# Patient Record
Sex: MALE | Race: WHITE | Employment: OTHER | URBAN - METROPOLITAN AREA
[De-identification: names, ages, dates, MRNs, and addresses within clinical notes are randomized per-mention and may not be internally consistent; named-entity substitution may affect disease eponyms.]

---

## 2017-01-03 ENCOUNTER — TELEPHONE (OUTPATIENT)
Dept: FAMILY MEDICINE CLINIC | Age: 57
End: 2017-01-03

## 2017-01-03 RX ORDER — GUAIFENESIN AND CODEINE PHOSPHATE 100; 10 MG/5ML; MG/5ML
5 SOLUTION ORAL 4 TIMES DAILY PRN
Qty: 240 ML | Refills: 0 | Status: SHIPPED | OUTPATIENT
Start: 2017-01-03 | End: 2017-08-22 | Stop reason: ALTCHOICE

## 2017-02-06 ENCOUNTER — OFFICE VISIT (OUTPATIENT)
Dept: FAMILY MEDICINE CLINIC | Age: 57
End: 2017-02-06

## 2017-02-06 VITALS
SYSTOLIC BLOOD PRESSURE: 136 MMHG | WEIGHT: 275 LBS | HEIGHT: 70 IN | BODY MASS INDEX: 39.37 KG/M2 | RESPIRATION RATE: 16 BRPM | TEMPERATURE: 97.9 F | DIASTOLIC BLOOD PRESSURE: 89 MMHG | HEART RATE: 89 BPM | OXYGEN SATURATION: 95 %

## 2017-02-06 DIAGNOSIS — R19.7 DIARRHEA, UNSPECIFIED TYPE: ICD-10-CM

## 2017-02-06 DIAGNOSIS — G51.0 BELL'S PALSY: Primary | ICD-10-CM

## 2017-02-06 PROCEDURE — 99215 OFFICE O/P EST HI 40 MIN: CPT | Performed by: NURSE PRACTITIONER

## 2017-02-06 RX ORDER — PREDNISONE 10 MG/1
TABLET ORAL
Qty: 57 TABLET | Refills: 0 | Status: SHIPPED | OUTPATIENT
Start: 2017-02-06 | End: 2017-08-22 | Stop reason: ALTCHOICE

## 2017-02-06 RX ORDER — TEMAZEPAM 15 MG/1
15 CAPSULE ORAL
COMMUNITY
Start: 2017-01-23 | End: 2018-03-28

## 2017-02-06 RX ORDER — VALACYCLOVIR HYDROCHLORIDE 1 G/1
1000 TABLET, FILM COATED ORAL 3 TIMES DAILY
Qty: 21 TABLET | Refills: 0 | Status: SHIPPED | OUTPATIENT
Start: 2017-02-06 | End: 2018-03-28 | Stop reason: ALTCHOICE

## 2017-02-06 RX ORDER — BENZONATATE 200 MG/1
CAPSULE ORAL
COMMUNITY
Start: 2017-01-23 | End: 2018-03-28 | Stop reason: ALTCHOICE

## 2017-02-06 RX ORDER — GLIMEPIRIDE 2 MG/1
TABLET ORAL
COMMUNITY
Start: 2016-12-22 | End: 2017-02-27 | Stop reason: SDUPTHER

## 2017-02-06 ASSESSMENT — ENCOUNTER SYMPTOMS
COUGH: 0
EYE DISCHARGE: 1
FLATUS: 1
VISUAL CHANGE: 0
VOMITING: 0
SHORTNESS OF BREATH: 0
NAUSEA: 0
BOWEL INCONTINENCE: 0
EYE REDNESS: 1
BLOOD IN STOOL: 0
DIARRHEA: 1
BACK PAIN: 0
CHOKING: 0
EYE PAIN: 0
PHOTOPHOBIA: 0
BLOATING: 0
CHEST TIGHTNESS: 0
ABDOMINAL PAIN: 1

## 2017-02-06 ASSESSMENT — PATIENT HEALTH QUESTIONNAIRE - PHQ9
SUM OF ALL RESPONSES TO PHQ QUESTIONS 1-9: 0
2. FEELING DOWN, DEPRESSED OR HOPELESS: 0
SUM OF ALL RESPONSES TO PHQ9 QUESTIONS 1 & 2: 0
1. LITTLE INTEREST OR PLEASURE IN DOING THINGS: 0

## 2017-02-22 ENCOUNTER — TELEPHONE (OUTPATIENT)
Dept: FAMILY MEDICINE CLINIC | Age: 57
End: 2017-02-22

## 2017-03-02 ENCOUNTER — OFFICE VISIT (OUTPATIENT)
Dept: FAMILY MEDICINE CLINIC | Age: 57
End: 2017-03-02

## 2017-03-02 VITALS
SYSTOLIC BLOOD PRESSURE: 118 MMHG | DIASTOLIC BLOOD PRESSURE: 80 MMHG | TEMPERATURE: 98.7 F | BODY MASS INDEX: 38.6 KG/M2 | WEIGHT: 269 LBS | HEART RATE: 93 BPM | RESPIRATION RATE: 16 BRPM

## 2017-03-02 DIAGNOSIS — M99.03 LUMBAR REGION SOMATIC DYSFUNCTION: Primary | ICD-10-CM

## 2017-03-02 DIAGNOSIS — I10 ESSENTIAL HYPERTENSION: ICD-10-CM

## 2017-03-02 PROCEDURE — 99213 OFFICE O/P EST LOW 20 MIN: CPT | Performed by: FAMILY MEDICINE

## 2017-03-02 ASSESSMENT — PATIENT HEALTH QUESTIONNAIRE - PHQ9
SUM OF ALL RESPONSES TO PHQ QUESTIONS 1-9: 1
1. LITTLE INTEREST OR PLEASURE IN DOING THINGS: 0
2. FEELING DOWN, DEPRESSED OR HOPELESS: 1
SUM OF ALL RESPONSES TO PHQ9 QUESTIONS 1 & 2: 1

## 2017-03-02 ASSESSMENT — ENCOUNTER SYMPTOMS
SHORTNESS OF BREATH: 0
DIARRHEA: 0
WHEEZING: 0
NAUSEA: 0
ABDOMINAL PAIN: 0
CONSTIPATION: 0

## 2017-03-08 DIAGNOSIS — R79.89 LOW VITAMIN D LEVEL: ICD-10-CM

## 2017-03-08 RX ORDER — ERGOCALCIFEROL (VITAMIN D2) 1250 MCG
50000 CAPSULE ORAL WEEKLY
Qty: 4 CAPSULE | Refills: 3 | Status: SHIPPED | OUTPATIENT
Start: 2017-03-08 | End: 2018-03-28 | Stop reason: ALTCHOICE

## 2017-04-05 ENCOUNTER — OFFICE VISIT (OUTPATIENT)
Dept: FAMILY MEDICINE CLINIC | Age: 57
End: 2017-04-05

## 2017-04-05 VITALS
TEMPERATURE: 98.7 F | SYSTOLIC BLOOD PRESSURE: 138 MMHG | HEART RATE: 102 BPM | DIASTOLIC BLOOD PRESSURE: 90 MMHG | RESPIRATION RATE: 16 BRPM | BODY MASS INDEX: 39.89 KG/M2 | WEIGHT: 278 LBS

## 2017-04-05 DIAGNOSIS — S46.919A STRAIN OF SHOULDER, UNSPECIFIED LATERALITY, INITIAL ENCOUNTER: ICD-10-CM

## 2017-04-05 DIAGNOSIS — I10 ESSENTIAL HYPERTENSION: Primary | ICD-10-CM

## 2017-04-05 PROCEDURE — 99213 OFFICE O/P EST LOW 20 MIN: CPT | Performed by: FAMILY MEDICINE

## 2017-04-05 ASSESSMENT — ENCOUNTER SYMPTOMS
SHORTNESS OF BREATH: 0
COUGH: 0
WHEEZING: 0
BACK PAIN: 0

## 2017-04-05 ASSESSMENT — PATIENT HEALTH QUESTIONNAIRE - PHQ9
SUM OF ALL RESPONSES TO PHQ QUESTIONS 1-9: 0
1. LITTLE INTEREST OR PLEASURE IN DOING THINGS: 0
SUM OF ALL RESPONSES TO PHQ9 QUESTIONS 1 & 2: 0

## 2017-04-07 DIAGNOSIS — E79.0 HYPERURICEMIA: Primary | ICD-10-CM

## 2017-04-07 DIAGNOSIS — E78.2 MIXED HYPERLIPIDEMIA: ICD-10-CM

## 2017-04-13 ENCOUNTER — TELEPHONE (OUTPATIENT)
Dept: FAMILY MEDICINE CLINIC | Age: 57
End: 2017-04-13

## 2017-04-17 ENCOUNTER — TELEPHONE (OUTPATIENT)
Dept: FAMILY MEDICINE CLINIC | Age: 57
End: 2017-04-17

## 2017-04-18 RX ORDER — LANCETS
1 EACH MISCELLANEOUS DAILY
Qty: 100 EACH | Refills: 3 | Status: SHIPPED | OUTPATIENT
Start: 2017-04-18 | End: 2017-04-24 | Stop reason: SDUPTHER

## 2017-04-24 RX ORDER — LANCETS
1 EACH MISCELLANEOUS DAILY
Qty: 100 EACH | Refills: 3 | Status: SHIPPED | OUTPATIENT
Start: 2017-04-24 | End: 2017-08-02 | Stop reason: SDUPTHER

## 2017-04-28 ENCOUNTER — TELEPHONE (OUTPATIENT)
Dept: FAMILY MEDICINE CLINIC | Age: 57
End: 2017-04-28

## 2017-05-16 ENCOUNTER — TELEPHONE (OUTPATIENT)
Dept: FAMILY MEDICINE CLINIC | Age: 57
End: 2017-05-16

## 2017-05-16 RX ORDER — LANCETS 28 GAUGE
1 EACH MISCELLANEOUS DAILY
Qty: 100 EACH | Refills: 3 | Status: SHIPPED | OUTPATIENT
Start: 2017-05-16 | End: 2017-12-13 | Stop reason: SDUPTHER

## 2017-05-16 RX ORDER — BLOOD-GLUCOSE METER
1 KIT MISCELLANEOUS DAILY
Qty: 1 KIT | Refills: 0 | Status: SHIPPED | OUTPATIENT
Start: 2017-05-16 | End: 2018-11-09 | Stop reason: SDUPTHER

## 2017-05-26 ENCOUNTER — HOSPITAL ENCOUNTER (OUTPATIENT)
Dept: GENERAL RADIOLOGY | Age: 57
Discharge: HOME | End: 2017-05-26

## 2017-05-26 ENCOUNTER — OFFICE VISIT (OUTPATIENT)
Dept: FAMILY MEDICINE CLINIC | Age: 57
End: 2017-05-26

## 2017-05-26 VITALS
OXYGEN SATURATION: 97 % | BODY MASS INDEX: 40.23 KG/M2 | DIASTOLIC BLOOD PRESSURE: 92 MMHG | HEIGHT: 70 IN | HEART RATE: 89 BPM | WEIGHT: 281 LBS | SYSTOLIC BLOOD PRESSURE: 138 MMHG

## 2017-05-26 DIAGNOSIS — M75.42 IMPINGEMENT SYNDROME OF LEFT SHOULDER: Primary | ICD-10-CM

## 2017-05-26 DIAGNOSIS — R52 PAIN: ICD-10-CM

## 2017-05-26 PROCEDURE — 99213 OFFICE O/P EST LOW 20 MIN: CPT | Performed by: NURSE PRACTITIONER

## 2017-05-26 ASSESSMENT — ENCOUNTER SYMPTOMS
BACK PAIN: 1
RESPIRATORY NEGATIVE: 1

## 2017-06-19 ENCOUNTER — TELEPHONE (OUTPATIENT)
Dept: FAMILY MEDICINE CLINIC | Age: 57
End: 2017-06-19

## 2017-07-06 RX ORDER — POTASSIUM CHLORIDE 20 MEQ/1
20 TABLET, EXTENDED RELEASE ORAL 2 TIMES DAILY
Qty: 180 TABLET | Refills: 3 | Status: SHIPPED | OUTPATIENT
Start: 2017-07-06 | End: 2018-07-09 | Stop reason: SDUPTHER

## 2017-07-26 RX ORDER — ATORVASTATIN CALCIUM 40 MG/1
40 TABLET, FILM COATED ORAL DAILY
Qty: 30 TABLET | Refills: 3 | Status: SHIPPED | OUTPATIENT
Start: 2017-07-26 | End: 2017-10-11 | Stop reason: SDUPTHER

## 2017-07-27 RX ORDER — GUAIFENESIN AND DEXTROMETHORPHAN HYDROBROMIDE 600; 30 MG/1; MG/1
1 TABLET, EXTENDED RELEASE ORAL 2 TIMES DAILY
Qty: 28 TABLET | Refills: 1 | Status: SHIPPED | OUTPATIENT
Start: 2017-07-27 | End: 2017-08-22 | Stop reason: ALTCHOICE

## 2017-08-02 DIAGNOSIS — M75.42 IMPINGEMENT SYNDROME OF LEFT SHOULDER: ICD-10-CM

## 2017-08-02 RX ORDER — LANCETS
1 EACH MISCELLANEOUS DAILY
Qty: 100 EACH | Refills: 3 | Status: SHIPPED | OUTPATIENT
Start: 2017-08-02 | End: 2017-12-13 | Stop reason: SDUPTHER

## 2017-08-22 ENCOUNTER — OFFICE VISIT (OUTPATIENT)
Dept: FAMILY MEDICINE CLINIC | Age: 57
End: 2017-08-22

## 2017-08-22 VITALS
HEART RATE: 88 BPM | RESPIRATION RATE: 16 BRPM | BODY MASS INDEX: 40.03 KG/M2 | SYSTOLIC BLOOD PRESSURE: 134 MMHG | WEIGHT: 279 LBS | DIASTOLIC BLOOD PRESSURE: 82 MMHG | TEMPERATURE: 97.2 F

## 2017-08-22 DIAGNOSIS — I10 ESSENTIAL HYPERTENSION: ICD-10-CM

## 2017-08-22 DIAGNOSIS — M75.52 BURSITIS OF SHOULDER, LEFT: Primary | ICD-10-CM

## 2017-08-22 PROCEDURE — 99213 OFFICE O/P EST LOW 20 MIN: CPT | Performed by: FAMILY MEDICINE

## 2017-08-22 RX ORDER — MELOXICAM 15 MG/1
15 TABLET ORAL DAILY
Qty: 20 TABLET | Refills: 0 | Status: SHIPPED | OUTPATIENT
Start: 2017-08-22 | End: 2017-11-08 | Stop reason: ALTCHOICE

## 2017-08-22 ASSESSMENT — ENCOUNTER SYMPTOMS
WHEEZING: 0
COUGH: 0
SHORTNESS OF BREATH: 0

## 2017-08-24 RX ORDER — BUMETANIDE 2 MG/1
TABLET ORAL
Qty: 270 TABLET | Refills: 1 | Status: SHIPPED | OUTPATIENT
Start: 2017-08-24 | End: 2018-03-28 | Stop reason: SDUPTHER

## 2017-08-29 RX ORDER — LOSARTAN POTASSIUM 25 MG/1
TABLET ORAL
Qty: 30 TABLET | Refills: 11 | Status: SHIPPED | OUTPATIENT
Start: 2017-08-29 | End: 2018-03-28 | Stop reason: ALTCHOICE

## 2017-08-29 RX ORDER — BUMETANIDE 2 MG/1
TABLET ORAL
Qty: 270 TABLET | Refills: 1 | Status: SHIPPED | OUTPATIENT
Start: 2017-08-29 | End: 2018-08-20 | Stop reason: SDUPTHER

## 2017-09-06 ENCOUNTER — TELEPHONE (OUTPATIENT)
Dept: FAMILY MEDICINE CLINIC | Age: 57
End: 2017-09-06

## 2017-10-11 ENCOUNTER — OFFICE VISIT (OUTPATIENT)
Dept: FAMILY MEDICINE CLINIC | Age: 57
End: 2017-10-11

## 2017-10-11 VITALS
TEMPERATURE: 98.2 F | BODY MASS INDEX: 40.18 KG/M2 | WEIGHT: 280 LBS | SYSTOLIC BLOOD PRESSURE: 138 MMHG | DIASTOLIC BLOOD PRESSURE: 88 MMHG | RESPIRATION RATE: 16 BRPM

## 2017-10-11 DIAGNOSIS — N39.0 URINARY TRACT INFECTION WITHOUT HEMATURIA, SITE UNSPECIFIED: Primary | ICD-10-CM

## 2017-10-11 DIAGNOSIS — I10 ESSENTIAL HYPERTENSION: ICD-10-CM

## 2017-10-11 DIAGNOSIS — Z00.00 ROUTINE GENERAL MEDICAL EXAMINATION AT A HEALTH CARE FACILITY: ICD-10-CM

## 2017-10-11 LAB
ALBUMIN SERPL-MCNC: 3.5 G/DL (ref 3.4–5)
ALP BLD-CCNC: 96 U/L (ref 40–129)
ALT SERPL-CCNC: 26 U/L (ref 10–40)
ANION GAP SERPL CALCULATED.3IONS-SCNC: 15 MMOL/L (ref 3–16)
AST SERPL-CCNC: 26 U/L (ref 15–37)
BASOPHILS ABSOLUTE: 0 K/UL (ref 0–0.2)
BASOPHILS RELATIVE PERCENT: 0.6 %
BILIRUB SERPL-MCNC: 0.5 MG/DL (ref 0–1)
BILIRUBIN DIRECT: <0.2 MG/DL (ref 0–0.3)
BILIRUBIN, INDIRECT: ABNORMAL MG/DL (ref 0–1)
BILIRUBIN, POC: NORMAL
BLOOD URINE, POC: NORMAL
BUN BLDV-MCNC: 21 MG/DL (ref 7–20)
CALCIUM SERPL-MCNC: 9 MG/DL (ref 8.3–10.6)
CHLORIDE BLD-SCNC: 101 MMOL/L (ref 99–110)
CHOLESTEROL, TOTAL: 185 MG/DL (ref 0–199)
CLARITY, POC: NORMAL
CO2: 27 MMOL/L (ref 21–32)
COLOR, POC: NORMAL
CREAT SERPL-MCNC: 1.5 MG/DL (ref 0.9–1.3)
EOSINOPHILS ABSOLUTE: 0.3 K/UL (ref 0–0.6)
EOSINOPHILS RELATIVE PERCENT: 4.3 %
GFR AFRICAN AMERICAN: 58
GFR NON-AFRICAN AMERICAN: 48
GLUCOSE BLD-MCNC: 114 MG/DL (ref 70–99)
GLUCOSE URINE, POC: NORMAL
HCT VFR BLD CALC: 43.1 % (ref 40.5–52.5)
HDLC SERPL-MCNC: 33 MG/DL (ref 40–60)
HEMOGLOBIN: 13.7 G/DL (ref 13.5–17.5)
KETONES, POC: NORMAL
LDL CHOLESTEROL CALCULATED: ABNORMAL MG/DL
LDL CHOLESTEROL DIRECT: 97 MG/DL
LEUKOCYTE EST, POC: NORMAL
LYMPHOCYTES ABSOLUTE: 1.4 K/UL (ref 1–5.1)
LYMPHOCYTES RELATIVE PERCENT: 21.5 %
MCH RBC QN AUTO: 29.1 PG (ref 26–34)
MCHC RBC AUTO-ENTMCNC: 31.8 G/DL (ref 31–36)
MCV RBC AUTO: 91.7 FL (ref 80–100)
MONOCYTES ABSOLUTE: 0.6 K/UL (ref 0–1.3)
MONOCYTES RELATIVE PERCENT: 9.5 %
NEUTROPHILS ABSOLUTE: 4.2 K/UL (ref 1.7–7.7)
NEUTROPHILS RELATIVE PERCENT: 64.1 %
NITRITE, POC: NORMAL
PDW BLD-RTO: 17.5 % (ref 12.4–15.4)
PH, POC: 7
PLATELET # BLD: 144 K/UL (ref 135–450)
PMV BLD AUTO: 9.3 FL (ref 5–10.5)
POTASSIUM SERPL-SCNC: 3.8 MMOL/L (ref 3.5–5.1)
PROTEIN, POC: NORMAL
RBC # BLD: 4.7 M/UL (ref 4.2–5.9)
SODIUM BLD-SCNC: 143 MMOL/L (ref 136–145)
SPECIFIC GRAVITY, POC: 1.02
TOTAL PROTEIN: 6.1 G/DL (ref 6.4–8.2)
TRIGL SERPL-MCNC: 434 MG/DL (ref 0–150)
UROBILINOGEN, POC: NORMAL
VLDLC SERPL CALC-MCNC: ABNORMAL MG/DL
WBC # BLD: 6.6 K/UL (ref 4–11)

## 2017-10-11 PROCEDURE — 81003 URINALYSIS AUTO W/O SCOPE: CPT | Performed by: FAMILY MEDICINE

## 2017-10-11 PROCEDURE — 99213 OFFICE O/P EST LOW 20 MIN: CPT | Performed by: FAMILY MEDICINE

## 2017-10-11 RX ORDER — ATORVASTATIN CALCIUM 40 MG/1
40 TABLET, FILM COATED ORAL DAILY
Qty: 30 TABLET | Refills: 3 | Status: SHIPPED | OUTPATIENT
Start: 2017-10-11 | End: 2018-03-06 | Stop reason: SDUPTHER

## 2017-10-11 NOTE — PROGRESS NOTES
Yes Cheryal Burkitt, DO   gabapentin (NEURONTIN) 300 MG capsule  Yes Historical Provider, MD   zaleplon (SONATA) 10 MG capsule Take 10 mg by mouth nightly as needed (takes 5-6 times a month when Melatonin really not effective)  Yes Historical Provider, MD   fluticasone (FLONASE) 50 MCG/ACT nasal spray 2 sprays by Nasal route Yes Historical Provider, MD   Melatonin 10 MG TABS Take 10 mg by mouth Yes Historical Provider, MD

## 2017-10-12 ENCOUNTER — TELEPHONE (OUTPATIENT)
Dept: FAMILY MEDICINE CLINIC | Age: 57
End: 2017-10-12

## 2017-10-15 ASSESSMENT — ENCOUNTER SYMPTOMS: SHORTNESS OF BREATH: 0

## 2017-11-06 NOTE — TELEPHONE ENCOUNTER
Patient return your call please call around  10 on Wednesday,       Or call before 4:45 tonight 11/6/17

## 2017-11-08 NOTE — TELEPHONE ENCOUNTER
Spoke to patient about labs. We stressed eating carb restriction etc. for sugar. Renal function discussed. Kind of stable elevation of creatinine. Recommend not taking anti-inflammatories, ibuprofen, Aleve, Motrin. Tylenol is okay. Recommend continue the same medicines.  See me in February

## 2017-11-28 RX ORDER — GLIMEPIRIDE 2 MG/1
TABLET ORAL
Qty: 30 TABLET | Refills: 5 | Status: SHIPPED | OUTPATIENT
Start: 2017-11-28 | End: 2018-03-28 | Stop reason: ALTCHOICE

## 2017-12-04 RX ORDER — METOPROLOL SUCCINATE 100 MG/1
TABLET, EXTENDED RELEASE ORAL
Qty: 90 TABLET | Refills: 3 | Status: ON HOLD | OUTPATIENT
Start: 2017-12-04 | End: 2018-11-30 | Stop reason: HOSPADM

## 2018-02-15 DIAGNOSIS — M75.42 IMPINGEMENT SYNDROME OF LEFT SHOULDER: ICD-10-CM

## 2018-02-23 ENCOUNTER — TELEPHONE (OUTPATIENT)
Dept: FAMILY MEDICINE CLINIC | Age: 58
End: 2018-02-23

## 2018-02-23 NOTE — TELEPHONE ENCOUNTER
Pt called wanting to know if you have a recommendation for an ophthalmologist for R eye blurriness. Advised pt that you can recommend someone, but it would be his responsibility to see if they're covered under his insurance. Please advise.

## 2018-02-23 NOTE — TELEPHONE ENCOUNTER
Give him the number of [facility]. They have a general referral number to connect him with someone near him.   Or [facility]    Yes, he needs to check about the insurance

## 2018-03-06 RX ORDER — ATORVASTATIN CALCIUM 40 MG/1
TABLET, FILM COATED ORAL
Qty: 30 TABLET | Refills: 3 | Status: SHIPPED | OUTPATIENT
Start: 2018-03-06 | End: 2018-07-09 | Stop reason: SDUPTHER

## 2018-03-28 ENCOUNTER — TELEPHONE (OUTPATIENT)
Dept: FAMILY MEDICINE CLINIC | Age: 58
End: 2018-03-28

## 2018-03-28 ENCOUNTER — OFFICE VISIT (OUTPATIENT)
Dept: FAMILY MEDICINE CLINIC | Age: 58
End: 2018-03-28

## 2018-03-28 VITALS
DIASTOLIC BLOOD PRESSURE: 86 MMHG | HEIGHT: 70 IN | OXYGEN SATURATION: 99 % | BODY MASS INDEX: 40.23 KG/M2 | WEIGHT: 281 LBS | HEART RATE: 93 BPM | SYSTOLIC BLOOD PRESSURE: 138 MMHG

## 2018-03-28 DIAGNOSIS — I10 ESSENTIAL HYPERTENSION: ICD-10-CM

## 2018-03-28 DIAGNOSIS — E11.9 TYPE 2 DIABETES MELLITUS WITHOUT COMPLICATION, WITHOUT LONG-TERM CURRENT USE OF INSULIN (HCC): ICD-10-CM

## 2018-03-28 DIAGNOSIS — H33.21 RIGHT RETINAL DETACHMENT: Primary | ICD-10-CM

## 2018-03-28 DIAGNOSIS — Z01.818 PRE-OP EXAM: ICD-10-CM

## 2018-03-28 DIAGNOSIS — E79.0 HYPERURICEMIA: ICD-10-CM

## 2018-03-28 LAB
A/G RATIO: 1.8 (ref 1.1–2.2)
ALBUMIN SERPL-MCNC: 4.3 G/DL (ref 3.4–5)
ALP BLD-CCNC: 132 U/L (ref 40–129)
ALT SERPL-CCNC: 32 U/L (ref 10–40)
ANION GAP SERPL CALCULATED.3IONS-SCNC: 21 MMOL/L (ref 3–16)
AST SERPL-CCNC: 26 U/L (ref 15–37)
BASOPHILS ABSOLUTE: 0 K/UL (ref 0–0.2)
BASOPHILS RELATIVE PERCENT: 0.5 %
BILIRUB SERPL-MCNC: 0.3 MG/DL (ref 0–1)
BUN BLDV-MCNC: 42 MG/DL (ref 7–20)
CALCIUM SERPL-MCNC: 9.6 MG/DL (ref 8.3–10.6)
CHLORIDE BLD-SCNC: 104 MMOL/L (ref 99–110)
CO2: 24 MMOL/L (ref 21–32)
CREAT SERPL-MCNC: 1.7 MG/DL (ref 0.9–1.3)
EOSINOPHILS ABSOLUTE: 0.7 K/UL (ref 0–0.6)
EOSINOPHILS RELATIVE PERCENT: 11.3 %
GFR AFRICAN AMERICAN: 50
GFR NON-AFRICAN AMERICAN: 41
GLOBULIN: 2.4 G/DL
GLUCOSE BLD-MCNC: 164 MG/DL (ref 70–99)
HCT VFR BLD CALC: 42.7 % (ref 40.5–52.5)
HEMOGLOBIN: 13.7 G/DL (ref 13.5–17.5)
LYMPHOCYTES ABSOLUTE: 1.2 K/UL (ref 1–5.1)
LYMPHOCYTES RELATIVE PERCENT: 19 %
MCH RBC QN AUTO: 29.9 PG (ref 26–34)
MCHC RBC AUTO-ENTMCNC: 32.2 G/DL (ref 31–36)
MCV RBC AUTO: 92.9 FL (ref 80–100)
MONOCYTES ABSOLUTE: 0.5 K/UL (ref 0–1.3)
MONOCYTES RELATIVE PERCENT: 8.3 %
NEUTROPHILS ABSOLUTE: 3.9 K/UL (ref 1.7–7.7)
NEUTROPHILS RELATIVE PERCENT: 60.9 %
PDW BLD-RTO: 17.3 % (ref 12.4–15.4)
PLATELET # BLD: 152 K/UL (ref 135–450)
PMV BLD AUTO: 10 FL (ref 5–10.5)
POTASSIUM SERPL-SCNC: 4.6 MMOL/L (ref 3.5–5.1)
RBC # BLD: 4.6 M/UL (ref 4.2–5.9)
SODIUM BLD-SCNC: 149 MMOL/L (ref 136–145)
TOTAL PROTEIN: 6.7 G/DL (ref 6.4–8.2)
URIC ACID, SERUM: 5.5 MG/DL (ref 3.5–7.2)
WBC # BLD: 6.5 K/UL (ref 4–11)

## 2018-03-28 PROCEDURE — 3046F HEMOGLOBIN A1C LEVEL >9.0%: CPT | Performed by: FAMILY MEDICINE

## 2018-03-28 PROCEDURE — G8484 FLU IMMUNIZE NO ADMIN: HCPCS | Performed by: FAMILY MEDICINE

## 2018-03-28 PROCEDURE — 99214 OFFICE O/P EST MOD 30 MIN: CPT | Performed by: FAMILY MEDICINE

## 2018-03-28 PROCEDURE — G8417 CALC BMI ABV UP PARAM F/U: HCPCS | Performed by: FAMILY MEDICINE

## 2018-03-28 PROCEDURE — 1036F TOBACCO NON-USER: CPT | Performed by: FAMILY MEDICINE

## 2018-03-28 PROCEDURE — 3017F COLORECTAL CA SCREEN DOC REV: CPT | Performed by: FAMILY MEDICINE

## 2018-03-28 PROCEDURE — G8427 DOCREV CUR MEDS BY ELIG CLIN: HCPCS | Performed by: FAMILY MEDICINE

## 2018-03-28 ASSESSMENT — ENCOUNTER SYMPTOMS
COUGH: 0
TROUBLE SWALLOWING: 0
SORE THROAT: 0
WHEEZING: 0
GASTROINTESTINAL NEGATIVE: 1
SHORTNESS OF BREATH: 0

## 2018-03-29 LAB
ESTIMATED AVERAGE GLUCOSE: 197.3 MG/DL
HBA1C MFR BLD: 8.5 %

## 2018-04-23 RX ORDER — ASPIRIN 81 MG/1
TABLET ORAL
Qty: 90 TABLET | Refills: 3 | Status: SHIPPED | OUTPATIENT
Start: 2018-04-23

## 2018-05-03 RX ORDER — BLOOD-GLUCOSE METER
KIT MISCELLANEOUS
Qty: 1 KIT | Refills: 0 | Status: SHIPPED | OUTPATIENT
Start: 2018-05-03 | End: 2018-11-09 | Stop reason: SDUPTHER

## 2018-05-03 RX ORDER — LANCETS 28 GAUGE
1 EACH MISCELLANEOUS DAILY
Qty: 100 EACH | Refills: 3 | Status: ON HOLD | OUTPATIENT
Start: 2018-05-03 | End: 2018-12-27 | Stop reason: HOSPADM

## 2018-05-05 DIAGNOSIS — M75.42 IMPINGEMENT SYNDROME OF LEFT SHOULDER: ICD-10-CM

## 2018-05-07 RX ORDER — GLIMEPIRIDE 2 MG/1
TABLET ORAL
Qty: 30 TABLET | Refills: 5 | Status: SHIPPED | OUTPATIENT
Start: 2018-05-07 | End: 2018-11-22

## 2018-05-07 RX ORDER — ALLOPURINOL 300 MG/1
TABLET ORAL
Qty: 90 TABLET | Refills: 3 | Status: SHIPPED | OUTPATIENT
Start: 2018-05-07 | End: 2019-02-27

## 2018-06-01 ENCOUNTER — TELEPHONE (OUTPATIENT)
Dept: ORTHOPEDIC SURGERY | Age: 58
End: 2018-06-01

## 2018-06-19 ENCOUNTER — OFFICE VISIT (OUTPATIENT)
Dept: FAMILY MEDICINE CLINIC | Age: 58
End: 2018-06-19

## 2018-06-19 VITALS
SYSTOLIC BLOOD PRESSURE: 138 MMHG | WEIGHT: 275 LBS | HEART RATE: 87 BPM | HEIGHT: 70 IN | DIASTOLIC BLOOD PRESSURE: 88 MMHG | BODY MASS INDEX: 39.37 KG/M2 | OXYGEN SATURATION: 98 %

## 2018-06-19 DIAGNOSIS — G93.1 ANOXIC BRAIN INJURY (HCC): ICD-10-CM

## 2018-06-19 DIAGNOSIS — E11.8 TYPE 2 DIABETES MELLITUS WITH COMPLICATION, WITHOUT LONG-TERM CURRENT USE OF INSULIN (HCC): Primary | ICD-10-CM

## 2018-06-19 DIAGNOSIS — R79.89 AZOTEMIA: ICD-10-CM

## 2018-06-19 DIAGNOSIS — I10 ESSENTIAL HYPERTENSION: ICD-10-CM

## 2018-06-19 LAB
CREATININE URINE POCT: 50
HBA1C MFR BLD: 7.3 %
MICROALBUMIN/CREAT 24H UR: 150 MG/G{CREAT}
MICROALBUMIN/CREAT UR-RTO: 300

## 2018-06-19 PROCEDURE — 99214 OFFICE O/P EST MOD 30 MIN: CPT | Performed by: FAMILY MEDICINE

## 2018-06-19 PROCEDURE — G8417 CALC BMI ABV UP PARAM F/U: HCPCS | Performed by: FAMILY MEDICINE

## 2018-06-19 PROCEDURE — 3045F PR MOST RECENT HEMOGLOBIN A1C LEVEL 7.0-9.0%: CPT | Performed by: FAMILY MEDICINE

## 2018-06-19 PROCEDURE — 83036 HEMOGLOBIN GLYCOSYLATED A1C: CPT | Performed by: FAMILY MEDICINE

## 2018-06-19 PROCEDURE — 3017F COLORECTAL CA SCREEN DOC REV: CPT | Performed by: FAMILY MEDICINE

## 2018-06-19 PROCEDURE — 1036F TOBACCO NON-USER: CPT | Performed by: FAMILY MEDICINE

## 2018-06-19 PROCEDURE — G8427 DOCREV CUR MEDS BY ELIG CLIN: HCPCS | Performed by: FAMILY MEDICINE

## 2018-06-19 PROCEDURE — 2022F DILAT RTA XM EVC RTNOPTHY: CPT | Performed by: FAMILY MEDICINE

## 2018-06-19 PROCEDURE — 82044 UR ALBUMIN SEMIQUANTITATIVE: CPT | Performed by: FAMILY MEDICINE

## 2018-06-19 ASSESSMENT — PATIENT HEALTH QUESTIONNAIRE - PHQ9
2. FEELING DOWN, DEPRESSED OR HOPELESS: 0
1. LITTLE INTEREST OR PLEASURE IN DOING THINGS: 0
SUM OF ALL RESPONSES TO PHQ QUESTIONS 1-9: 0
SUM OF ALL RESPONSES TO PHQ9 QUESTIONS 1 & 2: 0

## 2018-06-19 ASSESSMENT — ENCOUNTER SYMPTOMS
GASTROINTESTINAL NEGATIVE: 1
RESPIRATORY NEGATIVE: 1

## 2018-06-23 PROBLEM — E11.8 TYPE 2 DIABETES MELLITUS WITH COMPLICATION, WITHOUT LONG-TERM CURRENT USE OF INSULIN (HCC): Status: ACTIVE | Noted: 2018-06-23

## 2018-07-09 RX ORDER — POTASSIUM CHLORIDE 1500 MG/1
TABLET, EXTENDED RELEASE ORAL
Qty: 60 TABLET | Refills: 11 | Status: SHIPPED | OUTPATIENT
Start: 2018-07-09 | End: 2018-10-30 | Stop reason: ALTCHOICE

## 2018-07-31 ENCOUNTER — OFFICE VISIT (OUTPATIENT)
Dept: FAMILY MEDICINE CLINIC | Age: 58
End: 2018-07-31

## 2018-07-31 VITALS
OXYGEN SATURATION: 98 % | SYSTOLIC BLOOD PRESSURE: 128 MMHG | DIASTOLIC BLOOD PRESSURE: 74 MMHG | HEIGHT: 70 IN | WEIGHT: 275 LBS | TEMPERATURE: 98.2 F | HEART RATE: 84 BPM | BODY MASS INDEX: 39.37 KG/M2

## 2018-07-31 DIAGNOSIS — R13.12 OROPHARYNGEAL DYSPHAGIA: Primary | ICD-10-CM

## 2018-07-31 DIAGNOSIS — I10 ESSENTIAL HYPERTENSION: ICD-10-CM

## 2018-07-31 PROCEDURE — 99213 OFFICE O/P EST LOW 20 MIN: CPT | Performed by: FAMILY MEDICINE

## 2018-07-31 PROCEDURE — G8417 CALC BMI ABV UP PARAM F/U: HCPCS | Performed by: FAMILY MEDICINE

## 2018-07-31 PROCEDURE — 1036F TOBACCO NON-USER: CPT | Performed by: FAMILY MEDICINE

## 2018-07-31 PROCEDURE — 3017F COLORECTAL CA SCREEN DOC REV: CPT | Performed by: FAMILY MEDICINE

## 2018-07-31 PROCEDURE — G8427 DOCREV CUR MEDS BY ELIG CLIN: HCPCS | Performed by: FAMILY MEDICINE

## 2018-07-31 RX ORDER — FAMOTIDINE 40 MG/1
TABLET, FILM COATED ORAL
Qty: 30 TABLET | Refills: 3 | Status: SHIPPED | OUTPATIENT
Start: 2018-07-31 | End: 2018-11-22

## 2018-07-31 ASSESSMENT — ENCOUNTER SYMPTOMS
STRIDOR: 0
SORE THROAT: 1
WHEEZING: 0
TROUBLE SWALLOWING: 1

## 2018-08-01 DIAGNOSIS — M75.42 IMPINGEMENT SYNDROME OF LEFT SHOULDER: ICD-10-CM

## 2018-08-20 ENCOUNTER — OFFICE VISIT (OUTPATIENT)
Dept: FAMILY MEDICINE CLINIC | Age: 58
End: 2018-08-20

## 2018-08-20 VITALS
HEIGHT: 70 IN | WEIGHT: 273 LBS | DIASTOLIC BLOOD PRESSURE: 84 MMHG | OXYGEN SATURATION: 96 % | BODY MASS INDEX: 39.08 KG/M2 | SYSTOLIC BLOOD PRESSURE: 126 MMHG | HEART RATE: 95 BPM

## 2018-08-20 DIAGNOSIS — I10 ESSENTIAL HYPERTENSION: ICD-10-CM

## 2018-08-20 DIAGNOSIS — I10 ESSENTIAL HYPERTENSION: Primary | ICD-10-CM

## 2018-08-20 LAB
ANION GAP SERPL CALCULATED.3IONS-SCNC: 16 MMOL/L (ref 3–16)
BUN BLDV-MCNC: 29 MG/DL (ref 7–20)
CALCIUM SERPL-MCNC: 10.3 MG/DL (ref 8.3–10.6)
CHLORIDE BLD-SCNC: 103 MMOL/L (ref 99–110)
CO2: 27 MMOL/L (ref 21–32)
CREAT SERPL-MCNC: 1.9 MG/DL (ref 0.9–1.3)
GFR AFRICAN AMERICAN: 44
GFR NON-AFRICAN AMERICAN: 36
GLUCOSE BLD-MCNC: 129 MG/DL (ref 70–99)
POTASSIUM SERPL-SCNC: 4.5 MMOL/L (ref 3.5–5.1)
SODIUM BLD-SCNC: 146 MMOL/L (ref 136–145)

## 2018-08-20 PROCEDURE — 99213 OFFICE O/P EST LOW 20 MIN: CPT | Performed by: FAMILY MEDICINE

## 2018-08-20 PROCEDURE — 3017F COLORECTAL CA SCREEN DOC REV: CPT | Performed by: FAMILY MEDICINE

## 2018-08-20 PROCEDURE — G8417 CALC BMI ABV UP PARAM F/U: HCPCS | Performed by: FAMILY MEDICINE

## 2018-08-20 PROCEDURE — 1036F TOBACCO NON-USER: CPT | Performed by: FAMILY MEDICINE

## 2018-08-20 PROCEDURE — G8427 DOCREV CUR MEDS BY ELIG CLIN: HCPCS | Performed by: FAMILY MEDICINE

## 2018-08-20 RX ORDER — BUMETANIDE 2 MG/1
TABLET ORAL
Qty: 270 TABLET | Refills: 1 | Status: SHIPPED | OUTPATIENT
Start: 2018-08-20 | End: 2018-10-30 | Stop reason: ALTCHOICE

## 2018-08-20 ASSESSMENT — ENCOUNTER SYMPTOMS
WHEEZING: 0
CONSTIPATION: 0
SHORTNESS OF BREATH: 0
DIARRHEA: 0
COUGH: 0
BLOOD IN STOOL: 0

## 2018-08-22 DIAGNOSIS — R79.89 AZOTEMIA: ICD-10-CM

## 2018-08-22 DIAGNOSIS — I10 ESSENTIAL HYPERTENSION: Primary | ICD-10-CM

## 2018-08-28 ENCOUNTER — HOSPITAL ENCOUNTER (OUTPATIENT)
Dept: ULTRASOUND IMAGING | Age: 58
Discharge: HOME | End: 2018-08-28
Payer: COMMERCIAL

## 2018-08-28 DIAGNOSIS — I10 ESSENTIAL HYPERTENSION: ICD-10-CM

## 2018-08-28 DIAGNOSIS — R79.89 AZOTEMIA: ICD-10-CM

## 2018-08-28 PROCEDURE — 76770 US EXAM ABDO BACK WALL COMP: CPT

## 2018-09-27 ENCOUNTER — OFFICE VISIT (OUTPATIENT)
Dept: FAMILY MEDICINE CLINIC | Age: 58
End: 2018-09-27
Payer: COMMERCIAL

## 2018-09-27 VITALS
HEART RATE: 78 BPM | SYSTOLIC BLOOD PRESSURE: 136 MMHG | DIASTOLIC BLOOD PRESSURE: 88 MMHG | HEIGHT: 70 IN | BODY MASS INDEX: 38.94 KG/M2 | OXYGEN SATURATION: 98 % | WEIGHT: 272 LBS

## 2018-09-27 DIAGNOSIS — I10 ESSENTIAL HYPERTENSION: ICD-10-CM

## 2018-09-27 DIAGNOSIS — E11.8 TYPE 2 DIABETES MELLITUS WITH COMPLICATION, WITHOUT LONG-TERM CURRENT USE OF INSULIN (HCC): ICD-10-CM

## 2018-09-27 DIAGNOSIS — Z01.818 PRE-OP EXAM: ICD-10-CM

## 2018-09-27 DIAGNOSIS — H26.9 CATARACT OF RIGHT EYE, UNSPECIFIED CATARACT TYPE: Primary | ICD-10-CM

## 2018-09-27 LAB — HBA1C MFR BLD: 7.5 %

## 2018-09-27 PROCEDURE — 83036 HEMOGLOBIN GLYCOSYLATED A1C: CPT | Performed by: FAMILY MEDICINE

## 2018-09-27 PROCEDURE — 99214 OFFICE O/P EST MOD 30 MIN: CPT | Performed by: FAMILY MEDICINE

## 2018-09-27 PROCEDURE — G8427 DOCREV CUR MEDS BY ELIG CLIN: HCPCS | Performed by: FAMILY MEDICINE

## 2018-09-27 PROCEDURE — 1036F TOBACCO NON-USER: CPT | Performed by: FAMILY MEDICINE

## 2018-09-27 PROCEDURE — 3045F PR MOST RECENT HEMOGLOBIN A1C LEVEL 7.0-9.0%: CPT | Performed by: FAMILY MEDICINE

## 2018-09-27 PROCEDURE — G8417 CALC BMI ABV UP PARAM F/U: HCPCS | Performed by: FAMILY MEDICINE

## 2018-09-27 PROCEDURE — 2022F DILAT RTA XM EVC RTNOPTHY: CPT | Performed by: FAMILY MEDICINE

## 2018-09-27 PROCEDURE — 3017F COLORECTAL CA SCREEN DOC REV: CPT | Performed by: FAMILY MEDICINE

## 2018-09-27 RX ORDER — LOSARTAN POTASSIUM 25 MG/1
TABLET ORAL
Qty: 30 TABLET | Refills: 11 | Status: SHIPPED | OUTPATIENT
Start: 2018-09-27 | End: 2018-10-30 | Stop reason: ALTCHOICE

## 2018-09-27 ASSESSMENT — ENCOUNTER SYMPTOMS
COUGH: 0
WHEEZING: 0
SHORTNESS OF BREATH: 0
GASTROINTESTINAL NEGATIVE: 1

## 2018-09-27 NOTE — TELEPHONE ENCOUNTER
Pharm called to check the status of the Losartan refill. It was sent to the wrong office. Please send back to [facility] on file.

## 2018-10-02 DIAGNOSIS — N18.30 CKD (CHRONIC KIDNEY DISEASE) STAGE 3, GFR 30-59 ML/MIN (HCC): Primary | ICD-10-CM

## 2018-10-23 ENCOUNTER — CARE COORDINATION (OUTPATIENT)
Dept: FAMILY MEDICINE CLINIC | Age: 58
End: 2018-10-23

## 2018-10-30 ENCOUNTER — OFFICE VISIT (OUTPATIENT)
Dept: FAMILY MEDICINE CLINIC | Age: 58
End: 2018-10-30
Payer: COMMERCIAL

## 2018-10-30 ENCOUNTER — CARE COORDINATION (OUTPATIENT)
Dept: FAMILY MEDICINE CLINIC | Age: 58
End: 2018-10-30

## 2018-10-30 VITALS
WEIGHT: 276 LBS | BODY MASS INDEX: 39.6 KG/M2 | HEART RATE: 84 BPM | SYSTOLIC BLOOD PRESSURE: 138 MMHG | RESPIRATION RATE: 16 BRPM | TEMPERATURE: 98.4 F | DIASTOLIC BLOOD PRESSURE: 90 MMHG

## 2018-10-30 DIAGNOSIS — E11.8 TYPE 2 DIABETES MELLITUS WITH COMPLICATION, WITHOUT LONG-TERM CURRENT USE OF INSULIN (HCC): ICD-10-CM

## 2018-10-30 DIAGNOSIS — M79.10 MUSCLE PAIN: Primary | ICD-10-CM

## 2018-10-30 DIAGNOSIS — M79.10 MUSCLE PAIN: ICD-10-CM

## 2018-10-30 DIAGNOSIS — R07.9 CHEST PAIN, UNSPECIFIED TYPE: ICD-10-CM

## 2018-10-30 LAB
ANION GAP SERPL CALCULATED.3IONS-SCNC: 16 MMOL/L (ref 3–16)
BUN BLDV-MCNC: 41 MG/DL (ref 7–20)
CALCIUM SERPL-MCNC: 9.9 MG/DL (ref 8.3–10.6)
CHLORIDE BLD-SCNC: 100 MMOL/L (ref 99–110)
CHOLESTEROL, FASTING: 197 MG/DL (ref 0–199)
CO2: 29 MMOL/L (ref 21–32)
CREAT SERPL-MCNC: 2.4 MG/DL (ref 0.9–1.3)
GFR AFRICAN AMERICAN: 34
GFR NON-AFRICAN AMERICAN: 28
GLUCOSE BLD-MCNC: 133 MG/DL (ref 70–99)
HBA1C MFR BLD: 7.7 %
HDLC SERPL-MCNC: 33 MG/DL (ref 40–60)
LDL CHOLESTEROL CALCULATED: ABNORMAL MG/DL
LDL CHOLESTEROL DIRECT: 110 MG/DL
POTASSIUM SERPL-SCNC: 4.5 MMOL/L (ref 3.5–5.1)
SODIUM BLD-SCNC: 145 MMOL/L (ref 136–145)
TOTAL CK: 537 U/L (ref 39–308)
TRIGLYCERIDE, FASTING: 433 MG/DL (ref 0–150)
URIC ACID, SERUM: 4.5 MG/DL (ref 3.5–7.2)
VLDLC SERPL CALC-MCNC: ABNORMAL MG/DL

## 2018-10-30 PROCEDURE — G8484 FLU IMMUNIZE NO ADMIN: HCPCS | Performed by: NURSE PRACTITIONER

## 2018-10-30 PROCEDURE — 99214 OFFICE O/P EST MOD 30 MIN: CPT | Performed by: NURSE PRACTITIONER

## 2018-10-30 PROCEDURE — 3045F PR MOST RECENT HEMOGLOBIN A1C LEVEL 7.0-9.0%: CPT | Performed by: NURSE PRACTITIONER

## 2018-10-30 PROCEDURE — 2022F DILAT RTA XM EVC RTNOPTHY: CPT | Performed by: NURSE PRACTITIONER

## 2018-10-30 PROCEDURE — 83036 HEMOGLOBIN GLYCOSYLATED A1C: CPT | Performed by: NURSE PRACTITIONER

## 2018-10-30 PROCEDURE — G8417 CALC BMI ABV UP PARAM F/U: HCPCS | Performed by: NURSE PRACTITIONER

## 2018-10-30 PROCEDURE — 93000 ELECTROCARDIOGRAM COMPLETE: CPT | Performed by: NURSE PRACTITIONER

## 2018-10-30 PROCEDURE — G8427 DOCREV CUR MEDS BY ELIG CLIN: HCPCS | Performed by: NURSE PRACTITIONER

## 2018-10-30 PROCEDURE — 3017F COLORECTAL CA SCREEN DOC REV: CPT | Performed by: NURSE PRACTITIONER

## 2018-10-30 PROCEDURE — 1036F TOBACCO NON-USER: CPT | Performed by: NURSE PRACTITIONER

## 2018-10-30 RX ORDER — CYCLOBENZAPRINE HCL 10 MG
10 TABLET ORAL 3 TIMES DAILY PRN
Qty: 30 TABLET | Refills: 3 | Status: ON HOLD | OUTPATIENT
Start: 2018-10-30 | End: 2018-12-03

## 2018-10-30 RX ORDER — LOSARTAN POTASSIUM AND HYDROCHLOROTHIAZIDE 25; 100 MG/1; MG/1
1 TABLET ORAL DAILY
Qty: 30 TABLET | Refills: 3 | Status: SHIPPED | OUTPATIENT
Start: 2018-10-30 | End: 2018-11-22

## 2018-10-30 ASSESSMENT — ENCOUNTER SYMPTOMS
COUGH: 0
SHORTNESS OF BREATH: 0

## 2018-10-30 NOTE — CARE COORDINATION
(Comment: Nephrology)  Zone Management Tools:  Completed (Comment: DM)         Goals Addressed      Activity Plan                  I will increase my activity by contacting [facility] to see if they would help with $$ to join the [facility]    Barriers: impairment:  cognitive  Plan for overcoming my barriers: Working with RN [facility]  Confidence: 9/10  Anticipated Goal Completion Date: 11/30/18       Self Monitoring                  Self-Monitored Blood Glucose - I will check my blood sugar Fasting blood sugar  I will notify my provider of any trends of increasing or decreasing blood sugars over a 1 month period. I will notify my provider if I have any blood sugar readings less than 70 more than 2 times a month. Blood Pressure - I will take my blood pressure as directed - Daily  I will notify my provider of any trends of increasing or decreasing blood pressures over a month period of time. I will notify my provider of any changes in blood pressure associated with symptoms of dizziness, falls, passing out, headache, confusion/change in mental status. None Recently Recorded    Barriers: No BP cuff  Plan for overcoming my barriers: RN [facility] to inquire that Rx be sent in for BP cuff  Confidence: 9/10  Anticipated Goal Completion Date: 11/30/18              Prior to Admission medications    Medication Sig Start Date End Date Taking?  Authorizing Provider   losartan-hydrochlorothiazide (HYZAAR) 100-25 MG per tablet Take 1 tablet by mouth daily 10/30/18   PAYTON Munson - CNP   cyclobenzaprine (FLEXERIL) 10 MG tablet Take 1 tablet by mouth 3 times daily as needed for Muscle spasms 10/30/18   PAYTON Munson CNP   diclofenac (VOLTAREN) 50 MG EC tablet TAKE 1 TABLET BY MOUTH THREE TIMES DAILY WITH MEALS 8/2/18   PAYTON Alfredo CNP   famotidine (PEPCID) 40 MG tablet One tab daily before bed 7/31/18   Mary Rogers,    allopurinol (ZYLOPRIM) 100 MG tablet TAKE ONE TABLET BY MOUTH ONCE DAILY symptoms of low blood sugar?:  No       No patient-reported symptoms

## 2018-10-30 NOTE — PROGRESS NOTES
 Not on file. Social History Main Topics    Smoking status: Never Smoker    Smokeless tobacco: Never Used    Alcohol use No    Drug use: No    Sexual activity: Not on file     Other Topics Concern    Not on file     Social History Narrative    No narrative on file       Current Outpatient Prescriptions on File Prior to Visit   Medication Sig Dispense Refill    losartan (COZAAR) 25 MG tablet TAKE ONE TABLET BY MOUTH ONCE DAILY 30 tablet 11    bumetanide (BUMEX) 2 MG tablet TAKE THREE TABLETS BY MOUTH ONCE DAILY 270 tablet 1    diclofenac (VOLTAREN) 50 MG EC tablet TAKE 1 TABLET BY MOUTH THREE TIMES DAILY WITH MEALS 90 tablet 1    famotidine (PEPCID) 40 MG tablet One tab daily before bed 30 tablet 3    atorvastatin (LIPITOR) 40 MG tablet TAKE 1 TABLET BY MOUTH ONCE DAILY 30 tablet 5    KLOR-CON M20 20 MEQ extended release tablet TAKE ONE TABLET BY MOUTH TWICE DAILY 60 tablet 11    allopurinol (ZYLOPRIM) 100 MG tablet TAKE ONE TABLET BY MOUTH ONCE DAILY 90 tablet 3    amLODIPine (NORVASC) 5 MG tablet TAKE ONE TABLET BY MOUTH ONCE DAILY 90 tablet 3    allopurinol (ZYLOPRIM) 300 MG tablet TAKE ONE TABLET BY MOUTH ONCE DAILY IN  ADDITION  TO  THE  100MG  TABLET 90 tablet 3    glimepiride (AMARYL) 2 MG tablet TAKE ONE TABLET BY MOUTH ONCE DAILY IN THE MORNING BEFORE BREAKFAST 30 tablet 5    Blood Glucose Monitoring Suppl (FREESTYLE FREEDOM) KIT Test bid 1 kit 0    FREESTYLE LANCETS MISC 1 each by Does not apply route daily 100 each 3    glucose blood VI test strips (FREESTYLE TEST STRIPS) strip 1 each by In Vitro route daily As needed.  100 each 3    aspirin (ASPIRIN LOW DOSE) 81 MG EC tablet TAKE ONE TABLET BY MOUTH ONCE DAILY 90 tablet 3    ACCU-CHEK MULTICLIX LANCETS MISC USE ONE  TO CHECK GLUCOSE ONCE DAILY 102 each 3    metoprolol succinate (TOPROL XL) 100 MG extended release tablet TAKE ONE TABLET BY MOUTH ONCE DAILY 90 tablet 3    Blood Glucose Monitoring Suppl (FREESTYLE FREEDOM LITE)

## 2018-10-30 NOTE — PATIENT INSTRUCTIONS
Thank you for enrolling in [address] Please follow the instructions below to securely access your online medical record. shipbeat allows you to send messages to your doctor, view your test results, renew your prescriptions, schedule appointments, and more. How Do I Sign Up? 1. In your Internet browser, go to [email or web address]  2. Click on the Sign Up Now link in the Sign In box. You will see the New Member Sign Up page. 3. Enter your shipbeat Access Code exactly as it appears below. You will not need to use this code after youve completed the sign-up process. If you do not sign up before the expiration date, you must request a new code. shipbeat Access Code: Activation code not generated  Current shipbeat Status: Active    4. Enter your Social Security Number ([id number]) and Date of Birth (mm/dd/yyyy) as indicated and click Submit. You will be taken to the next sign-up page. 5. Create a shipbeat ID. This will be your shipbeat login ID and cannot be changed, so think of one that is secure and easy to remember. 6. Create a shipbeat password. You can change your password at any time. 7. Enter your Password Reset Question and Answer. This can be used at a later time if you forget your password. 8. Enter your e-mail address. You will receive e-mail notification when new information is available in [address] 9. Click Sign Up. You can now view your medical record. Additional Information  If you have questions, please contact your physician practice where you receive care. Remember, shipbeat is NOT to be used for urgent needs. For medical emergencies, dial 911.

## 2018-11-05 ENCOUNTER — TELEPHONE (OUTPATIENT)
Dept: FAMILY MEDICINE CLINIC | Age: 58
End: 2018-11-05

## 2018-11-05 DIAGNOSIS — N18.30 STAGE 3 CHRONIC KIDNEY DISEASE (HCC): Primary | ICD-10-CM

## 2018-11-05 DIAGNOSIS — M79.10 MUSCLE PAIN: ICD-10-CM

## 2018-11-05 DIAGNOSIS — E78.2 MIXED HYPERLIPIDEMIA: ICD-10-CM

## 2018-11-05 DIAGNOSIS — E78.2 ELEVATED CHOLESTEROL WITH HIGH TRIGLYCERIDES: ICD-10-CM

## 2018-11-09 ENCOUNTER — CARE COORDINATION (OUTPATIENT)
Dept: FAMILY MEDICINE CLINIC | Age: 58
End: 2018-11-09

## 2018-11-09 ENCOUNTER — OFFICE VISIT (OUTPATIENT)
Dept: FAMILY MEDICINE CLINIC | Age: 58
End: 2018-11-09
Payer: COMMERCIAL

## 2018-11-09 VITALS
WEIGHT: 267 LBS | TEMPERATURE: 98.5 F | SYSTOLIC BLOOD PRESSURE: 131 MMHG | BODY MASS INDEX: 38.31 KG/M2 | DIASTOLIC BLOOD PRESSURE: 85 MMHG | RESPIRATION RATE: 16 BRPM | HEART RATE: 78 BPM

## 2018-11-09 DIAGNOSIS — E66.01 MORBID OBESITY DUE TO EXCESS CALORIES (HCC): ICD-10-CM

## 2018-11-09 DIAGNOSIS — I10 HYPERTENSION, UNSPECIFIED TYPE: Primary | ICD-10-CM

## 2018-11-09 DIAGNOSIS — M75.52 BURSITIS OF SHOULDER, LEFT: Primary | ICD-10-CM

## 2018-11-09 PROCEDURE — 99213 OFFICE O/P EST LOW 20 MIN: CPT | Performed by: NURSE PRACTITIONER

## 2018-11-09 PROCEDURE — G8427 DOCREV CUR MEDS BY ELIG CLIN: HCPCS | Performed by: NURSE PRACTITIONER

## 2018-11-09 PROCEDURE — G8484 FLU IMMUNIZE NO ADMIN: HCPCS | Performed by: NURSE PRACTITIONER

## 2018-11-09 PROCEDURE — G8417 CALC BMI ABV UP PARAM F/U: HCPCS | Performed by: NURSE PRACTITIONER

## 2018-11-09 PROCEDURE — 3017F COLORECTAL CA SCREEN DOC REV: CPT | Performed by: NURSE PRACTITIONER

## 2018-11-09 PROCEDURE — 1036F TOBACCO NON-USER: CPT | Performed by: NURSE PRACTITIONER

## 2018-11-09 ASSESSMENT — ENCOUNTER SYMPTOMS
RESPIRATORY NEGATIVE: 1
EYES NEGATIVE: 1

## 2018-11-09 NOTE — PROGRESS NOTES
tablet Take 1 tablet by mouth daily 30 tablet 3    cyclobenzaprine (FLEXERIL) 10 MG tablet Take 1 tablet by mouth 3 times daily as needed for Muscle spasms 30 tablet 3    diclofenac (VOLTAREN) 50 MG EC tablet TAKE 1 TABLET BY MOUTH THREE TIMES DAILY WITH MEALS 90 tablet 1    famotidine (PEPCID) 40 MG tablet One tab daily before bed 30 tablet 3    allopurinol (ZYLOPRIM) 100 MG tablet TAKE ONE TABLET BY MOUTH ONCE DAILY 90 tablet 3    amLODIPine (NORVASC) 5 MG tablet TAKE ONE TABLET BY MOUTH ONCE DAILY 90 tablet 3    allopurinol (ZYLOPRIM) 300 MG tablet TAKE ONE TABLET BY MOUTH ONCE DAILY IN  ADDITION  TO  THE  100MG  TABLET 90 tablet 3    glimepiride (AMARYL) 2 MG tablet TAKE ONE TABLET BY MOUTH ONCE DAILY IN THE MORNING BEFORE BREAKFAST 30 tablet 5    FREESTYLE LANCETS MISC 1 each by Does not apply route daily 100 each 3    glucose blood VI test strips (FREESTYLE TEST STRIPS) strip 1 each by In Vitro route daily As needed. 100 each 3    aspirin (ASPIRIN LOW DOSE) 81 MG EC tablet TAKE ONE TABLET BY MOUTH ONCE DAILY 90 tablet 3    ACCU-CHEK MULTICLIX LANCETS MISC USE ONE  TO CHECK GLUCOSE ONCE DAILY 102 each 3    metoprolol succinate (TOPROL XL) 100 MG extended release tablet TAKE ONE TABLET BY MOUTH ONCE DAILY 90 tablet 3    Blood Glucose Monitoring Suppl (ACCU-CHEK COMPACT CARE KIT) KIT 1 kit by Does not apply route daily 1 kit 0    fluticasone (FLONASE) 50 MCG/ACT nasal spray 2 sprays by Nasal route      Melatonin 10 MG TABS Take 10 mg by mouth       No current facility-administered medications on file prior to visit. Review of Systems   Constitutional:        Obese BMI 38.31   Eyes: Negative. Eye pain: Hypertension treated with Norvaschypertension treated with Norvasc. Respiratory: Negative. Cardiovascular: Negative. Endocrine:        Diabetes treated with Amaryl   Neurological: Positive for dizziness, seizures and numbness.        Objective:     Physical Exam   Constitutional: He is

## 2018-11-14 ENCOUNTER — CARE COORDINATION (OUTPATIENT)
Dept: FAMILY MEDICINE CLINIC | Age: 58
End: 2018-11-14

## 2018-11-14 DIAGNOSIS — M75.42 IMPINGEMENT SYNDROME OF LEFT SHOULDER: ICD-10-CM

## 2018-11-14 DIAGNOSIS — N18.30 STAGE 3 CHRONIC KIDNEY DISEASE (HCC): ICD-10-CM

## 2018-11-14 DIAGNOSIS — I10 ESSENTIAL HYPERTENSION: Primary | ICD-10-CM

## 2018-11-14 NOTE — CARE COORDINATION
Called patient to advise. States that he will pick it up tomorrow. Instructed him to check his BP in the morning and write it down in his log book. Will call the patient in 2 weeks for BP readings. Patient verbalized understanding. No future appointments.

## 2018-11-22 ENCOUNTER — APPOINTMENT (OUTPATIENT)
Dept: GENERAL RADIOLOGY | Age: 58
DRG: 058 | End: 2018-11-22
Payer: COMMERCIAL

## 2018-11-22 ENCOUNTER — HOSPITAL ENCOUNTER (INPATIENT)
Age: 58
LOS: 1 days | Discharge: ANOTHER HOSPITAL | DRG: 058 | End: 2018-11-26
Attending: EMERGENCY MEDICINE | Admitting: INTERNAL MEDICINE
Payer: COMMERCIAL

## 2018-11-22 ENCOUNTER — APPOINTMENT (OUTPATIENT)
Dept: CT IMAGING | Age: 58
DRG: 058 | End: 2018-11-22
Payer: COMMERCIAL

## 2018-11-22 DIAGNOSIS — E78.5 HYPERLIPIDEMIA, UNSPECIFIED HYPERLIPIDEMIA TYPE: ICD-10-CM

## 2018-11-22 DIAGNOSIS — R77.8 ELEVATED TROPONIN: ICD-10-CM

## 2018-11-22 DIAGNOSIS — E11.22 TYPE 2 DIABETES MELLITUS WITH DIABETIC CHRONIC KIDNEY DISEASE, UNSPECIFIED CKD STAGE, UNSPECIFIED WHETHER LONG TERM INSULIN USE (HCC): ICD-10-CM

## 2018-11-22 DIAGNOSIS — R20.0 NUMBNESS AND TINGLING: Primary | ICD-10-CM

## 2018-11-22 DIAGNOSIS — R20.2 NUMBNESS AND TINGLING: Primary | ICD-10-CM

## 2018-11-22 DIAGNOSIS — E66.01 MORBID OBESITY (HCC): ICD-10-CM

## 2018-11-22 DIAGNOSIS — N18.9 CHRONIC KIDNEY DISEASE, UNSPECIFIED CKD STAGE: ICD-10-CM

## 2018-11-22 DIAGNOSIS — I10 ESSENTIAL HYPERTENSION: ICD-10-CM

## 2018-11-22 PROBLEM — I24.9 ACS (ACUTE CORONARY SYNDROME) (HCC): Status: ACTIVE | Noted: 2018-11-22

## 2018-11-22 LAB
A/G RATIO: 1.1 (ref 1.1–2.2)
ALBUMIN SERPL-MCNC: 3.9 G/DL (ref 3.4–5)
ALP BLD-CCNC: 103 U/L (ref 40–129)
ALT SERPL-CCNC: 30 U/L (ref 10–40)
ANION GAP SERPL CALCULATED.3IONS-SCNC: 15 MMOL/L (ref 3–16)
AST SERPL-CCNC: 32 U/L (ref 15–37)
BASOPHILS ABSOLUTE: 0 K/UL (ref 0–0.2)
BASOPHILS RELATIVE PERCENT: 0.5 %
BILIRUB SERPL-MCNC: 0.3 MG/DL (ref 0–1)
BILIRUBIN URINE: NEGATIVE
BLOOD, URINE: ABNORMAL
BUN BLDV-MCNC: 42 MG/DL (ref 7–20)
CALCIUM SERPL-MCNC: 9.6 MG/DL (ref 8.3–10.6)
CHLORIDE BLD-SCNC: 104 MMOL/L (ref 99–110)
CLARITY: CLEAR
CO2: 23 MMOL/L (ref 21–32)
COLOR: YELLOW
CREAT SERPL-MCNC: 2 MG/DL (ref 0.9–1.3)
EOSINOPHILS ABSOLUTE: 0.4 K/UL (ref 0–0.6)
EOSINOPHILS RELATIVE PERCENT: 6 %
EPITHELIAL CELLS, UA: ABNORMAL /HPF
GFR AFRICAN AMERICAN: 42
GFR NON-AFRICAN AMERICAN: 34
GLOBULIN: 3.4 G/DL
GLUCOSE BLD-MCNC: 121 MG/DL (ref 70–99)
GLUCOSE BLD-MCNC: 141 MG/DL (ref 70–99)
GLUCOSE URINE: NEGATIVE MG/DL
HCT VFR BLD CALC: 42.6 % (ref 40.5–52.5)
HEMOGLOBIN: 13.9 G/DL (ref 13.5–17.5)
KETONES, URINE: NEGATIVE MG/DL
LEUKOCYTE ESTERASE, URINE: NEGATIVE
LYMPHOCYTES ABSOLUTE: 1.5 K/UL (ref 1–5.1)
LYMPHOCYTES RELATIVE PERCENT: 21.6 %
MCH RBC QN AUTO: 29.8 PG (ref 26–34)
MCHC RBC AUTO-ENTMCNC: 32.5 G/DL (ref 31–36)
MCV RBC AUTO: 91.8 FL (ref 80–100)
MICROSCOPIC EXAMINATION: YES
MONOCYTES ABSOLUTE: 0.7 K/UL (ref 0–1.3)
MONOCYTES RELATIVE PERCENT: 10 %
NEUTROPHILS ABSOLUTE: 4.2 K/UL (ref 1.7–7.7)
NEUTROPHILS RELATIVE PERCENT: 61.9 %
NITRITE, URINE: NEGATIVE
PDW BLD-RTO: 17.4 % (ref 12.4–15.4)
PERFORMED ON: ABNORMAL
PH UA: 6.5
PLATELET # BLD: 134 K/UL (ref 135–450)
PMV BLD AUTO: 8.8 FL (ref 5–10.5)
POTASSIUM REFLEX MAGNESIUM: 4.2 MMOL/L (ref 3.5–5.1)
PROTEIN UA: >=300 MG/DL
RBC # BLD: 4.65 M/UL (ref 4.2–5.9)
RBC UA: ABNORMAL /HPF (ref 0–2)
SODIUM BLD-SCNC: 142 MMOL/L (ref 136–145)
SPECIFIC GRAVITY UA: 1.02
TOTAL PROTEIN: 7.3 G/DL (ref 6.4–8.2)
TROPONIN: 0.02 NG/ML
TROPONIN: 0.03 NG/ML
TROPONIN: 0.03 NG/ML
URINE REFLEX TO CULTURE: ABNORMAL
URINE TYPE: ABNORMAL
UROBILINOGEN, URINE: 0.2 E.U./DL
WBC # BLD: 6.7 K/UL (ref 4–11)
WBC UA: ABNORMAL /HPF (ref 0–5)

## 2018-11-22 PROCEDURE — 83036 HEMOGLOBIN GLYCOSYLATED A1C: CPT

## 2018-11-22 PROCEDURE — G0378 HOSPITAL OBSERVATION PER HR: HCPCS

## 2018-11-22 PROCEDURE — 85025 COMPLETE CBC W/AUTO DIFF WBC: CPT

## 2018-11-22 PROCEDURE — 96360 HYDRATION IV INFUSION INIT: CPT

## 2018-11-22 PROCEDURE — 71046 X-RAY EXAM CHEST 2 VIEWS: CPT

## 2018-11-22 PROCEDURE — 99285 EMERGENCY DEPT VISIT HI MDM: CPT

## 2018-11-22 PROCEDURE — 80053 COMPREHEN METABOLIC PANEL: CPT

## 2018-11-22 PROCEDURE — 6370000000 HC RX 637 (ALT 250 FOR IP): Performed by: INTERNAL MEDICINE

## 2018-11-22 PROCEDURE — 2580000003 HC RX 258: Performed by: INTERNAL MEDICINE

## 2018-11-22 PROCEDURE — 2580000003 HC RX 258: Performed by: PHYSICIAN ASSISTANT

## 2018-11-22 PROCEDURE — 93005 ELECTROCARDIOGRAM TRACING: CPT | Performed by: PHYSICIAN ASSISTANT

## 2018-11-22 PROCEDURE — 74018 RADEX ABDOMEN 1 VIEW: CPT

## 2018-11-22 PROCEDURE — 74176 CT ABD & PELVIS W/O CONTRAST: CPT

## 2018-11-22 PROCEDURE — 72131 CT LUMBAR SPINE W/O DYE: CPT

## 2018-11-22 PROCEDURE — 36415 COLL VENOUS BLD VENIPUNCTURE: CPT

## 2018-11-22 PROCEDURE — 81001 URINALYSIS AUTO W/SCOPE: CPT

## 2018-11-22 PROCEDURE — 84484 ASSAY OF TROPONIN QUANT: CPT

## 2018-11-22 RX ORDER — CHOLECALCIFEROL (VITAMIN D3) 125 MCG
10 CAPSULE ORAL DAILY
Status: DISCONTINUED | OUTPATIENT
Start: 2018-11-22 | End: 2018-11-26 | Stop reason: HOSPADM

## 2018-11-22 RX ORDER — FAMOTIDINE 20 MG/1
40 TABLET, FILM COATED ORAL DAILY
Status: DISCONTINUED | OUTPATIENT
Start: 2018-11-22 | End: 2018-11-22

## 2018-11-22 RX ORDER — METOPROLOL SUCCINATE 50 MG/1
100 TABLET, EXTENDED RELEASE ORAL DAILY
Status: DISCONTINUED | OUTPATIENT
Start: 2018-11-22 | End: 2018-11-26 | Stop reason: HOSPADM

## 2018-11-22 RX ORDER — ASPIRIN 81 MG/1
81 TABLET ORAL DAILY
Status: DISCONTINUED | OUTPATIENT
Start: 2018-11-22 | End: 2018-11-26 | Stop reason: HOSPADM

## 2018-11-22 RX ORDER — DEXTROSE MONOHYDRATE 25 G/50ML
12.5 INJECTION, SOLUTION INTRAVENOUS PRN
Status: DISCONTINUED | OUTPATIENT
Start: 2018-11-22 | End: 2018-11-26 | Stop reason: HOSPADM

## 2018-11-22 RX ORDER — GLIMEPIRIDE 2 MG/1
2 TABLET ORAL
Status: DISCONTINUED | OUTPATIENT
Start: 2018-11-23 | End: 2018-11-22

## 2018-11-22 RX ORDER — LANCETS 28 GAUGE
1 EACH MISCELLANEOUS DAILY
Status: DISCONTINUED | OUTPATIENT
Start: 2018-11-22 | End: 2018-11-22

## 2018-11-22 RX ORDER — NITROGLYCERIN 0.4 MG/1
0.4 TABLET SUBLINGUAL EVERY 5 MIN PRN
Status: DISCONTINUED | OUTPATIENT
Start: 2018-11-22 | End: 2018-11-26 | Stop reason: HOSPADM

## 2018-11-22 RX ORDER — BUMETANIDE 2 MG/1
2 TABLET ORAL 3 TIMES DAILY
Status: ON HOLD | COMMUNITY
End: 2018-12-27 | Stop reason: HOSPADM

## 2018-11-22 RX ORDER — POTASSIUM CHLORIDE 1.5 G/1.77G
20 POWDER, FOR SOLUTION ORAL 2 TIMES DAILY
Status: ON HOLD | COMMUNITY
End: 2018-12-27 | Stop reason: HOSPADM

## 2018-11-22 RX ORDER — ALLOPURINOL 100 MG/1
100 TABLET ORAL DAILY
Status: DISCONTINUED | OUTPATIENT
Start: 2018-11-22 | End: 2018-11-25

## 2018-11-22 RX ORDER — ONDANSETRON 2 MG/ML
4 INJECTION INTRAMUSCULAR; INTRAVENOUS EVERY 6 HOURS PRN
Status: DISCONTINUED | OUTPATIENT
Start: 2018-11-22 | End: 2018-11-26 | Stop reason: HOSPADM

## 2018-11-22 RX ORDER — SODIUM CHLORIDE 0.9 % (FLUSH) 0.9 %
10 SYRINGE (ML) INJECTION PRN
Status: DISCONTINUED | OUTPATIENT
Start: 2018-11-22 | End: 2018-11-26 | Stop reason: HOSPADM

## 2018-11-22 RX ORDER — DEXTROSE MONOHYDRATE 50 MG/ML
100 INJECTION, SOLUTION INTRAVENOUS PRN
Status: DISCONTINUED | OUTPATIENT
Start: 2018-11-22 | End: 2018-11-26 | Stop reason: HOSPADM

## 2018-11-22 RX ORDER — CYCLOBENZAPRINE HCL 10 MG
10 TABLET ORAL 3 TIMES DAILY PRN
Status: DISCONTINUED | OUTPATIENT
Start: 2018-11-22 | End: 2018-11-26 | Stop reason: HOSPADM

## 2018-11-22 RX ORDER — DOCUSATE SODIUM 100 MG/1
100 CAPSULE, LIQUID FILLED ORAL 2 TIMES DAILY
Status: DISCONTINUED | OUTPATIENT
Start: 2018-11-22 | End: 2018-11-26 | Stop reason: HOSPADM

## 2018-11-22 RX ORDER — LOSARTAN POTASSIUM AND HYDROCHLOROTHIAZIDE 25; 100 MG/1; MG/1
1 TABLET ORAL DAILY
Status: DISCONTINUED | OUTPATIENT
Start: 2018-11-22 | End: 2018-11-22

## 2018-11-22 RX ORDER — AMLODIPINE BESYLATE 5 MG/1
5 TABLET ORAL DAILY
Status: DISCONTINUED | OUTPATIENT
Start: 2018-11-22 | End: 2018-11-26 | Stop reason: HOSPADM

## 2018-11-22 RX ORDER — FLUTICASONE PROPIONATE 50 MCG
2 SPRAY, SUSPENSION (ML) NASAL DAILY
Status: DISCONTINUED | OUTPATIENT
Start: 2018-11-22 | End: 2018-11-22

## 2018-11-22 RX ORDER — ATORVASTATIN CALCIUM 40 MG/1
40 TABLET, FILM COATED ORAL NIGHTLY
Status: DISCONTINUED | OUTPATIENT
Start: 2018-11-22 | End: 2018-11-26 | Stop reason: HOSPADM

## 2018-11-22 RX ORDER — SODIUM CHLORIDE 0.9 % (FLUSH) 0.9 %
10 SYRINGE (ML) INJECTION EVERY 12 HOURS SCHEDULED
Status: DISCONTINUED | OUTPATIENT
Start: 2018-11-22 | End: 2018-11-26 | Stop reason: HOSPADM

## 2018-11-22 RX ORDER — ATORVASTATIN CALCIUM 40 MG/1
40 TABLET, FILM COATED ORAL DAILY
Status: ON HOLD | COMMUNITY
End: 2018-12-27 | Stop reason: HOSPADM

## 2018-11-22 RX ORDER — MAGNESIUM SULFATE 1 G/100ML
1 INJECTION INTRAVENOUS PRN
Status: DISCONTINUED | OUTPATIENT
Start: 2018-11-22 | End: 2018-11-26 | Stop reason: HOSPADM

## 2018-11-22 RX ORDER — NICOTINE POLACRILEX 4 MG
15 LOZENGE BUCCAL PRN
Status: DISCONTINUED | OUTPATIENT
Start: 2018-11-22 | End: 2018-11-26 | Stop reason: HOSPADM

## 2018-11-22 RX ORDER — 0.9 % SODIUM CHLORIDE 0.9 %
1000 INTRAVENOUS SOLUTION INTRAVENOUS ONCE
Status: COMPLETED | OUTPATIENT
Start: 2018-11-22 | End: 2018-11-22

## 2018-11-22 RX ADMIN — DOCUSATE SODIUM 100 MG: 100 CAPSULE, LIQUID FILLED ORAL at 21:11

## 2018-11-22 RX ADMIN — ATORVASTATIN CALCIUM 40 MG: 40 TABLET, FILM COATED ORAL at 21:11

## 2018-11-22 RX ADMIN — SODIUM CHLORIDE 1000 ML: 9 INJECTION, SOLUTION INTRAVENOUS at 10:02

## 2018-11-22 RX ADMIN — Medication 10 ML: at 21:12

## 2018-11-22 RX ADMIN — Medication 10 MG: at 21:12

## 2018-11-22 RX ADMIN — NITROGLYCERIN 1 INCH: 20 OINTMENT TOPICAL at 16:36

## 2018-11-22 ASSESSMENT — HEART SCORE: ECG: 1

## 2018-11-22 ASSESSMENT — PAIN SCALES - GENERAL: PAINLEVEL_OUTOF10: 0

## 2018-11-22 NOTE — PLAN OF CARE
Problem: Infection:  Goal: Will remain free from infection  Will remain free from infection  Outcome: Ongoing      Problem: Safety:  Goal: Free from accidental physical injury  Free from accidental physical injury  Outcome: Ongoing      Problem: Daily Care:  Goal: Daily care needs are met  Daily care needs are met  Outcome: Ongoing      Problem: Pain:  Goal: Patient's pain/discomfort is manageable  Patient's pain/discomfort is manageable  Outcome: Ongoing      Problem: Skin Integrity:  Goal: Skin integrity will stabilize  Skin integrity will stabilize  Outcome: Ongoing      Problem: Discharge Planning:  Goal: Patients continuum of care needs are met  Patients continuum of care needs are met  Outcome: Ongoing

## 2018-11-22 NOTE — ED PROVIDER NOTES
ondansetron (ZOFRAN) injection 4 mg (not administered)   atorvastatin (LIPITOR) tablet 40 mg (not administered)   nitroglycerin (NITRO-BID) 2 % ointment 1 inch (not administered)   nitroGLYCERIN (NITROSTAT) SL tablet 0.4 mg (not administered)   insulin lispro (HUMALOG) injection vial 0-6 Units (not administered)   insulin lispro (HUMALOG) injection vial 0-3 Units (not administered)   glucose (GLUTOSE) 40 % oral gel 15 g (not administered)   dextrose 50 % solution 12.5 g (not administered)   glucagon (rDNA) injection 1 mg (not administered)   dextrose 5 % solution (not administered)   magnesium sulfate 1 g in dextrose 5% 100 mL IVPB (not administered)   enoxaparin (LOVENOX) injection 40 mg (40 mg Subcutaneous Not Given 11/22/18 1445)   0.9 % sodium chloride bolus (0 mLs Intravenous Stopped 11/22/18 1102)     ED Course as of Nov 22 1602   Thu Nov 22, 2018   1157 : Discussed admission with hospitalist Dr. Abram Hope who indicates that she would like the patient have a CT of his abdomen and spine prior to admission. She is concerned about an underlying vascular condition.  [AR]   1158 Patient's labs returned showing no leukocytosis. No evidence of anemia. Normal electrolytes glucose elevated at 1 2142 creatinine at 2.0 GFR of 34 kidney function is actually improved from last labs evaluated. Elevated troponin at 0.03. Cannot find a history of troponin.  [AR]      ED Course User Index  [AR] Kathi Cooper PA-C     Re-Evaluations:  11/22/18      Time:   1200  Patients symptoms are improving. Consultations:             Ronni Lozoya HOSPITALIST   Dr Von Reinoso, agrees to admit the patient for serial troponin, Further evaluation and treatment    MDM:  Afebrile, stable, patient presents to the ED for evaluation. Seen in conjunction with attending ED provider who agrees with assessment and plan. Dr Nadine Mcintosh  Patients PO2 is 97% on room air they are not hypoxic, Patient is in no acute distress nontoxic on presentation.   Patient

## 2018-11-22 NOTE — ED NOTES
Report given to Omar Garcia RN. Patient DC from ED to PCU in stable condition via wheelchair on inpatient cardiac monitor.       Andres Islas RN  11/22/18 8835

## 2018-11-23 ENCOUNTER — APPOINTMENT (OUTPATIENT)
Dept: CT IMAGING | Age: 58
DRG: 058 | End: 2018-11-23
Payer: COMMERCIAL

## 2018-11-23 PROBLEM — R26.9 GAIT DISTURBANCE: Status: ACTIVE | Noted: 2018-11-23

## 2018-11-23 LAB
CHOLESTEROL, TOTAL: 191 MG/DL (ref 0–199)
EKG ATRIAL RATE: 94 BPM
EKG DIAGNOSIS: NORMAL
EKG P AXIS: 34 DEGREES
EKG P-R INTERVAL: 188 MS
EKG Q-T INTERVAL: 376 MS
EKG QRS DURATION: 112 MS
EKG QTC CALCULATION (BAZETT): 470 MS
EKG R AXIS: 57 DEGREES
EKG T AXIS: -57 DEGREES
EKG VENTRICULAR RATE: 94 BPM
ESTIMATED AVERAGE GLUCOSE: 177.2 MG/DL
GLUCOSE BLD-MCNC: 122 MG/DL (ref 70–99)
GLUCOSE BLD-MCNC: 122 MG/DL (ref 70–99)
GLUCOSE BLD-MCNC: 138 MG/DL (ref 70–99)
GLUCOSE BLD-MCNC: 166 MG/DL (ref 70–99)
HBA1C MFR BLD: 7.8 %
HCT VFR BLD CALC: 38.7 % (ref 40.5–52.5)
HDLC SERPL-MCNC: 27 MG/DL (ref 40–60)
HEMOGLOBIN: 12.6 G/DL (ref 13.5–17.5)
LDL CHOLESTEROL CALCULATED: ABNORMAL MG/DL
LDL CHOLESTEROL DIRECT: 107 MG/DL
LV EF: 43 %
LVEF MODALITY: NORMAL
MCH RBC QN AUTO: 29.7 PG (ref 26–34)
MCHC RBC AUTO-ENTMCNC: 32.5 G/DL (ref 31–36)
MCV RBC AUTO: 91.4 FL (ref 80–100)
PDW BLD-RTO: 17.4 % (ref 12.4–15.4)
PERFORMED ON: ABNORMAL
PLATELET # BLD: 136 K/UL (ref 135–450)
PMV BLD AUTO: 9.2 FL (ref 5–10.5)
RBC # BLD: 4.23 M/UL (ref 4.2–5.9)
TRIGL SERPL-MCNC: 422 MG/DL (ref 0–150)
VLDLC SERPL CALC-MCNC: ABNORMAL MG/DL
WBC # BLD: 6 K/UL (ref 4–11)

## 2018-11-23 PROCEDURE — 6370000000 HC RX 637 (ALT 250 FOR IP): Performed by: INTERNAL MEDICINE

## 2018-11-23 PROCEDURE — 2580000003 HC RX 258: Performed by: INTERNAL MEDICINE

## 2018-11-23 PROCEDURE — 83721 ASSAY OF BLOOD LIPOPROTEIN: CPT

## 2018-11-23 PROCEDURE — 70450 CT HEAD/BRAIN W/O DYE: CPT

## 2018-11-23 PROCEDURE — G0378 HOSPITAL OBSERVATION PER HR: HCPCS

## 2018-11-23 PROCEDURE — 94761 N-INVAS EAR/PLS OXIMETRY MLT: CPT

## 2018-11-23 PROCEDURE — 85027 COMPLETE CBC AUTOMATED: CPT

## 2018-11-23 PROCEDURE — 80061 LIPID PANEL: CPT

## 2018-11-23 PROCEDURE — 99226 PR SBSQ OBSERVATION CARE/DAY 35 MINUTES: CPT | Performed by: INTERNAL MEDICINE

## 2018-11-23 PROCEDURE — 93306 TTE W/DOPPLER COMPLETE: CPT

## 2018-11-23 PROCEDURE — 36415 COLL VENOUS BLD VENIPUNCTURE: CPT

## 2018-11-23 PROCEDURE — 93010 ELECTROCARDIOGRAM REPORT: CPT | Performed by: INTERNAL MEDICINE

## 2018-11-23 RX ADMIN — NITROGLYCERIN 1 INCH: 20 OINTMENT TOPICAL at 23:50

## 2018-11-23 RX ADMIN — Medication 10 ML: at 09:32

## 2018-11-23 RX ADMIN — CYCLOBENZAPRINE HYDROCHLORIDE 10 MG: 10 TABLET, FILM COATED ORAL at 18:08

## 2018-11-23 RX ADMIN — ATORVASTATIN CALCIUM 40 MG: 40 TABLET, FILM COATED ORAL at 21:17

## 2018-11-23 RX ADMIN — ASPIRIN 81 MG: 81 TABLET, COATED ORAL at 14:53

## 2018-11-23 RX ADMIN — Medication 10 ML: at 21:18

## 2018-11-23 RX ADMIN — ALLOPURINOL 100 MG: 100 TABLET ORAL at 09:31

## 2018-11-23 RX ADMIN — DOCUSATE SODIUM 100 MG: 100 CAPSULE, LIQUID FILLED ORAL at 21:17

## 2018-11-23 RX ADMIN — AMLODIPINE BESYLATE 5 MG: 5 TABLET ORAL at 09:31

## 2018-11-23 RX ADMIN — NITROGLYCERIN 1 INCH: 20 OINTMENT TOPICAL at 14:53

## 2018-11-23 RX ADMIN — METOPROLOL SUCCINATE 100 MG: 50 TABLET, EXTENDED RELEASE ORAL at 09:31

## 2018-11-23 RX ADMIN — NITROGLYCERIN 1 INCH: 20 OINTMENT TOPICAL at 00:06

## 2018-11-23 RX ADMIN — DOCUSATE SODIUM 100 MG: 100 CAPSULE, LIQUID FILLED ORAL at 09:31

## 2018-11-23 RX ADMIN — Medication 10 MG: at 21:18

## 2018-11-23 RX ADMIN — NITROGLYCERIN 1 INCH: 20 OINTMENT TOPICAL at 06:13

## 2018-11-24 ENCOUNTER — APPOINTMENT (OUTPATIENT)
Dept: NUCLEAR MEDICINE | Age: 58
DRG: 058 | End: 2018-11-24
Payer: COMMERCIAL

## 2018-11-24 LAB
GLUCOSE BLD-MCNC: 123 MG/DL (ref 70–99)
GLUCOSE BLD-MCNC: 123 MG/DL (ref 70–99)
GLUCOSE BLD-MCNC: 129 MG/DL (ref 70–99)
GLUCOSE BLD-MCNC: 139 MG/DL (ref 70–99)
LV EF: 37 %
LVEF MODALITY: NORMAL
PERFORMED ON: ABNORMAL

## 2018-11-24 PROCEDURE — 6360000002 HC RX W HCPCS: Performed by: INTERNAL MEDICINE

## 2018-11-24 PROCEDURE — G0378 HOSPITAL OBSERVATION PER HR: HCPCS

## 2018-11-24 PROCEDURE — A9502 TC99M TETROFOSMIN: HCPCS | Performed by: INTERNAL MEDICINE

## 2018-11-24 PROCEDURE — 93017 CV STRESS TEST TRACING ONLY: CPT

## 2018-11-24 PROCEDURE — 6370000000 HC RX 637 (ALT 250 FOR IP): Performed by: INTERNAL MEDICINE

## 2018-11-24 PROCEDURE — 78452 HT MUSCLE IMAGE SPECT MULT: CPT

## 2018-11-24 PROCEDURE — 2580000003 HC RX 258: Performed by: INTERNAL MEDICINE

## 2018-11-24 PROCEDURE — 3430000000 HC RX DIAGNOSTIC RADIOPHARMACEUTICAL: Performed by: INTERNAL MEDICINE

## 2018-11-24 RX ADMIN — METOPROLOL SUCCINATE 100 MG: 50 TABLET, EXTENDED RELEASE ORAL at 13:04

## 2018-11-24 RX ADMIN — ASPIRIN 81 MG: 81 TABLET, COATED ORAL at 13:04

## 2018-11-24 RX ADMIN — NITROGLYCERIN 1 INCH: 20 OINTMENT TOPICAL at 13:04

## 2018-11-24 RX ADMIN — TETROFOSMIN 11.3 MILLICURIE: 0.23 INJECTION, POWDER, LYOPHILIZED, FOR SOLUTION INTRAVENOUS at 08:27

## 2018-11-24 RX ADMIN — ALLOPURINOL 100 MG: 100 TABLET ORAL at 13:03

## 2018-11-24 RX ADMIN — TETROFOSMIN 34 MILLICURIE: 0.23 INJECTION, POWDER, LYOPHILIZED, FOR SOLUTION INTRAVENOUS at 10:06

## 2018-11-24 RX ADMIN — NITROGLYCERIN 1 INCH: 20 OINTMENT TOPICAL at 06:45

## 2018-11-24 RX ADMIN — ATORVASTATIN CALCIUM 40 MG: 40 TABLET, FILM COATED ORAL at 21:20

## 2018-11-24 RX ADMIN — REGADENOSON 0.4 MG: 0.08 INJECTION, SOLUTION INTRAVENOUS at 10:06

## 2018-11-24 RX ADMIN — DOCUSATE SODIUM 100 MG: 100 CAPSULE, LIQUID FILLED ORAL at 21:20

## 2018-11-24 RX ADMIN — DOCUSATE SODIUM 100 MG: 100 CAPSULE, LIQUID FILLED ORAL at 13:04

## 2018-11-24 RX ADMIN — AMLODIPINE BESYLATE 5 MG: 5 TABLET ORAL at 13:04

## 2018-11-24 RX ADMIN — Medication 10 MG: at 21:20

## 2018-11-24 RX ADMIN — NITROGLYCERIN 1 INCH: 20 OINTMENT TOPICAL at 22:52

## 2018-11-24 RX ADMIN — CYCLOBENZAPRINE HYDROCHLORIDE 10 MG: 10 TABLET, FILM COATED ORAL at 23:18

## 2018-11-24 RX ADMIN — Medication 10 ML: at 21:20

## 2018-11-24 RX ADMIN — NITROGLYCERIN 1 INCH: 20 OINTMENT TOPICAL at 18:46

## 2018-11-24 RX ADMIN — Medication 10 ML: at 13:05

## 2018-11-25 ENCOUNTER — APPOINTMENT (OUTPATIENT)
Dept: ULTRASOUND IMAGING | Age: 58
DRG: 058 | End: 2018-11-25
Payer: COMMERCIAL

## 2018-11-25 PROBLEM — I25.10 CAD IN NATIVE ARTERY: Status: ACTIVE | Noted: 2018-11-25

## 2018-11-25 LAB
ANION GAP SERPL CALCULATED.3IONS-SCNC: 12 MMOL/L (ref 3–16)
BUN BLDV-MCNC: 28 MG/DL (ref 7–20)
CALCIUM SERPL-MCNC: 9 MG/DL (ref 8.3–10.6)
CHLORIDE BLD-SCNC: 105 MMOL/L (ref 99–110)
CO2: 22 MMOL/L (ref 21–32)
CREAT SERPL-MCNC: 1.9 MG/DL (ref 0.9–1.3)
GFR AFRICAN AMERICAN: 44
GFR NON-AFRICAN AMERICAN: 36
GLUCOSE BLD-MCNC: 107 MG/DL (ref 70–99)
GLUCOSE BLD-MCNC: 115 MG/DL (ref 70–99)
GLUCOSE BLD-MCNC: 116 MG/DL (ref 70–99)
GLUCOSE BLD-MCNC: 119 MG/DL (ref 70–99)
GLUCOSE BLD-MCNC: 134 MG/DL (ref 70–99)
PERFORMED ON: ABNORMAL
POTASSIUM SERPL-SCNC: 4.1 MMOL/L (ref 3.5–5.1)
SODIUM BLD-SCNC: 139 MMOL/L (ref 136–145)

## 2018-11-25 PROCEDURE — 2060000000 HC ICU INTERMEDIATE R&B

## 2018-11-25 PROCEDURE — 6370000000 HC RX 637 (ALT 250 FOR IP): Performed by: HOSPITALIST

## 2018-11-25 PROCEDURE — 6370000000 HC RX 637 (ALT 250 FOR IP): Performed by: INTERNAL MEDICINE

## 2018-11-25 PROCEDURE — 80048 BASIC METABOLIC PNL TOTAL CA: CPT

## 2018-11-25 PROCEDURE — 2580000003 HC RX 258: Performed by: INTERNAL MEDICINE

## 2018-11-25 PROCEDURE — 99223 1ST HOSP IP/OBS HIGH 75: CPT | Performed by: INTERNAL MEDICINE

## 2018-11-25 PROCEDURE — 36415 COLL VENOUS BLD VENIPUNCTURE: CPT

## 2018-11-25 PROCEDURE — G0378 HOSPITAL OBSERVATION PER HR: HCPCS

## 2018-11-25 PROCEDURE — 76770 US EXAM ABDO BACK WALL COMP: CPT

## 2018-11-25 RX ORDER — SODIUM CHLORIDE 9 MG/ML
INJECTION, SOLUTION INTRAVENOUS CONTINUOUS
Status: DISCONTINUED | OUTPATIENT
Start: 2018-11-26 | End: 2018-11-26 | Stop reason: HOSPADM

## 2018-11-25 RX ORDER — ALLOPURINOL 300 MG/1
300 TABLET ORAL DAILY
Status: DISCONTINUED | OUTPATIENT
Start: 2018-11-26 | End: 2018-11-26 | Stop reason: HOSPADM

## 2018-11-25 RX ORDER — SODIUM CHLORIDE 0.9 % (FLUSH) 0.9 %
10 SYRINGE (ML) INJECTION PRN
Status: CANCELLED | OUTPATIENT
Start: 2018-11-25

## 2018-11-25 RX ORDER — ALLOPURINOL 100 MG/1
200 TABLET ORAL ONCE
Status: COMPLETED | OUTPATIENT
Start: 2018-11-25 | End: 2018-11-25

## 2018-11-25 RX ORDER — SODIUM CHLORIDE 0.9 % (FLUSH) 0.9 %
10 SYRINGE (ML) INJECTION EVERY 12 HOURS SCHEDULED
Status: CANCELLED | OUTPATIENT
Start: 2018-11-25

## 2018-11-25 RX ORDER — SODIUM CHLORIDE 9 MG/ML
INJECTION, SOLUTION INTRAVENOUS CONTINUOUS
Status: CANCELLED | OUTPATIENT
Start: 2018-11-25

## 2018-11-25 RX ADMIN — ALLOPURINOL 200 MG: 100 TABLET ORAL at 14:32

## 2018-11-25 RX ADMIN — ASPIRIN 81 MG: 81 TABLET, COATED ORAL at 08:39

## 2018-11-25 RX ADMIN — DOCUSATE SODIUM 100 MG: 100 CAPSULE, LIQUID FILLED ORAL at 08:39

## 2018-11-25 RX ADMIN — NITROGLYCERIN 1 INCH: 20 OINTMENT TOPICAL at 04:07

## 2018-11-25 RX ADMIN — Medication 10 ML: at 08:40

## 2018-11-25 RX ADMIN — DOCUSATE SODIUM 100 MG: 100 CAPSULE, LIQUID FILLED ORAL at 20:34

## 2018-11-25 RX ADMIN — METOPROLOL SUCCINATE 100 MG: 50 TABLET, EXTENDED RELEASE ORAL at 08:39

## 2018-11-25 RX ADMIN — ALLOPURINOL 100 MG: 100 TABLET ORAL at 08:40

## 2018-11-25 RX ADMIN — NITROGLYCERIN 1 INCH: 20 OINTMENT TOPICAL at 18:13

## 2018-11-25 RX ADMIN — AMLODIPINE BESYLATE 5 MG: 5 TABLET ORAL at 08:40

## 2018-11-25 RX ADMIN — ATORVASTATIN CALCIUM 40 MG: 40 TABLET, FILM COATED ORAL at 20:34

## 2018-11-25 RX ADMIN — Medication 10 ML: at 20:35

## 2018-11-25 RX ADMIN — NITROGLYCERIN 1 INCH: 20 OINTMENT TOPICAL at 13:50

## 2018-11-25 NOTE — CONSULTS
[address]  Advanced CHF/Pulmonary Hypertension   Cardiac Evaluation      Carroll Quick  YOB: 1960    Requesting PHysician:  Dr. Socorro Briceno      Chief Complaint   Patient presents with    Numbness     Patient reports sudden onset abd numbness that began this morning. Denies pain. Denies n/v/d/f. Denies SOB, CP        History of Present Illness:  Marquez Benjamin is a 59 yo male who presented to the ED several days ago with generalized pains in his upper extremities, weakness, and gait disturbance. He had been in his usual stage of health when he woke up and felt that something was wrong. He was concerned about a stroke. No slurred speech. No chest pain, shortness of breath, nausea, or vomiting. He has chronic bilateral peripheral edema. He had an echo with results below showing mild reduction in LV function. Yesterday he had a Lexiscan Myoview showing ischemia. He tells me that he had an MI in the mid 80's. He had a cath then at [facility]. He had some anoxia at the time and cannot remember a lot. Today he feels good. We are consulted for abnormal stress results. [facility] 11/24/18:   Moderate-large sized inferior, septal, and distal apical wall significant    partial reversibility defects consistent with mainly ischemia and some    infarction in the territory of the proximal to distal RCA and/or LAD. LV    function is moderately reduced with more prominent inferior hypokinesis and    ejection fraction of 37%. Higher risk abnormal study.      Echo:  98/82/17:  LV systolic function is mildly reduced with EF estimated from 40-45%.   More prominent septal hypokinesis is noted on certain views.   Left ventricular size is mildly increased.   There is mild concentric left ventricular hypertrophy.   Grade I diastolic dysfunction with normal filling pressure.   Aortic valve appears sclerotic but opens adequately.   Mild mitral regurgitation.   Inadequate tricuspid regurgitation to estimate

## 2018-11-25 NOTE — CONSULTS
[facility]    Consult Note           Reason for Consult:  [facility] on CKD  Requesting Physician:  Dr. Vane Carter    Chief Complaint:  Numbness tingling    History of Present Illness on 11/25/18:    60 y.o. yo male with PMH of CAD? , HTN, CKD baseline cr seems be around 1.9-2.4 who is admitted for numbness tingling unsteady gait on the day of admission  Had abnormal stress test and cardiology is planning on cardiac cath   He has CKD and nephrology was consulted   He has had HTN since high school and tels me that it was difficult to control for several years before it got controlled    Past Medical History:        Diagnosis Date    [facility]oxic brain [facility]) 1994    Cardiac arrest ([facility] 1994    Diabetes mellitus ([facility] [facility]     borderline    Gout     Hyperlipidemia     Hypertension     Reflux     Sleep apnea     Type 2 diabetes mellitus without complication ([facility] [address] [facility]        Past Surgical History:        Procedure Laterality Date    CARPAL TUNNEL RELEASE Left     CARPAL TUNNEL RELEASE Right 4/14/15    COLONOSCOPY  1/2014       Home Medications:    No current facility-administered medications on file prior to encounter.       Current Outpatient Prescriptions on File Prior to Encounter   Medication Sig Dispense Refill    allopurinol (ZYLOPRIM) 100 MG tablet TAKE ONE TABLET BY MOUTH ONCE DAILY 90 tablet 3    amLODIPine (NORVASC) 5 MG tablet TAKE ONE TABLET BY MOUTH ONCE DAILY 90 tablet 3    allopurinol (ZYLOPRIM) 300 MG tablet TAKE ONE TABLET BY MOUTH ONCE DAILY IN  ADDITION  TO  THE  100MG  TABLET 90 tablet 3    aspirin (ASPIRIN LOW DOSE) 81 MG EC tablet TAKE ONE TABLET BY MOUTH ONCE DAILY 90 tablet 3    metoprolol succinate (TOPROL XL) 100 MG extended release tablet TAKE ONE TABLET BY MOUTH ONCE DAILY 90 tablet 3    Melatonin 10 MG TABS Take 10 mg by mouth      cyclobenzaprine (FLEXERIL) 10 MG tablet Take 1 tablet by mouth 3 times daily as needed for Muscle spasms 30 tablet 3    FREESTYLE LANCETS MISC 1 each by Does not apply route daily 100 each 3    glucose blood VI test strips (FREESTYLE TEST STRIPS) strip 1 each by In Vitro route daily As needed. 100 each 3    ACCU-CHEK MULTICLIX LANCETS MISC USE ONE  TO CHECK GLUCOSE ONCE DAILY 102 each 3    Blood Glucose Monitoring Suppl (ACCU-CHEK COMPACT CARE KIT) KIT 1 kit by Does not apply route daily 1 kit 0       Allergies:  Enalapril; Nadolol; and Verapamil    Social History:    Social History     Social History    Marital status: Single     Spouse name: N/A    Number of children: N/A    Years of education: N/A     Occupational History    Not on file. Social History Main Topics    Smoking status: Never Smoker    Smokeless tobacco: Never Used    Alcohol use No    Drug use: No    Sexual activity: Not on file     Other Topics Concern    Not on file     Social History Narrative    No narrative on file       Family History:   Family History   Problem Relation Age of Onset    High Blood Pressure Mother     High Blood Pressure Father     High Blood Pressure Brother     High Blood Pressure Maternal Grandmother     High Blood Pressure Maternal Grandfather        Review of Systems:   Pertinent positives stated above in HPI. All other 10 systems were reviewed and were negative.      Physical exam:   Constitutional:  VITALS:  /72   Pulse 64   Temp 97.4 °F (36.3 °C) (Oral)   Resp 20   Ht 5' 10\" (1.778 m)   Wt 269 lb (122 kg)   SpO2 98%   BMI 38.60 kg/m²   Gen: alert, awake, nad  Skin: no rash, turgor wnl  Heent:  eomi, mmm  Neck: no bruits or jvd noted, thyroid normal  Cardiovascular:  S1, S2 without m/r/g  Respiratory: CTA B without w/r/r; respiratory effort normal  Abdomen:  +bs, soft, nt, nd, no hepatosplenomegaly  Ext: no lower extremity edema  Neuro/Psy: AAoriented times 3 ; moves all 4 ext  Musculoskeletal:  Rom, muscular strength intact; digits, nails normal    Data/  Recent Labs      11/23/18   0606   WBC  6.0   HGB  12.6*

## 2018-11-26 ENCOUNTER — HOSPITAL ENCOUNTER (INPATIENT)
Dept: CARDIAC CATH/INVASIVE PROCEDURES | Age: 58
LOS: 11 days | Discharge: ANOTHER HOSPITAL | DRG: 165 | End: 2018-12-07
Attending: INTERNAL MEDICINE | Admitting: THORACIC SURGERY (CARDIOTHORACIC VASCULAR SURGERY)
Payer: COMMERCIAL

## 2018-11-26 ENCOUNTER — APPOINTMENT (OUTPATIENT)
Dept: ULTRASOUND IMAGING | Age: 58
DRG: 058 | End: 2018-11-26
Payer: COMMERCIAL

## 2018-11-26 VITALS
SYSTOLIC BLOOD PRESSURE: 147 MMHG | OXYGEN SATURATION: 96 % | WEIGHT: 268.3 LBS | TEMPERATURE: 97.2 F | RESPIRATION RATE: 16 BRPM | DIASTOLIC BLOOD PRESSURE: 83 MMHG | HEART RATE: 69 BPM | HEIGHT: 70 IN | BODY MASS INDEX: 38.41 KG/M2

## 2018-11-26 PROBLEM — R94.39 ABNORMAL STRESS TEST: Status: ACTIVE | Noted: 2018-11-26

## 2018-11-26 LAB
ALBUMIN SERPL-MCNC: 3.6 G/DL (ref 3.4–5)
AMORPHOUS: ABNORMAL /HPF
ANION GAP SERPL CALCULATED.3IONS-SCNC: 12 MMOL/L (ref 3–16)
BILIRUBIN URINE: NEGATIVE
BLOOD, URINE: ABNORMAL
BUN BLDV-MCNC: 28 MG/DL (ref 7–20)
CALCIUM SERPL-MCNC: 9.3 MG/DL (ref 8.3–10.6)
CHLORIDE BLD-SCNC: 106 MMOL/L (ref 99–110)
CHLORIDE URINE RANDOM: 28 MMOL/L
CLARITY: CLEAR
CO2: 24 MMOL/L (ref 21–32)
COLOR: YELLOW
CREAT SERPL-MCNC: 2 MG/DL (ref 0.9–1.3)
CREATININE URINE: 108.4 MG/DL (ref 39–259)
EPITHELIAL CELLS, UA: ABNORMAL /HPF
GFR AFRICAN AMERICAN: 42
GFR NON-AFRICAN AMERICAN: 34
GLUCOSE BLD-MCNC: 106 MG/DL (ref 70–99)
GLUCOSE BLD-MCNC: 119 MG/DL (ref 70–99)
GLUCOSE BLD-MCNC: 134 MG/DL (ref 70–99)
GLUCOSE URINE: NEGATIVE MG/DL
KETONES, URINE: NEGATIVE MG/DL
LEUKOCYTE ESTERASE, URINE: NEGATIVE
MICROALBUMIN UR-MCNC: 376.7 MG/DL
MICROALBUMIN/CREAT UR-RTO: 3475.1 MG/G (ref 0–30)
MICROSCOPIC EXAMINATION: YES
MUCUS: ABNORMAL /LPF
NITRITE, URINE: NEGATIVE
PERFORMED ON: ABNORMAL
PERFORMED ON: ABNORMAL
PH UA: 6
PHOSPHORUS: 3.2 MG/DL (ref 2.5–4.9)
POTASSIUM SERPL-SCNC: 4.5 MMOL/L (ref 3.5–5.1)
POTASSIUM, UR: 26.8 MMOL/L
PROTEIN PROTEIN: 498 MG/DL
PROTEIN UA: >=300 MG/DL
PROTEIN/CREAT RATIO: 4.6 MG/DL
RBC UA: ABNORMAL /HPF (ref 0–2)
SODIUM BLD-SCNC: 142 MMOL/L (ref 136–145)
SODIUM URINE: 45 MMOL/L
SPECIFIC GRAVITY UA: 1.02
UROBILINOGEN, URINE: 0.2 E.U./DL
WBC UA: ABNORMAL /HPF (ref 0–5)

## 2018-11-26 PROCEDURE — 93458 L HRT ARTERY/VENTRICLE ANGIO: CPT | Performed by: INTERNAL MEDICINE

## 2018-11-26 PROCEDURE — 2500000003 HC RX 250 WO HCPCS

## 2018-11-26 PROCEDURE — 6370000000 HC RX 637 (ALT 250 FOR IP): Performed by: HOSPITALIST

## 2018-11-26 PROCEDURE — 36415 COLL VENOUS BLD VENIPUNCTURE: CPT

## 2018-11-26 PROCEDURE — 82436 ASSAY OF URINE CHLORIDE: CPT

## 2018-11-26 PROCEDURE — B2111ZZ FLUOROSCOPY OF MULTIPLE CORONARY ARTERIES USING LOW OSMOLAR CONTRAST: ICD-10-PCS | Performed by: INTERNAL MEDICINE

## 2018-11-26 PROCEDURE — 84300 ASSAY OF URINE SODIUM: CPT

## 2018-11-26 PROCEDURE — 84133 ASSAY OF URINE POTASSIUM: CPT

## 2018-11-26 PROCEDURE — 6370000000 HC RX 637 (ALT 250 FOR IP): Performed by: INTERNAL MEDICINE

## 2018-11-26 PROCEDURE — 4A023N7 MEASUREMENT OF CARDIAC SAMPLING AND PRESSURE, LEFT HEART, PERCUTANEOUS APPROACH: ICD-10-PCS | Performed by: INTERNAL MEDICINE

## 2018-11-26 PROCEDURE — C1894 INTRO/SHEATH, NON-LASER: HCPCS

## 2018-11-26 PROCEDURE — 99152 MOD SED SAME PHYS/QHP 5/>YRS: CPT | Performed by: INTERNAL MEDICINE

## 2018-11-26 PROCEDURE — 84156 ASSAY OF PROTEIN URINE: CPT

## 2018-11-26 PROCEDURE — 76775 US EXAM ABDO BACK WALL LIM: CPT

## 2018-11-26 PROCEDURE — P9047 ALBUMIN (HUMAN), 25%, 50ML: HCPCS

## 2018-11-26 PROCEDURE — 2709999900 HC NON-CHARGEABLE SUPPLY

## 2018-11-26 PROCEDURE — 81001 URINALYSIS AUTO W/SCOPE: CPT

## 2018-11-26 PROCEDURE — 80069 RENAL FUNCTION PANEL: CPT

## 2018-11-26 PROCEDURE — 6360000002 HC RX W HCPCS: Performed by: INTERNAL MEDICINE

## 2018-11-26 PROCEDURE — 6360000002 HC RX W HCPCS

## 2018-11-26 PROCEDURE — 6370000000 HC RX 637 (ALT 250 FOR IP)

## 2018-11-26 PROCEDURE — 2580000003 HC RX 258

## 2018-11-26 PROCEDURE — 82570 ASSAY OF URINE CREATININE: CPT

## 2018-11-26 PROCEDURE — 1200000000 HC SEMI PRIVATE

## 2018-11-26 PROCEDURE — 2580000003 HC RX 258: Performed by: INTERNAL MEDICINE

## 2018-11-26 PROCEDURE — 82043 UR ALBUMIN QUANTITATIVE: CPT

## 2018-11-26 PROCEDURE — 99153 MOD SED SAME PHYS/QHP EA: CPT | Performed by: INTERNAL MEDICINE

## 2018-11-26 PROCEDURE — C1769 GUIDE WIRE: HCPCS

## 2018-11-26 RX ORDER — ASPIRIN 81 MG/1
81 TABLET ORAL DAILY
Status: CANCELLED | OUTPATIENT
Start: 2018-11-26

## 2018-11-26 RX ORDER — NICOTINE POLACRILEX 4 MG
15 LOZENGE BUCCAL PRN
Status: CANCELLED | OUTPATIENT
Start: 2018-11-26

## 2018-11-26 RX ORDER — LABETALOL HYDROCHLORIDE 5 MG/ML
10 INJECTION, SOLUTION INTRAVENOUS ONCE
Status: COMPLETED | OUTPATIENT
Start: 2018-11-26 | End: 2018-11-26

## 2018-11-26 RX ORDER — ALLOPURINOL 300 MG/1
300 TABLET ORAL DAILY
Status: CANCELLED | OUTPATIENT
Start: 2018-11-26

## 2018-11-26 RX ORDER — METOPROLOL SUCCINATE 50 MG/1
100 TABLET, EXTENDED RELEASE ORAL DAILY
Status: CANCELLED | OUTPATIENT
Start: 2018-11-26

## 2018-11-26 RX ORDER — ASPIRIN 81 MG/1
243 TABLET, CHEWABLE ORAL ONCE
Status: COMPLETED | OUTPATIENT
Start: 2018-11-26 | End: 2018-11-26

## 2018-11-26 RX ORDER — ATORVASTATIN CALCIUM 10 MG/1
20 TABLET, FILM COATED ORAL DAILY
Status: DISCONTINUED | OUTPATIENT
Start: 2018-11-26 | End: 2018-12-03

## 2018-11-26 RX ORDER — SODIUM CHLORIDE 9 MG/ML
INJECTION, SOLUTION INTRAVENOUS CONTINUOUS
Status: ACTIVE | OUTPATIENT
Start: 2018-11-26 | End: 2018-11-26

## 2018-11-26 RX ORDER — CHOLECALCIFEROL (VITAMIN D3) 125 MCG
10 CAPSULE ORAL DAILY
Status: CANCELLED | OUTPATIENT
Start: 2018-11-26

## 2018-11-26 RX ORDER — MAGNESIUM SULFATE 1 G/100ML
1 INJECTION INTRAVENOUS PRN
Status: CANCELLED | OUTPATIENT
Start: 2018-11-26

## 2018-11-26 RX ORDER — DEXTROSE MONOHYDRATE 25 G/50ML
12.5 INJECTION, SOLUTION INTRAVENOUS PRN
Status: CANCELLED | OUTPATIENT
Start: 2018-11-26

## 2018-11-26 RX ORDER — POTASSIUM CHLORIDE 750 MG/1
20 TABLET, EXTENDED RELEASE ORAL 2 TIMES DAILY
Status: DISCONTINUED | OUTPATIENT
Start: 2018-11-26 | End: 2018-12-03

## 2018-11-26 RX ORDER — SODIUM CHLORIDE 0.9 % (FLUSH) 0.9 %
10 SYRINGE (ML) INJECTION EVERY 12 HOURS SCHEDULED
Status: CANCELLED | OUTPATIENT
Start: 2018-11-26

## 2018-11-26 RX ORDER — AMLODIPINE BESYLATE 5 MG/1
5 TABLET ORAL DAILY
Status: DISCONTINUED | OUTPATIENT
Start: 2018-11-26 | End: 2018-12-01

## 2018-11-26 RX ORDER — SODIUM CHLORIDE 0.9 % (FLUSH) 0.9 %
10 SYRINGE (ML) INJECTION PRN
Status: CANCELLED | OUTPATIENT
Start: 2018-11-26

## 2018-11-26 RX ORDER — BUMETANIDE 1 MG/1
2 TABLET ORAL 2 TIMES DAILY
Status: DISCONTINUED | OUTPATIENT
Start: 2018-11-26 | End: 2018-12-03

## 2018-11-26 RX ORDER — ATORVASTATIN CALCIUM 40 MG/1
40 TABLET, FILM COATED ORAL NIGHTLY
Status: CANCELLED | OUTPATIENT
Start: 2018-11-26

## 2018-11-26 RX ORDER — NITROGLYCERIN 0.4 MG/1
0.4 TABLET SUBLINGUAL EVERY 5 MIN PRN
Status: CANCELLED | OUTPATIENT
Start: 2018-11-26

## 2018-11-26 RX ORDER — SODIUM CHLORIDE 9 MG/ML
INJECTION, SOLUTION INTRAVENOUS CONTINUOUS
Status: CANCELLED | OUTPATIENT
Start: 2018-11-26

## 2018-11-26 RX ORDER — ASPIRIN 81 MG/1
81 TABLET ORAL DAILY
Status: DISCONTINUED | OUTPATIENT
Start: 2018-11-26 | End: 2018-12-03

## 2018-11-26 RX ORDER — AMLODIPINE BESYLATE 5 MG/1
5 TABLET ORAL DAILY
Status: CANCELLED | OUTPATIENT
Start: 2018-11-26

## 2018-11-26 RX ORDER — ONDANSETRON 2 MG/ML
4 INJECTION INTRAMUSCULAR; INTRAVENOUS EVERY 6 HOURS PRN
Status: CANCELLED | OUTPATIENT
Start: 2018-11-26

## 2018-11-26 RX ORDER — DOCUSATE SODIUM 100 MG/1
100 CAPSULE, LIQUID FILLED ORAL 2 TIMES DAILY
Status: CANCELLED | OUTPATIENT
Start: 2018-11-26

## 2018-11-26 RX ORDER — CYCLOBENZAPRINE HCL 10 MG
10 TABLET ORAL 3 TIMES DAILY PRN
Status: CANCELLED | OUTPATIENT
Start: 2018-11-26

## 2018-11-26 RX ORDER — GLIMEPIRIDE 2 MG/1
TABLET ORAL
Qty: 30 TABLET | Refills: 5 | Status: ON HOLD | OUTPATIENT
Start: 2018-11-26 | End: 2018-12-03

## 2018-11-26 RX ORDER — FUROSEMIDE 10 MG/ML
20 INJECTION INTRAMUSCULAR; INTRAVENOUS ONCE
Status: COMPLETED | OUTPATIENT
Start: 2018-11-26 | End: 2018-11-26

## 2018-11-26 RX ORDER — DEXTROSE MONOHYDRATE 50 MG/ML
100 INJECTION, SOLUTION INTRAVENOUS PRN
Status: CANCELLED | OUTPATIENT
Start: 2018-11-26

## 2018-11-26 RX ORDER — METOPROLOL SUCCINATE 50 MG/1
100 TABLET, EXTENDED RELEASE ORAL DAILY
Status: DISCONTINUED | OUTPATIENT
Start: 2018-11-26 | End: 2018-11-29

## 2018-11-26 RX ADMIN — Medication 10 MG: at 00:13

## 2018-11-26 RX ADMIN — AMLODIPINE BESYLATE 5 MG: 5 TABLET ORAL at 08:15

## 2018-11-26 RX ADMIN — ASPIRIN 81 MG: 81 TABLET, COATED ORAL at 08:15

## 2018-11-26 RX ADMIN — NITROGLYCERIN 1 INCH: 20 OINTMENT TOPICAL at 00:13

## 2018-11-26 RX ADMIN — TICAGRELOR 90 MG: 90 TABLET ORAL at 12:59

## 2018-11-26 RX ADMIN — ASPIRIN 243 MG: 81 TABLET, CHEWABLE ORAL at 09:56

## 2018-11-26 RX ADMIN — ASPIRIN 81 MG: 81 TABLET, COATED ORAL at 12:42

## 2018-11-26 RX ADMIN — POTASSIUM CHLORIDE 20 MEQ: 750 TABLET, EXTENDED RELEASE ORAL at 17:44

## 2018-11-26 RX ADMIN — TICAGRELOR 90 MG: 90 TABLET ORAL at 22:03

## 2018-11-26 RX ADMIN — ALLOPURINOL 300 MG: 300 TABLET ORAL at 08:15

## 2018-11-26 RX ADMIN — FUROSEMIDE 20 MG: 10 INJECTION, SOLUTION INTRAMUSCULAR; INTRAVENOUS at 17:45

## 2018-11-26 RX ADMIN — LABETALOL HYDROCHLORIDE 10 MG: 5 INJECTION, SOLUTION INTRAVENOUS at 12:00

## 2018-11-26 RX ADMIN — SODIUM CHLORIDE: 9 INJECTION, SOLUTION INTRAVENOUS at 12:40

## 2018-11-26 RX ADMIN — ATORVASTATIN CALCIUM 20 MG: 10 TABLET, FILM COATED ORAL at 22:06

## 2018-11-26 RX ADMIN — BUMETANIDE 2 MG: 1 TABLET ORAL at 22:03

## 2018-11-26 RX ADMIN — CYCLOBENZAPRINE HYDROCHLORIDE 10 MG: 10 TABLET, FILM COATED ORAL at 00:23

## 2018-11-26 RX ADMIN — BUMETANIDE 2 MG: 1 TABLET ORAL at 17:44

## 2018-11-26 RX ADMIN — SODIUM CHLORIDE: 9 INJECTION, SOLUTION INTRAVENOUS at 08:15

## 2018-11-26 RX ADMIN — METOPROLOL SUCCINATE 100 MG: 50 TABLET, EXTENDED RELEASE ORAL at 08:14

## 2018-11-26 RX ADMIN — NITROGLYCERIN 1 INCH: 20 OINTMENT TOPICAL at 05:59

## 2018-11-26 RX ADMIN — SODIUM CHLORIDE: 9 INJECTION, SOLUTION INTRAVENOUS at 00:13

## 2018-11-26 RX ADMIN — METOPROLOL SUCCINATE 100 MG: 50 TABLET, EXTENDED RELEASE ORAL at 12:43

## 2018-11-26 RX ADMIN — POTASSIUM CHLORIDE 20 MEQ: 750 TABLET, EXTENDED RELEASE ORAL at 22:03

## 2018-11-26 RX ADMIN — DOCUSATE SODIUM 100 MG: 100 CAPSULE, LIQUID FILLED ORAL at 08:14

## 2018-11-26 ASSESSMENT — PAIN SCALES - GENERAL: PAINLEVEL_OUTOF10: 0

## 2018-11-26 NOTE — CARE COORDINATION
DISCHARGE ORDER  Date/Time 2018 2:32 PM  Completed by: Giselle Pérez, Case Management    Patient Name: Rozina Mckeon    : 1960  Admitting Diagnosis: ACS (acute coronary syndrome) ([facility] [I24.9]  CAD in native artery [I25.10]  Admit Date/Time: 2018  9:35 AM    Noted discharge order. Discharge Plan: Noted pt transferred to [facility] for cath.

## 2018-11-26 NOTE — PROGRESS NOTES
11/26/18 0809 called first care and spoke to [facility] re pt needing to go to [facility] cath lab. ETA 45 min.  Roberto Carlos Lloyd
Patient BP noted to be elevated. Patient states electronic BP machines don't work for him and they must be done manually. Manual /85.
Per Dr. Mauricio Bending orders placed for transfer to [facility] for further interventional cardiac evaluation  with Dr. Viraj Melton. Pt to arrive at ASAP this am. RN, Shift lead, MD, cath lab and unit secretary all aware. Transportation arrangements made through [facility] care transport.  Risks benefits and alternatives to transfer discussed and pt agreeable to proceed Violeta Lees RN    [facility] Hospitalist aware to call [facility] hospitalist for transfer report
Pt arrived back from Stress Test at this time to room 316-1. Pt states in no discomfort or pain. VS obtained. Shift assessment complete. AM med pass w/sip of water. NPO status addressed to pt continuing until results are back. Pt up to chair ambulating in room independently with no issues with gait or imbalance noted. Will continue to monitor.
Report to [address] [place] [address]. Aware pt completed stress test via [facility]. Pt is waiting to have second scan completed and then will return to assigned room.
consistent with mainly ischemia and some   infarction in the territory of the proximal to distal RCA and/or LAD. LV   function is moderately reduced with more prominent inferior hypokinesis and   ejection fraction of 37%. Higher risk abnormal study    Summary of echo    LV systolic function is mildly reduced with EF estimated from 40-45%.   More prominent septal hypokinesis is noted on certain views.   Left ventricular size is mildly increased.   There is mild concentric left ventricular hypertrophy.   Grade I diastolic dysfunction with normal filling pressure.   Aortic valve appears sclerotic but opens adequately.   Mild mitral regurgitation.   Inadequate tricuspid regurgitation to estimate systolic pulmonary artery   pressure. Renal USG  FINDINGS:       Kidneys:       The right kidney measures 11.8 cm in length and the left kidney measures 11.8   cm in length.       Kidneys demonstrate abnormal increased cortical echogenicity.  There are   small bilateral simple renal cysts ranging in size up to 3.3 cm in diameter. There is no hydronephrosis. Devonda Gavia is a tiny nonobstructing 1 mm right   intrarenal calculus.           Bladder:       The bladder is incompletely distended and thus not evaluated.           Impression   1. Medical renal disease. 2. Nonobstructing right nephrolithiasis. Assessment  -CKD III , recent cr has been around 1.9 to 2.4; on admission it was 2; Likely from HTN nephrosclerosis, possibly has DM as well. Ua shows proteinuria of >300 on 11/22 w 3-5 RBCs and has had proteinuria since 2016.  Repeat UA w >300 protein but no wbcs, rbcs  -proteinuria   Work up in progress; likely from DM  -abnormal stress test   -Numbness tingling of arms and unsteady gait   -CHF, combined ,seesm compensated   -gout  -DM (newly diagnosed , he believed that he had borderline Dm for a while and was not on any meds)   Hb a1c from 11/22/18 was 7.8  -HTN   Seems better in the last several checks    Plan  -continue

## 2018-11-27 PROBLEM — I25.119 CORONARY ARTERY DISEASE INVOLVING NATIVE CORONARY ARTERY OF NATIVE HEART WITH ANGINA PECTORIS (HCC): Status: ACTIVE | Noted: 2018-11-25

## 2018-11-27 LAB
ANION GAP SERPL CALCULATED.3IONS-SCNC: 12 MMOL/L (ref 3–16)
BUN BLDV-MCNC: 29 MG/DL (ref 7–20)
CALCIUM SERPL-MCNC: 9.7 MG/DL (ref 8.3–10.6)
CHLORIDE BLD-SCNC: 104 MMOL/L (ref 99–110)
CO2: 24 MMOL/L (ref 21–32)
CREAT SERPL-MCNC: 1.9 MG/DL (ref 0.9–1.3)
GFR AFRICAN AMERICAN: 44
GFR NON-AFRICAN AMERICAN: 36
GLUCOSE BLD-MCNC: 111 MG/DL (ref 70–99)
GLUCOSE BLD-MCNC: 113 MG/DL (ref 70–99)
GLUCOSE BLD-MCNC: 123 MG/DL (ref 70–99)
GLUCOSE BLD-MCNC: 131 MG/DL (ref 70–99)
HCT VFR BLD CALC: 43.5 % (ref 40.5–52.5)
HEMOGLOBIN: 14.2 G/DL (ref 13.5–17.5)
MCH RBC QN AUTO: 29.6 PG (ref 26–34)
MCHC RBC AUTO-ENTMCNC: 32.7 G/DL (ref 31–36)
MCV RBC AUTO: 90.7 FL (ref 80–100)
PDW BLD-RTO: 17.7 % (ref 12.4–15.4)
PERFORMED ON: ABNORMAL
PLATELET # BLD: 159 K/UL (ref 135–450)
PMV BLD AUTO: 8.8 FL (ref 5–10.5)
POTASSIUM REFLEX MAGNESIUM: 4.1 MMOL/L (ref 3.5–5.1)
RBC # BLD: 4.79 M/UL (ref 4.2–5.9)
SODIUM BLD-SCNC: 140 MMOL/L (ref 136–145)
WBC # BLD: 6.4 K/UL (ref 4–11)

## 2018-11-27 PROCEDURE — 6370000000 HC RX 637 (ALT 250 FOR IP): Performed by: INTERNAL MEDICINE

## 2018-11-27 PROCEDURE — 1200000000 HC SEMI PRIVATE

## 2018-11-27 PROCEDURE — 36415 COLL VENOUS BLD VENIPUNCTURE: CPT

## 2018-11-27 PROCEDURE — 80048 BASIC METABOLIC PNL TOTAL CA: CPT

## 2018-11-27 PROCEDURE — 85027 COMPLETE CBC AUTOMATED: CPT

## 2018-11-27 PROCEDURE — 99024 POSTOP FOLLOW-UP VISIT: CPT | Performed by: INTERNAL MEDICINE

## 2018-11-27 RX ORDER — NICOTINE POLACRILEX 4 MG
15 LOZENGE BUCCAL PRN
Status: DISCONTINUED | OUTPATIENT
Start: 2018-11-27 | End: 2018-12-04

## 2018-11-27 RX ORDER — DEXTROSE MONOHYDRATE 50 MG/ML
100 INJECTION, SOLUTION INTRAVENOUS PRN
Status: DISCONTINUED | OUTPATIENT
Start: 2018-11-27 | End: 2018-12-04

## 2018-11-27 RX ORDER — DEXTROSE MONOHYDRATE 25 G/50ML
12.5 INJECTION, SOLUTION INTRAVENOUS PRN
Status: DISCONTINUED | OUTPATIENT
Start: 2018-11-27 | End: 2018-12-04

## 2018-11-27 RX ADMIN — AMLODIPINE BESYLATE 5 MG: 5 TABLET ORAL at 09:53

## 2018-11-27 RX ADMIN — POTASSIUM CHLORIDE 20 MEQ: 750 TABLET, EXTENDED RELEASE ORAL at 20:40

## 2018-11-27 RX ADMIN — BUMETANIDE 2 MG: 1 TABLET ORAL at 09:53

## 2018-11-27 RX ADMIN — TICAGRELOR 90 MG: 90 TABLET ORAL at 20:40

## 2018-11-27 RX ADMIN — TICAGRELOR 90 MG: 90 TABLET ORAL at 09:53

## 2018-11-27 RX ADMIN — POTASSIUM CHLORIDE 20 MEQ: 750 TABLET, EXTENDED RELEASE ORAL at 09:53

## 2018-11-27 RX ADMIN — ATORVASTATIN CALCIUM 20 MG: 10 TABLET, FILM COATED ORAL at 09:53

## 2018-11-27 RX ADMIN — ASPIRIN 81 MG: 81 TABLET, COATED ORAL at 09:53

## 2018-11-27 RX ADMIN — METOPROLOL SUCCINATE 100 MG: 50 TABLET, EXTENDED RELEASE ORAL at 09:52

## 2018-11-27 ASSESSMENT — PAIN SCALES - GENERAL
PAINLEVEL_OUTOF10: 0

## 2018-11-28 ENCOUNTER — TELEPHONE (OUTPATIENT)
Dept: FAMILY MEDICINE CLINIC | Age: 58
End: 2018-11-28

## 2018-11-28 LAB
ANION GAP SERPL CALCULATED.3IONS-SCNC: 14 MMOL/L (ref 3–16)
APTT: 34.2 SEC (ref 26–36)
BUN BLDV-MCNC: 34 MG/DL (ref 7–20)
CALCIUM SERPL-MCNC: 9.7 MG/DL (ref 8.3–10.6)
CHLORIDE BLD-SCNC: 105 MMOL/L (ref 99–110)
CO2: 22 MMOL/L (ref 21–32)
CREAT SERPL-MCNC: 2 MG/DL (ref 0.9–1.3)
GFR AFRICAN AMERICAN: 42
GFR NON-AFRICAN AMERICAN: 34
GLUCOSE BLD-MCNC: 117 MG/DL (ref 70–99)
GLUCOSE BLD-MCNC: 123 MG/DL (ref 70–99)
GLUCOSE BLD-MCNC: 125 MG/DL (ref 70–99)
GLUCOSE BLD-MCNC: 130 MG/DL (ref 70–99)
GLUCOSE BLD-MCNC: 142 MG/DL (ref 70–99)
HCT VFR BLD CALC: 45.7 % (ref 40.5–52.5)
HEMOGLOBIN: 14.7 G/DL (ref 13.5–17.5)
INR BLD: 1.05 (ref 0.86–1.14)
MCH RBC QN AUTO: 29.2 PG (ref 26–34)
MCHC RBC AUTO-ENTMCNC: 32 G/DL (ref 31–36)
MCV RBC AUTO: 91 FL (ref 80–100)
PDW BLD-RTO: 17.2 % (ref 12.4–15.4)
PERFORMED ON: ABNORMAL
PLATELET # BLD: 177 K/UL (ref 135–450)
PMV BLD AUTO: 8.7 FL (ref 5–10.5)
POTASSIUM REFLEX MAGNESIUM: 4.3 MMOL/L (ref 3.5–5.1)
PROTHROMBIN TIME: 12 SEC (ref 9.8–13)
RBC # BLD: 5.03 M/UL (ref 4.2–5.9)
SODIUM BLD-SCNC: 141 MMOL/L (ref 136–145)
WBC # BLD: 6.7 K/UL (ref 4–11)

## 2018-11-28 PROCEDURE — 6370000000 HC RX 637 (ALT 250 FOR IP): Performed by: INTERNAL MEDICINE

## 2018-11-28 PROCEDURE — 1200000000 HC SEMI PRIVATE

## 2018-11-28 PROCEDURE — 85730 THROMBOPLASTIN TIME PARTIAL: CPT

## 2018-11-28 PROCEDURE — 85027 COMPLETE CBC AUTOMATED: CPT

## 2018-11-28 PROCEDURE — 99024 POSTOP FOLLOW-UP VISIT: CPT | Performed by: INTERNAL MEDICINE

## 2018-11-28 PROCEDURE — 36415 COLL VENOUS BLD VENIPUNCTURE: CPT

## 2018-11-28 PROCEDURE — 80048 BASIC METABOLIC PNL TOTAL CA: CPT

## 2018-11-28 PROCEDURE — 85610 PROTHROMBIN TIME: CPT

## 2018-11-28 RX ORDER — SENNA AND DOCUSATE SODIUM 50; 8.6 MG/1; MG/1
2 TABLET, FILM COATED ORAL 2 TIMES DAILY
Status: DISCONTINUED | OUTPATIENT
Start: 2018-11-28 | End: 2018-12-04

## 2018-11-28 RX ADMIN — STANDARDIZED SENNA CONCENTRATE AND DOCUSATE SODIUM 2 TABLET: 8.6; 5 TABLET, FILM COATED ORAL at 09:03

## 2018-11-28 RX ADMIN — BUMETANIDE 2 MG: 1 TABLET ORAL at 09:03

## 2018-11-28 RX ADMIN — ASPIRIN 81 MG: 81 TABLET, COATED ORAL at 09:03

## 2018-11-28 RX ADMIN — POTASSIUM CHLORIDE 20 MEQ: 750 TABLET, EXTENDED RELEASE ORAL at 20:27

## 2018-11-28 RX ADMIN — METOPROLOL SUCCINATE 100 MG: 50 TABLET, EXTENDED RELEASE ORAL at 09:03

## 2018-11-28 RX ADMIN — TICAGRELOR 90 MG: 90 TABLET ORAL at 09:03

## 2018-11-28 RX ADMIN — AMLODIPINE BESYLATE 5 MG: 5 TABLET ORAL at 09:03

## 2018-11-28 RX ADMIN — ATORVASTATIN CALCIUM 20 MG: 10 TABLET, FILM COATED ORAL at 09:03

## 2018-11-28 RX ADMIN — TICAGRELOR 90 MG: 90 TABLET ORAL at 20:27

## 2018-11-28 RX ADMIN — STANDARDIZED SENNA CONCENTRATE AND DOCUSATE SODIUM 2 TABLET: 8.6; 5 TABLET, FILM COATED ORAL at 20:27

## 2018-11-28 RX ADMIN — POTASSIUM CHLORIDE 20 MEQ: 750 TABLET, EXTENDED RELEASE ORAL at 09:03

## 2018-11-29 LAB
ANION GAP SERPL CALCULATED.3IONS-SCNC: 12 MMOL/L (ref 3–16)
BUN BLDV-MCNC: 40 MG/DL (ref 7–20)
CALCIUM SERPL-MCNC: 9.5 MG/DL (ref 8.3–10.6)
CHLORIDE BLD-SCNC: 106 MMOL/L (ref 99–110)
CO2: 23 MMOL/L (ref 21–32)
CREAT SERPL-MCNC: 2.2 MG/DL (ref 0.9–1.3)
GFR AFRICAN AMERICAN: 37
GFR NON-AFRICAN AMERICAN: 31
GLUCOSE BLD-MCNC: 104 MG/DL (ref 70–99)
GLUCOSE BLD-MCNC: 106 MG/DL (ref 70–99)
GLUCOSE BLD-MCNC: 112 MG/DL (ref 70–99)
GLUCOSE BLD-MCNC: 124 MG/DL (ref 70–99)
GLUCOSE BLD-MCNC: 126 MG/DL (ref 70–99)
PERFORMED ON: ABNORMAL
POTASSIUM REFLEX MAGNESIUM: 4.3 MMOL/L (ref 3.5–5.1)
SODIUM BLD-SCNC: 141 MMOL/L (ref 136–145)

## 2018-11-29 PROCEDURE — 99233 SBSQ HOSP IP/OBS HIGH 50: CPT | Performed by: NURSE PRACTITIONER

## 2018-11-29 PROCEDURE — 80048 BASIC METABOLIC PNL TOTAL CA: CPT

## 2018-11-29 PROCEDURE — 36415 COLL VENOUS BLD VENIPUNCTURE: CPT

## 2018-11-29 PROCEDURE — 6370000000 HC RX 637 (ALT 250 FOR IP): Performed by: NURSE PRACTITIONER

## 2018-11-29 PROCEDURE — 6370000000 HC RX 637 (ALT 250 FOR IP): Performed by: INTERNAL MEDICINE

## 2018-11-29 PROCEDURE — 93971 EXTREMITY STUDY: CPT

## 2018-11-29 PROCEDURE — 1200000000 HC SEMI PRIVATE

## 2018-11-29 RX ADMIN — METOPROLOL SUCCINATE 100 MG: 50 TABLET, EXTENDED RELEASE ORAL at 08:14

## 2018-11-29 RX ADMIN — ATORVASTATIN CALCIUM 20 MG: 10 TABLET, FILM COATED ORAL at 08:14

## 2018-11-29 RX ADMIN — POTASSIUM CHLORIDE 20 MEQ: 750 TABLET, EXTENDED RELEASE ORAL at 08:15

## 2018-11-29 RX ADMIN — STANDARDIZED SENNA CONCENTRATE AND DOCUSATE SODIUM 2 TABLET: 8.6; 5 TABLET, FILM COATED ORAL at 08:14

## 2018-11-29 RX ADMIN — METOPROLOL SUCCINATE 75 MG: 50 TABLET, EXTENDED RELEASE ORAL at 21:17

## 2018-11-29 RX ADMIN — STANDARDIZED SENNA CONCENTRATE AND DOCUSATE SODIUM 2 TABLET: 8.6; 5 TABLET, FILM COATED ORAL at 21:17

## 2018-11-29 RX ADMIN — AMLODIPINE BESYLATE 5 MG: 5 TABLET ORAL at 08:15

## 2018-11-29 RX ADMIN — BUMETANIDE 2 MG: 1 TABLET ORAL at 08:14

## 2018-11-29 RX ADMIN — POTASSIUM CHLORIDE 20 MEQ: 750 TABLET, EXTENDED RELEASE ORAL at 21:17

## 2018-11-29 ASSESSMENT — PAIN SCALES - GENERAL
PAINLEVEL_OUTOF10: 0

## 2018-11-30 LAB
ANION GAP SERPL CALCULATED.3IONS-SCNC: 13 MMOL/L (ref 3–16)
BUN BLDV-MCNC: 38 MG/DL (ref 7–20)
CALCIUM SERPL-MCNC: 9.1 MG/DL (ref 8.3–10.6)
CHLORIDE BLD-SCNC: 105 MMOL/L (ref 99–110)
CO2: 21 MMOL/L (ref 21–32)
CREAT SERPL-MCNC: 2 MG/DL (ref 0.9–1.3)
GFR AFRICAN AMERICAN: 42
GFR NON-AFRICAN AMERICAN: 34
GLUCOSE BLD-MCNC: 102 MG/DL (ref 70–99)
GLUCOSE BLD-MCNC: 116 MG/DL (ref 70–99)
GLUCOSE BLD-MCNC: 118 MG/DL (ref 70–99)
GLUCOSE BLD-MCNC: 119 MG/DL (ref 70–99)
GLUCOSE BLD-MCNC: 96 MG/DL (ref 70–99)
PERFORMED ON: ABNORMAL
PERFORMED ON: NORMAL
POTASSIUM REFLEX MAGNESIUM: 4 MMOL/L (ref 3.5–5.1)
SODIUM BLD-SCNC: 139 MMOL/L (ref 136–145)

## 2018-11-30 PROCEDURE — 36415 COLL VENOUS BLD VENIPUNCTURE: CPT

## 2018-11-30 PROCEDURE — 99232 SBSQ HOSP IP/OBS MODERATE 35: CPT | Performed by: NURSE PRACTITIONER

## 2018-11-30 PROCEDURE — 6370000000 HC RX 637 (ALT 250 FOR IP): Performed by: INTERNAL MEDICINE

## 2018-11-30 PROCEDURE — 93880 EXTRACRANIAL BILAT STUDY: CPT

## 2018-11-30 PROCEDURE — 80048 BASIC METABOLIC PNL TOTAL CA: CPT

## 2018-11-30 PROCEDURE — 6370000000 HC RX 637 (ALT 250 FOR IP): Performed by: NURSE PRACTITIONER

## 2018-11-30 PROCEDURE — 1200000000 HC SEMI PRIVATE

## 2018-11-30 RX ORDER — METOPROLOL SUCCINATE 25 MG/1
75 TABLET, EXTENDED RELEASE ORAL 2 TIMES DAILY
Qty: 180 TABLET | Refills: 1 | Status: ON HOLD | OUTPATIENT
Start: 2018-11-30 | End: 2018-12-27 | Stop reason: HOSPADM

## 2018-11-30 RX ADMIN — BUMETANIDE 2 MG: 1 TABLET ORAL at 09:31

## 2018-11-30 RX ADMIN — ASPIRIN 81 MG: 81 TABLET, COATED ORAL at 09:28

## 2018-11-30 RX ADMIN — METOPROLOL SUCCINATE 75 MG: 50 TABLET, EXTENDED RELEASE ORAL at 09:28

## 2018-11-30 RX ADMIN — ATORVASTATIN CALCIUM 20 MG: 10 TABLET, FILM COATED ORAL at 09:28

## 2018-11-30 RX ADMIN — AMLODIPINE BESYLATE 5 MG: 5 TABLET ORAL at 09:28

## 2018-11-30 RX ADMIN — POTASSIUM CHLORIDE 20 MEQ: 750 TABLET, EXTENDED RELEASE ORAL at 09:28

## 2018-11-30 ASSESSMENT — PAIN SCALES - GENERAL
PAINLEVEL_OUTOF10: 0
PAINLEVEL_OUTOF10: 0

## 2018-12-01 LAB
ANION GAP SERPL CALCULATED.3IONS-SCNC: 13 MMOL/L (ref 3–16)
BUN BLDV-MCNC: 37 MG/DL (ref 7–20)
CALCIUM SERPL-MCNC: 9 MG/DL (ref 8.3–10.6)
CHLORIDE BLD-SCNC: 106 MMOL/L (ref 99–110)
CO2: 19 MMOL/L (ref 21–32)
CREAT SERPL-MCNC: 1.9 MG/DL (ref 0.9–1.3)
GFR AFRICAN AMERICAN: 44
GFR NON-AFRICAN AMERICAN: 36
GLUCOSE BLD-MCNC: 110 MG/DL (ref 70–99)
GLUCOSE BLD-MCNC: 113 MG/DL (ref 70–99)
GLUCOSE BLD-MCNC: 118 MG/DL (ref 70–99)
GLUCOSE BLD-MCNC: 122 MG/DL (ref 70–99)
GLUCOSE BLD-MCNC: 99 MG/DL (ref 70–99)
MAGNESIUM: 2 MG/DL (ref 1.8–2.4)
PERFORMED ON: ABNORMAL
PERFORMED ON: NORMAL
POTASSIUM REFLEX MAGNESIUM: 4 MMOL/L (ref 3.5–5.1)
SODIUM BLD-SCNC: 138 MMOL/L (ref 136–145)

## 2018-12-01 PROCEDURE — 6370000000 HC RX 637 (ALT 250 FOR IP): Performed by: NURSE PRACTITIONER

## 2018-12-01 PROCEDURE — 94761 N-INVAS EAR/PLS OXIMETRY MLT: CPT

## 2018-12-01 PROCEDURE — 6370000000 HC RX 637 (ALT 250 FOR IP): Performed by: INTERNAL MEDICINE

## 2018-12-01 PROCEDURE — 80048 BASIC METABOLIC PNL TOTAL CA: CPT

## 2018-12-01 PROCEDURE — 2700000000 HC OXYGEN THERAPY PER DAY

## 2018-12-01 PROCEDURE — 1200000000 HC SEMI PRIVATE

## 2018-12-01 PROCEDURE — 6360000002 HC RX W HCPCS: Performed by: INTERNAL MEDICINE

## 2018-12-01 PROCEDURE — 99232 SBSQ HOSP IP/OBS MODERATE 35: CPT | Performed by: NURSE PRACTITIONER

## 2018-12-01 PROCEDURE — 6370000000 HC RX 637 (ALT 250 FOR IP): Performed by: REGISTERED NURSE

## 2018-12-01 PROCEDURE — 83735 ASSAY OF MAGNESIUM: CPT

## 2018-12-01 PROCEDURE — 36415 COLL VENOUS BLD VENIPUNCTURE: CPT

## 2018-12-01 RX ORDER — ALLOPURINOL 100 MG/1
100 TABLET ORAL DAILY
Status: DISCONTINUED | OUTPATIENT
Start: 2018-12-01 | End: 2018-12-04

## 2018-12-01 RX ORDER — ALLOPURINOL 300 MG/1
300 TABLET ORAL NIGHTLY
Status: DISCONTINUED | OUTPATIENT
Start: 2018-12-01 | End: 2018-12-04

## 2018-12-01 RX ORDER — AMLODIPINE BESYLATE 5 MG/1
10 TABLET ORAL DAILY
Status: DISCONTINUED | OUTPATIENT
Start: 2018-12-02 | End: 2018-12-04

## 2018-12-01 RX ORDER — AMLODIPINE BESYLATE 5 MG/1
5 TABLET ORAL ONCE
Status: COMPLETED | OUTPATIENT
Start: 2018-12-01 | End: 2018-12-01

## 2018-12-01 RX ORDER — CHOLECALCIFEROL (VITAMIN D3) 125 MCG
10 CAPSULE ORAL DAILY
Status: DISCONTINUED | OUTPATIENT
Start: 2018-12-01 | End: 2018-12-04

## 2018-12-01 RX ADMIN — POTASSIUM CHLORIDE 20 MEQ: 750 TABLET, EXTENDED RELEASE ORAL at 21:53

## 2018-12-01 RX ADMIN — METOPROLOL SUCCINATE 75 MG: 50 TABLET, EXTENDED RELEASE ORAL at 09:19

## 2018-12-01 RX ADMIN — AMLODIPINE BESYLATE 5 MG: 5 TABLET ORAL at 09:19

## 2018-12-01 RX ADMIN — AMLODIPINE BESYLATE 5 MG: 5 TABLET ORAL at 12:02

## 2018-12-01 RX ADMIN — Medication 10 MG: at 21:53

## 2018-12-01 RX ADMIN — Medication 10 MG: at 00:18

## 2018-12-01 RX ADMIN — METOPROLOL SUCCINATE 75 MG: 50 TABLET, EXTENDED RELEASE ORAL at 00:17

## 2018-12-01 RX ADMIN — ENOXAPARIN SODIUM 40 MG: 40 INJECTION SUBCUTANEOUS at 15:08

## 2018-12-01 RX ADMIN — ATORVASTATIN CALCIUM 20 MG: 10 TABLET, FILM COATED ORAL at 21:53

## 2018-12-01 RX ADMIN — ALLOPURINOL 300 MG: 300 TABLET ORAL at 22:19

## 2018-12-01 RX ADMIN — ALLOPURINOL 100 MG: 100 TABLET ORAL at 12:02

## 2018-12-01 RX ADMIN — BUMETANIDE 2 MG: 1 TABLET ORAL at 09:19

## 2018-12-01 RX ADMIN — METOPROLOL SUCCINATE 75 MG: 50 TABLET, EXTENDED RELEASE ORAL at 21:53

## 2018-12-01 RX ADMIN — BUMETANIDE 2 MG: 1 TABLET ORAL at 00:06

## 2018-12-01 RX ADMIN — POTASSIUM CHLORIDE 20 MEQ: 750 TABLET, EXTENDED RELEASE ORAL at 09:19

## 2018-12-01 RX ADMIN — BUMETANIDE 2 MG: 1 TABLET ORAL at 18:27

## 2018-12-01 RX ADMIN — POTASSIUM CHLORIDE 20 MEQ: 750 TABLET, EXTENDED RELEASE ORAL at 00:06

## 2018-12-01 RX ADMIN — ASPIRIN 81 MG: 81 TABLET, COATED ORAL at 09:19

## 2018-12-01 ASSESSMENT — ENCOUNTER SYMPTOMS
GASTROINTESTINAL NEGATIVE: 1
RESPIRATORY NEGATIVE: 1

## 2018-12-02 LAB
ANION GAP SERPL CALCULATED.3IONS-SCNC: 12 MMOL/L (ref 3–16)
BUN BLDV-MCNC: 42 MG/DL (ref 7–20)
CALCIUM SERPL-MCNC: 9.2 MG/DL (ref 8.3–10.6)
CHLORIDE BLD-SCNC: 106 MMOL/L (ref 99–110)
CO2: 25 MMOL/L (ref 21–32)
CREAT SERPL-MCNC: 2.1 MG/DL (ref 0.9–1.3)
GFR AFRICAN AMERICAN: 39
GFR NON-AFRICAN AMERICAN: 32
GLUCOSE BLD-MCNC: 115 MG/DL (ref 70–99)
GLUCOSE BLD-MCNC: 119 MG/DL (ref 70–99)
GLUCOSE BLD-MCNC: 137 MG/DL (ref 70–99)
GLUCOSE BLD-MCNC: 88 MG/DL (ref 70–99)
PERFORMED ON: ABNORMAL
PERFORMED ON: ABNORMAL
PERFORMED ON: NORMAL
POTASSIUM REFLEX MAGNESIUM: 4.2 MMOL/L (ref 3.5–5.1)
SODIUM BLD-SCNC: 143 MMOL/L (ref 136–145)

## 2018-12-02 PROCEDURE — 6370000000 HC RX 637 (ALT 250 FOR IP): Performed by: INTERNAL MEDICINE

## 2018-12-02 PROCEDURE — 94660 CPAP INITIATION&MGMT: CPT

## 2018-12-02 PROCEDURE — 6360000002 HC RX W HCPCS: Performed by: INTERNAL MEDICINE

## 2018-12-02 PROCEDURE — 2700000000 HC OXYGEN THERAPY PER DAY

## 2018-12-02 PROCEDURE — 2060000000 HC ICU INTERMEDIATE R&B

## 2018-12-02 PROCEDURE — 36415 COLL VENOUS BLD VENIPUNCTURE: CPT

## 2018-12-02 PROCEDURE — 6370000000 HC RX 637 (ALT 250 FOR IP): Performed by: NURSE PRACTITIONER

## 2018-12-02 PROCEDURE — 94761 N-INVAS EAR/PLS OXIMETRY MLT: CPT

## 2018-12-02 PROCEDURE — 6370000000 HC RX 637 (ALT 250 FOR IP): Performed by: REGISTERED NURSE

## 2018-12-02 PROCEDURE — 99232 SBSQ HOSP IP/OBS MODERATE 35: CPT | Performed by: INTERNAL MEDICINE

## 2018-12-02 PROCEDURE — 80048 BASIC METABOLIC PNL TOTAL CA: CPT

## 2018-12-02 RX ADMIN — ASPIRIN 81 MG: 81 TABLET, COATED ORAL at 10:07

## 2018-12-02 RX ADMIN — ATORVASTATIN CALCIUM 20 MG: 10 TABLET, FILM COATED ORAL at 21:23

## 2018-12-02 RX ADMIN — METOPROLOL SUCCINATE 75 MG: 50 TABLET, EXTENDED RELEASE ORAL at 10:07

## 2018-12-02 RX ADMIN — AMLODIPINE BESYLATE 10 MG: 5 TABLET ORAL at 10:07

## 2018-12-02 RX ADMIN — BUMETANIDE 2 MG: 1 TABLET ORAL at 10:27

## 2018-12-02 RX ADMIN — ALLOPURINOL 100 MG: 100 TABLET ORAL at 10:07

## 2018-12-02 RX ADMIN — Medication 10 MG: at 21:23

## 2018-12-02 RX ADMIN — METOPROLOL SUCCINATE 75 MG: 50 TABLET, EXTENDED RELEASE ORAL at 21:24

## 2018-12-02 RX ADMIN — ENOXAPARIN SODIUM 40 MG: 40 INJECTION SUBCUTANEOUS at 10:07

## 2018-12-02 RX ADMIN — ALLOPURINOL 300 MG: 300 TABLET ORAL at 21:24

## 2018-12-02 RX ADMIN — POTASSIUM CHLORIDE 20 MEQ: 750 TABLET, EXTENDED RELEASE ORAL at 21:23

## 2018-12-02 RX ADMIN — POTASSIUM CHLORIDE 20 MEQ: 750 TABLET, EXTENDED RELEASE ORAL at 10:07

## 2018-12-02 ASSESSMENT — PAIN SCALES - GENERAL: PAINLEVEL_OUTOF10: 0

## 2018-12-03 ENCOUNTER — ANESTHESIA EVENT (OUTPATIENT)
Dept: OPERATING ROOM | Age: 58
DRG: 165 | End: 2018-12-03
Payer: COMMERCIAL

## 2018-12-03 LAB
ABO/RH: NORMAL
ANION GAP SERPL CALCULATED.3IONS-SCNC: 11 MMOL/L (ref 3–16)
ANTIBODY SCREEN: NORMAL
BUN BLDV-MCNC: 41 MG/DL (ref 7–20)
CALCIUM SERPL-MCNC: 9.3 MG/DL (ref 8.3–10.6)
CHLORIDE BLD-SCNC: 104 MMOL/L (ref 99–110)
CO2: 23 MMOL/L (ref 21–32)
CREAT SERPL-MCNC: 1.9 MG/DL (ref 0.9–1.3)
GFR AFRICAN AMERICAN: 44
GFR NON-AFRICAN AMERICAN: 36
GLUCOSE BLD-MCNC: 107 MG/DL (ref 70–99)
GLUCOSE BLD-MCNC: 121 MG/DL (ref 70–99)
GLUCOSE BLD-MCNC: 123 MG/DL (ref 70–99)
GLUCOSE BLD-MCNC: 128 MG/DL (ref 70–99)
GLUCOSE BLD-MCNC: 170 MG/DL (ref 70–99)
PERFORMED ON: ABNORMAL
POTASSIUM REFLEX MAGNESIUM: 4.4 MMOL/L (ref 3.5–5.1)
SODIUM BLD-SCNC: 138 MMOL/L (ref 136–145)

## 2018-12-03 PROCEDURE — 6370000000 HC RX 637 (ALT 250 FOR IP): Performed by: THORACIC SURGERY (CARDIOTHORACIC VASCULAR SURGERY)

## 2018-12-03 PROCEDURE — 99233 SBSQ HOSP IP/OBS HIGH 50: CPT | Performed by: NURSE PRACTITIONER

## 2018-12-03 PROCEDURE — 94726 PLETHYSMOGRAPHY LUNG VOLUMES: CPT

## 2018-12-03 PROCEDURE — 6370000000 HC RX 637 (ALT 250 FOR IP): Performed by: REGISTERED NURSE

## 2018-12-03 PROCEDURE — 80048 BASIC METABOLIC PNL TOTAL CA: CPT

## 2018-12-03 PROCEDURE — 86900 BLOOD TYPING SEROLOGIC ABO: CPT

## 2018-12-03 PROCEDURE — 2060000000 HC ICU INTERMEDIATE R&B

## 2018-12-03 PROCEDURE — 6370000000 HC RX 637 (ALT 250 FOR IP): Performed by: NURSE PRACTITIONER

## 2018-12-03 PROCEDURE — 94664 DEMO&/EVAL PT USE INHALER: CPT

## 2018-12-03 PROCEDURE — 36415 COLL VENOUS BLD VENIPUNCTURE: CPT

## 2018-12-03 PROCEDURE — 86923 COMPATIBILITY TEST ELECTRIC: CPT

## 2018-12-03 PROCEDURE — 94729 DIFFUSING CAPACITY: CPT

## 2018-12-03 PROCEDURE — 6370000000 HC RX 637 (ALT 250 FOR IP): Performed by: INTERNAL MEDICINE

## 2018-12-03 PROCEDURE — 86901 BLOOD TYPING SEROLOGIC RH(D): CPT

## 2018-12-03 PROCEDURE — 94760 N-INVAS EAR/PLS OXIMETRY 1: CPT

## 2018-12-03 PROCEDURE — 86850 RBC ANTIBODY SCREEN: CPT

## 2018-12-03 PROCEDURE — 2580000003 HC RX 258: Performed by: NURSE PRACTITIONER

## 2018-12-03 PROCEDURE — 6360000002 HC RX W HCPCS: Performed by: INTERNAL MEDICINE

## 2018-12-03 PROCEDURE — 94060 EVALUATION OF WHEEZING: CPT

## 2018-12-03 PROCEDURE — 99024 POSTOP FOLLOW-UP VISIT: CPT | Performed by: THORACIC SURGERY (CARDIOTHORACIC VASCULAR SURGERY)

## 2018-12-03 RX ORDER — ATORVASTATIN CALCIUM 40 MG/1
40 TABLET, FILM COATED ORAL DAILY
Status: DISCONTINUED | OUTPATIENT
Start: 2018-12-03 | End: 2018-12-04

## 2018-12-03 RX ORDER — POTASSIUM CHLORIDE 20 MEQ/1
20 TABLET, EXTENDED RELEASE ORAL DAILY
Status: DISCONTINUED | OUTPATIENT
Start: 2018-12-04 | End: 2018-12-04

## 2018-12-03 RX ORDER — BISACODYL 10 MG
10 SUPPOSITORY, RECTAL RECTAL ONCE
Status: DISCONTINUED | OUTPATIENT
Start: 2018-12-03 | End: 2018-12-04

## 2018-12-03 RX ORDER — FLUTICASONE PROPIONATE 50 MCG
2 SPRAY, SUSPENSION (ML) NASAL DAILY
Status: DISCONTINUED | OUTPATIENT
Start: 2018-12-03 | End: 2018-12-04

## 2018-12-03 RX ORDER — ALBUTEROL SULFATE 90 UG/1
4 AEROSOL, METERED RESPIRATORY (INHALATION) ONCE
Status: COMPLETED | OUTPATIENT
Start: 2018-12-03 | End: 2018-12-03

## 2018-12-03 RX ORDER — SODIUM CHLORIDE 9 MG/ML
INJECTION, SOLUTION INTRAVENOUS CONTINUOUS
Status: DISCONTINUED | OUTPATIENT
Start: 2018-12-04 | End: 2018-12-04

## 2018-12-03 RX ORDER — CHLORHEXIDINE GLUCONATE 0.12 MG/ML
15 RINSE ORAL ONCE
Status: COMPLETED | OUTPATIENT
Start: 2018-12-04 | End: 2018-12-04

## 2018-12-03 RX ORDER — GABAPENTIN 300 MG/1
300 CAPSULE ORAL NIGHTLY
COMMUNITY

## 2018-12-03 RX ORDER — MIDAZOLAM HYDROCHLORIDE 5 MG/ML
2 INJECTION INTRAMUSCULAR; INTRAVENOUS ONCE
Status: COMPLETED | OUTPATIENT
Start: 2018-12-04 | End: 2018-12-04

## 2018-12-03 RX ORDER — DIPHENHYDRAMINE HCL 25 MG
25 TABLET ORAL NIGHTLY PRN
Status: DISCONTINUED | OUTPATIENT
Start: 2018-12-03 | End: 2018-12-04

## 2018-12-03 RX ORDER — BUMETANIDE 1 MG/1
2 TABLET ORAL 3 TIMES DAILY
Status: DISCONTINUED | OUTPATIENT
Start: 2018-12-03 | End: 2018-12-03

## 2018-12-03 RX ORDER — ASPIRIN 81 MG/1
81 TABLET ORAL ONCE
Status: COMPLETED | OUTPATIENT
Start: 2018-12-04 | End: 2018-12-04

## 2018-12-03 RX ORDER — CHLORHEXIDINE GLUCONATE 4 G/100ML
SOLUTION TOPICAL SEE ADMIN INSTRUCTIONS
Status: DISCONTINUED | OUTPATIENT
Start: 2018-12-03 | End: 2018-12-04 | Stop reason: HOSPADM

## 2018-12-03 RX ORDER — GABAPENTIN 300 MG/1
300 CAPSULE ORAL NIGHTLY
Status: DISCONTINUED | OUTPATIENT
Start: 2018-12-03 | End: 2018-12-04

## 2018-12-03 RX ORDER — FLUTICASONE PROPIONATE 50 MCG
2 SPRAY, SUSPENSION (ML) NASAL DAILY
COMMUNITY

## 2018-12-03 RX ORDER — SODIUM CHLORIDE 0.9 % (FLUSH) 0.9 %
10 SYRINGE (ML) INJECTION EVERY 12 HOURS SCHEDULED
Status: DISCONTINUED | OUTPATIENT
Start: 2018-12-03 | End: 2018-12-04

## 2018-12-03 RX ORDER — ACETAMINOPHEN 325 MG/1
650 TABLET ORAL EVERY 4 HOURS PRN
Status: DISCONTINUED | OUTPATIENT
Start: 2018-12-03 | End: 2018-12-04

## 2018-12-03 RX ORDER — SODIUM CHLORIDE, SODIUM LACTATE, POTASSIUM CHLORIDE, CALCIUM CHLORIDE 600; 310; 30; 20 MG/100ML; MG/100ML; MG/100ML; MG/100ML
1000 INJECTION, SOLUTION INTRAVENOUS CONTINUOUS
Status: DISCONTINUED | OUTPATIENT
Start: 2018-12-03 | End: 2018-12-04

## 2018-12-03 RX ORDER — HYDRALAZINE HYDROCHLORIDE 25 MG/1
25 TABLET, FILM COATED ORAL 3 TIMES DAILY PRN
Status: DISCONTINUED | OUTPATIENT
Start: 2018-12-03 | End: 2018-12-04

## 2018-12-03 RX ORDER — LOSARTAN POTASSIUM 25 MG/1
25 TABLET ORAL DAILY
Status: ON HOLD | COMMUNITY
End: 2019-01-10 | Stop reason: HOSPADM

## 2018-12-03 RX ADMIN — CHLORHEXIDINE GLUCONATE: 213 SOLUTION TOPICAL at 20:56

## 2018-12-03 RX ADMIN — ATORVASTATIN CALCIUM 40 MG: 40 TABLET, FILM COATED ORAL at 20:52

## 2018-12-03 RX ADMIN — ENOXAPARIN SODIUM 40 MG: 40 INJECTION SUBCUTANEOUS at 08:45

## 2018-12-03 RX ADMIN — GABAPENTIN 300 MG: 300 CAPSULE ORAL at 20:52

## 2018-12-03 RX ADMIN — ASPIRIN 81 MG: 81 TABLET, COATED ORAL at 08:45

## 2018-12-03 RX ADMIN — MUPIROCIN: 20 OINTMENT TOPICAL at 20:52

## 2018-12-03 RX ADMIN — METOPROLOL SUCCINATE 75 MG: 50 TABLET, EXTENDED RELEASE ORAL at 20:52

## 2018-12-03 RX ADMIN — ALLOPURINOL 300 MG: 300 TABLET ORAL at 20:52

## 2018-12-03 RX ADMIN — Medication 4 PUFF: at 11:01

## 2018-12-03 RX ADMIN — POTASSIUM CHLORIDE 20 MEQ: 750 TABLET, EXTENDED RELEASE ORAL at 08:45

## 2018-12-03 RX ADMIN — SODIUM CHLORIDE, PRESERVATIVE FREE 10 ML: 5 INJECTION INTRAVENOUS at 10:49

## 2018-12-03 RX ADMIN — METOPROLOL SUCCINATE 75 MG: 50 TABLET, EXTENDED RELEASE ORAL at 08:44

## 2018-12-03 RX ADMIN — Medication 10 MG: at 20:51

## 2018-12-03 RX ADMIN — SODIUM CHLORIDE, PRESERVATIVE FREE 10 ML: 5 INJECTION INTRAVENOUS at 20:53

## 2018-12-03 RX ADMIN — ALLOPURINOL 100 MG: 100 TABLET ORAL at 08:45

## 2018-12-03 RX ADMIN — AMLODIPINE BESYLATE 10 MG: 5 TABLET ORAL at 08:45

## 2018-12-03 RX ADMIN — BUMETANIDE 2 MG: 1 TABLET ORAL at 08:45

## 2018-12-03 NOTE — ANESTHESIA PRE PROCEDURE
needed.  5/3/18   Gurmeet Marti, DO   ACCU-CHEK MULTICLIX LANCETS MISC USE ONE  TO CHECK GLUCOSE ONCE DAILY 4/19/18   Gurmeet Marti, DO   Blood Glucose Monitoring Suppl (ACCU-CHEK COMPACT CARE KIT) KIT 1 kit by Does not apply route daily 4/18/17   Gurmeet Marti, DO       Current medications:    Current Facility-Administered Medications   Medication Dose Route Frequency Provider Last Rate Last Dose    diphenhydrAMINE (BENADRYL) tablet 25 mg  25 mg Oral Nightly PRN PAYTON Fenton CNP ON 12/4/2018] potassium chloride (KLOR-CON M) extended release tablet 20 mEq  20 mEq Oral Daily MD Jessica Ferrari ON 12/4/2018] aspirin EC tablet 81 mg  81 mg Oral Once Neyda A PAYTON Palencia CNP        mupirocin (BACTROBAN) 2 % ointment   Nasal BID Neyda A PAYTON Palencia CNP        [START ON 12/4/2018] chlorhexidine (PERIDEX) 0.12 % solution 15 mL  15 mL Mouth/Throat Once Neyda A PAYTON Palencia CNP        chlorhexidine (HIBICLENS) 4 % liquid   Topical See Admin Instructions Neyda A PAYTON Palencia CNP        sodium chloride flush 0.9 % injection 10 mL  10 mL Intravenous 2 times per day Neyda A PAYTON Palencia CNP        [START ON 12/4/2018] 0.9 % sodium chloride infusion   Intravenous Continuous Neyda A PAYTON Palencia CNP        [START ON 12/4/2018] ceFAZolin (ANCEF) 2 g in sterile water 20 mL IV syringe  2 g Intravenous On Call to [address] [facility], APRN - CNP        [START ON 12/4/2018] vancomycin 1.5 g in dextrose 5% 300 mL IVPB  1,500 mg Intravenous On Call to [address] [facility], APRN - CNP        [START ON 12/4/2018] metoprolol tartrate (LOPRESSOR) tablet 12.5 mg  12.5 mg Oral Once Neyda A PAYTON Palencia CNP        bisacodyl (DULCOLAX) suppository 10 mg  10 mg Rectal Once Neyda A PAYTON Palencia CNP        [START ON 12/4/2018] insulin regular (HUMULIN R;NOVOLIN R) 100 Units in sodium chloride 0.9 % 100 mL infusion  0.5 Units/hr Intravenous On Call PAYTON Tamayo -

## 2018-12-04 ENCOUNTER — APPOINTMENT (OUTPATIENT)
Dept: GENERAL RADIOLOGY | Age: 58
DRG: 165 | End: 2018-12-04
Attending: INTERNAL MEDICINE
Payer: COMMERCIAL

## 2018-12-04 ENCOUNTER — APPOINTMENT (OUTPATIENT)
Dept: CT IMAGING | Age: 58
DRG: 165 | End: 2018-12-04
Attending: INTERNAL MEDICINE
Payer: COMMERCIAL

## 2018-12-04 ENCOUNTER — ANESTHESIA (OUTPATIENT)
Dept: OPERATING ROOM | Age: 58
DRG: 165 | End: 2018-12-04
Payer: COMMERCIAL

## 2018-12-04 VITALS
SYSTOLIC BLOOD PRESSURE: 86 MMHG | TEMPERATURE: 99.3 F | RESPIRATION RATE: 18 BRPM | DIASTOLIC BLOOD PRESSURE: 67 MMHG | OXYGEN SATURATION: 100 %

## 2018-12-04 LAB
ACTIVATED CLOTTING TIME: 112 SEC (ref 99–130)
ACTIVATED CLOTTING TIME: 114 SEC (ref 99–130)
ACTIVATED CLOTTING TIME: 487 SEC (ref 99–130)
ACTIVATED CLOTTING TIME: 493 SEC (ref 99–130)
ACTIVATED CLOTTING TIME: 625 SEC (ref 99–130)
ALBUMIN SERPL-MCNC: 2.5 G/DL (ref 3.4–5)
ALBUMIN SERPL-MCNC: 3.3 G/DL (ref 3.4–5)
ALP BLD-CCNC: 84 U/L (ref 40–129)
ALT SERPL-CCNC: 29 U/L (ref 10–40)
ANION GAP SERPL CALCULATED.3IONS-SCNC: 8 MMOL/L (ref 3–16)
ANION GAP SERPL CALCULATED.3IONS-SCNC: 9 MMOL/L (ref 3–16)
AST SERPL-CCNC: 22 U/L (ref 15–37)
BASE EXCESS ARTERIAL: -1 (ref -3–3)
BASE EXCESS ARTERIAL: -3 (ref -3–3)
BASE EXCESS ARTERIAL: -4 (ref -3–3)
BASE EXCESS ARTERIAL: -4 (ref -3–3)
BASE EXCESS ARTERIAL: -5 (ref -3–3)
BASE EXCESS ARTERIAL: -5 (ref -3–3)
BASOPHILS ABSOLUTE: 0 K/UL (ref 0–0.2)
BASOPHILS RELATIVE PERCENT: 0.8 %
BILIRUB SERPL-MCNC: 0.4 MG/DL (ref 0–1)
BILIRUBIN DIRECT: <0.2 MG/DL (ref 0–0.3)
BILIRUBIN, INDIRECT: ABNORMAL MG/DL (ref 0–1)
BLOOD BANK DISPENSE STATUS: NORMAL
BLOOD BANK DISPENSE STATUS: NORMAL
BLOOD BANK PRODUCT CODE: NORMAL
BLOOD BANK PRODUCT CODE: NORMAL
BPU ID: NORMAL
BPU ID: NORMAL
BUN BLDV-MCNC: 37 MG/DL (ref 7–20)
BUN BLDV-MCNC: 41 MG/DL (ref 7–20)
CALCIUM IONIZED: 1.15 MMOL/L (ref 1.12–1.32)
CALCIUM IONIZED: 1.16 MMOL/L (ref 1.12–1.32)
CALCIUM IONIZED: 1.22 MMOL/L (ref 1.12–1.32)
CALCIUM IONIZED: 1.45 MMOL/L (ref 1.12–1.32)
CALCIUM IONIZED: 1.69 MMOL/L (ref 1.12–1.32)
CALCIUM SERPL-MCNC: 9 MG/DL (ref 8.3–10.6)
CALCIUM SERPL-MCNC: 9.2 MG/DL (ref 8.3–10.6)
CHLORIDE BLD-SCNC: 103 MMOL/L (ref 99–110)
CHLORIDE BLD-SCNC: 106 MMOL/L (ref 99–110)
CHOLESTEROL, TOTAL: 156 MG/DL (ref 0–199)
CO2: 22 MMOL/L (ref 21–32)
CO2: 26 MMOL/L (ref 21–32)
CREAT SERPL-MCNC: 1.8 MG/DL (ref 0.9–1.3)
CREAT SERPL-MCNC: 2 MG/DL (ref 0.9–1.3)
DESCRIPTION BLOOD BANK: NORMAL
DESCRIPTION BLOOD BANK: NORMAL
EOSINOPHILS ABSOLUTE: 0.2 K/UL (ref 0–0.6)
EOSINOPHILS RELATIVE PERCENT: 3 %
ESTIMATED AVERAGE GLUCOSE: 162.8 MG/DL
GFR AFRICAN AMERICAN: 42
GFR AFRICAN AMERICAN: 47
GFR NON-AFRICAN AMERICAN: 34
GFR NON-AFRICAN AMERICAN: 39
GLUCOSE BLD-MCNC: 102 MG/DL (ref 70–99)
GLUCOSE BLD-MCNC: 102 MG/DL (ref 70–99)
GLUCOSE BLD-MCNC: 104 MG/DL (ref 70–99)
GLUCOSE BLD-MCNC: 107 MG/DL (ref 70–99)
GLUCOSE BLD-MCNC: 108 MG/DL (ref 70–99)
GLUCOSE BLD-MCNC: 117 MG/DL (ref 70–99)
GLUCOSE BLD-MCNC: 121 MG/DL (ref 70–99)
GLUCOSE BLD-MCNC: 126 MG/DL (ref 70–99)
GLUCOSE BLD-MCNC: 129 MG/DL (ref 70–99)
GLUCOSE BLD-MCNC: 134 MG/DL (ref 70–99)
GLUCOSE BLD-MCNC: 140 MG/DL (ref 70–99)
GLUCOSE BLD-MCNC: 142 MG/DL (ref 70–99)
GLUCOSE BLD-MCNC: 146 MG/DL (ref 70–99)
GLUCOSE BLD-MCNC: 152 MG/DL (ref 70–99)
GLUCOSE BLD-MCNC: 153 MG/DL (ref 70–99)
GLUCOSE BLD-MCNC: 159 MG/DL (ref 70–99)
HBA1C MFR BLD: 7.3 %
HCO3 ARTERIAL: 20.6 MMOL/L (ref 21–29)
HCO3 ARTERIAL: 20.8 MMOL/L (ref 21–29)
HCO3 ARTERIAL: 21.4 MMOL/L (ref 21–29)
HCO3 ARTERIAL: 21.5 MMOL/L (ref 21–29)
HCO3 ARTERIAL: 22.1 MMOL/L (ref 21–29)
HCO3 ARTERIAL: 22.6 MMOL/L (ref 21–29)
HCO3 ARTERIAL: 22.7 MMOL/L (ref 21–29)
HCO3 ARTERIAL: 24 MMOL/L (ref 21–29)
HCT VFR BLD CALC: 32.5 % (ref 40.5–52.5)
HCT VFR BLD CALC: 42.8 % (ref 40.5–52.5)
HDLC SERPL-MCNC: 28 MG/DL (ref 40–60)
HEMOGLOBIN: 10.1 GM/DL (ref 13.5–17.5)
HEMOGLOBIN: 10.5 G/DL (ref 13.5–17.5)
HEMOGLOBIN: 13.3 GM/DL (ref 13.5–17.5)
HEMOGLOBIN: 13.9 G/DL (ref 13.5–17.5)
HEMOGLOBIN: 8.2 GM/DL (ref 13.5–17.5)
HEMOGLOBIN: 8.9 GM/DL (ref 13.5–17.5)
HEMOGLOBIN: 9 GM/DL (ref 13.5–17.5)
INR BLD: 1.11 (ref 0.86–1.14)
INR BLD: 1.26 (ref 0.86–1.14)
LACTATE: 0.65 MMOL/L (ref 0.4–2)
LACTATE: 0.74 MMOL/L (ref 0.4–2)
LACTATE: 0.79 MMOL/L (ref 0.4–2)
LACTATE: 0.84 MMOL/L (ref 0.4–2)
LACTATE: 1.51 MMOL/L (ref 0.4–2)
LDL CHOLESTEROL CALCULATED: 83 MG/DL
LYMPHOCYTES ABSOLUTE: 1.4 K/UL (ref 1–5.1)
LYMPHOCYTES RELATIVE PERCENT: 26.1 %
MAGNESIUM: 2.7 MG/DL (ref 1.8–2.4)
MCH RBC QN AUTO: 29.2 PG (ref 26–34)
MCH RBC QN AUTO: 29.3 PG (ref 26–34)
MCHC RBC AUTO-ENTMCNC: 32.4 G/DL (ref 31–36)
MCHC RBC AUTO-ENTMCNC: 32.5 G/DL (ref 31–36)
MCV RBC AUTO: 90.1 FL (ref 80–100)
MCV RBC AUTO: 90.3 FL (ref 80–100)
MONOCYTES ABSOLUTE: 0.6 K/UL (ref 0–1.3)
MONOCYTES RELATIVE PERCENT: 11.6 %
NEUTROPHILS ABSOLUTE: 3.2 K/UL (ref 1.7–7.7)
NEUTROPHILS RELATIVE PERCENT: 58.5 %
O2 SAT, ARTERIAL: 100 % (ref 93–100)
O2 SAT, ARTERIAL: 98 % (ref 93–100)
O2 SAT, ARTERIAL: 98 % (ref 93–100)
O2 SAT, ARTERIAL: 99 % (ref 93–100)
PCO2 ARTERIAL: 31.1 MM HG (ref 35–45)
PCO2 ARTERIAL: 33.3 MM HG (ref 35–45)
PCO2 ARTERIAL: 38 MM HG (ref 35–45)
PCO2 ARTERIAL: 38.2 MM HG (ref 35–45)
PCO2 ARTERIAL: 38.4 MM HG (ref 35–45)
PCO2 ARTERIAL: 39.1 MM HG (ref 35–45)
PCO2 ARTERIAL: 42.7 MM HG (ref 35–45)
PCO2 ARTERIAL: 52 MM HG (ref 35–45)
PDW BLD-RTO: 16.8 % (ref 12.4–15.4)
PDW BLD-RTO: 17 % (ref 12.4–15.4)
PERFORMED ON: ABNORMAL
PH ARTERIAL: 7.25 (ref 7.35–7.45)
PH ARTERIAL: 7.33 (ref 7.35–7.45)
PH ARTERIAL: 7.35 (ref 7.35–7.45)
PH ARTERIAL: 7.35 (ref 7.35–7.45)
PH ARTERIAL: 7.37 (ref 7.35–7.45)
PH ARTERIAL: 7.41 (ref 7.35–7.45)
PH ARTERIAL: 7.42 (ref 7.35–7.45)
PH ARTERIAL: 7.43 (ref 7.35–7.45)
PHOSPHORUS: 3.6 MG/DL (ref 2.5–4.9)
PLATELET # BLD: 109 K/UL (ref 135–450)
PLATELET # BLD: 157 K/UL (ref 135–450)
PMV BLD AUTO: 8.5 FL (ref 5–10.5)
PMV BLD AUTO: 8.6 FL (ref 5–10.5)
PO2 ARTERIAL: 109.1 MM HG (ref 75–108)
PO2 ARTERIAL: 110.5 MM HG (ref 75–108)
PO2 ARTERIAL: 142.1 MM HG (ref 75–108)
PO2 ARTERIAL: 222.8 MM HG (ref 75–108)
PO2 ARTERIAL: 349.8 MM HG (ref 75–108)
PO2 ARTERIAL: 381.1 MM HG (ref 75–108)
PO2 ARTERIAL: 406.5 MM HG (ref 75–108)
PO2 ARTERIAL: 538.7 MM HG (ref 75–108)
POC HEMATOCRIT: 24 % (ref 41–53)
POC HEMATOCRIT: 26 % (ref 41–53)
POC HEMATOCRIT: 27 % (ref 41–53)
POC HEMATOCRIT: 30 % (ref 41–53)
POC HEMATOCRIT: 39 % (ref 41–53)
POC POTASSIUM: 3.7 MMOL/L (ref 3.5–5.1)
POC POTASSIUM: 3.9 MMOL/L (ref 3.5–5.1)
POC POTASSIUM: 4.1 MMOL/L (ref 3.5–5.1)
POC POTASSIUM: 4.4 MMOL/L (ref 3.5–5.1)
POC POTASSIUM: 4.5 MMOL/L (ref 3.5–5.1)
POC SAMPLE TYPE: ABNORMAL
POC SODIUM: 137 MMOL/L (ref 136–145)
POC SODIUM: 137 MMOL/L (ref 136–145)
POC SODIUM: 138 MMOL/L (ref 136–145)
POC SODIUM: 140 MMOL/L (ref 136–145)
POC SODIUM: 140 MMOL/L (ref 136–145)
POTASSIUM REFLEX MAGNESIUM: 4.2 MMOL/L (ref 3.5–5.1)
POTASSIUM SERPL-SCNC: 4.1 MMOL/L (ref 3.5–5.1)
POTASSIUM SERPL-SCNC: 4.1 MMOL/L (ref 3.5–5.1)
PROTHROMBIN TIME: 12.7 SEC (ref 9.8–13)
PROTHROMBIN TIME: 14.4 SEC (ref 9.8–13)
RBC # BLD: 3.6 M/UL (ref 4.2–5.9)
RBC # BLD: 4.75 M/UL (ref 4.2–5.9)
SODIUM BLD-SCNC: 136 MMOL/L (ref 136–145)
SODIUM BLD-SCNC: 138 MMOL/L (ref 136–145)
TCO2 ARTERIAL: 22 MMOL/L
TCO2 ARTERIAL: 22 MMOL/L
TCO2 ARTERIAL: 23 MMOL/L
TCO2 ARTERIAL: 24 MMOL/L
TCO2 ARTERIAL: 24 MMOL/L
TCO2 ARTERIAL: 25 MMOL/L
TOTAL PROTEIN: 6.4 G/DL (ref 6.4–8.2)
TRIGL SERPL-MCNC: 225 MG/DL (ref 0–150)
VANCOMYCIN RANDOM: 12.5 UG/ML
VLDLC SERPL CALC-MCNC: 45 MG/DL
WBC # BLD: 5.5 K/UL (ref 4–11)
WBC # BLD: 7.3 K/UL (ref 4–11)

## 2018-12-04 PROCEDURE — 82803 BLOOD GASES ANY COMBINATION: CPT

## 2018-12-04 PROCEDURE — 82947 ASSAY GLUCOSE BLOOD QUANT: CPT

## 2018-12-04 PROCEDURE — 83605 ASSAY OF LACTIC ACID: CPT

## 2018-12-04 PROCEDURE — B24BZZ4 ULTRASONOGRAPHY OF HEART WITH AORTA, TRANSESOPHAGEAL: ICD-10-PCS | Performed by: THORACIC SURGERY (CARDIOTHORACIC VASCULAR SURGERY)

## 2018-12-04 PROCEDURE — C1729 CATH, DRAINAGE: HCPCS | Performed by: THORACIC SURGERY (CARDIOTHORACIC VASCULAR SURGERY)

## 2018-12-04 PROCEDURE — 2580000003 HC RX 258: Performed by: THORACIC SURGERY (CARDIOTHORACIC VASCULAR SURGERY)

## 2018-12-04 PROCEDURE — 2580000003 HC RX 258: Performed by: NURSE PRACTITIONER

## 2018-12-04 PROCEDURE — 80076 HEPATIC FUNCTION PANEL: CPT

## 2018-12-04 PROCEDURE — 6370000000 HC RX 637 (ALT 250 FOR IP): Performed by: NURSE PRACTITIONER

## 2018-12-04 PROCEDURE — 2720000010 HC SURG SUPPLY STERILE: Performed by: THORACIC SURGERY (CARDIOTHORACIC VASCULAR SURGERY)

## 2018-12-04 PROCEDURE — 6360000002 HC RX W HCPCS: Performed by: ANESTHESIOLOGY

## 2018-12-04 PROCEDURE — 94668 MNPJ CHEST WALL SBSQ: CPT

## 2018-12-04 PROCEDURE — 83735 ASSAY OF MAGNESIUM: CPT

## 2018-12-04 PROCEDURE — 33533 CABG ARTERIAL SINGLE: CPT | Performed by: THORACIC SURGERY (CARDIOTHORACIC VASCULAR SURGERY)

## 2018-12-04 PROCEDURE — 3600000018 HC SURGERY OHS ADDTL 15MIN: Performed by: THORACIC SURGERY (CARDIOTHORACIC VASCULAR SURGERY)

## 2018-12-04 PROCEDURE — 2580000003 HC RX 258: Performed by: ANESTHESIOLOGY

## 2018-12-04 PROCEDURE — 6370000000 HC RX 637 (ALT 250 FOR IP): Performed by: THORACIC SURGERY (CARDIOTHORACIC VASCULAR SURGERY)

## 2018-12-04 PROCEDURE — 99255 IP/OBS CONSLTJ NEW/EST HI 80: CPT | Performed by: PSYCHIATRY & NEUROLOGY

## 2018-12-04 PROCEDURE — 2709999900 HC NON-CHARGEABLE SUPPLY: Performed by: THORACIC SURGERY (CARDIOTHORACIC VASCULAR SURGERY)

## 2018-12-04 PROCEDURE — 84132 ASSAY OF SERUM POTASSIUM: CPT

## 2018-12-04 PROCEDURE — 2500000003 HC RX 250 WO HCPCS: Performed by: ANESTHESIOLOGY

## 2018-12-04 PROCEDURE — 33518 CABG ARTERY-VEIN TWO: CPT | Performed by: THORACIC SURGERY (CARDIOTHORACIC VASCULAR SURGERY)

## 2018-12-04 PROCEDURE — 84295 ASSAY OF SERUM SODIUM: CPT

## 2018-12-04 PROCEDURE — 85027 COMPLETE CBC AUTOMATED: CPT

## 2018-12-04 PROCEDURE — 3700000000 HC ANESTHESIA ATTENDED CARE: Performed by: THORACIC SURGERY (CARDIOTHORACIC VASCULAR SURGERY)

## 2018-12-04 PROCEDURE — 71045 X-RAY EXAM CHEST 1 VIEW: CPT

## 2018-12-04 PROCEDURE — 94640 AIRWAY INHALATION TREATMENT: CPT

## 2018-12-04 PROCEDURE — 83036 HEMOGLOBIN GLYCOSYLATED A1C: CPT

## 2018-12-04 PROCEDURE — 3700000001 HC ADD 15 MINUTES (ANESTHESIA): Performed by: THORACIC SURGERY (CARDIOTHORACIC VASCULAR SURGERY)

## 2018-12-04 PROCEDURE — 6360000002 HC RX W HCPCS: Performed by: NURSE PRACTITIONER

## 2018-12-04 PROCEDURE — 74176 CT ABD & PELVIS W/O CONTRAST: CPT

## 2018-12-04 PROCEDURE — 85347 COAGULATION TIME ACTIVATED: CPT

## 2018-12-04 PROCEDURE — 82330 ASSAY OF CALCIUM: CPT

## 2018-12-04 PROCEDURE — 7100000001 HC PACU RECOVERY - ADDTL 15 MIN

## 2018-12-04 PROCEDURE — 70450 CT HEAD/BRAIN W/O DYE: CPT

## 2018-12-04 PROCEDURE — S0028 INJECTION, FAMOTIDINE, 20 MG: HCPCS | Performed by: THORACIC SURGERY (CARDIOTHORACIC VASCULAR SURGERY)

## 2018-12-04 PROCEDURE — 2500000003 HC RX 250 WO HCPCS: Performed by: THORACIC SURGERY (CARDIOTHORACIC VASCULAR SURGERY)

## 2018-12-04 PROCEDURE — 94667 MNPJ CHEST WALL 1ST: CPT

## 2018-12-04 PROCEDURE — 72125 CT NECK SPINE W/O DYE: CPT

## 2018-12-04 PROCEDURE — 80061 LIPID PANEL: CPT

## 2018-12-04 PROCEDURE — P9045 ALBUMIN (HUMAN), 5%, 250 ML: HCPCS | Performed by: THORACIC SURGERY (CARDIOTHORACIC VASCULAR SURGERY)

## 2018-12-04 PROCEDURE — C9290 INJ, BUPIVACAINE LIPOSOME: HCPCS | Performed by: THORACIC SURGERY (CARDIOTHORACIC VASCULAR SURGERY)

## 2018-12-04 PROCEDURE — 06BP4ZZ EXCISION OF RIGHT SAPHENOUS VEIN, PERCUTANEOUS ENDOSCOPIC APPROACH: ICD-10-PCS | Performed by: THORACIC SURGERY (CARDIOTHORACIC VASCULAR SURGERY)

## 2018-12-04 PROCEDURE — 6370000000 HC RX 637 (ALT 250 FOR IP): Performed by: ANESTHESIOLOGY

## 2018-12-04 PROCEDURE — 36415 COLL VENOUS BLD VENIPUNCTURE: CPT

## 2018-12-04 PROCEDURE — 2100000000 HC CCU R&B

## 2018-12-04 PROCEDURE — 6360000002 HC RX W HCPCS: Performed by: THORACIC SURGERY (CARDIOTHORACIC VASCULAR SURGERY)

## 2018-12-04 PROCEDURE — 36592 COLLECT BLOOD FROM PICC: CPT

## 2018-12-04 PROCEDURE — 2700000000 HC OXYGEN THERAPY PER DAY

## 2018-12-04 PROCEDURE — 85610 PROTHROMBIN TIME: CPT

## 2018-12-04 PROCEDURE — 85014 HEMATOCRIT: CPT

## 2018-12-04 PROCEDURE — 94664 DEMO&/EVAL PT USE INHALER: CPT

## 2018-12-04 PROCEDURE — 33508 ENDOSCOPIC VEIN HARVEST: CPT | Performed by: THORACIC SURGERY (CARDIOTHORACIC VASCULAR SURGERY)

## 2018-12-04 PROCEDURE — 7100000000 HC PACU RECOVERY - FIRST 15 MIN

## 2018-12-04 PROCEDURE — 94761 N-INVAS EAR/PLS OXIMETRY MLT: CPT

## 2018-12-04 PROCEDURE — 80202 ASSAY OF VANCOMYCIN: CPT

## 2018-12-04 PROCEDURE — 021109W BYPASS CORONARY ARTERY, TWO ARTERIES FROM AORTA WITH AUTOLOGOUS VENOUS TISSUE, OPEN APPROACH: ICD-10-PCS | Performed by: THORACIC SURGERY (CARDIOTHORACIC VASCULAR SURGERY)

## 2018-12-04 PROCEDURE — 71250 CT THORAX DX C-: CPT

## 2018-12-04 PROCEDURE — 02100Z9 BYPASS CORONARY ARTERY, ONE ARTERY FROM LEFT INTERNAL MAMMARY, OPEN APPROACH: ICD-10-PCS | Performed by: THORACIC SURGERY (CARDIOTHORACIC VASCULAR SURGERY)

## 2018-12-04 PROCEDURE — 80069 RENAL FUNCTION PANEL: CPT

## 2018-12-04 PROCEDURE — 06BQ4ZZ EXCISION OF LEFT SAPHENOUS VEIN, PERCUTANEOUS ENDOSCOPIC APPROACH: ICD-10-PCS | Performed by: THORACIC SURGERY (CARDIOTHORACIC VASCULAR SURGERY)

## 2018-12-04 PROCEDURE — 5A1221Z PERFORMANCE OF CARDIAC OUTPUT, CONTINUOUS: ICD-10-PCS | Performed by: THORACIC SURGERY (CARDIOTHORACIC VASCULAR SURGERY)

## 2018-12-04 PROCEDURE — 85025 COMPLETE CBC W/AUTO DIFF WBC: CPT

## 2018-12-04 PROCEDURE — 3600000008 HC SURGERY OHS BASE: Performed by: THORACIC SURGERY (CARDIOTHORACIC VASCULAR SURGERY)

## 2018-12-04 RX ORDER — DEXTROSE MONOHYDRATE 50 MG/ML
100 INJECTION, SOLUTION INTRAVENOUS PRN
Status: DISCONTINUED | OUTPATIENT
Start: 2018-12-04 | End: 2018-12-07 | Stop reason: HOSPADM

## 2018-12-04 RX ORDER — SODIUM CHLORIDE 9 MG/ML
INJECTION, SOLUTION INTRAVENOUS CONTINUOUS PRN
Status: DISCONTINUED | OUTPATIENT
Start: 2018-12-04 | End: 2018-12-04 | Stop reason: SDUPTHER

## 2018-12-04 RX ORDER — POTASSIUM CHLORIDE 29.8 MG/ML
20 INJECTION INTRAVENOUS PRN
Status: DISCONTINUED | OUTPATIENT
Start: 2018-12-04 | End: 2018-12-07 | Stop reason: HOSPADM

## 2018-12-04 RX ORDER — MAGNESIUM SULFATE IN WATER 40 MG/ML
2 INJECTION, SOLUTION INTRAVENOUS PRN
Status: DISCONTINUED | OUTPATIENT
Start: 2018-12-04 | End: 2018-12-07

## 2018-12-04 RX ORDER — DOCUSATE SODIUM 100 MG/1
100 CAPSULE, LIQUID FILLED ORAL 2 TIMES DAILY
Status: DISCONTINUED | OUTPATIENT
Start: 2018-12-05 | End: 2018-12-07 | Stop reason: HOSPADM

## 2018-12-04 RX ORDER — EPHEDRINE SULFATE 50 MG/ML
INJECTION INTRAVENOUS PRN
Status: DISCONTINUED | OUTPATIENT
Start: 2018-12-04 | End: 2018-12-04 | Stop reason: SDUPTHER

## 2018-12-04 RX ORDER — SODIUM CHLORIDE 0.9 % (FLUSH) 0.9 %
10 SYRINGE (ML) INJECTION EVERY 12 HOURS SCHEDULED
Status: DISCONTINUED | OUTPATIENT
Start: 2018-12-04 | End: 2018-12-07 | Stop reason: HOSPADM

## 2018-12-04 RX ORDER — PROTAMINE SULFATE 10 MG/ML
50 INJECTION, SOLUTION INTRAVENOUS
Status: ACTIVE | OUTPATIENT
Start: 2018-12-04 | End: 2018-12-04

## 2018-12-04 RX ORDER — FENTANYL CITRATE 50 UG/ML
INJECTION, SOLUTION INTRAMUSCULAR; INTRAVENOUS PRN
Status: DISCONTINUED | OUTPATIENT
Start: 2018-12-04 | End: 2018-12-04 | Stop reason: SDUPTHER

## 2018-12-04 RX ORDER — DOBUTAMINE HYDROCHLORIDE 200 MG/100ML
INJECTION INTRAVENOUS CONTINUOUS PRN
Status: DISCONTINUED | OUTPATIENT
Start: 2018-12-04 | End: 2018-12-04 | Stop reason: SDUPTHER

## 2018-12-04 RX ORDER — MIDAZOLAM HYDROCHLORIDE 1 MG/ML
1 INJECTION INTRAMUSCULAR; INTRAVENOUS
Status: DISCONTINUED | OUTPATIENT
Start: 2018-12-04 | End: 2018-12-06

## 2018-12-04 RX ORDER — FUROSEMIDE 10 MG/ML
40 INJECTION INTRAMUSCULAR; INTRAVENOUS 2 TIMES DAILY
Status: DISCONTINUED | OUTPATIENT
Start: 2018-12-05 | End: 2018-12-05

## 2018-12-04 RX ORDER — HYDROMORPHONE HCL 110MG/55ML
0.25 PATIENT CONTROLLED ANALGESIA SYRINGE INTRAVENOUS
Status: DISCONTINUED | OUTPATIENT
Start: 2018-12-04 | End: 2018-12-07

## 2018-12-04 RX ORDER — ACETAMINOPHEN 650 MG/1
650 SUPPOSITORY RECTAL EVERY 4 HOURS PRN
Status: DISCONTINUED | OUTPATIENT
Start: 2018-12-04 | End: 2018-12-07 | Stop reason: HOSPADM

## 2018-12-04 RX ORDER — DEXTROSE AND SODIUM CHLORIDE 5; .45 G/100ML; G/100ML
INJECTION, SOLUTION INTRAVENOUS CONTINUOUS
Status: DISCONTINUED | OUTPATIENT
Start: 2018-12-04 | End: 2018-12-04

## 2018-12-04 RX ORDER — HYDRALAZINE HYDROCHLORIDE 20 MG/ML
5 INJECTION INTRAMUSCULAR; INTRAVENOUS EVERY 5 MIN PRN
Status: DISCONTINUED | OUTPATIENT
Start: 2018-12-04 | End: 2018-12-07 | Stop reason: HOSPADM

## 2018-12-04 RX ORDER — ALBUTEROL SULFATE 2.5 MG/3ML
2.5 SOLUTION RESPIRATORY (INHALATION)
Status: DISCONTINUED | OUTPATIENT
Start: 2018-12-04 | End: 2018-12-07 | Stop reason: HOSPADM

## 2018-12-04 RX ORDER — POTASSIUM CHLORIDE 750 MG/1
10 TABLET, EXTENDED RELEASE ORAL
Status: DISCONTINUED | OUTPATIENT
Start: 2018-12-05 | End: 2018-12-04

## 2018-12-04 RX ORDER — HYDROMORPHONE HCL 110MG/55ML
0.5 PATIENT CONTROLLED ANALGESIA SYRINGE INTRAVENOUS
Status: DISCONTINUED | OUTPATIENT
Start: 2018-12-04 | End: 2018-12-07

## 2018-12-04 RX ORDER — CALCIUM CHLORIDE 100 MG/ML
INJECTION INTRAVENOUS; INTRAVENTRICULAR PRN
Status: DISCONTINUED | OUTPATIENT
Start: 2018-12-04 | End: 2018-12-04 | Stop reason: SDUPTHER

## 2018-12-04 RX ORDER — ALBUMIN, HUMAN INJ 5% 5 %
25 SOLUTION INTRAVENOUS PRN
Status: DISCONTINUED | OUTPATIENT
Start: 2018-12-04 | End: 2018-12-06

## 2018-12-04 RX ORDER — OXYCODONE HYDROCHLORIDE AND ACETAMINOPHEN 5; 325 MG/1; MG/1
2 TABLET ORAL EVERY 4 HOURS PRN
Status: DISCONTINUED | OUTPATIENT
Start: 2018-12-04 | End: 2018-12-04

## 2018-12-04 RX ORDER — MIDAZOLAM HYDROCHLORIDE 5 MG/ML
INJECTION INTRAMUSCULAR; INTRAVENOUS
Status: DISCONTINUED
Start: 2018-12-04 | End: 2018-12-04

## 2018-12-04 RX ORDER — PROTAMINE SULFATE 10 MG/ML
INJECTION, SOLUTION INTRAVENOUS PRN
Status: DISCONTINUED | OUTPATIENT
Start: 2018-12-04 | End: 2018-12-04 | Stop reason: SDUPTHER

## 2018-12-04 RX ORDER — PAPAVERINE HYDROCHLORIDE 30 MG/ML
INJECTION INTRAMUSCULAR; INTRAVENOUS PRN
Status: DISCONTINUED | OUTPATIENT
Start: 2018-12-04 | End: 2018-12-04 | Stop reason: HOSPADM

## 2018-12-04 RX ORDER — ATORVASTATIN CALCIUM 10 MG/1
20 TABLET, FILM COATED ORAL NIGHTLY
Status: DISCONTINUED | OUTPATIENT
Start: 2018-12-05 | End: 2018-12-07 | Stop reason: HOSPADM

## 2018-12-04 RX ORDER — GLYCOPYRROLATE 0.2 MG/ML
INJECTION INTRAMUSCULAR; INTRAVENOUS PRN
Status: DISCONTINUED | OUTPATIENT
Start: 2018-12-04 | End: 2018-12-04 | Stop reason: SDUPTHER

## 2018-12-04 RX ORDER — INSULIN GLARGINE 100 [IU]/ML
0.25 INJECTION, SOLUTION SUBCUTANEOUS NIGHTLY
Status: COMPLETED | OUTPATIENT
Start: 2018-12-05 | End: 2018-12-05

## 2018-12-04 RX ORDER — SODIUM CHLORIDE 0.9 % (FLUSH) 0.9 %
10 SYRINGE (ML) INJECTION PRN
Status: DISCONTINUED | OUTPATIENT
Start: 2018-12-04 | End: 2018-12-07 | Stop reason: HOSPADM

## 2018-12-04 RX ORDER — MAGNESIUM HYDROXIDE 1200 MG/15ML
LIQUID ORAL CONTINUOUS PRN
Status: COMPLETED | OUTPATIENT
Start: 2018-12-04 | End: 2018-12-04

## 2018-12-04 RX ORDER — LACTULOSE 10 G/15ML
10 SOLUTION ORAL 2 TIMES DAILY
Status: DISCONTINUED | OUTPATIENT
Start: 2018-12-05 | End: 2018-12-07 | Stop reason: HOSPADM

## 2018-12-04 RX ORDER — NEOSTIGMINE METHYLSULFATE 5 MG/5 ML
SYRINGE (ML) INTRAVENOUS PRN
Status: DISCONTINUED | OUTPATIENT
Start: 2018-12-04 | End: 2018-12-04 | Stop reason: SDUPTHER

## 2018-12-04 RX ORDER — CISATRACURIUM BESYLATE 2 MG/ML
INJECTION, SOLUTION INTRAVENOUS PRN
Status: DISCONTINUED | OUTPATIENT
Start: 2018-12-04 | End: 2018-12-04 | Stop reason: SDUPTHER

## 2018-12-04 RX ORDER — DEXTROSE MONOHYDRATE 25 G/50ML
12.5 INJECTION, SOLUTION INTRAVENOUS PRN
Status: DISCONTINUED | OUTPATIENT
Start: 2018-12-04 | End: 2018-12-07 | Stop reason: HOSPADM

## 2018-12-04 RX ORDER — OXYCODONE HYDROCHLORIDE AND ACETAMINOPHEN 5; 325 MG/1; MG/1
1 TABLET ORAL EVERY 4 HOURS PRN
Status: DISCONTINUED | OUTPATIENT
Start: 2018-12-04 | End: 2018-12-04

## 2018-12-04 RX ORDER — MEPERIDINE HYDROCHLORIDE 50 MG/ML
25 INJECTION INTRAMUSCULAR; INTRAVENOUS; SUBCUTANEOUS
Status: ACTIVE | OUTPATIENT
Start: 2018-12-04 | End: 2018-12-04

## 2018-12-04 RX ORDER — ETOMIDATE 2 MG/ML
INJECTION INTRAVENOUS PRN
Status: DISCONTINUED | OUTPATIENT
Start: 2018-12-04 | End: 2018-12-04 | Stop reason: SDUPTHER

## 2018-12-04 RX ORDER — MILRINONE LACTATE 0.2 MG/ML
0.12 INJECTION, SOLUTION INTRAVENOUS CONTINUOUS
Status: DISCONTINUED | OUTPATIENT
Start: 2018-12-04 | End: 2018-12-06

## 2018-12-04 RX ORDER — KETAMINE HYDROCHLORIDE 100 MG/ML
INJECTION, SOLUTION INTRAMUSCULAR; INTRAVENOUS PRN
Status: DISCONTINUED | OUTPATIENT
Start: 2018-12-04 | End: 2018-12-04 | Stop reason: SDUPTHER

## 2018-12-04 RX ORDER — ONDANSETRON 2 MG/ML
4 INJECTION INTRAMUSCULAR; INTRAVENOUS EVERY 8 HOURS PRN
Status: DISCONTINUED | OUTPATIENT
Start: 2018-12-04 | End: 2018-12-07 | Stop reason: HOSPADM

## 2018-12-04 RX ORDER — OXYCODONE HYDROCHLORIDE 5 MG/1
10 TABLET ORAL EVERY 4 HOURS PRN
Status: DISCONTINUED | OUTPATIENT
Start: 2018-12-04 | End: 2018-12-07 | Stop reason: HOSPADM

## 2018-12-04 RX ORDER — NICOTINE POLACRILEX 4 MG
15 LOZENGE BUCCAL PRN
Status: DISCONTINUED | OUTPATIENT
Start: 2018-12-04 | End: 2018-12-07 | Stop reason: HOSPADM

## 2018-12-04 RX ORDER — MIDAZOLAM HYDROCHLORIDE 1 MG/ML
INJECTION INTRAMUSCULAR; INTRAVENOUS PRN
Status: DISCONTINUED | OUTPATIENT
Start: 2018-12-04 | End: 2018-12-04 | Stop reason: SDUPTHER

## 2018-12-04 RX ORDER — CLOPIDOGREL BISULFATE 75 MG/1
75 TABLET ORAL DAILY
Status: DISCONTINUED | OUTPATIENT
Start: 2018-12-05 | End: 2018-12-07 | Stop reason: HOSPADM

## 2018-12-04 RX ORDER — MILRINONE LACTATE 0.2 MG/ML
INJECTION, SOLUTION INTRAVENOUS CONTINUOUS PRN
Status: DISCONTINUED | OUTPATIENT
Start: 2018-12-04 | End: 2018-12-04 | Stop reason: SDUPTHER

## 2018-12-04 RX ORDER — POLYETHYLENE GLYCOL 3350 17 G/17G
17 POWDER, FOR SOLUTION ORAL DAILY
Status: DISCONTINUED | OUTPATIENT
Start: 2018-12-05 | End: 2018-12-07 | Stop reason: HOSPADM

## 2018-12-04 RX ORDER — ASPIRIN 81 MG/1
81 TABLET ORAL DAILY
Status: DISCONTINUED | OUTPATIENT
Start: 2018-12-05 | End: 2018-12-07 | Stop reason: HOSPADM

## 2018-12-04 RX ORDER — ACETAMINOPHEN 10 MG/ML
1000 INJECTION, SOLUTION INTRAVENOUS EVERY 8 HOURS
Status: COMPLETED | OUTPATIENT
Start: 2018-12-04 | End: 2018-12-06

## 2018-12-04 RX ORDER — AMINOCAPROIC ACID 250 MG/ML
INJECTION, SOLUTION INTRAVENOUS PRN
Status: DISCONTINUED | OUTPATIENT
Start: 2018-12-04 | End: 2018-12-04 | Stop reason: SDUPTHER

## 2018-12-04 RX ORDER — CHLORHEXIDINE GLUCONATE 0.12 MG/ML
15 RINSE ORAL 2 TIMES DAILY
Status: DISCONTINUED | OUTPATIENT
Start: 2018-12-04 | End: 2018-12-07 | Stop reason: HOSPADM

## 2018-12-04 RX ORDER — METOPROLOL TARTRATE 5 MG/5ML
2.5 INJECTION INTRAVENOUS EVERY 10 MIN PRN
Status: DISCONTINUED | OUTPATIENT
Start: 2018-12-04 | End: 2018-12-07 | Stop reason: HOSPADM

## 2018-12-04 RX ORDER — ACETAMINOPHEN 325 MG/1
650 TABLET ORAL EVERY 4 HOURS PRN
Status: DISCONTINUED | OUTPATIENT
Start: 2018-12-04 | End: 2018-12-07 | Stop reason: HOSPADM

## 2018-12-04 RX ORDER — ACETAMINOPHEN 10 MG/ML
INJECTION, SOLUTION INTRAVENOUS PRN
Status: DISCONTINUED | OUTPATIENT
Start: 2018-12-04 | End: 2018-12-04 | Stop reason: SDUPTHER

## 2018-12-04 RX ORDER — HEPARIN SODIUM 1000 [USP'U]/ML
INJECTION, SOLUTION INTRAVENOUS; SUBCUTANEOUS PRN
Status: DISCONTINUED | OUTPATIENT
Start: 2018-12-04 | End: 2018-12-04 | Stop reason: SDUPTHER

## 2018-12-04 RX ORDER — SODIUM CHLORIDE, SODIUM LACTATE, POTASSIUM CHLORIDE, CALCIUM CHLORIDE 600; 310; 30; 20 MG/100ML; MG/100ML; MG/100ML; MG/100ML
INJECTION, SOLUTION INTRAVENOUS CONTINUOUS PRN
Status: DISCONTINUED | OUTPATIENT
Start: 2018-12-04 | End: 2018-12-04 | Stop reason: SDUPTHER

## 2018-12-04 RX ORDER — FAMOTIDINE 20 MG/1
20 TABLET, FILM COATED ORAL 2 TIMES DAILY
Status: DISCONTINUED | OUTPATIENT
Start: 2018-12-05 | End: 2018-12-07 | Stop reason: HOSPADM

## 2018-12-04 RX ORDER — OXYCODONE HYDROCHLORIDE 5 MG/1
5 TABLET ORAL EVERY 4 HOURS PRN
Status: DISCONTINUED | OUTPATIENT
Start: 2018-12-04 | End: 2018-12-07 | Stop reason: HOSPADM

## 2018-12-04 RX ADMIN — MILRINONE LACTATE IN DEXTROSE 0.25 MCG/KG/MIN: 200 INJECTION, SOLUTION INTRAVENOUS at 13:33

## 2018-12-04 RX ADMIN — SODIUM CHLORIDE: 9 INJECTION, SOLUTION INTRAVENOUS at 07:55

## 2018-12-04 RX ADMIN — NOREPINEPHRINE BITARTRATE 4 MCG/MIN: 1 INJECTION INTRAVENOUS at 13:50

## 2018-12-04 RX ADMIN — VANCOMYCIN HYDROCHLORIDE 1.5 G: 5 INJECTION, POWDER, LYOPHILIZED, FOR SOLUTION INTRAVENOUS at 21:31

## 2018-12-04 RX ADMIN — FENTANYL CITRATE 500 MCG: 50 INJECTION, SOLUTION INTRAMUSCULAR; INTRAVENOUS at 08:30

## 2018-12-04 RX ADMIN — VANCOMYCIN HYDROCHLORIDE 1.5 G: 5 INJECTION, POWDER, LYOPHILIZED, FOR SOLUTION INTRAVENOUS at 07:25

## 2018-12-04 RX ADMIN — CISATRACURIUM BESYLATE 10 MG: 2 INJECTION INTRAVENOUS at 10:55

## 2018-12-04 RX ADMIN — CHLORHEXIDINE GLUCONATE 15 ML: 1.2 RINSE ORAL at 05:05

## 2018-12-04 RX ADMIN — SODIUM CHLORIDE, PRESERVATIVE FREE 10 ML: 5 INJECTION INTRAVENOUS at 21:33

## 2018-12-04 RX ADMIN — GLYCOPYRROLATE 0.5 MG: 0.2 INJECTION, SOLUTION INTRAMUSCULAR; INTRAVENOUS at 12:21

## 2018-12-04 RX ADMIN — FENTANYL CITRATE 500 MCG: 50 INJECTION, SOLUTION INTRAMUSCULAR; INTRAVENOUS at 07:15

## 2018-12-04 RX ADMIN — MIDAZOLAM HYDROCHLORIDE 2 MG: 5 INJECTION, SOLUTION INTRAMUSCULAR; INTRAVENOUS at 06:03

## 2018-12-04 RX ADMIN — CISATRACURIUM BESYLATE 10 MG: 2 INJECTION INTRAVENOUS at 08:55

## 2018-12-04 RX ADMIN — CISATRACURIUM BESYLATE 10 MG: 2 INJECTION INTRAVENOUS at 11:25

## 2018-12-04 RX ADMIN — ONDANSETRON HYDROCHLORIDE 4 MG: 2 INJECTION, SOLUTION INTRAVENOUS at 17:56

## 2018-12-04 RX ADMIN — MIDAZOLAM HYDROCHLORIDE 2 MG: 2 INJECTION, SOLUTION INTRAMUSCULAR; INTRAVENOUS at 07:10

## 2018-12-04 RX ADMIN — SODIUM CHLORIDE, POTASSIUM CHLORIDE, SODIUM LACTATE AND CALCIUM CHLORIDE: 600; 310; 30; 20 INJECTION, SOLUTION INTRAVENOUS at 07:10

## 2018-12-04 RX ADMIN — POTASSIUM CHLORIDE 20 MEQ: 400 INJECTION, SOLUTION INTRAVENOUS at 13:22

## 2018-12-04 RX ADMIN — CALCIUM CHLORIDE 0.5 G: 100 INJECTION, SOLUTION INTRAVENOUS at 11:10

## 2018-12-04 RX ADMIN — ASPIRIN 81 MG: 81 TABLET ORAL at 05:04

## 2018-12-04 RX ADMIN — POTASSIUM CHLORIDE 20 MEQ: 400 INJECTION, SOLUTION INTRAVENOUS at 19:07

## 2018-12-04 RX ADMIN — ACETAMINOPHEN 1000 MG: 10 INJECTION, SOLUTION INTRAVENOUS at 14:01

## 2018-12-04 RX ADMIN — SODIUM BICARBONATE 50 MEQ: 84 INJECTION, SOLUTION INTRAVENOUS at 12:55

## 2018-12-04 RX ADMIN — DOBUTAMINE HYDROCHLORIDE 2.5 MCG/KG/MIN: 200 INJECTION INTRAVENOUS at 07:55

## 2018-12-04 RX ADMIN — DEXTROSE AND SODIUM CHLORIDE: 5; 450 INJECTION, SOLUTION INTRAVENOUS at 13:38

## 2018-12-04 RX ADMIN — EPHEDRINE SULFATE 5 MG: 50 INJECTION INTRAVENOUS at 07:55

## 2018-12-04 RX ADMIN — CISATRACURIUM BESYLATE 10 MG: 2 INJECTION INTRAVENOUS at 07:55

## 2018-12-04 RX ADMIN — AMINOCAPROIC ACID 5000 MG: 250 INJECTION, SOLUTION INTRAVENOUS at 07:15

## 2018-12-04 RX ADMIN — SODIUM BICARBONATE 50 MEQ: 84 INJECTION, SOLUTION INTRAVENOUS at 13:30

## 2018-12-04 RX ADMIN — EPHEDRINE SULFATE 10 MG: 50 INJECTION INTRAVENOUS at 08:15

## 2018-12-04 RX ADMIN — HEPARIN SODIUM 45000 UNITS: 1000 INJECTION, SOLUTION INTRAVENOUS; SUBCUTANEOUS at 09:20

## 2018-12-04 RX ADMIN — ALBUMIN (HUMAN) 12.5 G: 12.5 INJECTION, SOLUTION INTRAVENOUS at 13:45

## 2018-12-04 RX ADMIN — SODIUM CHLORIDE: 9 INJECTION, SOLUTION INTRAVENOUS at 12:15

## 2018-12-04 RX ADMIN — PROTAMINE SULFATE 450 MG: 10 INJECTION, SOLUTION INTRAVENOUS at 11:10

## 2018-12-04 RX ADMIN — FAMOTIDINE 20 MG: 10 INJECTION, SOLUTION INTRAVENOUS at 13:59

## 2018-12-04 RX ADMIN — ALBUTEROL SULFATE 2.5 MG: 2.5 SOLUTION RESPIRATORY (INHALATION) at 15:54

## 2018-12-04 RX ADMIN — ACETAMINOPHEN 1000 MG: 10 INJECTION, SOLUTION INTRAVENOUS at 07:25

## 2018-12-04 RX ADMIN — CEFAZOLIN SODIUM 2 G: 1 INJECTION, POWDER, FOR SOLUTION INTRAMUSCULAR; INTRAVENOUS at 18:07

## 2018-12-04 RX ADMIN — ACETAMINOPHEN 1000 MG: 10 INJECTION, SOLUTION INTRAVENOUS at 21:30

## 2018-12-04 RX ADMIN — KETAMINE HYDROCHLORIDE 75 MG: 100 INJECTION INTRAMUSCULAR; INTRAVENOUS at 07:10

## 2018-12-04 RX ADMIN — MUPIROCIN: 20 OINTMENT TOPICAL at 21:32

## 2018-12-04 RX ADMIN — SODIUM CHLORIDE: 9 INJECTION, SOLUTION INTRAVENOUS at 11:27

## 2018-12-04 RX ADMIN — CISATRACURIUM BESYLATE 10 MG: 2 INJECTION INTRAVENOUS at 10:05

## 2018-12-04 RX ADMIN — EPHEDRINE SULFATE 10 MG: 50 INJECTION INTRAVENOUS at 09:20

## 2018-12-04 RX ADMIN — SODIUM CHLORIDE 1.71 UNITS/HR: 9 INJECTION, SOLUTION INTRAVENOUS at 13:25

## 2018-12-04 RX ADMIN — CALCIUM CHLORIDE 0.5 G: 100 INJECTION, SOLUTION INTRAVENOUS at 11:15

## 2018-12-04 RX ADMIN — SODIUM CHLORIDE: 9 INJECTION, SOLUTION INTRAVENOUS at 03:55

## 2018-12-04 RX ADMIN — ALBUMIN (HUMAN) 12.5 G: 12.5 INJECTION, SOLUTION INTRAVENOUS at 13:35

## 2018-12-04 RX ADMIN — EPHEDRINE SULFATE 5 MG: 50 INJECTION INTRAVENOUS at 08:05

## 2018-12-04 RX ADMIN — EPHEDRINE SULFATE 10 MG: 50 INJECTION INTRAVENOUS at 08:14

## 2018-12-04 RX ADMIN — MILRINONE LACTATE 0.38 MCG/KG/MIN: 0.2 INJECTION, SOLUTION INTRAVENOUS at 08:07

## 2018-12-04 RX ADMIN — KETAMINE HYDROCHLORIDE 25 MG: 100 INJECTION INTRAMUSCULAR; INTRAVENOUS at 11:50

## 2018-12-04 RX ADMIN — CISATRACURIUM BESYLATE 40 MG: 2 INJECTION INTRAVENOUS at 07:15

## 2018-12-04 RX ADMIN — EPHEDRINE SULFATE 10 MG: 50 INJECTION INTRAVENOUS at 11:55

## 2018-12-04 RX ADMIN — MUPIROCIN: 20 OINTMENT TOPICAL at 05:06

## 2018-12-04 RX ADMIN — AMINOCAPROIC ACID 5000 MG: 250 INJECTION, SOLUTION INTRAVENOUS at 11:10

## 2018-12-04 RX ADMIN — Medication 2 G: at 07:25

## 2018-12-04 RX ADMIN — ETOMIDATE 10 MG: 2 INJECTION INTRAVENOUS at 07:15

## 2018-12-04 RX ADMIN — CHLORHEXIDINE GLUCONATE 15 ML: 1.2 RINSE ORAL at 21:32

## 2018-12-04 RX ADMIN — METOPROLOL TARTRATE 12.5 MG: 25 TABLET ORAL at 05:04

## 2018-12-04 RX ADMIN — ALBUTEROL SULFATE 2.5 MG: 2.5 SOLUTION RESPIRATORY (INHALATION) at 20:47

## 2018-12-04 RX ADMIN — Medication 1 G: at 10:15

## 2018-12-04 RX ADMIN — SODIUM CHLORIDE 2.7 UNITS/HR: 9 INJECTION, SOLUTION INTRAVENOUS at 07:55

## 2018-12-04 RX ADMIN — PROTAMINE SULFATE 50 MG: 10 INJECTION, SOLUTION INTRAVENOUS at 11:30

## 2018-12-04 RX ADMIN — Medication 5 MG: at 12:22

## 2018-12-04 ASSESSMENT — PULMONARY FUNCTION TESTS
PIF_VALUE: 8
PIF_VALUE: 22
PIF_VALUE: 21
PIF_VALUE: 24
PIF_VALUE: 20
PIF_VALUE: 20
PIF_VALUE: 22
PIF_VALUE: 20
PIF_VALUE: 22
PIF_VALUE: 1
PIF_VALUE: 22
PIF_VALUE: 1
PIF_VALUE: 20
PIF_VALUE: 24
PIF_VALUE: 21
PIF_VALUE: 22
PIF_VALUE: 1
PIF_VALUE: 20
PIF_VALUE: 23
PIF_VALUE: 24
PIF_VALUE: 21
PIF_VALUE: 1
PIF_VALUE: 22
PIF_VALUE: 1
PIF_VALUE: 21
PIF_VALUE: 26
PIF_VALUE: 20
PIF_VALUE: 26
PIF_VALUE: 21
PIF_VALUE: 21
PIF_VALUE: 1
PIF_VALUE: 24
PIF_VALUE: 2
PIF_VALUE: 20
PIF_VALUE: 23
PIF_VALUE: 1
PIF_VALUE: 22
PIF_VALUE: 1
PIF_VALUE: 22
PIF_VALUE: 32
PIF_VALUE: 1
PIF_VALUE: 22
PIF_VALUE: 1
PIF_VALUE: 22
PIF_VALUE: 20
PIF_VALUE: 25
PIF_VALUE: 7
PIF_VALUE: 24
PIF_VALUE: 2
PIF_VALUE: 24
PIF_VALUE: 1
PIF_VALUE: 1
PIF_VALUE: 21
PIF_VALUE: 21
PIF_VALUE: 24
PIF_VALUE: 1
PIF_VALUE: 21
PIF_VALUE: 4
PIF_VALUE: 24
PIF_VALUE: 26
PIF_VALUE: 24
PIF_VALUE: 5
PIF_VALUE: 22
PIF_VALUE: 20
PIF_VALUE: 21
PIF_VALUE: 22
PIF_VALUE: 1
PIF_VALUE: 21
PIF_VALUE: 20
PIF_VALUE: 21
PIF_VALUE: 20
PIF_VALUE: 22
PIF_VALUE: 20
PIF_VALUE: 21
PIF_VALUE: 22
PIF_VALUE: 1
PIF_VALUE: 2
PIF_VALUE: 1
PIF_VALUE: 1
PIF_VALUE: 24
PIF_VALUE: 21
PIF_VALUE: 22
PIF_VALUE: 21
PIF_VALUE: 1
PIF_VALUE: 21
PIF_VALUE: 1
PIF_VALUE: 19
PIF_VALUE: 24
PIF_VALUE: 1
PIF_VALUE: 21
PIF_VALUE: 24
PIF_VALUE: 5
PIF_VALUE: 22
PIF_VALUE: 21
PIF_VALUE: 23
PIF_VALUE: 24
PIF_VALUE: 25
PIF_VALUE: 3
PIF_VALUE: 1
PIF_VALUE: 21
PIF_VALUE: 26
PIF_VALUE: 20
PIF_VALUE: 23
PIF_VALUE: 22
PIF_VALUE: 6
PIF_VALUE: 22
PIF_VALUE: 22
PIF_VALUE: 21
PIF_VALUE: 24
PIF_VALUE: 21
PIF_VALUE: 2
PIF_VALUE: 22
PIF_VALUE: 21
PIF_VALUE: 1
PIF_VALUE: 1
PIF_VALUE: 22
PIF_VALUE: 24
PIF_VALUE: 8
PIF_VALUE: 1
PIF_VALUE: 20
PIF_VALUE: 25
PIF_VALUE: 2
PIF_VALUE: 24
PIF_VALUE: 1
PIF_VALUE: 22
PIF_VALUE: 23
PIF_VALUE: 20
PIF_VALUE: 1
PIF_VALUE: 22
PIF_VALUE: 1
PIF_VALUE: 2
PIF_VALUE: 23
PIF_VALUE: 23
PIF_VALUE: 20
PIF_VALUE: 22
PIF_VALUE: 1
PIF_VALUE: 22
PIF_VALUE: 21
PIF_VALUE: 22
PIF_VALUE: 1
PIF_VALUE: 21
PIF_VALUE: 23
PIF_VALUE: 21
PIF_VALUE: 1
PIF_VALUE: 25
PIF_VALUE: 24
PIF_VALUE: 21
PIF_VALUE: 26
PIF_VALUE: 23
PIF_VALUE: 2
PIF_VALUE: 26
PIF_VALUE: 20
PIF_VALUE: 1
PIF_VALUE: 1
PIF_VALUE: 25
PIF_VALUE: 1
PIF_VALUE: 16
PIF_VALUE: 23
PIF_VALUE: 22
PIF_VALUE: 21
PIF_VALUE: 1
PIF_VALUE: 24
PIF_VALUE: 2
PIF_VALUE: 21
PIF_VALUE: 1
PIF_VALUE: 24
PIF_VALUE: 24
PIF_VALUE: 21
PIF_VALUE: 22
PIF_VALUE: 1
PIF_VALUE: 20
PIF_VALUE: 25
PIF_VALUE: 23
PIF_VALUE: 20
PIF_VALUE: 1
PIF_VALUE: 24
PIF_VALUE: 22
PIF_VALUE: 1
PIF_VALUE: 42
PIF_VALUE: 19
PIF_VALUE: 22
PIF_VALUE: 25
PIF_VALUE: 24
PIF_VALUE: 1
PIF_VALUE: 20
PIF_VALUE: 22
PIF_VALUE: 22
PIF_VALUE: 21
PIF_VALUE: 24
PIF_VALUE: 22
PIF_VALUE: 21
PIF_VALUE: 22
PIF_VALUE: 24
PIF_VALUE: 21
PIF_VALUE: 22
PIF_VALUE: 23
PIF_VALUE: 1
PIF_VALUE: 20
PIF_VALUE: 1
PIF_VALUE: 22
PIF_VALUE: 24
PIF_VALUE: 1
PIF_VALUE: 21
PIF_VALUE: 25
PIF_VALUE: 22
PIF_VALUE: 21
PIF_VALUE: 24
PIF_VALUE: 24
PIF_VALUE: 1
PIF_VALUE: 1
PIF_VALUE: 7
PIF_VALUE: 21
PIF_VALUE: 22
PIF_VALUE: 20
PIF_VALUE: 21
PIF_VALUE: 1
PIF_VALUE: 3
PIF_VALUE: 22
PIF_VALUE: 22
PIF_VALUE: 2
PIF_VALUE: 24
PIF_VALUE: 21
PIF_VALUE: 1
PIF_VALUE: 20
PIF_VALUE: 21
PIF_VALUE: 21
PIF_VALUE: 23
PIF_VALUE: 21
PIF_VALUE: 22
PIF_VALUE: 22
PIF_VALUE: 21
PIF_VALUE: 22
PIF_VALUE: 22
PIF_VALUE: 21
PIF_VALUE: 1
PIF_VALUE: 18
PIF_VALUE: 22
PIF_VALUE: 24
PIF_VALUE: 1
PIF_VALUE: 21
PIF_VALUE: 1
PIF_VALUE: 1
PIF_VALUE: 24
PIF_VALUE: 23
PIF_VALUE: 23
PIF_VALUE: 19
PIF_VALUE: 24
PIF_VALUE: 1
PIF_VALUE: 1
PIF_VALUE: 21
PIF_VALUE: 21
PIF_VALUE: 25
PIF_VALUE: 22
PIF_VALUE: 25
PIF_VALUE: 24
PIF_VALUE: 23
PIF_VALUE: 1
PIF_VALUE: 22
PIF_VALUE: 1
PIF_VALUE: 2
PIF_VALUE: 22
PIF_VALUE: 1
PIF_VALUE: 22
PIF_VALUE: 1
PIF_VALUE: 21
PIF_VALUE: 22
PIF_VALUE: 22
PIF_VALUE: 21
PIF_VALUE: 22
PIF_VALUE: 21
PIF_VALUE: 19
PIF_VALUE: 21
PIF_VALUE: 1
PIF_VALUE: 22
PIF_VALUE: 24
PIF_VALUE: 1
PIF_VALUE: 22
PIF_VALUE: 1
PIF_VALUE: 19
PIF_VALUE: 1
PIF_VALUE: 22
PIF_VALUE: 21
PIF_VALUE: 1
PIF_VALUE: 21
PIF_VALUE: 22
PIF_VALUE: 12
PIF_VALUE: 21
PIF_VALUE: 1
PIF_VALUE: 1
PIF_VALUE: 23
PIF_VALUE: 22

## 2018-12-05 ENCOUNTER — APPOINTMENT (OUTPATIENT)
Dept: GENERAL RADIOLOGY | Age: 58
DRG: 165 | End: 2018-12-05
Attending: INTERNAL MEDICINE
Payer: COMMERCIAL

## 2018-12-05 LAB
ANION GAP SERPL CALCULATED.3IONS-SCNC: 11 MMOL/L (ref 3–16)
BUN BLDV-MCNC: 32 MG/DL (ref 7–20)
CALCIUM SERPL-MCNC: 8.7 MG/DL (ref 8.3–10.6)
CHLORIDE BLD-SCNC: 113 MMOL/L (ref 99–110)
CO2: 21 MMOL/L (ref 21–32)
CREAT SERPL-MCNC: 2 MG/DL (ref 0.9–1.3)
GFR AFRICAN AMERICAN: 42
GFR NON-AFRICAN AMERICAN: 34
GLUCOSE BLD-MCNC: 107 MG/DL (ref 70–99)
GLUCOSE BLD-MCNC: 109 MG/DL (ref 70–99)
GLUCOSE BLD-MCNC: 111 MG/DL (ref 70–99)
GLUCOSE BLD-MCNC: 124 MG/DL (ref 70–99)
GLUCOSE BLD-MCNC: 124 MG/DL (ref 70–99)
GLUCOSE BLD-MCNC: 128 MG/DL (ref 70–99)
GLUCOSE BLD-MCNC: 135 MG/DL (ref 70–99)
GLUCOSE BLD-MCNC: 136 MG/DL (ref 70–99)
GLUCOSE BLD-MCNC: 136 MG/DL (ref 70–99)
GLUCOSE BLD-MCNC: 96 MG/DL (ref 70–99)
GLUCOSE BLD-MCNC: 99 MG/DL (ref 70–99)
HCT VFR BLD CALC: 31.7 % (ref 40.5–52.5)
HEMOGLOBIN: 10.2 G/DL (ref 13.5–17.5)
MAGNESIUM: 2.4 MG/DL (ref 1.8–2.4)
MCH RBC QN AUTO: 29.2 PG (ref 26–34)
MCHC RBC AUTO-ENTMCNC: 32.2 G/DL (ref 31–36)
MCV RBC AUTO: 90.7 FL (ref 80–100)
PDW BLD-RTO: 16.8 % (ref 12.4–15.4)
PERFORMED ON: ABNORMAL
PERFORMED ON: NORMAL
PERFORMED ON: NORMAL
PLATELET # BLD: 100 K/UL (ref 135–450)
PMV BLD AUTO: 9.4 FL (ref 5–10.5)
POTASSIUM SERPL-SCNC: 4.4 MMOL/L (ref 3.5–5.1)
POTASSIUM SERPL-SCNC: 4.8 MMOL/L (ref 3.5–5.1)
RBC # BLD: 3.5 M/UL (ref 4.2–5.9)
SODIUM BLD-SCNC: 145 MMOL/L (ref 136–145)
VANCOMYCIN RANDOM: 21.3 UG/ML
WBC # BLD: 6 K/UL (ref 4–11)

## 2018-12-05 PROCEDURE — 80202 ASSAY OF VANCOMYCIN: CPT

## 2018-12-05 PROCEDURE — 2140000000 HC CCU INTERMEDIATE R&B

## 2018-12-05 PROCEDURE — 6360000002 HC RX W HCPCS: Performed by: THORACIC SURGERY (CARDIOTHORACIC VASCULAR SURGERY)

## 2018-12-05 PROCEDURE — G8978 MOBILITY CURRENT STATUS: HCPCS

## 2018-12-05 PROCEDURE — G8987 SELF CARE CURRENT STATUS: HCPCS

## 2018-12-05 PROCEDURE — 6370000000 HC RX 637 (ALT 250 FOR IP): Performed by: THORACIC SURGERY (CARDIOTHORACIC VASCULAR SURGERY)

## 2018-12-05 PROCEDURE — 99232 SBSQ HOSP IP/OBS MODERATE 35: CPT | Performed by: INTERNAL MEDICINE

## 2018-12-05 PROCEDURE — 97530 THERAPEUTIC ACTIVITIES: CPT

## 2018-12-05 PROCEDURE — G8988 SELF CARE GOAL STATUS: HCPCS

## 2018-12-05 PROCEDURE — G8979 MOBILITY GOAL STATUS: HCPCS

## 2018-12-05 PROCEDURE — 97167 OT EVAL HIGH COMPLEX 60 MIN: CPT

## 2018-12-05 PROCEDURE — 2580000003 HC RX 258: Performed by: NURSE PRACTITIONER

## 2018-12-05 PROCEDURE — 80048 BASIC METABOLIC PNL TOTAL CA: CPT

## 2018-12-05 PROCEDURE — 84132 ASSAY OF SERUM POTASSIUM: CPT

## 2018-12-05 PROCEDURE — 2580000003 HC RX 258: Performed by: THORACIC SURGERY (CARDIOTHORACIC VASCULAR SURGERY)

## 2018-12-05 PROCEDURE — 6360000002 HC RX W HCPCS: Performed by: NURSE PRACTITIONER

## 2018-12-05 PROCEDURE — 94668 MNPJ CHEST WALL SBSQ: CPT

## 2018-12-05 PROCEDURE — 83735 ASSAY OF MAGNESIUM: CPT

## 2018-12-05 PROCEDURE — 85027 COMPLETE CBC AUTOMATED: CPT

## 2018-12-05 PROCEDURE — 99233 SBSQ HOSP IP/OBS HIGH 50: CPT | Performed by: PSYCHIATRY & NEUROLOGY

## 2018-12-05 PROCEDURE — 97163 PT EVAL HIGH COMPLEX 45 MIN: CPT

## 2018-12-05 PROCEDURE — 71045 X-RAY EXAM CHEST 1 VIEW: CPT

## 2018-12-05 PROCEDURE — 94640 AIRWAY INHALATION TREATMENT: CPT

## 2018-12-05 PROCEDURE — 6370000000 HC RX 637 (ALT 250 FOR IP): Performed by: NURSE PRACTITIONER

## 2018-12-05 RX ORDER — DEXAMETHASONE SODIUM PHOSPHATE 4 MG/ML
4 INJECTION, SOLUTION INTRA-ARTICULAR; INTRALESIONAL; INTRAMUSCULAR; INTRAVENOUS; SOFT TISSUE EVERY 6 HOURS
Status: COMPLETED | OUTPATIENT
Start: 2018-12-05 | End: 2018-12-07

## 2018-12-05 RX ORDER — FUROSEMIDE 40 MG/1
40 TABLET ORAL DAILY
Status: DISCONTINUED | OUTPATIENT
Start: 2018-12-06 | End: 2018-12-05

## 2018-12-05 RX ORDER — FUROSEMIDE 10 MG/ML
20 INJECTION INTRAMUSCULAR; INTRAVENOUS DAILY
Status: DISCONTINUED | OUTPATIENT
Start: 2018-12-06 | End: 2018-12-06

## 2018-12-05 RX ORDER — SODIUM CHLORIDE 9 MG/ML
INJECTION, SOLUTION INTRAVENOUS
Status: DISCONTINUED
Start: 2018-12-05 | End: 2018-12-06

## 2018-12-05 RX ADMIN — FUROSEMIDE 20 MG: 10 INJECTION, SOLUTION INTRAMUSCULAR; INTRAVENOUS at 09:13

## 2018-12-05 RX ADMIN — DEXAMETHASONE SODIUM PHOSPHATE 4 MG: 4 INJECTION, SOLUTION INTRAMUSCULAR; INTRAVENOUS at 21:06

## 2018-12-05 RX ADMIN — CHLORHEXIDINE GLUCONATE 15 ML: 1.2 RINSE ORAL at 09:16

## 2018-12-05 RX ADMIN — CEFAZOLIN SODIUM 2 G: 1 INJECTION, POWDER, FOR SOLUTION INTRAMUSCULAR; INTRAVENOUS at 09:32

## 2018-12-05 RX ADMIN — OXYCODONE HYDROCHLORIDE 10 MG: 5 TABLET ORAL at 20:59

## 2018-12-05 RX ADMIN — ALBUTEROL SULFATE 2.5 MG: 2.5 SOLUTION RESPIRATORY (INHALATION) at 21:14

## 2018-12-05 RX ADMIN — FAMOTIDINE 20 MG: 20 TABLET ORAL at 09:10

## 2018-12-05 RX ADMIN — SODIUM CHLORIDE, PRESERVATIVE FREE 10 ML: 5 INJECTION INTRAVENOUS at 20:01

## 2018-12-05 RX ADMIN — CHLORHEXIDINE GLUCONATE 15 ML: 1.2 RINSE ORAL at 21:00

## 2018-12-05 RX ADMIN — POLYETHYLENE GLYCOL (3350) 17 G: 17 POWDER, FOR SOLUTION ORAL at 09:11

## 2018-12-05 RX ADMIN — INSULIN GLARGINE 29 UNITS: 100 INJECTION, SOLUTION SUBCUTANEOUS at 21:00

## 2018-12-05 RX ADMIN — METOPROLOL TARTRATE 25 MG: 25 TABLET ORAL at 17:45

## 2018-12-05 RX ADMIN — ACETAMINOPHEN 1000 MG: 10 INJECTION, SOLUTION INTRAVENOUS at 06:21

## 2018-12-05 RX ADMIN — CLOPIDOGREL BISULFATE 75 MG: 75 TABLET ORAL at 09:09

## 2018-12-05 RX ADMIN — HYDRALAZINE HYDROCHLORIDE 5 MG: 20 INJECTION INTRAMUSCULAR; INTRAVENOUS at 16:33

## 2018-12-05 RX ADMIN — FAMOTIDINE 20 MG: 20 TABLET ORAL at 20:59

## 2018-12-05 RX ADMIN — DEXAMETHASONE SODIUM PHOSPHATE 4 MG: 4 INJECTION, SOLUTION INTRAMUSCULAR; INTRAVENOUS at 14:41

## 2018-12-05 RX ADMIN — MUPIROCIN: 20 OINTMENT TOPICAL at 21:00

## 2018-12-05 RX ADMIN — METOPROLOL TARTRATE 12.5 MG: 25 TABLET ORAL at 09:09

## 2018-12-05 RX ADMIN — SODIUM CHLORIDE, PRESERVATIVE FREE 10 ML: 5 INJECTION INTRAVENOUS at 09:16

## 2018-12-05 RX ADMIN — DEXAMETHASONE SODIUM PHOSPHATE 4 MG: 4 INJECTION, SOLUTION INTRAMUSCULAR; INTRAVENOUS at 09:13

## 2018-12-05 RX ADMIN — ASPIRIN 81 MG: 81 TABLET, COATED ORAL at 09:09

## 2018-12-05 RX ADMIN — ATORVASTATIN CALCIUM 20 MG: 10 TABLET, FILM COATED ORAL at 20:59

## 2018-12-05 RX ADMIN — SODIUM CHLORIDE 3.4 UNITS/HR: 9 INJECTION, SOLUTION INTRAVENOUS at 11:29

## 2018-12-05 RX ADMIN — MUPIROCIN: 20 OINTMENT TOPICAL at 09:16

## 2018-12-05 RX ADMIN — FUROSEMIDE 20 MG: 10 INJECTION, SOLUTION INTRAMUSCULAR; INTRAVENOUS at 11:43

## 2018-12-05 RX ADMIN — ACETAMINOPHEN 1000 MG: 10 INJECTION, SOLUTION INTRAVENOUS at 20:59

## 2018-12-05 RX ADMIN — DOCUSATE SODIUM 100 MG: 100 CAPSULE, LIQUID FILLED ORAL at 20:59

## 2018-12-05 RX ADMIN — ACETAMINOPHEN 1000 MG: 10 INJECTION, SOLUTION INTRAVENOUS at 14:41

## 2018-12-05 RX ADMIN — DOCUSATE SODIUM 100 MG: 100 CAPSULE, LIQUID FILLED ORAL at 09:09

## 2018-12-05 RX ADMIN — CEFAZOLIN SODIUM 2 G: 1 INJECTION, POWDER, FOR SOLUTION INTRAMUSCULAR; INTRAVENOUS at 17:47

## 2018-12-05 RX ADMIN — HYDRALAZINE HYDROCHLORIDE 5 MG: 20 INJECTION INTRAMUSCULAR; INTRAVENOUS at 20:00

## 2018-12-05 RX ADMIN — CEFAZOLIN SODIUM 2 G: 1 INJECTION, POWDER, FOR SOLUTION INTRAMUSCULAR; INTRAVENOUS at 02:00

## 2018-12-05 RX ADMIN — VANCOMYCIN HYDROCHLORIDE 500 MG: 500 INJECTION, POWDER, LYOPHILIZED, FOR SOLUTION INTRAVENOUS at 20:59

## 2018-12-05 RX ADMIN — ALBUTEROL SULFATE 2.5 MG: 2.5 SOLUTION RESPIRATORY (INHALATION) at 15:50

## 2018-12-05 ASSESSMENT — PAIN SCALES - GENERAL
PAINLEVEL_OUTOF10: 8
PAINLEVEL_OUTOF10: 0

## 2018-12-06 ENCOUNTER — APPOINTMENT (OUTPATIENT)
Dept: MRI IMAGING | Age: 58
DRG: 165 | End: 2018-12-06
Attending: INTERNAL MEDICINE
Payer: COMMERCIAL

## 2018-12-06 LAB
ANION GAP SERPL CALCULATED.3IONS-SCNC: 12 MMOL/L (ref 3–16)
BUN BLDV-MCNC: 33 MG/DL (ref 7–20)
CALCIUM SERPL-MCNC: 9 MG/DL (ref 8.3–10.6)
CHLORIDE BLD-SCNC: 108 MMOL/L (ref 99–110)
CO2: 22 MMOL/L (ref 21–32)
CREAT SERPL-MCNC: 1.7 MG/DL (ref 0.9–1.3)
EKG ATRIAL RATE: 94 BPM
EKG DIAGNOSIS: NORMAL
EKG P AXIS: 28 DEGREES
EKG P-R INTERVAL: 190 MS
EKG Q-T INTERVAL: 386 MS
EKG QRS DURATION: 114 MS
EKG QTC CALCULATION (BAZETT): 482 MS
EKG R AXIS: 61 DEGREES
EKG T AXIS: -46 DEGREES
EKG VENTRICULAR RATE: 94 BPM
GFR AFRICAN AMERICAN: 50
GFR NON-AFRICAN AMERICAN: 41
GLUCOSE BLD-MCNC: 118 MG/DL (ref 70–99)
GLUCOSE BLD-MCNC: 129 MG/DL (ref 70–99)
GLUCOSE BLD-MCNC: 140 MG/DL (ref 70–99)
GLUCOSE BLD-MCNC: 145 MG/DL (ref 70–99)
GLUCOSE BLD-MCNC: 149 MG/DL (ref 70–99)
GLUCOSE BLD-MCNC: 177 MG/DL (ref 70–99)
GLUCOSE BLD-MCNC: 178 MG/DL (ref 70–99)
HCT VFR BLD CALC: 33.4 % (ref 40.5–52.5)
HEMOGLOBIN: 10.9 G/DL (ref 13.5–17.5)
MAGNESIUM: 2.2 MG/DL (ref 1.8–2.4)
MCH RBC QN AUTO: 29.5 PG (ref 26–34)
MCHC RBC AUTO-ENTMCNC: 32.7 G/DL (ref 31–36)
MCV RBC AUTO: 90.3 FL (ref 80–100)
PDW BLD-RTO: 16.6 % (ref 12.4–15.4)
PERFORMED ON: ABNORMAL
PLATELET # BLD: 104 K/UL (ref 135–450)
PMV BLD AUTO: 9.7 FL (ref 5–10.5)
POTASSIUM SERPL-SCNC: 3.7 MMOL/L (ref 3.5–5.1)
RBC # BLD: 3.7 M/UL (ref 4.2–5.9)
SODIUM BLD-SCNC: 142 MMOL/L (ref 136–145)
WBC # BLD: 9.7 K/UL (ref 4–11)

## 2018-12-06 PROCEDURE — 2140000000 HC CCU INTERMEDIATE R&B

## 2018-12-06 PROCEDURE — 85027 COMPLETE CBC AUTOMATED: CPT

## 2018-12-06 PROCEDURE — 97535 SELF CARE MNGMENT TRAINING: CPT

## 2018-12-06 PROCEDURE — 6360000002 HC RX W HCPCS: Performed by: NURSE PRACTITIONER

## 2018-12-06 PROCEDURE — 2580000003 HC RX 258: Performed by: THORACIC SURGERY (CARDIOTHORACIC VASCULAR SURGERY)

## 2018-12-06 PROCEDURE — 99233 SBSQ HOSP IP/OBS HIGH 50: CPT | Performed by: NURSE PRACTITIONER

## 2018-12-06 PROCEDURE — 6370000000 HC RX 637 (ALT 250 FOR IP): Performed by: NURSE PRACTITIONER

## 2018-12-06 PROCEDURE — 6370000000 HC RX 637 (ALT 250 FOR IP): Performed by: THORACIC SURGERY (CARDIOTHORACIC VASCULAR SURGERY)

## 2018-12-06 PROCEDURE — 94668 MNPJ CHEST WALL SBSQ: CPT

## 2018-12-06 PROCEDURE — 72146 MRI CHEST SPINE W/O DYE: CPT

## 2018-12-06 PROCEDURE — 97530 THERAPEUTIC ACTIVITIES: CPT

## 2018-12-06 PROCEDURE — 94640 AIRWAY INHALATION TREATMENT: CPT

## 2018-12-06 PROCEDURE — 83735 ASSAY OF MAGNESIUM: CPT

## 2018-12-06 PROCEDURE — 6360000002 HC RX W HCPCS: Performed by: THORACIC SURGERY (CARDIOTHORACIC VASCULAR SURGERY)

## 2018-12-06 PROCEDURE — 93005 ELECTROCARDIOGRAM TRACING: CPT | Performed by: NURSE PRACTITIONER

## 2018-12-06 PROCEDURE — 94664 DEMO&/EVAL PT USE INHALER: CPT

## 2018-12-06 PROCEDURE — 99233 SBSQ HOSP IP/OBS HIGH 50: CPT | Performed by: PSYCHIATRY & NEUROLOGY

## 2018-12-06 PROCEDURE — 80048 BASIC METABOLIC PNL TOTAL CA: CPT

## 2018-12-06 RX ORDER — FUROSEMIDE 20 MG/1
20 TABLET ORAL DAILY
Status: DISCONTINUED | OUTPATIENT
Start: 2018-12-06 | End: 2018-12-06

## 2018-12-06 RX ORDER — INSULIN GLARGINE 100 [IU]/ML
8 INJECTION, SOLUTION SUBCUTANEOUS NIGHTLY
Status: DISCONTINUED | OUTPATIENT
Start: 2018-12-06 | End: 2018-12-07 | Stop reason: HOSPADM

## 2018-12-06 RX ORDER — BUMETANIDE 1 MG/1
2 TABLET ORAL DAILY
Status: DISCONTINUED | OUTPATIENT
Start: 2018-12-07 | End: 2018-12-07 | Stop reason: HOSPADM

## 2018-12-06 RX ORDER — CARVEDILOL 6.25 MG/1
12.5 TABLET ORAL 2 TIMES DAILY WITH MEALS
Status: DISCONTINUED | OUTPATIENT
Start: 2018-12-06 | End: 2018-12-07

## 2018-12-06 RX ORDER — HEPARIN SODIUM 5000 [USP'U]/ML
5000 INJECTION, SOLUTION INTRAVENOUS; SUBCUTANEOUS EVERY 8 HOURS SCHEDULED
Status: DISCONTINUED | OUTPATIENT
Start: 2018-12-06 | End: 2018-12-07 | Stop reason: HOSPADM

## 2018-12-06 RX ADMIN — ALBUTEROL SULFATE 2.5 MG: 2.5 SOLUTION RESPIRATORY (INHALATION) at 20:04

## 2018-12-06 RX ADMIN — DEXAMETHASONE SODIUM PHOSPHATE 4 MG: 4 INJECTION, SOLUTION INTRAMUSCULAR; INTRAVENOUS at 08:59

## 2018-12-06 RX ADMIN — DEXAMETHASONE SODIUM PHOSPHATE 4 MG: 4 INJECTION, SOLUTION INTRAMUSCULAR; INTRAVENOUS at 22:15

## 2018-12-06 RX ADMIN — CARVEDILOL 12.5 MG: 6.25 TABLET, FILM COATED ORAL at 08:56

## 2018-12-06 RX ADMIN — ATORVASTATIN CALCIUM 20 MG: 10 TABLET, FILM COATED ORAL at 21:40

## 2018-12-06 RX ADMIN — ALBUTEROL SULFATE 2.5 MG: 2.5 SOLUTION RESPIRATORY (INHALATION) at 16:33

## 2018-12-06 RX ADMIN — CHLORHEXIDINE GLUCONATE 15 ML: 1.2 RINSE ORAL at 21:41

## 2018-12-06 RX ADMIN — INSULIN LISPRO 2 UNITS: 100 INJECTION, SOLUTION INTRAVENOUS; SUBCUTANEOUS at 18:16

## 2018-12-06 RX ADMIN — LACTULOSE 10 G: 20 SOLUTION ORAL at 21:40

## 2018-12-06 RX ADMIN — DEXAMETHASONE SODIUM PHOSPHATE 4 MG: 4 INJECTION, SOLUTION INTRAMUSCULAR; INTRAVENOUS at 05:00

## 2018-12-06 RX ADMIN — INSULIN LISPRO 2 UNITS: 100 INJECTION, SOLUTION INTRAVENOUS; SUBCUTANEOUS at 12:35

## 2018-12-06 RX ADMIN — ALBUTEROL SULFATE 2.5 MG: 2.5 SOLUTION RESPIRATORY (INHALATION) at 07:53

## 2018-12-06 RX ADMIN — FAMOTIDINE 20 MG: 20 TABLET ORAL at 08:56

## 2018-12-06 RX ADMIN — SODIUM CHLORIDE, PRESERVATIVE FREE 10 ML: 5 INJECTION INTRAVENOUS at 21:41

## 2018-12-06 RX ADMIN — POLYETHYLENE GLYCOL (3350) 17 G: 17 POWDER, FOR SOLUTION ORAL at 08:56

## 2018-12-06 RX ADMIN — CHLORHEXIDINE GLUCONATE 15 ML: 1.2 RINSE ORAL at 09:07

## 2018-12-06 RX ADMIN — FUROSEMIDE 20 MG: 20 TABLET ORAL at 08:55

## 2018-12-06 RX ADMIN — DOCUSATE SODIUM 100 MG: 100 CAPSULE, LIQUID FILLED ORAL at 21:40

## 2018-12-06 RX ADMIN — CEFAZOLIN SODIUM 2 G: 1 INJECTION, POWDER, FOR SOLUTION INTRAMUSCULAR; INTRAVENOUS at 04:58

## 2018-12-06 RX ADMIN — HEPARIN SODIUM 5000 UNITS: 5000 INJECTION INTRAVENOUS; SUBCUTANEOUS at 15:05

## 2018-12-06 RX ADMIN — ASPIRIN 81 MG: 81 TABLET, COATED ORAL at 08:55

## 2018-12-06 RX ADMIN — ALBUTEROL SULFATE 2.5 MG: 2.5 SOLUTION RESPIRATORY (INHALATION) at 11:27

## 2018-12-06 RX ADMIN — DEXAMETHASONE SODIUM PHOSPHATE 4 MG: 4 INJECTION, SOLUTION INTRAMUSCULAR; INTRAVENOUS at 15:05

## 2018-12-06 RX ADMIN — HEPARIN SODIUM 5000 UNITS: 5000 INJECTION INTRAVENOUS; SUBCUTANEOUS at 08:51

## 2018-12-06 RX ADMIN — FAMOTIDINE 20 MG: 20 TABLET ORAL at 21:40

## 2018-12-06 RX ADMIN — OXYCODONE HYDROCHLORIDE 10 MG: 5 TABLET ORAL at 21:40

## 2018-12-06 RX ADMIN — SODIUM CHLORIDE, PRESERVATIVE FREE 10 ML: 5 INJECTION INTRAVENOUS at 09:01

## 2018-12-06 RX ADMIN — HEPARIN SODIUM 5000 UNITS: 5000 INJECTION INTRAVENOUS; SUBCUTANEOUS at 22:15

## 2018-12-06 RX ADMIN — CLOPIDOGREL BISULFATE 75 MG: 75 TABLET ORAL at 08:55

## 2018-12-06 RX ADMIN — MUPIROCIN: 20 OINTMENT TOPICAL at 09:07

## 2018-12-06 RX ADMIN — LACTULOSE 10 G: 20 SOLUTION ORAL at 08:56

## 2018-12-06 RX ADMIN — INSULIN GLARGINE 8 UNITS: 100 INJECTION, SOLUTION SUBCUTANEOUS at 21:39

## 2018-12-06 RX ADMIN — MUPIROCIN: 20 OINTMENT TOPICAL at 21:41

## 2018-12-06 RX ADMIN — DOCUSATE SODIUM 100 MG: 100 CAPSULE, LIQUID FILLED ORAL at 08:56

## 2018-12-06 RX ADMIN — ACETAMINOPHEN 1000 MG: 10 INJECTION, SOLUTION INTRAVENOUS at 06:42

## 2018-12-06 RX ADMIN — CARVEDILOL 12.5 MG: 6.25 TABLET, FILM COATED ORAL at 18:14

## 2018-12-06 RX ADMIN — INSULIN LISPRO 2 UNITS: 100 INJECTION, SOLUTION INTRAVENOUS; SUBCUTANEOUS at 08:51

## 2018-12-06 ASSESSMENT — PAIN SCALES - GENERAL
PAINLEVEL_OUTOF10: 0

## 2018-12-07 ENCOUNTER — APPOINTMENT (OUTPATIENT)
Dept: MRI IMAGING | Age: 58
DRG: 165 | End: 2018-12-07
Attending: INTERNAL MEDICINE
Payer: COMMERCIAL

## 2018-12-07 ENCOUNTER — HOSPITAL ENCOUNTER (INPATIENT)
Age: 58
LOS: 20 days | Discharge: NURSING, REHAB OR LONG-TERM CARE | DRG: 023 | End: 2018-12-27
Attending: FAMILY MEDICINE | Admitting: INTERNAL MEDICINE
Payer: COMMERCIAL

## 2018-12-07 ENCOUNTER — TELEPHONE (OUTPATIENT)
Dept: ORTHOPEDIC SURGERY | Age: 58
End: 2018-12-07

## 2018-12-07 VITALS
OXYGEN SATURATION: 98 % | BODY MASS INDEX: 35.51 KG/M2 | DIASTOLIC BLOOD PRESSURE: 92 MMHG | WEIGHT: 248.02 LBS | HEART RATE: 82 BPM | SYSTOLIC BLOOD PRESSURE: 163 MMHG | RESPIRATION RATE: 22 BRPM | TEMPERATURE: 98.8 F | HEIGHT: 70 IN

## 2018-12-07 DIAGNOSIS — G95.20 CERVICAL SPINAL CORD COMPRESSION (HCC): Primary | ICD-10-CM

## 2018-12-07 LAB
ANION GAP SERPL CALCULATED.3IONS-SCNC: 11 MMOL/L (ref 3–16)
ANION GAP SERPL CALCULATED.3IONS-SCNC: 15 MMOL/L (ref 3–16)
BUN BLDV-MCNC: 43 MG/DL (ref 7–20)
BUN BLDV-MCNC: 46 MG/DL (ref 7–20)
CALCIUM SERPL-MCNC: 9 MG/DL (ref 8.3–10.6)
CALCIUM SERPL-MCNC: 9.2 MG/DL (ref 8.3–10.6)
CHLORIDE BLD-SCNC: 104 MMOL/L (ref 99–110)
CHLORIDE BLD-SCNC: 109 MMOL/L (ref 99–110)
CO2: 21 MMOL/L (ref 21–32)
CO2: 23 MMOL/L (ref 21–32)
CREAT SERPL-MCNC: 1.6 MG/DL (ref 0.9–1.3)
CREAT SERPL-MCNC: 1.6 MG/DL (ref 0.9–1.3)
GFR AFRICAN AMERICAN: 54
GFR AFRICAN AMERICAN: 54
GFR NON-AFRICAN AMERICAN: 44
GFR NON-AFRICAN AMERICAN: 44
GLUCOSE BLD-MCNC: 115 MG/DL (ref 70–99)
GLUCOSE BLD-MCNC: 121 MG/DL (ref 70–99)
GLUCOSE BLD-MCNC: 132 MG/DL (ref 70–99)
GLUCOSE BLD-MCNC: 133 MG/DL (ref 70–99)
GLUCOSE BLD-MCNC: 166 MG/DL (ref 70–99)
GLUCOSE BLD-MCNC: 191 MG/DL (ref 70–99)
HCT VFR BLD CALC: 34.5 % (ref 40.5–52.5)
HEMOGLOBIN: 11.1 G/DL (ref 13.5–17.5)
MAGNESIUM: 2.1 MG/DL (ref 1.8–2.4)
MCH RBC QN AUTO: 29.2 PG (ref 26–34)
MCHC RBC AUTO-ENTMCNC: 32.2 G/DL (ref 31–36)
MCV RBC AUTO: 90.9 FL (ref 80–100)
PDW BLD-RTO: 17.3 % (ref 12.4–15.4)
PERFORMED ON: ABNORMAL
PLATELET # BLD: 118 K/UL (ref 135–450)
PMV BLD AUTO: 9.6 FL (ref 5–10.5)
POTASSIUM REFLEX MAGNESIUM: 3.8 MMOL/L (ref 3.5–5.1)
POTASSIUM SERPL-SCNC: 3.8 MMOL/L (ref 3.5–5.1)
RBC # BLD: 3.79 M/UL (ref 4.2–5.9)
SODIUM BLD-SCNC: 140 MMOL/L (ref 136–145)
SODIUM BLD-SCNC: 143 MMOL/L (ref 136–145)
WBC # BLD: 9.2 K/UL (ref 4–11)

## 2018-12-07 PROCEDURE — 94640 AIRWAY INHALATION TREATMENT: CPT

## 2018-12-07 PROCEDURE — 80048 BASIC METABOLIC PNL TOTAL CA: CPT

## 2018-12-07 PROCEDURE — 6360000002 HC RX W HCPCS: Performed by: NURSE PRACTITIONER

## 2018-12-07 PROCEDURE — 2000000000 HC ICU R&B

## 2018-12-07 PROCEDURE — 6360000002 HC RX W HCPCS: Performed by: THORACIC SURGERY (CARDIOTHORACIC VASCULAR SURGERY)

## 2018-12-07 PROCEDURE — 36415 COLL VENOUS BLD VENIPUNCTURE: CPT

## 2018-12-07 PROCEDURE — 97530 THERAPEUTIC ACTIVITIES: CPT

## 2018-12-07 PROCEDURE — 72141 MRI NECK SPINE W/O DYE: CPT

## 2018-12-07 PROCEDURE — 6370000000 HC RX 637 (ALT 250 FOR IP): Performed by: NURSE PRACTITIONER

## 2018-12-07 PROCEDURE — 99233 SBSQ HOSP IP/OBS HIGH 50: CPT | Performed by: PSYCHIATRY & NEUROLOGY

## 2018-12-07 PROCEDURE — 2580000003 HC RX 258: Performed by: THORACIC SURGERY (CARDIOTHORACIC VASCULAR SURGERY)

## 2018-12-07 PROCEDURE — 2580000003 HC RX 258: Performed by: NURSE PRACTITIONER

## 2018-12-07 PROCEDURE — 97535 SELF CARE MNGMENT TRAINING: CPT

## 2018-12-07 PROCEDURE — 85027 COMPLETE CBC AUTOMATED: CPT

## 2018-12-07 PROCEDURE — 6370000000 HC RX 637 (ALT 250 FOR IP): Performed by: INTERNAL MEDICINE

## 2018-12-07 PROCEDURE — 94668 MNPJ CHEST WALL SBSQ: CPT

## 2018-12-07 PROCEDURE — 6360000002 HC RX W HCPCS: Performed by: PSYCHIATRY & NEUROLOGY

## 2018-12-07 PROCEDURE — 83735 ASSAY OF MAGNESIUM: CPT

## 2018-12-07 PROCEDURE — 6370000000 HC RX 637 (ALT 250 FOR IP): Performed by: THORACIC SURGERY (CARDIOTHORACIC VASCULAR SURGERY)

## 2018-12-07 RX ORDER — LOSARTAN POTASSIUM 25 MG/1
25 TABLET ORAL DAILY
Status: DISCONTINUED | OUTPATIENT
Start: 2018-12-07 | End: 2018-12-07 | Stop reason: HOSPADM

## 2018-12-07 RX ORDER — DEXAMETHASONE SODIUM PHOSPHATE 4 MG/ML
4 INJECTION, SOLUTION INTRA-ARTICULAR; INTRALESIONAL; INTRAMUSCULAR; INTRAVENOUS; SOFT TISSUE EVERY 6 HOURS
Status: DISCONTINUED | OUTPATIENT
Start: 2018-12-07 | End: 2018-12-07 | Stop reason: HOSPADM

## 2018-12-07 RX ORDER — AMIODARONE HYDROCHLORIDE 200 MG/1
400 TABLET ORAL 2 TIMES DAILY
Status: DISCONTINUED | OUTPATIENT
Start: 2018-12-07 | End: 2018-12-07 | Stop reason: HOSPADM

## 2018-12-07 RX ORDER — TAMSULOSIN HYDROCHLORIDE 0.4 MG/1
0.4 CAPSULE ORAL DAILY
Status: DISCONTINUED | OUTPATIENT
Start: 2018-12-07 | End: 2018-12-07 | Stop reason: HOSPADM

## 2018-12-07 RX ORDER — BISACODYL 10 MG
20 SUPPOSITORY, RECTAL RECTAL ONCE
Status: DISCONTINUED | OUTPATIENT
Start: 2018-12-07 | End: 2018-12-07 | Stop reason: HOSPADM

## 2018-12-07 RX ORDER — AMIODARONE HYDROCHLORIDE 200 MG/1
200 TABLET ORAL DAILY
Status: DISCONTINUED | OUTPATIENT
Start: 2018-12-13 | End: 2018-12-07 | Stop reason: HOSPADM

## 2018-12-07 RX ADMIN — SODIUM CHLORIDE, PRESERVATIVE FREE 10 ML: 5 INJECTION INTRAVENOUS at 10:00

## 2018-12-07 RX ADMIN — ALBUTEROL SULFATE 2.5 MG: 2.5 SOLUTION RESPIRATORY (INHALATION) at 08:58

## 2018-12-07 RX ADMIN — CHLORHEXIDINE GLUCONATE 15 ML: 1.2 RINSE ORAL at 21:26

## 2018-12-07 RX ADMIN — OXYCODONE HYDROCHLORIDE 5 MG: 5 TABLET ORAL at 21:38

## 2018-12-07 RX ADMIN — LOSARTAN POTASSIUM 25 MG: 25 TABLET, FILM COATED ORAL at 10:00

## 2018-12-07 RX ADMIN — AMIODARONE HYDROCHLORIDE 0.5 MG/MIN: 50 INJECTION, SOLUTION INTRAVENOUS at 10:26

## 2018-12-07 RX ADMIN — DEXAMETHASONE SODIUM PHOSPHATE 4 MG: 4 INJECTION, SOLUTION INTRAMUSCULAR; INTRAVENOUS at 21:39

## 2018-12-07 RX ADMIN — SODIUM CHLORIDE, PRESERVATIVE FREE 10 ML: 5 INJECTION INTRAVENOUS at 21:27

## 2018-12-07 RX ADMIN — CHLORHEXIDINE GLUCONATE 15 ML: 1.2 RINSE ORAL at 09:59

## 2018-12-07 RX ADMIN — METOPROLOL SUCCINATE 75 MG: 50 TABLET, EXTENDED RELEASE ORAL at 21:22

## 2018-12-07 RX ADMIN — DOCUSATE SODIUM 100 MG: 100 CAPSULE, LIQUID FILLED ORAL at 09:59

## 2018-12-07 RX ADMIN — AMIODARONE HYDROCHLORIDE 1 MG/MIN: 50 INJECTION, SOLUTION INTRAVENOUS at 02:01

## 2018-12-07 RX ADMIN — AMIODARONE HYDROCHLORIDE 150 MG: 150 INJECTION, SOLUTION INTRAVENOUS at 01:53

## 2018-12-07 RX ADMIN — DEXAMETHASONE SODIUM PHOSPHATE 4 MG: 4 INJECTION, SOLUTION INTRAMUSCULAR; INTRAVENOUS at 02:54

## 2018-12-07 RX ADMIN — CLOPIDOGREL BISULFATE 75 MG: 75 TABLET ORAL at 09:59

## 2018-12-07 RX ADMIN — MUPIROCIN: 20 OINTMENT TOPICAL at 21:27

## 2018-12-07 RX ADMIN — FAMOTIDINE 20 MG: 20 TABLET ORAL at 09:59

## 2018-12-07 RX ADMIN — INSULIN LISPRO 3 UNITS: 100 INJECTION, SOLUTION INTRAVENOUS; SUBCUTANEOUS at 17:13

## 2018-12-07 RX ADMIN — LACTULOSE 10 G: 20 SOLUTION ORAL at 21:22

## 2018-12-07 RX ADMIN — METOPROLOL SUCCINATE 75 MG: 50 TABLET, EXTENDED RELEASE ORAL at 09:59

## 2018-12-07 RX ADMIN — ATORVASTATIN CALCIUM 20 MG: 10 TABLET, FILM COATED ORAL at 21:22

## 2018-12-07 RX ADMIN — TAMSULOSIN HYDROCHLORIDE 0.4 MG: 0.4 CAPSULE ORAL at 10:06

## 2018-12-07 RX ADMIN — ASPIRIN 81 MG: 81 TABLET, COATED ORAL at 09:59

## 2018-12-07 RX ADMIN — BUMETANIDE 2 MG: 1 TABLET ORAL at 09:59

## 2018-12-07 RX ADMIN — MUPIROCIN: 20 OINTMENT TOPICAL at 09:59

## 2018-12-07 RX ADMIN — POLYETHYLENE GLYCOL (3350) 17 G: 17 POWDER, FOR SOLUTION ORAL at 10:00

## 2018-12-07 RX ADMIN — INSULIN GLARGINE 8 UNITS: 100 INJECTION, SOLUTION SUBCUTANEOUS at 21:48

## 2018-12-07 RX ADMIN — AMIODARONE HYDROCHLORIDE 400 MG: 200 TABLET ORAL at 21:22

## 2018-12-07 RX ADMIN — HEPARIN SODIUM 5000 UNITS: 5000 INJECTION INTRAVENOUS; SUBCUTANEOUS at 06:35

## 2018-12-07 RX ADMIN — FAMOTIDINE 20 MG: 20 TABLET ORAL at 21:22

## 2018-12-07 RX ADMIN — ALBUTEROL SULFATE 2.5 MG: 2.5 SOLUTION RESPIRATORY (INHALATION) at 16:13

## 2018-12-07 RX ADMIN — DEXAMETHASONE SODIUM PHOSPHATE 4 MG: 4 INJECTION, SOLUTION INTRAMUSCULAR; INTRAVENOUS at 17:13

## 2018-12-07 RX ADMIN — DOCUSATE SODIUM 100 MG: 100 CAPSULE, LIQUID FILLED ORAL at 21:22

## 2018-12-07 RX ADMIN — HYDRALAZINE HYDROCHLORIDE 5 MG: 20 INJECTION INTRAMUSCULAR; INTRAVENOUS at 06:42

## 2018-12-07 ASSESSMENT — PAIN SCALES - GENERAL
PAINLEVEL_OUTOF10: 0
PAINLEVEL_OUTOF10: 5
PAINLEVEL_OUTOF10: 0

## 2018-12-07 NOTE — TELEPHONE ENCOUNTER
Dr Karlie Delgado is asking if Dr Jordan Rivera could look at this patient Cervical MRI and call him     This patient is post open heart in [facility] ICU-   Patient is dealing with some paralysis    Pls call Dr Leopoldo Shen at [phone]

## 2018-12-08 LAB
ALBUMIN SERPL-MCNC: 3.2 G/DL (ref 3.4–5)
ANION GAP SERPL CALCULATED.3IONS-SCNC: 14 MMOL/L (ref 3–16)
BUN BLDV-MCNC: 57 MG/DL (ref 7–20)
CALCIUM SERPL-MCNC: 9.5 MG/DL (ref 8.3–10.6)
CHLORIDE BLD-SCNC: 107 MMOL/L (ref 99–110)
CO2: 22 MMOL/L (ref 21–32)
CREAT SERPL-MCNC: 1.9 MG/DL (ref 0.9–1.3)
GFR AFRICAN AMERICAN: 44
GFR NON-AFRICAN AMERICAN: 36
GLUCOSE BLD-MCNC: 118 MG/DL (ref 70–99)
GLUCOSE BLD-MCNC: 130 MG/DL (ref 70–99)
GLUCOSE BLD-MCNC: 137 MG/DL (ref 70–99)
GLUCOSE BLD-MCNC: 161 MG/DL (ref 70–99)
GLUCOSE BLD-MCNC: 210 MG/DL (ref 70–99)
HCT VFR BLD CALC: 35.6 % (ref 40.5–52.5)
HEMOGLOBIN: 11.5 G/DL (ref 13.5–17.5)
MAGNESIUM: 2.3 MG/DL (ref 1.8–2.4)
MCH RBC QN AUTO: 29.5 PG (ref 26–34)
MCHC RBC AUTO-ENTMCNC: 32.4 G/DL (ref 31–36)
MCV RBC AUTO: 90.8 FL (ref 80–100)
PDW BLD-RTO: 17.4 % (ref 12.4–15.4)
PERFORMED ON: ABNORMAL
PHOSPHORUS: 4.7 MG/DL (ref 2.5–4.9)
PLATELET # BLD: 171 K/UL (ref 135–450)
PMV BLD AUTO: 9.2 FL (ref 5–10.5)
POTASSIUM SERPL-SCNC: 4.4 MMOL/L (ref 3.5–5.1)
RBC # BLD: 3.92 M/UL (ref 4.2–5.9)
SODIUM BLD-SCNC: 143 MMOL/L (ref 136–145)
WBC # BLD: 9.1 K/UL (ref 4–11)

## 2018-12-08 PROCEDURE — 83735 ASSAY OF MAGNESIUM: CPT

## 2018-12-08 PROCEDURE — 2580000003 HC RX 258: Performed by: STUDENT IN AN ORGANIZED HEALTH CARE EDUCATION/TRAINING PROGRAM

## 2018-12-08 PROCEDURE — 94664 DEMO&/EVAL PT USE INHALER: CPT

## 2018-12-08 PROCEDURE — 94761 N-INVAS EAR/PLS OXIMETRY MLT: CPT

## 2018-12-08 PROCEDURE — 6370000000 HC RX 637 (ALT 250 FOR IP): Performed by: STUDENT IN AN ORGANIZED HEALTH CARE EDUCATION/TRAINING PROGRAM

## 2018-12-08 PROCEDURE — 6360000002 HC RX W HCPCS: Performed by: STUDENT IN AN ORGANIZED HEALTH CARE EDUCATION/TRAINING PROGRAM

## 2018-12-08 PROCEDURE — 36415 COLL VENOUS BLD VENIPUNCTURE: CPT

## 2018-12-08 PROCEDURE — 85027 COMPLETE CBC AUTOMATED: CPT

## 2018-12-08 PROCEDURE — 2000000000 HC ICU R&B

## 2018-12-08 PROCEDURE — 51702 INSERT TEMP BLADDER CATH: CPT

## 2018-12-08 PROCEDURE — 80069 RENAL FUNCTION PANEL: CPT

## 2018-12-08 PROCEDURE — 94660 CPAP INITIATION&MGMT: CPT

## 2018-12-08 PROCEDURE — 6370000000 HC RX 637 (ALT 250 FOR IP): Performed by: INTERNAL MEDICINE

## 2018-12-08 RX ORDER — DEXTROSE MONOHYDRATE 50 MG/ML
100 INJECTION, SOLUTION INTRAVENOUS PRN
Status: DISCONTINUED | OUTPATIENT
Start: 2018-12-08 | End: 2018-12-27 | Stop reason: HOSPADM

## 2018-12-08 RX ORDER — BUMETANIDE 2 MG/1
2 TABLET ORAL DAILY
Status: DISCONTINUED | OUTPATIENT
Start: 2018-12-08 | End: 2018-12-08

## 2018-12-08 RX ORDER — UREA 10 %
10 LOTION (ML) TOPICAL NIGHTLY PRN
Status: DISCONTINUED | OUTPATIENT
Start: 2018-12-08 | End: 2018-12-27 | Stop reason: HOSPADM

## 2018-12-08 RX ORDER — ALBUTEROL SULFATE 2.5 MG/3ML
2.5 SOLUTION RESPIRATORY (INHALATION) EVERY 4 HOURS PRN
Status: DISCONTINUED | OUTPATIENT
Start: 2018-12-08 | End: 2018-12-17

## 2018-12-08 RX ORDER — HYDRALAZINE HYDROCHLORIDE 10 MG/1
10 TABLET, FILM COATED ORAL EVERY 8 HOURS SCHEDULED
Status: DISCONTINUED | OUTPATIENT
Start: 2018-12-08 | End: 2018-12-15

## 2018-12-08 RX ORDER — ONDANSETRON 2 MG/ML
4 INJECTION INTRAMUSCULAR; INTRAVENOUS EVERY 6 HOURS PRN
Status: DISCONTINUED | OUTPATIENT
Start: 2018-12-08 | End: 2018-12-16 | Stop reason: SDUPTHER

## 2018-12-08 RX ORDER — TAMSULOSIN HYDROCHLORIDE 0.4 MG/1
0.4 CAPSULE ORAL DAILY
Status: DISCONTINUED | OUTPATIENT
Start: 2018-12-08 | End: 2018-12-27 | Stop reason: HOSPADM

## 2018-12-08 RX ORDER — SODIUM CHLORIDE 0.9 % (FLUSH) 0.9 %
10 SYRINGE (ML) INJECTION EVERY 12 HOURS SCHEDULED
Status: DISCONTINUED | OUTPATIENT
Start: 2018-12-08 | End: 2018-12-26 | Stop reason: SDUPTHER

## 2018-12-08 RX ORDER — LOSARTAN POTASSIUM 50 MG/1
25 TABLET ORAL DAILY
Status: DISCONTINUED | OUTPATIENT
Start: 2018-12-08 | End: 2018-12-08

## 2018-12-08 RX ORDER — FAMOTIDINE 20 MG/1
20 TABLET, FILM COATED ORAL 2 TIMES DAILY
Status: DISCONTINUED | OUTPATIENT
Start: 2018-12-08 | End: 2018-12-27 | Stop reason: HOSPADM

## 2018-12-08 RX ORDER — NICOTINE POLACRILEX 4 MG
15 LOZENGE BUCCAL PRN
Status: DISCONTINUED | OUTPATIENT
Start: 2018-12-08 | End: 2018-12-27 | Stop reason: HOSPADM

## 2018-12-08 RX ORDER — DEXTROSE MONOHYDRATE 25 G/50ML
12.5 INJECTION, SOLUTION INTRAVENOUS PRN
Status: DISCONTINUED | OUTPATIENT
Start: 2018-12-08 | End: 2018-12-27 | Stop reason: HOSPADM

## 2018-12-08 RX ORDER — ATORVASTATIN CALCIUM 20 MG/1
20 TABLET, FILM COATED ORAL NIGHTLY
Status: DISCONTINUED | OUTPATIENT
Start: 2018-12-08 | End: 2018-12-23

## 2018-12-08 RX ORDER — METOPROLOL SUCCINATE 50 MG/1
75 TABLET, EXTENDED RELEASE ORAL 2 TIMES DAILY
Status: DISCONTINUED | OUTPATIENT
Start: 2018-12-08 | End: 2018-12-21

## 2018-12-08 RX ORDER — ALBUTEROL SULFATE 2.5 MG/3ML
2.5 SOLUTION RESPIRATORY (INHALATION)
Status: DISCONTINUED | OUTPATIENT
Start: 2018-12-08 | End: 2018-12-08

## 2018-12-08 RX ORDER — SODIUM CHLORIDE 0.9 % (FLUSH) 0.9 %
10 SYRINGE (ML) INJECTION PRN
Status: DISCONTINUED | OUTPATIENT
Start: 2018-12-08 | End: 2018-12-26 | Stop reason: SDUPTHER

## 2018-12-08 RX ADMIN — MUPIROCIN: 20 OINTMENT TOPICAL at 20:57

## 2018-12-08 RX ADMIN — LOSARTAN POTASSIUM 25 MG: 50 TABLET ORAL at 08:32

## 2018-12-08 RX ADMIN — FAMOTIDINE 20 MG: 20 TABLET ORAL at 20:54

## 2018-12-08 RX ADMIN — Medication 10 ML: at 08:42

## 2018-12-08 RX ADMIN — METOPROLOL SUCCINATE 75 MG: 50 TABLET, EXTENDED RELEASE ORAL at 20:55

## 2018-12-08 RX ADMIN — Medication 10 ML: at 20:56

## 2018-12-08 RX ADMIN — INSULIN LISPRO 3 UNITS: 100 INJECTION, SOLUTION INTRAVENOUS; SUBCUTANEOUS at 20:59

## 2018-12-08 RX ADMIN — Medication 10 MG: at 22:14

## 2018-12-08 RX ADMIN — HYDRALAZINE HYDROCHLORIDE 10 MG: 10 TABLET, FILM COATED ORAL at 22:14

## 2018-12-08 RX ADMIN — TAMSULOSIN HYDROCHLORIDE 0.4 MG: 0.4 CAPSULE ORAL at 08:32

## 2018-12-08 RX ADMIN — INSULIN GLARGINE 8 UNITS: 100 INJECTION, SOLUTION SUBCUTANEOUS at 21:00

## 2018-12-08 RX ADMIN — FAMOTIDINE 20 MG: 20 TABLET ORAL at 08:32

## 2018-12-08 RX ADMIN — ATORVASTATIN CALCIUM 20 MG: 20 TABLET, FILM COATED ORAL at 20:55

## 2018-12-08 RX ADMIN — AMIODARONE HYDROCHLORIDE 0.5 MG/MIN: 50 INJECTION, SOLUTION INTRAVENOUS at 02:08

## 2018-12-08 RX ADMIN — BUMETANIDE 2 MG: 2 TABLET ORAL at 08:33

## 2018-12-08 RX ADMIN — METOPROLOL SUCCINATE 75 MG: 50 TABLET, EXTENDED RELEASE ORAL at 08:32

## 2018-12-08 ASSESSMENT — ENCOUNTER SYMPTOMS
SHORTNESS OF BREATH: 0
SORE THROAT: 0
SINUS PAIN: 0
WHEEZING: 0
CONSTIPATION: 1
ABDOMINAL PAIN: 0
CHEST TIGHTNESS: 0
NAUSEA: 0
VOMITING: 0
COUGH: 0

## 2018-12-08 ASSESSMENT — PAIN SCALES - GENERAL
PAINLEVEL_OUTOF10: 0

## 2018-12-08 ASSESSMENT — PAIN DESCRIPTION - PROGRESSION: CLINICAL_PROGRESSION: NOT CHANGED

## 2018-12-08 NOTE — CONSULTS
Samia Tolbert MD, 20 mg at 12/08/18 9578    insulin glargine (LANTUS) injection pen 8 Units, 8 Units, Subcutaneous, Nightly, Samia Tolbert MD    insulin lispro (HUMALOG) injection pen 0-18 Units, 0-18 Units, Subcutaneous, TID WC, Samia Tolbert MD    insulin lispro (HUMALOG) injection pen 0-9 Units, 0-9 Units, Subcutaneous, Nightly, Samia Tolbert MD    losartan (COZAAR) tablet 25 mg, 25 mg, Oral, Daily, Samia Tolbert MD, 25 mg at 12/08/18 4395    metoprolol succinate (TOPROL XL) extended release tablet 75 mg, 75 mg, Oral, BID, Samia Tolbert MD, 75 mg at 12/08/18 0565    mupirocin (BACTROBAN) 2 % ointment, , Nasal, BID, Samia Tolbert MD    [facility]) capsule 0.4 mg, 0.4 mg, Oral, Daily, Samia Tolbert MD, 0.4 mg at 12/08/18 0764    amiodarone (CORDARONE) 450 mg in dextrose 5 % 250 mL infusion, 0.5 mg/min, Intravenous, Continuous, Samia Tolbert MD, Last Rate: 16.7 mL/hr at 12/08/18 0208, 0.5 mg/min at 12/08/18 0208    sodium chloride flush 0.9 % injection 10 mL, 10 mL, Intravenous, 2 times per day, Samia Tolbert MD, 10 mL at 12/08/18 9256    sodium chloride flush 0.9 % injection 10 mL, 10 mL, Intravenous, PRN, Samia Tolbert MD    magnesium hydroxide (MILK OF MAGNESIA) 400 MG/5ML suspension 30 mL, 30 mL, Oral, Daily PRN, Samia Tolbert MD    [facility]) injection 4 mg, 4 mg, Intravenous, Q6H PRN, Samia Tolbert MD    glucose (GLUTOSE) 40 % oral gel 15 g, 15 g, Oral, PRN, Samia Tolbert MD    dextrose 50 % solution 12.5 g, 12.5 g, Intravenous, PRN, Samia Tolbert MD    glucagon (rDNA) injection 1 mg, 1 mg, Intramuscular, PRN, Samia Tolbert MD    dextrose 5 % solution, 100 mL/hr, Intravenous, PRN, Samia Tolbert MD    melatonin tablet 10 mg, 10 mg, Oral, Nightly PRN, Samia Tolbert MD    albuterol (PROVENTIL) nebulizer solution 2.5 mg, 2.5 mg, Nebulization, Q4H PRN, Shivam Pepe MD  Social History     Social History    Marital status: Single     Spouse name: N/A    Number of children: N/A    Years of education: N/A     Occupational History    Not on file. Social History Main Topics    Smoking status: Never Smoker    Smokeless tobacco: Never Used    Alcohol use No    Drug use: No    Sexual activity: Not on file     Other Topics Concern    Not on file     Social History Narrative    No narrative on file     Family History   Problem Relation Age of Onset    High Blood Pressure Mother     High Blood Pressure Father     High Blood Pressure Brother     High Blood Pressure Maternal Grandmother     High Blood Pressure Maternal Grandfather        ROS: Complete 10 point ROS was obtained with positives in HPI, otherwise negative. PHYSICAL EXAMINATION:  BP (!) 150/103   Pulse 68   Temp 98 °F (36.7 °C) (Oral)   Resp 13   Ht 5' 10\" (1.778 m)   Wt 261 lb 7.5 oz (118.6 kg)   SpO2 99%   BMI 37.52 kg/m²   General appearance: NAD  Cardiovascular: No carotid bruit.                               + lower leg edema with good pulsation. Mental Status: Oriented to person, place, problem, and time. Fluent speech. Good fund of knowledge. Normal attention span and concentration. Cranial Nerves:   II: Visual fields: Full to confrontation  III: Pupils: equal, round, reactive to light  III,IV,VI: Extra Ocular Movements are intact. No nystagmus  V: Facial sensation is intact to pin prick and light touch  VII: Facial strength and movements: intact and symmetric  VIII: Hearing: Intact to finger rub bilaterally  IX: Palate elevation is symmetric  XI: Shoulder shrug is intact  XII: Tongue movements are normal  Musculoskeletal: 5/5 in his arms proximally. He has 4-/5 weakness with handgrip bilaterally. 2/5 hip flexors but 0/5 distally in both LE. Reflexes: Bilateral biceps 2/4, triceps 2/4, brachial radialis 2/4, knee 3/4 and ankle 2/4. Planters: flexor bilaterally.   Coordination: NT due to weakness  Sensation: Decreased sensation to all modalities including vibration, with assessment of risk and Plan for stopping antiplatelet agents. If OK with cardiology then hold ASA and Plavix  . Begin decadron for treatment of cervical cord injury. Would plan for X23,24 ACDF and possible corpectomy in 5 days after halting antiplatelet agents. Discussed MRI results and treatment options with patient who wishes to discuss with his family MD. Will follow. Please call with any questions. Thank you again for this consultation.     Ana Salmon  12/8/2018

## 2018-12-08 NOTE — PROGRESS NOTES
RESPIRATORY THERAPY ASSESSMENT    Name:  Ana Pastor  Medical Record Number:  [id number]  Age: 60 y.o. Gender: male  : 1960  Today's Date:  2018  Room:  [address] [place] [address]    Assessment     Is the patient being admitted for a COPD or Asthma exacerbation? No   (If yes the patient will be seen every 4 hours for the first 24 hours and then reassessed)    Patient Admission Diagnosis      Allergies  Allergies   Allergen Reactions    Enalapril     Nadolol     Verapamil        Minimum Predicted Vital Capacity:     1122          Actual Vital Capacity:      Pt on home cpap           Pulmonary History:FELIPE  Home Oxygen Therapy:  room air  Home Respiratory Therapy:None   Current Respiratory Therapy:  albuterol          Respiratory Severity Index(RSI)   Patients with orders for inhalation medications, oxygen, or any therapeutic treatment modality will be placed on Respiratory Protocol. They will be assessed with the first treatment and at least every 72 hours thereafter. The following severity scale will be used to determine frequency of treatment intervention.     Smoking History: No Smoking History = 0    Social History  Social History   Substance Use Topics    Smoking status: Never Smoker    Smokeless tobacco: Never Used    Alcohol use No       Recent Surgical History: None = 0  Past Surgical History  Past Surgical History:   Procedure Laterality Date    CARPAL TUNNEL RELEASE Left     CARPAL TUNNEL RELEASE Right 4/14/15    COLONOSCOPY  2014    AL CABG, ARTERIAL, THREE N/A 2018    CORONARY ARTERY BYPASS GRAFTING X3, INTERNAL MAMMARY ARTERY, SAPHENOUS VEIN GRAFT, ON PUMP performed by Yudith Hutchinson MD at [facility]       Level of Consciousness: Alert, Oriented, and Cooperative = 0    Level of Activity: Walking with assistance = 1    Respiratory Pattern: Regular Pattern; RR 8-20 = 0    Breath Sounds: Clear = 0    Sputum   ,  ,    Cough: Strong, spontaneous, non-productive = 0    Vital Signs   BP (!) 145/89   Pulse 80   Temp 98 °F (36.7 °C) (Oral)   Resp 17   Ht 5' 10\" (1.778 m)   Wt 261 lb 7.5 oz (118.6 kg)   SpO2 94%   BMI 37.52 kg/m²   SPO2 (COPD values may differ): Greater than or equal to 92% on room air = 0    Peak Flow (asthma only): not applicable = 0    RSI: 0-4 = See once and convert to home regimen or discontinue        Plan       Goals: medication delivery, mobilize retained secretions and volume expansion    Patient/caregiver was educated on the proper method of use for Respiratory Care Devices:  Yes      Level of patient/caregiver understanding able to:   [x] Verbalize understanding   [x] Demonstrate understanding       [] Teach back        [] Needs reinforcement       []  No available caregiver               []  Other:     Response to education:  Excellent     Is patient being placed on Home Treatment Regimen? No     Does the patient have everything they need prior to discharge? Yes     Comments: pt has home cpap, no resp distress, no home meds    Plan of Care: Albuterol prn, acapella and IS prn    Electronically signed by Ale Nunez RCP on 12/8/2018 at 12:53 AM    Respiratory Protocol Guidelines     1. Assessment and treatment by Respiratory Therapy will be initiated for medication and therapeutic interventions upon initiation of aerosolized medication. 2. Physician will be contacted for respiratory rate (RR) greater than 35 breaths per minute. Therapy will be held for heart rate (HR) greater than 140 beats per minute, pending direction from physician. 3. Bronchodilators will be administered via Metered Dose Inhaler (MDI) with spacer when the following criteria are met:  a. Alert and cooperative     b. HR < 140 bpm  c. RR < 30 bpm                d. Can demonstrate a 2-3 second inspiratory hold  4. Bronchodilators will be administered via Hand Held Nebulizer [facility]) to patients when ANY of the following criteria are met  a. Incognizant or uncooperative          b.  Patients treated

## 2018-12-08 NOTE — PROGRESS NOTES
ICU Progress Note  PGY1    Hospital Day: 2                                                         Code:Full Code  Admit Date: 12/7/2018                                 PCP: Teresa Carcamo DO    ICU Day: 2  Vent Day: no vent   IV Access:  PIV: R AC, PIV L AC Central Line: no  Duration:  R AC (4 days), L AC (1 day)    IV Fluids:  none  Diet: Diet NPO Effective Now Exceptions are: Ice Chips, Sips with Meds    Daily Plan:  12/8/2018    Subjective:   Mr. Noemy Panda endorses that he struggles with short term memory loss from previous anoxic brain injury, and that he may need to be reminded why he was transferred to [facility]. When explaining, the patient remembers that he is here for cord compression. Pt endorses that his symptoms may be worse at times, and at other times states that they are about the same. He endorses loss of sensation over his lower abdomen and his upper legs. He also endorses weakness in his legs. Pt states that he can still feel that he needs to use the restroom, and thinks that he can sense when he is urinating. However, while pt was urinating (during nursing change), patient said that he was not currently urinating. Robin was placed and 1650 mL drained. He denies chest pain, shortness of breath, nausea/vomiting, abdominal pain, urinary pain, diarrhea or constipation, loss of bowel continence, vision or hearing changes, problems with upper extremity or facial weakness/sensation.       Medications:     Scheduled Meds:   atorvastatin  20 mg Oral Nightly    bumetanide  2 mg Oral Daily    famotidine  20 mg Oral BID    insulin glargine  8 Units Subcutaneous Nightly    insulin lispro  0-18 Units Subcutaneous TID     insulin lispro  0-9 Units Subcutaneous Nightly    losartan  25 mg Oral Daily    metoprolol succinate  75 mg Oral BID    mupirocin   Nasal BID    tamsulosin  0.4 mg Oral Daily    sodium chloride flush  10 mL Intravenous 2 times per day     Continuous Infusions:   amiodarone 0.5

## 2018-12-09 LAB
ALBUMIN SERPL-MCNC: 2.9 G/DL (ref 3.4–5)
ANION GAP SERPL CALCULATED.3IONS-SCNC: 15 MMOL/L (ref 3–16)
BUN BLDV-MCNC: 67 MG/DL (ref 7–20)
CALCIUM SERPL-MCNC: 8.2 MG/DL (ref 8.3–10.6)
CHLORIDE BLD-SCNC: 106 MMOL/L (ref 99–110)
CO2: 23 MMOL/L (ref 21–32)
CREAT SERPL-MCNC: 2.1 MG/DL (ref 0.9–1.3)
GFR AFRICAN AMERICAN: 39
GFR NON-AFRICAN AMERICAN: 32
GLUCOSE BLD-MCNC: 121 MG/DL (ref 70–99)
GLUCOSE BLD-MCNC: 134 MG/DL (ref 70–99)
GLUCOSE BLD-MCNC: 140 MG/DL (ref 70–99)
GLUCOSE BLD-MCNC: 144 MG/DL (ref 70–99)
GLUCOSE BLD-MCNC: 155 MG/DL (ref 70–99)
GLUCOSE BLD-MCNC: 157 MG/DL (ref 70–99)
HCT VFR BLD CALC: 36.8 % (ref 40.5–52.5)
HEMOGLOBIN: 11.7 G/DL (ref 13.5–17.5)
MAGNESIUM: 2.1 MG/DL (ref 1.8–2.4)
MCH RBC QN AUTO: 29.1 PG (ref 26–34)
MCHC RBC AUTO-ENTMCNC: 31.7 G/DL (ref 31–36)
MCV RBC AUTO: 91.7 FL (ref 80–100)
PDW BLD-RTO: 17.1 % (ref 12.4–15.4)
PERFORMED ON: ABNORMAL
PHOSPHORUS: 4.5 MG/DL (ref 2.5–4.9)
PLATELET # BLD: 164 K/UL (ref 135–450)
PMV BLD AUTO: 8.7 FL (ref 5–10.5)
POTASSIUM SERPL-SCNC: 4 MMOL/L (ref 3.5–5.1)
RBC # BLD: 4.01 M/UL (ref 4.2–5.9)
SODIUM BLD-SCNC: 144 MMOL/L (ref 136–145)
WBC # BLD: 7.9 K/UL (ref 4–11)

## 2018-12-09 PROCEDURE — 6360000002 HC RX W HCPCS: Performed by: STUDENT IN AN ORGANIZED HEALTH CARE EDUCATION/TRAINING PROGRAM

## 2018-12-09 PROCEDURE — 94660 CPAP INITIATION&MGMT: CPT

## 2018-12-09 PROCEDURE — 2580000003 HC RX 258: Performed by: STUDENT IN AN ORGANIZED HEALTH CARE EDUCATION/TRAINING PROGRAM

## 2018-12-09 PROCEDURE — 6370000000 HC RX 637 (ALT 250 FOR IP): Performed by: STUDENT IN AN ORGANIZED HEALTH CARE EDUCATION/TRAINING PROGRAM

## 2018-12-09 PROCEDURE — 83735 ASSAY OF MAGNESIUM: CPT

## 2018-12-09 PROCEDURE — 2000000000 HC ICU R&B

## 2018-12-09 PROCEDURE — 80069 RENAL FUNCTION PANEL: CPT

## 2018-12-09 PROCEDURE — 85027 COMPLETE CBC AUTOMATED: CPT

## 2018-12-09 PROCEDURE — 36415 COLL VENOUS BLD VENIPUNCTURE: CPT

## 2018-12-09 PROCEDURE — 6370000000 HC RX 637 (ALT 250 FOR IP): Performed by: INTERNAL MEDICINE

## 2018-12-09 PROCEDURE — 99024 POSTOP FOLLOW-UP VISIT: CPT | Performed by: THORACIC SURGERY (CARDIOTHORACIC VASCULAR SURGERY)

## 2018-12-09 RX ORDER — DEXAMETHASONE SODIUM PHOSPHATE 4 MG/ML
4 INJECTION, SOLUTION INTRA-ARTICULAR; INTRALESIONAL; INTRAMUSCULAR; INTRAVENOUS; SOFT TISSUE EVERY 6 HOURS
Status: DISCONTINUED | OUTPATIENT
Start: 2018-12-09 | End: 2018-12-14

## 2018-12-09 RX ORDER — HEPARIN SODIUM 5000 [USP'U]/ML
5000 INJECTION, SOLUTION INTRAVENOUS; SUBCUTANEOUS EVERY 8 HOURS SCHEDULED
Status: COMPLETED | OUTPATIENT
Start: 2018-12-09 | End: 2018-12-15

## 2018-12-09 RX ORDER — DOCUSATE SODIUM 100 MG/1
100 CAPSULE, LIQUID FILLED ORAL DAILY
Status: DISCONTINUED | OUTPATIENT
Start: 2018-12-09 | End: 2018-12-16 | Stop reason: SDUPTHER

## 2018-12-09 RX ORDER — SENNA PLUS 8.6 MG/1
1 TABLET ORAL NIGHTLY PRN
Status: DISCONTINUED | OUTPATIENT
Start: 2018-12-09 | End: 2018-12-27 | Stop reason: HOSPADM

## 2018-12-09 RX ADMIN — INSULIN LISPRO 2 UNITS: 100 INJECTION, SOLUTION INTRAVENOUS; SUBCUTANEOUS at 19:51

## 2018-12-09 RX ADMIN — HEPARIN SODIUM 5000 UNITS: 5000 INJECTION INTRAVENOUS; SUBCUTANEOUS at 15:05

## 2018-12-09 RX ADMIN — ATORVASTATIN CALCIUM 20 MG: 20 TABLET, FILM COATED ORAL at 20:00

## 2018-12-09 RX ADMIN — DEXAMETHASONE SODIUM PHOSPHATE 4 MG: 4 INJECTION, SOLUTION INTRAMUSCULAR; INTRAVENOUS at 08:16

## 2018-12-09 RX ADMIN — Medication 10 ML: at 20:02

## 2018-12-09 RX ADMIN — HEPARIN SODIUM 5000 UNITS: 5000 INJECTION INTRAVENOUS; SUBCUTANEOUS at 21:59

## 2018-12-09 RX ADMIN — INSULIN LISPRO 3 UNITS: 100 INJECTION, SOLUTION INTRAVENOUS; SUBCUTANEOUS at 13:04

## 2018-12-09 RX ADMIN — MUPIROCIN: 20 OINTMENT TOPICAL at 08:18

## 2018-12-09 RX ADMIN — FAMOTIDINE 20 MG: 20 TABLET ORAL at 08:16

## 2018-12-09 RX ADMIN — DEXAMETHASONE SODIUM PHOSPHATE 4 MG: 4 INJECTION, SOLUTION INTRAMUSCULAR; INTRAVENOUS at 15:05

## 2018-12-09 RX ADMIN — INSULIN GLARGINE 8 UNITS: 100 INJECTION, SOLUTION SUBCUTANEOUS at 19:49

## 2018-12-09 RX ADMIN — TAMSULOSIN HYDROCHLORIDE 0.4 MG: 0.4 CAPSULE ORAL at 08:16

## 2018-12-09 RX ADMIN — DEXAMETHASONE SODIUM PHOSPHATE 4 MG: 4 INJECTION, SOLUTION INTRAMUSCULAR; INTRAVENOUS at 19:31

## 2018-12-09 RX ADMIN — INSULIN LISPRO 3 UNITS: 100 INJECTION, SOLUTION INTRAVENOUS; SUBCUTANEOUS at 18:31

## 2018-12-09 RX ADMIN — Medication 10 ML: at 08:16

## 2018-12-09 RX ADMIN — METOPROLOL SUCCINATE 75 MG: 50 TABLET, EXTENDED RELEASE ORAL at 20:00

## 2018-12-09 RX ADMIN — HYDRALAZINE HYDROCHLORIDE 10 MG: 10 TABLET, FILM COATED ORAL at 15:05

## 2018-12-09 RX ADMIN — HYDRALAZINE HYDROCHLORIDE 10 MG: 10 TABLET, FILM COATED ORAL at 21:58

## 2018-12-09 RX ADMIN — FAMOTIDINE 20 MG: 20 TABLET ORAL at 20:00

## 2018-12-09 RX ADMIN — METOPROLOL SUCCINATE 75 MG: 50 TABLET, EXTENDED RELEASE ORAL at 08:16

## 2018-12-09 RX ADMIN — DOCUSATE SODIUM 100 MG: 100 CAPSULE, LIQUID FILLED ORAL at 15:05

## 2018-12-09 RX ADMIN — HYDRALAZINE HYDROCHLORIDE 10 MG: 10 TABLET, FILM COATED ORAL at 06:42

## 2018-12-09 RX ADMIN — Medication 10 MG: at 21:59

## 2018-12-09 ASSESSMENT — PAIN DESCRIPTION - PROGRESSION
CLINICAL_PROGRESSION: NOT CHANGED

## 2018-12-09 ASSESSMENT — PAIN SCALES - GENERAL
PAINLEVEL_OUTOF10: 0

## 2018-12-09 NOTE — PROGRESS NOTES
On assessment pt states that he experiences superficial sensation in bilateral lower extremities but has altered deep sensation. Pt states \"I can feel your hands on my skin but its like beneath my skin feels like Styrofoam\". Pt can plantar flex on command but is unable to dorsiflex, which is consistent with day shift report. At 2230 pt put on his home CPAP machine and is sleeping comfortably.

## 2018-12-09 NOTE — PROGRESS NOTES
Progress Note - Neurosurgery    cc: follow-up on cervical cord compression    Subjective:  Magda Camacho is a 60 y.o. male. Pain is controlled. Patient resting comfortably this am.  Improved feeling in LE since starting decadron. Objective:  Blood pressure 128/85, pulse 65, temperature 97.7 °F (36.5 °C), temperature source Oral, resp. rate 15, height 5' 10\" (1.778 m), weight 255 lb 1.2 oz (115.7 kg), SpO2 95 %. In: 12 [P.O.:960]  Out: 2050 [Urine:2050]    Physical Exam:  General: in no apparent distress and cooperative  HEENT: Head: Normal, normocephalic, atraumatic. Neuro: alert & oriented x 3 with fluent speech  Motor: Hip flexors 4-/5 today, quads 2+/5, ADF/APF 3/5 on right, 0/5 on left    Labs:  CBC:   Lab Results   Component Value Date    WBC 7.9 12/09/2018    RBC 4.01 12/09/2018         Assessment and Plan:    Patient Active Hospital Problem List:   Cervical spinal cord compression ([facility] [address] (12/7/2018)  CT surgery note appreciated. ASA and Plavix on hold, day 2. Given improvement with decadron, patient is a candidate for and agreeable to surgical decompression this week. Plan surgical intervention mid week. Will continue to follow.       Alfred Juan  12/9/2018

## 2018-12-09 NOTE — PLAN OF CARE
Problem: Risk for Impaired Skin Integrity  Goal: Tissue integrity - skin and mucous membranes  Structural intactness and normal physiological function of skin and  mucous membranes. Outcome: Ongoing  Patient skin free from breakdown. He is repositioned to alternate pressure. Sacral heart in place. Skin kept clean and dry. Low air loss mattress in place. Will continue to montior    Problem: Falls - Risk of:  Goal: Will remain free from falls  Will remain free from falls   Outcome: Ongoing  Patient free from falls call light and belongings within reach. Bed locked and in lowest position bed alarm activated. 3/4 side rails up non skid socks on fall signs posted. Patient educated to call out for assistance and calls out approriately hourly rounding. Will continue to monitor    Problem: Urinary Elimination:  Goal: Complications related to the disease process, condition or treatment will be avoided or minimized  Complications related to the disease process, condition or treatment will be avoided or minimized   Outcome: Ongoing  Patient has a mohamud in place for urinary retention. Mohamud care completed with CHG wipes to decrease risk of infection.  Will continue to monitor

## 2018-12-09 NOTE — PROGRESS NOTES
Seen and examined and discussed with Dr Noemy Hyde    Pt doing about the same  Upset he cant get up but explained to him that is not safe  Numbness and weakness about the same in the LE    Paraplegia  Cervical cord compression and spinal stenosis  CAD s/p CABG  CKD3  ABL anemia post op  DM2  FELIPE on CPAP  HTN  Chronic combined CHF  Hx of anoxic brain injury     Planning for cervical cord decompression  Monitor neuro status  Appreciate cards input about antiplt therapy  Cont with ss insulin coverage    Electronically signed by Phil Doyle MD on 12/9/2018 at 12:52 PM

## 2018-12-09 NOTE — PROGRESS NOTES
[facility]    Follow up Note           Reason for Consult:  CAMI on CKD  Requesting Physician:  Dr. Marilu Monzon    Chief Complaint:  Numbness tingling    Sub/interval history    The patient was transferred her for paraplegia occuring after CABAG, seen by Neurosurgery , he appears to have cervical cord injury and may require surgery but he had recent CABG and was on Plavix. Timing needs to be worked out by surgery. He feels well today with no CP, SOB, nausea or vomiting. ROS: No fever or chills. Social: No family at bedside. Physical exam:   Constitutional:  VITALS:  /85   Pulse 75   Temp 97.6 °F (36.4 °C) (Oral)   Resp 18   Ht 5' 10\" (1.778 m)   Wt 255 lb 1.2 oz (115.7 kg)   SpO2 98%   BMI 36.60 kg/m²      General appearance: Seems comfortable, no acute distress. Neck: Trachea midline, thyroid normal.   Lungs:  Non labored breathing, CTA to anterior auscultation. Heart:  S1S2 normal, rub or gallop. No peripheral edema. Abdomen: Soft, non-tender, no organomegaly. Skin: No lesions or rashes, warm to touch. Data/  Recent Labs      12/07/18   0432  12/08/18   0438  12/09/18   0452   WBC  9.2  9.1  7.9   HGB  11.1*  11.5*  11.7*   HCT  34.5*  35.6*  36.8*   MCV  90.9  90.8  91.7   PLT  118*  171  164     Recent Labs      12/07/18   0432  12/07/18   1505  12/08/18   0438  12/09/18   0452   NA  143  140  143  144   K  3.8  3.8  4.4  4.0   CL  109  104  107  106   CO2  23  21  22  23   GLUCOSE  166*  133*  161*  134*   PHOS   --    --   4.7  4.5   MG  2.10   --   2.30  2.10   BUN  43*  46*  57*  67*   CREATININE  1.6*  1.6*  1.9*  2.1*   LABGLOM  44*  44*  36*  32*   GFRAA  54*  54*  44*  39*        Assessment/Plan    -CKD III , recent cr has been around 1.8 to 2   Likely from HTN nephrosclerosis, possibly has DM as well. Cr now 2.1,stopped the ARB.     -Proteinuria   Likely from DM   Spot urine protein/creatinine ratio 4.6 grams    -Coronary artery disease    post op

## 2018-12-10 LAB
ALBUMIN SERPL-MCNC: 2.9 G/DL (ref 3.4–5)
ANION GAP SERPL CALCULATED.3IONS-SCNC: 13 MMOL/L (ref 3–16)
BUN BLDV-MCNC: 63 MG/DL (ref 7–20)
CALCIUM SERPL-MCNC: 8.9 MG/DL (ref 8.3–10.6)
CHLORIDE BLD-SCNC: 106 MMOL/L (ref 99–110)
CO2: 22 MMOL/L (ref 21–32)
CREAT SERPL-MCNC: 1.9 MG/DL (ref 0.9–1.3)
GFR AFRICAN AMERICAN: 44
GFR NON-AFRICAN AMERICAN: 36
GLUCOSE BLD-MCNC: 134 MG/DL (ref 70–99)
GLUCOSE BLD-MCNC: 138 MG/DL (ref 70–99)
GLUCOSE BLD-MCNC: 169 MG/DL (ref 70–99)
GLUCOSE BLD-MCNC: 177 MG/DL (ref 70–99)
GLUCOSE BLD-MCNC: 194 MG/DL (ref 70–99)
HCT VFR BLD CALC: 36.5 % (ref 40.5–52.5)
HEMOGLOBIN: 11.8 G/DL (ref 13.5–17.5)
MAGNESIUM: 2.4 MG/DL (ref 1.8–2.4)
MCH RBC QN AUTO: 29.2 PG (ref 26–34)
MCHC RBC AUTO-ENTMCNC: 32.4 G/DL (ref 31–36)
MCV RBC AUTO: 90.2 FL (ref 80–100)
PDW BLD-RTO: 16.7 % (ref 12.4–15.4)
PERFORMED ON: ABNORMAL
PHOSPHORUS: 4.6 MG/DL (ref 2.5–4.9)
PLATELET # BLD: 155 K/UL (ref 135–450)
PMV BLD AUTO: 8.7 FL (ref 5–10.5)
POTASSIUM SERPL-SCNC: 4.6 MMOL/L (ref 3.5–5.1)
RBC # BLD: 4.04 M/UL (ref 4.2–5.9)
SODIUM BLD-SCNC: 141 MMOL/L (ref 136–145)
WBC # BLD: 7.9 K/UL (ref 4–11)

## 2018-12-10 PROCEDURE — 6360000002 HC RX W HCPCS: Performed by: STUDENT IN AN ORGANIZED HEALTH CARE EDUCATION/TRAINING PROGRAM

## 2018-12-10 PROCEDURE — 99222 1ST HOSP IP/OBS MODERATE 55: CPT | Performed by: INTERNAL MEDICINE

## 2018-12-10 PROCEDURE — 80069 RENAL FUNCTION PANEL: CPT

## 2018-12-10 PROCEDURE — 2580000003 HC RX 258: Performed by: STUDENT IN AN ORGANIZED HEALTH CARE EDUCATION/TRAINING PROGRAM

## 2018-12-10 PROCEDURE — 6370000000 HC RX 637 (ALT 250 FOR IP): Performed by: STUDENT IN AN ORGANIZED HEALTH CARE EDUCATION/TRAINING PROGRAM

## 2018-12-10 PROCEDURE — 1200000000 HC SEMI PRIVATE

## 2018-12-10 PROCEDURE — 85027 COMPLETE CBC AUTOMATED: CPT

## 2018-12-10 PROCEDURE — 36415 COLL VENOUS BLD VENIPUNCTURE: CPT

## 2018-12-10 PROCEDURE — 83735 ASSAY OF MAGNESIUM: CPT

## 2018-12-10 PROCEDURE — 6370000000 HC RX 637 (ALT 250 FOR IP): Performed by: INTERNAL MEDICINE

## 2018-12-10 RX ADMIN — METOPROLOL SUCCINATE 75 MG: 50 TABLET, EXTENDED RELEASE ORAL at 21:11

## 2018-12-10 RX ADMIN — HEPARIN SODIUM 5000 UNITS: 5000 INJECTION INTRAVENOUS; SUBCUTANEOUS at 17:34

## 2018-12-10 RX ADMIN — Medication 10 ML: at 21:19

## 2018-12-10 RX ADMIN — METOPROLOL SUCCINATE 75 MG: 50 TABLET, EXTENDED RELEASE ORAL at 09:42

## 2018-12-10 RX ADMIN — HEPARIN SODIUM 5000 UNITS: 5000 INJECTION INTRAVENOUS; SUBCUTANEOUS at 22:10

## 2018-12-10 RX ADMIN — DEXAMETHASONE SODIUM PHOSPHATE 4 MG: 4 INJECTION, SOLUTION INTRAMUSCULAR; INTRAVENOUS at 01:58

## 2018-12-10 RX ADMIN — FAMOTIDINE 20 MG: 20 TABLET ORAL at 09:42

## 2018-12-10 RX ADMIN — INSULIN LISPRO 2 UNITS: 100 INJECTION, SOLUTION INTRAVENOUS; SUBCUTANEOUS at 21:11

## 2018-12-10 RX ADMIN — DEXAMETHASONE SODIUM PHOSPHATE 4 MG: 4 INJECTION, SOLUTION INTRAMUSCULAR; INTRAVENOUS at 17:34

## 2018-12-10 RX ADMIN — INSULIN GLARGINE 8 UNITS: 100 INJECTION, SOLUTION SUBCUTANEOUS at 21:11

## 2018-12-10 RX ADMIN — HYDRALAZINE HYDROCHLORIDE 10 MG: 10 TABLET, FILM COATED ORAL at 22:10

## 2018-12-10 RX ADMIN — HYDRALAZINE HYDROCHLORIDE 10 MG: 10 TABLET, FILM COATED ORAL at 05:46

## 2018-12-10 RX ADMIN — FAMOTIDINE 20 MG: 20 TABLET ORAL at 21:12

## 2018-12-10 RX ADMIN — Medication 10 ML: at 09:43

## 2018-12-10 RX ADMIN — DEXAMETHASONE SODIUM PHOSPHATE 4 MG: 4 INJECTION, SOLUTION INTRAMUSCULAR; INTRAVENOUS at 21:12

## 2018-12-10 RX ADMIN — HYDRALAZINE HYDROCHLORIDE 10 MG: 10 TABLET, FILM COATED ORAL at 17:34

## 2018-12-10 RX ADMIN — TAMSULOSIN HYDROCHLORIDE 0.4 MG: 0.4 CAPSULE ORAL at 09:42

## 2018-12-10 RX ADMIN — DEXAMETHASONE SODIUM PHOSPHATE 4 MG: 4 INJECTION, SOLUTION INTRAMUSCULAR; INTRAVENOUS at 09:42

## 2018-12-10 RX ADMIN — INSULIN LISPRO 3 UNITS: 100 INJECTION, SOLUTION INTRAVENOUS; SUBCUTANEOUS at 13:21

## 2018-12-10 RX ADMIN — DOCUSATE SODIUM 100 MG: 100 CAPSULE, LIQUID FILLED ORAL at 09:42

## 2018-12-10 RX ADMIN — ATORVASTATIN CALCIUM 20 MG: 20 TABLET, FILM COATED ORAL at 21:12

## 2018-12-10 RX ADMIN — Medication 10 MG: at 22:14

## 2018-12-10 RX ADMIN — HEPARIN SODIUM 5000 UNITS: 5000 INJECTION INTRAVENOUS; SUBCUTANEOUS at 05:46

## 2018-12-10 ASSESSMENT — PAIN SCALES - GENERAL
PAINLEVEL_OUTOF10: 0

## 2018-12-10 ASSESSMENT — PAIN DESCRIPTION - PROGRESSION
CLINICAL_PROGRESSION: NOT CHANGED
CLINICAL_PROGRESSION: NOT CHANGED

## 2018-12-10 NOTE — CONSULTS
12/10/2018    CO2 22 12/10/2018    BUN 63 12/10/2018    CREATININE 1.9 12/10/2018    CALCIUM 8.9 12/10/2018    GFRAA 44 12/10/2018    LABGLOM 36 12/10/2018    GLUCOSE 169 12/10/2018     Hepatic Function Panel:  Lab Results   Component Value Date    ALKPHOS 84 12/04/2018    ALT 29 12/04/2018    AST 22 12/04/2018    PROT 6.4 12/04/2018    BILITOT 0.4 12/04/2018    BILIDIR <0.2 12/04/2018    IBILI see below 12/04/2018     ABG:  Lab Results   Component Value Date    TTG6NCN 21.5 12/04/2018    BEART -4 12/04/2018    L2IPXOIT 98 12/04/2018    PHART 7.347 12/04/2018    JEG0JQE 39.1 12/04/2018    PO2ART 110.5 12/04/2018    YKK5JVM 23 12/04/2018         Assessment/Plan       Severe Cervical Spinal Canal stenosis with Cord Compression at C5-6 & C6-7  CAD S/P recent CABG 12/4  CKD3  A-Fib rhythm controlled  DMII  FELIPE  Chronic Combined CHF  Hx of anoxic brain injury from cardiac arrest 1990s    Plan:  Decodron to help with inflammation  Hold ASA & Plavix (Day 3)  Wear [place] J collar at all times, except for hygiene purposes  Plan for neurosurgical intervention on 12/12/18  Continue Toprol XL/Hydralazine  Lantus/lispro for sugar control  Continue statin  CT surgery and Neurosurgery following      Maya Vance DO. PGY-3    Patient seen, examined and discussed with the resident and I agree with the assessment and plan. Briefly, this is a 60 y.o. male with cervical cord compression and recent CABG. Holding antiplatelet therapies in anticipation of decompressive surgeries when risk of bleeding has been mitigated. Currently improved with traction from [place]-J collar and decadron. Getting q4 hour neuro checks so can transfer to 5 tower from my perspective.     Jose Gonzalez MD

## 2018-12-10 NOTE — PROGRESS NOTES
Progress Note  Hospital Day: 4  POD#  6  S/P Coronary artery bypass x 3 (2018) at [address] [place] [address] Complaint: Postop follow up    Mr. Calero has no complaints. States his lower extremities are improving. VITAL SIGNS   Temperature:  Current - Temp: 97.3 °F (36.3 °C);  Max - Temp  Av.6 °F (36.4 °C)  Min: 97.3 °F (36.3 °C)  Max: 98 °F (36.7 °C)    Respiratory Rate : Resp  Av.1  Min: 12  Max: 24    Pulse Range: Pulse  Av.8  Min: 59  Max: 79    Blood Pressure Range:  Systolic (30SLP), UIV:751 , Min:126 , FWD:734   ; Diastolic (56BGI), KSO:82, Min:80, Max:109    Pulse ox Range: SpO2  Av %  Min: 95 %  Max: 100 %       Admission Weight: Weight: 261 lb 7.5 oz (118.6 kg)    Current Weight: up 3 lbs from preop CABG   Weight   12/10/18 0500 254 lb 10.1 oz (115.5 kg)   18 0500 255 lb 1.2 oz (115.7 kg)   18 2343 261 lb 7.5 oz (118.6 kg)     I/O:   Intake/Output Summary (Last 24 hours) at 12/10/18 0917  Last data filed at 12/10/18 0556   Gross per 24 hour   Intake             1440 ml   Output             2557 ml   Net            -1117 ml     Urine (mohamud): 240-1544-210 ml/shift    LINES/ACCESS:   Peripheral Access: Left antecubital Day #: 3                               Mohamud Day #: 2             Diet: DIET CARDIAC;    MEDICATIONS:      dexamethasone  4 mg Intravenous Q6H    docusate sodium  100 mg Oral Daily    heparin (porcine)  5,000 Units Subcutaneous 3 times per day    atorvastatin  20 mg Oral Nightly    famotidine  20 mg Oral BID    insulin glargine  8 Units Subcutaneous Nightly    insulin lispro  0-18 Units Subcutaneous TID     insulin lispro  0-9 Units Subcutaneous Nightly    metoprolol succinate  75 mg Oral BID    tamsulosin  0.4 mg Oral Daily    sodium chloride flush  10 mL Intravenous 2 times per day    hydrALAZINE  10 mg Oral 3 times per day     DATA REVIEW:   CBC: Recent Labs      18   0438  18   0452  12/10/18   0510   WBC  9.1  7.9 7. 9   HGB  11.5*  11.7*  11.8*   HCT  35.6*  36.8*  36.5*   MCV  90.8  91.7  90.2   PLT  171  164  155     BMP: Recent Labs      12/07/18   1505  12/08/18   0438  12/09/18   0452  12/10/18   0510   NA  140  143  144  141   K  3.8  4.4  4.0  4.6   CL  104  107  106  106   CO2  21  22  23  22   PHOS   --   4.7  4.5  4.6   GLUCOSE  133*  161*  134*  169*   BUN  46*  57*  67*  63*   CREATININE  1.6*  1.9*  2.1*  1.9*   CALCIUM  9.2  9.5  8.2*  8.9   MG   --   2.30  2.10  2.40     Accucheck Glucoses:   Recent Labs      12/09/18   0821  12/09/18   1230  12/09/18   1737  12/09/18   1928  12/10/18   0809   POCGLU  121*  144*  155*  140*  134*     ABG:  Lab Results   Component Value Date    PHART 7.347 12/04/2018    PO2ART 110.5 12/04/2018    MPE3UIA 39.1 12/04/2018    N3LTMRZI 98 12/04/2018    BYR8MZR 23 12/04/2018    GQR9WBZ 21.5 12/04/2018    BEART -4 12/04/2018       ASSESSMENT:   Patient Active Problem List:     Trigger finger     Hand pain     Anoxic brain injury ([facility] [address]     HTN (hypertension)     Hyperlipemia     Carpal tunnel syndrome     Wrist pain     Obstructive sleep apnea syndrome     Hyperuricemia     Cardiac arrhythmia     Depression     Gout     Adiposity     Type 2 diabetes mellitus with complication, without long-term current use of insulin (HCC)     ACS (acute coronary syndrome) ([facility])     Gait disturbance     Chronic kidney disease     Abnormal echocardiogram     Coronary artery disease involving native coronary artery of native heart with angina pectoris ([facility])     Abnormal stress test     Cardiomyopathy ([facility])     NSTEMI (non-ST elevated myocardial infarction) ([address]     Ischemic cardiomyopathy     Acute systolic congestive heart failure (HCC)     Acute renal failure with acute tubular necrosis superimposed on stage 3 chronic kidney disease ([address]     Spinal cord ischemia (HCC)     DM (diabetes mellitus), secondary, uncontrolled, w/neurologic complic ([address]     Cervical spinal cord compression ([address]      Neuro:

## 2018-12-10 NOTE — PROGRESS NOTES
TRANSFER ACCEPTANCE NOTE:    62yo M PMHx cardiac arrest (1994), CKD III, HTN, HLD who underwent 3 vessel CABG on 12/4 at [facility]. Developed UE paresthesias, LE weakness, and saddle anesthesia after procedure. Going to the OR on Wednesday 21/2, with NSGY for cervical spine decompression. OK with CT surg to hold ASA and plavix for 5 days. NSGY and CTS following. S: The patient was seen and examined. Patient felt well. He asked to remove J collar but NSGY recommend continuing collar until surgery. Patient does not want to go to SNF, however he still has significant weakness in lower extremities. Vitals:    12/10/18 0800   BP: (!) 174/109   Pulse: 64   Resp: 13   Temp: 97.3 °F (36.3 °C)   SpO2: 100%     Scheduled Meds:   dexamethasone  4 mg Intravenous Q6H    docusate sodium  100 mg Oral Daily    heparin (porcine)  5,000 Units Subcutaneous 3 times per day    atorvastatin  20 mg Oral Nightly    famotidine  20 mg Oral BID    insulin glargine  8 Units Subcutaneous Nightly    insulin lispro  0-18 Units Subcutaneous TID WC    insulin lispro  0-9 Units Subcutaneous Nightly    metoprolol succinate  75 mg Oral BID    tamsulosin  0.4 mg Oral Daily    sodium chloride flush  10 mL Intravenous 2 times per day    hydrALAZINE  10 mg Oral 3 times per day     Continuous Infusions:   dextrose       PRN Meds:senna, sodium chloride flush, magnesium hydroxide, ondansetron, glucose, dextrose, glucagon (rDNA), dextrose, melatonin, albuterol    Gen: supine in bed; no apparent distress, j collar in place, obese  HEENT: MMM  Cardiovascular: +S1, +S2; RRR; no murmurs   Lungs: CTAB  Abdomen: soft, non-tender, non-distended, bowel sounds present throughout  Extremities: warm, well-perfused; no cyanosis, no edema, no erythema  Neuro: AAO x3; 3/5 strength BL lower extremities, 5/5 BL upper extremities, 4/5 sensory LE. Decreased dorsiflexion bilaterally.  Pupils reactive to light bilaterally, normal eye ROM         The

## 2018-12-11 ENCOUNTER — TELEPHONE (OUTPATIENT)
Dept: FAMILY MEDICINE CLINIC | Age: 58
End: 2018-12-11

## 2018-12-11 ENCOUNTER — APPOINTMENT (OUTPATIENT)
Dept: GENERAL RADIOLOGY | Age: 58
DRG: 023 | End: 2018-12-11
Attending: FAMILY MEDICINE
Payer: COMMERCIAL

## 2018-12-11 LAB
ALBUMIN SERPL-MCNC: 2.8 G/DL (ref 3.4–5)
ANION GAP SERPL CALCULATED.3IONS-SCNC: 12 MMOL/L (ref 3–16)
BUN BLDV-MCNC: 62 MG/DL (ref 7–20)
CALCIUM SERPL-MCNC: 8.9 MG/DL (ref 8.3–10.6)
CHLORIDE BLD-SCNC: 102 MMOL/L (ref 99–110)
CO2: 21 MMOL/L (ref 21–32)
CREAT SERPL-MCNC: 1.9 MG/DL (ref 0.9–1.3)
GFR AFRICAN AMERICAN: 44
GFR NON-AFRICAN AMERICAN: 36
GLUCOSE BLD-MCNC: 144 MG/DL (ref 70–99)
GLUCOSE BLD-MCNC: 164 MG/DL (ref 70–99)
GLUCOSE BLD-MCNC: 170 MG/DL (ref 70–99)
GLUCOSE BLD-MCNC: 175 MG/DL (ref 70–99)
GLUCOSE BLD-MCNC: 225 MG/DL (ref 70–99)
HCT VFR BLD CALC: 36.8 % (ref 40.5–52.5)
HEMOGLOBIN: 11.9 G/DL (ref 13.5–17.5)
MAGNESIUM: 2.2 MG/DL (ref 1.8–2.4)
MCH RBC QN AUTO: 29.6 PG (ref 26–34)
MCHC RBC AUTO-ENTMCNC: 32.5 G/DL (ref 31–36)
MCV RBC AUTO: 91.2 FL (ref 80–100)
PDW BLD-RTO: 17.1 % (ref 12.4–15.4)
PERFORMED ON: ABNORMAL
PHOSPHORUS: 4.7 MG/DL (ref 2.5–4.9)
PLATELET # BLD: 147 K/UL (ref 135–450)
PMV BLD AUTO: 8.9 FL (ref 5–10.5)
POTASSIUM SERPL-SCNC: 4.5 MMOL/L (ref 3.5–5.1)
RBC # BLD: 4.03 M/UL (ref 4.2–5.9)
SODIUM BLD-SCNC: 135 MMOL/L (ref 136–145)
WBC # BLD: 7.7 K/UL (ref 4–11)

## 2018-12-11 PROCEDURE — 6370000000 HC RX 637 (ALT 250 FOR IP): Performed by: STUDENT IN AN ORGANIZED HEALTH CARE EDUCATION/TRAINING PROGRAM

## 2018-12-11 PROCEDURE — 83735 ASSAY OF MAGNESIUM: CPT

## 2018-12-11 PROCEDURE — 1200000000 HC SEMI PRIVATE

## 2018-12-11 PROCEDURE — 85027 COMPLETE CBC AUTOMATED: CPT

## 2018-12-11 PROCEDURE — 2580000003 HC RX 258: Performed by: STUDENT IN AN ORGANIZED HEALTH CARE EDUCATION/TRAINING PROGRAM

## 2018-12-11 PROCEDURE — 6360000002 HC RX W HCPCS: Performed by: STUDENT IN AN ORGANIZED HEALTH CARE EDUCATION/TRAINING PROGRAM

## 2018-12-11 PROCEDURE — 6370000000 HC RX 637 (ALT 250 FOR IP): Performed by: INTERNAL MEDICINE

## 2018-12-11 PROCEDURE — 36415 COLL VENOUS BLD VENIPUNCTURE: CPT

## 2018-12-11 PROCEDURE — 80069 RENAL FUNCTION PANEL: CPT

## 2018-12-11 PROCEDURE — 71045 X-RAY EXAM CHEST 1 VIEW: CPT

## 2018-12-11 RX ORDER — CEFAZOLIN SODIUM 2 G/50ML
2 SOLUTION INTRAVENOUS
Status: ACTIVE | OUTPATIENT
Start: 2018-12-12 | End: 2018-12-12

## 2018-12-11 RX ADMIN — METOPROLOL SUCCINATE 75 MG: 50 TABLET, EXTENDED RELEASE ORAL at 08:55

## 2018-12-11 RX ADMIN — METOPROLOL SUCCINATE 75 MG: 50 TABLET, EXTENDED RELEASE ORAL at 21:13

## 2018-12-11 RX ADMIN — Medication 10 MG: at 21:40

## 2018-12-11 RX ADMIN — Medication 10 ML: at 08:58

## 2018-12-11 RX ADMIN — HYDRALAZINE HYDROCHLORIDE 10 MG: 10 TABLET, FILM COATED ORAL at 05:59

## 2018-12-11 RX ADMIN — HEPARIN SODIUM 5000 UNITS: 5000 INJECTION INTRAVENOUS; SUBCUTANEOUS at 05:59

## 2018-12-11 RX ADMIN — INSULIN LISPRO 3 UNITS: 100 INJECTION, SOLUTION INTRAVENOUS; SUBCUTANEOUS at 18:05

## 2018-12-11 RX ADMIN — HEPARIN SODIUM 5000 UNITS: 5000 INJECTION INTRAVENOUS; SUBCUTANEOUS at 21:14

## 2018-12-11 RX ADMIN — FAMOTIDINE 20 MG: 20 TABLET ORAL at 21:14

## 2018-12-11 RX ADMIN — DOCUSATE SODIUM 100 MG: 100 CAPSULE, LIQUID FILLED ORAL at 09:12

## 2018-12-11 RX ADMIN — DEXAMETHASONE SODIUM PHOSPHATE 4 MG: 4 INJECTION, SOLUTION INTRAMUSCULAR; INTRAVENOUS at 08:56

## 2018-12-11 RX ADMIN — DEXAMETHASONE SODIUM PHOSPHATE 4 MG: 4 INJECTION, SOLUTION INTRAMUSCULAR; INTRAVENOUS at 01:49

## 2018-12-11 RX ADMIN — ATORVASTATIN CALCIUM 20 MG: 20 TABLET, FILM COATED ORAL at 21:14

## 2018-12-11 RX ADMIN — DEXAMETHASONE SODIUM PHOSPHATE 4 MG: 4 INJECTION, SOLUTION INTRAMUSCULAR; INTRAVENOUS at 15:18

## 2018-12-11 RX ADMIN — DEXAMETHASONE SODIUM PHOSPHATE 4 MG: 4 INJECTION, SOLUTION INTRAMUSCULAR; INTRAVENOUS at 21:14

## 2018-12-11 RX ADMIN — INSULIN LISPRO 3 UNITS: 100 INJECTION, SOLUTION INTRAVENOUS; SUBCUTANEOUS at 12:14

## 2018-12-11 RX ADMIN — FAMOTIDINE 20 MG: 20 TABLET ORAL at 08:55

## 2018-12-11 RX ADMIN — TAMSULOSIN HYDROCHLORIDE 0.4 MG: 0.4 CAPSULE ORAL at 08:55

## 2018-12-11 RX ADMIN — HYDRALAZINE HYDROCHLORIDE 10 MG: 10 TABLET, FILM COATED ORAL at 21:14

## 2018-12-11 RX ADMIN — INSULIN LISPRO 3 UNITS: 100 INJECTION, SOLUTION INTRAVENOUS; SUBCUTANEOUS at 21:25

## 2018-12-11 RX ADMIN — INSULIN LISPRO 3 UNITS: 100 INJECTION, SOLUTION INTRAVENOUS; SUBCUTANEOUS at 08:58

## 2018-12-11 RX ADMIN — HYDRALAZINE HYDROCHLORIDE 10 MG: 10 TABLET, FILM COATED ORAL at 15:17

## 2018-12-11 RX ADMIN — HEPARIN SODIUM 5000 UNITS: 5000 INJECTION INTRAVENOUS; SUBCUTANEOUS at 15:18

## 2018-12-11 ASSESSMENT — PAIN SCALES - GENERAL
PAINLEVEL_OUTOF10: 0

## 2018-12-11 NOTE — PROGRESS NOTES
Internal Medicine PGY-1 Resident Progress Note        PCP: Melany Waite DO    Date of Admission: 12/7/2018    Chief Complaint: UE paresthesias, LE weakness and saddle anesthesia    Subjective: ICU transfer yesterday. Met pt at bedside. He was in good spirits. Said the numbness has diminished in his fingers - but continues on his abdomen and lower extremities. Denies fevers or chills. No HA, lightheadedness or dizziness. No CP, chest tightness or palpitations. Hospital Course: 59 yo M Hx cardiac arrest (1994), CKD III, HTN, HLD, CAD (s/p 3 vessel CABG; 12/4 at [facility]). Developed UE paresthesias, LE weakness, and saddle anesthesia after procedure. Going to the OR on Wednesday 12/12, with NSGY for cervical spine decompression. OK with CT surg to hold ASA and plavix for 5 days. NSGY and CTS following.      Medications:  Reviewed    Infusion Medications    dextrose       Scheduled Medications    dexamethasone  4 mg Intravenous Q6H    docusate sodium  100 mg Oral Daily    heparin (porcine)  5,000 Units Subcutaneous 3 times per day    atorvastatin  20 mg Oral Nightly    famotidine  20 mg Oral BID    insulin glargine  8 Units Subcutaneous Nightly    insulin lispro  0-18 Units Subcutaneous TID WC    insulin lispro  0-9 Units Subcutaneous Nightly    metoprolol succinate  75 mg Oral BID    tamsulosin  0.4 mg Oral Daily    sodium chloride flush  10 mL Intravenous 2 times per day    hydrALAZINE  10 mg Oral 3 times per day     PRN Meds: senna, sodium chloride flush, magnesium hydroxide, ondansetron, glucose, dextrose, glucagon (rDNA), dextrose, melatonin, albuterol      Intake/Output Summary (Last 24 hours) at 12/11/18 0648  Last data filed at 12/11/18 3154   Gross per 24 hour   Intake                0 ml   Output             2350 ml   Net            -2350 ml       Physical Exam Performed:    BP (!) 155/103   Pulse 57   Temp 98.6 °F (37 °C) (Oral)   Resp 16   Ht 5' 10\" (1.778 m)   Wt 254 lb 6.6 oz

## 2018-12-11 NOTE — PLAN OF CARE
Neurosurgery Update:    OR cancelled for tomorrow 12/12. Patient will need C6 corpectomy and will need to be off plavix 7 days for this procedure. Discussed change in plan w/ patient this evening.    OR now planned for Saturday 12/15/2018    Bakari Dimas  6:13 PM  12/11/18

## 2018-12-12 ENCOUNTER — TELEPHONE (OUTPATIENT)
Dept: FAMILY MEDICINE CLINIC | Age: 58
End: 2018-12-12

## 2018-12-12 LAB
ALBUMIN SERPL-MCNC: 3 G/DL (ref 3.4–5)
ANION GAP SERPL CALCULATED.3IONS-SCNC: 11 MMOL/L (ref 3–16)
BUN BLDV-MCNC: 58 MG/DL (ref 7–20)
CALCIUM SERPL-MCNC: 9 MG/DL (ref 8.3–10.6)
CHLORIDE BLD-SCNC: 103 MMOL/L (ref 99–110)
CO2: 24 MMOL/L (ref 21–32)
CREAT SERPL-MCNC: 1.7 MG/DL (ref 0.9–1.3)
GFR AFRICAN AMERICAN: 50
GFR NON-AFRICAN AMERICAN: 41
GLUCOSE BLD-MCNC: 128 MG/DL (ref 70–99)
GLUCOSE BLD-MCNC: 166 MG/DL (ref 70–99)
GLUCOSE BLD-MCNC: 184 MG/DL (ref 70–99)
GLUCOSE BLD-MCNC: 207 MG/DL (ref 70–99)
GLUCOSE BLD-MCNC: 231 MG/DL (ref 70–99)
GLUCOSE BLD-MCNC: 256 MG/DL (ref 70–99)
HCT VFR BLD CALC: 39.7 % (ref 40.5–52.5)
HEMOGLOBIN: 12.3 G/DL (ref 13.5–17.5)
INR BLD: 1.03 (ref 0.86–1.14)
MAGNESIUM: 2.2 MG/DL (ref 1.8–2.4)
MCH RBC QN AUTO: 28.9 PG (ref 26–34)
MCHC RBC AUTO-ENTMCNC: 31 G/DL (ref 31–36)
MCV RBC AUTO: 93.3 FL (ref 80–100)
PDW BLD-RTO: 17.7 % (ref 12.4–15.4)
PERFORMED ON: ABNORMAL
PHOSPHORUS: 4 MG/DL (ref 2.5–4.9)
PLATELET # BLD: 170 K/UL (ref 135–450)
PMV BLD AUTO: 8.8 FL (ref 5–10.5)
POTASSIUM SERPL-SCNC: 4.9 MMOL/L (ref 3.5–5.1)
PROTHROMBIN TIME: 11.7 SEC (ref 9.8–13)
RBC # BLD: 4.25 M/UL (ref 4.2–5.9)
SODIUM BLD-SCNC: 138 MMOL/L (ref 136–145)
WBC # BLD: 12.5 K/UL (ref 4–11)

## 2018-12-12 PROCEDURE — 6360000002 HC RX W HCPCS: Performed by: STUDENT IN AN ORGANIZED HEALTH CARE EDUCATION/TRAINING PROGRAM

## 2018-12-12 PROCEDURE — 1200000000 HC SEMI PRIVATE

## 2018-12-12 PROCEDURE — 36415 COLL VENOUS BLD VENIPUNCTURE: CPT

## 2018-12-12 PROCEDURE — 2580000003 HC RX 258: Performed by: STUDENT IN AN ORGANIZED HEALTH CARE EDUCATION/TRAINING PROGRAM

## 2018-12-12 PROCEDURE — G8988 SELF CARE GOAL STATUS: HCPCS

## 2018-12-12 PROCEDURE — 6370000000 HC RX 637 (ALT 250 FOR IP): Performed by: STUDENT IN AN ORGANIZED HEALTH CARE EDUCATION/TRAINING PROGRAM

## 2018-12-12 PROCEDURE — G8981 BODY POS CURRENT STATUS: HCPCS

## 2018-12-12 PROCEDURE — 97530 THERAPEUTIC ACTIVITIES: CPT

## 2018-12-12 PROCEDURE — 6370000000 HC RX 637 (ALT 250 FOR IP): Performed by: INTERNAL MEDICINE

## 2018-12-12 PROCEDURE — 85610 PROTHROMBIN TIME: CPT

## 2018-12-12 PROCEDURE — 85027 COMPLETE CBC AUTOMATED: CPT

## 2018-12-12 PROCEDURE — 94664 DEMO&/EVAL PT USE INHALER: CPT

## 2018-12-12 PROCEDURE — 94660 CPAP INITIATION&MGMT: CPT

## 2018-12-12 PROCEDURE — 97166 OT EVAL MOD COMPLEX 45 MIN: CPT

## 2018-12-12 PROCEDURE — 80069 RENAL FUNCTION PANEL: CPT

## 2018-12-12 PROCEDURE — G8982 BODY POS GOAL STATUS: HCPCS

## 2018-12-12 PROCEDURE — 83735 ASSAY OF MAGNESIUM: CPT

## 2018-12-12 PROCEDURE — G8987 SELF CARE CURRENT STATUS: HCPCS

## 2018-12-12 PROCEDURE — 97163 PT EVAL HIGH COMPLEX 45 MIN: CPT

## 2018-12-12 RX ORDER — GABAPENTIN 300 MG/1
300 CAPSULE ORAL NIGHTLY
Status: DISCONTINUED | OUTPATIENT
Start: 2018-12-12 | End: 2018-12-27 | Stop reason: HOSPADM

## 2018-12-12 RX ADMIN — DOCUSATE SODIUM 100 MG: 100 CAPSULE, LIQUID FILLED ORAL at 08:06

## 2018-12-12 RX ADMIN — HYDRALAZINE HYDROCHLORIDE 10 MG: 10 TABLET, FILM COATED ORAL at 06:05

## 2018-12-12 RX ADMIN — GABAPENTIN 300 MG: 300 CAPSULE ORAL at 20:30

## 2018-12-12 RX ADMIN — HEPARIN SODIUM 5000 UNITS: 5000 INJECTION INTRAVENOUS; SUBCUTANEOUS at 20:30

## 2018-12-12 RX ADMIN — TAMSULOSIN HYDROCHLORIDE 0.4 MG: 0.4 CAPSULE ORAL at 08:06

## 2018-12-12 RX ADMIN — INSULIN LISPRO 5 UNITS: 100 INJECTION, SOLUTION INTRAVENOUS; SUBCUTANEOUS at 22:19

## 2018-12-12 RX ADMIN — HYDRALAZINE HYDROCHLORIDE 10 MG: 10 TABLET, FILM COATED ORAL at 14:22

## 2018-12-12 RX ADMIN — DEXAMETHASONE SODIUM PHOSPHATE 4 MG: 4 INJECTION, SOLUTION INTRAMUSCULAR; INTRAVENOUS at 02:42

## 2018-12-12 RX ADMIN — ATORVASTATIN CALCIUM 20 MG: 20 TABLET, FILM COATED ORAL at 20:29

## 2018-12-12 RX ADMIN — HEPARIN SODIUM 5000 UNITS: 5000 INJECTION INTRAVENOUS; SUBCUTANEOUS at 14:23

## 2018-12-12 RX ADMIN — HEPARIN SODIUM 5000 UNITS: 5000 INJECTION INTRAVENOUS; SUBCUTANEOUS at 06:05

## 2018-12-12 RX ADMIN — Medication 10 ML: at 20:33

## 2018-12-12 RX ADMIN — DEXAMETHASONE SODIUM PHOSPHATE 4 MG: 4 INJECTION, SOLUTION INTRAMUSCULAR; INTRAVENOUS at 08:09

## 2018-12-12 RX ADMIN — Medication 10 MG: at 20:33

## 2018-12-12 RX ADMIN — Medication 10 ML: at 08:09

## 2018-12-12 RX ADMIN — FAMOTIDINE 20 MG: 20 TABLET ORAL at 20:30

## 2018-12-12 RX ADMIN — HYDRALAZINE HYDROCHLORIDE 10 MG: 10 TABLET, FILM COATED ORAL at 20:29

## 2018-12-12 RX ADMIN — METOPROLOL SUCCINATE 75 MG: 50 TABLET, EXTENDED RELEASE ORAL at 08:06

## 2018-12-12 RX ADMIN — DEXAMETHASONE SODIUM PHOSPHATE 4 MG: 4 INJECTION, SOLUTION INTRAMUSCULAR; INTRAVENOUS at 14:17

## 2018-12-12 RX ADMIN — INSULIN LISPRO 3 UNITS: 100 INJECTION, SOLUTION INTRAVENOUS; SUBCUTANEOUS at 11:30

## 2018-12-12 RX ADMIN — METOPROLOL SUCCINATE 75 MG: 50 TABLET, EXTENDED RELEASE ORAL at 20:29

## 2018-12-12 RX ADMIN — INSULIN GLARGINE 7 UNITS: 100 INJECTION, SOLUTION SUBCUTANEOUS at 22:19

## 2018-12-12 RX ADMIN — Medication 10 ML: at 00:20

## 2018-12-12 RX ADMIN — FAMOTIDINE 20 MG: 20 TABLET ORAL at 08:05

## 2018-12-12 RX ADMIN — DEXAMETHASONE SODIUM PHOSPHATE 4 MG: 4 INJECTION, SOLUTION INTRAMUSCULAR; INTRAVENOUS at 20:29

## 2018-12-12 ASSESSMENT — PAIN SCALES - GENERAL
PAINLEVEL_OUTOF10: 0

## 2018-12-12 NOTE — PROGRESS NOTES
1000- activity      Attempted to get patient OOB , patient unable to bear any weight on lower ext , patient lowered to the ground, and lifted with lift back to bed , only contact to thefloor was when we gently lowered him to the floor to place the lift under him,patient assisted back to bed, side rail up x 4 , bed alarm in place

## 2018-12-12 NOTE — PROGRESS NOTES
Complexity  Patient Education: Role of OT, safety, sternal precautions- verb understanding  REQUIRES OT FOLLOW UP: Yes  Activity Tolerance  Activity Tolerance: Treatment limited secondary to medical complications (free text); Patient limited by fatigue  Activity Tolerance: Pt O2 sat 100% -- limited ability to perform increased activity 2/2 poor postural control and inability to use BUE as functional assist   Safety Devices  Safety Devices in place: Yes  Type of devices: Bed alarm in place;Nurse notified;Call light within reach; Left in bed         Plan  This note will serve as a discharge summary if patient is discharged from hospital before next treatment session. Plan  Times per week: 2-5x   Current Treatment Recommendations: Strengthening, Balance Training, Functional Mobility Training, Neuromuscular Re-education, Patient/Caregiver Education & Training, Equipment Evaluation, Education, & procurement, Self-Care / ADL, Safety Education & Training, Endurance Training    G-Code  OT G-codes  Functional Limitation: Self care  Self Care Current Status (): At least 60 percent but less than 80 percent impaired, limited or restricted  Self Care Goal Status (): At least 40 percent but less than 60 percent impaired, limited or restricted    AM-PAC Score  AM-[facility] Inpatient Daily Activity Raw Score: 12  AM-PAC Inpatient ADL T-Scale Score : 30.6  ADL Inpatient CMS 0-100% Score: 66.57  ADL Inpatient CMS G-Code Modifier : CL    Goals  Short term goals  Time Frame for Short term goals: at d/c   Short term goal 1: Sit at EOB x 3mins with Mod A x 1   Short term goal 2: Grooming with setup   Short term goal 3: Increase BUE HEP 10-reps x 2 sets all planes.     Patient Goals   Patient goals : \"to be able to stand up\"     Therapy Time   Individual Concurrent Group Co-treatment   Time In 1100         Time Out 1140         Minutes 40              Timed Code Treatment Minutes:  23 mins     Total Treatment Minutes:  40 mins Aidee Jasmin, OT

## 2018-12-12 NOTE — PROGRESS NOTES
as Cr is elevated, giving hydralazine instead - 10 mg q 8 hr     Atrial fibrillation   -was on amio gtt at OSH  -currently NSR   -Toprol XL 75 mg BID      Type II Diabetes Mellitus  Chronic, stable/uncontrolled. Known diabetic complications:nephropathy, cardiovascular disease and cerebrovascular disease. Home diabetic medications include oral agent (monotherapy): glimepiride. A1C 7.3 (12/4/18)   Plan  - Lantus 8u qhs  - HDSSI  - Accuchecks  - Hypoglycemia protocol  - start Neurontin 300 mg QHS      FELIPE  On CPAP at home. Follows [facility] sleep medicine. Takes gabapentin for periodic limb movement disorder qhs. Plan   -Continue CPAP as tolerated, confirm with neurosurgery     HTN   -Currently controlled, see above plan      Chronic Systolic and Diastolic Heart Failure  Echo 11/2018, LVEF 39-79%, Grade 1 Diastolic Dysfunction  Plan   - Toprol XL bid  - held bumex yesterday per nephrology     Hx of anoxic brain injury in 1990's following MI with short term memory deficit   -Spinal cord, fall precautions   -Patient demonstrates poor insight at times, may need reorientation to not move neck and to situation     DVT Prophylaxis: none for surgery tomorrow  Diet: DIET CARDIAC;  Code Status: Full Code    Dispo - [facility]    Chas Mcclure MD    I will discuss the patient with the senior resident and MD Chas Parker MD  Internal Medicine Resident, PGY-1  Pager: [phone]  Patient seen and examined, labs and imaging studies reviewed, agree with assessment and plan as outlined above. Continue with car epatient is resting comfortably he is on guideline based meds lantus discontinued last night in anticipation of surgery at 3 today will resume today hold off Friday night.       Niko Rodgers MD [id number]

## 2018-12-12 NOTE — PROGRESS NOTES
RESPIRATORY THERAPY ASSESSMENT    Name:  Shreya Marrero  Medical Record Number:  [id number]  Age: 60 y.o. Gender: male  : 1960  Today's Date:  2018  Room:  [address] [place] [address]-    Assessment     Is the patient being admitted for a COPD or Asthma exacerbation? No   (If yes the patient will be seen every 4 hours for the first 24 hours and then reassessed)    Patient Admission Diagnosis      Allergies  Allergies   Allergen Reactions    Enalapril     Nadolol     Verapamil        Minimum Predicted Vital Capacity:     1122          Actual Vital Capacity:      Pt on home cpap           Pulmonary History:FELIPE  Home Oxygen Therapy:  room air  Home Respiratory Therapy:None   Current Respiratory Therapy:  albuterol          Respiratory Severity Index(RSI)   Patients with orders for inhalation medications, oxygen, or any therapeutic treatment modality will be placed on Respiratory Protocol. They will be assessed with the first treatment and at least every 72 hours thereafter. The following severity scale will be used to determine frequency of treatment intervention.     Smoking History: No Smoking History = 0    Social History  Social History   Substance Use Topics    Smoking status: Never Smoker    Smokeless tobacco: Never Used    Alcohol use No       Recent Surgical History: None = 0  Past Surgical History  Past Surgical History:   Procedure Laterality Date    CARPAL TUNNEL RELEASE Left     CARPAL TUNNEL RELEASE Right 4/14/15    COLONOSCOPY  2014    GA CABG, ARTERIAL, THREE N/A 2018    CORONARY ARTERY BYPASS GRAFTING X3, INTERNAL MAMMARY ARTERY, SAPHENOUS VEIN GRAFT, ON PUMP performed by Chapito Palacios MD at [facility]       Level of Consciousness: Alert, Oriented, and Cooperative = 0    Level of Activity: Walking with assistance = 1    Respiratory Pattern: Regular Pattern; RR 8-20 = 0    Breath Sounds: Clear = 0    Sputum   ,  , Sputum How Obtained: None  Cough: Strong, spontaneous, non-productive =

## 2018-12-13 LAB
ALBUMIN SERPL-MCNC: 2.8 G/DL (ref 3.4–5)
ANION GAP SERPL CALCULATED.3IONS-SCNC: 15 MMOL/L (ref 3–16)
BUN BLDV-MCNC: 54 MG/DL (ref 7–20)
CALCIUM SERPL-MCNC: 9 MG/DL (ref 8.3–10.6)
CHLORIDE BLD-SCNC: 109 MMOL/L (ref 99–110)
CO2: 18 MMOL/L (ref 21–32)
CREAT SERPL-MCNC: 1.6 MG/DL (ref 0.9–1.3)
GFR AFRICAN AMERICAN: 54
GFR NON-AFRICAN AMERICAN: 44
GLUCOSE BLD-MCNC: 155 MG/DL (ref 70–99)
GLUCOSE BLD-MCNC: 185 MG/DL (ref 70–99)
GLUCOSE BLD-MCNC: 189 MG/DL (ref 70–99)
GLUCOSE BLD-MCNC: 209 MG/DL (ref 70–99)
GLUCOSE BLD-MCNC: 222 MG/DL (ref 70–99)
HCT VFR BLD CALC: 38.4 % (ref 40.5–52.5)
HEMOGLOBIN: 12.4 G/DL (ref 13.5–17.5)
MAGNESIUM: 2.3 MG/DL (ref 1.8–2.4)
MCH RBC QN AUTO: 29.3 PG (ref 26–34)
MCHC RBC AUTO-ENTMCNC: 32.1 G/DL (ref 31–36)
MCV RBC AUTO: 91.1 FL (ref 80–100)
PDW BLD-RTO: 16.9 % (ref 12.4–15.4)
PERFORMED ON: ABNORMAL
PHOSPHORUS: 4.3 MG/DL (ref 2.5–4.9)
PLATELET # BLD: 137 K/UL (ref 135–450)
PMV BLD AUTO: 9.4 FL (ref 5–10.5)
POTASSIUM SERPL-SCNC: 5.1 MMOL/L (ref 3.5–5.1)
RBC # BLD: 4.22 M/UL (ref 4.2–5.9)
SODIUM BLD-SCNC: 142 MMOL/L (ref 136–145)
WBC # BLD: 11.3 K/UL (ref 4–11)

## 2018-12-13 PROCEDURE — 94760 N-INVAS EAR/PLS OXIMETRY 1: CPT

## 2018-12-13 PROCEDURE — 1200000000 HC SEMI PRIVATE

## 2018-12-13 PROCEDURE — 83735 ASSAY OF MAGNESIUM: CPT

## 2018-12-13 PROCEDURE — 6360000002 HC RX W HCPCS: Performed by: STUDENT IN AN ORGANIZED HEALTH CARE EDUCATION/TRAINING PROGRAM

## 2018-12-13 PROCEDURE — 80069 RENAL FUNCTION PANEL: CPT

## 2018-12-13 PROCEDURE — 94660 CPAP INITIATION&MGMT: CPT

## 2018-12-13 PROCEDURE — 6370000000 HC RX 637 (ALT 250 FOR IP): Performed by: STUDENT IN AN ORGANIZED HEALTH CARE EDUCATION/TRAINING PROGRAM

## 2018-12-13 PROCEDURE — 36415 COLL VENOUS BLD VENIPUNCTURE: CPT

## 2018-12-13 PROCEDURE — 2580000003 HC RX 258: Performed by: STUDENT IN AN ORGANIZED HEALTH CARE EDUCATION/TRAINING PROGRAM

## 2018-12-13 PROCEDURE — 85027 COMPLETE CBC AUTOMATED: CPT

## 2018-12-13 PROCEDURE — 6370000000 HC RX 637 (ALT 250 FOR IP): Performed by: INTERNAL MEDICINE

## 2018-12-13 RX ADMIN — Medication 10 ML: at 08:54

## 2018-12-13 RX ADMIN — DEXAMETHASONE SODIUM PHOSPHATE 4 MG: 4 INJECTION, SOLUTION INTRAMUSCULAR; INTRAVENOUS at 20:06

## 2018-12-13 RX ADMIN — DOCUSATE SODIUM 100 MG: 100 CAPSULE, LIQUID FILLED ORAL at 08:53

## 2018-12-13 RX ADMIN — INSULIN LISPRO 3 UNITS: 100 INJECTION, SOLUTION INTRAVENOUS; SUBCUTANEOUS at 20:04

## 2018-12-13 RX ADMIN — FAMOTIDINE 20 MG: 20 TABLET ORAL at 08:52

## 2018-12-13 RX ADMIN — HEPARIN SODIUM 5000 UNITS: 5000 INJECTION INTRAVENOUS; SUBCUTANEOUS at 13:51

## 2018-12-13 RX ADMIN — DEXAMETHASONE SODIUM PHOSPHATE 4 MG: 4 INJECTION, SOLUTION INTRAMUSCULAR; INTRAVENOUS at 02:49

## 2018-12-13 RX ADMIN — Medication 10 ML: at 20:08

## 2018-12-13 RX ADMIN — DEXAMETHASONE SODIUM PHOSPHATE 4 MG: 4 INJECTION, SOLUTION INTRAMUSCULAR; INTRAVENOUS at 13:51

## 2018-12-13 RX ADMIN — METOPROLOL SUCCINATE 75 MG: 50 TABLET, EXTENDED RELEASE ORAL at 20:05

## 2018-12-13 RX ADMIN — HYDRALAZINE HYDROCHLORIDE 10 MG: 10 TABLET, FILM COATED ORAL at 22:24

## 2018-12-13 RX ADMIN — GABAPENTIN 300 MG: 300 CAPSULE ORAL at 20:05

## 2018-12-13 RX ADMIN — HYDRALAZINE HYDROCHLORIDE 10 MG: 10 TABLET, FILM COATED ORAL at 06:16

## 2018-12-13 RX ADMIN — Medication 10 MG: at 22:24

## 2018-12-13 RX ADMIN — ATORVASTATIN CALCIUM 20 MG: 20 TABLET, FILM COATED ORAL at 20:05

## 2018-12-13 RX ADMIN — HEPARIN SODIUM 5000 UNITS: 5000 INJECTION INTRAVENOUS; SUBCUTANEOUS at 22:24

## 2018-12-13 RX ADMIN — INSULIN LISPRO 6 UNITS: 100 INJECTION, SOLUTION INTRAVENOUS; SUBCUTANEOUS at 12:30

## 2018-12-13 RX ADMIN — INSULIN LISPRO 3 UNITS: 100 INJECTION, SOLUTION INTRAVENOUS; SUBCUTANEOUS at 16:51

## 2018-12-13 RX ADMIN — INSULIN LISPRO 3 UNITS: 100 INJECTION, SOLUTION INTRAVENOUS; SUBCUTANEOUS at 08:55

## 2018-12-13 RX ADMIN — INSULIN GLARGINE 7 UNITS: 100 INJECTION, SOLUTION SUBCUTANEOUS at 20:05

## 2018-12-13 RX ADMIN — TAMSULOSIN HYDROCHLORIDE 0.4 MG: 0.4 CAPSULE ORAL at 08:53

## 2018-12-13 RX ADMIN — DEXAMETHASONE SODIUM PHOSPHATE 4 MG: 4 INJECTION, SOLUTION INTRAMUSCULAR; INTRAVENOUS at 08:53

## 2018-12-13 RX ADMIN — HEPARIN SODIUM 5000 UNITS: 5000 INJECTION INTRAVENOUS; SUBCUTANEOUS at 06:16

## 2018-12-13 RX ADMIN — FAMOTIDINE 20 MG: 20 TABLET ORAL at 20:05

## 2018-12-13 RX ADMIN — HYDRALAZINE HYDROCHLORIDE 10 MG: 10 TABLET, FILM COATED ORAL at 13:51

## 2018-12-13 RX ADMIN — METOPROLOL SUCCINATE 75 MG: 50 TABLET, EXTENDED RELEASE ORAL at 08:53

## 2018-12-13 ASSESSMENT — PAIN SCALES - GENERAL: PAINLEVEL_OUTOF10: 0

## 2018-12-13 NOTE — PROGRESS NOTES
[facility]    Follow up Note           Reason for Consult:  CAMI on CKD  Requesting Physician:  Dr. Sylvain Davila    Chief Complaint:  Numbness tingling  The patient was transferred her for paraplegia occuring after CABAG, seen by Neurosurgery , he appears to have cervical cord injury and may require surgery but he had recent CABG and was on Plavix. Sub/interval history    Patient moved to 5th floor, decompression/laminectomy planned for Saturday  Reports increased sensation in feet  Cr improved. ROS: No fever or chills. Social: No family at bedside. Physical exam:   Constitutional:  VITALS:  BP (!) 150/88   Pulse 69   Temp 98.2 °F (36.8 °C) (Oral)   Resp 16   Ht 5' 10\" (1.778 m)   Wt 255 lb 11.7 oz (116 kg)   SpO2 99%   BMI 36.69 kg/m²      Admit Wt: Weight: 261 lb 7.5 oz (118.6 kg)   Todays Wt: Weight: 255 lb 11.7 oz (116 kg)     Most Recent Wt: Weight: 255 lb 11.7 oz (116 kg)            General appearance: Seems comfortable, no acute distress. Neck: Trachea midline, thyroid normal.   Lungs:  Non labored breathing, CTA to anterior auscultation. Heart:  S1S2 normal, rub or gallop. No peripheral edema. Abdomen: Soft, non-tender, no organomegaly. Skin: No lesions or rashes, warm to touch. Data/  Recent Labs      12/11/18   0428  12/12/18   1454  12/13/18   0736   WBC  7.7  12.5*  11.3*   HGB  11.9*  12.3*  12.4*   HCT  36.8*  39.7*  38.4*   MCV  91.2  93.3  91.1   PLT  147  170  137     Recent Labs      12/11/18   0428  12/12/18   1454  12/13/18   0736   NA  135*  138  142   K  4.5  4.9  5.1   CL  102  103  109   CO2  21  24  18*   GLUCOSE  170*  207*  185*   PHOS  4.7  4.0  4.3   MG  2.20  2.20  2.30   BUN  62*  58*  54*   CREATININE  1.9*  1.7*  1.6*   LABGLOM  36*  41*  44*   GFRAA  44*  50*  54*        Assessment/Plan    -CKD III , recent cr has been around 1.8 to 2; previously followed with Dr. Elina Whitney now follows with PCP.    Likely from HTN nephrosclerosis, possibly has DM as well. Cr back to baseline since holding ARB. -Proteinuria   Likely from DM   Spot urine protein/creatinine ratio 4.6 grams    -Coronary artery disease    post op CABAG     -CHF compensated   Holding Bumex for now.     -Gout    -DM (newly diagnosed , he believed that he had borderline Dm for a while and was not on any meds)   Hb a1c from 11/22/18 was 7.8    -HTN   Still a bit elevated will monitor for now eventually back on ARB

## 2018-12-14 ENCOUNTER — ANESTHESIA EVENT (OUTPATIENT)
Dept: OPERATING ROOM | Age: 58
DRG: 023 | End: 2018-12-14
Payer: COMMERCIAL

## 2018-12-14 LAB
ABO/RH: NORMAL
ALBUMIN SERPL-MCNC: 3 G/DL (ref 3.4–5)
ANION GAP SERPL CALCULATED.3IONS-SCNC: 12 MMOL/L (ref 3–16)
ANTIBODY SCREEN: NORMAL
BUN BLDV-MCNC: 55 MG/DL (ref 7–20)
CALCIUM SERPL-MCNC: 8.7 MG/DL (ref 8.3–10.6)
CHLORIDE BLD-SCNC: 101 MMOL/L (ref 99–110)
CO2: 21 MMOL/L (ref 21–32)
CREAT SERPL-MCNC: 2.1 MG/DL (ref 0.9–1.3)
GFR AFRICAN AMERICAN: 39
GFR NON-AFRICAN AMERICAN: 32
GLUCOSE BLD-MCNC: 167 MG/DL (ref 70–99)
GLUCOSE BLD-MCNC: 189 MG/DL (ref 70–99)
GLUCOSE BLD-MCNC: 234 MG/DL (ref 70–99)
GLUCOSE BLD-MCNC: 246 MG/DL (ref 70–99)
GLUCOSE BLD-MCNC: 251 MG/DL (ref 70–99)
HCT VFR BLD CALC: 39.1 % (ref 40.5–52.5)
HEMOGLOBIN: 12.4 G/DL (ref 13.5–17.5)
MAGNESIUM: 2.2 MG/DL (ref 1.8–2.4)
MCH RBC QN AUTO: 28.8 PG (ref 26–34)
MCHC RBC AUTO-ENTMCNC: 31.8 G/DL (ref 31–36)
MCV RBC AUTO: 90.7 FL (ref 80–100)
PDW BLD-RTO: 17.2 % (ref 12.4–15.4)
PERFORMED ON: ABNORMAL
PHOSPHORUS: 4 MG/DL (ref 2.5–4.9)
PLATELET # BLD: 112 K/UL (ref 135–450)
PMV BLD AUTO: 9.2 FL (ref 5–10.5)
POTASSIUM SERPL-SCNC: 5.3 MMOL/L (ref 3.5–5.1)
RBC # BLD: 4.31 M/UL (ref 4.2–5.9)
SODIUM BLD-SCNC: 134 MMOL/L (ref 136–145)
WBC # BLD: 12.9 K/UL (ref 4–11)

## 2018-12-14 PROCEDURE — 86901 BLOOD TYPING SEROLOGIC RH(D): CPT

## 2018-12-14 PROCEDURE — 85027 COMPLETE CBC AUTOMATED: CPT

## 2018-12-14 PROCEDURE — 6370000000 HC RX 637 (ALT 250 FOR IP): Performed by: INTERNAL MEDICINE

## 2018-12-14 PROCEDURE — 80069 RENAL FUNCTION PANEL: CPT

## 2018-12-14 PROCEDURE — 6370000000 HC RX 637 (ALT 250 FOR IP): Performed by: STUDENT IN AN ORGANIZED HEALTH CARE EDUCATION/TRAINING PROGRAM

## 2018-12-14 PROCEDURE — 6360000002 HC RX W HCPCS: Performed by: STUDENT IN AN ORGANIZED HEALTH CARE EDUCATION/TRAINING PROGRAM

## 2018-12-14 PROCEDURE — 1200000000 HC SEMI PRIVATE

## 2018-12-14 PROCEDURE — 83735 ASSAY OF MAGNESIUM: CPT

## 2018-12-14 PROCEDURE — 36415 COLL VENOUS BLD VENIPUNCTURE: CPT

## 2018-12-14 PROCEDURE — 86900 BLOOD TYPING SEROLOGIC ABO: CPT

## 2018-12-14 PROCEDURE — 2580000003 HC RX 258: Performed by: STUDENT IN AN ORGANIZED HEALTH CARE EDUCATION/TRAINING PROGRAM

## 2018-12-14 PROCEDURE — 86850 RBC ANTIBODY SCREEN: CPT

## 2018-12-14 PROCEDURE — 2580000003 HC RX 258: Performed by: NURSE PRACTITIONER

## 2018-12-14 PROCEDURE — 6360000002 HC RX W HCPCS: Performed by: NURSE PRACTITIONER

## 2018-12-14 RX ORDER — SODIUM CHLORIDE 9 MG/ML
INJECTION, SOLUTION INTRAVENOUS CONTINUOUS
Status: DISCONTINUED | OUTPATIENT
Start: 2018-12-16 | End: 2018-12-15

## 2018-12-14 RX ORDER — DEXAMETHASONE 4 MG/1
4 TABLET ORAL DAILY
Status: COMPLETED | OUTPATIENT
Start: 2018-12-19 | End: 2018-12-19

## 2018-12-14 RX ORDER — CEFAZOLIN SODIUM 2 G/50ML
2 SOLUTION INTRAVENOUS
Status: DISCONTINUED | OUTPATIENT
Start: 2018-12-15 | End: 2018-12-15

## 2018-12-14 RX ORDER — DEXAMETHASONE 4 MG/1
4 TABLET ORAL EVERY 12 HOURS SCHEDULED
Status: COMPLETED | OUTPATIENT
Start: 2018-12-17 | End: 2018-12-18

## 2018-12-14 RX ORDER — SODIUM CHLORIDE 9 MG/ML
INJECTION, SOLUTION INTRAVENOUS
Status: DISPENSED
Start: 2018-12-14 | End: 2018-12-15

## 2018-12-14 RX ORDER — DEXAMETHASONE 4 MG/1
4 TABLET ORAL EVERY 8 HOURS SCHEDULED
Status: DISPENSED | OUTPATIENT
Start: 2018-12-16 | End: 2018-12-17

## 2018-12-14 RX ORDER — SODIUM CHLORIDE 0.9 % (FLUSH) 0.9 %
10 SYRINGE (ML) INJECTION EVERY 12 HOURS SCHEDULED
Status: DISCONTINUED | OUTPATIENT
Start: 2018-12-14 | End: 2018-12-16 | Stop reason: HOSPADM

## 2018-12-14 RX ORDER — SODIUM CHLORIDE 0.9 % (FLUSH) 0.9 %
10 SYRINGE (ML) INJECTION PRN
Status: DISCONTINUED | OUTPATIENT
Start: 2018-12-14 | End: 2018-12-16 | Stop reason: HOSPADM

## 2018-12-14 RX ORDER — SODIUM CHLORIDE 9 MG/ML
INJECTION, SOLUTION INTRAVENOUS CONTINUOUS
Status: DISCONTINUED | OUTPATIENT
Start: 2018-12-14 | End: 2018-12-14

## 2018-12-14 RX ORDER — DEXAMETHASONE 4 MG/1
4 TABLET ORAL EVERY 6 HOURS SCHEDULED
Status: COMPLETED | OUTPATIENT
Start: 2018-12-14 | End: 2018-12-16

## 2018-12-14 RX ADMIN — ATORVASTATIN CALCIUM 20 MG: 20 TABLET, FILM COATED ORAL at 21:37

## 2018-12-14 RX ADMIN — Medication 10 ML: at 09:29

## 2018-12-14 RX ADMIN — FAMOTIDINE 20 MG: 20 TABLET ORAL at 09:29

## 2018-12-14 RX ADMIN — HYDRALAZINE HYDROCHLORIDE 10 MG: 10 TABLET, FILM COATED ORAL at 21:38

## 2018-12-14 RX ADMIN — HYDRALAZINE HYDROCHLORIDE 10 MG: 10 TABLET, FILM COATED ORAL at 12:37

## 2018-12-14 RX ADMIN — Medication 10 ML: at 21:38

## 2018-12-14 RX ADMIN — METOPROLOL SUCCINATE 75 MG: 50 TABLET, EXTENDED RELEASE ORAL at 09:29

## 2018-12-14 RX ADMIN — SENNOSIDES 8.6 MG: 8.6 TABLET, FILM COATED ORAL at 23:11

## 2018-12-14 RX ADMIN — DEXAMETHASONE 4 MG: 4 TABLET ORAL at 23:13

## 2018-12-14 RX ADMIN — TAMSULOSIN HYDROCHLORIDE 0.4 MG: 0.4 CAPSULE ORAL at 09:29

## 2018-12-14 RX ADMIN — DEXAMETHASONE SODIUM PHOSPHATE 4 MG: 4 INJECTION, SOLUTION INTRAMUSCULAR; INTRAVENOUS at 03:47

## 2018-12-14 RX ADMIN — DEXAMETHASONE 4 MG: 4 TABLET ORAL at 12:37

## 2018-12-14 RX ADMIN — INSULIN LISPRO 5 UNITS: 100 INJECTION, SOLUTION INTRAVENOUS; SUBCUTANEOUS at 16:57

## 2018-12-14 RX ADMIN — HYDRALAZINE HYDROCHLORIDE 10 MG: 10 TABLET, FILM COATED ORAL at 06:31

## 2018-12-14 RX ADMIN — GABAPENTIN 300 MG: 300 CAPSULE ORAL at 21:37

## 2018-12-14 RX ADMIN — INSULIN GLARGINE 7 UNITS: 100 INJECTION, SOLUTION SUBCUTANEOUS at 22:19

## 2018-12-14 RX ADMIN — INSULIN LISPRO 3 UNITS: 100 INJECTION, SOLUTION INTRAVENOUS; SUBCUTANEOUS at 22:18

## 2018-12-14 RX ADMIN — DOCUSATE SODIUM 100 MG: 100 CAPSULE, LIQUID FILLED ORAL at 09:29

## 2018-12-14 RX ADMIN — INSULIN LISPRO 6 UNITS: 100 INJECTION, SOLUTION INTRAVENOUS; SUBCUTANEOUS at 16:55

## 2018-12-14 RX ADMIN — FAMOTIDINE 20 MG: 20 TABLET ORAL at 21:38

## 2018-12-14 RX ADMIN — METOPROLOL SUCCINATE 75 MG: 50 TABLET, EXTENDED RELEASE ORAL at 21:37

## 2018-12-14 RX ADMIN — DEXAMETHASONE SODIUM PHOSPHATE 4 MG: 4 INJECTION, SOLUTION INTRAMUSCULAR; INTRAVENOUS at 09:29

## 2018-12-14 RX ADMIN — DEXAMETHASONE 4 MG: 4 TABLET ORAL at 16:55

## 2018-12-14 RX ADMIN — SODIUM CHLORIDE: 9 INJECTION, SOLUTION INTRAVENOUS at 14:48

## 2018-12-14 RX ADMIN — INSULIN LISPRO 3 UNITS: 100 INJECTION, SOLUTION INTRAVENOUS; SUBCUTANEOUS at 09:32

## 2018-12-14 RX ADMIN — HEPARIN SODIUM 5000 UNITS: 5000 INJECTION INTRAVENOUS; SUBCUTANEOUS at 12:38

## 2018-12-14 RX ADMIN — Medication 10 MG: at 23:24

## 2018-12-14 RX ADMIN — HEPARIN SODIUM 5000 UNITS: 5000 INJECTION INTRAVENOUS; SUBCUTANEOUS at 06:31

## 2018-12-14 RX ADMIN — HEPARIN SODIUM 5000 UNITS: 5000 INJECTION INTRAVENOUS; SUBCUTANEOUS at 22:20

## 2018-12-14 RX ADMIN — INSULIN LISPRO 9 UNITS: 100 INJECTION, SOLUTION INTRAVENOUS; SUBCUTANEOUS at 10:46

## 2018-12-14 ASSESSMENT — PAIN SCALES - GENERAL
PAINLEVEL_OUTOF10: 0

## 2018-12-14 NOTE — PROGRESS NOTES
NEUROSURGERY CONSULT PROGRESS NOTE  Tatiana MOULTON  [id number]     Objective:   BP (!) 150/92   Pulse 80   Temp 98.3 °F (36.8 °C) (Oral)   Resp 16   Ht 5' 10\" (1.778 m)   Wt 255 lb 11.7 oz (116 kg)   SpO2 100%   BMI 36.69 kg/m²   Exam:   No changes in exam    Assessment:  Patient is a 59 y/o M w/ C5-7 cervical stenosis w/ myelopathy and need for surgical decompression     Plan:  1. Plan for OR Sunday @ 9:30am, C5-7 decompression and fusion & C6 corpectomy - surgery date moved d/t surgeon availability - discussed with patient. 2. Needs to be off plavix 7 days prior to OR  3. Hood J at all times - okay to have off briefly for hygiene  4. Continue decadron - can do quick taper after OR (ordered placed)  5. On Pepcid for GI prophylaxis while on steroids   6. PT/OT  7. Okay for [address] in AM - (ordered placed)  8. Will follow along - please call with questions       Tona Presley [address] [facility]  Neurosurgery Nurse Practitioner  12/14/2018  11:16 AM  [phone]    Patient was discussed with Dr. Bette Pitts who agrees with above assessment and plan.

## 2018-12-14 NOTE — PROGRESS NOTES
nephrosclerosis, possibly has DM as well. On IV fluids for now . -Proteinuria   Likely from DM   Spot urine protein/creatinine ratio 4.6 grams    -Coronary artery disease    post op CABAG     -CHF compensated   Holding Bumex for now.     -Gout    -DM (newly diagnosed , he believed that he had borderline Dm for a while and was not on any meds) however Hb a1c from 11/22/18 was 7.8    -HTN  Eventually will be put  back on ARB

## 2018-12-14 NOTE — PROGRESS NOTES
SPECGRAV 1.020 11/26/2018    GLUCOSEU Negative 11/26/2018       Radiology:  XR CHEST 1 VW   Final Result      No acute cardiopulmonary disease. Assessment/Plan:  59 yo M w/ Hx of cardiac arrest (1994) anoxic brain injury, HTN, HLD, FELIPE, DMII, CKD 3, admitted to [facility] with NSTEMI and underwent 3 vessel CABG on 12/4. He started having paresthesias in bilateral upper extremities (fingers 3-5) and weakness in bilateral lower extremities, and was found to have cervical spinal cord compression. He was transferred for neurosurgical evaluation.     Cervical Cord compression/spinal stenosis C6/C7 with bilateral finger 3-5 numbness  CT cervical spine showed severe spinal stenosis C6/C7 with cord compression. Leg weakness prior to CABG, but worse afterwards. Patient demonstrating bladder retention (1650 mL drained), unable to stand or hold upper body up on sitting, decreased sensation over abdomen from umbilicus below and L1, L2 and medial part of L3 dermatomes. - Neurosurgery following              -corpectomy planned Saturday 12/15              -Continue Decadron 4 mg q6h  - C- collar  - Spinal cord precautions  - Bedrest  - q4h neuro checks  - Hold anticoagulation, ASA, plavix (held day #6 of 7 days)              -CT surgery says it is reasonable to stop ASA and plavix for spinal decompression              -antiplatelets should be restarted as soon as possible after decompression if okay with NSG     Bilateral leg weakness and altered sensation from umbilicus down  MRI Thoracic spine with no acute pathology. MRI cervical spine as above.    - Neurosurgery following; patient to go to OR for decompression today 12/12  - Q4 hour neuro checks  - strict bed rest  - C-collar     CAD s/p CABG after MI (NSTEMI)  -s/p 3 vessel CABG (LAD, OM and PDA) 12/4/18  -Consult to cardiology given recent CABG              -hold ASA and plavix until decompression  - Toprol XL 75mg bid, holding ARB  - Continue

## 2018-12-14 NOTE — PROGRESS NOTES
DO Edvin   Blood Glucose Monitoring Suppl (ACCU-CHEK COMPACT CARE KIT) KIT 1 kit by Does not apply route daily 4/18/17  Yes Gurmeet Marti DO   Melatonin 10 MG TABS Take 10 mg by mouth nightly    Yes Historical Provider, MD       CURRENT INPATIENT MEDICATIONS:  Scheduled Meds:   gabapentin  300 mg Oral Nightly    insulin glargine  7 Units Subcutaneous Nightly    dexamethasone  4 mg Intravenous Q6H    docusate sodium  100 mg Oral Daily    heparin (porcine)  5,000 Units Subcutaneous 3 times per day    atorvastatin  20 mg Oral Nightly    famotidine  20 mg Oral BID    insulin lispro  0-18 Units Subcutaneous TID WC    insulin lispro  0-9 Units Subcutaneous Nightly    metoprolol succinate  75 mg Oral BID    tamsulosin  0.4 mg Oral Daily    sodium chloride flush  10 mL Intravenous 2 times per day    hydrALAZINE  10 mg Oral 3 times per day     Continuous Infusions:   dextrose       PRN Meds:senna, sodium chloride flush, magnesium hydroxide, ondansetron, glucose, dextrose, glucagon (rDNA), dextrose, melatonin, albuterol   Labs    Lab Results   Component Value Date    INR 1.03 12/12/2018       CBC Recent Labs      12/12/18   1454  12/13/18   0736  12/14/18   0646   WBC  12.5*  11.3*  12.9*   HGB  12.3*  12.4*  12.4*   HCT  39.7*  38.4*  39.1*   MCV  93.3  91.1  90.7   PLT  170  137  112*     Renal Recent Labs      12/12/18   1454  12/13/18   0736  12/14/18   0646   NA  138  142  134*   K  4.9  5.1  5.3*   CL  103  109  101   CO2  24  18*  21   PHOS  4.0  4.3  4.0   BUN  58*  54*  55*   CREATININE  1.7*  1.6*  2.1*     Hepatic No results for input(s): AST, ALT, ALB, BILIDIR, BILITOT, ALKPHOS in the last 72 hours. Estimated Creatinine Clearance: 48 mL/min (A) (based on SCr of 2.1 mg/dL (H)).      Vitals    TEMPERATURE:  98 °F (36.7 °C)  HEIGHT:  5' 10\" (177.8 cm)  WEIGHT:  255 lb 11.7 oz (116 kg)  BLOOD PRESSURE:  BP: (!) 153/88    PULSE:  65  RESPIRATION:  15    I/0  Intake/Output:    Intake/Output Summary

## 2018-12-15 LAB
ALBUMIN SERPL-MCNC: 2.6 G/DL (ref 3.4–5)
ANION GAP SERPL CALCULATED.3IONS-SCNC: 13 MMOL/L (ref 3–16)
BUN BLDV-MCNC: 57 MG/DL (ref 7–20)
CALCIUM SERPL-MCNC: 8.4 MG/DL (ref 8.3–10.6)
CHLORIDE BLD-SCNC: 102 MMOL/L (ref 99–110)
CO2: 19 MMOL/L (ref 21–32)
CREAT SERPL-MCNC: 1.9 MG/DL (ref 0.9–1.3)
GFR AFRICAN AMERICAN: 44
GFR NON-AFRICAN AMERICAN: 36
GLUCOSE BLD-MCNC: 184 MG/DL (ref 70–99)
GLUCOSE BLD-MCNC: 194 MG/DL (ref 70–99)
GLUCOSE BLD-MCNC: 200 MG/DL (ref 70–99)
GLUCOSE BLD-MCNC: 235 MG/DL (ref 70–99)
GLUCOSE BLD-MCNC: 316 MG/DL (ref 70–99)
HCT VFR BLD CALC: 37 % (ref 40.5–52.5)
HEMOGLOBIN: 11.6 G/DL (ref 13.5–17.5)
MAGNESIUM: 2.2 MG/DL (ref 1.8–2.4)
MCH RBC QN AUTO: 28.6 PG (ref 26–34)
MCHC RBC AUTO-ENTMCNC: 31.4 G/DL (ref 31–36)
MCV RBC AUTO: 91.3 FL (ref 80–100)
PDW BLD-RTO: 17.8 % (ref 12.4–15.4)
PERFORMED ON: ABNORMAL
PHOSPHORUS: 3.8 MG/DL (ref 2.5–4.9)
PLATELET # BLD: 66 K/UL (ref 135–450)
PLATELET SLIDE REVIEW: ABNORMAL
PMV BLD AUTO: 9.8 FL (ref 5–10.5)
POTASSIUM SERPL-SCNC: 4.8 MMOL/L (ref 3.5–5.1)
RBC # BLD: 4.05 M/UL (ref 4.2–5.9)
SODIUM BLD-SCNC: 134 MMOL/L (ref 136–145)
WBC # BLD: 12.2 K/UL (ref 4–11)

## 2018-12-15 PROCEDURE — 36415 COLL VENOUS BLD VENIPUNCTURE: CPT

## 2018-12-15 PROCEDURE — 6370000000 HC RX 637 (ALT 250 FOR IP): Performed by: STUDENT IN AN ORGANIZED HEALTH CARE EDUCATION/TRAINING PROGRAM

## 2018-12-15 PROCEDURE — 83735 ASSAY OF MAGNESIUM: CPT

## 2018-12-15 PROCEDURE — 85027 COMPLETE CBC AUTOMATED: CPT

## 2018-12-15 PROCEDURE — 1200000000 HC SEMI PRIVATE

## 2018-12-15 PROCEDURE — 6360000002 HC RX W HCPCS: Performed by: NURSE PRACTITIONER

## 2018-12-15 PROCEDURE — 2580000003 HC RX 258: Performed by: NURSE PRACTITIONER

## 2018-12-15 PROCEDURE — 2580000003 HC RX 258: Performed by: INTERNAL MEDICINE

## 2018-12-15 PROCEDURE — 6370000000 HC RX 637 (ALT 250 FOR IP): Performed by: INTERNAL MEDICINE

## 2018-12-15 PROCEDURE — 2580000003 HC RX 258: Performed by: STUDENT IN AN ORGANIZED HEALTH CARE EDUCATION/TRAINING PROGRAM

## 2018-12-15 PROCEDURE — 80069 RENAL FUNCTION PANEL: CPT

## 2018-12-15 RX ORDER — SODIUM BICARBONATE 650 MG/1
650 TABLET ORAL 2 TIMES DAILY
Status: DISCONTINUED | OUTPATIENT
Start: 2018-12-15 | End: 2018-12-27 | Stop reason: HOSPADM

## 2018-12-15 RX ORDER — HYDRALAZINE HYDROCHLORIDE 25 MG/1
25 TABLET, FILM COATED ORAL EVERY 8 HOURS SCHEDULED
Status: DISCONTINUED | OUTPATIENT
Start: 2018-12-15 | End: 2018-12-20

## 2018-12-15 RX ORDER — SODIUM CHLORIDE 9 MG/ML
INJECTION, SOLUTION INTRAVENOUS CONTINUOUS
Status: DISCONTINUED | OUTPATIENT
Start: 2018-12-15 | End: 2018-12-17

## 2018-12-15 RX ORDER — CEFAZOLIN SODIUM 2 G/50ML
2 SOLUTION INTRAVENOUS
Status: COMPLETED | OUTPATIENT
Start: 2018-12-16 | End: 2018-12-16

## 2018-12-15 RX ADMIN — DEXAMETHASONE 4 MG: 4 TABLET ORAL at 13:09

## 2018-12-15 RX ADMIN — HYDRALAZINE HYDROCHLORIDE 25 MG: 25 TABLET, FILM COATED ORAL at 21:31

## 2018-12-15 RX ADMIN — DEXAMETHASONE 4 MG: 4 TABLET ORAL at 19:23

## 2018-12-15 RX ADMIN — GABAPENTIN 300 MG: 300 CAPSULE ORAL at 21:30

## 2018-12-15 RX ADMIN — INSULIN LISPRO 3 UNITS: 100 INJECTION, SOLUTION INTRAVENOUS; SUBCUTANEOUS at 08:10

## 2018-12-15 RX ADMIN — MAGNESIUM HYDROXIDE 30 ML: 400 SUSPENSION ORAL at 08:05

## 2018-12-15 RX ADMIN — Medication 10 ML: at 21:32

## 2018-12-15 RX ADMIN — Medication 10 ML: at 08:06

## 2018-12-15 RX ADMIN — ATORVASTATIN CALCIUM 20 MG: 20 TABLET, FILM COATED ORAL at 21:31

## 2018-12-15 RX ADMIN — FAMOTIDINE 20 MG: 20 TABLET ORAL at 08:06

## 2018-12-15 RX ADMIN — HYDRALAZINE HYDROCHLORIDE 25 MG: 25 TABLET, FILM COATED ORAL at 13:11

## 2018-12-15 RX ADMIN — FAMOTIDINE 20 MG: 20 TABLET ORAL at 21:30

## 2018-12-15 RX ADMIN — Medication 10 MG: at 21:31

## 2018-12-15 RX ADMIN — INSULIN LISPRO 5 UNITS: 100 INJECTION, SOLUTION INTRAVENOUS; SUBCUTANEOUS at 08:09

## 2018-12-15 RX ADMIN — HYDRALAZINE HYDROCHLORIDE 10 MG: 10 TABLET, FILM COATED ORAL at 05:34

## 2018-12-15 RX ADMIN — HEPARIN SODIUM 5000 UNITS: 5000 INJECTION INTRAVENOUS; SUBCUTANEOUS at 13:07

## 2018-12-15 RX ADMIN — METOPROLOL SUCCINATE 75 MG: 50 TABLET, EXTENDED RELEASE ORAL at 08:05

## 2018-12-15 RX ADMIN — TAMSULOSIN HYDROCHLORIDE 0.4 MG: 0.4 CAPSULE ORAL at 08:05

## 2018-12-15 RX ADMIN — SODIUM BICARBONATE 650 MG: 650 TABLET ORAL at 21:31

## 2018-12-15 RX ADMIN — INSULIN LISPRO 2 UNITS: 100 INJECTION, SOLUTION INTRAVENOUS; SUBCUTANEOUS at 20:53

## 2018-12-15 RX ADMIN — DOCUSATE SODIUM 100 MG: 100 CAPSULE, LIQUID FILLED ORAL at 08:06

## 2018-12-15 RX ADMIN — SODIUM BICARBONATE 650 MG: 650 TABLET ORAL at 08:05

## 2018-12-15 RX ADMIN — HEPARIN SODIUM 5000 UNITS: 5000 INJECTION INTRAVENOUS; SUBCUTANEOUS at 21:30

## 2018-12-15 RX ADMIN — SODIUM CHLORIDE: 9 INJECTION, SOLUTION INTRAVENOUS at 08:04

## 2018-12-15 RX ADMIN — INSULIN LISPRO 2 UNITS: 100 INJECTION, SOLUTION INTRAVENOUS; SUBCUTANEOUS at 12:51

## 2018-12-15 RX ADMIN — DEXAMETHASONE 4 MG: 4 TABLET ORAL at 05:34

## 2018-12-15 RX ADMIN — INSULIN LISPRO 1 UNITS: 100 INJECTION, SOLUTION INTRAVENOUS; SUBCUTANEOUS at 17:08

## 2018-12-15 RX ADMIN — METOPROLOL SUCCINATE 75 MG: 50 TABLET, EXTENDED RELEASE ORAL at 21:30

## 2018-12-15 RX ADMIN — HEPARIN SODIUM 5000 UNITS: 5000 INJECTION INTRAVENOUS; SUBCUTANEOUS at 05:41

## 2018-12-15 ASSESSMENT — PAIN SCALES - GENERAL: PAINLEVEL_OUTOF10: 2

## 2018-12-15 NOTE — PROGRESS NOTES
PRN Meds: sodium chloride flush, senna, sodium chloride flush, magnesium hydroxide, ondansetron, glucose, dextrose, glucagon (rDNA), dextrose, melatonin, albuterol      Intake/Output Summary (Last 24 hours) at 12/15/18 1408  Last data filed at 12/15/18 1406   Gross per 24 hour   Intake                0 ml   Output             3100 ml   Net            -3100 ml       Physical Exam Performed:    BP (!) 155/91 Comment: nurse notified  Pulse 67   Temp 98.1 °F (36.7 °C) (Oral)   Resp 16   Ht 5' 10\" (1.778 m)   Wt 255 lb 11.7 oz (116 kg)   SpO2 97%   BMI 36.69 kg/m²     General appearance:  No apparent distress. HEENT:  Normal cephalic, atraumatic without obvious deformity. PERRL. Extra ocular muscles intact. Conjunctivae/corneas clear. Neck: C-collar  Respiratory:  Normal respiratory effort. CTAB w/o r/r/w. Cardiovascular:  RRR; nl S1&S2; w/o m/r/g. Abdomen: Soft, nt/nd w/ normal bowel sounds. Musculoskeletal:  No clubbing, cyanosis or edema bilaterally. Skin: Skin color, texture, turgor normal.  No rashes or lesions. Neurologic: Loss of sensation to bilateral lower extremities. Cranial nerves: II-XII intact, grossly non-focal.  Psychiatric:  Alert and oriented, thought content appropriate, normal insight  Capillary Refill: Brisk,< 3 seconds   Peripheral Pulses: +2 palpable, equal bilaterally     Labs:   Recent Labs      12/13/18   0736  12/14/18   0646  12/15/18   0626   WBC  11.3*  12.9*  12.2*   HGB  12.4*  12.4*  11.6*   HCT  38.4*  39.1*  37.0*   PLT  137  112*  66*     Recent Labs      12/13/18   0736  12/14/18   0646  12/15/18   0626   NA  142  134*  134*   K  5.1  5.3*  4.8   CL  109  101  102   CO2  18*  21  19*   BUN  54*  55*  57*   CREATININE  1.6*  2.1*  1.9*   CALCIUM  9.0  8.7  8.4   PHOS  4.3  4.0  3.8     No results for input(s): AST, ALT, BILIDIR, BILITOT, ALKPHOS in the last 72 hours.   Recent Labs      12/12/18   1454   INR  1.03     No results for input(s): Lorena Gutierrez in the

## 2018-12-15 NOTE — ANESTHESIA PRE PROCEDURE
atorvastatin (LIPITOR) 40 MG tablet Take 40 mg by mouth daily    Yes Historical Provider, MD   bumetanide (BUMEX) 2 MG tablet Take 2 mg by mouth 3 times daily    Yes Historical Provider, MD   potassium chloride (KLOR-CON) 20 MEQ packet Take 20 mEq by mouth 2 times daily   Yes Historical Provider, MD   allopurinol (ZYLOPRIM) 100 MG tablet TAKE ONE TABLET BY MOUTH ONCE DAILY 6/14/18  Yes [facility]   amLODIPine (NORVASC) 5 MG tablet TAKE ONE TABLET BY MOUTH ONCE DAILY 6/14/18  Yes Scharlene Service, DO   allopurinol (ZYLOPRIM) 300 MG tablet TAKE ONE TABLET BY MOUTH ONCE DAILY IN  ADDITION  TO  THE  100MG  TABLET 5/7/18  Yes [facility]   FREESTYLE LANCETS MISC 1 each by Does not apply route daily 5/3/18  Yes [facility]   glucose blood VI test strips (FREESTYLE TEST STRIPS) strip 1 each by In Vitro route daily As needed.  5/3/18  Yes [facility]   aspirin (ASPIRIN LOW DOSE) 81 MG EC tablet TAKE ONE TABLET BY MOUTH ONCE DAILY 4/23/18  Yes [facility]   ACCU-CHEK MULTICLIX LANCETS MISC USE ONE  TO CHECK GLUCOSE ONCE DAILY 4/19/18  Yes [facility]   Blood Glucose Monitoring Suppl (ACCU-CHEK COMPACT CARE KIT) KIT 1 kit by Does not apply route daily 4/18/17  Yes [facility]   Melatonin 10 MG TABS Take 10 mg by mouth nightly    Yes Historical Provider, MD       Current medications:    Current Facility-Administered Medications   Medication Dose Route Frequency Provider Last Rate Last Dose    dexamethasone (DECADRON) tablet 4 mg  4 mg Oral 4 times per day PAYTON Gracia - CNP   4 mg at 12/14/18 1655    Followed by   Deborah Dilniharika ON 12/16/2018] dexamethasone (DECADRON) tablet 4 mg  4 mg Oral 3 times per day PAYTON Gracia - MARY        Followed by   Deborah Dilniharika ON 12/17/2018] dexamethasone (DECADRON) tablet 4 mg  4 mg Oral 2 times per day Josafat Herrera APRN - CNP        Followed by   Deborah Dill ON 12/19/2018] dexamethasone (DECADRON) tablet 4 mg  4 mg Oral Daily Josafat Herrera APRN capsule 0.4 mg  0.4 mg Oral Daily Mirian Velásquez MD   0.4 mg at 12/14/18 9935    sodium chloride flush 0.9 % injection 10 mL  10 mL Intravenous 2 times per day Mirian Velásquez MD   10 mL at 12/14/18 0929    sodium chloride flush 0.9 % injection 10 mL  10 mL Intravenous PRN Mirian Velásquez MD        magnesium hydroxide (MILK OF MAGNESIA) 400 MG/5ML suspension 30 mL  30 mL Oral Daily PRN Mirian Velásquez MD        ondansetron [facility]) injection 4 mg  4 mg Intravenous Q6H PRN Mirian Velásquez MD        glucose (GLUTOSE) 40 % oral gel 15 g  15 g Oral PRN Mirian Velásquez MD        dextrose 50 % solution 12.5 g  12.5 g Intravenous PRN Mirian Velásquez MD        glucagon (rDNA) injection 1 mg  1 mg Intramuscular PRN Mirian Velásquez MD        dextrose 5 % solution  100 mL/hr Intravenous PRN Mirian Velásquez MD        melatonin tablet 10 mg  10 mg Oral Nightly PRN Mirian Velásquez MD   10 mg at 12/13/18 2224    albuterol (PROVENTIL) nebulizer solution 2.5 mg  2.5 mg Nebulization Q4H PRN Alcira Mckee MD        hydrALAZINE (APRESOLINE) tablet 10 mg  10 mg Oral 3 times per day Sammy Dorantes MD   10 mg at 12/14/18 1237       Allergies:     Allergies   Allergen Reactions    Enalapril     Nadolol     Verapamil        Problem List:    Patient Active Problem List   Diagnosis Code    Trigger finger M65.30    Hand pain M79.643    Anoxic brain injury ([facility] [address] G93.1    HTN (hypertension) I10    Hyperlipemia E78.5    Carpal tunnel syndrome G56.00    Wrist pain M25.539    Obstructive sleep apnea syndrome G47.33    Hyperuricemia E79.0    Cardiac arrhythmia I49.9    Depression F32.9    Gout M10.9    Adiposity E66.9    Type 2 diabetes mellitus with complication, without long-term current use of insulin (HCC) E11.8    ACS (acute coronary syndrome) (HCC) I24.9    Gait disturbance R26.9    Chronic kidney disease N18.9    Abnormal echocardiogram R93.1    Coronary artery disease involving native coronary artery of native heart with angina pectoris ([facility] [address] I25.119    Abnormal stress test R94.39    Cardiomyopathy ([facility]) I42.9    NSTEMI (non-ST elevated myocardial infarction) ([facility]) I21.4    Ischemic cardiomyopathy Q45.8    Acute systolic congestive heart failure ([facility]) I50.21    Acute renal failure with acute tubular necrosis superimposed on stage 3 chronic kidney disease ([facility]) N17.0, N18.3    Spinal cord ischemia ([facility]) G95.11    DM (diabetes mellitus), secondary, uncontrolled, w/neurologic complic ([facility]) X17.21, B39.31    Cervical spinal cord compression ([facility]) G95.20       Past Medical History:        Diagnosis Date    Anoxic brain injury ([facility] [address] 1994    Cardiac arrest ([facility] [address] 1994    [facility]     borderline    Gout     Hyperlipidemia     Hypertension     Reflux     Sleep apnea     Type 2 diabetes mellitus without complication ([facility])        Past Surgical History:        Procedure Laterality Date    CARPAL TUNNEL RELEASE Left     CARPAL TUNNEL RELEASE Right 4/14/15    COLONOSCOPY  1/2014    TN CABG, ARTERIAL, THREE N/A 12/4/2018    CORONARY ARTERY BYPASS GRAFTING X3, INTERNAL MAMMARY ARTERY, SAPHENOUS VEIN GRAFT, ON PUMP performed by Chapito Palacios MD at [address] [facility] History:    Social History   Substance Use Topics    Smoking status: Never Smoker    Smokeless tobacco: Never Used    Alcohol use No                                Counseling given: Not Answered      Vital Signs (Current):   Vitals:    12/14/18 0730 12/14/18 1028 12/14/18 1030 12/14/18 1525   BP: (!) 153/88  (!) 150/92 (!) 149/88   Pulse: 65 80  67   Resp: 15 16  14   Temp: 98 °F (36.7 °C) 98.3 °F (36.8 °C)  98.3 °F (36.8 °C)   TempSrc: Oral Oral  Oral   SpO2: 96% 100%  95%   Weight:       Height:                                                  BP Readings from Last 3 Encounters:   12/14/18 (!) 149/88   12/07/18 (!) 163/92   12/04/18 86/67       NPO Status: 40-45%. More prominent septal hypokinesis is noted on certain views. Left ventricular size is mildly increased.   There is mild concentric left ventricular hypertrophy.   Grade I diastolic dysfunction with normal filling pressure.   Aortic valve appears sclerotic but opens adequately.   Mild mitral regurgitation. Inadequate tricuspid regurgitation to estimate systolic pulmonary artery   pressure.    -anoxic brain injury after cardiac arrest (1994)     Stress test: EF 37%     Neuro/Psych:   (+) neuromuscular disease:,              ROS comment: UE paresthesias, LE weakness and saddle anesthesia GI/Hepatic/Renal:   (+) GERD:, renal disease: CRI, morbid obesity          Endo/Other:    (+) DiabetesType II DM, , .                 Abdominal:           Vascular:                                      Anesthesia Plan      general and TIVA     ASA 4       Induction: intravenous. MIPS: Postoperative opioids intended and Prophylactic antiemetics administered. Anesthetic plan and risks discussed with patient. Attending anesthesiologist reviewed and agrees with Pre Eval content               LHC:  11/26/18 with Dr. Jeremiah Burger  LM: luminals, mild calcium  LAD: heavy prox calcification.  Mid long section of disease with focal lesion up to 80%, distal with sequential lesions of up to 60-70%                Daig1- mid 90%, distal vessel bifurcation disease of 50%  LCX: luminals, distal native LCX is ecctretic and severely diseased fed by R->Lt collaterals.                 OM1- 50% mid ribbon like lesion                OM2- smaller vessel, mild diffuse disease <30%  RCA: dominant, mild-moderate diffuse disease and calcification, lesions up to 20%                PDA- focal eccentric 80%                = some Rt-->Lt collaterals  LVEDP: 22-25  LVEF: Not done due to CKD    Wilner Gold MD   12/14/2018

## 2018-12-16 ENCOUNTER — APPOINTMENT (OUTPATIENT)
Dept: GENERAL RADIOLOGY | Age: 58
DRG: 023 | End: 2018-12-16
Attending: FAMILY MEDICINE
Payer: COMMERCIAL

## 2018-12-16 ENCOUNTER — ANESTHESIA (OUTPATIENT)
Dept: OPERATING ROOM | Age: 58
DRG: 023 | End: 2018-12-16
Payer: COMMERCIAL

## 2018-12-16 VITALS
DIASTOLIC BLOOD PRESSURE: 57 MMHG | TEMPERATURE: 95.9 F | RESPIRATION RATE: 6 BRPM | SYSTOLIC BLOOD PRESSURE: 95 MMHG | OXYGEN SATURATION: 100 %

## 2018-12-16 LAB
ALBUMIN SERPL-MCNC: 2.6 G/DL (ref 3.4–5)
ANION GAP SERPL CALCULATED.3IONS-SCNC: 12 MMOL/L (ref 3–16)
BUN BLDV-MCNC: 65 MG/DL (ref 7–20)
CALCIUM SERPL-MCNC: 8.8 MG/DL (ref 8.3–10.6)
CHLORIDE BLD-SCNC: 102 MMOL/L (ref 99–110)
CO2: 22 MMOL/L (ref 21–32)
CREAT SERPL-MCNC: 1.8 MG/DL (ref 0.9–1.3)
GFR AFRICAN AMERICAN: 47
GFR NON-AFRICAN AMERICAN: 39
GLUCOSE BLD-MCNC: 183 MG/DL (ref 70–99)
GLUCOSE BLD-MCNC: 197 MG/DL (ref 70–99)
GLUCOSE BLD-MCNC: 206 MG/DL (ref 70–99)
GLUCOSE BLD-MCNC: 216 MG/DL (ref 70–99)
GLUCOSE BLD-MCNC: 255 MG/DL (ref 70–99)
HCT VFR BLD CALC: 33 % (ref 40.5–52.5)
HCT VFR BLD CALC: 37.5 % (ref 40.5–52.5)
HEMOGLOBIN: 10.4 G/DL (ref 13.5–17.5)
HEMOGLOBIN: 11.9 G/DL (ref 13.5–17.5)
MAGNESIUM: 2.4 MG/DL (ref 1.8–2.4)
MCH RBC QN AUTO: 29.1 PG (ref 26–34)
MCH RBC QN AUTO: 29.1 PG (ref 26–34)
MCHC RBC AUTO-ENTMCNC: 31.7 G/DL (ref 31–36)
MCHC RBC AUTO-ENTMCNC: 31.8 G/DL (ref 31–36)
MCV RBC AUTO: 91.4 FL (ref 80–100)
MCV RBC AUTO: 91.9 FL (ref 80–100)
PDW BLD-RTO: 17.2 % (ref 12.4–15.4)
PDW BLD-RTO: 17.2 % (ref 12.4–15.4)
PERFORMED ON: ABNORMAL
PHOSPHORUS: 4.5 MG/DL (ref 2.5–4.9)
PLATELET # BLD: 73 K/UL (ref 135–450)
PLATELET # BLD: 78 K/UL (ref 135–450)
PMV BLD AUTO: 10.6 FL (ref 5–10.5)
PMV BLD AUTO: 9.7 FL (ref 5–10.5)
POTASSIUM SERPL-SCNC: 5.2 MMOL/L (ref 3.5–5.1)
RBC # BLD: 3.59 M/UL (ref 4.2–5.9)
RBC # BLD: 4.1 M/UL (ref 4.2–5.9)
SODIUM BLD-SCNC: 136 MMOL/L (ref 136–145)
WBC # BLD: 11.9 K/UL (ref 4–11)
WBC # BLD: 8.9 K/UL (ref 4–11)

## 2018-12-16 PROCEDURE — 6360000002 HC RX W HCPCS: Performed by: PHYSICIAN ASSISTANT

## 2018-12-16 PROCEDURE — 2700000000 HC OXYGEN THERAPY PER DAY

## 2018-12-16 PROCEDURE — 7100000000 HC PACU RECOVERY - FIRST 15 MIN: Performed by: NEUROLOGICAL SURGERY

## 2018-12-16 PROCEDURE — 1200000000 HC SEMI PRIVATE

## 2018-12-16 PROCEDURE — 83735 ASSAY OF MAGNESIUM: CPT

## 2018-12-16 PROCEDURE — 2720000010 HC SURG SUPPLY STERILE: Performed by: NEUROLOGICAL SURGERY

## 2018-12-16 PROCEDURE — 2500000003 HC RX 250 WO HCPCS: Performed by: NEUROLOGICAL SURGERY

## 2018-12-16 PROCEDURE — C1713 ANCHOR/SCREW BN/BN,TIS/BN: HCPCS | Performed by: NEUROLOGICAL SURGERY

## 2018-12-16 PROCEDURE — 6370000000 HC RX 637 (ALT 250 FOR IP): Performed by: INTERNAL MEDICINE

## 2018-12-16 PROCEDURE — 2580000003 HC RX 258: Performed by: STUDENT IN AN ORGANIZED HEALTH CARE EDUCATION/TRAINING PROGRAM

## 2018-12-16 PROCEDURE — 6370000000 HC RX 637 (ALT 250 FOR IP): Performed by: STUDENT IN AN ORGANIZED HEALTH CARE EDUCATION/TRAINING PROGRAM

## 2018-12-16 PROCEDURE — 80069 RENAL FUNCTION PANEL: CPT

## 2018-12-16 PROCEDURE — L0120 CERV FLEX N/ADJ FOAM PRE OTS: HCPCS | Performed by: NEUROLOGICAL SURGERY

## 2018-12-16 PROCEDURE — 6360000002 HC RX W HCPCS: Performed by: NURSE PRACTITIONER

## 2018-12-16 PROCEDURE — 2580000003 HC RX 258: Performed by: ANESTHESIOLOGY

## 2018-12-16 PROCEDURE — 2780000010 HC IMPLANT OTHER: Performed by: NEUROLOGICAL SURGERY

## 2018-12-16 PROCEDURE — 3700000000 HC ANESTHESIA ATTENDED CARE: Performed by: NEUROLOGICAL SURGERY

## 2018-12-16 PROCEDURE — 7100000001 HC PACU RECOVERY - ADDTL 15 MIN: Performed by: NEUROLOGICAL SURGERY

## 2018-12-16 PROCEDURE — 36415 COLL VENOUS BLD VENIPUNCTURE: CPT

## 2018-12-16 PROCEDURE — 6370000000 HC RX 637 (ALT 250 FOR IP)

## 2018-12-16 PROCEDURE — 6360000002 HC RX W HCPCS: Performed by: ANESTHESIOLOGY

## 2018-12-16 PROCEDURE — 2580000003 HC RX 258: Performed by: NEUROLOGICAL SURGERY

## 2018-12-16 PROCEDURE — 3600000012 HC SURGERY LEVEL 2 ADDTL 15MIN: Performed by: NEUROLOGICAL SURGERY

## 2018-12-16 PROCEDURE — 0RG20K0 FUSION OF 2 OR MORE CERVICAL VERTEBRAL JOINTS WITH NONAUTOLOGOUS TISSUE SUBSTITUTE, ANTERIOR APPROACH, ANTERIOR COLUMN, OPEN APPROACH: ICD-10-PCS | Performed by: NEUROLOGICAL SURGERY

## 2018-12-16 PROCEDURE — 3700000001 HC ADD 15 MINUTES (ANESTHESIA): Performed by: NEUROLOGICAL SURGERY

## 2018-12-16 PROCEDURE — 0RG20A0 FUSION OF 2 OR MORE CERVICAL VERTEBRAL JOINTS WITH INTERBODY FUSION DEVICE, ANTERIOR APPROACH, ANTERIOR COLUMN, OPEN APPROACH: ICD-10-PCS | Performed by: NEUROLOGICAL SURGERY

## 2018-12-16 PROCEDURE — 2709999900 HC NON-CHARGEABLE SUPPLY: Performed by: NEUROLOGICAL SURGERY

## 2018-12-16 PROCEDURE — 2580000003 HC RX 258: Performed by: NURSE PRACTITIONER

## 2018-12-16 PROCEDURE — 2580000003 HC RX 258: Performed by: PHYSICIAN ASSISTANT

## 2018-12-16 PROCEDURE — 6370000000 HC RX 637 (ALT 250 FOR IP): Performed by: NEUROLOGICAL SURGERY

## 2018-12-16 PROCEDURE — 3209999900 FLUORO FOR SURGICAL PROCEDURES

## 2018-12-16 PROCEDURE — 85027 COMPLETE CBC AUTOMATED: CPT

## 2018-12-16 PROCEDURE — 72020 X-RAY EXAM OF SPINE 1 VIEW: CPT

## 2018-12-16 PROCEDURE — 2500000003 HC RX 250 WO HCPCS: Performed by: ANESTHESIOLOGY

## 2018-12-16 PROCEDURE — BR10YZZ FLUOROSCOPY OF CERVICAL SPINE USING OTHER CONTRAST: ICD-10-PCS | Performed by: RADIOLOGY

## 2018-12-16 PROCEDURE — 94660 CPAP INITIATION&MGMT: CPT

## 2018-12-16 PROCEDURE — 0RG20J0 FUSION OF 2 OR MORE CERVICAL VERTEBRAL JOINTS WITH SYNTHETIC SUBSTITUTE, ANTERIOR APPROACH, ANTERIOR COLUMN, OPEN APPROACH: ICD-10-PCS | Performed by: NEUROLOGICAL SURGERY

## 2018-12-16 PROCEDURE — 94761 N-INVAS EAR/PLS OXIMETRY MLT: CPT

## 2018-12-16 PROCEDURE — 3600000002 HC SURGERY LEVEL 2 BASE: Performed by: NEUROLOGICAL SURGERY

## 2018-12-16 DEVICE — SCREW SPNL L16MM DIA4MM ANT CERV TI SELF DRL VAR ANG FULL: Type: IMPLANTABLE DEVICE | Site: SPINE CERVICAL | Status: FUNCTIONAL

## 2018-12-16 DEVICE — GRAFT BNE SUB SM 1ML CRYOPRESERVED VIABLE CORT CANC BNE: Type: IMPLANTABLE DEVICE | Site: SPINE CERVICAL | Status: FUNCTIONAL

## 2018-12-16 DEVICE — SCREW SPNL L18MM DIA4MM ANT CERV TI SELF DRL VAR ANG LOK: Type: IMPLANTABLE DEVICE | Site: SPINE CERVICAL | Status: FUNCTIONAL

## 2018-12-16 DEVICE — IMPLANTABLE DEVICE: Type: IMPLANTABLE DEVICE | Site: SPINE CERVICAL | Status: FUNCTIONAL

## 2018-12-16 RX ORDER — ONDANSETRON 2 MG/ML
4 INJECTION INTRAMUSCULAR; INTRAVENOUS
Status: DISCONTINUED | OUTPATIENT
Start: 2018-12-16 | End: 2018-12-16 | Stop reason: HOSPADM

## 2018-12-16 RX ORDER — DIAZEPAM 5 MG/1
5 TABLET ORAL EVERY 6 HOURS PRN
Status: DISCONTINUED | OUTPATIENT
Start: 2018-12-16 | End: 2018-12-27 | Stop reason: HOSPADM

## 2018-12-16 RX ORDER — ACETAMINOPHEN 325 MG/1
650 TABLET ORAL EVERY 4 HOURS PRN
Status: DISCONTINUED | OUTPATIENT
Start: 2018-12-16 | End: 2018-12-27 | Stop reason: HOSPADM

## 2018-12-16 RX ORDER — HYDROMORPHONE HCL 110MG/55ML
PATIENT CONTROLLED ANALGESIA SYRINGE INTRAVENOUS PRN
Status: DISCONTINUED | OUTPATIENT
Start: 2018-12-16 | End: 2018-12-16 | Stop reason: SDUPTHER

## 2018-12-16 RX ORDER — ROCURONIUM BROMIDE 10 MG/ML
INJECTION, SOLUTION INTRAVENOUS PRN
Status: DISCONTINUED | OUTPATIENT
Start: 2018-12-16 | End: 2018-12-16 | Stop reason: SDUPTHER

## 2018-12-16 RX ORDER — FENTANYL CITRATE 50 UG/ML
INJECTION, SOLUTION INTRAMUSCULAR; INTRAVENOUS PRN
Status: DISCONTINUED | OUTPATIENT
Start: 2018-12-16 | End: 2018-12-16 | Stop reason: SDUPTHER

## 2018-12-16 RX ORDER — MEPERIDINE HYDROCHLORIDE 25 MG/ML
12.5 INJECTION INTRAMUSCULAR; INTRAVENOUS; SUBCUTANEOUS EVERY 5 MIN PRN
Status: DISCONTINUED | OUTPATIENT
Start: 2018-12-16 | End: 2018-12-16 | Stop reason: HOSPADM

## 2018-12-16 RX ORDER — DEXAMETHASONE SODIUM PHOSPHATE 4 MG/ML
INJECTION, SOLUTION INTRA-ARTICULAR; INTRALESIONAL; INTRAMUSCULAR; INTRAVENOUS; SOFT TISSUE PRN
Status: DISCONTINUED | OUTPATIENT
Start: 2018-12-16 | End: 2018-12-16 | Stop reason: SDUPTHER

## 2018-12-16 RX ORDER — PROPOFOL 10 MG/ML
INJECTION, EMULSION INTRAVENOUS PRN
Status: DISCONTINUED | OUTPATIENT
Start: 2018-12-16 | End: 2018-12-16 | Stop reason: SDUPTHER

## 2018-12-16 RX ORDER — SODIUM CHLORIDE 9 MG/ML
INJECTION, SOLUTION INTRAVENOUS CONTINUOUS PRN
Status: DISCONTINUED | OUTPATIENT
Start: 2018-12-16 | End: 2018-12-16 | Stop reason: SDUPTHER

## 2018-12-16 RX ORDER — FENTANYL CITRATE 50 UG/ML
25 INJECTION, SOLUTION INTRAMUSCULAR; INTRAVENOUS EVERY 5 MIN PRN
Status: DISCONTINUED | OUTPATIENT
Start: 2018-12-16 | End: 2018-12-16 | Stop reason: HOSPADM

## 2018-12-16 RX ORDER — PROPOFOL 10 MG/ML
INJECTION, EMULSION INTRAVENOUS CONTINUOUS PRN
Status: DISCONTINUED | OUTPATIENT
Start: 2018-12-16 | End: 2018-12-16 | Stop reason: SDUPTHER

## 2018-12-16 RX ORDER — SODIUM CHLORIDE 9 MG/ML
INJECTION, SOLUTION INTRAVENOUS CONTINUOUS
Status: DISCONTINUED | OUTPATIENT
Start: 2018-12-16 | End: 2018-12-17

## 2018-12-16 RX ORDER — MIDAZOLAM HYDROCHLORIDE 1 MG/ML
INJECTION INTRAMUSCULAR; INTRAVENOUS PRN
Status: DISCONTINUED | OUTPATIENT
Start: 2018-12-16 | End: 2018-12-16 | Stop reason: SDUPTHER

## 2018-12-16 RX ORDER — DOCUSATE SODIUM 100 MG/1
100 CAPSULE, LIQUID FILLED ORAL 2 TIMES DAILY
Status: DISCONTINUED | OUTPATIENT
Start: 2018-12-16 | End: 2018-12-27 | Stop reason: HOSPADM

## 2018-12-16 RX ORDER — SENNA AND DOCUSATE SODIUM 50; 8.6 MG/1; MG/1
1 TABLET, FILM COATED ORAL DAILY
Status: DISCONTINUED | OUTPATIENT
Start: 2018-12-16 | End: 2018-12-27 | Stop reason: HOSPADM

## 2018-12-16 RX ORDER — SODIUM CHLORIDE 0.9 % (FLUSH) 0.9 %
10 SYRINGE (ML) INJECTION EVERY 12 HOURS SCHEDULED
Status: DISCONTINUED | OUTPATIENT
Start: 2018-12-16 | End: 2018-12-26 | Stop reason: SDUPTHER

## 2018-12-16 RX ORDER — ONDANSETRON 2 MG/ML
INJECTION INTRAMUSCULAR; INTRAVENOUS PRN
Status: DISCONTINUED | OUTPATIENT
Start: 2018-12-16 | End: 2018-12-16 | Stop reason: SDUPTHER

## 2018-12-16 RX ORDER — DIPHENHYDRAMINE HYDROCHLORIDE 50 MG/ML
12.5 INJECTION INTRAMUSCULAR; INTRAVENOUS
Status: DISCONTINUED | OUTPATIENT
Start: 2018-12-16 | End: 2018-12-16 | Stop reason: HOSPADM

## 2018-12-16 RX ORDER — METOCLOPRAMIDE HYDROCHLORIDE 5 MG/ML
INJECTION INTRAMUSCULAR; INTRAVENOUS PRN
Status: DISCONTINUED | OUTPATIENT
Start: 2018-12-16 | End: 2018-12-16 | Stop reason: SDUPTHER

## 2018-12-16 RX ORDER — CEFAZOLIN SODIUM 2 G/50ML
2 SOLUTION INTRAVENOUS EVERY 8 HOURS
Status: COMPLETED | OUTPATIENT
Start: 2018-12-16 | End: 2018-12-17

## 2018-12-16 RX ORDER — OXYCODONE HYDROCHLORIDE AND ACETAMINOPHEN 5; 325 MG/1; MG/1
1 TABLET ORAL ONCE
Status: DISCONTINUED | OUTPATIENT
Start: 2018-12-16 | End: 2018-12-16 | Stop reason: HOSPADM

## 2018-12-16 RX ORDER — ONDANSETRON 2 MG/ML
4 INJECTION INTRAMUSCULAR; INTRAVENOUS EVERY 6 HOURS PRN
Status: DISCONTINUED | OUTPATIENT
Start: 2018-12-16 | End: 2018-12-27 | Stop reason: HOSPADM

## 2018-12-16 RX ORDER — PROMETHAZINE HYDROCHLORIDE 25 MG/ML
6.25 INJECTION, SOLUTION INTRAMUSCULAR; INTRAVENOUS
Status: DISCONTINUED | OUTPATIENT
Start: 2018-12-16 | End: 2018-12-16 | Stop reason: HOSPADM

## 2018-12-16 RX ORDER — HYDRALAZINE HYDROCHLORIDE 20 MG/ML
5 INJECTION INTRAMUSCULAR; INTRAVENOUS EVERY 10 MIN PRN
Status: DISCONTINUED | OUTPATIENT
Start: 2018-12-16 | End: 2018-12-16 | Stop reason: HOSPADM

## 2018-12-16 RX ORDER — LIDOCAINE HYDROCHLORIDE 20 MG/ML
INJECTION, SOLUTION INFILTRATION; PERINEURAL PRN
Status: DISCONTINUED | OUTPATIENT
Start: 2018-12-16 | End: 2018-12-16 | Stop reason: SDUPTHER

## 2018-12-16 RX ORDER — SODIUM CHLORIDE, SODIUM LACTATE, POTASSIUM CHLORIDE, CALCIUM CHLORIDE 600; 310; 30; 20 MG/100ML; MG/100ML; MG/100ML; MG/100ML
INJECTION, SOLUTION INTRAVENOUS CONTINUOUS PRN
Status: DISCONTINUED | OUTPATIENT
Start: 2018-12-16 | End: 2018-12-16 | Stop reason: SDUPTHER

## 2018-12-16 RX ORDER — SODIUM CHLORIDE 0.9 % (FLUSH) 0.9 %
10 SYRINGE (ML) INJECTION PRN
Status: DISCONTINUED | OUTPATIENT
Start: 2018-12-16 | End: 2018-12-26 | Stop reason: SDUPTHER

## 2018-12-16 RX ORDER — OXYCODONE HYDROCHLORIDE AND ACETAMINOPHEN 5; 325 MG/1; MG/1
1 TABLET ORAL EVERY 4 HOURS PRN
Status: DISCONTINUED | OUTPATIENT
Start: 2018-12-16 | End: 2018-12-27 | Stop reason: HOSPADM

## 2018-12-16 RX ORDER — OXYCODONE HYDROCHLORIDE AND ACETAMINOPHEN 5; 325 MG/1; MG/1
2 TABLET ORAL EVERY 4 HOURS PRN
Status: DISCONTINUED | OUTPATIENT
Start: 2018-12-16 | End: 2018-12-27 | Stop reason: HOSPADM

## 2018-12-16 RX ADMIN — HYDRALAZINE HYDROCHLORIDE 25 MG: 25 TABLET, FILM COATED ORAL at 18:21

## 2018-12-16 RX ADMIN — MIDAZOLAM HYDROCHLORIDE 2 MG: 1 INJECTION INTRAMUSCULAR; INTRAVENOUS at 11:59

## 2018-12-16 RX ADMIN — ROCURONIUM BROMIDE 20 MG: 10 INJECTION, SOLUTION INTRAVENOUS at 12:02

## 2018-12-16 RX ADMIN — LIDOCAINE HYDROCHLORIDE 200 MG: 20 INJECTION, SOLUTION INFILTRATION; PERINEURAL at 12:02

## 2018-12-16 RX ADMIN — FAMOTIDINE 20 MG: 20 TABLET ORAL at 21:17

## 2018-12-16 RX ADMIN — DEXAMETHASONE SODIUM PHOSPHATE 10 MG: 4 INJECTION, SOLUTION INTRAMUSCULAR; INTRAVENOUS at 13:14

## 2018-12-16 RX ADMIN — INSULIN LISPRO 1 UNITS: 100 INJECTION, SOLUTION INTRAVENOUS; SUBCUTANEOUS at 08:16

## 2018-12-16 RX ADMIN — DEXAMETHASONE 4 MG: 4 TABLET ORAL at 00:00

## 2018-12-16 RX ADMIN — SODIUM CHLORIDE: 9 INJECTION, SOLUTION INTRAVENOUS at 16:53

## 2018-12-16 RX ADMIN — HYDRALAZINE HYDROCHLORIDE 25 MG: 25 TABLET, FILM COATED ORAL at 05:23

## 2018-12-16 RX ADMIN — HYDRALAZINE HYDROCHLORIDE 25 MG: 25 TABLET, FILM COATED ORAL at 21:18

## 2018-12-16 RX ADMIN — Medication 10 MG: at 21:17

## 2018-12-16 RX ADMIN — SODIUM BICARBONATE 650 MG: 650 TABLET ORAL at 21:17

## 2018-12-16 RX ADMIN — SODIUM CHLORIDE: 900 INJECTION, SOLUTION INTRAVENOUS at 11:48

## 2018-12-16 RX ADMIN — GABAPENTIN 300 MG: 300 CAPSULE ORAL at 21:17

## 2018-12-16 RX ADMIN — ATORVASTATIN CALCIUM 20 MG: 20 TABLET, FILM COATED ORAL at 21:18

## 2018-12-16 RX ADMIN — Medication 10 ML: at 07:58

## 2018-12-16 RX ADMIN — FENTANYL CITRATE 100 MCG: 50 INJECTION INTRAMUSCULAR; INTRAVENOUS at 12:06

## 2018-12-16 RX ADMIN — HYDROMORPHONE HYDROCHLORIDE 0.2 MG: 2 INJECTION, SOLUTION INTRAMUSCULAR; INTRAVENOUS; SUBCUTANEOUS at 14:20

## 2018-12-16 RX ADMIN — INSULIN LISPRO 2 UNITS: 100 INJECTION, SOLUTION INTRAVENOUS; SUBCUTANEOUS at 22:16

## 2018-12-16 RX ADMIN — METOCLOPRAMIDE 10 MG: 5 INJECTION, SOLUTION INTRAMUSCULAR; INTRAVENOUS at 13:14

## 2018-12-16 RX ADMIN — METHOCARBAMOL 1000 MG: 100 INJECTION, SOLUTION INTRAMUSCULAR; INTRAVENOUS at 16:13

## 2018-12-16 RX ADMIN — INSULIN LISPRO 2 UNITS: 100 INJECTION, SOLUTION INTRAVENOUS; SUBCUTANEOUS at 18:42

## 2018-12-16 RX ADMIN — PROPOFOL 200 MG: 10 INJECTION, EMULSION INTRAVENOUS at 12:02

## 2018-12-16 RX ADMIN — HYDROMORPHONE HYDROCHLORIDE 0.8 MG: 2 INJECTION, SOLUTION INTRAMUSCULAR; INTRAVENOUS; SUBCUTANEOUS at 13:43

## 2018-12-16 RX ADMIN — FAMOTIDINE 20 MG: 20 TABLET ORAL at 07:57

## 2018-12-16 RX ADMIN — PHENYLEPHRINE HYDROCHLORIDE 160 MCG: 10 INJECTION, SOLUTION INTRAMUSCULAR; INTRAVENOUS; SUBCUTANEOUS at 15:40

## 2018-12-16 RX ADMIN — DEXAMETHASONE 4 MG: 4 TABLET ORAL at 05:23

## 2018-12-16 RX ADMIN — SODIUM CHLORIDE, SODIUM LACTATE, POTASSIUM CHLORIDE, AND CALCIUM CHLORIDE: 600; 310; 30; 20 INJECTION, SOLUTION INTRAVENOUS at 11:48

## 2018-12-16 RX ADMIN — METOPROLOL SUCCINATE 75 MG: 50 TABLET, EXTENDED RELEASE ORAL at 21:18

## 2018-12-16 RX ADMIN — HYDROMORPHONE HYDROCHLORIDE 0.2 MG: 2 INJECTION, SOLUTION INTRAMUSCULAR; INTRAVENOUS; SUBCUTANEOUS at 13:26

## 2018-12-16 RX ADMIN — CEFAZOLIN SODIUM 2 G: 2 SOLUTION INTRAVENOUS at 21:24

## 2018-12-16 RX ADMIN — PHENYLEPHRINE HYDROCHLORIDE 300 MCG: 10 INJECTION, SOLUTION INTRAMUSCULAR; INTRAVENOUS; SUBCUTANEOUS at 15:25

## 2018-12-16 RX ADMIN — REMIFENTANIL HYDROCHLORIDE 0.2 MCG/KG/MIN: 1 INJECTION, POWDER, LYOPHILIZED, FOR SOLUTION INTRAVENOUS at 12:25

## 2018-12-16 RX ADMIN — DEXAMETHASONE 4 MG: 4 TABLET ORAL at 21:18

## 2018-12-16 RX ADMIN — PHENYLEPHRINE HYDROCHLORIDE 160 MCG: 10 INJECTION, SOLUTION INTRAMUSCULAR; INTRAVENOUS; SUBCUTANEOUS at 15:31

## 2018-12-16 RX ADMIN — HYDRALAZINE HYDROCHLORIDE 5 MG: 20 INJECTION INTRAMUSCULAR; INTRAVENOUS at 16:25

## 2018-12-16 RX ADMIN — SODIUM BICARBONATE 650 MG: 650 TABLET ORAL at 07:56

## 2018-12-16 RX ADMIN — PROPOFOL 100 MCG/KG/MIN: 10 INJECTION, EMULSION INTRAVENOUS at 12:25

## 2018-12-16 RX ADMIN — PHENYLEPHRINE HYDROCHLORIDE 300 MCG: 10 INJECTION, SOLUTION INTRAMUSCULAR; INTRAVENOUS; SUBCUTANEOUS at 15:28

## 2018-12-16 RX ADMIN — ONDANSETRON 4 MG: 2 INJECTION INTRAMUSCULAR; INTRAVENOUS at 15:10

## 2018-12-16 RX ADMIN — METOPROLOL SUCCINATE 75 MG: 50 TABLET, EXTENDED RELEASE ORAL at 07:57

## 2018-12-16 RX ADMIN — SODIUM CHLORIDE, SODIUM LACTATE, POTASSIUM CHLORIDE, AND CALCIUM CHLORIDE: 600; 310; 30; 20 INJECTION, SOLUTION INTRAVENOUS at 14:15

## 2018-12-16 RX ADMIN — CEFAZOLIN SODIUM 2 G: 2 SOLUTION INTRAVENOUS at 13:01

## 2018-12-16 RX ADMIN — REMIFENTANIL HYDROCHLORIDE: 1 INJECTION, POWDER, LYOPHILIZED, FOR SOLUTION INTRAVENOUS at 13:50

## 2018-12-16 RX ADMIN — TAMSULOSIN HYDROCHLORIDE 0.4 MG: 0.4 CAPSULE ORAL at 07:57

## 2018-12-16 ASSESSMENT — PULMONARY FUNCTION TESTS
PIF_VALUE: 21
PIF_VALUE: 20
PIF_VALUE: 21
PIF_VALUE: 20
PIF_VALUE: 21
PIF_VALUE: 21
PIF_VALUE: 20
PIF_VALUE: 19
PIF_VALUE: 21
PIF_VALUE: 1
PIF_VALUE: 20
PIF_VALUE: 21
PIF_VALUE: 16
PIF_VALUE: 1
PIF_VALUE: 18
PIF_VALUE: 21
PIF_VALUE: 20
PIF_VALUE: 20
PIF_VALUE: 19
PIF_VALUE: 20
PIF_VALUE: 14
PIF_VALUE: 20
PIF_VALUE: 22
PIF_VALUE: 20
PIF_VALUE: 21
PIF_VALUE: 21
PIF_VALUE: 28
PIF_VALUE: 21
PIF_VALUE: 20
PIF_VALUE: 21
PIF_VALUE: 20
PIF_VALUE: 9
PIF_VALUE: 20
PIF_VALUE: 20
PIF_VALUE: 11
PIF_VALUE: 20
PIF_VALUE: 20
PIF_VALUE: 19
PIF_VALUE: 16
PIF_VALUE: 1
PIF_VALUE: 20
PIF_VALUE: 20
PIF_VALUE: 19
PIF_VALUE: 21
PIF_VALUE: 20
PIF_VALUE: 20
PIF_VALUE: 21
PIF_VALUE: 20
PIF_VALUE: 14
PIF_VALUE: 20
PIF_VALUE: 19
PIF_VALUE: 21
PIF_VALUE: 7
PIF_VALUE: 20
PIF_VALUE: 22
PIF_VALUE: 20
PIF_VALUE: 21
PIF_VALUE: 29
PIF_VALUE: 20
PIF_VALUE: 21
PIF_VALUE: 20
PIF_VALUE: 21
PIF_VALUE: 25
PIF_VALUE: 20
PIF_VALUE: 19
PIF_VALUE: 20
PIF_VALUE: 18
PIF_VALUE: 22
PIF_VALUE: 21
PIF_VALUE: 20
PIF_VALUE: 20
PIF_VALUE: 22
PIF_VALUE: 23
PIF_VALUE: 18
PIF_VALUE: 20
PIF_VALUE: 1
PIF_VALUE: 19
PIF_VALUE: 22
PIF_VALUE: 20
PIF_VALUE: 21
PIF_VALUE: 20
PIF_VALUE: 20
PIF_VALUE: 21
PIF_VALUE: 21
PIF_VALUE: 22
PIF_VALUE: 20
PIF_VALUE: 19
PIF_VALUE: 22
PIF_VALUE: 20
PIF_VALUE: 21
PIF_VALUE: 20
PIF_VALUE: 20
PIF_VALUE: 21
PIF_VALUE: 24
PIF_VALUE: 20
PIF_VALUE: 22
PIF_VALUE: 19
PIF_VALUE: 19
PIF_VALUE: 20
PIF_VALUE: 20
PIF_VALUE: 19
PIF_VALUE: 21
PIF_VALUE: 21
PIF_VALUE: 22
PIF_VALUE: 20
PIF_VALUE: 23
PIF_VALUE: 21
PIF_VALUE: 21
PIF_VALUE: 6
PIF_VALUE: 2
PIF_VALUE: 16
PIF_VALUE: 21
PIF_VALUE: 20
PIF_VALUE: 21
PIF_VALUE: 2
PIF_VALUE: 20
PIF_VALUE: 21
PIF_VALUE: 21
PIF_VALUE: 20
PIF_VALUE: 20
PIF_VALUE: 21
PIF_VALUE: 21
PIF_VALUE: 20
PIF_VALUE: 3
PIF_VALUE: 21
PIF_VALUE: 19
PIF_VALUE: 6
PIF_VALUE: 19
PIF_VALUE: 19
PIF_VALUE: 21
PIF_VALUE: 19
PIF_VALUE: 20
PIF_VALUE: 20
PIF_VALUE: 22
PIF_VALUE: 20
PIF_VALUE: 20
PIF_VALUE: 21
PIF_VALUE: 20
PIF_VALUE: 19
PIF_VALUE: 19
PIF_VALUE: 20
PIF_VALUE: 21
PIF_VALUE: 21
PIF_VALUE: 20
PIF_VALUE: 21
PIF_VALUE: 13
PIF_VALUE: 21
PIF_VALUE: 20
PIF_VALUE: 21
PIF_VALUE: 19
PIF_VALUE: 21
PIF_VALUE: 20
PIF_VALUE: 21
PIF_VALUE: 21
PIF_VALUE: 19
PIF_VALUE: 16
PIF_VALUE: 20
PIF_VALUE: 4
PIF_VALUE: 20
PIF_VALUE: 2
PIF_VALUE: 20
PIF_VALUE: 21
PIF_VALUE: 20
PIF_VALUE: 15
PIF_VALUE: 21
PIF_VALUE: 20
PIF_VALUE: 21
PIF_VALUE: 19
PIF_VALUE: 21
PIF_VALUE: 21
PIF_VALUE: 20
PIF_VALUE: 19
PIF_VALUE: 20
PIF_VALUE: 20
PIF_VALUE: 21
PIF_VALUE: 2
PIF_VALUE: 19
PIF_VALUE: 20
PIF_VALUE: 19
PIF_VALUE: 20
PIF_VALUE: 19
PIF_VALUE: 21
PIF_VALUE: 20
PIF_VALUE: 20
PIF_VALUE: 9
PIF_VALUE: 18
PIF_VALUE: 20
PIF_VALUE: 19
PIF_VALUE: 7
PIF_VALUE: 20
PIF_VALUE: 21
PIF_VALUE: 20

## 2018-12-16 ASSESSMENT — PAIN SCALES - GENERAL: PAINLEVEL_OUTOF10: 0

## 2018-12-16 NOTE — PROGRESS NOTES
VSS. NPO. CHD wipes to cervical area at 0600. Collar on and aligned. Lantus was held before bed. Robin in place.

## 2018-12-16 NOTE — PROGRESS NOTES
Patient back from surgery , thoracic chest incision cleansed with hydrogen peroxide, then dressing with medipore to area, pt on ancef iv till drain is pulled by neuro surgery, thoracic surgery to see tomorrow,

## 2018-12-16 NOTE — ANESTHESIA PROCEDURE NOTES
Arterial Line:    An arterial line was placed using surface landmarks, in the OR for the following indication(s): continuous blood pressure monitoring and blood sampling needed. A 20 gauge (size), 1 and 3/8 inch (length), Angiocath (type) catheter was placed, Seldinger technique not used, into the left radial artery, secured by tape, suture and Tegaderm. Anesthesia type: Local  Local infiltration: None    Events:  patient tolerated procedure well with no complications.   Anesthesiologist: Luba Greene  Performed: Anesthesiologist   Preanesthetic Checklist  Completed: patient identified, site marked, surgical consent, pre-op evaluation, timeout performed, IV checked, risks and benefits discussed, monitors and equipment checked, anesthesia consent given, oxygen available and patient being monitored

## 2018-12-16 NOTE — PROGRESS NOTES
Arterial line discontinued. Plan to transfer back to room. No c/o pain. Now awake enough to follow commands to assess lower extremities.

## 2018-12-16 NOTE — PROGRESS NOTES
PACU Transfer Note    Vitals:    12/16/18 1715   BP: (!) 147/86   Pulse: 64   Resp: 12   Temp: 96.8 °F (36 °C)   SpO2: 100%     BP same improved from pre-op    In: 2689 [I.V.:2689]  Out: 1200 [Urine:1100]    Pain assessment:  none  Pain Level: 0    Report given to Receiving unit RAMAKRISHNA Woodward.     12/16/2018 5:20 PM

## 2018-12-16 NOTE — PROGRESS NOTES
redraw CBC in blue top  - BL LE dopplers in am      Bilateral leg weakness and altered sensation from umbilicus down  MRI Thoracic spine with no acute pathology. MRI cervical spine as above. - Neurosurgery following; patient to go to OR for decompression today 12/12  - Q4 hour neuro checks  - strict bed rest  - C-collar    CAD s/p CABG after MI (NSTEMI)  -s/p 3 vessel CABG (LAD, OM and PDA) 12/4/18  -Consult to cardiology given recent CABG              -hold ASA and plavix until decompression  - Toprol XL 75mg bid, holding ARB  - Continue atorvastatin 20 mg qhs      Acute blood loss anemia post surgical  Baseline 13.3--> 8.2-9 after surgery.  -Daily CBC - stable     CKD 3  Cr. 1.6 at admit (baseline since August appears to be 2)  -Daily renal panel   -Nephrology following  -held Bumex on 12/8  -holding ABG as Cr is elevated, giving hydralazine instead - 10 mg q 8 hr     Atrial fibrillation   -was on amio gtt at OSH  -currently NSR   -Toprol XL 75 mg BID      Type II Diabetes Mellitus  Chronic, stable/uncontrolled. Known diabetic complications:nephropathy, cardiovascular disease and cerebrovascular disease. Home diabetic medications include oral agent (monotherapy): glimepiride. A1C 7.3 (12/4/18)   Plan  - Lantus 8u qhs  - HDSSI  - Accuchecks  - Hypoglycemia protocol  - cont Neurontin 300 mg QHS      FELIPE  On CPAP at home. Follows [facility] sleep medicine. Takes gabapentin for periodic limb movement disorder qhs.    Plan   -Continue CPAP     HTN   -Currently controlled, see above plan      Chronic Systolic and Diastolic Heart Failure  Echo 11/2018, LVEF 25-31%, Grade 1 Diastolic Dysfunction  Plan   - Toprol XL bid     Hx of anoxic brain injury in 1990's following MI with short term memory deficit   -Spinal cord, fall precautions   -Patient demonstrates poor insight at times, may need reorientation to not move neck and to situation     DVT Prophylaxis: none for surgery tomorrow  Diet: Diet NPO Time Specified Exceptions

## 2018-12-17 ENCOUNTER — APPOINTMENT (OUTPATIENT)
Dept: VASCULAR LAB | Age: 58
DRG: 023 | End: 2018-12-17
Attending: FAMILY MEDICINE
Payer: COMMERCIAL

## 2018-12-17 LAB
ALBUMIN SERPL-MCNC: 2.3 G/DL (ref 3.4–5)
ANION GAP SERPL CALCULATED.3IONS-SCNC: 12 MMOL/L (ref 3–16)
BUN BLDV-MCNC: 59 MG/DL (ref 7–20)
CALCIUM SERPL-MCNC: 8.3 MG/DL (ref 8.3–10.6)
CHLORIDE BLD-SCNC: 106 MMOL/L (ref 99–110)
CO2: 20 MMOL/L (ref 21–32)
CREAT SERPL-MCNC: 1.7 MG/DL (ref 0.9–1.3)
GFR AFRICAN AMERICAN: 50
GFR NON-AFRICAN AMERICAN: 41
GLUCOSE BLD-MCNC: 205 MG/DL (ref 70–99)
GLUCOSE BLD-MCNC: 222 MG/DL (ref 70–99)
GLUCOSE BLD-MCNC: 238 MG/DL (ref 70–99)
GLUCOSE BLD-MCNC: 243 MG/DL (ref 70–99)
GLUCOSE BLD-MCNC: 260 MG/DL (ref 70–99)
HCT VFR BLD CALC: 33.7 % (ref 40.5–52.5)
HEMOGLOBIN: 10.7 G/DL (ref 13.5–17.5)
MAGNESIUM: 2.2 MG/DL (ref 1.8–2.4)
MCH RBC QN AUTO: 29.1 PG (ref 26–34)
MCHC RBC AUTO-ENTMCNC: 31.8 G/DL (ref 31–36)
MCV RBC AUTO: 91.6 FL (ref 80–100)
PDW BLD-RTO: 17.4 % (ref 12.4–15.4)
PERFORMED ON: ABNORMAL
PHOSPHORUS: 4.7 MG/DL (ref 2.5–4.9)
PLATELET # BLD: 86 K/UL (ref 135–450)
PMV BLD AUTO: 10 FL (ref 5–10.5)
POTASSIUM SERPL-SCNC: 5.2 MMOL/L (ref 3.5–5.1)
RBC # BLD: 3.68 M/UL (ref 4.2–5.9)
SODIUM BLD-SCNC: 138 MMOL/L (ref 136–145)
WBC # BLD: 10.1 K/UL (ref 4–11)

## 2018-12-17 PROCEDURE — 93970 EXTREMITY STUDY: CPT

## 2018-12-17 PROCEDURE — 1200000000 HC SEMI PRIVATE

## 2018-12-17 PROCEDURE — 6370000000 HC RX 637 (ALT 250 FOR IP): Performed by: INTERNAL MEDICINE

## 2018-12-17 PROCEDURE — 2580000003 HC RX 258: Performed by: PHYSICIAN ASSISTANT

## 2018-12-17 PROCEDURE — 6370000000 HC RX 637 (ALT 250 FOR IP): Performed by: STUDENT IN AN ORGANIZED HEALTH CARE EDUCATION/TRAINING PROGRAM

## 2018-12-17 PROCEDURE — 6370000000 HC RX 637 (ALT 250 FOR IP): Performed by: PHYSICIAN ASSISTANT

## 2018-12-17 PROCEDURE — 6360000002 HC RX W HCPCS: Performed by: PHYSICIAN ASSISTANT

## 2018-12-17 PROCEDURE — 2580000003 HC RX 258: Performed by: STUDENT IN AN ORGANIZED HEALTH CARE EDUCATION/TRAINING PROGRAM

## 2018-12-17 PROCEDURE — 85027 COMPLETE CBC AUTOMATED: CPT

## 2018-12-17 PROCEDURE — 94761 N-INVAS EAR/PLS OXIMETRY MLT: CPT

## 2018-12-17 PROCEDURE — 83735 ASSAY OF MAGNESIUM: CPT

## 2018-12-17 PROCEDURE — 6360000002 HC RX W HCPCS: Performed by: NURSE PRACTITIONER

## 2018-12-17 PROCEDURE — 94660 CPAP INITIATION&MGMT: CPT

## 2018-12-17 PROCEDURE — 36415 COLL VENOUS BLD VENIPUNCTURE: CPT

## 2018-12-17 PROCEDURE — 80069 RENAL FUNCTION PANEL: CPT

## 2018-12-17 RX ADMIN — INSULIN LISPRO 2 UNITS: 100 INJECTION, SOLUTION INTRAVENOUS; SUBCUTANEOUS at 11:54

## 2018-12-17 RX ADMIN — Medication 10 MG: at 21:23

## 2018-12-17 RX ADMIN — TAMSULOSIN HYDROCHLORIDE 0.4 MG: 0.4 CAPSULE ORAL at 09:19

## 2018-12-17 RX ADMIN — INSULIN LISPRO 1 UNITS: 100 INJECTION, SOLUTION INTRAVENOUS; SUBCUTANEOUS at 21:27

## 2018-12-17 RX ADMIN — GABAPENTIN 300 MG: 300 CAPSULE ORAL at 20:47

## 2018-12-17 RX ADMIN — METHOCARBAMOL 1000 MG: 100 INJECTION, SOLUTION INTRAMUSCULAR; INTRAVENOUS at 01:13

## 2018-12-17 RX ADMIN — DOCUSATE SODIUM 100 MG: 100 CAPSULE, LIQUID FILLED ORAL at 20:47

## 2018-12-17 RX ADMIN — HYDRALAZINE HYDROCHLORIDE 25 MG: 25 TABLET, FILM COATED ORAL at 05:22

## 2018-12-17 RX ADMIN — HYDRALAZINE HYDROCHLORIDE 25 MG: 25 TABLET, FILM COATED ORAL at 20:46

## 2018-12-17 RX ADMIN — FAMOTIDINE 20 MG: 20 TABLET ORAL at 09:19

## 2018-12-17 RX ADMIN — SENNOSIDES AND DOCUSATE SODIUM 1 TABLET: 8.6; 5 TABLET ORAL at 09:19

## 2018-12-17 RX ADMIN — Medication 10 ML: at 09:35

## 2018-12-17 RX ADMIN — CEFAZOLIN SODIUM 2 G: 2 SOLUTION INTRAVENOUS at 05:22

## 2018-12-17 RX ADMIN — INSULIN LISPRO 2 UNITS: 100 INJECTION, SOLUTION INTRAVENOUS; SUBCUTANEOUS at 09:27

## 2018-12-17 RX ADMIN — DEXAMETHASONE 4 MG: 4 TABLET ORAL at 17:40

## 2018-12-17 RX ADMIN — HYDRALAZINE HYDROCHLORIDE 25 MG: 25 TABLET, FILM COATED ORAL at 17:39

## 2018-12-17 RX ADMIN — METHOCARBAMOL 1000 MG: 100 INJECTION, SOLUTION INTRAMUSCULAR; INTRAVENOUS at 09:29

## 2018-12-17 RX ADMIN — DOCUSATE SODIUM 100 MG: 100 CAPSULE, LIQUID FILLED ORAL at 09:19

## 2018-12-17 RX ADMIN — Medication 10 ML: at 22:12

## 2018-12-17 RX ADMIN — ENOXAPARIN SODIUM 40 MG: 40 INJECTION SUBCUTANEOUS at 09:21

## 2018-12-17 RX ADMIN — METOPROLOL SUCCINATE 75 MG: 50 TABLET, EXTENDED RELEASE ORAL at 09:18

## 2018-12-17 RX ADMIN — SODIUM BICARBONATE 650 MG: 650 TABLET ORAL at 09:20

## 2018-12-17 RX ADMIN — INSULIN LISPRO 2 UNITS: 100 INJECTION, SOLUTION INTRAVENOUS; SUBCUTANEOUS at 17:41

## 2018-12-17 RX ADMIN — METOPROLOL SUCCINATE 75 MG: 50 TABLET, EXTENDED RELEASE ORAL at 20:51

## 2018-12-17 RX ADMIN — SODIUM CHLORIDE: 9 INJECTION, SOLUTION INTRAVENOUS at 01:12

## 2018-12-17 RX ADMIN — DEXAMETHASONE 4 MG: 4 TABLET ORAL at 05:22

## 2018-12-17 RX ADMIN — ATORVASTATIN CALCIUM 20 MG: 20 TABLET, FILM COATED ORAL at 20:47

## 2018-12-17 RX ADMIN — FAMOTIDINE 20 MG: 20 TABLET ORAL at 20:47

## 2018-12-17 RX ADMIN — SODIUM BICARBONATE 650 MG: 650 TABLET ORAL at 20:47

## 2018-12-17 NOTE — PROGRESS NOTES
NEUROSURGERY POST-OP PROGRESS NOTE    Patient Name: Aurelio Jose YOB: 1960   Sex: Male Age: 60 yrs     Medical Record Number: [id number] Acct Number: [de-identified]   Room Number: 0325/8087-36 Hospital Day: Hospital Day: 11     Interval History:  Post-operative Day# 1 s/p ACDF C5-7 w/ C6 corpectomy     Subjective: Patient states he already feels like he is improving strength     Objective:    VITAL SIGNS   BP (!) 164/89   Pulse 58   Temp 97.5 °F (36.4 °C) (Oral)   Resp 18   Ht 5' 10\" (1.778 m)   Wt 255 lb 11.7 oz (116 kg)   SpO2 99%   BMI 36.69 kg/m²    Height Height: 5' 10\" (177.8 cm)   Weight Weight: 255 lb 11.7 oz (116 kg)        Allergies Allergies   Allergen Reactions    Enalapril     Nadolol     Verapamil       NPO Status DIET GENERAL;   Isolation No active isolations     LABS   Basic Metabolic Profile Recent Labs      12/15/18   0626  12/16/18   0605  12/17/18   0543   NA  134*  136  138   CL  102  102  106   CO2  19*  22  20*   BUN  57*  65*  59*   CREATININE  1.9*  1.8*  1.7*   GLUCOSE  200*  216*  260*   PHOS  3.8  4.5  4.7   MG  2.20  2.40  2.20      Complete Blood Count Recent Labs      12/16/18   0605  12/16/18   1651  12/17/18   0543   WBC  11.9*  8.9  10.1   RBC  4.10*  3.59*  3.68*      Coagulation Studies No results for input(s): PTT, INR in the last 72 hours.     Invalid input(s): PLATELETS, PROA, PT, PTTA     MEDICATIONS   Inpatient Medications     sodium chloride flush, 10 mL, Intravenous, 2 times per day    docusate sodium, 100 mg, Oral, BID    sennosides-docusate sodium, 1 tablet, Oral, Daily    enoxaparin, 40 mg, Subcutaneous, Daily    hydrALAZINE, 25 mg, Oral, 3 times per day    sodium bicarbonate, 650 mg, Oral, BID    insulin glargine, 10 Units, Subcutaneous, BID    insulin lispro, 0-6 Units, Subcutaneous, TID WC    insulin lispro, 0-3 Units, Subcutaneous, Nightly    [COMPLETED] dexamethasone, 4 mg, Oral, 4 times per day **FOLLOWED BY** dexamethasone, 4 mg, Oral, 3 times per day **FOLLOWED BY** dexamethasone, 4 mg, Oral, 2 times per day **FOLLOWED BY** [START ON 12/19/2018] dexamethasone, 4 mg, Oral, Daily    gabapentin, 300 mg, Oral, Nightly    atorvastatin, 20 mg, Oral, Nightly    famotidine, 20 mg, Oral, BID    metoprolol succinate, 75 mg, Oral, BID    tamsulosin, 0.4 mg, Oral, Daily    sodium chloride flush, 10 mL, Intravenous, 2 times per day   Infusions    dextrose        Antibiotics   Recent Abx Admin                   ceFAZolin (ANCEF) 2 g in dextrose 3 % 50 mL IVPB (duplex) (g) 2 g New Bag 12/17/18 0522     2 g New Bag 12/16/18 2124    sodium chloride 0.9 % 1,000 mL with bacitracin 50,000 Units (mL) 1,000 mL Given 12/16/18 1414    ceFAZolin (ANCEF) 2 g in dextrose 3 % 50 mL IVPB (duplex) (g) 2 g Given 12/16/18 1301                 Neurologic Exam:    Mental status: awake, alert, oriented x 4, speech clear     Musculoskeletal:   Gait: Not tested   Tone: Normal   Sensory: Intact to light touch   Motor strength:    Right  Left    Right  Left    Deltoid  5 5  Hip Flex  4 4   Biceps  5 5  Knee Extensors  4 4   Triceps  5 5  Knee Flexors  4 4   Wrist Ext  5 5  Ankle Dorsiflex. 4 4   Wrist Flex  5 5  Ankle Plantarflex. 4 4   Handgrip  5 5  Ext Harpreet Longus  4 4   Thumb Ext  5 5         Incision: Anterior cervical incision   Drain: 50ml    Respiratory:  Unlabored respiratory pattern    Abdomen:   Soft, ND   Last BM 12/16    Cardiovascular:  Warm, well perfused    Assessment   Patient is a 59 y/o M POD#1 s/p ACDF C5-7 & C6 corpectomy d/t cervical stenosis w/ myelopathy     Plan:  1. Neurologic exam frequency: Q4H  2. [place] J collar - will need to order new from [facility]   3. Mobility: PT/OT as tolerates (has brace in place currently)  4. Steroids: Quick taper   5. Diet: PO diet as tolerates   6. Antibiotics: completed post operative course  7. Drain: Low output - will DC today   8. Robin: per primary team if still in place  9.  DVT Prophylaxis: SCDs &

## 2018-12-17 NOTE — PROGRESS NOTES
weakness prior to CABG, but worse afterwards. Patient demonstrating bladder retention (1650 mL drained), unable to stand or hold upper body up on sitting, decreased sensation over abdomen from umbilicus below and L1, L2 and medial part of L3 dermatomes. - Neurosurgery following              -corpectomy planned Saturday 12/15              -Continue Decadron 4 mg q6h  - C- collar  - Spinal cord precautions  - Bedrest  - q4h neuro checks  - Hold anticoagulation, ASA, plavix (held day #6 of 7 days)              -CT surgery says it is reasonable to stop ASA and plavix for spinal decompression              -antiplatelets should be restarted as soon as possible after decompression if okay with NSG     Thrombocytopenia:   Presented with  (12/4) and yesterday PL 66; today PL 78  - redraw CBC in blue top  - BL LE dopplers in am      Bilateral leg weakness and altered sensation from umbilicus down  MRI Thoracic spine with no acute pathology. MRI cervical spine as above. - Neurosurgery following; patient to go to OR for decompression today 12/12  - Q4 hour neuro checks  - strict bed rest  - C-collar    CAD s/p CABG after MI (NSTEMI)  -s/p 3 vessel CABG (LAD, OM and PDA) 12/4/18  -Consult to cardiology given recent CABG              -hold ASA and plavix until decompression  - Toprol XL 75mg bid, holding ARB  - Continue atorvastatin 20 mg qhs      Acute blood loss anemia post surgical  Baseline 13.3--> 8.2-9 after surgery.  -Daily CBC - stable     CKD 3  Cr. 1.6 at admit (baseline since August appears to be 2)  -Daily renal panel   -Nephrology following  -held Bumex on 12/8  -holding ABG as Cr is elevated, giving hydralazine instead - 10 mg q 8 hr     Atrial fibrillation   -was on amio gtt at OSH  -currently NSR   -Toprol XL 75 mg BID      Type II Diabetes Mellitus  Chronic, stable/uncontrolled. Known diabetic complications:nephropathy, cardiovascular disease and cerebrovascular disease.  Home diabetic medications

## 2018-12-18 LAB
ALBUMIN SERPL-MCNC: 2.4 G/DL (ref 3.4–5)
ANION GAP SERPL CALCULATED.3IONS-SCNC: 11 MMOL/L (ref 3–16)
BUN BLDV-MCNC: 55 MG/DL (ref 7–20)
CALCIUM SERPL-MCNC: 8.8 MG/DL (ref 8.3–10.6)
CHLORIDE BLD-SCNC: 111 MMOL/L (ref 99–110)
CO2: 19 MMOL/L (ref 21–32)
CREAT SERPL-MCNC: 1.5 MG/DL (ref 0.9–1.3)
GFR AFRICAN AMERICAN: 58
GFR NON-AFRICAN AMERICAN: 48
GLUCOSE BLD-MCNC: 145 MG/DL (ref 70–99)
GLUCOSE BLD-MCNC: 172 MG/DL (ref 70–99)
GLUCOSE BLD-MCNC: 176 MG/DL (ref 70–99)
GLUCOSE BLD-MCNC: 182 MG/DL (ref 70–99)
GLUCOSE BLD-MCNC: 242 MG/DL (ref 70–99)
HCT VFR BLD CALC: 35.8 % (ref 40.5–52.5)
HEMOGLOBIN: 11.4 G/DL (ref 13.5–17.5)
MAGNESIUM: 2 MG/DL (ref 1.8–2.4)
MCH RBC QN AUTO: 28.9 PG (ref 26–34)
MCHC RBC AUTO-ENTMCNC: 31.8 G/DL (ref 31–36)
MCV RBC AUTO: 91.1 FL (ref 80–100)
PDW BLD-RTO: 17.6 % (ref 12.4–15.4)
PERFORMED ON: ABNORMAL
PHOSPHORUS: 3.6 MG/DL (ref 2.5–4.9)
PLATELET # BLD: 111 K/UL (ref 135–450)
PMV BLD AUTO: 9.4 FL (ref 5–10.5)
POTASSIUM SERPL-SCNC: 5 MMOL/L (ref 3.5–5.1)
RBC # BLD: 3.94 M/UL (ref 4.2–5.9)
SODIUM BLD-SCNC: 141 MMOL/L (ref 136–145)
WBC # BLD: 11.8 K/UL (ref 4–11)

## 2018-12-18 PROCEDURE — 2580000003 HC RX 258: Performed by: STUDENT IN AN ORGANIZED HEALTH CARE EDUCATION/TRAINING PROGRAM

## 2018-12-18 PROCEDURE — 94761 N-INVAS EAR/PLS OXIMETRY MLT: CPT

## 2018-12-18 PROCEDURE — 6370000000 HC RX 637 (ALT 250 FOR IP): Performed by: INTERNAL MEDICINE

## 2018-12-18 PROCEDURE — 83735 ASSAY OF MAGNESIUM: CPT

## 2018-12-18 PROCEDURE — 6360000002 HC RX W HCPCS: Performed by: PHYSICIAN ASSISTANT

## 2018-12-18 PROCEDURE — 94660 CPAP INITIATION&MGMT: CPT

## 2018-12-18 PROCEDURE — 6360000002 HC RX W HCPCS: Performed by: CLINICAL NURSE SPECIALIST

## 2018-12-18 PROCEDURE — 1200000000 HC SEMI PRIVATE

## 2018-12-18 PROCEDURE — 6360000002 HC RX W HCPCS: Performed by: INTERNAL MEDICINE

## 2018-12-18 PROCEDURE — 2580000003 HC RX 258: Performed by: PHYSICIAN ASSISTANT

## 2018-12-18 PROCEDURE — 6370000000 HC RX 637 (ALT 250 FOR IP): Performed by: STUDENT IN AN ORGANIZED HEALTH CARE EDUCATION/TRAINING PROGRAM

## 2018-12-18 PROCEDURE — 85027 COMPLETE CBC AUTOMATED: CPT

## 2018-12-18 PROCEDURE — 2580000003 HC RX 258: Performed by: CLINICAL NURSE SPECIALIST

## 2018-12-18 PROCEDURE — 97530 THERAPEUTIC ACTIVITIES: CPT

## 2018-12-18 PROCEDURE — 97168 OT RE-EVAL EST PLAN CARE: CPT

## 2018-12-18 PROCEDURE — 87186 SC STD MICRODIL/AGAR DIL: CPT

## 2018-12-18 PROCEDURE — 80069 RENAL FUNCTION PANEL: CPT

## 2018-12-18 PROCEDURE — 87040 BLOOD CULTURE FOR BACTERIA: CPT

## 2018-12-18 PROCEDURE — 87801 DETECT AGNT MULT DNA AMPLI: CPT

## 2018-12-18 PROCEDURE — 36415 COLL VENOUS BLD VENIPUNCTURE: CPT

## 2018-12-18 PROCEDURE — 6360000002 HC RX W HCPCS: Performed by: NURSE PRACTITIONER

## 2018-12-18 PROCEDURE — 6370000000 HC RX 637 (ALT 250 FOR IP): Performed by: PHYSICIAN ASSISTANT

## 2018-12-18 PROCEDURE — 6360000002 HC RX W HCPCS: Performed by: STUDENT IN AN ORGANIZED HEALTH CARE EDUCATION/TRAINING PROGRAM

## 2018-12-18 RX ORDER — FUROSEMIDE 10 MG/ML
40 INJECTION INTRAMUSCULAR; INTRAVENOUS 2 TIMES DAILY
Status: COMPLETED | OUTPATIENT
Start: 2018-12-18 | End: 2018-12-18

## 2018-12-18 RX ORDER — HYDRALAZINE HYDROCHLORIDE 20 MG/ML
10 INJECTION INTRAMUSCULAR; INTRAVENOUS ONCE
Status: COMPLETED | OUTPATIENT
Start: 2018-12-18 | End: 2018-12-18

## 2018-12-18 RX ORDER — LOSARTAN POTASSIUM 25 MG/1
25 TABLET ORAL DAILY
Status: DISCONTINUED | OUTPATIENT
Start: 2018-12-18 | End: 2018-12-21

## 2018-12-18 RX ADMIN — METOPROLOL SUCCINATE 75 MG: 50 TABLET, EXTENDED RELEASE ORAL at 21:21

## 2018-12-18 RX ADMIN — ATORVASTATIN CALCIUM 20 MG: 20 TABLET, FILM COATED ORAL at 21:22

## 2018-12-18 RX ADMIN — Medication 10 MG: at 21:21

## 2018-12-18 RX ADMIN — HYDRALAZINE HYDROCHLORIDE 25 MG: 25 TABLET, FILM COATED ORAL at 06:06

## 2018-12-18 RX ADMIN — PIPERACILLIN SODIUM,TAZOBACTAM SODIUM 3.38 G: 3; .375 INJECTION, POWDER, FOR SOLUTION INTRAVENOUS at 21:20

## 2018-12-18 RX ADMIN — SODIUM BICARBONATE 650 MG: 650 TABLET ORAL at 21:21

## 2018-12-18 RX ADMIN — HYDRALAZINE HYDROCHLORIDE 10 MG: 20 INJECTION INTRAMUSCULAR; INTRAVENOUS at 02:34

## 2018-12-18 RX ADMIN — FUROSEMIDE 40 MG: 10 INJECTION, SOLUTION INTRAMUSCULAR; INTRAVENOUS at 21:21

## 2018-12-18 RX ADMIN — TAMSULOSIN HYDROCHLORIDE 0.4 MG: 0.4 CAPSULE ORAL at 08:18

## 2018-12-18 RX ADMIN — LOSARTAN POTASSIUM 25 MG: 25 TABLET ORAL at 10:07

## 2018-12-18 RX ADMIN — Medication 10 ML: at 21:00

## 2018-12-18 RX ADMIN — INSULIN LISPRO 2 UNITS: 100 INJECTION, SOLUTION INTRAVENOUS; SUBCUTANEOUS at 18:20

## 2018-12-18 RX ADMIN — FAMOTIDINE 20 MG: 20 TABLET ORAL at 08:19

## 2018-12-18 RX ADMIN — INSULIN LISPRO 1 UNITS: 100 INJECTION, SOLUTION INTRAVENOUS; SUBCUTANEOUS at 12:32

## 2018-12-18 RX ADMIN — DEXAMETHASONE 4 MG: 4 TABLET ORAL at 08:18

## 2018-12-18 RX ADMIN — SODIUM BICARBONATE 650 MG: 650 TABLET ORAL at 08:17

## 2018-12-18 RX ADMIN — INSULIN LISPRO 1 UNITS: 100 INJECTION, SOLUTION INTRAVENOUS; SUBCUTANEOUS at 08:04

## 2018-12-18 RX ADMIN — GABAPENTIN 300 MG: 300 CAPSULE ORAL at 21:22

## 2018-12-18 RX ADMIN — METOPROLOL SUCCINATE 75 MG: 50 TABLET, EXTENDED RELEASE ORAL at 08:17

## 2018-12-18 RX ADMIN — HYDRALAZINE HYDROCHLORIDE 25 MG: 25 TABLET, FILM COATED ORAL at 14:03

## 2018-12-18 RX ADMIN — Medication 10 ML: at 08:19

## 2018-12-18 RX ADMIN — HYDRALAZINE HYDROCHLORIDE 25 MG: 25 TABLET, FILM COATED ORAL at 21:21

## 2018-12-18 RX ADMIN — FAMOTIDINE 20 MG: 20 TABLET ORAL at 21:22

## 2018-12-18 RX ADMIN — Medication 10 ML: at 21:22

## 2018-12-18 RX ADMIN — PIPERACILLIN SODIUM,TAZOBACTAM SODIUM 3.38 G: 3; .375 INJECTION, POWDER, FOR SOLUTION INTRAVENOUS at 13:40

## 2018-12-18 RX ADMIN — Medication 10 ML: at 08:18

## 2018-12-18 RX ADMIN — ENOXAPARIN SODIUM 40 MG: 40 INJECTION SUBCUTANEOUS at 08:16

## 2018-12-18 RX ADMIN — DOCUSATE SODIUM 100 MG: 100 CAPSULE, LIQUID FILLED ORAL at 21:22

## 2018-12-18 RX ADMIN — INSULIN LISPRO 1 UNITS: 100 INJECTION, SOLUTION INTRAVENOUS; SUBCUTANEOUS at 21:36

## 2018-12-18 RX ADMIN — FUROSEMIDE 40 MG: 10 INJECTION, SOLUTION INTRAMUSCULAR; INTRAVENOUS at 10:09

## 2018-12-18 ASSESSMENT — PAIN SCALES - GENERAL: PAINLEVEL_OUTOF10: 0

## 2018-12-18 NOTE — PROGRESS NOTES
Screening  Patient Currently in Pain: Denies  Vital Signs  Patient Currently in Pain: Denies       Orientation  Orientation  Overall Orientation Status: Within Normal Limits  Cognition      Objective   Bed mobility  Rolling to Left: Dependent/Total (max x 1 + mod x 1)  Rolling to Right: Dependent/Total (max x 1 + mod x 1)  Supine to Sit: Dependent/Total (max x 2)  Sit to Supine: Dependent/Total (max x 2)     Transfers  Sit to Stand: Unable to assess  Stand to sit: Unable to assess  Bed to Chair: Dependent/Total (maxisky)     Balance  Comments: pt sat EOB 10-15 minute with variable trunk support, but mostly max A - able to use UEs on bed to balance CGA briefly. pt completed UE reaching with 1 UE support, but required max A for balance during reaching with significant posterior lean. Assessment   Body structures, Functions, Activity limitations: Decreased functional mobility ; Decreased endurance;Decreased coordination;Decreased sensation;Decreased ROM; Decreased balance;Decreased strength;Decreased cognition  Assessment: Pt is normally independent, lives alone. Currently very limited 2/2 LE weakness and UE restrictions post CABG. Recommend continued therapies both while inpatient and at DC. Pt at similar functional level after cervical decompression. Very motivated to participate in therapy. Treatment Diagnosis: decreased mobility  Prognosis: Good  Patient Education: role of PT, safety, maxisky transfer  REQUIRES PT FOLLOW UP: Yes  Activity Tolerance  Activity Tolerance: Patient Tolerated treatment well  Activity Tolerance: Limited by LE weakness and UE restrictions.         AM-PAC Score  AM-PAC Inpatient Mobility Raw Score : 7  AM-PAC Inpatient T-Scale Score : 26.42  Mobility Inpatient CMS 0-100% Score: 92.36  Mobility Inpatient CMS G-Code Modifier : CM    Goals  Short term goals  Time Frame for Short term goals: Discharge  Short term goal 1: Bed mobility with max x 1 - ongoing  Short term goal 2: Maintain

## 2018-12-18 NOTE — PROGRESS NOTES
NEUROSURGERY POST-OP PROGRESS NOTE    Patient Name: Carroll Quick YOB: 1960   Sex: Male Age: 60 yrs     Medical Record Number: [id number] Acct Number: [de-identified]   Room Number: 0576/3718-47 Hospital Day: Hospital Day: 12     Interval History:  Post-operative Day# 2 s/p ACDF C5-7 w/ C6 corpectomy     Subjective: Patient states he already feels like he is improving strength     Objective:    VITAL SIGNS   BP (!) 152/89   Pulse 66   Temp 97.6 °F (36.4 °C) (Oral)   Resp 16   Ht 5' 10\" (1.778 m)   Wt 255 lb 11.7 oz (116 kg)   SpO2 97%   BMI 36.69 kg/m²    Height Height: 5' 10\" (177.8 cm)   Weight Weight: 255 lb 11.7 oz (116 kg)        Allergies Allergies   Allergen Reactions    Enalapril     Nadolol     Verapamil       NPO Status DIET GENERAL;   Isolation No active isolations     LABS   Basic Metabolic Profile Recent Labs      12/16/18   0605  12/17/18   0543  12/18/18   0606   NA  136  138  141   CL  102  106  111*   CO2  22  20*  19*   BUN  65*  59*  55*   CREATININE  1.8*  1.7*  1.5*   GLUCOSE  216*  260*  176*   PHOS  4.5  4.7  3.6   MG  2.40  2.20  2.00      Complete Blood Count Recent Labs      12/16/18   1651  12/17/18   0543  12/18/18   0606   WBC  8.9  10.1  11.8*   RBC  3.59*  3.68*  3.94*      Coagulation Studies No results for input(s): PTT, INR in the last 72 hours.     Invalid input(s): PLATELETS, PROA, PT, PTTA     MEDICATIONS   Inpatient Medications     losartan, 25 mg, Oral, Daily    furosemide, 40 mg, Intravenous, BID    sodium chloride flush, 10 mL, Intravenous, 2 times per day    docusate sodium, 100 mg, Oral, BID    sennosides-docusate sodium, 1 tablet, Oral, Daily    enoxaparin, 40 mg, Subcutaneous, Daily    hydrALAZINE, 25 mg, Oral, 3 times per day    sodium bicarbonate, 650 mg, Oral, BID    insulin glargine, 10 Units, Subcutaneous, BID    insulin lispro, 0-6 Units, Subcutaneous, TID WC    insulin lispro, 0-3 Units, Subcutaneous, Nightly    [COMPLETED] dexamethasone, 4 mg, Oral, 4 times per day **FOLLOWED BY** [] dexamethasone, 4 mg, Oral, 3 times per day **FOLLOWED BY** [COMPLETED] dexamethasone, 4 mg, Oral, 2 times per day **FOLLOWED BY** [START ON 2018] dexamethasone, 4 mg, Oral, Daily    gabapentin, 300 mg, Oral, Nightly    atorvastatin, 20 mg, Oral, Nightly    famotidine, 20 mg, Oral, BID    metoprolol succinate, 75 mg, Oral, BID    tamsulosin, 0.4 mg, Oral, Daily    sodium chloride flush, 10 mL, Intravenous, 2 times per day   Infusions    dextrose        Antibiotics   Recent Abx Admin      No antibiotic orders with administrations found. Neurologic Exam:    Mental status: awake, alert, oriented x 4, speech clear     Musculoskeletal:   Gait: Not tested   Tone: Normal   Sensory: Intact to light touch   Motor strength:    Right  Left    Right  Left    Deltoid  5 5  Hip Flex  4 4   Biceps  5 5  Knee Extensors  4 4   Triceps  5 5  Knee Flexors  4 4   Wrist Ext  5 5  Ankle Dorsiflex. 4 4   Wrist Flex  5 5  Ankle Plantarflex. 4 4   Handgrip  5 5  Ext Harpreet Longus  4 4   Thumb Ext  5 5         Incision: CDI    Drain: DC'd    Respiratory:  Unlabored respiratory pattern    Abdomen:   Soft, ND   Last BM 12/16    Cardiovascular:  Warm, well perfused    Assessment:  Patient is a 61 y/o M POD#1 s/p ACDF C5-7 & C6 corpectomy d/t cervical stenosis w/ myelopathy     Plan:  1. Neurologic exam frequency: Q4H  2. [place] J collar to be worn at all times, except for hygiene purposes  3. Mobility: PT/OT as tolerates (has brace in place currently)  4. Steroids: Quick taper   5. Diet: PO diet as tolerates   6. Antibiotics: completed post operative course  7. Drain: DC'd   8. Robin: per primary team if still in place  9. DVT Prophylaxis: SCDs & Lovenox   10. Antiplt therapy: current plan is to resume on POD#7   11. GI Prophylaxis: Pepcid   12. Bowel Regimen: Per primary team   13. Pain control: PRN   14.  Incisional Care: Okay to leave incision STEFANO,

## 2018-12-18 NOTE — PROGRESS NOTES
Progress Note  Hospital Day: 12  POD#  14  S/P Coronary artery bypass x 3 (2018)    Chief Complaint: Postop follow up    Mr. Calero is sitting in bed with visitors in room. Called by bedside RN that sternotomy incisions had increased brownish drainage. VITAL SIGNS   Temperature:  Current - Temp: 97.6 °F (36.4 °C); Max - Temp  Av.9 °F (36.6 °C)  Min: 97.6 °F (36.4 °C)  Max: 98.1 °F (36.7 °C)    Respiratory Rate : Resp  Av.6  Min: 16  Max: 18    Pulse Range: Pulse  Av.4  Min: 56  Max: 68    Blood Pressure Range:  Systolic (64QIJ), GAL:060 , Min:149 , MQR:001   ; Diastolic (82VOC), JFI:45, Min:89, Max:96    Pulse ox Range: SpO2  Av.5 %  Min: 97 %  Max: 98 %  O2 Flow Rate (L/min): 0 L/min    Admission Weight: Weight: 261 lb 7.5 oz (118.6 kg)    Current Weight: No data found.     I/O:   Intake/Output Summary (Last 24 hours) at 18 1220  Last data filed at 18 0446   Gross per 24 hour   Intake                0 ml   Output             2700 ml   Net            -2700 ml     Urine (mohamud): [phone] ml/shift    LINES/ACCESS:   Peripheral Access    Diet: DIET GENERAL;    MEDICATIONS:      losartan  25 mg Oral Daily    furosemide  40 mg Intravenous BID    sodium chloride flush  10 mL Intravenous 2 times per day    docusate sodium  100 mg Oral BID    sennosides-docusate sodium  1 tablet Oral Daily    enoxaparin  40 mg Subcutaneous Daily    hydrALAZINE  25 mg Oral 3 times per day    sodium bicarbonate  650 mg Oral BID    insulin glargine  10 Units Subcutaneous BID    insulin lispro  0-6 Units Subcutaneous TID     insulin lispro  0-3 Units Subcutaneous Nightly    [START ON 2018] dexamethasone  4 mg Oral Daily    gabapentin  300 mg Oral Nightly    atorvastatin  20 mg Oral Nightly    famotidine  20 mg Oral BID    metoprolol succinate  75 mg Oral BID    tamsulosin  0.4 mg Oral Daily    sodium chloride flush  10 mL Intravenous 2 times per day     DATA REVIEW: CBC: Recent Labs      12/16/18   0605  12/16/18   1651  12/17/18   0543  12/18/18   0606   WBC  11.9*  8.9  10.1  11.8*   HGB  11.9*  10.4*  10.7*  11.4*   HCT  37.5*  33.0*  33.7*  35.8*   MCV  91.4  91.9  91.6  91.1   PLT  78*  73*  86*  111*     BMP: Recent Labs      12/16/18   0605  12/17/18   0543  12/18/18   0606   NA  136  138  141   K  5.2*  5.2*  5.0   CL  102  106  111*   CO2  22  20*  19*   PHOS  4.5  4.7  3.6   GLUCOSE  216*  260*  176*   BUN  65*  59*  55*   CREATININE  1.8*  1.7*  1.5*   CALCIUM  8.8  8.3  8.8   MG  2.40  2.20  2.00     Accucheck Glucoses:   Recent Labs      12/17/18   1153  12/17/18   1640  12/17/18   2027  12/18/18   0722  12/18/18   1126   POCGLU  238*  243*  222*  145*  182*     ABG:  Lab Results   Component Value Date    PHART 7.347 12/04/2018    PO2ART 110.5 12/04/2018    UPQ8VCT 39.1 12/04/2018    F0AAYHGJ 98 12/04/2018    LHS3BZW 23 12/04/2018    YKU7FNT 21.5 12/04/2018    BEART -4 12/04/2018     Chest X-Ray 12/11/2018:   No acute disease      ASSESSMENT:   Patient Active Problem List:     Trigger finger     Hand pain     Anoxic brain injury ([facility] [facility]     HTN (hypertension)     Hyperlipemia     Carpal tunnel syndrome     Wrist pain     Obstructive sleep apnea syndrome     Hyperuricemia     Cardiac arrhythmia     Depression     Gout     Adiposity     Type 2 diabetes mellitus with complication, without long-term current use of insulin (HCC)     ACS (acute coronary syndrome) ([facility])     Gait disturbance     Chronic kidney disease     Abnormal echocardiogram     Coronary artery disease involving native coronary artery of native heart with angina pectoris (HCC)     Abnormal stress test     Cardiomyopathy ([facility] [address]     NSTEMI (non-ST elevated myocardial infarction) ([facility] [address]     Ischemic cardiomyopathy     Acute systolic congestive heart failure (HCC)     Acute renal failure with acute tubular necrosis superimposed on stage 3 chronic kidney disease ([address]     Spinal cord ischemia ([address]     DM (diabetes mellitus), secondary, uncontrolled, w/neurologic complic ([address]     Cervical spinal cord compression (HCC)      Neuro: awake, alert and oriented x 3, thought content appropriate   CV:   Sinus  Pulm:  clear  Wounds: sternotomy incision with two area draining brownish drainage. Sternum rocking  Ext: warm    PLAN:   Obtain blood culture. start IV Zosyn and wound dressing changes every shift  Discussed patient with Dr Virgilio Hardy who is agreeable to assessment and plan.         Austin Sinha, APRN  [phone] pager  12/18/2018  12:20 PM

## 2018-12-18 NOTE — PLAN OF CARE
Problem: Pain - Acute:  Goal: Pain level will decrease  Pain level will decrease    Outcome: Ongoing  Pt is alert and intermittent disoriented to place and time. denies    Problem: Pain:  Goal: Pain level will decrease  Pain level will decrease    Outcome: Ongoing  Pt is alert and intermittent disoriented to place and time. Denies chest pain and SR on tele. No c/o calf pain and slightly able do ankle pumps. No c/o pain for now including anterior neck. Samish J collar ON. Call light within reach.

## 2018-12-19 LAB
ALBUMIN SERPL-MCNC: 2.6 G/DL (ref 3.4–5)
ANION GAP SERPL CALCULATED.3IONS-SCNC: 13 MMOL/L (ref 3–16)
BUN BLDV-MCNC: 56 MG/DL (ref 7–20)
CALCIUM SERPL-MCNC: 8.9 MG/DL (ref 8.3–10.6)
CHLORIDE BLD-SCNC: 105 MMOL/L (ref 99–110)
CO2: 23 MMOL/L (ref 21–32)
CREAT SERPL-MCNC: 1.8 MG/DL (ref 0.9–1.3)
GFR AFRICAN AMERICAN: 47
GFR NON-AFRICAN AMERICAN: 39
GLUCOSE BLD-MCNC: 104 MG/DL (ref 70–99)
GLUCOSE BLD-MCNC: 123 MG/DL (ref 70–99)
GLUCOSE BLD-MCNC: 156 MG/DL (ref 70–99)
GLUCOSE BLD-MCNC: 195 MG/DL (ref 70–99)
GLUCOSE BLD-MCNC: 206 MG/DL (ref 70–99)
HCT VFR BLD CALC: 35.9 % (ref 40.5–52.5)
HCT VFR BLD CALC: 37.7 % (ref 40.5–52.5)
HEMOGLOBIN: 11.3 G/DL (ref 13.5–17.5)
HEMOGLOBIN: 11.9 G/DL (ref 13.5–17.5)
MAGNESIUM: 2 MG/DL (ref 1.8–2.4)
MCH RBC QN AUTO: 28.9 PG (ref 26–34)
MCHC RBC AUTO-ENTMCNC: 31.7 G/DL (ref 31–36)
MCV RBC AUTO: 91.2 FL (ref 80–100)
PDW BLD-RTO: 17.3 % (ref 12.4–15.4)
PERFORMED ON: ABNORMAL
PHOSPHORUS: 4.4 MG/DL (ref 2.5–4.9)
PLATELET # BLD: 123 K/UL (ref 135–450)
PMV BLD AUTO: 9 FL (ref 5–10.5)
POTASSIUM SERPL-SCNC: 4.5 MMOL/L (ref 3.5–5.1)
RBC # BLD: 4.14 M/UL (ref 4.2–5.9)
REPORT: NORMAL
SODIUM BLD-SCNC: 141 MMOL/L (ref 136–145)
WBC # BLD: 12 K/UL (ref 4–11)

## 2018-12-19 PROCEDURE — 6370000000 HC RX 637 (ALT 250 FOR IP): Performed by: PHYSICIAN ASSISTANT

## 2018-12-19 PROCEDURE — 2580000003 HC RX 258: Performed by: STUDENT IN AN ORGANIZED HEALTH CARE EDUCATION/TRAINING PROGRAM

## 2018-12-19 PROCEDURE — 6360000002 HC RX W HCPCS: Performed by: THORACIC SURGERY (CARDIOTHORACIC VASCULAR SURGERY)

## 2018-12-19 PROCEDURE — 6370000000 HC RX 637 (ALT 250 FOR IP): Performed by: STUDENT IN AN ORGANIZED HEALTH CARE EDUCATION/TRAINING PROGRAM

## 2018-12-19 PROCEDURE — 99024 POSTOP FOLLOW-UP VISIT: CPT | Performed by: THORACIC SURGERY (CARDIOTHORACIC VASCULAR SURGERY)

## 2018-12-19 PROCEDURE — 36415 COLL VENOUS BLD VENIPUNCTURE: CPT

## 2018-12-19 PROCEDURE — 85027 COMPLETE CBC AUTOMATED: CPT

## 2018-12-19 PROCEDURE — 83735 ASSAY OF MAGNESIUM: CPT

## 2018-12-19 PROCEDURE — 6360000002 HC RX W HCPCS: Performed by: PHYSICIAN ASSISTANT

## 2018-12-19 PROCEDURE — 2580000003 HC RX 258: Performed by: PHYSICIAN ASSISTANT

## 2018-12-19 PROCEDURE — 80069 RENAL FUNCTION PANEL: CPT

## 2018-12-19 PROCEDURE — 1200000000 HC SEMI PRIVATE

## 2018-12-19 PROCEDURE — 85014 HEMATOCRIT: CPT

## 2018-12-19 PROCEDURE — 6370000000 HC RX 637 (ALT 250 FOR IP): Performed by: INTERNAL MEDICINE

## 2018-12-19 PROCEDURE — 6360000002 HC RX W HCPCS: Performed by: NURSE PRACTITIONER

## 2018-12-19 PROCEDURE — 85018 HEMOGLOBIN: CPT

## 2018-12-19 PROCEDURE — 97530 THERAPEUTIC ACTIVITIES: CPT

## 2018-12-19 PROCEDURE — 2580000003 HC RX 258: Performed by: THORACIC SURGERY (CARDIOTHORACIC VASCULAR SURGERY)

## 2018-12-19 PROCEDURE — 2580000003 HC RX 258: Performed by: CLINICAL NURSE SPECIALIST

## 2018-12-19 PROCEDURE — 6360000002 HC RX W HCPCS: Performed by: STUDENT IN AN ORGANIZED HEALTH CARE EDUCATION/TRAINING PROGRAM

## 2018-12-19 PROCEDURE — 6360000002 HC RX W HCPCS: Performed by: CLINICAL NURSE SPECIALIST

## 2018-12-19 RX ORDER — FLUTICASONE PROPIONATE 50 MCG
1 SPRAY, SUSPENSION (ML) NASAL DAILY
Status: DISCONTINUED | OUTPATIENT
Start: 2018-12-19 | End: 2018-12-27 | Stop reason: HOSPADM

## 2018-12-19 RX ADMIN — INSULIN LISPRO 1 UNITS: 100 INJECTION, SOLUTION INTRAVENOUS; SUBCUTANEOUS at 17:59

## 2018-12-19 RX ADMIN — PIPERACILLIN SODIUM,TAZOBACTAM SODIUM 3.38 G: 3; .375 INJECTION, POWDER, FOR SOLUTION INTRAVENOUS at 05:00

## 2018-12-19 RX ADMIN — Medication 10 ML: at 08:24

## 2018-12-19 RX ADMIN — METOPROLOL SUCCINATE 75 MG: 50 TABLET, EXTENDED RELEASE ORAL at 08:21

## 2018-12-19 RX ADMIN — DEXAMETHASONE 4 MG: 4 TABLET ORAL at 08:22

## 2018-12-19 RX ADMIN — GABAPENTIN 300 MG: 300 CAPSULE ORAL at 22:09

## 2018-12-19 RX ADMIN — Medication 10 ML: at 21:00

## 2018-12-19 RX ADMIN — DOCUSATE SODIUM 100 MG: 100 CAPSULE, LIQUID FILLED ORAL at 08:22

## 2018-12-19 RX ADMIN — INSULIN LISPRO 1 UNITS: 100 INJECTION, SOLUTION INTRAVENOUS; SUBCUTANEOUS at 13:17

## 2018-12-19 RX ADMIN — METOPROLOL SUCCINATE 75 MG: 50 TABLET, EXTENDED RELEASE ORAL at 22:15

## 2018-12-19 RX ADMIN — OXYCODONE AND ACETAMINOPHEN 2 TABLET: 5; 325 TABLET ORAL at 08:22

## 2018-12-19 RX ADMIN — SODIUM BICARBONATE 650 MG: 650 TABLET ORAL at 08:22

## 2018-12-19 RX ADMIN — HYDRALAZINE HYDROCHLORIDE 25 MG: 25 TABLET, FILM COATED ORAL at 06:15

## 2018-12-19 RX ADMIN — CEFEPIME HYDROCHLORIDE 2 G: 2 INJECTION, POWDER, FOR SOLUTION INTRAVENOUS at 22:17

## 2018-12-19 RX ADMIN — TAMSULOSIN HYDROCHLORIDE 0.4 MG: 0.4 CAPSULE ORAL at 08:22

## 2018-12-19 RX ADMIN — HYDRALAZINE HYDROCHLORIDE 25 MG: 25 TABLET, FILM COATED ORAL at 13:25

## 2018-12-19 RX ADMIN — INSULIN LISPRO 1 UNITS: 100 INJECTION, SOLUTION INTRAVENOUS; SUBCUTANEOUS at 22:37

## 2018-12-19 RX ADMIN — LOSARTAN POTASSIUM 25 MG: 25 TABLET ORAL at 08:21

## 2018-12-19 RX ADMIN — SODIUM BICARBONATE 650 MG: 650 TABLET ORAL at 22:09

## 2018-12-19 RX ADMIN — HYDRALAZINE HYDROCHLORIDE 25 MG: 25 TABLET, FILM COATED ORAL at 22:08

## 2018-12-19 RX ADMIN — DOCUSATE SODIUM 100 MG: 100 CAPSULE, LIQUID FILLED ORAL at 22:09

## 2018-12-19 RX ADMIN — ENOXAPARIN SODIUM 40 MG: 40 INJECTION SUBCUTANEOUS at 08:24

## 2018-12-19 RX ADMIN — PIPERACILLIN SODIUM,TAZOBACTAM SODIUM 3.38 G: 3; .375 INJECTION, POWDER, FOR SOLUTION INTRAVENOUS at 13:25

## 2018-12-19 RX ADMIN — FAMOTIDINE 20 MG: 20 TABLET ORAL at 08:22

## 2018-12-19 RX ADMIN — FLUTICASONE PROPIONATE 1 SPRAY: 50 SPRAY, METERED NASAL at 23:29

## 2018-12-19 RX ADMIN — Medication 10 MG: at 23:27

## 2018-12-19 RX ADMIN — ATORVASTATIN CALCIUM 20 MG: 20 TABLET, FILM COATED ORAL at 22:09

## 2018-12-19 RX ADMIN — FAMOTIDINE 20 MG: 20 TABLET ORAL at 23:26

## 2018-12-19 RX ADMIN — PIPERACILLIN SODIUM,TAZOBACTAM SODIUM 3.38 G: 3; .375 INJECTION, POWDER, FOR SOLUTION INTRAVENOUS at 22:22

## 2018-12-19 RX ADMIN — SENNOSIDES AND DOCUSATE SODIUM 1 TABLET: 8.6; 5 TABLET ORAL at 08:22

## 2018-12-19 ASSESSMENT — PAIN DESCRIPTION - PROGRESSION: CLINICAL_PROGRESSION: GRADUALLY WORSENING

## 2018-12-19 ASSESSMENT — PAIN DESCRIPTION - LOCATION: LOCATION: NECK

## 2018-12-19 ASSESSMENT — PAIN SCALES - GENERAL
PAINLEVEL_OUTOF10: 0
PAINLEVEL_OUTOF10: 7

## 2018-12-19 ASSESSMENT — PAIN DESCRIPTION - FREQUENCY: FREQUENCY: CONTINUOUS

## 2018-12-19 ASSESSMENT — PAIN DESCRIPTION - PAIN TYPE: TYPE: SURGICAL PAIN

## 2018-12-19 ASSESSMENT — PAIN DESCRIPTION - ONSET: ONSET: ON-GOING

## 2018-12-19 ASSESSMENT — PAIN DESCRIPTION - ORIENTATION: ORIENTATION: MID

## 2018-12-19 ASSESSMENT — PAIN DESCRIPTION - DESCRIPTORS: DESCRIPTORS: DISCOMFORT;TIGHTNESS

## 2018-12-19 NOTE — PROGRESS NOTES
[facility]  Diabetes Education   Progress Note       NAME:  Lars Penny  MEDICAL RECORD NUMBER:  [id number]  AGE: 60 y.o.    GENDER: male  : 1960  TODAY'S DATE:  2018    Subjective   Reason for Diabetic Education Evaluation and Assessment: General diabetes education    Visit Type: evaluation      Lars Penny is a 60 y.o. male referred by:     [x] Physician  [] Nursing  [] Chart Review   [] Other:     PAST MEDICAL HISTORY        Diagnosis Date    [facility]     Cardiac arrest ([facility]     Diabetes mellitus ([facility]     borderline    Gout     Hyperlipidemia     Hypertension     Reflux     Sleep apnea     Type 2 diabetes mellitus without complication ([facility] [facility]        PAST SURGICAL HISTORY    Past Surgical History:   Procedure Laterality Date    CARPAL TUNNEL RELEASE Left     CARPAL TUNNEL RELEASE Right 4/14/15    CERVICAL One [address] ARTHROPLASTY N/A 2018    C6 CORPECTOMY C5-7 DECOMPRESSION/ FUSION performed by Ranjeet Ann MD at [address] [place] [address]  2014    RI CABG, ARTERIAL, THREE N/A 2018    CORONARY ARTERY BYPASS GRAFTING X3, INTERNAL MAMMARY ARTERY, SAPHENOUS VEIN GRAFT, ON PUMP performed by Hieu Thompson MD at [facility]    Family History   Problem Relation Age of Onset    High Blood Pressure Mother     High Blood Pressure Father     High Blood Pressure Brother     High Blood Pressure Maternal Grandmother     High Blood Pressure Maternal Grandfather        SOCIAL HISTORY    Social History   Substance Use Topics    Smoking status: Never Smoker    Smokeless tobacco: Never Used    Alcohol use No       ALLERGIES    Allergies   Allergen Reactions    Enalapril     Nadolol     Verapamil        MEDICATIONS     losartan  25 mg Oral Daily    piperacillin-tazobactam  3.375 g Intravenous Q8H    sodium chloride flush  10 mL Intravenous 2 times per day    docusate sodium  100 mg Oral BID    sennosides-docusate sodium  1 tablet Oral Daily    enoxaparin  40 mg Subcutaneous Daily    hydrALAZINE  25 mg Oral 3 times per day    sodium bicarbonate  650 mg Oral BID    insulin glargine  10 Units Subcutaneous BID    insulin lispro  0-6 Units Subcutaneous TID WC    insulin lispro  0-3 Units Subcutaneous Nightly    gabapentin  300 mg Oral Nightly    atorvastatin  20 mg Oral Nightly    famotidine  20 mg Oral BID    metoprolol succinate  75 mg Oral BID    tamsulosin  0.4 mg Oral Daily    sodium chloride flush  10 mL Intravenous 2 times per day       Objective        Patient Active Problem List   Diagnosis Code    Trigger finger M65.30    Hand pain M79.643    Anoxic brain injury ([facility] [address] G93.1    HTN (hypertension) I10    Hyperlipemia E78.5    Carpal tunnel syndrome G56.00    Wrist pain M25.539    Obstructive sleep apnea syndrome G47.33    Hyperuricemia E79.0    Cardiac arrhythmia I49.9    Depression F32.9    Gout M10.9    Adiposity E66.9    Type 2 diabetes mellitus with complication, without long-term current use of insulin ([facility]) E11.8    ACS (acute coronary syndrome) ([facility]) I24.9    Gait disturbance R26.9    Chronic kidney disease N18.9    Abnormal echocardiogram R93.1    Coronary artery disease involving native coronary artery of native heart with angina pectoris ([facility]) I25.119    Abnormal stress test R94.39    Cardiomyopathy ([facility]) I42.9    NSTEMI (non-ST elevated myocardial infarction) ([facility]) I21.4    Ischemic cardiomyopathy N92.8    Acute systolic congestive heart failure ([facility]) I50.21    Acute renal failure with acute tubular necrosis superimposed on stage 3 chronic kidney disease ([facility]) N17.0, N18.3    Spinal cord ischemia ([facility]) G95.11    DM (diabetes mellitus), secondary, uncontrolled, w/neurologic complic ([facility]) A80.47, W98.18    Cervical spinal cord compression ([facility]) G95.20        /81   Pulse 70   Temp 98.1 °F (36.7 °C) (Oral)   Resp 18   Ht 5' 10\" (1.778 m)   Wt 255 lb 11.7 oz (116 kg)   SpO2

## 2018-12-19 NOTE — PROGRESS NOTES
Physical Therapy  Facility/Department: [facility] 5T ORTHO/NEURO  Daily Treatment Note  NAME: Chapo Angelo  : 1960  MRN: [id number]    Date of Service: 2018    Discharge Recommendations: Chapo Angelo scored a 7/24 on the AM-PAC short mobility form. Current research shows that an AM-PAC score of 17 or less is typically not associated with a discharge to the patient's home setting. Based on the patients AM-PAC score and their current functional mobility deficits, it is recommended that the patient have 5-7 sessions per week of Physical Therapy at d/c to increase the patients independence. PT Equipment Recommendations  Equipment Needed:  (defer to next level of care)    Patient Diagnosis(es): There were no encounter diagnoses. has a past medical history of Anoxic brain injury ([facility] [address] Cardiac arrest ([facility] [facility] Diabetes mellitus ([facility] [facility] Gout; Hyperlipidemia; Hypertension; Reflux; Sleep apnea; and Type 2 diabetes mellitus without complication ([facility] [facility] has a past surgical history that includes Colonoscopy (2014); Carpal tunnel release (Left); Carpal tunnel release (Right, 4/14/15); pr cabg, arterial, three (N/A, 2018); and Cervical disc arthroplasty (N/A, 2018). Restrictions  Restrictions/Precautions  Restrictions/Precautions: General Precautions, Fall Risk, Surgical Protocols  Position Activity Restriction  Sternal Precautions: No Pushing, No Pulling, 5# Lifting Restrictions  Other position/activity restrictions: [place] J cervical collar to be worn at all times, except hygiene purposes, up with assist  Subjective   General  Chart Reviewed:  Yes  Additional Pertinent Hx: Admit  from [facility] 2/2 BLE paralysis after CABG x 3 on 12/3  Neuro consult -  s/p ACDF C5-7 w/ C6 corpectomy on 12/15 ; PMHx: TBI , [address] [facility], gout, HLD, HTN, reflux, sleep apnea, CABG 2018  Referring Practitioner: Shama Guzman  Subjective  Subjective: pt supine in bed, collar on, denies pain, pleasant and

## 2018-12-19 NOTE — PROGRESS NOTES
Occupational Therapy  Facility/Department: [facility] 5T ORTHO/NEURO  Daily Treatment Note  NAME: Lars Penny  : 1960  MRN: [id number]    Date of Service: 2018    Discharge Recommendations:  Lars Penny scored a  on the AM-PAC ADL Inpatient form. Current research shows that an AM-PAC score of 17 or less is typically not associated with a discharge to the patient's home setting. Based on the patients AM-PAC score and their current ADL deficits, it is recommended that the patient have 5-7 sessions per week of Occupational Therapy at d/c to increase the patients independence. OT Equipment Recommendations  Equipment Needed: No  Other: defer to next level of care    Patient Diagnosis(es): There were no encounter diagnoses. has a past medical history of Anoxic brain injury ([facility] Cardiac arrest ([facility] [facility] Diabetes mellitus ([facility] [facility] Gout; Hyperlipidemia; Hypertension; Reflux; Sleep apnea; and Type 2 diabetes mellitus without complication ([facility] has a past surgical history that includes Colonoscopy (2014); Carpal tunnel release (Left); Carpal tunnel release (Right, 4/14/15); pr cabg, arterial, three (N/A, 2018); and Cervical disc arthroplasty (N/A, 2018). Restrictions  Restrictions/Precautions  Restrictions/Precautions: General Precautions, Fall Risk, Surgical Protocols  Position Activity Restriction  Sternal Precautions: No Pushing, No Pulling, 5# Lifting Restrictions  Other position/activity restrictions: [place] J cervical collar to be worn at all times, except hygiene purposes, up with assist  Subjective   General  Chart Reviewed:  Yes  Additional Pertinent Hx: Admit  from [facility] 2/2 BLE paralysis after CABG x 3 on 12/3  Neuro consult -  s/p ACDF C5-7 w/ C6 corpectomy on 12/15      PMHx= anoxic brain injury, cardiac arrest, DM, CABG x3 2018  Family / Caregiver Present: No  Diagnosis: Cervical spinal cord compression  Subjective  Subjective: Pt in bed upon entry and Re-education, Patient/Caregiver Education & Training, Equipment Evaluation, Education, & procurement, Self-Care / ADL, Safety Education & Training, Endurance Training  G-Code     OutComes Score                                                  AM-PAC Score        AM-PAC Inpatient Daily Activity Raw Score: 12  AM-PAC Inpatient ADL T-Scale Score : 30.6  ADL Inpatient CMS 0-100% Score: 66.57  ADL Inpatient CMS G-Code Modifier : CL    Goals  Short term goals  Time Frame for Short term goals: at d/c   Short term goal 1: Sit at EOB x 3mins with Mod A x 1 - not met  Short term goal 2: Grooming with setup - not met  Short term goal 3:  Increase BUE HEP 10-reps x 2 sets all planes- not met, progressing  Patient Goals   Patient goals : \"to be able to stand up\"       Therapy Time   Individual Concurrent Group Co-treatment   Time In 1145         Time Out 1215         Minutes 30         Timed Code Treatment Minutes:   30  Total Treatment Minutes:  30     If patient is d/c prior to next treatment session, this note will serve as the discharge summary    [employer] OTR/L, 6950

## 2018-12-19 NOTE — PROGRESS NOTES
NEUROSURGERY POST-OP PROGRESS NOTE    Patient Name: Vahid Morales YOB: 1960   Sex: Male Age: 60 yrs     Medical Record Number: [id number] Acct Number: [de-identified]   Room Number: 7915/3566-39 Hospital Day: Hospital Day: 15     Interval History:  Post-operative Day# 4 s/p ACDF C5-7 w/ C6 corpectomy     Subjective: No new changes from neurosurgery standpoint - chest incision infection noted and started on antibx yesterday     Objective:    VITAL SIGNS   /79   Pulse 67   Temp 97.4 °F (36.3 °C) (Oral)   Resp 18   Ht 5' 10\" (1.778 m)   Wt 255 lb 11.7 oz (116 kg)   SpO2 96%   BMI 36.69 kg/m²    Height Height: 5' 10\" (177.8 cm)   Weight Weight: 255 lb 11.7 oz (116 kg)        Allergies Allergies   Allergen Reactions    Enalapril     Nadolol     Verapamil       NPO Status DIET GENERAL;   Isolation No active isolations     LABS   Basic Metabolic Profile Recent Labs      12/17/18   0543  12/18/18   0606  12/19/18   0621   NA  138  141  141   CL  106  111*  105   CO2  20*  19*  23   BUN  59*  55*  56*   CREATININE  1.7*  1.5*  1.8*   GLUCOSE  260*  176*  123*   PHOS  4.7  3.6  4.4   MG  2.20  2.00  2.00      Complete Blood Count Recent Labs      12/17/18   0543  12/18/18   0606  12/19/18   0621   WBC  10.1  11.8*  12.0*   RBC  3.68*  3.94*  4.14*      Coagulation Studies No results for input(s): PTT, INR in the last 72 hours.     Invalid input(s): PLATELETS, PROA, PT, PTTA     MEDICATIONS   Inpatient Medications     losartan, 25 mg, Oral, Daily    piperacillin-tazobactam, 3.375 g, Intravenous, Q8H    sodium chloride flush, 10 mL, Intravenous, 2 times per day    docusate sodium, 100 mg, Oral, BID    sennosides-docusate sodium, 1 tablet, Oral, Daily    enoxaparin, 40 mg, Subcutaneous, Daily    hydrALAZINE, 25 mg, Oral, 3 times per day    sodium bicarbonate, 650 mg, Oral, BID    insulin glargine, 10 Units, Subcutaneous, BID    insulin lispro, 0-6 Units, Subcutaneous, TID WC    insulin

## 2018-12-19 NOTE — PROGRESS NOTES
uncontrolled, w/neurologic complic ([address]     Cervical spinal cord compression (HCC)      Neuro: awake, alert and oriented x 3, thought content appropriate   CV:   Sinus  Pulm:  clear  Wounds: sternotomy incision with two area draining brownish drainage.  Sternum rocking  Ext: warm    PLAN:   We'll plan for drainage of sternal incisions with wound VAC placement patient's could be discharged Friday as much as we'll arrange for his wound VAC wound VAC would be need to be replaced Monday, Wednesday at my clinic, Friday  Patient will be discharged on Augmentin as antibiotic for 10 days

## 2018-12-19 NOTE — PROGRESS NOTES
Internal Medicine PGY-1 Resident Progress Note        PCP: Meir Hutchinson DO    Date of Admission: 12/7/2018    Chief Complaint: UE paresthesias, LE weakness and saddle anesthesia    Subjective: ICU transfer 12/10  POD #3: corpectomy and anterior cervical discectomy and fusion (12/16)    He says that hands, abdomen and thighs are all back to normal - but says he still feels a little weak. No fevers or chills. No HA, lightheadedness or dizziness. No CP, chest tightness or palpitations. No SOB (on room air)  No BM since the surgery (12/16)    Overnight bouts of hypertension SBP 140s; otherwise VS wnl; afebrile    Surgical site bandaged, clean and dry (brown discharge from thoracic incision reported yesterday)    Hb 11.9 stable  WBC 12 stable; remains afebrile   (164 on admission)  Cr 1.8; yest 1.5 (1.9 on admission)  Glu 104-172    Per NSG: Plavix/ASA can be restarted after drain is removed (likely 1 week)  Possible [place] [facility] on discharge; precert started - will wait for PT eval    Per nephro:  Cr improving restarted lasix 40 mg BID; losartan 25 mg QHS yesterday; plan to start bumetanide today (IVF stopped yesterday)    Per CTS:  Vandana Gordon d/c from incision site; zosyn + BCx + wound Cx    AM-PAC  PT: 7/24  OT: 12/14    Hospital Course: 61 yo M Hx cardiac arrest (1994), CKD III, HTN, HLD, CAD (s/p 3 vessel CABG; 12/4 at [address]). Developed UE paresthesias, LE weakness, and saddle anesthesia after procedure. Going to the OR on Wednesday 12/12, with NSGY for cervical spine decompression. OK with CT surg to hold ASA and plavix for 5 days. NSGY and CTS following.      Medications:  Reviewed    Infusion Medications    dextrose       Scheduled Medications    losartan  25 mg Oral Daily    piperacillin-tazobactam  3.375 g Intravenous Q8H    sodium chloride flush  10 mL Intravenous 2 times per day    docusate sodium  100 mg Oral BID    sennosides-docusate sodium  1 tablet Oral Daily    enoxaparin  40 mg

## 2018-12-20 ENCOUNTER — ANESTHESIA EVENT (OUTPATIENT)
Dept: OPERATING ROOM | Age: 58
DRG: 023 | End: 2018-12-20
Payer: COMMERCIAL

## 2018-12-20 ENCOUNTER — APPOINTMENT (OUTPATIENT)
Dept: GENERAL RADIOLOGY | Age: 58
DRG: 023 | End: 2018-12-20
Attending: FAMILY MEDICINE
Payer: COMMERCIAL

## 2018-12-20 ENCOUNTER — ANESTHESIA (OUTPATIENT)
Dept: OPERATING ROOM | Age: 58
DRG: 023 | End: 2018-12-20
Payer: COMMERCIAL

## 2018-12-20 VITALS
OXYGEN SATURATION: 100 % | DIASTOLIC BLOOD PRESSURE: 85 MMHG | TEMPERATURE: 96.8 F | RESPIRATION RATE: 8 BRPM | SYSTOLIC BLOOD PRESSURE: 140 MMHG

## 2018-12-20 LAB
ALBUMIN SERPL-MCNC: 2.2 G/DL (ref 3.4–5)
ANION GAP SERPL CALCULATED.3IONS-SCNC: 12 MMOL/L (ref 3–16)
BASE EXCESS ARTERIAL: -4 (ref -3–3)
BASE EXCESS ARTERIAL: -5.6 MMOL/L (ref -3–3)
BASE EXCESS ARTERIAL: -6 (ref -3–3)
BUN BLDV-MCNC: 57 MG/DL (ref 7–20)
CALCIUM IONIZED: 1.24 MMOL/L (ref 1.12–1.32)
CALCIUM IONIZED: 1.25 MMOL/L (ref 1.12–1.32)
CALCIUM SERPL-MCNC: 8.7 MG/DL (ref 8.3–10.6)
CARBOXYHEMOGLOBIN ARTERIAL: 2.1 % (ref 0–1.5)
CHLORIDE BLD-SCNC: 108 MMOL/L (ref 99–110)
CO2: 21 MMOL/L (ref 21–32)
CREAT SERPL-MCNC: 1.8 MG/DL (ref 0.9–1.3)
CREATININE URINE: 54.1 MG/DL (ref 39–259)
GFR AFRICAN AMERICAN: 47
GFR NON-AFRICAN AMERICAN: 39
GLUCOSE BLD-MCNC: 106 MG/DL (ref 70–99)
GLUCOSE BLD-MCNC: 106 MG/DL (ref 70–99)
GLUCOSE BLD-MCNC: 120 MG/DL (ref 70–99)
GLUCOSE BLD-MCNC: 142 MG/DL (ref 70–99)
GLUCOSE BLD-MCNC: 187 MG/DL (ref 70–99)
GLUCOSE BLD-MCNC: 202 MG/DL (ref 70–99)
GLUCOSE BLD-MCNC: 206 MG/DL (ref 70–99)
HCO3 ARTERIAL: 19 MMOL/L (ref 21–29)
HCO3 ARTERIAL: 20.2 MMOL/L (ref 21–29)
HCO3 ARTERIAL: 21.8 MMOL/L (ref 21–29)
HCT VFR BLD CALC: 36 % (ref 40.5–52.5)
HEMOGLOBIN, ART, EXTENDED: 12.3 G/DL
HEMOGLOBIN: 11.4 G/DL (ref 13.5–17.5)
LACTATE: 1.29 MMOL/L (ref 0.4–2)
LACTATE: 2.12 MMOL/L (ref 0.4–2)
MAGNESIUM: 2 MG/DL (ref 1.8–2.4)
MCH RBC QN AUTO: 28.6 PG (ref 26–34)
MCHC RBC AUTO-ENTMCNC: 31.5 G/DL (ref 31–36)
MCV RBC AUTO: 90.8 FL (ref 80–100)
METHEMOGLOBIN ARTERIAL: 0 % (ref 0–1.4)
O2 SAT, ARTERIAL: 100 % (ref 93–100)
O2 SAT, ARTERIAL: 100 % (ref 93–100)
O2 SAT, ARTERIAL: 99 % (ref 93–100)
PCO2 ARTERIAL: 36.9 MM HG (ref 35–45)
PCO2 ARTERIAL: 37.8 MMHG (ref 35–45)
PCO2 ARTERIAL: 38.2 MM HG (ref 35–45)
PDW BLD-RTO: 17.4 % (ref 12.4–15.4)
PERFORMED ON: ABNORMAL
PH ARTERIAL: 7.33 (ref 7.35–7.45)
PH ARTERIAL: 7.35 (ref 7.35–7.45)
PH ARTERIAL: 7.37 (ref 7.35–7.45)
PHOSPHORUS: 4.1 MG/DL (ref 2.5–4.9)
PLATELET # BLD: 118 K/UL (ref 135–450)
PMV BLD AUTO: 9.2 FL (ref 5–10.5)
PO2 ARTERIAL: 144.1 MM HG (ref 75–108)
PO2 ARTERIAL: 398 MMHG (ref 75–108)
PO2 ARTERIAL: 501.9 MM HG (ref 75–108)
POC POTASSIUM: 4 MMOL/L (ref 3.5–5.1)
POC POTASSIUM: 4.5 MMOL/L (ref 3.5–5.1)
POC SAMPLE TYPE: ABNORMAL
POC SAMPLE TYPE: ABNORMAL
POC SODIUM: 141 MMOL/L (ref 136–145)
POC SODIUM: 141 MMOL/L (ref 136–145)
POTASSIUM SERPL-SCNC: 4.9 MMOL/L (ref 3.5–5.1)
PROTEIN PROTEIN: 159.6 MG/DL
RBC # BLD: 3.96 M/UL (ref 4.2–5.9)
SODIUM BLD-SCNC: 141 MMOL/L (ref 136–145)
TCO2 ARTERIAL: 20 MMOL/L
TCO2 ARTERIAL: 21 MMOL/L
TCO2 ARTERIAL: 23 MMOL/L
TROPONIN: 0.29 NG/ML
WBC # BLD: 10.5 K/UL (ref 4–11)

## 2018-12-20 PROCEDURE — 2580000003 HC RX 258: Performed by: STUDENT IN AN ORGANIZED HEALTH CARE EDUCATION/TRAINING PROGRAM

## 2018-12-20 PROCEDURE — 82570 ASSAY OF URINE CREATININE: CPT

## 2018-12-20 PROCEDURE — 87205 SMEAR GRAM STAIN: CPT

## 2018-12-20 PROCEDURE — 83605 ASSAY OF LACTIC ACID: CPT

## 2018-12-20 PROCEDURE — 2580000003 HC RX 258

## 2018-12-20 PROCEDURE — 80069 RENAL FUNCTION PANEL: CPT

## 2018-12-20 PROCEDURE — 0BH17EZ INSERTION OF ENDOTRACHEAL AIRWAY INTO TRACHEA, VIA NATURAL OR ARTIFICIAL OPENING: ICD-10-PCS | Performed by: THORACIC SURGERY (CARDIOTHORACIC VASCULAR SURGERY)

## 2018-12-20 PROCEDURE — 74018 RADEX ABDOMEN 1 VIEW: CPT

## 2018-12-20 PROCEDURE — 87075 CULTR BACTERIA EXCEPT BLOOD: CPT

## 2018-12-20 PROCEDURE — 97110 THERAPEUTIC EXERCISES: CPT

## 2018-12-20 PROCEDURE — 36415 COLL VENOUS BLD VENIPUNCTURE: CPT

## 2018-12-20 PROCEDURE — 3700000001 HC ADD 15 MINUTES (ANESTHESIA): Performed by: THORACIC SURGERY (CARDIOTHORACIC VASCULAR SURGERY)

## 2018-12-20 PROCEDURE — 82330 ASSAY OF CALCIUM: CPT

## 2018-12-20 PROCEDURE — 87077 CULTURE AEROBIC IDENTIFY: CPT

## 2018-12-20 PROCEDURE — 84132 ASSAY OF SERUM POTASSIUM: CPT

## 2018-12-20 PROCEDURE — 2580000003 HC RX 258: Performed by: THORACIC SURGERY (CARDIOTHORACIC VASCULAR SURGERY)

## 2018-12-20 PROCEDURE — 84295 ASSAY OF SERUM SODIUM: CPT

## 2018-12-20 PROCEDURE — 2580000003 HC RX 258: Performed by: NURSE ANESTHETIST, CERTIFIED REGISTERED

## 2018-12-20 PROCEDURE — 3700000000 HC ANESTHESIA ATTENDED CARE: Performed by: THORACIC SURGERY (CARDIOTHORACIC VASCULAR SURGERY)

## 2018-12-20 PROCEDURE — 6370000000 HC RX 637 (ALT 250 FOR IP): Performed by: INTERNAL MEDICINE

## 2018-12-20 PROCEDURE — 84156 ASSAY OF PROTEIN URINE: CPT

## 2018-12-20 PROCEDURE — 2580000003 HC RX 258: Performed by: INTERNAL MEDICINE

## 2018-12-20 PROCEDURE — 6360000002 HC RX W HCPCS: Performed by: PHYSICIAN ASSISTANT

## 2018-12-20 PROCEDURE — 94761 N-INVAS EAR/PLS OXIMETRY MLT: CPT

## 2018-12-20 PROCEDURE — 87070 CULTURE OTHR SPECIMN AEROBIC: CPT

## 2018-12-20 PROCEDURE — 10180 I&D COMPLEX PO WOUND INFCTJ: CPT | Performed by: THORACIC SURGERY (CARDIOTHORACIC VASCULAR SURGERY)

## 2018-12-20 PROCEDURE — 71045 X-RAY EXAM CHEST 1 VIEW: CPT

## 2018-12-20 PROCEDURE — 5A1945Z RESPIRATORY VENTILATION, 24-96 CONSECUTIVE HOURS: ICD-10-PCS | Performed by: THORACIC SURGERY (CARDIOTHORACIC VASCULAR SURGERY)

## 2018-12-20 PROCEDURE — 97112 NEUROMUSCULAR REEDUCATION: CPT

## 2018-12-20 PROCEDURE — 6370000000 HC RX 637 (ALT 250 FOR IP): Performed by: STUDENT IN AN ORGANIZED HEALTH CARE EDUCATION/TRAINING PROGRAM

## 2018-12-20 PROCEDURE — 3600000004 HC SURGERY LEVEL 4 BASE: Performed by: THORACIC SURGERY (CARDIOTHORACIC VASCULAR SURGERY)

## 2018-12-20 PROCEDURE — 2500000003 HC RX 250 WO HCPCS: Performed by: STUDENT IN AN ORGANIZED HEALTH CARE EDUCATION/TRAINING PROGRAM

## 2018-12-20 PROCEDURE — 87186 SC STD MICRODIL/AGAR DIL: CPT

## 2018-12-20 PROCEDURE — 94750 HC PULMONARY COMPLIANCE STUDY: CPT

## 2018-12-20 PROCEDURE — 0J960ZZ DRAINAGE OF CHEST SUBCUTANEOUS TISSUE AND FASCIA, OPEN APPROACH: ICD-10-PCS | Performed by: THORACIC SURGERY (CARDIOTHORACIC VASCULAR SURGERY)

## 2018-12-20 PROCEDURE — 94770 HC ETCO2 MONITOR DAILY: CPT

## 2018-12-20 PROCEDURE — 82803 BLOOD GASES ANY COMBINATION: CPT

## 2018-12-20 PROCEDURE — 85027 COMPLETE CBC AUTOMATED: CPT

## 2018-12-20 PROCEDURE — 6360000002 HC RX W HCPCS: Performed by: NURSE ANESTHETIST, CERTIFIED REGISTERED

## 2018-12-20 PROCEDURE — P9045 ALBUMIN (HUMAN), 5%, 250 ML: HCPCS | Performed by: NURSE ANESTHETIST, CERTIFIED REGISTERED

## 2018-12-20 PROCEDURE — 97530 THERAPEUTIC ACTIVITIES: CPT

## 2018-12-20 PROCEDURE — 6360000002 HC RX W HCPCS: Performed by: INTERNAL MEDICINE

## 2018-12-20 PROCEDURE — 94002 VENT MGMT INPAT INIT DAY: CPT

## 2018-12-20 PROCEDURE — 93005 ELECTROCARDIOGRAM TRACING: CPT | Performed by: STUDENT IN AN ORGANIZED HEALTH CARE EDUCATION/TRAINING PROGRAM

## 2018-12-20 PROCEDURE — 2580000003 HC RX 258: Performed by: PHYSICIAN ASSISTANT

## 2018-12-20 PROCEDURE — 83735 ASSAY OF MAGNESIUM: CPT

## 2018-12-20 PROCEDURE — 2709999900 HC NON-CHARGEABLE SUPPLY: Performed by: THORACIC SURGERY (CARDIOTHORACIC VASCULAR SURGERY)

## 2018-12-20 PROCEDURE — 6360000002 HC RX W HCPCS: Performed by: THORACIC SURGERY (CARDIOTHORACIC VASCULAR SURGERY)

## 2018-12-20 PROCEDURE — 97605 NEG PRS WND THER DME<=50SQCM: CPT | Performed by: THORACIC SURGERY (CARDIOTHORACIC VASCULAR SURGERY)

## 2018-12-20 PROCEDURE — 3600000014 HC SURGERY LEVEL 4 ADDTL 15MIN: Performed by: THORACIC SURGERY (CARDIOTHORACIC VASCULAR SURGERY)

## 2018-12-20 PROCEDURE — 2000000000 HC ICU R&B

## 2018-12-20 PROCEDURE — 2500000003 HC RX 250 WO HCPCS: Performed by: NURSE ANESTHETIST, CERTIFIED REGISTERED

## 2018-12-20 PROCEDURE — 6360000002 HC RX W HCPCS

## 2018-12-20 PROCEDURE — 94660 CPAP INITIATION&MGMT: CPT

## 2018-12-20 PROCEDURE — 6370000000 HC RX 637 (ALT 250 FOR IP): Performed by: PHYSICIAN ASSISTANT

## 2018-12-20 PROCEDURE — 6360000002 HC RX W HCPCS: Performed by: STUDENT IN AN ORGANIZED HEALTH CARE EDUCATION/TRAINING PROGRAM

## 2018-12-20 PROCEDURE — 82947 ASSAY GLUCOSE BLOOD QUANT: CPT

## 2018-12-20 PROCEDURE — 5A12012 PERFORMANCE OF CARDIAC OUTPUT, SINGLE, MANUAL: ICD-10-PCS | Performed by: THORACIC SURGERY (CARDIOTHORACIC VASCULAR SURGERY)

## 2018-12-20 PROCEDURE — 84484 ASSAY OF TROPONIN QUANT: CPT

## 2018-12-20 RX ORDER — PROPOFOL 10 MG/ML
INJECTION, EMULSION INTRAVENOUS PRN
Status: DISCONTINUED | OUTPATIENT
Start: 2018-12-20 | End: 2018-12-20 | Stop reason: SDUPTHER

## 2018-12-20 RX ORDER — GLYCOPYRROLATE 0.2 MG/ML
INJECTION INTRAMUSCULAR; INTRAVENOUS PRN
Status: DISCONTINUED | OUTPATIENT
Start: 2018-12-20 | End: 2018-12-20 | Stop reason: SDUPTHER

## 2018-12-20 RX ORDER — SODIUM CHLORIDE 9 MG/ML
INJECTION, SOLUTION INTRAVENOUS
Status: DISPENSED
Start: 2018-12-20 | End: 2018-12-21

## 2018-12-20 RX ORDER — ATROPINE SULFATE 0.1 MG/ML
INJECTION INTRAVENOUS PRN
Status: DISCONTINUED | OUTPATIENT
Start: 2018-12-20 | End: 2018-12-20 | Stop reason: SDUPTHER

## 2018-12-20 RX ORDER — OXYMETAZOLINE HYDROCHLORIDE 0.05 G/100ML
2 SPRAY NASAL 2 TIMES DAILY
Status: DISPENSED | OUTPATIENT
Start: 2018-12-20 | End: 2018-12-23

## 2018-12-20 RX ORDER — FUROSEMIDE 10 MG/ML
20 INJECTION INTRAMUSCULAR; INTRAVENOUS ONCE
Status: COMPLETED | OUTPATIENT
Start: 2018-12-20 | End: 2018-12-20

## 2018-12-20 RX ORDER — EPINEPHRINE 1 MG/ML
INJECTION, SOLUTION, CONCENTRATE INTRAVENOUS PRN
Status: DISCONTINUED | OUTPATIENT
Start: 2018-12-20 | End: 2018-12-20 | Stop reason: SDUPTHER

## 2018-12-20 RX ORDER — PROPOFOL 10 MG/ML
10 INJECTION, EMULSION INTRAVENOUS
Status: DISCONTINUED | OUTPATIENT
Start: 2018-12-20 | End: 2018-12-21

## 2018-12-20 RX ORDER — FAMOTIDINE 40 MG/1
TABLET, FILM COATED ORAL
Qty: 30 TABLET | Refills: 3 | Status: ON HOLD | OUTPATIENT
Start: 2018-12-20 | End: 2019-01-10

## 2018-12-20 RX ORDER — SODIUM CHLORIDE 9 MG/ML
INJECTION, SOLUTION INTRAVENOUS CONTINUOUS PRN
Status: DISCONTINUED | OUTPATIENT
Start: 2018-12-20 | End: 2018-12-20 | Stop reason: SDUPTHER

## 2018-12-20 RX ORDER — SODIUM CHLORIDE, SODIUM LACTATE, POTASSIUM CHLORIDE, CALCIUM CHLORIDE 600; 310; 30; 20 MG/100ML; MG/100ML; MG/100ML; MG/100ML
INJECTION, SOLUTION INTRAVENOUS CONTINUOUS
Status: DISCONTINUED | OUTPATIENT
Start: 2018-12-20 | End: 2018-12-21

## 2018-12-20 RX ORDER — EPHEDRINE SULFATE 50 MG/ML
INJECTION INTRAVENOUS PRN
Status: DISCONTINUED | OUTPATIENT
Start: 2018-12-20 | End: 2018-12-20 | Stop reason: SDUPTHER

## 2018-12-20 RX ORDER — CALCIUM CHLORIDE 100 MG/ML
INJECTION INTRAVENOUS; INTRAVENTRICULAR PRN
Status: DISCONTINUED | OUTPATIENT
Start: 2018-12-20 | End: 2018-12-20 | Stop reason: SDUPTHER

## 2018-12-20 RX ORDER — SODIUM CHLORIDE, SODIUM LACTATE, POTASSIUM CHLORIDE, CALCIUM CHLORIDE 600; 310; 30; 20 MG/100ML; MG/100ML; MG/100ML; MG/100ML
INJECTION, SOLUTION INTRAVENOUS
Status: COMPLETED
Start: 2018-12-20 | End: 2018-12-20

## 2018-12-20 RX ORDER — MIDAZOLAM HYDROCHLORIDE 1 MG/ML
INJECTION INTRAMUSCULAR; INTRAVENOUS PRN
Status: DISCONTINUED | OUTPATIENT
Start: 2018-12-20 | End: 2018-12-20 | Stop reason: SDUPTHER

## 2018-12-20 RX ORDER — ALBUMIN, HUMAN INJ 5% 5 %
SOLUTION INTRAVENOUS PRN
Status: DISCONTINUED | OUTPATIENT
Start: 2018-12-20 | End: 2018-12-20 | Stop reason: SDUPTHER

## 2018-12-20 RX ORDER — PROPOFOL 10 MG/ML
INJECTION, EMULSION INTRAVENOUS
Status: COMPLETED
Start: 2018-12-20 | End: 2018-12-20

## 2018-12-20 RX ORDER — ONDANSETRON 2 MG/ML
INJECTION INTRAMUSCULAR; INTRAVENOUS PRN
Status: DISCONTINUED | OUTPATIENT
Start: 2018-12-20 | End: 2018-12-20 | Stop reason: SDUPTHER

## 2018-12-20 RX ORDER — LEVOFLOXACIN 5 MG/ML
750 INJECTION, SOLUTION INTRAVENOUS EVERY 24 HOURS
Status: DISCONTINUED | OUTPATIENT
Start: 2018-12-20 | End: 2018-12-21

## 2018-12-20 RX ORDER — 0.9 % SODIUM CHLORIDE 0.9 %
1000 INTRAVENOUS SOLUTION INTRAVENOUS ONCE
Status: COMPLETED | OUTPATIENT
Start: 2018-12-20 | End: 2018-12-20

## 2018-12-20 RX ADMIN — CALCIUM CHLORIDE 0.5 G: 100 INJECTION, SOLUTION INTRAVENOUS at 18:29

## 2018-12-20 RX ADMIN — PROPOFOL 5 MCG/KG/MIN: 10 INJECTION, EMULSION INTRAVENOUS at 21:02

## 2018-12-20 RX ADMIN — FLUTICASONE PROPIONATE 1 SPRAY: 50 SPRAY, METERED NASAL at 08:36

## 2018-12-20 RX ADMIN — HYDROCORTISONE SODIUM SUCCINATE 125 MG: 100 INJECTION, POWDER, FOR SOLUTION INTRAMUSCULAR; INTRAVENOUS at 19:52

## 2018-12-20 RX ADMIN — NOREPINEPHRINE BITARTRATE 5 MCG/MIN: 1 INJECTION INTRAVENOUS at 19:44

## 2018-12-20 RX ADMIN — LOSARTAN POTASSIUM 25 MG: 25 TABLET ORAL at 08:35

## 2018-12-20 RX ADMIN — SENNOSIDES AND DOCUSATE SODIUM 1 TABLET: 8.6; 5 TABLET ORAL at 08:35

## 2018-12-20 RX ADMIN — EPINEPHRINE 0.1 MCG: 1 INJECTION, SOLUTION INTRAMUSCULAR; INTRAVENOUS; SUBCUTANEOUS at 18:33

## 2018-12-20 RX ADMIN — HYDRALAZINE HYDROCHLORIDE 25 MG: 25 TABLET, FILM COATED ORAL at 06:33

## 2018-12-20 RX ADMIN — Medication 10 ML: at 08:36

## 2018-12-20 RX ADMIN — FUROSEMIDE 20 MG: 10 INJECTION, SOLUTION INTRAMUSCULAR; INTRAVENOUS at 13:13

## 2018-12-20 RX ADMIN — EPHEDRINE SULFATE 10 MG: 50 INJECTION INTRAVENOUS at 17:49

## 2018-12-20 RX ADMIN — PIPERACILLIN SODIUM,TAZOBACTAM SODIUM 3.38 G: 3; .375 INJECTION, POWDER, FOR SOLUTION INTRAVENOUS at 06:33

## 2018-12-20 RX ADMIN — TAMSULOSIN HYDROCHLORIDE 0.4 MG: 0.4 CAPSULE ORAL at 08:35

## 2018-12-20 RX ADMIN — EPINEPHRINE 1 MG: 1 INJECTION, SOLUTION INTRAMUSCULAR; SUBCUTANEOUS at 18:17

## 2018-12-20 RX ADMIN — ENOXAPARIN SODIUM 40 MG: 40 INJECTION SUBCUTANEOUS at 09:46

## 2018-12-20 RX ADMIN — DOCUSATE SODIUM 100 MG: 100 CAPSULE, LIQUID FILLED ORAL at 08:35

## 2018-12-20 RX ADMIN — EPINEPHRINE 1 MG: 1 INJECTION, SOLUTION INTRAMUSCULAR; SUBCUTANEOUS at 18:29

## 2018-12-20 RX ADMIN — MIDAZOLAM HYDROCHLORIDE 2 MG: 1 INJECTION INTRAMUSCULAR; INTRAVENOUS at 17:35

## 2018-12-20 RX ADMIN — ATROPINE SULFATE 0.5 MG: 0.1 INJECTION, SOLUTION INTRAVENOUS at 18:18

## 2018-12-20 RX ADMIN — SODIUM CHLORIDE, POTASSIUM CHLORIDE, SODIUM LACTATE AND CALCIUM CHLORIDE: 600; 310; 30; 20 INJECTION, SOLUTION INTRAVENOUS at 22:02

## 2018-12-20 RX ADMIN — Medication 10 ML: at 21:14

## 2018-12-20 RX ADMIN — PROPOFOL 150 MG: 10 INJECTION, EMULSION INTRAVENOUS at 17:37

## 2018-12-20 RX ADMIN — SODIUM CHLORIDE 0.4 MCG/KG/HR: 9 INJECTION, SOLUTION INTRAVENOUS at 20:48

## 2018-12-20 RX ADMIN — SODIUM CHLORIDE, POTASSIUM CHLORIDE, SODIUM LACTATE AND CALCIUM CHLORIDE: 600; 310; 30; 20 INJECTION, SOLUTION INTRAVENOUS at 21:59

## 2018-12-20 RX ADMIN — CEFEPIME HYDROCHLORIDE 2 G: 2 INJECTION, POWDER, FOR SOLUTION INTRAVENOUS at 09:46

## 2018-12-20 RX ADMIN — ALBUMIN (HUMAN) 12.5 G: 12.5 SOLUTION INTRAVENOUS at 18:45

## 2018-12-20 RX ADMIN — GLYCOPYRROLATE 0.2 MG: 0.2 INJECTION INTRAMUSCULAR; INTRAVENOUS at 17:35

## 2018-12-20 RX ADMIN — Medication 2 SPRAY: at 13:25

## 2018-12-20 RX ADMIN — PIPERACILLIN SODIUM,TAZOBACTAM SODIUM 3.38 G: 3; .375 INJECTION, POWDER, FOR SOLUTION INTRAVENOUS at 21:30

## 2018-12-20 RX ADMIN — EPINEPHRINE 1 MG: 1 INJECTION, SOLUTION INTRAMUSCULAR; SUBCUTANEOUS at 18:00

## 2018-12-20 RX ADMIN — Medication 10 ML: at 21:15

## 2018-12-20 RX ADMIN — SODIUM CHLORIDE: 900 INJECTION, SOLUTION INTRAVENOUS at 17:25

## 2018-12-20 RX ADMIN — EPHEDRINE SULFATE 10 MG: 50 INJECTION INTRAVENOUS at 17:48

## 2018-12-20 RX ADMIN — ATROPINE SULFATE 0.5 MG: 0.1 INJECTION, SOLUTION INTRAVENOUS at 18:00

## 2018-12-20 RX ADMIN — HYDRALAZINE HYDROCHLORIDE 25 MG: 25 TABLET, FILM COATED ORAL at 13:13

## 2018-12-20 RX ADMIN — FAMOTIDINE 20 MG: 20 TABLET ORAL at 08:35

## 2018-12-20 RX ADMIN — SODIUM CHLORIDE 1000 ML: 9 INJECTION, SOLUTION INTRAVENOUS at 19:44

## 2018-12-20 RX ADMIN — METOPROLOL SUCCINATE 75 MG: 50 TABLET, EXTENDED RELEASE ORAL at 08:35

## 2018-12-20 RX ADMIN — ONDANSETRON 4 MG: 2 INJECTION INTRAMUSCULAR; INTRAVENOUS at 17:50

## 2018-12-20 RX ADMIN — SODIUM BICARBONATE 650 MG: 650 TABLET ORAL at 08:35

## 2018-12-20 RX ADMIN — LEVOFLOXACIN 750 MG: 5 INJECTION, SOLUTION INTRAVENOUS at 11:30

## 2018-12-20 RX ADMIN — PIPERACILLIN SODIUM,TAZOBACTAM SODIUM 3.38 G: 3; .375 INJECTION, POWDER, FOR SOLUTION INTRAVENOUS at 13:13

## 2018-12-20 ASSESSMENT — PULMONARY FUNCTION TESTS
PIF_VALUE: 19
PIF_VALUE: 22
PIF_VALUE: 12
PIF_VALUE: 0
PIF_VALUE: 12
PIF_VALUE: 34
PIF_VALUE: 12
PIF_VALUE: 18
PIF_VALUE: 19
PIF_VALUE: 3
PIF_VALUE: 34
PIF_VALUE: 1
PIF_VALUE: 8
PIF_VALUE: 35
PIF_VALUE: 19
PIF_VALUE: 20
PIF_VALUE: 0
PIF_VALUE: 12
PIF_VALUE: 12
PIF_VALUE: 19
PIF_VALUE: 19
PIF_VALUE: 21
PIF_VALUE: 0
PIF_VALUE: 0
PIF_VALUE: 20
PIF_VALUE: 20
PIF_VALUE: 28
PIF_VALUE: 4
PIF_VALUE: 11
PIF_VALUE: 18
PIF_VALUE: 4
PIF_VALUE: 9
PIF_VALUE: 12
PIF_VALUE: 35
PIF_VALUE: 13
PIF_VALUE: 1
PIF_VALUE: 0
PIF_VALUE: 1
PIF_VALUE: 20
PIF_VALUE: 17
PIF_VALUE: 16
PIF_VALUE: 19
PIF_VALUE: 16
PIF_VALUE: 24
PIF_VALUE: 2
PIF_VALUE: 12
PIF_VALUE: 16
PIF_VALUE: 12
PIF_VALUE: 12
PIF_VALUE: 22
PIF_VALUE: 21
PIF_VALUE: 13
PIF_VALUE: 19
PIF_VALUE: 17
PIF_VALUE: 15
PIF_VALUE: 21
PIF_VALUE: 1
PIF_VALUE: 16
PIF_VALUE: 12
PIF_VALUE: 12
PIF_VALUE: 20
PIF_VALUE: 0
PIF_VALUE: 19
PIF_VALUE: 2
PIF_VALUE: 19
PIF_VALUE: 22
PIF_VALUE: 4
PIF_VALUE: 0
PIF_VALUE: 19
PIF_VALUE: 0
PIF_VALUE: 19
PIF_VALUE: 27
PIF_VALUE: 12
PIF_VALUE: 11
PIF_VALUE: 26
PIF_VALUE: 21
PIF_VALUE: 27
PIF_VALUE: 19
PIF_VALUE: 8
PIF_VALUE: 0
PIF_VALUE: 26
PIF_VALUE: 12
PIF_VALUE: 16
PIF_VALUE: 19
PIF_VALUE: 20
PIF_VALUE: 9
PIF_VALUE: 19
PIF_VALUE: 11
PIF_VALUE: 12
PIF_VALUE: 19
PIF_VALUE: 21
PIF_VALUE: 19
PIF_VALUE: 28
PIF_VALUE: 16
PIF_VALUE: 10
PIF_VALUE: 19
PIF_VALUE: 20
PIF_VALUE: 19
PIF_VALUE: 19
PIF_VALUE: 12
PIF_VALUE: 19
PIF_VALUE: 17
PIF_VALUE: 19
PIF_VALUE: 3
PIF_VALUE: 13
PIF_VALUE: 19

## 2018-12-20 ASSESSMENT — PAIN SCALES - GENERAL
PAINLEVEL_OUTOF10: 0
PAINLEVEL_OUTOF10: 0

## 2018-12-20 NOTE — PROGRESS NOTES
3 seconds   Peripheral Pulses: +2 palpable, equal bilaterally     Labs:   Recent Labs      12/18/18   0606  12/19/18   0621  12/19/18   1832  12/20/18   0510   WBC  11.8*  12.0*   --   10.5   HGB  11.4*  11.9*  11.3*  11.4*   HCT  35.8*  37.7*  35.9*  36.0*   PLT  111*  123*   --   118*     Recent Labs      12/18/18   0606  12/19/18   0621  12/20/18   0510   NA  141  141  141   K  5.0  4.5  4.9   CL  111*  105  108   CO2  19*  23  21   BUN  55*  56*  57*   CREATININE  1.5*  1.8*  1.8*   CALCIUM  8.8  8.9  8.7   PHOS  3.6  4.4  4.1     No results for input(s): AST, ALT, BILIDIR, BILITOT, ALKPHOS in the last 72 hours. No results for input(s): INR in the last 72 hours. No results for input(s): Amedeo [place] in the last 72 hours. Urinalysis:      Lab Results   Component Value Date    NITRU Negative 11/26/2018    WBCUA 0-2 11/26/2018    RBCUA 0-2 11/26/2018    BLOODU SMALL 11/26/2018    SPECGRAV 1.020 11/26/2018    GLUCOSEU Negative 11/26/2018       Radiology:  VL Extremity Venous Bilateral   Final Result      XR Cervical Spine 1 VW   Final Result       Intraoperative radiographs of the cervical spine C5-C7 ACDF. FLUORO FOR SURGICAL PROCEDURES   Final Result       Intraoperative radiographs of the cervical spine C5-C7 ACDF. XR CHEST 1 VW   Final Result      No acute cardiopulmonary disease. Assessment/Plan:  61 yo M w/ Hx of cardiac arrest (1994) anoxic brain injury, HTN, HLD, FELIPE, DMII, CKD 3, admitted to [facility] with NSTEMI and underwent 3 vessel CABG on 12/4. He started having paresthesias in bilateral upper extremities (fingers 3-5) and weakness in bilateral lower extremities, and was found to have cervical spinal cord compression. He was transferred for neurosurgical evaluation.     Cervical Cord compression/spinal stenosis C6/C7 with bilateral finger 3-5 numbness  CT cervical spine showed severe spinal stenosis C6/C7 with cord compression.  Leg weakness prior to CABG, but worse medications include oral agent (monotherapy): glimepiride. A1C 7.3 (12/4/18)   Plan  - Lantus 10u BID  - HDSSI  - Accuchecks  - Hypoglycemia protocol  - cont Neurontin 300 mg QHS      FELIPE  On CPAP at home. Follows [facility] sleep medicine. Takes gabapentin for periodic limb movement disorder qhs.    Plan   -Continue CPAP     HTN   -Currently controlled, see above plan      Chronic Systolic and Diastolic Heart Failure  Echo 11/2018, LVEF 44-75%, Grade 1 Diastolic Dysfunction  Plan   - Toprol XL bid     Hx of anoxic brain injury in 1990's following MI with short term memory deficit   -Spinal cord, fall precautions   -Patient demonstrates poor insight at times, may need reorientation to not move neck and to situation     DVT Prophylaxis: none for surgery tomorrow  Diet: Diet NPO, After Midnight Exceptions are: Ice Chips, Sips with Meds  Code Status: Full Code    Dispo - GMF    Cali Mobley MD    I will discuss the patient with the senior resident and MD Cali Armstrong MD  Internal Medicine Resident, PGY-1  Pager: [phone]

## 2018-12-20 NOTE — PLAN OF CARE
Problem: Falls - Risk of:  Goal: Will remain free from falls  Will remain free from falls   Outcome: Ongoing  Patient remains free from falls during this shift. Bed is in the lowest position and the bed alarm is activated. Anti-slip socks are on. Call light is within reach. Will continue to monitor and reassess. Problem: Mobility - Impaired:  Goal: Mobility will improve to maximum level  Mobility will improve to maximum level   Outcome: Ongoing  Patient has strong plantar flexion and weak dorsiflexion. Will continue to monitor and reassess. Problem: Pain - Acute:  Goal: Pain level will decrease  Pain level will decrease    Outcome: Ongoing  RN assesses pain using 0-10 scale. Patient understands how to rate pain using 0-10 scale. Patient states pain is at a 0/10. Will continue to monitor and reassess. Problem: Pain:  Goal: Pain level will decrease  Pain level will decrease    Outcome: Ongoing  RN assesses pain using 0-10 scale. Patient understands how to rate pain using 0-10 scale. Patient states pain is at a 0/10. Will continue to monitor and reassess.

## 2018-12-20 NOTE — ANESTHESIA PRE PROCEDURE
12/20/18 2871    losartan (COZAAR) tablet 25 mg  25 mg Oral Daily Josephine Alvarado MD   25 mg at 12/20/18 0835    sodium chloride flush 0.9 % injection 10 mL  10 mL Intravenous 2 times per day Nelson Decker PA   10 mL at 12/20/18 0836    sodium chloride flush 0.9 % injection 10 mL  10 mL Intravenous PRN [place] Jeronimo, PA        acetaminophen (TYLENOL) tablet 650 mg  650 mg Oral Q4H PRN [place] Jeronimo PA        docusate sodium (COLACE) capsule 100 mg  100 mg Oral BID Nelson Jeronimo PA   100 mg at 12/20/18 0835    sennosides-docusate sodium (SENOKOT-S) 8.6-50 MG tablet 1 tablet  1 tablet Oral Daily [place] Jeronimo PA   1 tablet at 12/20/18 0835    ondansetron (ZOFRAN) injection 4 mg  4 mg Intravenous Q6H PRN Nelson Jeronimo PA        oxyCODONE-acetaminophen (PERCOCET) 5-325 MG per tablet 1 tablet  1 tablet Oral Q4H PRN Nelson Jeronimo PA        Or    oxyCODONE-acetaminophen (PERCOCET) 5-325 MG per tablet 2 tablet  2 tablet Oral Q4H PRN [place] Jeronimo PA   2 tablet at 12/19/18 5854    diazepam (VALIUM) tablet 5 mg  5 mg Oral Q6H PRN Nelson Jeronimo, PA        enoxaparin (LOVENOX) injection 40 mg  40 mg Subcutaneous Daily [place] Jeronimo PA   40 mg at 12/20/18 0946    hydrALAZINE (APRESOLINE) tablet 25 mg  25 mg Oral 3 times per day Gwendolyn Paulson MD   25 mg at 12/20/18 9874    sodium bicarbonate tablet 650 mg  650 mg Oral BID Gwendolyn Paulson MD   650 mg at 12/20/18 0835    insulin glargine (LANTUS) injection pen 10 Units  10 Units Subcutaneous BID Chris Harrison MD   Stopped at 12/20/18 0724    insulin lispro (HUMALOG) injection pen 0-6 Units  0-6 Units Subcutaneous TID WC Chris Harrison MD   Stopped at 12/20/18 0724    insulin lispro (HUMALOG) injection pen 0-3 Units  0-3 Units Subcutaneous Nightly Chris Ole Michele MD   1 Units at 12/19/18 2237    gabapentin (NEURONTIN) capsule 300 mg  300 mg Oral Nightly Nikhil Garibay MD   300 mg at 12/19/18 2209    senna (SENOKOT) tablet 8.6 mg  1

## 2018-12-21 PROBLEM — A49.8 SERRATIA INFECTION: Status: ACTIVE | Noted: 2018-12-21

## 2018-12-21 PROBLEM — R78.81 GRAM-NEGATIVE BACTEREMIA: Status: ACTIVE | Noted: 2018-12-21

## 2018-12-21 PROBLEM — T81.49XA POSTOPERATIVE INFECTION OF WOUND OF STERNUM: Status: ACTIVE | Noted: 2018-12-21

## 2018-12-21 LAB
ALBUMIN SERPL-MCNC: 2 G/DL (ref 3.4–5)
ANION GAP SERPL CALCULATED.3IONS-SCNC: 12 MMOL/L (ref 3–16)
BASE EXCESS ARTERIAL: -2 (ref -3–3)
BASE EXCESS ARTERIAL: -4 (ref -3–3)
BASE EXCESS ARTERIAL: -4.1 MMOL/L (ref -3–3)
BLOOD CULTURE, ROUTINE: ABNORMAL
BUN BLDV-MCNC: 53 MG/DL (ref 7–20)
CALCIUM IONIZED: 1.24 MMOL/L (ref 1.12–1.32)
CALCIUM SERPL-MCNC: 8.2 MG/DL (ref 8.3–10.6)
CARBOXYHEMOGLOBIN ARTERIAL: 1.1 % (ref 0–1.5)
CHLORIDE BLD-SCNC: 108 MMOL/L (ref 99–110)
CO2: 20 MMOL/L (ref 21–32)
CREAT SERPL-MCNC: 1.9 MG/DL (ref 0.9–1.3)
EKG ATRIAL RATE: 126 BPM
EKG ATRIAL RATE: 81 BPM
EKG DIAGNOSIS: NORMAL
EKG DIAGNOSIS: NORMAL
EKG P AXIS: 17 DEGREES
EKG P AXIS: 42 DEGREES
EKG P-R INTERVAL: 164 MS
EKG P-R INTERVAL: 168 MS
EKG Q-T INTERVAL: 342 MS
EKG Q-T INTERVAL: 406 MS
EKG QRS DURATION: 102 MS
EKG QRS DURATION: 110 MS
EKG QTC CALCULATION (BAZETT): 471 MS
EKG QTC CALCULATION (BAZETT): 495 MS
EKG R AXIS: 68 DEGREES
EKG R AXIS: 81 DEGREES
EKG T AXIS: 217 DEGREES
EKG T AXIS: 267 DEGREES
EKG VENTRICULAR RATE: 126 BPM
EKG VENTRICULAR RATE: 81 BPM
GFR AFRICAN AMERICAN: 44
GFR NON-AFRICAN AMERICAN: 36
GLUCOSE BLD-MCNC: 109 MG/DL (ref 70–99)
GLUCOSE BLD-MCNC: 110 MG/DL (ref 70–99)
GLUCOSE BLD-MCNC: 168 MG/DL (ref 70–99)
GLUCOSE BLD-MCNC: 171 MG/DL (ref 70–99)
GLUCOSE BLD-MCNC: 192 MG/DL (ref 70–99)
GLUCOSE BLD-MCNC: 197 MG/DL (ref 70–99)
GLUCOSE BLD-MCNC: 215 MG/DL (ref 70–99)
GLUCOSE BLD-MCNC: 83 MG/DL (ref 70–99)
HCO3 ARTERIAL: 20 MMOL/L (ref 21–29)
HCO3 ARTERIAL: 21.8 MMOL/L (ref 21–29)
HCO3 ARTERIAL: 22.8 MMOL/L (ref 21–29)
HCT VFR BLD CALC: 31.2 % (ref 40.5–52.5)
HEMOGLOBIN, ART, EXTENDED: 9.9 G/DL
HEMOGLOBIN: 9.9 G/DL (ref 13.5–17.5)
LACTATE: 0.72 MMOL/L (ref 0.4–2)
LACTATE: 1.12 MMOL/L (ref 0.4–2)
LACTIC ACID: 1.4 MMOL/L (ref 0.4–2)
MAGNESIUM: 1.8 MG/DL (ref 1.8–2.4)
MCH RBC QN AUTO: 29.1 PG (ref 26–34)
MCHC RBC AUTO-ENTMCNC: 31.6 G/DL (ref 31–36)
MCV RBC AUTO: 91.9 FL (ref 80–100)
METHEMOGLOBIN ARTERIAL: 0.6 % (ref 0–1.4)
O2 SAT, ARTERIAL: 98 % (ref 93–100)
O2 SAT, ARTERIAL: 99 % (ref 93–100)
O2 SAT, ARTERIAL: 99 % (ref 93–100)
ORGANISM: ABNORMAL
ORGANISM: ABNORMAL
PCO2 ARTERIAL: 36.4 MMHG (ref 35–45)
PCO2 ARTERIAL: 36.7 MM HG (ref 35–45)
PCO2 ARTERIAL: 38.2 MM HG (ref 35–45)
PDW BLD-RTO: 17.5 % (ref 12.4–15.4)
PERFORMED ON: ABNORMAL
PERFORMED ON: NORMAL
PH ARTERIAL: 7.36 (ref 7.35–7.45)
PH ARTERIAL: 7.36 (ref 7.35–7.45)
PH ARTERIAL: 7.4 (ref 7.35–7.45)
PHOSPHORUS: 5.1 MG/DL (ref 2.5–4.9)
PLATELET # BLD: 98 K/UL (ref 135–450)
PMV BLD AUTO: 8.8 FL (ref 5–10.5)
PO2 ARTERIAL: 108.8 MM HG (ref 75–108)
PO2 ARTERIAL: 143 MMHG (ref 75–108)
PO2 ARTERIAL: 153.4 MM HG (ref 75–108)
POC POTASSIUM: 5 MMOL/L (ref 3.5–5.1)
POC SAMPLE TYPE: ABNORMAL
POC SODIUM: 139 MMOL/L (ref 136–145)
POTASSIUM SERPL-SCNC: 5.1 MMOL/L (ref 3.5–5.1)
RBC # BLD: 3.4 M/UL (ref 4.2–5.9)
SODIUM BLD-SCNC: 140 MMOL/L (ref 136–145)
TCO2 ARTERIAL: 21 MMOL/L
TCO2 ARTERIAL: 23 MMOL/L
TCO2 ARTERIAL: 24 MMOL/L
TROPONIN: 0.26 NG/ML
TROPONIN: 0.36 NG/ML
TROPONIN: 0.36 NG/ML
WBC # BLD: 7.9 K/UL (ref 4–11)

## 2018-12-21 PROCEDURE — 6360000002 HC RX W HCPCS: Performed by: INTERNAL MEDICINE

## 2018-12-21 PROCEDURE — 6370000000 HC RX 637 (ALT 250 FOR IP): Performed by: PHYSICIAN ASSISTANT

## 2018-12-21 PROCEDURE — 2580000003 HC RX 258: Performed by: STUDENT IN AN ORGANIZED HEALTH CARE EDUCATION/TRAINING PROGRAM

## 2018-12-21 PROCEDURE — 99255 IP/OBS CONSLTJ NEW/EST HI 80: CPT | Performed by: INTERNAL MEDICINE

## 2018-12-21 PROCEDURE — 83605 ASSAY OF LACTIC ACID: CPT

## 2018-12-21 PROCEDURE — 82803 BLOOD GASES ANY COMBINATION: CPT

## 2018-12-21 PROCEDURE — 93005 ELECTROCARDIOGRAM TRACING: CPT | Performed by: STUDENT IN AN ORGANIZED HEALTH CARE EDUCATION/TRAINING PROGRAM

## 2018-12-21 PROCEDURE — 93010 ELECTROCARDIOGRAM REPORT: CPT | Performed by: INTERNAL MEDICINE

## 2018-12-21 PROCEDURE — 6360000002 HC RX W HCPCS: Performed by: PHYSICIAN ASSISTANT

## 2018-12-21 PROCEDURE — 94003 VENT MGMT INPAT SUBQ DAY: CPT

## 2018-12-21 PROCEDURE — 6360000002 HC RX W HCPCS: Performed by: STUDENT IN AN ORGANIZED HEALTH CARE EDUCATION/TRAINING PROGRAM

## 2018-12-21 PROCEDURE — 2580000003 HC RX 258: Performed by: PHYSICIAN ASSISTANT

## 2018-12-21 PROCEDURE — 84484 ASSAY OF TROPONIN QUANT: CPT

## 2018-12-21 PROCEDURE — 83735 ASSAY OF MAGNESIUM: CPT

## 2018-12-21 PROCEDURE — 6370000000 HC RX 637 (ALT 250 FOR IP): Performed by: INTERNAL MEDICINE

## 2018-12-21 PROCEDURE — 36592 COLLECT BLOOD FROM PICC: CPT

## 2018-12-21 PROCEDURE — 84295 ASSAY OF SERUM SODIUM: CPT

## 2018-12-21 PROCEDURE — 6360000002 HC RX W HCPCS: Performed by: THORACIC SURGERY (CARDIOTHORACIC VASCULAR SURGERY)

## 2018-12-21 PROCEDURE — 94664 DEMO&/EVAL PT USE INHALER: CPT

## 2018-12-21 PROCEDURE — 85027 COMPLETE CBC AUTOMATED: CPT

## 2018-12-21 PROCEDURE — 80069 RENAL FUNCTION PANEL: CPT

## 2018-12-21 PROCEDURE — 87040 BLOOD CULTURE FOR BACTERIA: CPT

## 2018-12-21 PROCEDURE — 2580000003 HC RX 258: Performed by: INTERNAL MEDICINE

## 2018-12-21 PROCEDURE — 2000000000 HC ICU R&B

## 2018-12-21 PROCEDURE — 36415 COLL VENOUS BLD VENIPUNCTURE: CPT

## 2018-12-21 PROCEDURE — 2580000003 HC RX 258

## 2018-12-21 PROCEDURE — 99291 CRITICAL CARE FIRST HOUR: CPT | Performed by: INTERNAL MEDICINE

## 2018-12-21 PROCEDURE — 82330 ASSAY OF CALCIUM: CPT

## 2018-12-21 PROCEDURE — 82947 ASSAY GLUCOSE BLOOD QUANT: CPT

## 2018-12-21 PROCEDURE — 2580000003 HC RX 258: Performed by: THORACIC SURGERY (CARDIOTHORACIC VASCULAR SURGERY)

## 2018-12-21 PROCEDURE — 84132 ASSAY OF SERUM POTASSIUM: CPT

## 2018-12-21 PROCEDURE — 6370000000 HC RX 637 (ALT 250 FOR IP): Performed by: STUDENT IN AN ORGANIZED HEALTH CARE EDUCATION/TRAINING PROGRAM

## 2018-12-21 RX ORDER — ATROPINE SULFATE 0.1 MG/ML
0.5 INJECTION INTRAVENOUS ONCE
Status: COMPLETED | OUTPATIENT
Start: 2018-12-21 | End: 2018-12-21

## 2018-12-21 RX ORDER — SODIUM CHLORIDE, SODIUM LACTATE, POTASSIUM CHLORIDE, CALCIUM CHLORIDE 600; 310; 30; 20 MG/100ML; MG/100ML; MG/100ML; MG/100ML
INJECTION, SOLUTION INTRAVENOUS CONTINUOUS
Status: DISCONTINUED | OUTPATIENT
Start: 2018-12-21 | End: 2018-12-21

## 2018-12-21 RX ORDER — AMLODIPINE BESYLATE 5 MG/1
5 TABLET ORAL EVERY EVENING
Status: DISCONTINUED | OUTPATIENT
Start: 2018-12-21 | End: 2018-12-24

## 2018-12-21 RX ORDER — HYDRALAZINE HYDROCHLORIDE 20 MG/ML
10 INJECTION INTRAMUSCULAR; INTRAVENOUS EVERY 6 HOURS PRN
Status: DISCONTINUED | OUTPATIENT
Start: 2018-12-21 | End: 2018-12-27 | Stop reason: HOSPADM

## 2018-12-21 RX ORDER — SODIUM CHLORIDE 9 MG/ML
INJECTION, SOLUTION INTRAVENOUS
Status: COMPLETED
Start: 2018-12-21 | End: 2018-12-21

## 2018-12-21 RX ORDER — UREA 10 %
10 LOTION (ML) TOPICAL NIGHTLY PRN
Status: DISCONTINUED | OUTPATIENT
Start: 2018-12-22 | End: 2018-12-21

## 2018-12-21 RX ORDER — AMLODIPINE BESYLATE 5 MG/1
5 TABLET ORAL EVERY EVENING
Status: DISCONTINUED | OUTPATIENT
Start: 2018-12-21 | End: 2018-12-21

## 2018-12-21 RX ORDER — LEVOFLOXACIN 500 MG/1
250 TABLET, FILM COATED ORAL DAILY
Status: DISCONTINUED | OUTPATIENT
Start: 2018-12-22 | End: 2018-12-26

## 2018-12-21 RX ADMIN — Medication 2 SPRAY: at 22:08

## 2018-12-21 RX ADMIN — GABAPENTIN 300 MG: 300 CAPSULE ORAL at 00:55

## 2018-12-21 RX ADMIN — SODIUM CHLORIDE, POTASSIUM CHLORIDE, SODIUM LACTATE AND CALCIUM CHLORIDE: 600; 310; 30; 20 INJECTION, SOLUTION INTRAVENOUS at 04:45

## 2018-12-21 RX ADMIN — Medication 10 ML: at 20:55

## 2018-12-21 RX ADMIN — DOCUSATE SODIUM 100 MG: 100 CAPSULE, LIQUID FILLED ORAL at 20:34

## 2018-12-21 RX ADMIN — SODIUM CHLORIDE, POTASSIUM CHLORIDE, SODIUM LACTATE AND CALCIUM CHLORIDE: 600; 310; 30; 20 INJECTION, SOLUTION INTRAVENOUS at 11:31

## 2018-12-21 RX ADMIN — FAMOTIDINE 20 MG: 20 TABLET ORAL at 20:34

## 2018-12-21 RX ADMIN — HYDROCORTISONE SODIUM SUCCINATE 100 MG: 100 INJECTION, POWDER, FOR SOLUTION INTRAMUSCULAR; INTRAVENOUS at 03:58

## 2018-12-21 RX ADMIN — SODIUM BICARBONATE 650 MG: 650 TABLET ORAL at 00:55

## 2018-12-21 RX ADMIN — ENOXAPARIN SODIUM 40 MG: 40 INJECTION SUBCUTANEOUS at 11:02

## 2018-12-21 RX ADMIN — Medication 10 MG: at 22:08

## 2018-12-21 RX ADMIN — SODIUM CHLORIDE 500 ML: 9 INJECTION, SOLUTION INTRAVENOUS at 21:55

## 2018-12-21 RX ADMIN — ATROPINE SULFATE 0.5 MG: 0.1 INJECTION, SOLUTION INTRAVENOUS at 01:29

## 2018-12-21 RX ADMIN — INSULIN LISPRO 1 UNITS: 100 INJECTION, SOLUTION INTRAVENOUS; SUBCUTANEOUS at 00:09

## 2018-12-21 RX ADMIN — ATORVASTATIN CALCIUM 20 MG: 20 TABLET, FILM COATED ORAL at 00:56

## 2018-12-21 RX ADMIN — Medication 10 ML: at 20:56

## 2018-12-21 RX ADMIN — ATORVASTATIN CALCIUM 20 MG: 20 TABLET, FILM COATED ORAL at 20:34

## 2018-12-21 RX ADMIN — HYDRALAZINE HYDROCHLORIDE 10 MG: 20 INJECTION INTRAMUSCULAR; INTRAVENOUS at 15:08

## 2018-12-21 RX ADMIN — GABAPENTIN 300 MG: 300 CAPSULE ORAL at 20:34

## 2018-12-21 RX ADMIN — CEFEPIME HYDROCHLORIDE 2 G: 2 INJECTION, POWDER, FOR SOLUTION INTRAVENOUS at 21:54

## 2018-12-21 RX ADMIN — CEFEPIME HYDROCHLORIDE 2 G: 2 INJECTION, POWDER, FOR SOLUTION INTRAVENOUS at 10:33

## 2018-12-21 RX ADMIN — FAMOTIDINE 20 MG: 20 TABLET ORAL at 00:55

## 2018-12-21 RX ADMIN — PROPOFOL 30 MCG/KG/MIN: 10 INJECTION, EMULSION INTRAVENOUS at 02:49

## 2018-12-21 RX ADMIN — PIPERACILLIN SODIUM,TAZOBACTAM SODIUM 3.38 G: 3; .375 INJECTION, POWDER, FOR SOLUTION INTRAVENOUS at 06:15

## 2018-12-21 RX ADMIN — LEVOFLOXACIN 750 MG: 5 INJECTION, SOLUTION INTRAVENOUS at 10:33

## 2018-12-21 RX ADMIN — SODIUM BICARBONATE 650 MG: 650 TABLET ORAL at 20:34

## 2018-12-21 RX ADMIN — AMLODIPINE BESYLATE 5 MG: 5 TABLET ORAL at 13:52

## 2018-12-21 ASSESSMENT — PULMONARY FUNCTION TESTS
PIF_VALUE: 9
PIF_VALUE: 13
PIF_VALUE: 14
PIF_VALUE: 8
PIF_VALUE: 13
PIF_VALUE: 12
PIF_VALUE: 13
PIF_VALUE: 13
PIF_VALUE: 11
PIF_VALUE: 13
PIF_VALUE: 8
PIF_VALUE: 15
PIF_VALUE: 13
PIF_VALUE: 13
PIF_VALUE: 15
PIF_VALUE: 13
PIF_VALUE: 8
PIF_VALUE: 9
PIF_VALUE: 10
PIF_VALUE: 15
PIF_VALUE: 13
PIF_VALUE: 6
PIF_VALUE: 15
PIF_VALUE: 13
PIF_VALUE: 14
PIF_VALUE: 13

## 2018-12-21 ASSESSMENT — PAIN DESCRIPTION - PROGRESSION
CLINICAL_PROGRESSION: NOT CHANGED
CLINICAL_PROGRESSION: NOT CHANGED

## 2018-12-21 ASSESSMENT — PAIN SCALES - GENERAL
PAINLEVEL_OUTOF10: 0

## 2018-12-21 NOTE — PROGRESS NOTES
Pt extubated per order at 0920. Pt currently on 4 L O2 NC. Pt now able to follow commands. Will monitor closely.

## 2018-12-21 NOTE — ANESTHESIA POSTPROCEDURE EVALUATION
at 0.1mcg/kg/minute, and inserted an echocardiography probe to assess ventricular function and volume status. The LV was moderately dysfunctional and the RV was grossly underfilled. We then started albumin to expand the circulating volume and transferred the patient to the ICU.

## 2018-12-21 NOTE — PROGRESS NOTES
tolerates  4. Steroids: Quick taper completed  5. Diet: PO diet as tolerates   6. DVT Prophylaxis: SCDs & Lovenox   7. Antiplt therapy: OK to resume on POD#7 from neurosurgery standpoint  8. GI Prophylaxis: Pepcid   9. Bowel Regimen: Per primary team   10. Pain control: Per primary team  11. Incisional Care: Okay to leave anterior cervical incision STEFANO, wash daily with soap & water    Dispo Planning: Okay for dispo planning to rehab from neurosurgery standpoint     Patient was discussed with Dr. Yovana Beltran who agrees with above assessment and plan.      Electronically signed by: [id number] [facility] [address]  N, 12/21/2018 4:11 PM   Neurosurgery Nurse Practitioner  [phone]

## 2018-12-21 NOTE — CONSULTS
Initial Pulmonary & Critical Care Consult Note      Reason for Consult: cardiac arrest   Requesting Physician: Dr. Barnett Home:   [facility]:                The patient is a 60 y.o. male with significant past medical history of cardiac arrest (1994), anoxic brain injury, HTN, HLD, FELIPE, DMII, CKD 3, CAD S/P CABG, cervical stenosis S/P decompression surgery presents with complaints of cardiac arrest. He recently had CABGX3 on 12/4, later developed numbness and weakness which found to have spinal canal stenosis, had decompression surgery of C5-C6 on 12/12. He was doing well until his CABG dehisced on 12/19 and was taken back to OR on 12/20 for wound washout and wound vac placement. He however coded  In PACU, received 1 min of chest compression before ROSC. He was on epi drip briefly, but quickly weaned off. This morning, patient was hemodynamically stable following commend and passed weaning trial, we will extubate him today.     Past Medical History:      Diagnosis Date    Anoxic brain injury [facility]) 1994    Cardiac arrest ([facility] [address] 1994    Diabetes mellitus ([facility] [address]     borderline    Gout     Hyperlipidemia     Hypertension     Reflux     Sleep apnea     Type 2 diabetes mellitus without complication (HCC)       Past Surgical History:        Procedure Laterality Date    CARPAL TUNNEL RELEASE Left     CARPAL TUNNEL RELEASE Right 4/14/15    CERVICAL DISC ARTHROPLASTY N/A 12/16/2018    C6 CORPECTOMY C5-7 DECOMPRESSION/ FUSION performed by Rose Mary Prince MD at [facility]  1/2014    ID CABG, ARTERIAL, THREE N/A 12/4/2018    CORONARY ARTERY BYPASS GRAFTING X3, INTERNAL MAMMARY ARTERY, SAPHENOUS VEIN GRAFT, ON PUMP performed by Kayode Perdomo MD at [facility] N/A 12/20/2018    STERNUM INCISION AND DRAINAGE WITH WOUND VAC PLACEMENT performed by Kayode Perdomo MD at [address] [facility] [address]     Current Medications:     insulin lispro  0-6 Units Subcutaneous Q6H   

## 2018-12-21 NOTE — CONSULTS
Infectious Diseases Inpatient Consult Note    Reason for Consult:   Serratia bacteremia, sternal wound infection  Requesting Physician:   Dr Sadiq Dominguez  Primary Care Physician:  Derrick Gordillo DO  History Obtained From:   Pt, EPIC    Admit Date: 12/7/2018  Hospital Day: 13    CHIEF COMPLAINT:     Sternal wound    HISTORY OF PRESENT ILLNESS:      59 yo man with hx DM, HTN, TIFFANIE, FELIPE, CKD, past arrest 1994 with anoxic injury. [facility] to [facility] - 12/7 -  Dx NSTEMI, cath with multivessel d, CABG on 12/4 . Postop pt had LE weakness. MRI revealed SC stenosis and compression at C 5/6, C 6/7. Transfer / admit [facility] 12/7 -  OR 12/16 C6 corpectomy, allograft cage, synthes plate C 5-7  Upper chest wound drainage noted. Seen by CT surg and had unstable sternum / 'rocking'  Taken to OR 12/20 for debridement, no operative details. GS neg  Postop, arrest in PACU, admit to ICU on vent. Today 12/21, extubated in ICU. Pt c/o of some CP / surgical site pain.       Past Medical History:    Past Medical History:   Diagnosis Date    Anoxic brain injury ([facility] [address] 12    Cardiac arrest ([facility] [address] 1994    Diabetes mellitus ([facility] [address]     borderline    Gout     Hyperlipidemia     Hypertension     Reflux     Sleep apnea     Type 2 diabetes mellitus without complication (HCC)        Past Surgical History:    Past Surgical History:   Procedure Laterality Date    CARPAL TUNNEL RELEASE Left     CARPAL TUNNEL RELEASE Right 4/14/15    CERVICAL DISC ARTHROPLASTY N/A 12/16/2018    C6 CORPECTOMY C5-7 DECOMPRESSION/ FUSION performed by Keshawn Pritchett MD at [facility]  1/2014    AR CABG, ARTERIAL, THREE N/A 12/4/2018    CORONARY ARTERY BYPASS GRAFTING X3, INTERNAL MAMMARY ARTERY, SAPHENOUS VEIN GRAFT, ON PUMP performed by Nikki Sheth MD at [address] [place] [address] N/A 12/20/2018    STERNUM INCISION AND DRAINAGE WITH WOUND VAC PLACEMENT performed by Nikki Sheth MD at [place] [address] ischemia ([facility] [address]    DM (diabetes mellitus), secondary, uncontrolled, w/neurologic complic ([address]    Cervical spinal cord compression (HCC)       Multiple medical co-morbidities - hx DM, HTN, TIFFANIE, FELIPE, CKD,  past arrest with anoxic injury.   CAD, s/p MI, s/p CABG 12/4  Cervical stenosis with SC compression, s/p decompression 12/16    Sternal wound dehiscence   Serratia bacteremia, presumed due to sternal wound infection  POD#1 debridement, no operative details available    RECOMMENDATIONS:    Cont cefepime  Add levofloxacin  Will f/u on OR cultures  Await OR report - findings, extent of debridement    Discussed with pt  Bella Montoya MD

## 2018-12-21 NOTE — PROGRESS NOTES
[facility]    Follow up Note           Reason for Consult:  CAMI on CKD  Requesting Physician:  Dr. Trula Ganser    Chief Complaint:  Numbness tingling  The patient was transferred her for paraplegia occuring after CABG, seen by Neurosurgery , he appears to have cervical cord injury    Sub/interval history    Corpectomy 12/16    Found with positive BC for Serratia   Exposed sternum in part due to friction of C collar: had debridement and wound VAC placement 12/20  Moved to ICU - was on vent. Extubated now    BP much higher. He has received significant IVF and shows edema  Not taking PO yet but plans for swallow evaluation - was taking PO before  Sternal surgery    No C collar in place  No dyspnea, CP. No cough or wheezing. No N/V, abd pain, or diarrhea. No F/C. Social: No family at bedside. Physical exam:   Constitutional:  VITALS:  /64   Pulse 67   Temp 96.2 °F (35.7 °C) (Axillary)   Resp 19   Ht 5' 10\" (1.778 m)   Wt 256 lb 9.9 oz (116.4 kg)   SpO2 99%   BMI 36.82 kg/m²      Admit Wt: Weight: 261 lb 7.5 oz (118.6 kg)   Todays Wt: Weight: 256 lb 9.9 oz (116.4 kg)     Most Recent Wt: Weight: 256 lb 9.9 oz (116.4 kg)        General appearance: hard C collar. NAD  Neck: no DALTON  Lungs:  clear  Heart:  S1S2 normal, rub or gallop. Post sternotomy with VAC in place  Abdomen: Soft, non-tender, no organomegaly. Skin: No lesions or rashes, warm to touch.    Ext: trace to 1+ LE edema  : mohamud in place    Data/  Recent Labs      12/19/18   0621  12/19/18   1832  12/20/18   0510  12/21/18   0433   WBC  12.0*   --   10.5  7.9   HGB  11.9*  11.3*  11.4*  9.9*   HCT  37.7*  35.9*  36.0*  31.2*   MCV  91.2   --   90.8  91.9   PLT  123*   --   118*  98*     Recent Labs      12/19/18   0621  12/20/18   0510  12/21/18   0443   NA  141  141  140   K  4.5  4.9  5.1   CL  105  108  108   CO2  23  21  20*   GLUCOSE  123*  120*  197*   PHOS  4.4  4.1  5.1*   MG  2.00  2.00  1.80   BUN  56*  57* 53*   CREATININE  1.8*  1.8*  1.9*   LABGLOM  39*  39*  36*   GFRAA  47*  47*  44*        Assessment/Plan    1. CKD III , recent cr has been around 1.8 to 2; previously followed with Dr. Marie Brantley  Likely from HTN nephrosclerosis, possibly has DM as well. Given repeat OR and higher creatinine, will hold losartan for now  Has volume excess - stop IVF bu no diuretics yet  2. Proteinuria - Spot urine protein/creatinine ratio 4.6 grams  Significant but non-nephrotic proteinuria dates back to 2010  Recheck UPC  Use ARB once creatinine stable  3. Cervical cord compression - CT cervical spine showed severe spinal stenosis C6/C7 with cord compression. Leg weakness  Corpectomy performed 12/16  Had Decadron but off this now  4. Coronary artery disease   post op CABG   5. CHF - normally on bumetanide TID at home  Has responded to IV lasix - diuretics on hold today. Resume Lasix 40 mg IV tomorrow if stable  Stop IVF  6. Gout  7. DM2 - newly diagnosed   Hb a1c from 11/22/18 was 7.8  8. HTN  - bradycardia overnight, hold metoprolol  Hold losartan with creatinine higher  Will use amlodipine and prn hydralazine  9. Urinary retention - on flomax. Continue mohamud until more mobile  10.  Bacteremia - BC positive for Serratia felt to be due to sternal wound  Followed by ID  On cefepime and Levaquin

## 2018-12-21 NOTE — PROGRESS NOTES
Since returning from the OR Mr. Duncan Chen has been quite labile. From what I was told he received ~1min of chest compressions before ROSC. He arrived to the ICU on an epinephrine infusion that he seemed dependant on to maintain his blood pressure. However he seemed extremely sensitive to this; when we titrated it down his BP would drop, and even on a low dose of 0.05 his BP kavon to 260s/100s. We have managed to transition him off epinephrine and onto levophed with more predictable control of his pressure. He did have an acute episode of sudden bradycardia with heart rate in the 30s that responded to a dose atropine. His troponin is slowly starting to rise as expected post-arrest. We will continue to trend these. His EKGs have been NSR without ST changes. ABGs and chemistries have looked good through the night. He has come down on his FiO2 quickly and is breathing over the vent. Settings have been: FiO2 35%, , RR 14, PEEP 5 for most of the night.      Matt Lang  PGY-3

## 2018-12-21 NOTE — PROGRESS NOTES
sounds  Extremities: no edema  Skin: no rashes  Neurologic: AAOx4, CNs II-XII grossly intact, moving all extremities spontaneously    FEN: Diet NPO, After Midnight Exceptions are: Ice Chips, Sips with Meds  PPx: Lovenox   CODE: Full Code  DISPO: Intensive care unit     The objective and subjective findings as well as the ICU course of treatment have been reviewed with the ICU team. The treatment plan has been reviewed with the ICU team. The patient is being transferred to the intensive care unit in stable condition.      Lucho Chaidez MD   Internal Medicine Resident, PGY-1

## 2018-12-22 LAB
ALBUMIN SERPL-MCNC: 2.1 G/DL (ref 3.4–5)
ANION GAP SERPL CALCULATED.3IONS-SCNC: 11 MMOL/L (ref 3–16)
BUN BLDV-MCNC: 49 MG/DL (ref 7–20)
CALCIUM SERPL-MCNC: 8.4 MG/DL (ref 8.3–10.6)
CHLORIDE BLD-SCNC: 108 MMOL/L (ref 99–110)
CO2: 23 MMOL/L (ref 21–32)
CREAT SERPL-MCNC: 2 MG/DL (ref 0.9–1.3)
GFR AFRICAN AMERICAN: 42
GFR NON-AFRICAN AMERICAN: 34
GLUCOSE BLD-MCNC: 104 MG/DL (ref 70–99)
GLUCOSE BLD-MCNC: 127 MG/DL (ref 70–99)
GLUCOSE BLD-MCNC: 148 MG/DL (ref 70–99)
GLUCOSE BLD-MCNC: 171 MG/DL (ref 70–99)
GLUCOSE BLD-MCNC: 90 MG/DL (ref 70–99)
HCT VFR BLD CALC: 30.6 % (ref 40.5–52.5)
HEMOGLOBIN: 9.9 G/DL (ref 13.5–17.5)
MAGNESIUM: 1.9 MG/DL (ref 1.8–2.4)
MCH RBC QN AUTO: 29.4 PG (ref 26–34)
MCHC RBC AUTO-ENTMCNC: 32.2 G/DL (ref 31–36)
MCV RBC AUTO: 91.2 FL (ref 80–100)
PDW BLD-RTO: 17.4 % (ref 12.4–15.4)
PERFORMED ON: ABNORMAL
PERFORMED ON: NORMAL
PHOSPHORUS: 4.3 MG/DL (ref 2.5–4.9)
PLATELET # BLD: 110 K/UL (ref 135–450)
PMV BLD AUTO: 9 FL (ref 5–10.5)
POTASSIUM SERPL-SCNC: 4.4 MMOL/L (ref 3.5–5.1)
RBC # BLD: 3.36 M/UL (ref 4.2–5.9)
SODIUM BLD-SCNC: 142 MMOL/L (ref 136–145)
WBC # BLD: 6.9 K/UL (ref 4–11)

## 2018-12-22 PROCEDURE — 83735 ASSAY OF MAGNESIUM: CPT

## 2018-12-22 PROCEDURE — 94660 CPAP INITIATION&MGMT: CPT

## 2018-12-22 PROCEDURE — 6370000000 HC RX 637 (ALT 250 FOR IP): Performed by: INTERNAL MEDICINE

## 2018-12-22 PROCEDURE — 94760 N-INVAS EAR/PLS OXIMETRY 1: CPT

## 2018-12-22 PROCEDURE — 80069 RENAL FUNCTION PANEL: CPT

## 2018-12-22 PROCEDURE — 6360000002 HC RX W HCPCS: Performed by: INTERNAL MEDICINE

## 2018-12-22 PROCEDURE — 93005 ELECTROCARDIOGRAM TRACING: CPT | Performed by: STUDENT IN AN ORGANIZED HEALTH CARE EDUCATION/TRAINING PROGRAM

## 2018-12-22 PROCEDURE — 85027 COMPLETE CBC AUTOMATED: CPT

## 2018-12-22 PROCEDURE — 6370000000 HC RX 637 (ALT 250 FOR IP): Performed by: STUDENT IN AN ORGANIZED HEALTH CARE EDUCATION/TRAINING PROGRAM

## 2018-12-22 PROCEDURE — 36592 COLLECT BLOOD FROM PICC: CPT

## 2018-12-22 PROCEDURE — 6360000002 HC RX W HCPCS: Performed by: PHYSICIAN ASSISTANT

## 2018-12-22 PROCEDURE — 99232 SBSQ HOSP IP/OBS MODERATE 35: CPT | Performed by: INTERNAL MEDICINE

## 2018-12-22 PROCEDURE — 1200000000 HC SEMI PRIVATE

## 2018-12-22 PROCEDURE — 2580000003 HC RX 258: Performed by: INTERNAL MEDICINE

## 2018-12-22 PROCEDURE — 6370000000 HC RX 637 (ALT 250 FOR IP): Performed by: PHYSICIAN ASSISTANT

## 2018-12-22 PROCEDURE — 2580000003 HC RX 258: Performed by: PHYSICIAN ASSISTANT

## 2018-12-22 PROCEDURE — 36415 COLL VENOUS BLD VENIPUNCTURE: CPT

## 2018-12-22 RX ORDER — FUROSEMIDE 10 MG/ML
40 INJECTION INTRAMUSCULAR; INTRAVENOUS DAILY
Status: DISCONTINUED | OUTPATIENT
Start: 2018-12-22 | End: 2018-12-27 | Stop reason: HOSPADM

## 2018-12-22 RX ADMIN — ATORVASTATIN CALCIUM 20 MG: 20 TABLET, FILM COATED ORAL at 20:12

## 2018-12-22 RX ADMIN — FUROSEMIDE 40 MG: 10 INJECTION, SOLUTION INTRAMUSCULAR; INTRAVENOUS at 08:36

## 2018-12-22 RX ADMIN — CEFEPIME HYDROCHLORIDE 2 G: 2 INJECTION, POWDER, FOR SOLUTION INTRAVENOUS at 22:34

## 2018-12-22 RX ADMIN — TAMSULOSIN HYDROCHLORIDE 0.4 MG: 0.4 CAPSULE ORAL at 08:36

## 2018-12-22 RX ADMIN — Medication 10 ML: at 08:37

## 2018-12-22 RX ADMIN — Medication 2 SPRAY: at 20:15

## 2018-12-22 RX ADMIN — SENNOSIDES AND DOCUSATE SODIUM 1 TABLET: 8.6; 5 TABLET ORAL at 08:36

## 2018-12-22 RX ADMIN — SODIUM BICARBONATE 650 MG: 650 TABLET ORAL at 08:36

## 2018-12-22 RX ADMIN — DOCUSATE SODIUM 100 MG: 100 CAPSULE, LIQUID FILLED ORAL at 08:36

## 2018-12-22 RX ADMIN — GABAPENTIN 300 MG: 300 CAPSULE ORAL at 20:12

## 2018-12-22 RX ADMIN — FAMOTIDINE 20 MG: 20 TABLET ORAL at 20:12

## 2018-12-22 RX ADMIN — DOCUSATE SODIUM 100 MG: 100 CAPSULE, LIQUID FILLED ORAL at 20:12

## 2018-12-22 RX ADMIN — AMLODIPINE BESYLATE 5 MG: 5 TABLET ORAL at 19:01

## 2018-12-22 RX ADMIN — LEVOFLOXACIN 250 MG: 500 TABLET, FILM COATED ORAL at 08:36

## 2018-12-22 RX ADMIN — CEFEPIME HYDROCHLORIDE 2 G: 2 INJECTION, POWDER, FOR SOLUTION INTRAVENOUS at 12:12

## 2018-12-22 RX ADMIN — ENOXAPARIN SODIUM 40 MG: 40 INJECTION SUBCUTANEOUS at 08:35

## 2018-12-22 RX ADMIN — Medication 10 ML: at 20:13

## 2018-12-22 RX ADMIN — FAMOTIDINE 20 MG: 20 TABLET ORAL at 08:36

## 2018-12-22 ASSESSMENT — PAIN SCALES - GENERAL
PAINLEVEL_OUTOF10: 0

## 2018-12-22 ASSESSMENT — ENCOUNTER SYMPTOMS
ABDOMINAL DISTENTION: 0
ABDOMINAL PAIN: 0
CONSTIPATION: 0
COUGH: 0
WHEEZING: 0
SHORTNESS OF BREATH: 0
DIARRHEA: 0

## 2018-12-22 ASSESSMENT — PAIN DESCRIPTION - PROGRESSION
CLINICAL_PROGRESSION: NOT CHANGED
CLINICAL_PROGRESSION: NOT CHANGED

## 2018-12-22 NOTE — PROGRESS NOTES
Daily    sodium bicarbonate, 650 mg, Oral, BID    insulin lispro, 0-3 Units, Subcutaneous, Nightly    gabapentin, 300 mg, Oral, Nightly    atorvastatin, 20 mg, Oral, Nightly    famotidine, 20 mg, Oral, BID    tamsulosin, 0.4 mg, Oral, Daily    sodium chloride flush, 10 mL, Intravenous, 2 times per day   Infusions    dextrose        Antibiotics   Recent Abx Admin                   cefepime (MAXIPIME) 2 g IVPB minibag (g) 2 g New Bag 12/21/18 2154     2 g New Bag  1033    levofloxacin (LEVAQUIN) 750 MG/150ML infusion 750 mg (mg) 750 mg New Bag 12/21/18 1033                 Neurologic Exam:    Mental status: awake, alert, oriented x 4, speech clear     Musculoskeletal:   Gait: Not tested   Tone: Normal   Sensory: Decreased in BLE  Motor strength:    Right  Left    Right  Left    Deltoid  5 5  Hip Flex  5 5   Biceps  5 5  Knee Extensors  5 5   Triceps  5 5  Knee Flexors  5 5   Wrist Ext  5 5  Ankle Dorsiflex. 3 3   Wrist Flex  5 5  Ankle Plantarflex. 5 5   Handgrip  5 5  Ext Harpreet Longus  3 3   Thumb Ext  5 5         Incision: Anterior cervical incision w/ dermabond - STEFANO  Misenheimer J collar when OOB    Respiratory:  Unlabored respiratory pattern    Abdomen:   Soft, ND   Last BM 12/19    Cardiovascular:  Warm, well perfused    Assessment:   Patient is a 61 y/o M POD#6 s/p ACDF C5-7 & C6 corpectomy d/t cervical stenosis w/ myelopathy     Plan:  1. Neurologic exam frequency: Q4H  2. Misenheimer J collar - Only needs while up walking - okay to have off while in bed or sitting in chair. If CT surgery needs to have off while sternal incision healing okay to DC x 1 week. Please call [phone] to discuss if needed. Patient will need brace for 6-8 week post operatively. 3. Mobility: PT/OT as tolerates  4. Steroids: Quick taper completed  5. Diet: PO diet as tolerates   6. DVT Prophylaxis: SCDs & Lovenox   7. Antiplt therapy: OK to resume 12/23/2018 from neurosurgery standpoint  8. GI Prophylaxis: Pepcid   9.  Bowel Regimen: Per primary team   10. Pain control: Per primary team  11. Incisional Care: Okay to leave anterior cervical incision STEFANO, wash daily with soap & water    Dispo Planning: Okay for dispo planning to rehab from neurosurgery standpoint     Patient was discussed with Dr. Lisette Lopez who agrees with above assessment and plan. Electronically signed by: Charleen Casiano, 12/22/2018 8:31 AM   Neurosurgery Nurse Practitioner  [phone]    ________________________________________________    I saw and examined Lu Redmond on 12/22/18. I agree with the assessment and plan as detailed above.      Corby Evans MD, PhD  [address], [address] [place] [address], [address] [phone] (c), [phone] (o)

## 2018-12-22 NOTE — PROGRESS NOTES
Pulmonary & Critical Care Medicine ICU Progress Note    Admit Date: 2018  Hospital day 16                              ICU Day:  2  PCP: Derrick Gordillo DO    Indication for Visit: Respiratory failure    Events of Last 24 hours: Patient was extubated yesterday without difficulty. He currently is on room air and denies dyspnea, cough, or chest pain. He remains nothing by mouth but was able to swallow his pills with water without any difficulty. ROS: No fever or HA    Vitals:  Tmax: 98.6  VITALS:  /81   Pulse 71   Temp 97.8 °F (36.6 °C) (Oral)   Resp 14   Ht 5' 10\" (1.778 m)   Wt 257 lb 4.4 oz (116.7 kg)   SpO2 98%   BMI 36.92 kg/m²   24HR INTAKE/OUTPUT:    Intake/Output Summary (Last 24 hours) at 18 0849  Last data filed at 18 0845   Gross per 24 hour   Intake          3073.92 ml   Output             1967 ml   Net          1106.92 ml     CURRENT PULSE OXIMETRY:  SpO2: 98 %  24HR PULSE OXIMETRY RANGE:  SpO2  Av.3 %  Min: 95 %  Max: 100 %    EXAM:  General: No distress. Alert. Eyes: PERRL. No sclera icterus. No conjunctival injection. ENT: No discharge. Pharynx clear. Neck: Trachea midline. Normal thyroid. Resp: No accessory muscle use. No crackles. No wheezing. No rhonchi. CV: Regular rate. Regular rhythm. No mumur or rub. No edema. GI: Non-tender. Non-distended. No masses. No organmegaly. Normal bowel sounds. Skin: Warm and dry. No nodule on exposed extremities. No rash on exposed extremities. Lymph: No cervical LAD. No supraclavicular LAD. M/S: No cyanosis. No joint deformity. No clubbing. Neuro: Awake. Follows commands. Positive pupils/gag/corneals. Normal pain response. Psych: Oriented to person, place, time. No anxiety or agitation.      Medications:    Scheduled Meds:   furosemide  40 mg Intravenous Daily    insulin lispro  0-6 Units Subcutaneous Q6H    cefepime  2 g Intravenous Q12H    levofloxacin  250 mg Oral Daily    amLODIPine  5 mg Oral QPM    oxymetazoline  2 spray Each Nare BID    fluticasone  1 spray Each Nare Daily    sodium chloride flush  10 mL Intravenous 2 times per day    docusate sodium  100 mg Oral BID    sennosides-docusate sodium  1 tablet Oral Daily    enoxaparin  40 mg Subcutaneous Daily    sodium bicarbonate  650 mg Oral BID    insulin lispro  0-3 Units Subcutaneous Nightly    gabapentin  300 mg Oral Nightly    atorvastatin  20 mg Oral Nightly    famotidine  20 mg Oral BID    tamsulosin  0.4 mg Oral Daily    sodium chloride flush  10 mL Intravenous 2 times per day     Diet: Diet NPO, After Midnight Exceptions are: Ice Chips, Sips with Meds     Results:  CBC: Recent Labs      12/20/18   0510  12/21/18   0433  12/22/18   0627   WBC  10.5  7.9  6.9   HGB  11.4*  9.9*  9.9*   HCT  36.0*  31.2*  30.6*   MCV  90.8  91.9  91.2   PLT  118*  98*  110*     BMP: Recent Labs      12/20/18   0510  12/21/18   0443  12/22/18   0627   NA  141  140  142   K  4.9  5.1  4.4   CL  108  108  108   CO2  21  20*  23   PHOS  4.1  5.1*  4.3   BUN  57*  53*  49*   CREATININE  1.8*  1.9*  2.0*     CXR 12/20/2018  Impression       ET tube in place. Postsurgical changes in the lower cervical spine.       No acute cardiopulmonary findings.      Assessment    Acute hypoxemic/hypercapnic respiratory failure with bradycardia (required epinephrine) S/P sternotomy wound wash out and wound vac placement  Serratia marcescens bacteremia 2/2 wound infection  Cervical spinal stenosis s/p decompression   CAD/CABG  CKD III  Paroxysmal A-Fib, currently NSR  T2DM  HLD  FELIPE     Plan:  Okay to resume diet today  Antibiotics per infectious disease  Continue Toprol XL  Resume ASA/Plavix 12/23  ID/CT surgery/Neprho following  Okay to transfer to telemetry  Will sign off    Andrew Guy

## 2018-12-23 LAB
ALBUMIN SERPL-MCNC: 2 G/DL (ref 3.4–5)
ANION GAP SERPL CALCULATED.3IONS-SCNC: 9 MMOL/L (ref 3–16)
BUN BLDV-MCNC: 48 MG/DL (ref 7–20)
CALCIUM SERPL-MCNC: 8.3 MG/DL (ref 8.3–10.6)
CHLORIDE BLD-SCNC: 107 MMOL/L (ref 99–110)
CO2: 24 MMOL/L (ref 21–32)
CREAT SERPL-MCNC: 2 MG/DL (ref 0.9–1.3)
EKG ATRIAL RATE: 125 BPM
EKG ATRIAL RATE: 93 BPM
EKG DIAGNOSIS: NORMAL
EKG DIAGNOSIS: NORMAL
EKG P AXIS: 81 DEGREES
EKG P-R INTERVAL: 148 MS
EKG Q-T INTERVAL: 290 MS
EKG Q-T INTERVAL: 378 MS
EKG QRS DURATION: 102 MS
EKG QRS DURATION: 102 MS
EKG QTC CALCULATION (BAZETT): 433 MS
EKG QTC CALCULATION (BAZETT): 469 MS
EKG R AXIS: 61 DEGREES
EKG R AXIS: 79 DEGREES
EKG T AXIS: -80 DEGREES
EKG T AXIS: 236 DEGREES
EKG VENTRICULAR RATE: 134 BPM
EKG VENTRICULAR RATE: 93 BPM
GFR AFRICAN AMERICAN: 42
GFR NON-AFRICAN AMERICAN: 34
GLUCOSE BLD-MCNC: 136 MG/DL (ref 70–99)
GLUCOSE BLD-MCNC: 166 MG/DL (ref 70–99)
GLUCOSE BLD-MCNC: 176 MG/DL (ref 70–99)
GLUCOSE BLD-MCNC: 182 MG/DL (ref 70–99)
GLUCOSE BLD-MCNC: 199 MG/DL (ref 70–99)
GLUCOSE BLD-MCNC: 208 MG/DL (ref 70–99)
HCT VFR BLD CALC: 29.2 % (ref 40.5–52.5)
HEMOGLOBIN: 9.2 G/DL (ref 13.5–17.5)
MAGNESIUM: 1.7 MG/DL (ref 1.8–2.4)
MCH RBC QN AUTO: 28.8 PG (ref 26–34)
MCHC RBC AUTO-ENTMCNC: 31.6 G/DL (ref 31–36)
MCV RBC AUTO: 91.3 FL (ref 80–100)
PDW BLD-RTO: 17.1 % (ref 12.4–15.4)
PERFORMED ON: ABNORMAL
PHOSPHORUS: 3.2 MG/DL (ref 2.5–4.9)
PLATELET # BLD: 93 K/UL (ref 135–450)
PLATELET SLIDE REVIEW: ABNORMAL
PMV BLD AUTO: 8.8 FL (ref 5–10.5)
POTASSIUM SERPL-SCNC: 4.1 MMOL/L (ref 3.5–5.1)
RBC # BLD: 3.2 M/UL (ref 4.2–5.9)
SODIUM BLD-SCNC: 140 MMOL/L (ref 136–145)
WBC # BLD: 5.8 K/UL (ref 4–11)

## 2018-12-23 PROCEDURE — 80069 RENAL FUNCTION PANEL: CPT

## 2018-12-23 PROCEDURE — 6370000000 HC RX 637 (ALT 250 FOR IP): Performed by: STUDENT IN AN ORGANIZED HEALTH CARE EDUCATION/TRAINING PROGRAM

## 2018-12-23 PROCEDURE — 93005 ELECTROCARDIOGRAM TRACING: CPT | Performed by: INTERNAL MEDICINE

## 2018-12-23 PROCEDURE — 6370000000 HC RX 637 (ALT 250 FOR IP): Performed by: INTERNAL MEDICINE

## 2018-12-23 PROCEDURE — 6360000002 HC RX W HCPCS: Performed by: INTERNAL MEDICINE

## 2018-12-23 PROCEDURE — 1200000000 HC SEMI PRIVATE

## 2018-12-23 PROCEDURE — 85027 COMPLETE CBC AUTOMATED: CPT

## 2018-12-23 PROCEDURE — 99233 SBSQ HOSP IP/OBS HIGH 50: CPT | Performed by: INTERNAL MEDICINE

## 2018-12-23 PROCEDURE — 94760 N-INVAS EAR/PLS OXIMETRY 1: CPT

## 2018-12-23 PROCEDURE — 99024 POSTOP FOLLOW-UP VISIT: CPT | Performed by: THORACIC SURGERY (CARDIOTHORACIC VASCULAR SURGERY)

## 2018-12-23 PROCEDURE — 6370000000 HC RX 637 (ALT 250 FOR IP): Performed by: PHYSICIAN ASSISTANT

## 2018-12-23 PROCEDURE — 2580000003 HC RX 258: Performed by: STUDENT IN AN ORGANIZED HEALTH CARE EDUCATION/TRAINING PROGRAM

## 2018-12-23 PROCEDURE — 2580000003 HC RX 258: Performed by: PHYSICIAN ASSISTANT

## 2018-12-23 PROCEDURE — 83735 ASSAY OF MAGNESIUM: CPT

## 2018-12-23 PROCEDURE — 2580000003 HC RX 258: Performed by: INTERNAL MEDICINE

## 2018-12-23 PROCEDURE — 94660 CPAP INITIATION&MGMT: CPT

## 2018-12-23 PROCEDURE — 93010 ELECTROCARDIOGRAM REPORT: CPT | Performed by: INTERNAL MEDICINE

## 2018-12-23 PROCEDURE — 6360000002 HC RX W HCPCS: Performed by: PHYSICIAN ASSISTANT

## 2018-12-23 PROCEDURE — 36592 COLLECT BLOOD FROM PICC: CPT

## 2018-12-23 RX ORDER — ATORVASTATIN CALCIUM 80 MG/1
80 TABLET, FILM COATED ORAL NIGHTLY
Status: DISCONTINUED | OUTPATIENT
Start: 2018-12-23 | End: 2018-12-27 | Stop reason: HOSPADM

## 2018-12-23 RX ORDER — ASPIRIN 81 MG/1
81 TABLET, CHEWABLE ORAL DAILY
Status: DISCONTINUED | OUTPATIENT
Start: 2018-12-23 | End: 2018-12-27 | Stop reason: HOSPADM

## 2018-12-23 RX ORDER — MAGNESIUM SULFATE IN WATER 40 MG/ML
2 INJECTION, SOLUTION INTRAVENOUS ONCE
Status: COMPLETED | OUTPATIENT
Start: 2018-12-23 | End: 2018-12-23

## 2018-12-23 RX ORDER — METOPROLOL SUCCINATE 50 MG/1
25 TABLET, EXTENDED RELEASE ORAL DAILY
Status: DISCONTINUED | OUTPATIENT
Start: 2018-12-23 | End: 2018-12-23

## 2018-12-23 RX ORDER — CLOPIDOGREL BISULFATE 75 MG/1
75 TABLET ORAL DAILY
Status: DISCONTINUED | OUTPATIENT
Start: 2018-12-23 | End: 2018-12-27 | Stop reason: HOSPADM

## 2018-12-23 RX ORDER — METOPROLOL SUCCINATE 50 MG/1
50 TABLET, EXTENDED RELEASE ORAL DAILY
Status: DISCONTINUED | OUTPATIENT
Start: 2018-12-24 | End: 2018-12-27 | Stop reason: HOSPADM

## 2018-12-23 RX ADMIN — FUROSEMIDE 40 MG: 10 INJECTION, SOLUTION INTRAMUSCULAR; INTRAVENOUS at 09:09

## 2018-12-23 RX ADMIN — FAMOTIDINE 20 MG: 20 TABLET ORAL at 08:43

## 2018-12-23 RX ADMIN — METOPROLOL SUCCINATE 25 MG: 50 TABLET, EXTENDED RELEASE ORAL at 09:57

## 2018-12-23 RX ADMIN — DOCUSATE SODIUM 100 MG: 100 CAPSULE, LIQUID FILLED ORAL at 08:44

## 2018-12-23 RX ADMIN — CEFEPIME HYDROCHLORIDE 2 G: 2 INJECTION, POWDER, FOR SOLUTION INTRAVENOUS at 22:16

## 2018-12-23 RX ADMIN — AMLODIPINE BESYLATE 5 MG: 5 TABLET ORAL at 17:13

## 2018-12-23 RX ADMIN — Medication 10 ML: at 08:44

## 2018-12-23 RX ADMIN — ASPIRIN 81 MG 81 MG: 81 TABLET ORAL at 09:57

## 2018-12-23 RX ADMIN — SENNOSIDES 8.6 MG: 8.6 TABLET, FILM COATED ORAL at 17:09

## 2018-12-23 RX ADMIN — DOCUSATE SODIUM 100 MG: 100 CAPSULE, LIQUID FILLED ORAL at 20:40

## 2018-12-23 RX ADMIN — TAMSULOSIN HYDROCHLORIDE 0.4 MG: 0.4 CAPSULE ORAL at 08:44

## 2018-12-23 RX ADMIN — CLOPIDOGREL 75 MG: 75 TABLET, FILM COATED ORAL at 09:59

## 2018-12-23 RX ADMIN — ENOXAPARIN SODIUM 40 MG: 40 INJECTION SUBCUTANEOUS at 08:43

## 2018-12-23 RX ADMIN — FAMOTIDINE 20 MG: 20 TABLET ORAL at 20:40

## 2018-12-23 RX ADMIN — SENNOSIDES AND DOCUSATE SODIUM 1 TABLET: 8.6; 5 TABLET ORAL at 08:43

## 2018-12-23 RX ADMIN — Medication 10 ML: at 20:48

## 2018-12-23 RX ADMIN — SODIUM BICARBONATE 650 MG: 650 TABLET ORAL at 20:40

## 2018-12-23 RX ADMIN — MAGNESIUM SULFATE HEPTAHYDRATE 2 G: 40 INJECTION, SOLUTION INTRAVENOUS at 08:47

## 2018-12-23 RX ADMIN — LEVOFLOXACIN 250 MG: 500 TABLET, FILM COATED ORAL at 08:43

## 2018-12-23 RX ADMIN — ATORVASTATIN CALCIUM 80 MG: 80 TABLET, FILM COATED ORAL at 20:40

## 2018-12-23 RX ADMIN — SODIUM BICARBONATE 650 MG: 650 TABLET ORAL at 08:43

## 2018-12-23 RX ADMIN — GABAPENTIN 300 MG: 300 CAPSULE ORAL at 20:40

## 2018-12-23 RX ADMIN — CEFEPIME HYDROCHLORIDE 2 G: 2 INJECTION, POWDER, FOR SOLUTION INTRAVENOUS at 10:07

## 2018-12-23 RX ADMIN — Medication 10 ML: at 20:47

## 2018-12-23 ASSESSMENT — PAIN SCALES - GENERAL
PAINLEVEL_OUTOF10: 0

## 2018-12-23 NOTE — PROGRESS NOTES
3090 ml   Net            -2120 ml       Objective:     Vitals: BP (!) 158/92   Pulse 78   Temp 98.1 °F (36.7 °C) (Oral)   Resp 8   Ht 5' 10\" (1.778 m)   Wt 251 lb 5.2 oz (114 kg)   SpO2 100%   BMI 36.06 kg/m²     Physical Exam  General appearance: Obese  M, Lying in bed in no acute distress, appears stated age, cooperative  HEENT Normal cephalic, atraumatic, EOMI, PERRL, no LAD, no oropharyngeal erythema  Lungs: CTAB, no wheezes, rhonchi or rales  Heart: RRR +S1/S2 without murmurs, rubs or gallops, wound vac in place with appropriate dressing, c/d/i, TTP but no erythema noted   Abdomen: Soft, NTND, without rigidity or guarding, normal active bowel sounds  Extremities: no edema  Skin: no rashes  Neurologic: AAOx3, CNs II-XII grossly intact, moving all extremities spontaneously    LABS:    CBC:   Recent Labs      12/21/18   0433  12/22/18   0627  12/23/18   0416   WBC  7.9  6.9  5.8   HGB  9.9*  9.9*  9.2*   HCT  31.2*  30.6*  29.2*   MCV  91.9  91.2  91.3   PLT  98*  110*  93*                                                                BMP:    Recent Labs      12/21/18   0443  12/22/18   0627  12/23/18   0416   NA  140  142  140   K  5.1  4.4  4.1   CL  108  108  107   CO2  20*  23  24   BUN  53*  49*  48*   CREATININE  1.9*  2.0*  2.0*   GLUCOSE  197*  104*  182*     Troponin:   Recent Labs      12/21/18   0140  12/21/18   0433  12/21/18   1358   TROPONINI  0.36*  0.36*  0.26*     ABGs:   Lab Results   Component Value Date    PHART 7.402 12/21/2018    UGG3ICS 36.7 12/21/2018    PO2ART 108.8 12/21/2018     -----------------------------------------------------------------  RAD:   XR ABDOMEN (KUB) (SINGLE AP VIEW)   Final Result   1. Orogastric tube with expected intragastric placement. XR CHEST 1 VW   Final Result      ET tube in place. Postsurgical changes in the lower cervical spine. No acute cardiopulmonary findings.          VL Extremity Venous Bilateral   Final Result      XR

## 2018-12-23 NOTE — PROGRESS NOTES
High Blood Pressure Maternal Grandmother     High Blood Pressure Maternal Grandfather          REVIEW OF SYSTEMS:    No fever / chills / sweats. No weight loss. No visual change, eye pain, eye discharge. No oral lesion, sore throat, dysphagia. Denies cough / sputum/Sob   Denies chest pain, palpitations/ dizziness  Denies nausea/ vomiting/abdominal pain/diarrhea. Denies dysuria or change in urinary function. Denies joint swelling or pain. No myalgia, arthralgia. No rashes, skin lesions   Denies focal weakness, sensory change or other neurologic symptoms  No lymph node swelling or tenderness.     Fevers sternal wound dehiscence, drainage, weakness     PHYSICAL EXAM:      Vitals:    BP (!) 142/105   Pulse 136   Temp 98.1 °F (36.7 °C) (Oral)   Resp 8   Ht 5' 10\" (1.778 m)   Wt 251 lb 5.2 oz (114 kg)   SpO2 100%   BMI 36.06 kg/m²     General Appearance: alert,in  acute distress,+  pallor, no icterus   Skin: warm and dry, no rash or erythema  Head: normocephalic and atraumatic  Eyes: pupils equal, round, and reactive to light, conjunctivae normal  ENT: tympanic membrane, external ear and ear canal normal bilaterally, nose without deformity, nasal mucosa and turbinates normal without polyps  Neck: supple and non-tender without mass, no thyromegaly  no cervical lymphadenopathy  Pulmonary/Chest: coarse crepts +  no wheezes, rales or rhonchi, normal air movement, no respiratory distress  Cardiovascular:  S1 and S2, no murmurs, rubs, clicks, or gallops, no carotid bruits sterna wound vac++  Abdomen: soft, non-tender, non-distended, normal bowel sounds, no masses or organomegaly  Extremities: no cyanosis, clubbing or +edema  Musculoskeletal: normal range of motion, no joint swelling, deformity or tenderness  Psych:  Orientation, sensorium, mood normal  Lines:  IV      Data Review:    Lab Results   Component Value Date    WBC 5.8 12/23/2018    HGB 9.2 (L) 12/23/2018    HCT 29.2 (L) 12/23/2018    MCV 91.3 12/23/2018    PLT 93 (L) 12/23/2018     Lab Results   Component Value Date    CREATININE 2.0 (H) 12/23/2018    BUN 48 (H) 12/23/2018     12/23/2018    K 4.1 12/23/2018     12/23/2018    CO2 24 12/23/2018       Hepatic Function Panel:   Lab Results   Component Value Date    ALKPHOS 84 12/04/2018    ALT 29 12/04/2018    AST 22 12/04/2018    PROT 6.4 12/04/2018    BILITOT 0.4 12/04/2018    BILIDIR <0.2 12/04/2018    IBILI see below 12/04/2018    LABALBU 2.0 12/23/2018       UA:  Lab Results   Component Value Date    COLORU Yellow 11/26/2018    CLARITYU Clear 11/26/2018    GLUCOSEU Negative 11/26/2018    BILIRUBINUR Negative 11/26/2018    BILIRUBINUR n 10/11/2017    KETUA Negative 11/26/2018    SPECGRAV 1.020 11/26/2018    BLOODU SMALL 11/26/2018    PHUR 6.0 11/26/2018    PROTEINU >=300 11/26/2018    UROBILINOGEN 0.2 11/26/2018    NITRU Negative 11/26/2018    LEUKOCYTESUR Negative 11/26/2018    LABMICR YES 11/26/2018    LABMICR 376.70 11/26/2018    URINETYPE Not Specified 11/22/2018      Urine Microscopic:   Lab Results   Component Value Date    WBCUA 0-2 11/26/2018    RBCUA 0-2 11/26/2018    EPIU 3-5 11/26/2018     Creat  2 :    MICRO: cultures reviewed and updated by me   Time       Surgical Culture [[id number]] (Abnormal)  Collected: 12/20/18 7933   Order Status: Completed Specimen: Specimen from Chest Updated: 12/23/18 0614    Gram Stain Result No WBCs or organisms seen    Anaerobic Culture --    Further report to follow   No anaerobes isolated so far, Further report to follow     Organism Serratia marcescens (A)    Culture Surgical Light growth   Narrative:     ORDER#: [id number]                          ORDERED BY: Catracho Vallejo  SOURCE: Chest sternum                      COLLECTED:  12/20/18 17:46  ANTIBIOTICS AT [facility]. :                      RECEIVED :  12/21/18 06:28  Performed at:   [facility]  [address]   Phone [phone] Culture blood #1 [[id number]] Collected: 12/21/18 0926   Order Status: Completed Specimen: Blood from Blood Updated: 12/22/18 1115    Blood Culture, Routine No Growth to date.  Any change in status will be called. Narrative:     ORDER#: [id number]                          ORDERED BY: JLUIS Morgan  SOURCE: Blood Arterial line                COLLECTED:  12/21/18 09:26  ANTIBIOTICS AT [facility]. :                      RECEIVED :  12/21/18 14:49  If child <=2 yrs old please draw pediatric bottle. ~Blood Culture #1  Performed at:  [facility]  [address] [facility] [address]   Phone [phone]   Culture blood #2 [[id number]] Collected: 12/21/18 1015   Order Status: Completed Specimen: Blood Updated: 12/22/18 1115    Culture, Blood 2 No Growth to date.  Any change in status will be called. Narrative:     ORDER#: [id number]                          ORDERED BY: JLUIS Morgan  SOURCE: Blood Antecubital-Right            COLLECTED:  12/21/18 10:15  ANTIBIOTICS AT [facility]. :                      RECEIVED :  12/21/18 14:49  Pediatric bottle ONLY received  If child <=2 yrs old please draw pediatric bottle. ~Blood Culture #2  Performed at:  [facility]  [address]   Phone [phone]   CULTURE BLOOD #1 [[id number]] (Abnormal)  Collected: 12/18/18 1300   Order Status: Completed Specimen: Blood from Blood Updated: 12/21/18 0543    Blood Culture, Routine --    Organism Serratia marcescens DNA Detected (A)    Blood Culture, Routine See additional report for complete BCID panel. Organism Serratia marcescens (A)    Blood Culture, Routine --    POSITIVE for   Isolated two out of two bottles    Narrative:     ORDER#: [id number]                          ORDERED BY: Jenny Fay  SOURCE: Blood Antecubital-Right            COLLECTED:  12/18/18 13:00  ANTIBIOTICS AT [facility]. :                      RECEIVED :  12/18/18 15:34  CALL  Bee from current hospitalization and  care every where were reviewed  by me as a part of the evaluation   Plan:   1. Cont IV Cefepime x 2 gm  Q12 HRS  2. Cont oral Levofloxacin   3. ESR, CRP   4. Will need Chest line placement or PICC line placement for IV abx   5. Anticipate x 4 weeks of iv ABx THERAPY     Discussed with patient/Family  D/W RN     Thanks for allowing me to participate in your patient's care and please call me with any questions or concerns.     Davon Lopez MD  Infectious Disease  [facility]) Physician  Phone: [phone]   Fax : [phone]

## 2018-12-23 NOTE — PROGRESS NOTES
Incision and drainage sternotomy  Postoperative day #2  Dr. Tino Randall  Patient was extubated post pump appropriately to questions  Vital signs stable afebrile  Wound VAC in position  Culture from the sternum is no back  S1 plus S2 +0 no additional sounds sinus rhythm  Bilaterally clear no additional sounds  Plan  From our end patient could be discharged to acute rehab wound VAC change Monday Wednesday and Friday  I'll increase beta blocker today disposition that did not contain sinus rhythm I could add digoxin in the morning

## 2018-12-24 ENCOUNTER — APPOINTMENT (OUTPATIENT)
Dept: INTERVENTIONAL RADIOLOGY/VASCULAR | Age: 58
DRG: 023 | End: 2018-12-24
Attending: FAMILY MEDICINE
Payer: COMMERCIAL

## 2018-12-24 LAB
ALBUMIN SERPL-MCNC: 2.1 G/DL (ref 3.4–5)
ANION GAP SERPL CALCULATED.3IONS-SCNC: 9 MMOL/L (ref 3–16)
BUN BLDV-MCNC: 48 MG/DL (ref 7–20)
C-REACTIVE PROTEIN: 91 MG/L (ref 0–5.1)
CALCIUM SERPL-MCNC: 8.2 MG/DL (ref 8.3–10.6)
CHLORIDE BLD-SCNC: 108 MMOL/L (ref 99–110)
CO2: 24 MMOL/L (ref 21–32)
CREAT SERPL-MCNC: 2.1 MG/DL (ref 0.9–1.3)
GFR AFRICAN AMERICAN: 39
GFR NON-AFRICAN AMERICAN: 32
GLUCOSE BLD-MCNC: 125 MG/DL (ref 70–99)
GLUCOSE BLD-MCNC: 127 MG/DL (ref 70–99)
GLUCOSE BLD-MCNC: 145 MG/DL (ref 70–99)
GLUCOSE BLD-MCNC: 147 MG/DL (ref 70–99)
GLUCOSE BLD-MCNC: 167 MG/DL (ref 70–99)
GLUCOSE BLD-MCNC: 203 MG/DL (ref 70–99)
HCT VFR BLD CALC: 27.7 % (ref 40.5–52.5)
HEMOGLOBIN: 9.1 G/DL (ref 13.5–17.5)
INR BLD: 1.26 (ref 0.86–1.14)
MAGNESIUM: 2 MG/DL (ref 1.8–2.4)
MCH RBC QN AUTO: 29.8 PG (ref 26–34)
MCHC RBC AUTO-ENTMCNC: 32.8 G/DL (ref 31–36)
MCV RBC AUTO: 91 FL (ref 80–100)
PDW BLD-RTO: 17.5 % (ref 12.4–15.4)
PERFORMED ON: ABNORMAL
PHOSPHORUS: 3.5 MG/DL (ref 2.5–4.9)
PLATELET # BLD: 100 K/UL (ref 135–450)
PMV BLD AUTO: 8.8 FL (ref 5–10.5)
POTASSIUM SERPL-SCNC: 4.2 MMOL/L (ref 3.5–5.1)
PROTHROMBIN TIME: 14.4 SEC (ref 9.8–13)
RBC # BLD: 3.05 M/UL (ref 4.2–5.9)
SEDIMENTATION RATE, ERYTHROCYTE: 119 MM/HR (ref 0–20)
SODIUM BLD-SCNC: 141 MMOL/L (ref 136–145)
WBC # BLD: 7 K/UL (ref 4–11)

## 2018-12-24 PROCEDURE — G8987 SELF CARE CURRENT STATUS: HCPCS

## 2018-12-24 PROCEDURE — 85027 COMPLETE CBC AUTOMATED: CPT

## 2018-12-24 PROCEDURE — 83735 ASSAY OF MAGNESIUM: CPT

## 2018-12-24 PROCEDURE — 86140 C-REACTIVE PROTEIN: CPT

## 2018-12-24 PROCEDURE — 85652 RBC SED RATE AUTOMATED: CPT

## 2018-12-24 PROCEDURE — 51798 US URINE CAPACITY MEASURE: CPT

## 2018-12-24 PROCEDURE — 99152 MOD SED SAME PHYS/QHP 5/>YRS: CPT | Performed by: RADIOLOGY

## 2018-12-24 PROCEDURE — G8979 MOBILITY GOAL STATUS: HCPCS

## 2018-12-24 PROCEDURE — C1894 INTRO/SHEATH, NON-LASER: HCPCS

## 2018-12-24 PROCEDURE — 6370000000 HC RX 637 (ALT 250 FOR IP): Performed by: STUDENT IN AN ORGANIZED HEALTH CARE EDUCATION/TRAINING PROGRAM

## 2018-12-24 PROCEDURE — 6360000002 HC RX W HCPCS

## 2018-12-24 PROCEDURE — 6360000002 HC RX W HCPCS: Performed by: INTERNAL MEDICINE

## 2018-12-24 PROCEDURE — 97164 PT RE-EVAL EST PLAN CARE: CPT

## 2018-12-24 PROCEDURE — 2580000003 HC RX 258: Performed by: INTERNAL MEDICINE

## 2018-12-24 PROCEDURE — 97530 THERAPEUTIC ACTIVITIES: CPT

## 2018-12-24 PROCEDURE — 6370000000 HC RX 637 (ALT 250 FOR IP): Performed by: INTERNAL MEDICINE

## 2018-12-24 PROCEDURE — C1751 CATH, INF, PER/CENT/MIDLINE: HCPCS

## 2018-12-24 PROCEDURE — 6360000002 HC RX W HCPCS: Performed by: PHYSICIAN ASSISTANT

## 2018-12-24 PROCEDURE — 97168 OT RE-EVAL EST PLAN CARE: CPT

## 2018-12-24 PROCEDURE — 2580000003 HC RX 258: Performed by: STUDENT IN AN ORGANIZED HEALTH CARE EDUCATION/TRAINING PROGRAM

## 2018-12-24 PROCEDURE — 76937 US GUIDE VASCULAR ACCESS: CPT | Performed by: RADIOLOGY

## 2018-12-24 PROCEDURE — 77001 FLUOROGUIDE FOR VEIN DEVICE: CPT | Performed by: RADIOLOGY

## 2018-12-24 PROCEDURE — 80069 RENAL FUNCTION PANEL: CPT

## 2018-12-24 PROCEDURE — 1200000000 HC SEMI PRIVATE

## 2018-12-24 PROCEDURE — 36558 INSERT TUNNELED CV CATH: CPT | Performed by: RADIOLOGY

## 2018-12-24 PROCEDURE — 94760 N-INVAS EAR/PLS OXIMETRY 1: CPT

## 2018-12-24 PROCEDURE — G8978 MOBILITY CURRENT STATUS: HCPCS

## 2018-12-24 PROCEDURE — 99024 POSTOP FOLLOW-UP VISIT: CPT | Performed by: THORACIC SURGERY (CARDIOTHORACIC VASCULAR SURGERY)

## 2018-12-24 PROCEDURE — 2580000003 HC RX 258: Performed by: PHYSICIAN ASSISTANT

## 2018-12-24 PROCEDURE — 6370000000 HC RX 637 (ALT 250 FOR IP): Performed by: PHYSICIAN ASSISTANT

## 2018-12-24 PROCEDURE — 02HV33Z INSERTION OF INFUSION DEVICE INTO SUPERIOR VENA CAVA, PERCUTANEOUS APPROACH: ICD-10-PCS | Performed by: RADIOLOGY

## 2018-12-24 PROCEDURE — 6370000000 HC RX 637 (ALT 250 FOR IP): Performed by: THORACIC SURGERY (CARDIOTHORACIC VASCULAR SURGERY)

## 2018-12-24 PROCEDURE — G8988 SELF CARE GOAL STATUS: HCPCS

## 2018-12-24 PROCEDURE — 85610 PROTHROMBIN TIME: CPT

## 2018-12-24 RX ORDER — SODIUM CHLORIDE 0.9 % (FLUSH) 0.9 %
10 SYRINGE (ML) INJECTION PRN
Status: DISCONTINUED | OUTPATIENT
Start: 2018-12-24 | End: 2018-12-24 | Stop reason: SDUPTHER

## 2018-12-24 RX ORDER — LIDOCAINE HYDROCHLORIDE 10 MG/ML
5 INJECTION, SOLUTION EPIDURAL; INFILTRATION; INTRACAUDAL; PERINEURAL ONCE
Status: DISCONTINUED | OUTPATIENT
Start: 2018-12-24 | End: 2018-12-27 | Stop reason: HOSPADM

## 2018-12-24 RX ORDER — ACETAMINOPHEN 325 MG/1
650 TABLET ORAL EVERY 4 HOURS PRN
Status: DISCONTINUED | OUTPATIENT
Start: 2018-12-24 | End: 2018-12-24 | Stop reason: SDUPTHER

## 2018-12-24 RX ORDER — AMLODIPINE BESYLATE 10 MG/1
10 TABLET ORAL EVERY EVENING
Status: DISCONTINUED | OUTPATIENT
Start: 2018-12-24 | End: 2018-12-27 | Stop reason: HOSPADM

## 2018-12-24 RX ORDER — WARFARIN SODIUM 5 MG/1
5 TABLET ORAL DAILY
Status: DISCONTINUED | OUTPATIENT
Start: 2018-12-24 | End: 2018-12-26

## 2018-12-24 RX ORDER — SODIUM CHLORIDE 0.9 % (FLUSH) 0.9 %
10 SYRINGE (ML) INJECTION EVERY 12 HOURS SCHEDULED
Status: DISCONTINUED | OUTPATIENT
Start: 2018-12-24 | End: 2018-12-24 | Stop reason: SDUPTHER

## 2018-12-24 RX ORDER — OXYMETAZOLINE HYDROCHLORIDE 0.05 G/100ML
2 SPRAY NASAL ONCE
Status: COMPLETED | OUTPATIENT
Start: 2018-12-24 | End: 2018-12-24

## 2018-12-24 RX ADMIN — METOPROLOL SUCCINATE 50 MG: 50 TABLET, EXTENDED RELEASE ORAL at 09:42

## 2018-12-24 RX ADMIN — DOCUSATE SODIUM 100 MG: 100 CAPSULE, LIQUID FILLED ORAL at 21:06

## 2018-12-24 RX ADMIN — TAMSULOSIN HYDROCHLORIDE 0.4 MG: 0.4 CAPSULE ORAL at 09:42

## 2018-12-24 RX ADMIN — ATORVASTATIN CALCIUM 80 MG: 80 TABLET, FILM COATED ORAL at 20:24

## 2018-12-24 RX ADMIN — CEFEPIME HYDROCHLORIDE 2 G: 2 INJECTION, POWDER, FOR SOLUTION INTRAVENOUS at 21:07

## 2018-12-24 RX ADMIN — FLUTICASONE PROPIONATE 1 SPRAY: 50 SPRAY, METERED NASAL at 12:40

## 2018-12-24 RX ADMIN — DOCUSATE SODIUM 100 MG: 100 CAPSULE, LIQUID FILLED ORAL at 09:42

## 2018-12-24 RX ADMIN — OXYMETAZOLINE HYDROCHLORIDE 2 SPRAY: 5 SPRAY NASAL at 12:40

## 2018-12-24 RX ADMIN — GABAPENTIN 300 MG: 300 CAPSULE ORAL at 20:24

## 2018-12-24 RX ADMIN — FAMOTIDINE 20 MG: 20 TABLET ORAL at 20:24

## 2018-12-24 RX ADMIN — CLOPIDOGREL 75 MG: 75 TABLET, FILM COATED ORAL at 09:42

## 2018-12-24 RX ADMIN — FUROSEMIDE 40 MG: 10 INJECTION, SOLUTION INTRAMUSCULAR; INTRAVENOUS at 09:43

## 2018-12-24 RX ADMIN — ENOXAPARIN SODIUM 40 MG: 40 INJECTION SUBCUTANEOUS at 09:40

## 2018-12-24 RX ADMIN — SODIUM BICARBONATE 650 MG: 650 TABLET ORAL at 09:41

## 2018-12-24 RX ADMIN — CEFEPIME HYDROCHLORIDE 2 G: 2 INJECTION, POWDER, FOR SOLUTION INTRAVENOUS at 09:40

## 2018-12-24 RX ADMIN — SENNOSIDES AND DOCUSATE SODIUM 1 TABLET: 8.6; 5 TABLET ORAL at 09:42

## 2018-12-24 RX ADMIN — SODIUM BICARBONATE 650 MG: 650 TABLET ORAL at 20:28

## 2018-12-24 RX ADMIN — Medication 10 ML: at 20:24

## 2018-12-24 RX ADMIN — Medication 10 ML: at 09:44

## 2018-12-24 RX ADMIN — AMLODIPINE BESYLATE 10 MG: 10 TABLET ORAL at 17:09

## 2018-12-24 RX ADMIN — LEVOFLOXACIN 250 MG: 500 TABLET, FILM COATED ORAL at 09:41

## 2018-12-24 RX ADMIN — WARFARIN SODIUM 5 MG: 5 TABLET ORAL at 20:24

## 2018-12-24 RX ADMIN — ASPIRIN 81 MG 81 MG: 81 TABLET ORAL at 09:42

## 2018-12-24 RX ADMIN — FAMOTIDINE 20 MG: 20 TABLET ORAL at 09:45

## 2018-12-24 RX ADMIN — Medication 10 ML: at 20:25

## 2018-12-24 ASSESSMENT — PAIN SCALES - GENERAL
PAINLEVEL_OUTOF10: 0
PAINLEVEL_OUTOF10: 0

## 2018-12-24 NOTE — PLAN OF CARE
Problem: Risk for Impaired Skin Integrity  Goal: Tissue integrity - skin and mucous membranes  Structural intactness and normal physiological function of skin and  mucous membranes. Outcome: Ongoing  Pt has various bruising and redness, special mattress ordered. New sacral heart applied and zinc cream. Will consult wound nurse. Problem: Falls - Risk of:  Goal: Will remain free from falls  Will remain free from falls   Outcome: Ongoing  Pt will need to see PT, pt not c/o any pain and resting in bed comfortably.

## 2018-12-24 NOTE — PROGRESS NOTES
mg, Oral, BID    sennosides-docusate sodium, 1 tablet, Oral, Daily    enoxaparin, 40 mg, Subcutaneous, Daily    sodium bicarbonate, 650 mg, Oral, BID    gabapentin, 300 mg, Oral, Nightly    famotidine, 20 mg, Oral, BID    tamsulosin, 0.4 mg, Oral, Daily    sodium chloride flush, 10 mL, Intravenous, 2 times per day   Infusions    dextrose        Antibiotics   Recent Abx Admin                   cefepime (MAXIPIME) 2 g IVPB minibag (g) 2 g New Bag 12/23/18 2216     2 g New Bag  1007    levofloxacin (LEVAQUIN) tablet 250 mg (mg) 250 mg Given 12/23/18 0843                 Neurologic Exam:    Mental status: awake, alert, oriented x 4, speech clear     Musculoskeletal:   Gait: Not tested   Tone: Normal   Sensory: Decreased in BLE  Motor strength:    Right  Left    Right  Left    Deltoid  5 5  Hip Flex  5 5   Biceps  5 5  Knee Extensors  5 5   Triceps  5 5  Knee Flexors  5 5   Wrist Ext  5 5  Ankle Dorsiflex. 3 3   Wrist Flex  5 5  Ankle Plantarflex. 5 5   Handgrip  5 5  Ext Harpreet Longus  3 3   Thumb Ext  5 5         Incision: Anterior cervical incision w/ dermabond - STEFANO  [place] J collar when OOB    Respiratory:  Unlabored respiratory pattern    Abdomen:   Soft, ND   Last BM 12/19    Cardiovascular:  Warm, well perfused    Assessment:   Patient is a 59 y/o M POD#9 s/p ACDF C5-7 & C6 corpectomy d/t cervical stenosis w/ myelopathy     Plan:  1. Neuro stable  2. Neurologic exam frequency: Q4H  3. [place] J collar  - Only needs while up walking  - okay to have off while in bed or sitting in chair  - If CT surgery needs to have off while sternal incision healing okay to DC x 1 week. Please call [phone] to discuss if needed  - Patient will need brace for 6-8 week post operatively  4. Mobility: PT/OT as tolerates  5. Diet: PO diet as tolerates   6. DVT Prophylaxis: SCDs & Lovenox   7. Antiplt therapy: OK to resume if medically indicated  8. GI Prophylaxis: Pepcid   9. Bowel Regimen: Per primary team   10.  Pain control: Per

## 2018-12-24 NOTE — PROGRESS NOTES
[facility]    Follow up Note           Reason for Consult:  CAMI on CKD  Requesting Physician:  Dr. Hang Martinez    Chief Complaint:  Numbness tingling  The patient was transferred her for paraplegia occuring after CABG, seen by Neurosurgery , he appears to have cervical cord injury. Patient underwent an anterior spinal cord compression of C6/C7 (POD 6) and subsequently developed sternotomy wound dehiscence with Serratia Marcescens bacteremia. Patient went back for wound vac placement with CT surgery on 12/20 and then had PEA arrest in the PACU that required epi, < 1 min chest compressions and pressor and intubation. Patient was extubated on 12/21 and off sedation. Sub/interval history    Corpectomy 12/16  Found with positive BC for Serratia       No complaints this morning. He remains in the ICU. No chest pain. No cough or wheezing. Social: No visitors this morning. Physical exam:   Constitutional:  VITALS:  /70   Pulse 99   Temp 98.1 °F (36.7 °C) (Oral)   Resp 16   Ht 5' 10\" (1.778 m)   Wt 251 lb 5.2 oz (114 kg)   SpO2 94%   BMI 36.06 kg/m²      Admit Wt: Weight: 261 lb 7.5 oz (118.6 kg)   Todays Wt: Weight: 251 lb 5.2 oz (114 kg)     Most Recent Wt: Weight: 251 lb 5.2 oz (114 kg)        General appearance: NAD, in ICU. Neck: no JVD, supple. Lungs:  Clear bilaterally. No rales. Heart:  S1S2 normal, rub or gallop. Post sternotomy with VAC in place. Abdomen: Soft, non-tender, no organomegaly. Skin: No lesions or rashes, warm to touch.    Ext: trace to 1+ LE edema  : mohamud in place    Data/  Recent Labs      12/22/18   0627 12/23/18 0416  12/24/18   0426   WBC  6.9  5.8  7.0   HGB  9.9*  9.2*  9.1*   HCT  30.6*  29.2*  27.7*   MCV  91.2  91.3  91.0   PLT  110*  93*  100*     Recent Labs      12/22/18   0627  12/23/18   0416  12/24/18   0426   NA  142  140  141   K  4.4  4.1  4.2   CL  108  107  108   CO2  23  24  24   GLUCOSE  104*  182*  145*   PHOS  4.3

## 2018-12-24 NOTE — PROCEDURES
Patient:  Ana Gandhi   :   1960    The procedure including risks and benefits was discussed at length with the patient (or designated family member) and all questions were answered. Informed consent to proceed with the procedure was given.       PROCEDURE : right IJ tunneled non cuffed picc-this is ready for use    BLOOD LOSS : Minimal  SPECIMENS : None  COMPLICATIONS : None  CONDITION : Stable      Dolph Freiberg

## 2018-12-24 NOTE — PROGRESS NOTES
Occupational Therapy   Occupational Therapy Re- Assessment and Treatment  Date: 2018   Patient Name: Rosalinda Carter  MRN: [id number]     : 1960    Date of Service: 2018    Discharge Recommendations:  Rosalinda Carter scored a  on the AM-PAC ADL Inpatient form. Current research shows that an AM-PAC score of 17 or less is typically not associated with a discharge to the patient's home setting. Based on the patients AM-PAC score and their current ADL deficits, it is recommended that the patient have 5-7 sessions per week of Occupational Therapy at d/c to increase the patients independence. OT Equipment Recommendations  Equipment Needed: No  Other: defer to next level of care      Patient Diagnosis(es): There were no encounter diagnoses. has a past medical history of Anoxic brain injury ([address] Cardiac arrest ([address] Diabetes mellitus ([address] Gout; Hyperlipidemia; Hypertension; Reflux; Sleep apnea; and Type 2 diabetes mellitus without complication ([address] has a past surgical history that includes Colonoscopy (2014); Carpal tunnel release (Left); Carpal tunnel release (Right, 4/14/15); pr cabg, arterial, three (N/A, 2018); Cervical disc arthroplasty (N/A, 2018); and RECONSTRUCTIVE REPAIR STERNAL (N/A, 2018). Treatment Diagnosis: impaired ADLs /functional mobility / sitting balance and poor BUE strenght 2/2 CABG and Cervical cord compression       Restrictions  Restrictions/Precautions  Restrictions/Precautions: General Precautions, Fall Risk, Surgical Protocols  Position Activity Restriction  Sternal Precautions: No Pushing, No Pulling, 5# Lifting Restrictions  Other position/activity restrictions: activity as tolerated; [place] J when up per neurosurgery notes; wound vac; R LE fem line (venous)    Subjective   General  Chart Reviewed:  Yes  Additional Pertinent Hx: Admit  from [facility] 2/2 BLE paralysis after CABG x 3 on 12/3  Neuro consult -  s/p ACDF C5-7 Daily  Current Treatment Recommendations: Strengthening, Balance Training, Functional Mobility Training, Neuromuscular Re-education, Patient/Caregiver Education & Training, Equipment Evaluation, Education, & procurement, Self-Care / ADL, Safety Education & Training, Endurance Training    G-Code  OT G-codes  Functional Limitation: Self care  Self Care Current Status (): At least 60 percent but less than 80 percent impaired, limited or restricted  Self Care Goal Status (): At least 40 percent but less than 60 percent impaired, limited or restricted  OutComes Score                                                  AM-PAC Score        AM-PAC Inpatient Daily Activity Raw Score: 12  AM-PAC Inpatient ADL T-Scale Score : 30.6  ADL Inpatient CMS 0-100% Score: 66.57  ADL Inpatient CMS G-Code Modifier : CL    Goals  Short term goals  Time Frame for Short term goals: at d/c   Short term goal 1: updated goal 12/24: Sit at EOB x 5mins with min A - not met  Short term goal 2: Grooming with setup - not met  Short term goal 3:  Increase BUE HEP 10-reps x 2 sets all planes- not met  Patient Goals   Patient goals : \"to be able to stand up\"       Therapy Time   Individual Concurrent Group Co-treatment   Time In 1000         Time Out 1026         Minutes 26         Timed Code Treatment Minutes:   11  Total Treatment Minutes:  32     If patient is d/c prior to next treatment session, this note will serve as the discharge summary    Madhav Bach OTR/L, [address], OT

## 2018-12-24 NOTE — PROGRESS NOTES
tolerated; [place] J when up per neurosurgery notes; wound vac; R LE fem line (venous)  Vision/Hearing  Vision: Within Functional Limits  Vision Exceptions: Wears glasses at all times  Hearing: Within functional limits     Subjective  General  Chart Reviewed: Yes  Additional Pertinent Hx: Admit 12/7 from [facility] 2/2 BLE paralysis after CABG x 3 on 12/3  Neuro consult -  s/p ACDF C5-7 w/ C6 corpectomy on 12/15 ; 12/21 admit to ICU and intubated due to cardiac arrest post op sternal wound I and D/wound vac; extubated 12/21 and txfer 5T 12/23. PMHx: TBI 1994, MI 1994, gout, HLD, HTN, reflux, sleep apnea, CABG 12/4/2018  Referring Practitioner: Dominique Reyes MD  Diagnosis: Acute paraplegia with sensory level around T4 and hyperreflexia, per Neurology note 12/04. CAD s/p CABG 12/04   Subjective  Subjective: Pt found supine in bed. Pleasant and agreeable to PT. Very cooperative throughout session. Hopes for ARU at d/c. Pain Screening  Patient Currently in Pain: Denies       Orientation  Orientation  Overall Orientation Status: Within Normal Limits  Social/Functional History  Social/Functional History  Lives With: Alone  Type of Home: House  Home Layout: One level  Home Access: Stairs to enter without rails  Entrance Stairs - Number of Steps: 1 -6 inch step   Bathroom Shower/Tub: Tub/Shower unit  Bathroom Toilet: Standard  Home Equipment: Cane, Rolling walker (doesn't normally )  ADL Assistance: Independent  Homemaking Assistance: Independent  Homemaking Responsibilities: Yes  Ambulation Assistance: Independent  Transfer Assistance: Independent  Active : Yes  Occupation: On disability  Additional Comments: pt reports family can assist with IADLs after d/c.     Cognition        Objective          AROM RLE (degrees)  RLE General AROM: AAROM WFL  AROM LLE (degrees)  LLE General AROM: AAROM WFL  Strength RLE  Comment: grossly decreased throughout; able to hold R LE in hooklying position x 3-4 min, at least 3+/5 ankle

## 2018-12-25 LAB
ALBUMIN SERPL-MCNC: 2.3 G/DL (ref 3.4–5)
ANAEROBIC CULTURE: ABNORMAL
ANION GAP SERPL CALCULATED.3IONS-SCNC: 10 MMOL/L (ref 3–16)
BUN BLDV-MCNC: 45 MG/DL (ref 7–20)
CALCIUM SERPL-MCNC: 8.8 MG/DL (ref 8.3–10.6)
CHLORIDE BLD-SCNC: 108 MMOL/L (ref 99–110)
CO2: 24 MMOL/L (ref 21–32)
CREAT SERPL-MCNC: 1.8 MG/DL (ref 0.9–1.3)
CULTURE SURGICAL: ABNORMAL
GFR AFRICAN AMERICAN: 47
GFR NON-AFRICAN AMERICAN: 39
GLUCOSE BLD-MCNC: 137 MG/DL (ref 70–99)
GLUCOSE BLD-MCNC: 144 MG/DL (ref 70–99)
GLUCOSE BLD-MCNC: 156 MG/DL (ref 70–99)
GLUCOSE BLD-MCNC: 160 MG/DL (ref 70–99)
GLUCOSE BLD-MCNC: 177 MG/DL (ref 70–99)
GRAM STAIN RESULT: ABNORMAL
HCT VFR BLD CALC: 30.7 % (ref 40.5–52.5)
HEMOGLOBIN: 10 G/DL (ref 13.5–17.5)
INR BLD: 1.24 (ref 0.86–1.14)
MAGNESIUM: 1.9 MG/DL (ref 1.8–2.4)
MCH RBC QN AUTO: 29.4 PG (ref 26–34)
MCHC RBC AUTO-ENTMCNC: 32.5 G/DL (ref 31–36)
MCV RBC AUTO: 90.4 FL (ref 80–100)
ORGANISM: ABNORMAL
PDW BLD-RTO: 17.3 % (ref 12.4–15.4)
PERFORMED ON: ABNORMAL
PHOSPHORUS: 3.7 MG/DL (ref 2.5–4.9)
PLATELET # BLD: 102 K/UL (ref 135–450)
PMV BLD AUTO: 9 FL (ref 5–10.5)
POTASSIUM SERPL-SCNC: 4.3 MMOL/L (ref 3.5–5.1)
PROTHROMBIN TIME: 14.1 SEC (ref 9.8–13)
RBC # BLD: 3.4 M/UL (ref 4.2–5.9)
SODIUM BLD-SCNC: 142 MMOL/L (ref 136–145)
URIC ACID, SERUM: 7 MG/DL (ref 3.5–7.2)
WBC # BLD: 5.7 K/UL (ref 4–11)

## 2018-12-25 PROCEDURE — 6370000000 HC RX 637 (ALT 250 FOR IP): Performed by: PHYSICIAN ASSISTANT

## 2018-12-25 PROCEDURE — 6360000002 HC RX W HCPCS: Performed by: INTERNAL MEDICINE

## 2018-12-25 PROCEDURE — 83735 ASSAY OF MAGNESIUM: CPT

## 2018-12-25 PROCEDURE — 85027 COMPLETE CBC AUTOMATED: CPT

## 2018-12-25 PROCEDURE — 85610 PROTHROMBIN TIME: CPT

## 2018-12-25 PROCEDURE — 2580000003 HC RX 258: Performed by: INTERNAL MEDICINE

## 2018-12-25 PROCEDURE — 97535 SELF CARE MNGMENT TRAINING: CPT

## 2018-12-25 PROCEDURE — 36415 COLL VENOUS BLD VENIPUNCTURE: CPT

## 2018-12-25 PROCEDURE — 2580000003 HC RX 258: Performed by: PHYSICIAN ASSISTANT

## 2018-12-25 PROCEDURE — 6370000000 HC RX 637 (ALT 250 FOR IP): Performed by: STUDENT IN AN ORGANIZED HEALTH CARE EDUCATION/TRAINING PROGRAM

## 2018-12-25 PROCEDURE — 99024 POSTOP FOLLOW-UP VISIT: CPT | Performed by: THORACIC SURGERY (CARDIOTHORACIC VASCULAR SURGERY)

## 2018-12-25 PROCEDURE — 80069 RENAL FUNCTION PANEL: CPT

## 2018-12-25 PROCEDURE — 6370000000 HC RX 637 (ALT 250 FOR IP): Performed by: INTERNAL MEDICINE

## 2018-12-25 PROCEDURE — 2580000003 HC RX 258: Performed by: STUDENT IN AN ORGANIZED HEALTH CARE EDUCATION/TRAINING PROGRAM

## 2018-12-25 PROCEDURE — 97530 THERAPEUTIC ACTIVITIES: CPT

## 2018-12-25 PROCEDURE — 6370000000 HC RX 637 (ALT 250 FOR IP): Performed by: THORACIC SURGERY (CARDIOTHORACIC VASCULAR SURGERY)

## 2018-12-25 PROCEDURE — 1200000000 HC SEMI PRIVATE

## 2018-12-25 PROCEDURE — 84550 ASSAY OF BLOOD/URIC ACID: CPT

## 2018-12-25 RX ORDER — SODIUM CHLORIDE 9 MG/ML
INJECTION, SOLUTION INTRAVENOUS
Status: COMPLETED
Start: 2018-12-25 | End: 2018-12-26

## 2018-12-25 RX ADMIN — DOCUSATE SODIUM 100 MG: 100 CAPSULE, LIQUID FILLED ORAL at 21:07

## 2018-12-25 RX ADMIN — CLOPIDOGREL 75 MG: 75 TABLET, FILM COATED ORAL at 10:02

## 2018-12-25 RX ADMIN — FAMOTIDINE 20 MG: 20 TABLET ORAL at 10:04

## 2018-12-25 RX ADMIN — Medication 10 ML: at 10:06

## 2018-12-25 RX ADMIN — SODIUM BICARBONATE 650 MG: 650 TABLET ORAL at 10:06

## 2018-12-25 RX ADMIN — AMLODIPINE BESYLATE 10 MG: 10 TABLET ORAL at 17:23

## 2018-12-25 RX ADMIN — SODIUM BICARBONATE 650 MG: 650 TABLET ORAL at 21:07

## 2018-12-25 RX ADMIN — Medication 10 MG: at 22:24

## 2018-12-25 RX ADMIN — FLUTICASONE PROPIONATE 1 SPRAY: 50 SPRAY, METERED NASAL at 10:05

## 2018-12-25 RX ADMIN — ASPIRIN 81 MG 81 MG: 81 TABLET ORAL at 10:04

## 2018-12-25 RX ADMIN — ATORVASTATIN CALCIUM 80 MG: 80 TABLET, FILM COATED ORAL at 21:07

## 2018-12-25 RX ADMIN — DOCUSATE SODIUM 100 MG: 100 CAPSULE, LIQUID FILLED ORAL at 10:04

## 2018-12-25 RX ADMIN — FUROSEMIDE 40 MG: 10 INJECTION, SOLUTION INTRAMUSCULAR; INTRAVENOUS at 10:05

## 2018-12-25 RX ADMIN — CEFEPIME HYDROCHLORIDE 2 G: 2 INJECTION, POWDER, FOR SOLUTION INTRAVENOUS at 10:05

## 2018-12-25 RX ADMIN — SENNOSIDES AND DOCUSATE SODIUM 1 TABLET: 8.6; 5 TABLET ORAL at 10:06

## 2018-12-25 RX ADMIN — Medication 10 ML: at 21:08

## 2018-12-25 RX ADMIN — LEVOFLOXACIN 250 MG: 500 TABLET, FILM COATED ORAL at 10:03

## 2018-12-25 RX ADMIN — WARFARIN SODIUM 5 MG: 5 TABLET ORAL at 17:23

## 2018-12-25 RX ADMIN — GABAPENTIN 300 MG: 300 CAPSULE ORAL at 21:07

## 2018-12-25 RX ADMIN — CEFEPIME HYDROCHLORIDE 2 G: 2 INJECTION, POWDER, FOR SOLUTION INTRAVENOUS at 21:07

## 2018-12-25 RX ADMIN — FAMOTIDINE 20 MG: 20 TABLET ORAL at 21:07

## 2018-12-25 RX ADMIN — TAMSULOSIN HYDROCHLORIDE 0.4 MG: 0.4 CAPSULE ORAL at 10:04

## 2018-12-25 RX ADMIN — METOPROLOL SUCCINATE 50 MG: 50 TABLET, EXTENDED RELEASE ORAL at 10:04

## 2018-12-25 NOTE — PROGRESS NOTES
Patient A&O to person and time, intermittently disoriented to place and situations. VSS. Patient intermittently goes in and out of AFib on TELE, pt asymptomatic, medicine aware. Wound vac on with minimal output. Posterior surgical incision dry and intact with old drainag, collar on and aligned. Neuro checks at baseline. Patient up to the chair for a short period of time with maximove x2 assist, tolerated fairly well. Robin in place with adequate output. Patient denies pain or nausea, tolerating diet well. Will continue to monitor.

## 2018-12-25 NOTE — PROGRESS NOTES
Clinical Pharmacy Consult Note    Admit date: 12/7/2018    Subjective/Objective:  61 yo male with PMH significant for cardiac arrest (1994), CKD III, HTN, HLD, and CAD. He underwent a 3 vessel CABG at [facility] on 12/4/18 and subsequently developed upper extremity paraesthesias, LE weakness, and saddle anesthesia. After being admitted to [place] [address], he underwent a cervical spine decompression with neurosurgery and subsequently developed sternotomy wound dehiscence with Serratia Marcescens bacteremia. He underwent a wound vac placement with CT surgery on 12/20/18 and then had PEA arrest.  On 12/23/18, the patient went into atrial fibrillation with RVR. Pharmacy is now being consulted to dose warfarin per Dr. Brittney Ann. This patient was not on warfarin at home. Indication: Atrial fibrillation with RVR  Goal INR: 1.8 - 2.2 (per neurosurgery)    Labs:  Date INR Warfarin Dose   12/24/18 1.26 5 mg   12/25 1.24 5 mg               Lab Results   Component Value Date    HGB 10.0 (L) 12/25/2018    HCT 30.7 (L) 12/25/2018     (L) 12/25/2018       Assessment/Plan:  1. Anticoagulation: Atrial fibrillation  · Continue warfarin 5 mg daily  · May need lower dose with a lower goal and elevated baseline INR  · Goal INR: 1.8 to 2.2 per neurosurgery  · Medication profile reviewed for potential drug interactions. Significant drug interactions include allopurinol, cefepime, and levofloxacin, which can enhance the anticoagulant effect of warfarin, and aspirin and clopidogrel, which can increase the risk of bleeding. · Daily INR will be monitored and dose adjustments made as needed. Please call with any questions.   Suhas Marroquin, JakubD   PGY1 Pharmacy Resident   Wireless: [id number]  12/25/2018 11:11 AM

## 2018-12-25 NOTE — PROGRESS NOTES
Occupational Therapy  Facility/Department: [facility] 5T ORTHO/NEURO  Daily Treatment Note  NAME: Joyce Bailey  : 1960  MRN: [id number]    Date of Service: 2018    Discharge Recommendations:  Joyce Bailey scored a  on the AM-PAC ADL Inpatient form. Current research shows that an AM-PAC score of 17 or less is typically not associated with a discharge to the patient's home setting. Based on the patients AM-PAC score and their current ADL deficits, it is recommended that the patient have 5-7 sessions per week of Occupational Therapy at d/c to increase the patients independence. Assessment: Pt continues to require assist of 1 for rolling in bed with cuing to maintain sternal precautions. Total assist for LB ADLs and toileting. Pt is significantly below his PLOF of independence with ADLS and mobility and would continue to benefit from skilled OT tx to maximize functional recover       OT Equipment Recommendations  Equipment Needed: No  Other: defer to next level of care    Patient Diagnosis(es): There were no encounter diagnoses. has a past medical history of Anoxic brain injury ([address] Cardiac arrest ([address] Diabetes mellitus ([address] Gout; Hyperlipidemia; Hypertension; Reflux; Sleep apnea; and Type 2 diabetes mellitus without complication ([address] has a past surgical history that includes Colonoscopy (2014); Carpal tunnel release (Left); Carpal tunnel release (Right, 4/14/15); pr cabg, arterial, three (N/A, 2018); Cervical disc arthroplasty (N/A, 2018); and RECONSTRUCTIVE REPAIR STERNAL (N/A, 2018).     Restrictions  Restrictions/Precautions  Restrictions/Precautions: General Precautions, Fall Risk, Surgical Protocols  Position Activity Restriction  Sternal Precautions: No Pushing, No Pulling, 5# Lifting Restrictions  Other position/activity restrictions: activity as tolerated; [place] J when up per neurosurgery notes; wound vac  Subjective   General  Chart Reviewed: summary    [place] OTR/L, [address] Bunn, OT

## 2018-12-25 NOTE — PROGRESS NOTES
Pt pulled tunneled line to R chest. Catheter is intact. Cleaned with alcohol and apply gauze dressing and tegaderm over the site. No drainage noted.

## 2018-12-25 NOTE — PROGRESS NOTES
48*  45*   CREATININE  2.0*  2.1*  1.8*   LABGLOM  34*  32*  39*   GFRAA  42*  39*  47*      Renal US 11/25/18        The right kidney measures 11.8 cm in length and the left kidney measures 11.8   cm in length.       Kidneys demonstrate abnormal increased cortical echogenicity.  There are   small bilateral simple renal cysts ranging in size up to 3.3 cm in diameter. There is no hydronephrosis. Tari Friendly is a tiny nonobstructing 1 mm right   intrarenal calculus. Assessment/Plan    1. CKD III , recent cr has been around 1.8 to 2; previously followed with Dr. Yanna Fan at [facility]. Likely from HTN nephrosclerosis, possibly has DM as well. 2. Proteinuria - Spot urine protein/creatinine ratio 4.6 grams  Significant but non-nephrotic proteinuria dates back to 2010  Recheck UPC around 3.0. Use ARB once stable  3. Acidosis on sodium bicarbonate tabs  4. Cervical cord compression - CT cervical spine showed severe spinal stenosis C6/C7 with cord compression. Leg weakness  Corpectomy performed 12/16  Had Decadron but off this now  5. Coronary artery disease   post op CABG   6. CHF - normally on bumetanide TID at home  Has responded to IV lasix in the past, now on lasix 40 mg iv daily; monitor renal function  7. Gout -check uric acid in am  8. DM2 - newly diagnosed   Hb a1c from 11/22/18 was 7.8  9. HTN  - bradycardia a few days ago. Off metoprolol.  - amlodipine and prn hydralazine  10. Urinary retention - on flomax. Continue mohamud until more mobile  11.  Bacteremia - BC positive for Serratia felt to be due to sternal wound  Followed by ID  On cefepime and Levaquin    Discussed with RAMAKRISHNA

## 2018-12-25 NOTE — PROGRESS NOTES
Physical Therapy  Daily Treatment Note    Discharge Recommendations: Paula Juárez scored a 7/24 on the AM-PAC short mobility form. Current research shows that an AM-PAC score of 17 or less is typically not associated with a discharge to the patient's home setting. Based on the patients AM-PAC score and their current functional mobility deficits, it is recommended that the patient have 5-7 sessions per week of Physical Therapy at d/c to increase the patients independence. Assessment:  Pt with good effort and participation. Limited at this time due to LE weakness and UE limitations due to sternal precautions. Pt continues to be well below baseline for mobility and would benefit from skilled PT at D/C to maximize independence. Equipment Needs: Defer to next level of care    Chart Reviewed: Yes   Restrictions/Precautions: General Precautions, Fall Risk, Surgical Protocols Other position/activity restrictions: activity as tolerated; [place] J when up per neurosurgery notes; wound vac; R LE fem line (venous)   Additional Pertinent Hx: Admit 12/7 from [facility] 2/2 BLE paralysis after CABG x 3 on 12/3  Neuro consult -  s/p ACDF C5-7 w/ C6 corpectomy on 12/15 ; 12/21 admit to ICU and intubated due to cardiac arrest post op sternal wound I and D/wound vac; extubated 12/21 and txfer 5T 12/23. PMHx: TBI 1994, MI 1994, gout, HLD, HTN, reflux, sleep apnea, CABG 12/4/2018      Diagnosis: Acute paraplegia with sensory level around T4 and hyperreflexia, per Neurology note 12/04. CAD s/p CABG 12/04    Treatment Diagnosis: decreased mobility    Subjective: Pt in bed initially. Ready to work with PT/OT and get up to chair. Pain: Denies pain at rest.     Objective:    Bed mobility  Rolling: To R and L x once each to place maxi move sling. Pt noted to be able to assist with moving B LEs to place sling under thighs.     Transfers  Bed > chair: Dependent assist via maxi   Other: Once up in chair, pt asking to use bedpan in seated position. Used maxi  to seat pt on bedpan in chair. Pt with some success. Cleaned afterwards. RN aware. Patient Education  Reviewed sternal precautions. Pt needing some cueing during session to not pull with UEs with rolling. Needs reinforcement. Calling for assist with needs. Expressed understanding. Safety Devices  Pt left with needs in reach. In chair (reclined) with chair alarm on. RN updated. AM-PAC score  AM-PAC Inpatient Mobility Raw Score : 7  AM-PAC Inpatient T-Scale Score : 26.42  Mobility Inpatient CMS 0-100% Score: 92.36  Mobility Inpatient CMS G-Code Modifier : CM    Goals: (as determined and assessed by primary PT)  Time Frame for Short term goals: Discharge  Short term goal 1: Bed mobility with max x 1   ongoing  Short term goal 2: Maintain EOB sitting x 5 min with CGA   ongoing  Short term goal 3: Pt will participate in OOB transfer  ongoing      Plan:  Times per week: 5-7;    Current Treatment Recommendations: Functional Mobility Training, Transfer Training, Gait Training, Balance Training, ROM, Strengthening, Safety Education & Training    Therapy Time    Individual  Concurrent  Group  Co-treatment    Time In  833            Time Out  945            Minutes  72              Timed Code Treatment Minutes: 72  Total Treatment Minutes: 72    Will continue per plan of care. If patient is discharged prior to next treatment, this note will serve as the discharge summary. [place], [place] #[id number]    Addendum: 0/3 goals met. Continue POC.    Tino Casey, PT

## 2018-12-26 ENCOUNTER — APPOINTMENT (OUTPATIENT)
Dept: INTERVENTIONAL RADIOLOGY/VASCULAR | Age: 58
DRG: 023 | End: 2018-12-26
Attending: FAMILY MEDICINE
Payer: COMMERCIAL

## 2018-12-26 PROBLEM — Z71.89 DIABETES EDUCATION, ENCOUNTER FOR: Status: ACTIVE | Noted: 2018-06-23

## 2018-12-26 LAB
ALBUMIN SERPL-MCNC: 2.2 G/DL (ref 3.4–5)
ANION GAP SERPL CALCULATED.3IONS-SCNC: 9 MMOL/L (ref 3–16)
BLOOD CULTURE, ROUTINE: NORMAL
BUN BLDV-MCNC: 43 MG/DL (ref 7–20)
C-REACTIVE PROTEIN: 74.5 MG/L (ref 0–5.1)
CALCIUM SERPL-MCNC: 8.8 MG/DL (ref 8.3–10.6)
CHLORIDE BLD-SCNC: 101 MMOL/L (ref 99–110)
CO2: 26 MMOL/L (ref 21–32)
CREAT SERPL-MCNC: 2 MG/DL (ref 0.9–1.3)
CULTURE, BLOOD 2: NORMAL
EKG ATRIAL RATE: 86 BPM
EKG DIAGNOSIS: NORMAL
EKG Q-T INTERVAL: 378 MS
EKG QRS DURATION: 108 MS
EKG QTC CALCULATION (BAZETT): 499 MS
EKG R AXIS: 70 DEGREES
EKG T AXIS: 250 DEGREES
EKG VENTRICULAR RATE: 105 BPM
GFR AFRICAN AMERICAN: 42
GFR NON-AFRICAN AMERICAN: 34
GLUCOSE BLD-MCNC: 124 MG/DL (ref 70–99)
GLUCOSE BLD-MCNC: 148 MG/DL (ref 70–99)
GLUCOSE BLD-MCNC: 220 MG/DL (ref 70–99)
HCT VFR BLD CALC: 29.6 % (ref 40.5–52.5)
HEMOGLOBIN: 9.6 G/DL (ref 13.5–17.5)
INR BLD: 1.28 (ref 0.86–1.14)
MAGNESIUM: 1.7 MG/DL (ref 1.8–2.4)
MCH RBC QN AUTO: 29.3 PG (ref 26–34)
MCHC RBC AUTO-ENTMCNC: 32.3 G/DL (ref 31–36)
MCV RBC AUTO: 90.6 FL (ref 80–100)
PDW BLD-RTO: 17.2 % (ref 12.4–15.4)
PERFORMED ON: ABNORMAL
PERFORMED ON: ABNORMAL
PHOSPHORUS: 3.8 MG/DL (ref 2.5–4.9)
PLATELET # BLD: 110 K/UL (ref 135–450)
PMV BLD AUTO: 8.7 FL (ref 5–10.5)
POTASSIUM SERPL-SCNC: 4.4 MMOL/L (ref 3.5–5.1)
PROTHROMBIN TIME: 14.6 SEC (ref 9.8–13)
RBC # BLD: 3.27 M/UL (ref 4.2–5.9)
SEDIMENTATION RATE, ERYTHROCYTE: 66 MM/HR (ref 0–20)
SODIUM BLD-SCNC: 136 MMOL/L (ref 136–145)
WBC # BLD: 5.4 K/UL (ref 4–11)

## 2018-12-26 PROCEDURE — 99233 SBSQ HOSP IP/OBS HIGH 50: CPT | Performed by: INTERNAL MEDICINE

## 2018-12-26 PROCEDURE — 2500000003 HC RX 250 WO HCPCS

## 2018-12-26 PROCEDURE — 6360000002 HC RX W HCPCS: Performed by: INTERNAL MEDICINE

## 2018-12-26 PROCEDURE — 97110 THERAPEUTIC EXERCISES: CPT

## 2018-12-26 PROCEDURE — 99153 MOD SED SAME PHYS/QHP EA: CPT

## 2018-12-26 PROCEDURE — C1751 CATH, INF, PER/CENT/MIDLINE: HCPCS

## 2018-12-26 PROCEDURE — 6370000000 HC RX 637 (ALT 250 FOR IP): Performed by: INTERNAL MEDICINE

## 2018-12-26 PROCEDURE — B518YZA FLUOROSCOPY OF SUPERIOR VENA CAVA USING OTHER CONTRAST, GUIDANCE: ICD-10-PCS | Performed by: RADIOLOGY

## 2018-12-26 PROCEDURE — 85610 PROTHROMBIN TIME: CPT

## 2018-12-26 PROCEDURE — 97530 THERAPEUTIC ACTIVITIES: CPT

## 2018-12-26 PROCEDURE — 6360000002 HC RX W HCPCS

## 2018-12-26 PROCEDURE — 86140 C-REACTIVE PROTEIN: CPT

## 2018-12-26 PROCEDURE — 2580000003 HC RX 258: Performed by: INTERNAL MEDICINE

## 2018-12-26 PROCEDURE — 6370000000 HC RX 637 (ALT 250 FOR IP): Performed by: STUDENT IN AN ORGANIZED HEALTH CARE EDUCATION/TRAINING PROGRAM

## 2018-12-26 PROCEDURE — 85652 RBC SED RATE AUTOMATED: CPT

## 2018-12-26 PROCEDURE — 2709999900 HC NON-CHARGEABLE SUPPLY

## 2018-12-26 PROCEDURE — 36558 INSERT TUNNELED CV CATH: CPT

## 2018-12-26 PROCEDURE — 93005 ELECTROCARDIOGRAM TRACING: CPT | Performed by: STUDENT IN AN ORGANIZED HEALTH CARE EDUCATION/TRAINING PROGRAM

## 2018-12-26 PROCEDURE — 6370000000 HC RX 637 (ALT 250 FOR IP): Performed by: PHYSICIAN ASSISTANT

## 2018-12-26 PROCEDURE — 36415 COLL VENOUS BLD VENIPUNCTURE: CPT

## 2018-12-26 PROCEDURE — 85027 COMPLETE CBC AUTOMATED: CPT

## 2018-12-26 PROCEDURE — 2580000003 HC RX 258: Performed by: RADIOLOGY

## 2018-12-26 PROCEDURE — 83735 ASSAY OF MAGNESIUM: CPT

## 2018-12-26 PROCEDURE — 6370000000 HC RX 637 (ALT 250 FOR IP): Performed by: THORACIC SURGERY (CARDIOTHORACIC VASCULAR SURGERY)

## 2018-12-26 PROCEDURE — 76937 US GUIDE VASCULAR ACCESS: CPT

## 2018-12-26 PROCEDURE — C1892 INTRO/SHEATH,FIXED,PEEL-AWAY: HCPCS

## 2018-12-26 PROCEDURE — 1200000000 HC SEMI PRIVATE

## 2018-12-26 PROCEDURE — 77001 FLUOROGUIDE FOR VEIN DEVICE: CPT

## 2018-12-26 PROCEDURE — 93010 ELECTROCARDIOGRAM REPORT: CPT | Performed by: INTERNAL MEDICINE

## 2018-12-26 PROCEDURE — 80069 RENAL FUNCTION PANEL: CPT

## 2018-12-26 PROCEDURE — C1894 INTRO/SHEATH, NON-LASER: HCPCS

## 2018-12-26 PROCEDURE — 99152 MOD SED SAME PHYS/QHP 5/>YRS: CPT

## 2018-12-26 PROCEDURE — 2580000003 HC RX 258

## 2018-12-26 PROCEDURE — 02HV33Z INSERTION OF INFUSION DEVICE INTO SUPERIOR VENA CAVA, PERCUTANEOUS APPROACH: ICD-10-PCS | Performed by: RADIOLOGY

## 2018-12-26 RX ORDER — SODIUM CHLORIDE 0.9 % (FLUSH) 0.9 %
10 SYRINGE (ML) INJECTION EVERY 12 HOURS SCHEDULED
Status: DISCONTINUED | OUTPATIENT
Start: 2018-12-26 | End: 2018-12-27 | Stop reason: HOSPADM

## 2018-12-26 RX ORDER — CASTOR OIL AND BALSAM, PERU 788; 87 MG/G; MG/G
OINTMENT TOPICAL 2 TIMES DAILY
Status: DISCONTINUED | OUTPATIENT
Start: 2018-12-26 | End: 2018-12-27 | Stop reason: HOSPADM

## 2018-12-26 RX ORDER — WARFARIN SODIUM 5 MG/1
5 TABLET ORAL DAILY
Status: DISCONTINUED | OUTPATIENT
Start: 2018-12-27 | End: 2018-12-27 | Stop reason: HOSPADM

## 2018-12-26 RX ORDER — LIDOCAINE HYDROCHLORIDE 10 MG/ML
5 INJECTION, SOLUTION EPIDURAL; INFILTRATION; INTRACAUDAL; PERINEURAL ONCE
Status: DISCONTINUED | OUTPATIENT
Start: 2018-12-26 | End: 2018-12-27 | Stop reason: HOSPADM

## 2018-12-26 RX ORDER — SODIUM CHLORIDE 0.9 % (FLUSH) 0.9 %
10 SYRINGE (ML) INJECTION PRN
Status: DISCONTINUED | OUTPATIENT
Start: 2018-12-26 | End: 2018-12-27 | Stop reason: HOSPADM

## 2018-12-26 RX ORDER — WARFARIN SODIUM 5 MG/1
7.5 TABLET ORAL
Status: COMPLETED | OUTPATIENT
Start: 2018-12-26 | End: 2018-12-26

## 2018-12-26 RX ADMIN — ATORVASTATIN CALCIUM 80 MG: 80 TABLET, FILM COATED ORAL at 20:12

## 2018-12-26 RX ADMIN — FUROSEMIDE 40 MG: 10 INJECTION, SOLUTION INTRAMUSCULAR; INTRAVENOUS at 11:45

## 2018-12-26 RX ADMIN — SODIUM BICARBONATE 650 MG: 650 TABLET ORAL at 11:43

## 2018-12-26 RX ADMIN — AMLODIPINE BESYLATE 10 MG: 10 TABLET ORAL at 17:39

## 2018-12-26 RX ADMIN — SODIUM BICARBONATE 650 MG: 650 TABLET ORAL at 20:12

## 2018-12-26 RX ADMIN — GABAPENTIN 300 MG: 300 CAPSULE ORAL at 20:12

## 2018-12-26 RX ADMIN — WARFARIN SODIUM 7.5 MG: 5 TABLET ORAL at 17:40

## 2018-12-26 RX ADMIN — FAMOTIDINE 20 MG: 20 TABLET ORAL at 20:12

## 2018-12-26 RX ADMIN — SODIUM CHLORIDE: 900 INJECTION, SOLUTION INTRAVENOUS at 00:36

## 2018-12-26 RX ADMIN — METOPROLOL SUCCINATE 50 MG: 50 TABLET, EXTENDED RELEASE ORAL at 11:44

## 2018-12-26 RX ADMIN — CEFEPIME HYDROCHLORIDE 2 G: 2 INJECTION, POWDER, FOR SOLUTION INTRAVENOUS at 11:52

## 2018-12-26 RX ADMIN — FAMOTIDINE 20 MG: 20 TABLET ORAL at 11:43

## 2018-12-26 RX ADMIN — DOCUSATE SODIUM 100 MG: 100 CAPSULE, LIQUID FILLED ORAL at 11:43

## 2018-12-26 RX ADMIN — Medication 10 ML: at 20:12

## 2018-12-26 RX ADMIN — CASTOR OIL AND BALSAM, PERU: 788; 87 OINTMENT TOPICAL at 17:55

## 2018-12-26 RX ADMIN — TAMSULOSIN HYDROCHLORIDE 0.4 MG: 0.4 CAPSULE ORAL at 11:44

## 2018-12-26 RX ADMIN — SENNOSIDES AND DOCUSATE SODIUM 1 TABLET: 8.6; 5 TABLET ORAL at 11:44

## 2018-12-26 RX ADMIN — Medication 10 ML: at 11:45

## 2018-12-26 RX ADMIN — CLOPIDOGREL 75 MG: 75 TABLET, FILM COATED ORAL at 11:44

## 2018-12-26 RX ADMIN — LEVOFLOXACIN 250 MG: 500 TABLET, FILM COATED ORAL at 11:44

## 2018-12-26 RX ADMIN — Medication 10 MG: at 20:12

## 2018-12-26 RX ADMIN — ASPIRIN 81 MG 81 MG: 81 TABLET ORAL at 11:44

## 2018-12-26 RX ADMIN — DOCUSATE SODIUM 100 MG: 100 CAPSULE, LIQUID FILLED ORAL at 20:12

## 2018-12-26 RX ADMIN — MEROPENEM 1 G: 1 INJECTION, POWDER, FOR SOLUTION INTRAVENOUS at 17:39

## 2018-12-26 ASSESSMENT — ENCOUNTER SYMPTOMS
WHEEZING: 0
COUGH: 0
DIARRHEA: 0
TROUBLE SWALLOWING: 0
EYE REDNESS: 0
BACK PAIN: 0
SHORTNESS OF BREATH: 0
RHINORRHEA: 0
NAUSEA: 0
ABDOMINAL PAIN: 0
EYE DISCHARGE: 0
CONSTIPATION: 0
SORE THROAT: 0

## 2018-12-26 ASSESSMENT — PAIN SCALES - GENERAL: PAINLEVEL_OUTOF10: 0

## 2018-12-26 NOTE — PROGRESS NOTES
numbness & BL LE weakness and altered sensation from umbilicus down  - Neurosurgery following  - s/p corpectomy 12/16  - Daniel J collar when walking  - q4h neuro checks     Thrombocytopenia: (resolved)  Presented with  (12/4)   - BL LE dopplers: negative for DVT     CAD s/p CABG after MI (NSTEMI)  -s/p 3 vessel CABG (LAD, OM and PDA) 12/4/18  - Cardiology following  - cont ASA and plavix  - Toprol XL 50 mg bid, NO ARB  - Continue lipitor 80 mg QHS      Acute blood loss anemia post surgical  Baseline 13.3--> 8.2-9 after surgery.  -Daily CBC - stable     CKD 3  Cr. 1.6 at admit (baseline since August appears to be 2)  -Daily renal panel   -Nephrology following     Atrial fibrillation   -Toprol XL 50 mg BID      Type II Diabetes Mellitus  Chronic, stable/uncontrolled. Known diabetic complications:nephropathy, cardiovascular disease and cerebrovascular disease. Home diabetic medications include oral agent (monotherapy): glimepiride. A1C 7.3 (12/4/18)   Plan  - lispro QiD  - LDSSI  - Accuchecks  - Hypoglycemia protocol  - cont Neurontin 300 mg QHS      FELIPE  On CPAP at home. Follows [facility] sleep medicine. Takes gabapentin for periodic limb movement disorder qhs.    Plan   -Continue CPAP     HTN   -10 mg PO norvasc QHS     Chronic Systolic and Diastolic Heart Failure  Echo 11/2018, LVEF 92-60%, Grade 1 Diastolic Dysfunction  Plan   - Toprol XL bid     DVT Prophylaxis: coumadin  Mike Hood MD    I will discuss the patient with the senior resident and MD Mike Escobar MD  Internal Medicine Resident, PGY-1  Pager: [phone]

## 2018-12-26 NOTE — PROGRESS NOTES
250 mg, Oral, Daily    fluticasone, 1 spray, Each Nare, Daily    docusate sodium, 100 mg, Oral, BID    sennosides-docusate sodium, 1 tablet, Oral, Daily    sodium bicarbonate, 650 mg, Oral, BID    gabapentin, 300 mg, Oral, Nightly    famotidine, 20 mg, Oral, BID    tamsulosin, 0.4 mg, Oral, Daily   Infusions    dextrose        Antibiotics   Recent Abx Admin                   cefepime (MAXIPIME) 2 g IVPB minibag (g) 2 g New Bag 12/26/18 1152     2 g New Bag 12/25/18 2107    levofloxacin (LEVAQUIN) tablet 250 mg (mg) 250 mg Given 12/26/18 1144                 Neurologic Exam:    Mental status: awake, alert, oriented x 4, speech clear     Musculoskeletal:   Gait: Not tested   Tone: Normal   Sensory: Decreased in BLE  Motor strength:    Right  Left    Right  Left    Deltoid  5 5  Hip Flex  5 5   Biceps  5 5  Knee Extensors  5 5   Triceps  5 5  Knee Flexors  5 5   Wrist Ext  5 5  Ankle Dorsiflex. 3 3   Wrist Flex  5 5  Ankle Plantarflex. 5 5   Handgrip  5 5  Ext Harpreet Longus  3 3   Thumb Ext  5 5         Incision: Anterior cervical incision w/ dermabond - STEFANO  [place] J collar when OOB only     Respiratory:  Unlabored respiratory pattern    Abdomen:   Soft, ND   Last BM 12/19    Cardiovascular:  Warm, well perfused    Assessment:   Patient is a 61 y/o M POD#11 s/p ACDF C5-7 & C6 corpectomy d/t cervical stenosis w/ myelopathy     Plan:  1. Neuro stable  2. Neurologic exam frequency: Q4H  3. Park J collar  - Only needs while up walking  - okay to have off while in bed or sitting in chair  - Patient will need brace for 6-8 week post operatively  4. Mobility: PT/OT as tolerates  5. Diet: PO diet as tolerates   6. DVT Prophylaxis: SCDs & Lovenox   7. Antiplt therapy: OK to resume if medically indicated  8. GI Prophylaxis: Pepcid   9. Bowel Regimen: Per primary team   10. Pain control: Per primary team  11.  Incisional Care: Okay to leave anterior cervical incision STEFANO, wash daily with soap & water    Dispo Planning:

## 2018-12-26 NOTE — PROGRESS NOTES
Occupational/Physical Therapy    Attempted OT/PT treatment. On arrival, met by wound care nurse who reports issues with wound vac. Will hold therapies for now and continue per POC.      Soledad Saravia, OTR/L, [id number]   [place], [place] #[id number]  Lucero Shafer, PT

## 2018-12-26 NOTE — PROGRESS NOTES
Neck: Normal range of motion. Neck supple. Cardiovascular: Normal rate and regular rhythm. Exam reveals no friction rub. No murmur heard. Surgical dressing in place in the sternal wound   Pulmonary/Chest: No stridor. No respiratory distress. He has no wheezes. He has no rales. Abdominal: Soft. Bowel sounds are normal. There is no tenderness. There is no rebound and no guarding. Musculoskeletal: Normal range of motion. He exhibits no edema or tenderness. Lymphadenopathy:     He has no cervical adenopathy. He has no axillary adenopathy. Neurological: He is alert and oriented to person, place, and time. He exhibits normal muscle tone. Skin: Skin is warm and dry. No rash noted. He is not diaphoretic. No erythema. Psychiatric: He has a normal mood and affect. His behavior is normal.   Nursing note and vitals reviewed. Lines: Allvascular access sites are healthy with no local erythema, discharge or tenderness. Intake and output:    I/O last 3 completed shifts:  In: -   Out: 1350 [Urine:1350]    Lab Data:   All available labs and old records have been reviewed by me.     CBC:  Recent Labs      12/24/18   0426  12/25/18   0551  12/26/18   0552   WBC  7.0  5.7  5.4   RBC  3.05*  3.40*  3.27*   HGB  9.1*  10.0*  9.6*   HCT  27.7*  30.7*  29.6*   PLT  100*  102*  110*   MCV  91.0  90.4  90.6   MCH  29.8  29.4  29.3   MCHC  32.8  32.5  32.3   RDW  17.5*  17.3*  17.2*        BMP:  Recent Labs      12/24/18   0426  12/25/18   0551  12/26/18   0552   NA  141  142  136   K  4.2  4.3  4.4   CL  108  108  101   CO2  24 24 26   BUN  48*  45*  43*   CREATININE  2.1*  1.8*  2.0*   CALCIUM  8.2*  8.8  8.8   GLUCOSE  145*  144*  148*        Hepatic Function Panel:   Lab Results   Component Value Date    ALKPHOS 84 12/04/2018    ALT 29 12/04/2018    AST 22 12/04/2018    PROT 6.4 12/04/2018    BILITOT 0.4 12/04/2018    BILIDIR <0.2 12/04/2018    IBILI see below 12/04/2018    LABALBU 2.2 12/26/2018 CPK:   Lab Results   Component Value Date    CKTOTAL 537 (H) 10/30/2018     ESR:   Lab Results   Component Value Date    SEDRATE 119 (H) 12/24/2018     CRP:   Lab Results   Component Value Date    CRP 91.0 (H) 12/24/2018           Imaging: All pertinent images and reports for the current visitwere reviewed by me during this visit. IR TUNNELED CVC PLACE WO SQ PORT/PUMP > 5 YEARS   Final Result   1. Satisfactory ultrasound and fluoroscopic-guided placement and positioning of right internal jugular dual lumen power injectable 5 [place] central venous catheter, PICC line profile   2. Catheter tip in satisfactory position at the cavoatrial junction and is ready for use            IR PICC WO SQ PORT/PUMP > 5 YEARS   Final Result   IMPRESSION :      Successful placement of right internal jugular tunneled small bore central venous catheter standardly positioned at the cavo-atrial junction. The catheter is ready for immediate use. Please note that this is a noncuffed catheter and therefore can be    removed at the bedside without need for dissection. Limited ultrasound demonstrates a patent internal jugular vein. Fluoroscopy time : 0.3 minutes   Number of exposures obtained : 2 fluoroscopic images   Moderate sedation was provided and monitored by an independent trained observer. Moderate sedation start time : 1233   Moderate sedation end time : 1244         XR ABDOMEN (KUB) (SINGLE AP VIEW)   Final Result   1. Orogastric tube with expected intragastric placement. XR CHEST 1 VW   Final Result      ET tube in place. Postsurgical changes in the lower cervical spine. No acute cardiopulmonary findings. VL Extremity Venous Bilateral   Final Result      XR Cervical Spine 1 VW   Final Result       Intraoperative radiographs of the cervical spine C5-C7 ACDF. FLUORO FOR SURGICAL PROCEDURES   Final Result       Intraoperative radiographs of the cervical spine C5-C7 ACDF.       XR

## 2018-12-26 NOTE — PROGRESS NOTES
BUN  48*  45*  43*   CREATININE  2.1*  1.8*  2.0*   LABGLOM  32*  39*  34*   GFRAA  39*  47*  42*      Renal US 11/25/18        The right kidney measures 11.8 cm in length and the left kidney measures 11.8   cm in length.       Kidneys demonstrate abnormal increased cortical echogenicity.  There are   small bilateral simple renal cysts ranging in size up to 3.3 cm in diameter. There is no hydronephrosis. Ana Rosa Clipper is a tiny nonobstructing 1 mm right   intrarenal calculus. Assessment/Plan    1. CKD III , recent cr has been around 1.8 to 2; previously followed with Dr. Swati Garcia at [facility]. Likely from HTN nephrosclerosis, possibly has DM as well. Cr back to his baseline. 2. Proteinuria - Spot urine protein/creatinine ratio 4.6 grams  Significant but non-nephrotic proteinuria dates back to 2010  Recheck UPC around 3.0. Use ARB once stable  3. Acidosis resolved now on sodium bicarbonate tabs  4. Cervical cord compression - CT cervical spine showed severe spinal stenosis C6/C7 with cord compression. Leg weakness  Corpectomy performed 12/16  5. Coronary artery disease   post op CABG   6. CHF - normally on bumetanide TID at home  Has responded to IV lasix in the past, now on lasix 40 mg iv daily; monitor renal function  7. Gout  Noted uric acid was 7  8. DM2 - newly diagnosed   Hb a1c from 11/22/18 was 7.8  9. HTN  - bradycardia a few days ago. Off metoprolol.  - amlodipine and prn hydralazine  10. Urinary retention - on flomax.   11. Bacteremia - BC positive for Serratia felt to be due to sternal wound  Followed by ID  On cefepime and Levaquin    Discussed with RAMAKRISHNA

## 2018-12-26 NOTE — DISCHARGE INSTR - COC
(0-10 scale): Pain Level: 0  Last Weight:   Wt Readings from Last 1 Encounters:   12/23/18 251 lb 5.2 oz (114 kg)     Mental Status:  disoriented alert to person, time, situation and some times place    IV Access:  - PICC - site  tunneled right picc, insertion date: 12/26/18    Nursing Mobility/ADLs:  Walking   Dependent  Transfer  [address]  Assisted  Med Delivery   whole    Wound Care Documentation and Therapy:  Negative Pressure Wound Therapy Chest Anterior;Medial (Active)   $ Disposable NPWT >50 sq cm PER TX $ Yes 12/26/2018 12:30 PM   $ Standard NPWT >50 sq cm PER TX $ Yes 12/26/2018 12:30 PM   Wound Type Surgical 12/26/2018  2:49 PM   Dressing Type Black foam 12/26/2018  2:49 PM   Number of pieces used 2 12/26/2018 12:30 PM   Cycle Continuous 12/26/2018  2:49 PM   Target Pressure (mmHg) 125 12/26/2018  2:49 PM   Canister changed? No 12/26/2018  2:49 PM   Dressing Status Clean;Dry; Intact 12/26/2018  2:49 PM   Dressing Changed Changed/New 12/26/2018  2:49 PM   Drainage Amount None 12/26/2018  2:49 PM   Drainage Description Sanguinous 12/26/2018  2:49 PM   Dressing Change Due 12/28/18 12/26/2018 12:30 PM   Output (ml) 0 ml 12/24/2018  5:33 AM   Wound Assessment Clean;Dry; Intact;Pink;Red 12/26/2018  2:49 PM   Shasta-wound Assessment Clean;Dry; Intact 12/26/2018  2:49 PM   Odor None 12/26/2018  2:49 PM   Number of days: 5       Incision 11/26/18 Groin Right (Active)   Wound Assessment Clean;Dry; Intact 12/13/2018  8:00 PM   [place]-wound Assessment Clean;Dry; Intact 12/13/2018  8:00 PM   Closure Surgical glue; Open to air 12/13/2018  8:00 PM   Culture Taken No 12/12/2018  9:00 AM   Drainage Amount None 12/13/2018  4:15 PM   Odor None 12/13/2018  4:15 PM   Dressing/Treatment Open to air 12/13/2018  4:15 PM   Dressing Changed Changed/New 12/2/2018  2:45 PM   Dressing Status Clean;Dry; Intact 12/13/2018  3:30 AM   Number of days: 30 Incision 12/04/18 Sternum (Active)   Wound Assessment JEFFREY 12/26/2018  2:49 PM   Shasta-wound Assessment JEFFREY 12/26/2018  2:49 PM   Closure Other (Comment) 12/26/2018  2:49 PM   Culture Taken No 12/24/2018  5:33 AM   Drainage Amount Small 12/26/2018  2:49 PM   Drainage Description Serosanguinous 12/26/2018  2:49 PM   Odor None 12/26/2018  2:49 PM   Dressing/Treatment Vacuum dressing;Tegaderm 12/26/2018  2:49 PM   Dressing Changed Changed/New 12/26/2018  2:49 PM   Dressing Status Clean;Dry; Intact 12/26/2018  2:49 PM   Dressing Change Due 12/08/18 12/7/2018  8:00 PM   Number of days: 22       Incision 12/16/18 Neck (Active)   Wound Assessment Clean;Dry; Intact 12/26/2018  2:49 PM   [place]asta-wound Assessment Clean;Dry; Intact 12/26/2018  2:49 PM   Closure Surgical glue; Open to air 12/26/2018  2:49 PM   Culture Taken No 12/25/2018  9:45 AM   Drainage Amount None 12/26/2018  2:49 PM   Odor None 12/26/2018  2:49 PM   Dressing/Treatment Open to air;Surgical glue 12/26/2018  2:49 PM   Dressing Changed Changed/New 12/26/2018  2:49 PM   Dressing Status Clean;Dry; Intact 12/25/2018 12:54 AM   Number of days: 10       Wound 12/22/18 Coccyx Mid Phyllis Headings puprle with left edge partial open 1cm (Active)   Wound Other 12/26/2018 11:09 AM   Dressing Status Clean;Dry; Intact 12/26/2018  2:49 PM   Dressing Changed Changed/New 12/24/2018  4:00 PM   Dressing/Treatment Other (Comment) 12/26/2018  2:49 PM   Wound Length (cm) 3 cm 12/26/2018 11:09 AM   Wound Width (cm) 2 cm 12/26/2018 11:09 AM   Wound Depth (cm) 0.1 cm 12/26/2018 11:09 AM   Wound Surface Area (cm^2) 6 cm^2 12/26/2018 11:09 AM   Wound Volume (cm^3) 0.6 cm^3 12/26/2018 11:09 AM   Wound Assessment Purple 12/26/2018  2:49 PM   Drainage Amount None 12/26/2018  2:49 PM   Drainage Description Serosanguinous 12/25/2018 12:54 AM   Odor None 12/26/2018  2:49 PM   [place]asta-wound Assessment Dry 12/26/2018 11:09 AM   [place]%Wound Bed 10 12/26/2018 11:09 AM   Purple%Wound Bed 90 12/26/2018 therapy. Ventilator:    - No ventilator support    Rehab Therapies: Physical Therapy, Occupational Therapy and Orthotics/Prosthetics  Weight Bearing Status/Restrictions: No weight bearing restirctions  Other Medical Equipment (for information only, NOT a DME order):  brakeesha mckeon when out of bed  Other Treatments:     Patient's personal belongings (please select all that are sent with patient):      RN SIGNATURE:  Electronically signed by Yobany Thomas RN on 12/27/18 at 12:58 PM    CASE MANAGEMENT/SOCIAL WORK SECTION    Inpatient Status Date:12/07/2018    Readmission Risk Assessment Score:  Readmission Risk              Risk of Unplanned Readmission:        36           Discharging to Facility/ Agency   · Name: [facility]  · Address:  · Phone:[phone]  · Fax:[phone]    Dialysis Facility (if applicable)   · Name:  · Address:  · Dialysis Schedule:  · Phone:  · Fax:    / signature: Electronically signed by Rajiv Groves RN on 12/27/18 at 1:35 PM    PHYSICIAN SECTION    Prognosis: Good    Condition at Discharge: Stable    Rehab Potential (if transferring to Rehab): Good    Recommended Labs or Other Treatments After Discharge:                           Out-patient antibiotic therapy (OPAT)/ Antibiotic Infusion orders          Diagnosis: Serratia bacteremia and sternal wound infection       Organism/ culture: Multidrug resistant Serratia     Name and dose of Antimicrobial: IV ertapenem 1 g every 24 hours     Antimicrobial start date: calculated from 12/26/18     Antimicrobial completion date planned: January 16, 2019     Lab monitoring: CBC, Chem 12, ESR, CRP  once a week, to be       collected every Monday or Tuesday morning until the patient is on IV          antibiotics.  Fax weekly lab results to Ngoc Casas MD's office at        [phone]       ** Please notify Ngoc Casas MD's office with any change in patient's        status, transfer out of facility or to

## 2018-12-27 ENCOUNTER — HOSPITAL ENCOUNTER (INPATIENT)
Age: 58
LOS: 14 days | Discharge: NURSING, REHAB OR LONG-TERM CARE | DRG: 040 | End: 2019-01-10
Attending: PHYSICAL MEDICINE & REHABILITATION | Admitting: PHYSICAL MEDICINE & REHABILITATION
Payer: COMMERCIAL

## 2018-12-27 VITALS
OXYGEN SATURATION: 96 % | BODY MASS INDEX: 35.98 KG/M2 | HEART RATE: 129 BPM | RESPIRATION RATE: 16 BRPM | SYSTOLIC BLOOD PRESSURE: 123 MMHG | WEIGHT: 251.32 LBS | DIASTOLIC BLOOD PRESSURE: 84 MMHG | TEMPERATURE: 98.5 F | HEIGHT: 70 IN

## 2018-12-27 DIAGNOSIS — G95.20 CERVICAL SPINAL CORD COMPRESSION (HCC): ICD-10-CM

## 2018-12-27 DIAGNOSIS — A49.8 SERRATIA INFECTION: ICD-10-CM

## 2018-12-27 DIAGNOSIS — T81.49XA POSTOPERATIVE INFECTION OF WOUND OF STERNUM: Primary | ICD-10-CM

## 2018-12-27 PROBLEM — G95.9 CERVICAL MYELOPATHY (HCC): Status: ACTIVE | Noted: 2018-12-27

## 2018-12-27 LAB
ALBUMIN SERPL-MCNC: 2.3 G/DL (ref 3.4–5)
ANION GAP SERPL CALCULATED.3IONS-SCNC: 11 MMOL/L (ref 3–16)
BUN BLDV-MCNC: 40 MG/DL (ref 7–20)
CALCIUM SERPL-MCNC: 8.5 MG/DL (ref 8.3–10.6)
CHLORIDE BLD-SCNC: 105 MMOL/L (ref 99–110)
CO2: 27 MMOL/L (ref 21–32)
CREAT SERPL-MCNC: 1.8 MG/DL (ref 0.9–1.3)
EKG ATRIAL RATE: 105 BPM
EKG DIAGNOSIS: NORMAL
EKG P AXIS: 35 DEGREES
EKG P-R INTERVAL: 162 MS
EKG Q-T INTERVAL: 372 MS
EKG QRS DURATION: 104 MS
EKG QTC CALCULATION (BAZETT): 491 MS
EKG R AXIS: 69 DEGREES
EKG T AXIS: 252 DEGREES
EKG VENTRICULAR RATE: 105 BPM
GFR AFRICAN AMERICAN: 47
GFR NON-AFRICAN AMERICAN: 39
GLUCOSE BLD-MCNC: 116 MG/DL (ref 70–99)
GLUCOSE BLD-MCNC: 117 MG/DL (ref 70–99)
GLUCOSE BLD-MCNC: 134 MG/DL (ref 70–99)
GLUCOSE BLD-MCNC: 170 MG/DL (ref 70–99)
GLUCOSE BLD-MCNC: 179 MG/DL (ref 70–99)
HCT VFR BLD CALC: 27.8 % (ref 40.5–52.5)
HEMOGLOBIN: 8.8 G/DL (ref 13.5–17.5)
INR BLD: 1.54 (ref 0.86–1.14)
MAGNESIUM: 1.6 MG/DL (ref 1.8–2.4)
MCH RBC QN AUTO: 28.6 PG (ref 26–34)
MCHC RBC AUTO-ENTMCNC: 31.5 G/DL (ref 31–36)
MCV RBC AUTO: 90.7 FL (ref 80–100)
PDW BLD-RTO: 17 % (ref 12.4–15.4)
PERFORMED ON: ABNORMAL
PHOSPHORUS: 3.9 MG/DL (ref 2.5–4.9)
PLATELET # BLD: 109 K/UL (ref 135–450)
PMV BLD AUTO: 8.5 FL (ref 5–10.5)
POTASSIUM SERPL-SCNC: 3.8 MMOL/L (ref 3.5–5.1)
PROTHROMBIN TIME: 17.5 SEC (ref 9.8–13)
RBC # BLD: 3.07 M/UL (ref 4.2–5.9)
SODIUM BLD-SCNC: 143 MMOL/L (ref 136–145)
WBC # BLD: 4.9 K/UL (ref 4–11)

## 2018-12-27 PROCEDURE — 85610 PROTHROMBIN TIME: CPT

## 2018-12-27 PROCEDURE — 6370000000 HC RX 637 (ALT 250 FOR IP): Performed by: THORACIC SURGERY (CARDIOTHORACIC VASCULAR SURGERY)

## 2018-12-27 PROCEDURE — 2580000003 HC RX 258: Performed by: PHYSICAL MEDICINE & REHABILITATION

## 2018-12-27 PROCEDURE — 2580000003 HC RX 258: Performed by: INTERNAL MEDICINE

## 2018-12-27 PROCEDURE — 85027 COMPLETE CBC AUTOMATED: CPT

## 2018-12-27 PROCEDURE — 2580000003 HC RX 258

## 2018-12-27 PROCEDURE — 94660 CPAP INITIATION&MGMT: CPT

## 2018-12-27 PROCEDURE — 93005 ELECTROCARDIOGRAM TRACING: CPT | Performed by: STUDENT IN AN ORGANIZED HEALTH CARE EDUCATION/TRAINING PROGRAM

## 2018-12-27 PROCEDURE — 6370000000 HC RX 637 (ALT 250 FOR IP): Performed by: STUDENT IN AN ORGANIZED HEALTH CARE EDUCATION/TRAINING PROGRAM

## 2018-12-27 PROCEDURE — 93010 ELECTROCARDIOGRAM REPORT: CPT | Performed by: INTERNAL MEDICINE

## 2018-12-27 PROCEDURE — 97608 NEG PRS WND THER NDME>50SQCM: CPT

## 2018-12-27 PROCEDURE — 83735 ASSAY OF MAGNESIUM: CPT

## 2018-12-27 PROCEDURE — 6370000000 HC RX 637 (ALT 250 FOR IP): Performed by: INTERNAL MEDICINE

## 2018-12-27 PROCEDURE — 6370000000 HC RX 637 (ALT 250 FOR IP): Performed by: PHYSICIAN ASSISTANT

## 2018-12-27 PROCEDURE — 6360000002 HC RX W HCPCS: Performed by: STUDENT IN AN ORGANIZED HEALTH CARE EDUCATION/TRAINING PROGRAM

## 2018-12-27 PROCEDURE — 1280000000 HC REHAB R&B

## 2018-12-27 PROCEDURE — 80069 RENAL FUNCTION PANEL: CPT

## 2018-12-27 PROCEDURE — 94761 N-INVAS EAR/PLS OXIMETRY MLT: CPT

## 2018-12-27 PROCEDURE — 6360000002 HC RX W HCPCS: Performed by: PHYSICAL MEDICINE & REHABILITATION

## 2018-12-27 PROCEDURE — 6360000002 HC RX W HCPCS: Performed by: INTERNAL MEDICINE

## 2018-12-27 PROCEDURE — 36592 COLLECT BLOOD FROM PICC: CPT

## 2018-12-27 PROCEDURE — 6370000000 HC RX 637 (ALT 250 FOR IP): Performed by: PHYSICAL MEDICINE & REHABILITATION

## 2018-12-27 RX ORDER — SODIUM CHLORIDE 9 MG/ML
INJECTION, SOLUTION INTRAVENOUS CONTINUOUS
Status: DISCONTINUED | OUTPATIENT
Start: 2018-12-27 | End: 2018-12-27

## 2018-12-27 RX ORDER — PSEUDOEPHEDRINE HCL 30 MG
100 TABLET ORAL 2 TIMES DAILY
Qty: 60 CAPSULE | Refills: 1 | Status: ON HOLD | OUTPATIENT
Start: 2018-12-27 | End: 2019-01-10 | Stop reason: HOSPADM

## 2018-12-27 RX ORDER — DOCUSATE SODIUM 100 MG/1
100 CAPSULE, LIQUID FILLED ORAL 2 TIMES DAILY
Status: DISCONTINUED | OUTPATIENT
Start: 2018-12-27 | End: 2018-12-27 | Stop reason: SDUPTHER

## 2018-12-27 RX ORDER — CLOPIDOGREL BISULFATE 75 MG/1
75 TABLET ORAL DAILY
Status: DISCONTINUED | OUTPATIENT
Start: 2018-12-28 | End: 2019-01-10 | Stop reason: HOSPADM

## 2018-12-27 RX ORDER — ASPIRIN 81 MG/1
81 TABLET ORAL DAILY
Status: DISCONTINUED | OUTPATIENT
Start: 2018-12-27 | End: 2018-12-27 | Stop reason: SDUPTHER

## 2018-12-27 RX ORDER — FAMOTIDINE 20 MG/1
20 TABLET, FILM COATED ORAL 2 TIMES DAILY
Status: DISCONTINUED | OUTPATIENT
Start: 2018-12-27 | End: 2019-01-10 | Stop reason: HOSPADM

## 2018-12-27 RX ORDER — SODIUM CHLORIDE 0.9 % (FLUSH) 0.9 %
SYRINGE (ML) INJECTION
Status: COMPLETED
Start: 2018-12-27 | End: 2018-12-27

## 2018-12-27 RX ORDER — POLYETHYLENE GLYCOL 3350 17 G/17G
17 POWDER, FOR SOLUTION ORAL DAILY PRN
Status: DISCONTINUED | OUTPATIENT
Start: 2018-12-27 | End: 2019-01-10 | Stop reason: HOSPADM

## 2018-12-27 RX ORDER — SODIUM CHLORIDE 0.9 % (FLUSH) 0.9 %
10 SYRINGE (ML) INJECTION EVERY 12 HOURS SCHEDULED
Status: DISCONTINUED | OUTPATIENT
Start: 2018-12-27 | End: 2018-12-27 | Stop reason: SDUPTHER

## 2018-12-27 RX ORDER — OXYCODONE HYDROCHLORIDE AND ACETAMINOPHEN 5; 325 MG/1; MG/1
1 TABLET ORAL EVERY 6 HOURS PRN
Qty: 12 TABLET | Refills: 0 | Status: ON HOLD | OUTPATIENT
Start: 2018-12-27 | End: 2019-01-10

## 2018-12-27 RX ORDER — ONDANSETRON 4 MG/1
4 TABLET, ORALLY DISINTEGRATING ORAL EVERY 8 HOURS PRN
Status: DISCONTINUED | OUTPATIENT
Start: 2018-12-27 | End: 2019-01-10 | Stop reason: HOSPADM

## 2018-12-27 RX ORDER — GABAPENTIN 300 MG/1
300 CAPSULE ORAL NIGHTLY
Status: DISCONTINUED | OUTPATIENT
Start: 2018-12-27 | End: 2019-01-10 | Stop reason: HOSPADM

## 2018-12-27 RX ORDER — ATORVASTATIN CALCIUM 80 MG/1
80 TABLET, FILM COATED ORAL NIGHTLY
Status: DISCONTINUED | OUTPATIENT
Start: 2018-12-27 | End: 2019-01-10 | Stop reason: HOSPADM

## 2018-12-27 RX ORDER — SENNA AND DOCUSATE SODIUM 50; 8.6 MG/1; MG/1
1 TABLET, FILM COATED ORAL DAILY
Qty: 30 TABLET | Refills: 1 | Status: ON HOLD | OUTPATIENT
Start: 2018-12-27 | End: 2019-05-16 | Stop reason: HOSPADM

## 2018-12-27 RX ORDER — CYCLOBENZAPRINE HCL 10 MG
10 TABLET ORAL 3 TIMES DAILY PRN
Status: DISCONTINUED | OUTPATIENT
Start: 2018-12-27 | End: 2018-12-27 | Stop reason: HOSPADM

## 2018-12-27 RX ORDER — SODIUM CHLORIDE 0.9 % (FLUSH) 0.9 %
10 SYRINGE (ML) INJECTION PRN
Status: DISCONTINUED | OUTPATIENT
Start: 2018-12-27 | End: 2018-12-27 | Stop reason: SDUPTHER

## 2018-12-27 RX ORDER — DEXTROSE MONOHYDRATE 25 G/50ML
12.5 INJECTION, SOLUTION INTRAVENOUS PRN
Status: DISCONTINUED | OUTPATIENT
Start: 2018-12-27 | End: 2018-12-27 | Stop reason: SDUPTHER

## 2018-12-27 RX ORDER — WARFARIN SODIUM 5 MG/1
TABLET ORAL
Qty: 30 TABLET | Refills: 3 | Status: SHIPPED | OUTPATIENT
Start: 2018-12-27 | End: 2019-02-27 | Stop reason: ALTCHOICE

## 2018-12-27 RX ORDER — SENNA AND DOCUSATE SODIUM 50; 8.6 MG/1; MG/1
1 TABLET, FILM COATED ORAL DAILY
Status: DISCONTINUED | OUTPATIENT
Start: 2018-12-28 | End: 2019-01-10 | Stop reason: HOSPADM

## 2018-12-27 RX ORDER — DEXTROSE MONOHYDRATE 50 MG/ML
100 INJECTION, SOLUTION INTRAVENOUS PRN
Status: DISCONTINUED | OUTPATIENT
Start: 2018-12-27 | End: 2018-12-28

## 2018-12-27 RX ORDER — SODIUM CHLORIDE 0.9 % (FLUSH) 0.9 %
10 SYRINGE (ML) INJECTION PRN
Status: DISCONTINUED | OUTPATIENT
Start: 2018-12-27 | End: 2018-12-27 | Stop reason: HOSPADM

## 2018-12-27 RX ORDER — DEXTROSE MONOHYDRATE 50 MG/ML
100 INJECTION, SOLUTION INTRAVENOUS PRN
Status: DISCONTINUED | OUTPATIENT
Start: 2018-12-27 | End: 2018-12-27 | Stop reason: SDUPTHER

## 2018-12-27 RX ORDER — NICOTINE POLACRILEX 4 MG
15 LOZENGE BUCCAL PRN
Status: DISCONTINUED | OUTPATIENT
Start: 2018-12-27 | End: 2019-01-10 | Stop reason: HOSPADM

## 2018-12-27 RX ORDER — CHOLECALCIFEROL (VITAMIN D3) 125 MCG
10 CAPSULE ORAL DAILY
Status: DISCONTINUED | OUTPATIENT
Start: 2018-12-27 | End: 2018-12-27 | Stop reason: SDUPTHER

## 2018-12-27 RX ORDER — METOPROLOL SUCCINATE 50 MG/1
50 TABLET, EXTENDED RELEASE ORAL DAILY
Status: DISCONTINUED | OUTPATIENT
Start: 2018-12-28 | End: 2019-01-10 | Stop reason: HOSPADM

## 2018-12-27 RX ORDER — NICOTINE POLACRILEX 4 MG
15 LOZENGE BUCCAL PRN
Status: DISCONTINUED | OUTPATIENT
Start: 2018-12-27 | End: 2018-12-27 | Stop reason: SDUPTHER

## 2018-12-27 RX ORDER — NITROGLYCERIN 0.4 MG/1
0.4 TABLET SUBLINGUAL EVERY 5 MIN PRN
Status: DISCONTINUED | OUTPATIENT
Start: 2018-12-27 | End: 2018-12-27 | Stop reason: HOSPADM

## 2018-12-27 RX ORDER — METOPROLOL SUCCINATE 50 MG/1
50 TABLET, EXTENDED RELEASE ORAL DAILY
Qty: 90 TABLET | Refills: 1 | Status: SHIPPED | OUTPATIENT
Start: 2018-12-27 | End: 2019-06-18

## 2018-12-27 RX ORDER — ALLOPURINOL 300 MG/1
300 TABLET ORAL DAILY
Status: DISCONTINUED | OUTPATIENT
Start: 2018-12-27 | End: 2018-12-27 | Stop reason: HOSPADM

## 2018-12-27 RX ORDER — METOPROLOL SUCCINATE 50 MG/1
50 TABLET, EXTENDED RELEASE ORAL DAILY
Status: DISCONTINUED | OUTPATIENT
Start: 2018-12-28 | End: 2018-12-27 | Stop reason: SDUPTHER

## 2018-12-27 RX ORDER — METOPROLOL SUCCINATE 100 MG/1
100 TABLET, EXTENDED RELEASE ORAL DAILY
Status: DISCONTINUED | OUTPATIENT
Start: 2018-12-27 | End: 2018-12-27

## 2018-12-27 RX ORDER — OXYCODONE HYDROCHLORIDE AND ACETAMINOPHEN 5; 325 MG/1; MG/1
1 TABLET ORAL EVERY 6 HOURS PRN
Status: DISCONTINUED | OUTPATIENT
Start: 2018-12-27 | End: 2019-01-10 | Stop reason: HOSPADM

## 2018-12-27 RX ORDER — DIAZEPAM 5 MG/1
5 TABLET ORAL EVERY 8 HOURS PRN
Qty: 15 TABLET | Refills: 0 | Status: ON HOLD | OUTPATIENT
Start: 2018-12-27 | End: 2019-01-10 | Stop reason: HOSPADM

## 2018-12-27 RX ORDER — MULTIVITAMIN/IRON/FOLIC ACID 18MG-0.4MG
1 TABLET ORAL DAILY
Qty: 30 TABLET | Refills: 0 | Status: SHIPPED | OUTPATIENT
Start: 2018-12-27 | End: 2019-02-27 | Stop reason: DRUGHIGH

## 2018-12-27 RX ORDER — SODIUM BICARBONATE 650 MG/1
650 TABLET ORAL 2 TIMES DAILY
Qty: 60 TABLET | Refills: 2 | Status: ON HOLD | OUTPATIENT
Start: 2018-12-27 | End: 2019-05-16 | Stop reason: HOSPADM

## 2018-12-27 RX ORDER — FLUTICASONE PROPIONATE 50 MCG
2 SPRAY, SUSPENSION (ML) NASAL DAILY
Status: DISCONTINUED | OUTPATIENT
Start: 2018-12-27 | End: 2019-01-10 | Stop reason: HOSPADM

## 2018-12-27 RX ORDER — BISACODYL 10 MG
10 SUPPOSITORY, RECTAL RECTAL DAILY PRN
Status: DISCONTINUED | OUTPATIENT
Start: 2018-12-27 | End: 2019-01-10 | Stop reason: HOSPADM

## 2018-12-27 RX ORDER — ONDANSETRON 2 MG/ML
4 INJECTION INTRAMUSCULAR; INTRAVENOUS EVERY 6 HOURS PRN
Status: DISCONTINUED | OUTPATIENT
Start: 2018-12-27 | End: 2018-12-27 | Stop reason: SDUPTHER

## 2018-12-27 RX ORDER — CASTOR OIL AND BALSAM, PERU 788; 87 MG/G; MG/G
OINTMENT TOPICAL 2 TIMES DAILY
Status: DISCONTINUED | OUTPATIENT
Start: 2018-12-27 | End: 2019-01-10 | Stop reason: HOSPADM

## 2018-12-27 RX ORDER — CLOPIDOGREL BISULFATE 75 MG/1
75 TABLET ORAL DAILY
Qty: 30 TABLET | Refills: 3 | Status: SHIPPED | OUTPATIENT
Start: 2018-12-27 | End: 2019-02-27 | Stop reason: CLARIF

## 2018-12-27 RX ORDER — AMLODIPINE BESYLATE 5 MG/1
10 TABLET ORAL NIGHTLY
Status: DISCONTINUED | OUTPATIENT
Start: 2018-12-27 | End: 2019-01-10 | Stop reason: HOSPADM

## 2018-12-27 RX ORDER — WARFARIN SODIUM 5 MG/1
5 TABLET ORAL DAILY
Status: DISCONTINUED | OUTPATIENT
Start: 2018-12-27 | End: 2018-12-28

## 2018-12-27 RX ORDER — SODIUM BICARBONATE 650 MG/1
650 TABLET ORAL 2 TIMES DAILY
Status: DISCONTINUED | OUTPATIENT
Start: 2018-12-27 | End: 2019-01-10 | Stop reason: HOSPADM

## 2018-12-27 RX ORDER — ONDANSETRON 4 MG/1
4 TABLET, FILM COATED ORAL EVERY 8 HOURS PRN
Status: DISCONTINUED | OUTPATIENT
Start: 2018-12-27 | End: 2018-12-27 | Stop reason: SDUPTHER

## 2018-12-27 RX ORDER — DIAZEPAM 5 MG/1
5 TABLET ORAL EVERY 8 HOURS PRN
Status: CANCELLED | OUTPATIENT
Start: 2018-12-27

## 2018-12-27 RX ORDER — ATORVASTATIN CALCIUM 20 MG/1
40 TABLET, FILM COATED ORAL NIGHTLY
Status: DISCONTINUED | OUTPATIENT
Start: 2018-12-27 | End: 2018-12-27

## 2018-12-27 RX ORDER — FUROSEMIDE 40 MG/1
40 TABLET ORAL DAILY
Status: DISCONTINUED | OUTPATIENT
Start: 2018-12-28 | End: 2019-01-10 | Stop reason: HOSPADM

## 2018-12-27 RX ORDER — CYCLOBENZAPRINE HCL 10 MG
10 TABLET ORAL 3 TIMES DAILY PRN
Qty: 30 TABLET | Refills: 0 | Status: ON HOLD | OUTPATIENT
Start: 2018-12-27 | End: 2019-01-10

## 2018-12-27 RX ORDER — CYCLOBENZAPRINE HCL 10 MG
10 TABLET ORAL 3 TIMES DAILY PRN
Status: DISCONTINUED | OUTPATIENT
Start: 2018-12-27 | End: 2019-01-10 | Stop reason: HOSPADM

## 2018-12-27 RX ORDER — SENNA PLUS 8.6 MG/1
1 TABLET ORAL NIGHTLY PRN
Qty: 30 TABLET | Refills: 1 | Status: ON HOLD | OUTPATIENT
Start: 2018-12-27 | End: 2019-01-10 | Stop reason: HOSPADM

## 2018-12-27 RX ORDER — ATORVASTATIN CALCIUM 80 MG/1
80 TABLET, FILM COATED ORAL NIGHTLY
Qty: 30 TABLET | Refills: 3 | Status: SHIPPED | OUTPATIENT
Start: 2018-12-27 | End: 2019-06-24

## 2018-12-27 RX ORDER — AMLODIPINE BESYLATE 5 MG/1
5 TABLET ORAL DAILY
Status: DISCONTINUED | OUTPATIENT
Start: 2018-12-27 | End: 2018-12-27

## 2018-12-27 RX ORDER — SODIUM CHLORIDE 0.9 % (FLUSH) 0.9 %
10 SYRINGE (ML) INJECTION EVERY 12 HOURS SCHEDULED
Status: DISCONTINUED | OUTPATIENT
Start: 2018-12-27 | End: 2018-12-27 | Stop reason: HOSPADM

## 2018-12-27 RX ORDER — ACETAMINOPHEN 325 MG/1
650 TABLET ORAL EVERY 6 HOURS PRN
Status: DISCONTINUED | OUTPATIENT
Start: 2018-12-27 | End: 2019-01-10 | Stop reason: HOSPADM

## 2018-12-27 RX ORDER — TAMSULOSIN HYDROCHLORIDE 0.4 MG/1
0.4 CAPSULE ORAL DAILY
Status: DISCONTINUED | OUTPATIENT
Start: 2018-12-28 | End: 2019-01-10 | Stop reason: HOSPADM

## 2018-12-27 RX ORDER — MAGNESIUM SULFATE 1 G/100ML
1 INJECTION INTRAVENOUS PRN
Status: DISCONTINUED | OUTPATIENT
Start: 2018-12-27 | End: 2018-12-27 | Stop reason: HOSPADM

## 2018-12-27 RX ORDER — CHOLECALCIFEROL (VITAMIN D3) 125 MCG
10 CAPSULE ORAL NIGHTLY PRN
Status: DISCONTINUED | OUTPATIENT
Start: 2018-12-27 | End: 2019-01-10 | Stop reason: HOSPADM

## 2018-12-27 RX ORDER — ASPIRIN 81 MG/1
81 TABLET ORAL DAILY
Status: DISCONTINUED | OUTPATIENT
Start: 2018-12-28 | End: 2019-01-10 | Stop reason: HOSPADM

## 2018-12-27 RX ORDER — DEXTROSE MONOHYDRATE 25 G/50ML
12.5 INJECTION, SOLUTION INTRAVENOUS PRN
Status: DISCONTINUED | OUTPATIENT
Start: 2018-12-27 | End: 2018-12-28

## 2018-12-27 RX ORDER — MAGNESIUM SULFATE IN WATER 40 MG/ML
4 INJECTION, SOLUTION INTRAVENOUS ONCE
Status: COMPLETED | OUTPATIENT
Start: 2018-12-27 | End: 2018-12-27

## 2018-12-27 RX ORDER — ALLOPURINOL 300 MG/1
300 TABLET ORAL DAILY
Status: DISCONTINUED | OUTPATIENT
Start: 2018-12-28 | End: 2019-01-10 | Stop reason: HOSPADM

## 2018-12-27 RX ORDER — OXYCODONE HYDROCHLORIDE AND ACETAMINOPHEN 5; 325 MG/1; MG/1
2 TABLET ORAL EVERY 6 HOURS PRN
Status: DISCONTINUED | OUTPATIENT
Start: 2018-12-27 | End: 2019-01-10 | Stop reason: HOSPADM

## 2018-12-27 RX ORDER — TAMSULOSIN HYDROCHLORIDE 0.4 MG/1
0.4 CAPSULE ORAL DAILY
Qty: 30 CAPSULE | Refills: 3 | Status: SHIPPED | OUTPATIENT
Start: 2018-12-27

## 2018-12-27 RX ADMIN — WARFARIN SODIUM 5 MG: 5 TABLET ORAL at 17:53

## 2018-12-27 RX ADMIN — FAMOTIDINE 20 MG: 20 TABLET ORAL at 10:28

## 2018-12-27 RX ADMIN — SODIUM BICARBONATE 650 MG TABLET 650 MG: at 20:22

## 2018-12-27 RX ADMIN — SODIUM CHLORIDE: 9 INJECTION, SOLUTION INTRAVENOUS at 04:30

## 2018-12-27 RX ADMIN — MEROPENEM 1 G: 1 INJECTION, POWDER, FOR SOLUTION INTRAVENOUS at 18:33

## 2018-12-27 RX ADMIN — FUROSEMIDE 40 MG: 10 INJECTION, SOLUTION INTRAMUSCULAR; INTRAVENOUS at 10:29

## 2018-12-27 RX ADMIN — Medication 10 ML: at 01:39

## 2018-12-27 RX ADMIN — ASPIRIN 81 MG 81 MG: 81 TABLET ORAL at 10:27

## 2018-12-27 RX ADMIN — CASTOR OIL AND BALSAM, PERU: 788; 87 OINTMENT TOPICAL at 11:24

## 2018-12-27 RX ADMIN — METOPROLOL SUCCINATE 50 MG: 50 TABLET, EXTENDED RELEASE ORAL at 10:27

## 2018-12-27 RX ADMIN — ALLOPURINOL 300 MG: 300 TABLET ORAL at 10:28

## 2018-12-27 RX ADMIN — CLOPIDOGREL 75 MG: 75 TABLET, FILM COATED ORAL at 10:27

## 2018-12-27 RX ADMIN — FAMOTIDINE 20 MG: 20 TABLET ORAL at 20:23

## 2018-12-27 RX ADMIN — TAMSULOSIN HYDROCHLORIDE 0.4 MG: 0.4 CAPSULE ORAL at 10:28

## 2018-12-27 RX ADMIN — Medication 10 MG: at 20:22

## 2018-12-27 RX ADMIN — OXYCODONE AND ACETAMINOPHEN 2 TABLET: 5; 325 TABLET ORAL at 23:39

## 2018-12-27 RX ADMIN — SODIUM CHLORIDE, PRESERVATIVE FREE 10 ML: 5 INJECTION INTRAVENOUS at 20:26

## 2018-12-27 RX ADMIN — SODIUM BICARBONATE 650 MG: 650 TABLET ORAL at 10:28

## 2018-12-27 RX ADMIN — FLUTICASONE PROPIONATE 1 SPRAY: 50 SPRAY, METERED NASAL at 11:27

## 2018-12-27 RX ADMIN — AMLODIPINE BESYLATE 10 MG: 5 TABLET ORAL at 20:25

## 2018-12-27 RX ADMIN — MEROPENEM 1 G: 1 INJECTION, POWDER, FOR SOLUTION INTRAVENOUS at 10:29

## 2018-12-27 RX ADMIN — DOCUSATE SODIUM 100 MG: 100 CAPSULE, LIQUID FILLED ORAL at 10:27

## 2018-12-27 RX ADMIN — NITROGLYCERIN 1 INCH: 20 OINTMENT TOPICAL at 07:58

## 2018-12-27 RX ADMIN — SENNOSIDES AND DOCUSATE SODIUM 1 TABLET: 8.6; 5 TABLET ORAL at 10:28

## 2018-12-27 RX ADMIN — GABAPENTIN 300 MG: 300 CAPSULE ORAL at 20:22

## 2018-12-27 RX ADMIN — ATORVASTATIN CALCIUM 80 MG: 80 TABLET, FILM COATED ORAL at 20:22

## 2018-12-27 RX ADMIN — MAGNESIUM SULFATE HEPTAHYDRATE 4 G: 40 INJECTION, SOLUTION INTRAVENOUS at 11:18

## 2018-12-27 RX ADMIN — MEROPENEM 1 G: 1 INJECTION, POWDER, FOR SOLUTION INTRAVENOUS at 01:39

## 2018-12-27 RX ADMIN — Medication 10 ML: at 10:29

## 2018-12-27 RX ADMIN — CASTOR OIL AND BALSAM, PERU: 788; 87 OINTMENT TOPICAL at 20:26

## 2018-12-27 ASSESSMENT — PAIN SCALES - GENERAL
PAINLEVEL_OUTOF10: 0
PAINLEVEL_OUTOF10: 7
PAINLEVEL_OUTOF10: 0

## 2018-12-27 ASSESSMENT — PAIN DESCRIPTION - PROGRESSION: CLINICAL_PROGRESSION: GRADUALLY IMPROVING

## 2018-12-27 ASSESSMENT — PAIN DESCRIPTION - LOCATION: LOCATION: OTHER (COMMENT)

## 2018-12-27 ASSESSMENT — PAIN DESCRIPTION - FREQUENCY: FREQUENCY: OTHER (COMMENT)

## 2018-12-27 NOTE — PROGRESS NOTES
Patient is a/o x4. Patient denies c/o nausea/pain. VSS. Non-skid socks on. Patient went into AFIB at 0247 with HR going up to 140's. MD notified and aware. No new ordersplaced. Patient reverted back to NSR at 0342  Fall precautions in place, on camera in use. Bed locked and in lowest position, bedside table and nurse call light within reach. Instructed patient to call out for assistance. Patient remains free from falls at this time, will continue to monitor.

## 2018-12-27 NOTE — PROGRESS NOTES
Pt. With rental [address] [id number] [phone]. Call to rep. Mccarty with [facility], who will transfer [address] rental to [facility] where pt. Transferring to today. Noy DURBIN, Pt. and Sofía DURBIN mgr. Instructed on all and Juventino Barnes to send [facility] with dressing intact with pt. To [facility]. All confirming understandin/agreement with all. Call to Smith Alvarez at [facility], instructed on above and dressing change due helder. 12/28,  Nancy confirming understanding/agreement. Call to Will Neumann at [facility] and confirms understanding/agreement on [address] coming intact with pt. And to change helder. As ordered and to call this [facility] if questions/concerns. VAC dressing currently intact with VAC holding steady pressure as ordered.

## 2018-12-27 NOTE — DISCHARGE SUMMARY
g ertapenem on discharge date (for 3 weeks)  - repeat BCx (12/21): NGTD     Cervical Cord compression/spinal stenosis C6/C7 with BL finger 3-5 numbness & BL LE weakness and altered sensation from umbilicus down  - Neurosurgery following  - s/p corpectomy 12/16  - Watkins J collar when walking  - q4h neuro checks     Thrombocytopenia: (resolved)  Presented with  (12/4)   - BL LE dopplers: negative for DVT     CAD s/p CABG after MI (NSTEMI)  -s/p 3 vessel CABG (LAD, OM and PDA) 12/4/18  - Cardiology following  - cont ASA and plavix  - Toprol XL 50 mg bid, NO ARB  - Continue lipitor 80 mg QHS      Acute blood loss anemia post surgical  Baseline 13.3--> 8.2-9 after surgery.  -Daily CBC - stable     CKD 3  Cr. 1.6 at admit (baseline since August appears to be 2). Non-nephrotic proteinuria since 2010 . Likely HTN from nephrosclerosis possibly DM.  -Daily renal panel   -Nephrology following     Atrial fibrillation   -Toprol XL 50 mg BID; warfarin 5 mg QD     Type II Diabetes Mellitus  Chronic, stable/uncontrolled. Known diabetic complications:nephropathy, cardiovascular disease and cerebrovascular disease. Home diabetic medications include oral agent (monotherapy): glimepiride. A1C 7.3 (12/4/18)   Plan  - lispro QiD  - LDSSI  - Accuchecks  - Hypoglycemia protocol  - cont Neurontin 300 mg QHS      FELIPE  On CPAP at home. Follows [facility] sleep medicine. Takes gabapentin for periodic limb movement disorder qhs. Plan   -Continue CPAP     HTN   -10 mg PO norvasc QHS     Chronic Systolic and Diastolic Heart Failure  Echo 11/2018, LVEF 34-39%, Grade 1 Diastolic Dysfunction  Plan   - Toprol XL BID  - IV lasix 40 mg QD         Disposition:  ARU    Physical Exam Performed:     /84   Pulse 129   Temp 98.5 °F (36.9 °C) (Oral)   Resp 16   Ht 5' 10\" (1.778 m)   Wt 251 lb 5.2 oz (114 kg)   SpO2 96%   BMI 36.06 kg/m²       General appearance:  No apparent distress, appears stated age and cooperative.   HEENT:  Normal cephalic, atraumatic without obvious deformity. Pupils equal, round, and reactive to light. Extra ocular muscles intact. Conjunctivae/corneas clear. Neck: Supple, with full range of motion. No jugular venous distention. Trachea midline. Respiratory:  Normal respiratory effort. Clear to auscultation, bilaterally without Rales/Wheezes/Rhonchi. Cardiovascular:  Regular rate and rhythm with normal S1/S2 without murmurs, rubs or gallops. Wound VAC in place. Abdomen: Soft, non-tender, non-distended with normal bowel sounds. Musculoskeletal:  No clubbing, cyanosis or edema bilaterally. Full range of motion without deformity. Skin: Skin color, texture, turgor normal.  No rashes or lesions. +1 LE edema  Neurologic:  Neurovascularly intact without any focal sensory/motor deficits. Cranial nerves: II-XII intact, grossly non-focal.  Psychiatric:  Alert and oriented, thought content appropriate, normal insight  Capillary Refill: Brisk,< 3 seconds   Peripheral Pulses: +2 palpable, equal bilaterally       Labs: For convenience and continuity at follow-up the following most recent labs are provided:      CBC:    Lab Results   Component Value Date    WBC 4.9 12/27/2018    HGB 8.8 12/27/2018    HCT 27.8 12/27/2018     12/27/2018       Renal:    Lab Results   Component Value Date     12/27/2018    K 3.8 12/27/2018    K 3.8 12/07/2018     12/27/2018    CO2 27 12/27/2018    BUN 40 12/27/2018    CREATININE 1.8 12/27/2018    CALCIUM 8.5 12/27/2018    PHOS 3.9 12/27/2018         Significant Diagnostic Studies    Radiology:   IR TUNNELED CVC PLACE WO SQ PORT/PUMP > 5 YEARS   Final Result   1. Satisfactory ultrasound and fluoroscopic-guided placement and positioning of right internal jugular dual lumen power injectable 5 [place]e central venous catheter, PICC line profile   2.  Catheter tip in satisfactory position at the cavoatrial junction and is ready for use            IR PICC WO SQ PORT/PUMP > 5 YEARS   Final Result   IMPRESSION :      Successful placement of right internal jugular tunneled small bore central venous catheter standardly positioned at the cavo-atrial junction. The catheter is ready for immediate use. Please note that this is a noncuffed catheter and therefore can be    removed at the bedside without need for dissection. Limited ultrasound demonstrates a patent internal jugular vein. Fluoroscopy time : 0.3 minutes   Number of exposures obtained : 2 fluoroscopic images   Moderate sedation was provided and monitored by an independent trained observer. Moderate sedation start time : 1233   Moderate sedation end time : 1244         XR ABDOMEN (KUB) (SINGLE AP VIEW)   Final Result   1. Orogastric tube with expected intragastric placement. XR CHEST 1 VW   Final Result      ET tube in place. Postsurgical changes in the lower cervical spine. No acute cardiopulmonary findings. VL Extremity Venous Bilateral   Final Result      XR Cervical Spine 1 VW   Final Result       Intraoperative radiographs of the cervical spine C5-C7 ACDF. FLUORO FOR SURGICAL PROCEDURES   Final Result       Intraoperative radiographs of the cervical spine C5-C7 ACDF. XR CHEST 1 VW   Final Result      No acute cardiopulmonary disease.                 Consults:     IP CONSULT TO DIABETES EDUCATOR  IP CONSULT TO NEUROSURGERY  IP CONSULT TO CARDIOTHORACIC SURGERY  IP CONSULT TO CRITICAL CARE  IP CONSULT TO SOCIAL WORK  IP CONSULT TO NEPHROLOGY  IP CONSULT TO CARDIOTHORACIC SURGERY  INPATIENT CONSULT TO ORTHOTIST/PROSTHETIST  IP CONSULT TO CASE MANAGEMENT  IP CONSULT TO DIETITIAN  IP CONSULT TO INFECTIOUS DISEASES  IP CONSULT TO RADIOLOGY  PHARMACY TO DOSE WARFARIN  IP CONSULT TO INTERVENTIONAL RADIOLOGY    Disposition:  ARU    Condition at Discharge: Stable    Discharge Instructions/Follow-up:  - IV ertapenum 1 g Q24H until 1/16/19  - monitor CBC,m Chem 12, ESR,m CRP once per week and submit · famotidine 40 MG tablet  · metoprolol succinate 50 MG extended release tablet     You can get these medications from any pharmacy    Bring a paper prescription for each of these medications  · atorvastatin 80 MG tablet  · clopidogrel 75 MG tablet  · cyclobenzaprine 10 MG tablet  · diazepam 5 MG tablet  · docusate 100 MG Caps  · Magnesium Oxide 250 MG Tabs  · oxyCODONE-acetaminophen 5-325 MG per tablet  · senna 8.6 MG tablet  · sennosides-docusate sodium 8.6-50 MG tablet  · sodium bicarbonate 650 MG tablet  · tamsulosin 0.4 MG capsule  · warfarin 5 MG tablet         Time Spent on discharge is more than 45 minutes in the examination, evaluation, counseling and review of medications and discharge plan. Signed:    Kaia Chun MD   12/27/2018      Thank you Verna Davidson DO for the opportunity to be involved in this patient's care. If you have any questions or concerns please feel free to contact me at [phone]. Patient seen and examined, labs and imaging studies reviewed, agree with assessment and plan as outlined above. Continue with discharge planning asked to monitor for recurrence of symptoms or new symptoms inclduing but not limited to chest pain shortness of breath nausea vomiting fevers or chills and seek immediate medical attention or call 911. Greater than 30 minutes spent on case on day of discharge late entry on 12/28.     Amy Mackey MD [address]

## 2018-12-27 NOTE — FLOWSHEET NOTE
12/27/18 1046   Encounter Summary   Services provided to: Patient   Referral/Consult From: Rounding   Continue Visiting (es 12/27)   Complexity of Encounter Moderate   Length of Encounter 15 minutes   Routine   Type Follow up   Assessment Approachable   Intervention Active listening   Outcome Engaged in conversation

## 2018-12-27 NOTE — PROGRESS NOTES
Patient seen and examined  Patient doing well today hr was elevated but goes back down resume metoprolol  chf appears fully compensated. Anticipate discharge today  Full discharge summary to follow.

## 2018-12-27 NOTE — CARE COORDINATION
Case Management continuing to follow patient for discharge planning and needs, spoke with patient and family friend yesterday, late evening. Patient with questions regarding going to [facility] for rehab instead of Inpatient rehab. Case Management and family friend explained to patient the difference in rehab. Family friend is very knowledgeable in rehab, may be some type of consultant, MD even for SNF's. Patient was wanting to go to the [facility] and do Swimming for rehab. Referrals were sent to [facility], they were unable to accept patient, [facility] still reviewing clinicals. Patient also okay with referrals being sent to [facility] and [facility], as well as [facility] for placement. Patient was wanting a place close to home, but once he understood he would not have to drive back and forth everyday from facility, he wanted whatever facilities are good with rehab. PT/OT worked with patient again this am.  Patient with AMPAC score of 7-12/24. Referral was sent to [facility], will need updated clinicals after procedure. Procedure scheduled for Saturday, was moved from today. Patient will need precert. Also received return phone call from [facility] and [facility] admissions coordinator, no longer in network with patients insurer. Admissions Coordinator for [facility] will continue to follow case, will plan for precert to be started on Monday, will send referral to [facility] for SNF placement for back up. Please contact Case Management with any questions or concerns.     Thank Isatu Romano, RN Case Manager  4th Floor Progressive Care Unit  [phone]
provider; he and his family are in agreement with the discharge plan.       Care Transitions patient: Vickie Pacheco RN  [facility]  Case Management Department  Ph: [phone] HDT:[phone]

## 2018-12-27 NOTE — PROGRESS NOTES
Physical Therapy/Occupational Therapy     PT/OT attempted to work with pt this PM, per RN pt prepping for d/c at 1430.        Yazmin Ledezma, PT, DPT [id number]   John Paul Jean OTR/L, 1655

## 2018-12-28 LAB
ANION GAP SERPL CALCULATED.3IONS-SCNC: 12 MMOL/L (ref 3–16)
BASOPHILS ABSOLUTE: 0 K/UL (ref 0–0.2)
BASOPHILS RELATIVE PERCENT: 0.4 %
BUN BLDV-MCNC: 36 MG/DL (ref 7–20)
CALCIUM SERPL-MCNC: 8.4 MG/DL (ref 8.3–10.6)
CHLORIDE BLD-SCNC: 103 MMOL/L (ref 99–110)
CO2: 26 MMOL/L (ref 21–32)
CREAT SERPL-MCNC: 1.9 MG/DL (ref 0.9–1.3)
EOSINOPHILS ABSOLUTE: 0.1 K/UL (ref 0–0.6)
EOSINOPHILS RELATIVE PERCENT: 2.8 %
GFR AFRICAN AMERICAN: 44
GFR NON-AFRICAN AMERICAN: 36
GLUCOSE BLD-MCNC: 110 MG/DL (ref 70–99)
GLUCOSE BLD-MCNC: 119 MG/DL (ref 70–99)
GLUCOSE BLD-MCNC: 145 MG/DL (ref 70–99)
GLUCOSE BLD-MCNC: 176 MG/DL (ref 70–99)
GLUCOSE BLD-MCNC: 191 MG/DL (ref 70–99)
HCT VFR BLD CALC: 27.7 % (ref 40.5–52.5)
HEMOGLOBIN: 9 G/DL (ref 13.5–17.5)
INR BLD: 1.87 (ref 0.86–1.14)
LYMPHOCYTES ABSOLUTE: 0.9 K/UL (ref 1–5.1)
LYMPHOCYTES RELATIVE PERCENT: 19.6 %
MAGNESIUM: 2.2 MG/DL (ref 1.8–2.4)
MCH RBC QN AUTO: 29.3 PG (ref 26–34)
MCHC RBC AUTO-ENTMCNC: 32.4 G/DL (ref 31–36)
MCV RBC AUTO: 90.5 FL (ref 80–100)
MONOCYTES ABSOLUTE: 0.4 K/UL (ref 0–1.3)
MONOCYTES RELATIVE PERCENT: 8.7 %
NEUTROPHILS ABSOLUTE: 3 K/UL (ref 1.7–7.7)
NEUTROPHILS RELATIVE PERCENT: 68.5 %
PDW BLD-RTO: 16.6 % (ref 12.4–15.4)
PERFORMED ON: ABNORMAL
PLATELET # BLD: 112 K/UL (ref 135–450)
PMV BLD AUTO: 8.4 FL (ref 5–10.5)
POTASSIUM REFLEX MAGNESIUM: 3.6 MMOL/L (ref 3.5–5.1)
PREALBUMIN: 14 MG/DL (ref 20–40)
PROTHROMBIN TIME: 21.3 SEC (ref 9.8–13)
RBC # BLD: 3.06 M/UL (ref 4.2–5.9)
SODIUM BLD-SCNC: 141 MMOL/L (ref 136–145)
WBC # BLD: 4.4 K/UL (ref 4–11)

## 2018-12-28 PROCEDURE — 2580000003 HC RX 258: Performed by: PHYSICAL MEDICINE & REHABILITATION

## 2018-12-28 PROCEDURE — 83735 ASSAY OF MAGNESIUM: CPT

## 2018-12-28 PROCEDURE — 6360000002 HC RX W HCPCS: Performed by: PHYSICAL MEDICINE & REHABILITATION

## 2018-12-28 PROCEDURE — 85610 PROTHROMBIN TIME: CPT

## 2018-12-28 PROCEDURE — 97163 PT EVAL HIGH COMPLEX 45 MIN: CPT

## 2018-12-28 PROCEDURE — 1280000000 HC REHAB R&B

## 2018-12-28 PROCEDURE — 97530 THERAPEUTIC ACTIVITIES: CPT

## 2018-12-28 PROCEDURE — 6370000000 HC RX 637 (ALT 250 FOR IP): Performed by: PHYSICAL MEDICINE & REHABILITATION

## 2018-12-28 PROCEDURE — G8979 MOBILITY GOAL STATUS: HCPCS

## 2018-12-28 PROCEDURE — 80048 BASIC METABOLIC PNL TOTAL CA: CPT

## 2018-12-28 PROCEDURE — 97112 NEUROMUSCULAR REEDUCATION: CPT

## 2018-12-28 PROCEDURE — 92523 SPEECH SOUND LANG COMPREHEN: CPT

## 2018-12-28 PROCEDURE — 97606 NEG PRS WND THER DME>50 SQCM: CPT

## 2018-12-28 PROCEDURE — 97110 THERAPEUTIC EXERCISES: CPT

## 2018-12-28 PROCEDURE — 97167 OT EVAL HIGH COMPLEX 60 MIN: CPT

## 2018-12-28 PROCEDURE — G8978 MOBILITY CURRENT STATUS: HCPCS

## 2018-12-28 PROCEDURE — 85025 COMPLETE CBC W/AUTO DIFF WBC: CPT

## 2018-12-28 PROCEDURE — 84134 ASSAY OF PREALBUMIN: CPT

## 2018-12-28 PROCEDURE — 97542 WHEELCHAIR MNGMENT TRAINING: CPT

## 2018-12-28 PROCEDURE — 97535 SELF CARE MNGMENT TRAINING: CPT

## 2018-12-28 RX ORDER — SODIUM CHLORIDE 0.9 % (FLUSH) 0.9 %
10 SYRINGE (ML) INJECTION 2 TIMES DAILY
Status: DISCONTINUED | OUTPATIENT
Start: 2018-12-28 | End: 2019-01-10 | Stop reason: HOSPADM

## 2018-12-28 RX ORDER — SODIUM CHLORIDE 0.9 % (FLUSH) 0.9 %
10 SYRINGE (ML) INJECTION PRN
Status: DISCONTINUED | OUTPATIENT
Start: 2018-12-28 | End: 2019-01-10 | Stop reason: HOSPADM

## 2018-12-28 RX ADMIN — METOPROLOL SUCCINATE 50 MG: 50 TABLET, EXTENDED RELEASE ORAL at 07:55

## 2018-12-28 RX ADMIN — Medication 400 MG: at 07:56

## 2018-12-28 RX ADMIN — WARFARIN SODIUM 6 MG: 5 TABLET ORAL at 17:20

## 2018-12-28 RX ADMIN — FAMOTIDINE 20 MG: 20 TABLET ORAL at 21:16

## 2018-12-28 RX ADMIN — GABAPENTIN 300 MG: 300 CAPSULE ORAL at 21:16

## 2018-12-28 RX ADMIN — FAMOTIDINE 20 MG: 20 TABLET ORAL at 07:56

## 2018-12-28 RX ADMIN — ASPIRIN 81 MG: 81 TABLET, COATED ORAL at 07:55

## 2018-12-28 RX ADMIN — MEROPENEM 1 G: 1 INJECTION, POWDER, FOR SOLUTION INTRAVENOUS at 02:32

## 2018-12-28 RX ADMIN — INSULIN LISPRO 1 UNITS: 100 INJECTION, SOLUTION INTRAVENOUS; SUBCUTANEOUS at 12:25

## 2018-12-28 RX ADMIN — AMLODIPINE BESYLATE 10 MG: 5 TABLET ORAL at 21:16

## 2018-12-28 RX ADMIN — TAMSULOSIN HYDROCHLORIDE 0.4 MG: 0.4 CAPSULE ORAL at 07:55

## 2018-12-28 RX ADMIN — CLOPIDOGREL BISULFATE 75 MG: 75 TABLET ORAL at 07:55

## 2018-12-28 RX ADMIN — Medication 10 MG: at 21:16

## 2018-12-28 RX ADMIN — MEROPENEM 1 G: 1 INJECTION, POWDER, FOR SOLUTION INTRAVENOUS at 21:16

## 2018-12-28 RX ADMIN — SODIUM BICARBONATE 650 MG TABLET 650 MG: at 07:55

## 2018-12-28 RX ADMIN — CASTOR OIL AND BALSAM, PERU: 788; 87 OINTMENT TOPICAL at 21:21

## 2018-12-28 RX ADMIN — Medication 10 ML: at 07:54

## 2018-12-28 RX ADMIN — INSULIN LISPRO 1 UNITS: 100 INJECTION, SOLUTION INTRAVENOUS; SUBCUTANEOUS at 17:20

## 2018-12-28 RX ADMIN — STANDARDIZED SENNA CONCENTRATE AND DOCUSATE SODIUM 1 TABLET: 8.6; 5 TABLET, FILM COATED ORAL at 07:55

## 2018-12-28 RX ADMIN — ALLOPURINOL 300 MG: 300 TABLET ORAL at 07:55

## 2018-12-28 RX ADMIN — INSULIN LISPRO 1 UNITS: 100 INJECTION, SOLUTION INTRAVENOUS; SUBCUTANEOUS at 21:17

## 2018-12-28 RX ADMIN — ATORVASTATIN CALCIUM 80 MG: 80 TABLET, FILM COATED ORAL at 21:16

## 2018-12-28 RX ADMIN — MEROPENEM 1 G: 1 INJECTION, POWDER, FOR SOLUTION INTRAVENOUS at 14:20

## 2018-12-28 RX ADMIN — Medication 10 ML: at 21:20

## 2018-12-28 RX ADMIN — FUROSEMIDE 40 MG: 40 TABLET ORAL at 07:56

## 2018-12-28 RX ADMIN — OXYCODONE AND ACETAMINOPHEN 2 TABLET: 5; 325 TABLET ORAL at 12:03

## 2018-12-28 RX ADMIN — SODIUM BICARBONATE 650 MG TABLET 650 MG: at 21:16

## 2018-12-28 ASSESSMENT — PAIN SCALES - GENERAL
PAINLEVEL_OUTOF10: 0
PAINLEVEL_OUTOF10: 8

## 2018-12-28 ASSESSMENT — 9 HOLE PEG TEST
TESTTIME_SECONDS: 34
TESTTIME_SECONDS: 38.5
TEST_RESULT: FUNCTIONAL
TEST_RESULT: FUNCTIONAL

## 2018-12-29 LAB
ANION GAP SERPL CALCULATED.3IONS-SCNC: 12 MMOL/L (ref 3–16)
BASOPHILS ABSOLUTE: 0 K/UL (ref 0–0.2)
BASOPHILS RELATIVE PERCENT: 0.3 %
BUN BLDV-MCNC: 37 MG/DL (ref 7–20)
CALCIUM SERPL-MCNC: 8.3 MG/DL (ref 8.3–10.6)
CHLORIDE BLD-SCNC: 102 MMOL/L (ref 99–110)
CO2: 26 MMOL/L (ref 21–32)
CREAT SERPL-MCNC: 1.9 MG/DL (ref 0.9–1.3)
EOSINOPHILS ABSOLUTE: 0.1 K/UL (ref 0–0.6)
EOSINOPHILS RELATIVE PERCENT: 2.9 %
GFR AFRICAN AMERICAN: 44
GFR NON-AFRICAN AMERICAN: 36
GLUCOSE BLD-MCNC: 119 MG/DL (ref 70–99)
GLUCOSE BLD-MCNC: 125 MG/DL (ref 70–99)
GLUCOSE BLD-MCNC: 127 MG/DL (ref 70–99)
GLUCOSE BLD-MCNC: 149 MG/DL (ref 70–99)
GLUCOSE BLD-MCNC: 151 MG/DL (ref 70–99)
HCT VFR BLD CALC: 27.2 % (ref 40.5–52.5)
HEMOGLOBIN: 8.6 G/DL (ref 13.5–17.5)
INR BLD: 1.99 (ref 0.86–1.14)
LYMPHOCYTES ABSOLUTE: 0.7 K/UL (ref 1–5.1)
LYMPHOCYTES RELATIVE PERCENT: 17.9 %
MAGNESIUM: 1.8 MG/DL (ref 1.8–2.4)
MCH RBC QN AUTO: 28.7 PG (ref 26–34)
MCHC RBC AUTO-ENTMCNC: 31.5 G/DL (ref 31–36)
MCV RBC AUTO: 91.1 FL (ref 80–100)
MONOCYTES ABSOLUTE: 0.4 K/UL (ref 0–1.3)
MONOCYTES RELATIVE PERCENT: 9.3 %
NEUTROPHILS ABSOLUTE: 2.8 K/UL (ref 1.7–7.7)
NEUTROPHILS RELATIVE PERCENT: 69.6 %
PDW BLD-RTO: 17.3 % (ref 12.4–15.4)
PERFORMED ON: ABNORMAL
PLATELET # BLD: 110 K/UL (ref 135–450)
PMV BLD AUTO: 8.5 FL (ref 5–10.5)
POTASSIUM REFLEX MAGNESIUM: 3.5 MMOL/L (ref 3.5–5.1)
PROTHROMBIN TIME: 22.7 SEC (ref 9.8–13)
RBC # BLD: 2.99 M/UL (ref 4.2–5.9)
SODIUM BLD-SCNC: 140 MMOL/L (ref 136–145)
WBC # BLD: 4.1 K/UL (ref 4–11)

## 2018-12-29 PROCEDURE — 97535 SELF CARE MNGMENT TRAINING: CPT

## 2018-12-29 PROCEDURE — 97530 THERAPEUTIC ACTIVITIES: CPT

## 2018-12-29 PROCEDURE — 85025 COMPLETE CBC W/AUTO DIFF WBC: CPT

## 2018-12-29 PROCEDURE — 83735 ASSAY OF MAGNESIUM: CPT

## 2018-12-29 PROCEDURE — 1280000000 HC REHAB R&B

## 2018-12-29 PROCEDURE — 97127 HC SP THER IVNTJ W/FOCUS COG FUNCJ: CPT

## 2018-12-29 PROCEDURE — 97112 NEUROMUSCULAR REEDUCATION: CPT

## 2018-12-29 PROCEDURE — 97110 THERAPEUTIC EXERCISES: CPT

## 2018-12-29 PROCEDURE — 80048 BASIC METABOLIC PNL TOTAL CA: CPT

## 2018-12-29 PROCEDURE — 6360000002 HC RX W HCPCS: Performed by: PHYSICAL MEDICINE & REHABILITATION

## 2018-12-29 PROCEDURE — 2580000003 HC RX 258: Performed by: PHYSICAL MEDICINE & REHABILITATION

## 2018-12-29 PROCEDURE — 6370000000 HC RX 637 (ALT 250 FOR IP): Performed by: PHYSICAL MEDICINE & REHABILITATION

## 2018-12-29 PROCEDURE — 85610 PROTHROMBIN TIME: CPT

## 2018-12-29 RX ORDER — WARFARIN SODIUM 5 MG/1
5 TABLET ORAL DAILY
Status: COMPLETED | OUTPATIENT
Start: 2018-12-29 | End: 2018-12-29

## 2018-12-29 RX ADMIN — STANDARDIZED SENNA CONCENTRATE AND DOCUSATE SODIUM 1 TABLET: 8.6; 5 TABLET, FILM COATED ORAL at 08:22

## 2018-12-29 RX ADMIN — MEROPENEM 1 G: 1 INJECTION, POWDER, FOR SOLUTION INTRAVENOUS at 18:00

## 2018-12-29 RX ADMIN — Medication 10 ML: at 18:30

## 2018-12-29 RX ADMIN — MEROPENEM 1 G: 1 INJECTION, POWDER, FOR SOLUTION INTRAVENOUS at 10:01

## 2018-12-29 RX ADMIN — FLUTICASONE PROPIONATE 2 SPRAY: 50 SPRAY, METERED NASAL at 08:23

## 2018-12-29 RX ADMIN — POLYETHYLENE GLYCOL (3350) 17 G: 17 POWDER, FOR SOLUTION ORAL at 17:12

## 2018-12-29 RX ADMIN — SODIUM BICARBONATE 650 MG TABLET 650 MG: at 08:22

## 2018-12-29 RX ADMIN — ATORVASTATIN CALCIUM 80 MG: 80 TABLET, FILM COATED ORAL at 20:20

## 2018-12-29 RX ADMIN — TAMSULOSIN HYDROCHLORIDE 0.4 MG: 0.4 CAPSULE ORAL at 08:22

## 2018-12-29 RX ADMIN — GABAPENTIN 300 MG: 300 CAPSULE ORAL at 20:20

## 2018-12-29 RX ADMIN — FAMOTIDINE 20 MG: 20 TABLET ORAL at 20:20

## 2018-12-29 RX ADMIN — Medication 10 ML: at 18:00

## 2018-12-29 RX ADMIN — METOPROLOL SUCCINATE 50 MG: 50 TABLET, EXTENDED RELEASE ORAL at 08:22

## 2018-12-29 RX ADMIN — ALLOPURINOL 300 MG: 300 TABLET ORAL at 08:22

## 2018-12-29 RX ADMIN — CASTOR OIL AND BALSAM, PERU: 788; 87 OINTMENT TOPICAL at 20:21

## 2018-12-29 RX ADMIN — FAMOTIDINE 20 MG: 20 TABLET ORAL at 08:22

## 2018-12-29 RX ADMIN — FUROSEMIDE 40 MG: 40 TABLET ORAL at 08:22

## 2018-12-29 RX ADMIN — Medication 10 MG: at 22:31

## 2018-12-29 RX ADMIN — Medication 10 ML: at 10:04

## 2018-12-29 RX ADMIN — INSULIN LISPRO 1 UNITS: 100 INJECTION, SOLUTION INTRAVENOUS; SUBCUTANEOUS at 20:22

## 2018-12-29 RX ADMIN — Medication 10 ML: at 20:21

## 2018-12-29 RX ADMIN — SODIUM BICARBONATE 650 MG TABLET 650 MG: at 20:20

## 2018-12-29 RX ADMIN — MEROPENEM 1 G: 1 INJECTION, POWDER, FOR SOLUTION INTRAVENOUS at 02:39

## 2018-12-29 RX ADMIN — ASPIRIN 81 MG: 81 TABLET, COATED ORAL at 08:22

## 2018-12-29 RX ADMIN — CLOPIDOGREL BISULFATE 75 MG: 75 TABLET ORAL at 08:22

## 2018-12-29 RX ADMIN — Medication 10 ML: at 08:23

## 2018-12-29 RX ADMIN — CASTOR OIL AND BALSAM, PERU: 788; 87 OINTMENT TOPICAL at 08:24

## 2018-12-29 RX ADMIN — Medication 400 MG: at 08:22

## 2018-12-29 RX ADMIN — WARFARIN SODIUM 5 MG: 5 TABLET ORAL at 17:52

## 2018-12-29 RX ADMIN — AMLODIPINE BESYLATE 10 MG: 5 TABLET ORAL at 20:21

## 2018-12-29 RX ADMIN — INSULIN LISPRO 1 UNITS: 100 INJECTION, SOLUTION INTRAVENOUS; SUBCUTANEOUS at 17:14

## 2018-12-29 ASSESSMENT — PAIN SCALES - GENERAL
PAINLEVEL_OUTOF10: 0

## 2018-12-30 LAB
ANION GAP SERPL CALCULATED.3IONS-SCNC: 9 MMOL/L (ref 3–16)
BASOPHILS ABSOLUTE: 0 K/UL (ref 0–0.2)
BASOPHILS RELATIVE PERCENT: 0.2 %
BUN BLDV-MCNC: 34 MG/DL (ref 7–20)
CALCIUM SERPL-MCNC: 8.6 MG/DL (ref 8.3–10.6)
CHLORIDE BLD-SCNC: 106 MMOL/L (ref 99–110)
CO2: 27 MMOL/L (ref 21–32)
CREAT SERPL-MCNC: 1.6 MG/DL (ref 0.9–1.3)
EOSINOPHILS ABSOLUTE: 0.2 K/UL (ref 0–0.6)
EOSINOPHILS RELATIVE PERCENT: 5.2 %
GFR AFRICAN AMERICAN: 54
GFR NON-AFRICAN AMERICAN: 44
GLUCOSE BLD-MCNC: 114 MG/DL (ref 70–99)
GLUCOSE BLD-MCNC: 114 MG/DL (ref 70–99)
GLUCOSE BLD-MCNC: 120 MG/DL (ref 70–99)
GLUCOSE BLD-MCNC: 145 MG/DL (ref 70–99)
GLUCOSE BLD-MCNC: 147 MG/DL (ref 70–99)
HCT VFR BLD CALC: 27.4 % (ref 40.5–52.5)
HEMOGLOBIN: 8.6 G/DL (ref 13.5–17.5)
INR BLD: 1.91 (ref 0.86–1.14)
LYMPHOCYTES ABSOLUTE: 0.7 K/UL (ref 1–5.1)
LYMPHOCYTES RELATIVE PERCENT: 18.4 %
MAGNESIUM: 1.8 MG/DL (ref 1.8–2.4)
MCH RBC QN AUTO: 28.8 PG (ref 26–34)
MCHC RBC AUTO-ENTMCNC: 31.5 G/DL (ref 31–36)
MCV RBC AUTO: 91.3 FL (ref 80–100)
MONOCYTES ABSOLUTE: 0.4 K/UL (ref 0–1.3)
MONOCYTES RELATIVE PERCENT: 9.7 %
NEUTROPHILS ABSOLUTE: 2.6 K/UL (ref 1.7–7.7)
NEUTROPHILS RELATIVE PERCENT: 66.5 %
PDW BLD-RTO: 17.2 % (ref 12.4–15.4)
PERFORMED ON: ABNORMAL
PLATELET # BLD: 125 K/UL (ref 135–450)
PMV BLD AUTO: 8 FL (ref 5–10.5)
POTASSIUM REFLEX MAGNESIUM: 3.5 MMOL/L (ref 3.5–5.1)
PROTHROMBIN TIME: 21.8 SEC (ref 9.8–13)
RBC # BLD: 3 M/UL (ref 4.2–5.9)
SODIUM BLD-SCNC: 142 MMOL/L (ref 136–145)
WBC # BLD: 3.9 K/UL (ref 4–11)

## 2018-12-30 PROCEDURE — 6370000000 HC RX 637 (ALT 250 FOR IP): Performed by: PHYSICAL MEDICINE & REHABILITATION

## 2018-12-30 PROCEDURE — 6360000002 HC RX W HCPCS: Performed by: PHYSICAL MEDICINE & REHABILITATION

## 2018-12-30 PROCEDURE — 83735 ASSAY OF MAGNESIUM: CPT

## 2018-12-30 PROCEDURE — 85610 PROTHROMBIN TIME: CPT

## 2018-12-30 PROCEDURE — 1280000000 HC REHAB R&B

## 2018-12-30 PROCEDURE — 80048 BASIC METABOLIC PNL TOTAL CA: CPT

## 2018-12-30 PROCEDURE — 2580000003 HC RX 258: Performed by: PHYSICAL MEDICINE & REHABILITATION

## 2018-12-30 PROCEDURE — 85025 COMPLETE CBC W/AUTO DIFF WBC: CPT

## 2018-12-30 RX ORDER — SODIUM PHOSPHATE,MONO-DIBASIC 19G-7G/118
ENEMA (ML) RECTAL
Status: DISPENSED
Start: 2018-12-30 | End: 2018-12-31

## 2018-12-30 RX ORDER — WARFARIN SODIUM 7.5 MG/1
7.5 TABLET ORAL DAILY
Status: COMPLETED | OUTPATIENT
Start: 2018-12-30 | End: 2018-12-30

## 2018-12-30 RX ORDER — SODIUM PHOSPHATE,MONO-DIBASIC 19G-7G/118
1 ENEMA (ML) RECTAL ONCE
Status: COMPLETED | OUTPATIENT
Start: 2018-12-30 | End: 2018-12-30

## 2018-12-30 RX ADMIN — Medication 10 ML: at 18:01

## 2018-12-30 RX ADMIN — METOPROLOL SUCCINATE 50 MG: 50 TABLET, EXTENDED RELEASE ORAL at 08:24

## 2018-12-30 RX ADMIN — STANDARDIZED SENNA CONCENTRATE AND DOCUSATE SODIUM 1 TABLET: 8.6; 5 TABLET, FILM COATED ORAL at 08:24

## 2018-12-30 RX ADMIN — FAMOTIDINE 20 MG: 20 TABLET ORAL at 08:24

## 2018-12-30 RX ADMIN — CASTOR OIL AND BALSAM, PERU: 788; 87 OINTMENT TOPICAL at 21:19

## 2018-12-30 RX ADMIN — ASPIRIN 81 MG: 81 TABLET, COATED ORAL at 08:24

## 2018-12-30 RX ADMIN — MAGNESIUM HYDROXIDE 30 ML: 400 SUSPENSION ORAL at 08:24

## 2018-12-30 RX ADMIN — MEROPENEM 1 G: 1 INJECTION, POWDER, FOR SOLUTION INTRAVENOUS at 01:56

## 2018-12-30 RX ADMIN — MEROPENEM 1 G: 1 INJECTION, POWDER, FOR SOLUTION INTRAVENOUS at 18:00

## 2018-12-30 RX ADMIN — INSULIN LISPRO 1 UNITS: 100 INJECTION, SOLUTION INTRAVENOUS; SUBCUTANEOUS at 17:16

## 2018-12-30 RX ADMIN — GABAPENTIN 300 MG: 300 CAPSULE ORAL at 21:17

## 2018-12-30 RX ADMIN — AMLODIPINE BESYLATE 10 MG: 5 TABLET ORAL at 21:17

## 2018-12-30 RX ADMIN — Medication 10 MG: at 22:00

## 2018-12-30 RX ADMIN — CASTOR OIL AND BALSAM, PERU: 788; 87 OINTMENT TOPICAL at 08:24

## 2018-12-30 RX ADMIN — Medication 10 ML: at 01:57

## 2018-12-30 RX ADMIN — SODIUM BICARBONATE 650 MG TABLET 650 MG: at 08:24

## 2018-12-30 RX ADMIN — BISACODYL 10 MG: 10 SUPPOSITORY RECTAL at 18:42

## 2018-12-30 RX ADMIN — Medication 10 ML: at 08:25

## 2018-12-30 RX ADMIN — FAMOTIDINE 20 MG: 20 TABLET ORAL at 21:17

## 2018-12-30 RX ADMIN — TAMSULOSIN HYDROCHLORIDE 0.4 MG: 0.4 CAPSULE ORAL at 08:24

## 2018-12-30 RX ADMIN — WARFARIN SODIUM 7.5 MG: 7.5 TABLET ORAL at 17:30

## 2018-12-30 RX ADMIN — SODIUM PHOSPHATE 1 ENEMA: 7; 19 ENEMA RECTAL at 21:19

## 2018-12-30 RX ADMIN — MEROPENEM 1 G: 1 INJECTION, POWDER, FOR SOLUTION INTRAVENOUS at 10:07

## 2018-12-30 RX ADMIN — INSULIN LISPRO 1 UNITS: 100 INJECTION, SOLUTION INTRAVENOUS; SUBCUTANEOUS at 21:20

## 2018-12-30 RX ADMIN — SODIUM BICARBONATE 650 MG TABLET 650 MG: at 21:17

## 2018-12-30 RX ADMIN — Medication 400 MG: at 08:24

## 2018-12-30 RX ADMIN — FUROSEMIDE 40 MG: 40 TABLET ORAL at 08:24

## 2018-12-30 RX ADMIN — Medication 10 ML: at 21:18

## 2018-12-30 RX ADMIN — FLUTICASONE PROPIONATE 2 SPRAY: 50 SPRAY, METERED NASAL at 08:24

## 2018-12-30 RX ADMIN — ATORVASTATIN CALCIUM 80 MG: 80 TABLET, FILM COATED ORAL at 21:17

## 2018-12-30 RX ADMIN — Medication 10 ML: at 10:07

## 2018-12-30 RX ADMIN — ALLOPURINOL 300 MG: 300 TABLET ORAL at 08:24

## 2018-12-30 RX ADMIN — CLOPIDOGREL BISULFATE 75 MG: 75 TABLET ORAL at 08:24

## 2018-12-30 ASSESSMENT — PAIN SCALES - GENERAL
PAINLEVEL_OUTOF10: 0
PAINLEVEL_OUTOF10: 0

## 2018-12-31 LAB
ANION GAP SERPL CALCULATED.3IONS-SCNC: 9 MMOL/L (ref 3–16)
BASOPHILS ABSOLUTE: 0 K/UL (ref 0–0.2)
BASOPHILS RELATIVE PERCENT: 0.2 %
BUN BLDV-MCNC: 30 MG/DL (ref 7–20)
CALCIUM SERPL-MCNC: 8.5 MG/DL (ref 8.3–10.6)
CHLORIDE BLD-SCNC: 105 MMOL/L (ref 99–110)
CO2: 28 MMOL/L (ref 21–32)
CREAT SERPL-MCNC: 1.6 MG/DL (ref 0.9–1.3)
EOSINOPHILS ABSOLUTE: 0.2 K/UL (ref 0–0.6)
EOSINOPHILS RELATIVE PERCENT: 5.8 %
GFR AFRICAN AMERICAN: 54
GFR NON-AFRICAN AMERICAN: 44
GLUCOSE BLD-MCNC: 116 MG/DL (ref 70–99)
GLUCOSE BLD-MCNC: 122 MG/DL (ref 70–99)
GLUCOSE BLD-MCNC: 133 MG/DL (ref 70–99)
GLUCOSE BLD-MCNC: 140 MG/DL (ref 70–99)
GLUCOSE BLD-MCNC: 175 MG/DL (ref 70–99)
HCT VFR BLD CALC: 28.2 % (ref 40.5–52.5)
HEMOGLOBIN: 8.9 G/DL (ref 13.5–17.5)
INR BLD: 2.11 (ref 0.86–1.14)
LYMPHOCYTES ABSOLUTE: 0.6 K/UL (ref 1–5.1)
LYMPHOCYTES RELATIVE PERCENT: 15.4 %
MAGNESIUM: 1.7 MG/DL (ref 1.8–2.4)
MCH RBC QN AUTO: 28.7 PG (ref 26–34)
MCHC RBC AUTO-ENTMCNC: 31.6 G/DL (ref 31–36)
MCV RBC AUTO: 91 FL (ref 80–100)
MONOCYTES ABSOLUTE: 0.3 K/UL (ref 0–1.3)
MONOCYTES RELATIVE PERCENT: 8.2 %
NEUTROPHILS ABSOLUTE: 2.9 K/UL (ref 1.7–7.7)
NEUTROPHILS RELATIVE PERCENT: 70.4 %
PDW BLD-RTO: 17.3 % (ref 12.4–15.4)
PERFORMED ON: ABNORMAL
PLATELET # BLD: 144 K/UL (ref 135–450)
PMV BLD AUTO: 8.1 FL (ref 5–10.5)
POTASSIUM REFLEX MAGNESIUM: 4 MMOL/L (ref 3.5–5.1)
PROTHROMBIN TIME: 24.1 SEC (ref 9.8–13)
RBC # BLD: 3.1 M/UL (ref 4.2–5.9)
SODIUM BLD-SCNC: 142 MMOL/L (ref 136–145)
WBC # BLD: 4.1 K/UL (ref 4–11)

## 2018-12-31 PROCEDURE — 1280000000 HC REHAB R&B

## 2018-12-31 PROCEDURE — 6370000000 HC RX 637 (ALT 250 FOR IP): Performed by: PHYSICAL MEDICINE & REHABILITATION

## 2018-12-31 PROCEDURE — 97110 THERAPEUTIC EXERCISES: CPT

## 2018-12-31 PROCEDURE — 80048 BASIC METABOLIC PNL TOTAL CA: CPT

## 2018-12-31 PROCEDURE — 6360000002 HC RX W HCPCS: Performed by: PHYSICAL MEDICINE & REHABILITATION

## 2018-12-31 PROCEDURE — 2580000003 HC RX 258: Performed by: PHYSICAL MEDICINE & REHABILITATION

## 2018-12-31 PROCEDURE — 97535 SELF CARE MNGMENT TRAINING: CPT

## 2018-12-31 PROCEDURE — 97127 HC SP THER IVNTJ W/FOCUS COG FUNCJ: CPT

## 2018-12-31 PROCEDURE — 97530 THERAPEUTIC ACTIVITIES: CPT

## 2018-12-31 PROCEDURE — 97112 NEUROMUSCULAR REEDUCATION: CPT

## 2018-12-31 PROCEDURE — 85610 PROTHROMBIN TIME: CPT

## 2018-12-31 PROCEDURE — 85025 COMPLETE CBC W/AUTO DIFF WBC: CPT

## 2018-12-31 PROCEDURE — 83735 ASSAY OF MAGNESIUM: CPT

## 2018-12-31 PROCEDURE — 97606 NEG PRS WND THER DME>50 SQCM: CPT

## 2018-12-31 RX ORDER — LACTULOSE 10 G/15ML
20 SOLUTION ORAL 3 TIMES DAILY
Status: DISCONTINUED | OUTPATIENT
Start: 2018-12-31 | End: 2019-01-03

## 2018-12-31 RX ADMIN — INSULIN LISPRO 1 UNITS: 100 INJECTION, SOLUTION INTRAVENOUS; SUBCUTANEOUS at 12:11

## 2018-12-31 RX ADMIN — FAMOTIDINE 20 MG: 20 TABLET ORAL at 22:05

## 2018-12-31 RX ADMIN — STANDARDIZED SENNA CONCENTRATE AND DOCUSATE SODIUM 1 TABLET: 8.6; 5 TABLET, FILM COATED ORAL at 09:34

## 2018-12-31 RX ADMIN — MEROPENEM 1 G: 1 INJECTION, POWDER, FOR SOLUTION INTRAVENOUS at 10:18

## 2018-12-31 RX ADMIN — SODIUM BICARBONATE 650 MG TABLET 650 MG: at 22:05

## 2018-12-31 RX ADMIN — GABAPENTIN 300 MG: 300 CAPSULE ORAL at 22:05

## 2018-12-31 RX ADMIN — MEROPENEM 1 G: 1 INJECTION, POWDER, FOR SOLUTION INTRAVENOUS at 02:01

## 2018-12-31 RX ADMIN — WARFARIN SODIUM 6 MG: 5 TABLET ORAL at 17:40

## 2018-12-31 RX ADMIN — SODIUM BICARBONATE 650 MG TABLET 650 MG: at 09:34

## 2018-12-31 RX ADMIN — Medication 10 MG: at 22:05

## 2018-12-31 RX ADMIN — AMLODIPINE BESYLATE 10 MG: 5 TABLET ORAL at 22:05

## 2018-12-31 RX ADMIN — MEROPENEM 1 G: 1 INJECTION, POWDER, FOR SOLUTION INTRAVENOUS at 17:40

## 2018-12-31 RX ADMIN — Medication 10 ML: at 18:16

## 2018-12-31 RX ADMIN — LACTULOSE 20 G: 20 SOLUTION ORAL at 22:05

## 2018-12-31 RX ADMIN — ASPIRIN 81 MG: 81 TABLET, COATED ORAL at 09:35

## 2018-12-31 RX ADMIN — Medication 10 ML: at 02:01

## 2018-12-31 RX ADMIN — Medication 10 ML: at 09:33

## 2018-12-31 RX ADMIN — LACTULOSE 20 G: 20 SOLUTION ORAL at 17:40

## 2018-12-31 RX ADMIN — FUROSEMIDE 40 MG: 40 TABLET ORAL at 09:35

## 2018-12-31 RX ADMIN — ATORVASTATIN CALCIUM 80 MG: 80 TABLET, FILM COATED ORAL at 22:05

## 2018-12-31 RX ADMIN — CASTOR OIL AND BALSAM, PERU: 788; 87 OINTMENT TOPICAL at 09:36

## 2018-12-31 RX ADMIN — FAMOTIDINE 20 MG: 20 TABLET ORAL at 09:34

## 2018-12-31 RX ADMIN — METOPROLOL SUCCINATE 50 MG: 50 TABLET, EXTENDED RELEASE ORAL at 09:34

## 2018-12-31 RX ADMIN — Medication 400 MG: at 09:34

## 2018-12-31 RX ADMIN — ALLOPURINOL 300 MG: 300 TABLET ORAL at 09:34

## 2018-12-31 RX ADMIN — CASTOR OIL AND BALSAM, PERU: 788; 87 OINTMENT TOPICAL at 22:06

## 2018-12-31 RX ADMIN — TAMSULOSIN HYDROCHLORIDE 0.4 MG: 0.4 CAPSULE ORAL at 09:34

## 2018-12-31 RX ADMIN — Medication 10 ML: at 22:06

## 2018-12-31 RX ADMIN — CLOPIDOGREL BISULFATE 75 MG: 75 TABLET ORAL at 09:34

## 2018-12-31 ASSESSMENT — PAIN SCALES - GENERAL
PAINLEVEL_OUTOF10: 0

## 2019-01-01 LAB
A/G RATIO: 0.7 (ref 1.1–2.2)
ALBUMIN SERPL-MCNC: 2.1 G/DL (ref 3.4–5)
ALP BLD-CCNC: 130 U/L (ref 40–129)
ALT SERPL-CCNC: 58 U/L (ref 10–40)
ANION GAP SERPL CALCULATED.3IONS-SCNC: 10 MMOL/L (ref 3–16)
AST SERPL-CCNC: 68 U/L (ref 15–37)
BASOPHILS ABSOLUTE: 0 K/UL (ref 0–0.2)
BASOPHILS RELATIVE PERCENT: 0.1 %
BILIRUB SERPL-MCNC: 0.3 MG/DL (ref 0–1)
BUN BLDV-MCNC: 30 MG/DL (ref 7–20)
CALCIUM SERPL-MCNC: 8.6 MG/DL (ref 8.3–10.6)
CHLORIDE BLD-SCNC: 105 MMOL/L (ref 99–110)
CO2: 28 MMOL/L (ref 21–32)
CREAT SERPL-MCNC: 1.6 MG/DL (ref 0.9–1.3)
EOSINOPHILS ABSOLUTE: 0.4 K/UL (ref 0–0.6)
EOSINOPHILS RELATIVE PERCENT: 9.1 %
GFR AFRICAN AMERICAN: 54
GFR NON-AFRICAN AMERICAN: 44
GLOBULIN: 3.1 G/DL
GLUCOSE BLD-MCNC: 115 MG/DL (ref 70–99)
GLUCOSE BLD-MCNC: 116 MG/DL (ref 70–99)
GLUCOSE BLD-MCNC: 126 MG/DL (ref 70–99)
GLUCOSE BLD-MCNC: 128 MG/DL (ref 70–99)
GLUCOSE BLD-MCNC: 139 MG/DL (ref 70–99)
HCT VFR BLD CALC: 28.5 % (ref 40.5–52.5)
HEMOGLOBIN: 9 G/DL (ref 13.5–17.5)
INR BLD: 2.53 (ref 0.86–1.14)
LYMPHOCYTES ABSOLUTE: 0.7 K/UL (ref 1–5.1)
LYMPHOCYTES RELATIVE PERCENT: 17.9 %
MAGNESIUM: 1.8 MG/DL (ref 1.8–2.4)
MCH RBC QN AUTO: 28.6 PG (ref 26–34)
MCHC RBC AUTO-ENTMCNC: 31.7 G/DL (ref 31–36)
MCV RBC AUTO: 90.4 FL (ref 80–100)
MONOCYTES ABSOLUTE: 0.4 K/UL (ref 0–1.3)
MONOCYTES RELATIVE PERCENT: 9.5 %
NEUTROPHILS ABSOLUTE: 2.6 K/UL (ref 1.7–7.7)
NEUTROPHILS RELATIVE PERCENT: 63.4 %
PDW BLD-RTO: 16.9 % (ref 12.4–15.4)
PERFORMED ON: ABNORMAL
PLATELET # BLD: 160 K/UL (ref 135–450)
PMV BLD AUTO: 7.9 FL (ref 5–10.5)
POTASSIUM REFLEX MAGNESIUM: 3.7 MMOL/L (ref 3.5–5.1)
PROTHROMBIN TIME: 28.8 SEC (ref 9.8–13)
RBC # BLD: 3.16 M/UL (ref 4.2–5.9)
SEDIMENTATION RATE, ERYTHROCYTE: 117 MM/HR (ref 0–20)
SODIUM BLD-SCNC: 143 MMOL/L (ref 136–145)
TOTAL PROTEIN: 5.2 G/DL (ref 6.4–8.2)
WBC # BLD: 4.1 K/UL (ref 4–11)

## 2019-01-01 PROCEDURE — 6370000000 HC RX 637 (ALT 250 FOR IP): Performed by: PHYSICAL MEDICINE & REHABILITATION

## 2019-01-01 PROCEDURE — 2580000003 HC RX 258: Performed by: PHYSICAL MEDICINE & REHABILITATION

## 2019-01-01 PROCEDURE — 97530 THERAPEUTIC ACTIVITIES: CPT

## 2019-01-01 PROCEDURE — 97110 THERAPEUTIC EXERCISES: CPT

## 2019-01-01 PROCEDURE — 83735 ASSAY OF MAGNESIUM: CPT

## 2019-01-01 PROCEDURE — 85025 COMPLETE CBC W/AUTO DIFF WBC: CPT

## 2019-01-01 PROCEDURE — 86140 C-REACTIVE PROTEIN: CPT

## 2019-01-01 PROCEDURE — 85610 PROTHROMBIN TIME: CPT

## 2019-01-01 PROCEDURE — 6360000002 HC RX W HCPCS: Performed by: PHYSICAL MEDICINE & REHABILITATION

## 2019-01-01 PROCEDURE — 97535 SELF CARE MNGMENT TRAINING: CPT

## 2019-01-01 PROCEDURE — 1280000000 HC REHAB R&B

## 2019-01-01 PROCEDURE — 85652 RBC SED RATE AUTOMATED: CPT

## 2019-01-01 PROCEDURE — 80053 COMPREHEN METABOLIC PANEL: CPT

## 2019-01-01 RX ORDER — WARFARIN SODIUM 2 MG/1
4 TABLET ORAL DAILY
Status: COMPLETED | OUTPATIENT
Start: 2019-01-01 | End: 2019-01-01

## 2019-01-01 RX ADMIN — FAMOTIDINE 20 MG: 20 TABLET ORAL at 08:33

## 2019-01-01 RX ADMIN — WARFARIN SODIUM 4 MG: 2 TABLET ORAL at 18:02

## 2019-01-01 RX ADMIN — Medication 400 MG: at 08:33

## 2019-01-01 RX ADMIN — FUROSEMIDE 40 MG: 40 TABLET ORAL at 08:32

## 2019-01-01 RX ADMIN — SODIUM BICARBONATE 650 MG TABLET 650 MG: at 08:33

## 2019-01-01 RX ADMIN — METOPROLOL SUCCINATE 50 MG: 50 TABLET, EXTENDED RELEASE ORAL at 08:32

## 2019-01-01 RX ADMIN — Medication 10 MG: at 20:24

## 2019-01-01 RX ADMIN — ACETAMINOPHEN 650 MG: 325 TABLET ORAL at 08:33

## 2019-01-01 RX ADMIN — Medication 10 ML: at 20:30

## 2019-01-01 RX ADMIN — Medication 10 ML: at 19:30

## 2019-01-01 RX ADMIN — LACTULOSE 20 G: 20 SOLUTION ORAL at 08:32

## 2019-01-01 RX ADMIN — CLOPIDOGREL BISULFATE 75 MG: 75 TABLET ORAL at 08:32

## 2019-01-01 RX ADMIN — ATORVASTATIN CALCIUM 80 MG: 80 TABLET, FILM COATED ORAL at 20:25

## 2019-01-01 RX ADMIN — STANDARDIZED SENNA CONCENTRATE AND DOCUSATE SODIUM 1 TABLET: 8.6; 5 TABLET, FILM COATED ORAL at 08:33

## 2019-01-01 RX ADMIN — LACTULOSE 20 G: 20 SOLUTION ORAL at 20:25

## 2019-01-01 RX ADMIN — SODIUM BICARBONATE 650 MG TABLET 650 MG: at 20:25

## 2019-01-01 RX ADMIN — ALLOPURINOL 300 MG: 300 TABLET ORAL at 08:33

## 2019-01-01 RX ADMIN — LACTULOSE 20 G: 20 SOLUTION ORAL at 15:06

## 2019-01-01 RX ADMIN — MEROPENEM 1 G: 1 INJECTION, POWDER, FOR SOLUTION INTRAVENOUS at 10:02

## 2019-01-01 RX ADMIN — ASPIRIN 81 MG: 81 TABLET, COATED ORAL at 08:32

## 2019-01-01 RX ADMIN — Medication 10 ML: at 18:55

## 2019-01-01 RX ADMIN — CASTOR OIL AND BALSAM, PERU: 788; 87 OINTMENT TOPICAL at 08:32

## 2019-01-01 RX ADMIN — Medication 10 ML: at 10:32

## 2019-01-01 RX ADMIN — CASTOR OIL AND BALSAM, PERU: 788; 87 OINTMENT TOPICAL at 20:28

## 2019-01-01 RX ADMIN — AMLODIPINE BESYLATE 10 MG: 5 TABLET ORAL at 20:24

## 2019-01-01 RX ADMIN — MEROPENEM 1 G: 1 INJECTION, POWDER, FOR SOLUTION INTRAVENOUS at 18:53

## 2019-01-01 RX ADMIN — MEROPENEM 1 G: 1 INJECTION, POWDER, FOR SOLUTION INTRAVENOUS at 00:43

## 2019-01-01 RX ADMIN — Medication 10 ML: at 08:34

## 2019-01-01 RX ADMIN — FAMOTIDINE 20 MG: 20 TABLET ORAL at 20:25

## 2019-01-01 RX ADMIN — GABAPENTIN 300 MG: 300 CAPSULE ORAL at 20:25

## 2019-01-01 RX ADMIN — TAMSULOSIN HYDROCHLORIDE 0.4 MG: 0.4 CAPSULE ORAL at 08:33

## 2019-01-01 ASSESSMENT — PAIN SCALES - GENERAL: PAINLEVEL_OUTOF10: 7

## 2019-01-02 LAB
ANION GAP SERPL CALCULATED.3IONS-SCNC: 7 MMOL/L (ref 3–16)
BASOPHILS ABSOLUTE: 0 K/UL (ref 0–0.2)
BASOPHILS RELATIVE PERCENT: 0.7 %
BUN BLDV-MCNC: 27 MG/DL (ref 7–20)
C-REACTIVE PROTEIN: 80.1 MG/L (ref 0–5.1)
CALCIUM SERPL-MCNC: 8.6 MG/DL (ref 8.3–10.6)
CHLORIDE BLD-SCNC: 106 MMOL/L (ref 99–110)
CO2: 29 MMOL/L (ref 21–32)
CREAT SERPL-MCNC: 1.4 MG/DL (ref 0.9–1.3)
EOSINOPHILS ABSOLUTE: 0.5 K/UL (ref 0–0.6)
EOSINOPHILS RELATIVE PERCENT: 11.7 %
GFR AFRICAN AMERICAN: >60
GFR NON-AFRICAN AMERICAN: 52
GLUCOSE BLD-MCNC: 104 MG/DL (ref 70–99)
GLUCOSE BLD-MCNC: 105 MG/DL (ref 70–99)
GLUCOSE BLD-MCNC: 134 MG/DL (ref 70–99)
GLUCOSE BLD-MCNC: 137 MG/DL (ref 70–99)
GLUCOSE BLD-MCNC: 140 MG/DL (ref 70–99)
HCT VFR BLD CALC: 28.3 % (ref 40.5–52.5)
HEMOGLOBIN: 8.9 G/DL (ref 13.5–17.5)
INR BLD: 3.68 (ref 0.86–1.14)
LYMPHOCYTES ABSOLUTE: 0.7 K/UL (ref 1–5.1)
LYMPHOCYTES RELATIVE PERCENT: 17 %
MAGNESIUM: 1.6 MG/DL (ref 1.8–2.4)
MCH RBC QN AUTO: 28.1 PG (ref 26–34)
MCHC RBC AUTO-ENTMCNC: 31.3 G/DL (ref 31–36)
MCV RBC AUTO: 89.8 FL (ref 80–100)
MONOCYTES ABSOLUTE: 0.4 K/UL (ref 0–1.3)
MONOCYTES RELATIVE PERCENT: 9.1 %
NEUTROPHILS ABSOLUTE: 2.4 K/UL (ref 1.7–7.7)
NEUTROPHILS RELATIVE PERCENT: 61.5 %
PDW BLD-RTO: 17.3 % (ref 12.4–15.4)
PERFORMED ON: ABNORMAL
PLATELET # BLD: 172 K/UL (ref 135–450)
PMV BLD AUTO: 7.8 FL (ref 5–10.5)
POTASSIUM REFLEX MAGNESIUM: 3.7 MMOL/L (ref 3.5–5.1)
PROTHROMBIN TIME: 42 SEC (ref 9.8–13)
RBC # BLD: 3.15 M/UL (ref 4.2–5.9)
SODIUM BLD-SCNC: 142 MMOL/L (ref 136–145)
WBC # BLD: 3.9 K/UL (ref 4–11)

## 2019-01-02 PROCEDURE — 36591 DRAW BLOOD OFF VENOUS DEVICE: CPT

## 2019-01-02 PROCEDURE — 97530 THERAPEUTIC ACTIVITIES: CPT

## 2019-01-02 PROCEDURE — 1280000000 HC REHAB R&B

## 2019-01-02 PROCEDURE — 83735 ASSAY OF MAGNESIUM: CPT

## 2019-01-02 PROCEDURE — 6370000000 HC RX 637 (ALT 250 FOR IP): Performed by: PHYSICAL MEDICINE & REHABILITATION

## 2019-01-02 PROCEDURE — 85610 PROTHROMBIN TIME: CPT

## 2019-01-02 PROCEDURE — 97535 SELF CARE MNGMENT TRAINING: CPT

## 2019-01-02 PROCEDURE — 85025 COMPLETE CBC W/AUTO DIFF WBC: CPT

## 2019-01-02 PROCEDURE — 51702 INSERT TEMP BLADDER CATH: CPT

## 2019-01-02 PROCEDURE — 2580000003 HC RX 258: Performed by: PHYSICAL MEDICINE & REHABILITATION

## 2019-01-02 PROCEDURE — 97606 NEG PRS WND THER DME>50 SQCM: CPT

## 2019-01-02 PROCEDURE — 6360000002 HC RX W HCPCS: Performed by: PHYSICAL MEDICINE & REHABILITATION

## 2019-01-02 PROCEDURE — 80048 BASIC METABOLIC PNL TOTAL CA: CPT

## 2019-01-02 PROCEDURE — 97110 THERAPEUTIC EXERCISES: CPT

## 2019-01-02 PROCEDURE — 97127 HC SP THER IVNTJ W/FOCUS COG FUNCJ: CPT

## 2019-01-02 RX ADMIN — MEROPENEM 1 G: 1 INJECTION, POWDER, FOR SOLUTION INTRAVENOUS at 17:27

## 2019-01-02 RX ADMIN — ALLOPURINOL 300 MG: 300 TABLET ORAL at 08:10

## 2019-01-02 RX ADMIN — METOPROLOL SUCCINATE 50 MG: 50 TABLET, EXTENDED RELEASE ORAL at 08:11

## 2019-01-02 RX ADMIN — INSULIN LISPRO 1 UNITS: 100 INJECTION, SOLUTION INTRAVENOUS; SUBCUTANEOUS at 20:08

## 2019-01-02 RX ADMIN — LACTULOSE 20 G: 20 SOLUTION ORAL at 20:00

## 2019-01-02 RX ADMIN — FUROSEMIDE 40 MG: 40 TABLET ORAL at 08:11

## 2019-01-02 RX ADMIN — LACTULOSE 20 G: 20 SOLUTION ORAL at 13:36

## 2019-01-02 RX ADMIN — FAMOTIDINE 20 MG: 20 TABLET ORAL at 08:11

## 2019-01-02 RX ADMIN — OXYCODONE AND ACETAMINOPHEN 2 TABLET: 5; 325 TABLET ORAL at 08:10

## 2019-01-02 RX ADMIN — Medication 10 MG: at 19:59

## 2019-01-02 RX ADMIN — LACTULOSE 20 G: 20 SOLUTION ORAL at 08:12

## 2019-01-02 RX ADMIN — SODIUM BICARBONATE 650 MG TABLET 650 MG: at 20:00

## 2019-01-02 RX ADMIN — OXYCODONE AND ACETAMINOPHEN 2 TABLET: 5; 325 TABLET ORAL at 13:35

## 2019-01-02 RX ADMIN — SODIUM BICARBONATE 650 MG TABLET 650 MG: at 08:11

## 2019-01-02 RX ADMIN — GABAPENTIN 300 MG: 300 CAPSULE ORAL at 20:00

## 2019-01-02 RX ADMIN — Medication 10 ML: at 08:11

## 2019-01-02 RX ADMIN — FAMOTIDINE 20 MG: 20 TABLET ORAL at 20:00

## 2019-01-02 RX ADMIN — STANDARDIZED SENNA CONCENTRATE AND DOCUSATE SODIUM 1 TABLET: 8.6; 5 TABLET, FILM COATED ORAL at 08:11

## 2019-01-02 RX ADMIN — MEROPENEM 1 G: 1 INJECTION, POWDER, FOR SOLUTION INTRAVENOUS at 09:58

## 2019-01-02 RX ADMIN — ATORVASTATIN CALCIUM 80 MG: 80 TABLET, FILM COATED ORAL at 20:00

## 2019-01-02 RX ADMIN — FLUTICASONE PROPIONATE 2 SPRAY: 50 SPRAY, METERED NASAL at 08:11

## 2019-01-02 RX ADMIN — Medication 400 MG: at 08:11

## 2019-01-02 RX ADMIN — ASPIRIN 81 MG: 81 TABLET, COATED ORAL at 08:10

## 2019-01-02 RX ADMIN — AMLODIPINE BESYLATE 10 MG: 5 TABLET ORAL at 20:00

## 2019-01-02 RX ADMIN — MEROPENEM 1 G: 1 INJECTION, POWDER, FOR SOLUTION INTRAVENOUS at 02:25

## 2019-01-02 RX ADMIN — CLOPIDOGREL BISULFATE 75 MG: 75 TABLET ORAL at 08:11

## 2019-01-02 RX ADMIN — CYCLOBENZAPRINE HYDROCHLORIDE 10 MG: 10 TABLET, FILM COATED ORAL at 08:11

## 2019-01-02 RX ADMIN — CASTOR OIL AND BALSAM, PERU: 788; 87 OINTMENT TOPICAL at 19:59

## 2019-01-02 RX ADMIN — TAMSULOSIN HYDROCHLORIDE 0.4 MG: 0.4 CAPSULE ORAL at 08:11

## 2019-01-02 RX ADMIN — CASTOR OIL AND BALSAM, PERU: 788; 87 OINTMENT TOPICAL at 08:11

## 2019-01-02 RX ADMIN — Medication 10 ML: at 19:59

## 2019-01-02 ASSESSMENT — PAIN SCALES - GENERAL
PAINLEVEL_OUTOF10: 7
PAINLEVEL_OUTOF10: 7
PAINLEVEL_OUTOF10: 3
PAINLEVEL_OUTOF10: 7
PAINLEVEL_OUTOF10: 7
PAINLEVEL_OUTOF10: 0
PAINLEVEL_OUTOF10: 0
PAINLEVEL_OUTOF10: 2
PAINLEVEL_OUTOF10: 0
PAINLEVEL_OUTOF10: 0

## 2019-01-03 LAB
ANION GAP SERPL CALCULATED.3IONS-SCNC: 8 MMOL/L (ref 3–16)
BASOPHILS ABSOLUTE: 0 K/UL (ref 0–0.2)
BASOPHILS RELATIVE PERCENT: 0.4 %
BUN BLDV-MCNC: 24 MG/DL (ref 7–20)
CALCIUM SERPL-MCNC: 8.3 MG/DL (ref 8.3–10.6)
CHLORIDE BLD-SCNC: 105 MMOL/L (ref 99–110)
CO2: 29 MMOL/L (ref 21–32)
CREAT SERPL-MCNC: 1.5 MG/DL (ref 0.9–1.3)
EOSINOPHILS ABSOLUTE: 0.5 K/UL (ref 0–0.6)
EOSINOPHILS RELATIVE PERCENT: 11.8 %
GFR AFRICAN AMERICAN: 58
GFR NON-AFRICAN AMERICAN: 48
GLUCOSE BLD-MCNC: 113 MG/DL (ref 70–99)
GLUCOSE BLD-MCNC: 113 MG/DL (ref 70–99)
GLUCOSE BLD-MCNC: 114 MG/DL (ref 70–99)
GLUCOSE BLD-MCNC: 147 MG/DL (ref 70–99)
GLUCOSE BLD-MCNC: 147 MG/DL (ref 70–99)
HCT VFR BLD CALC: 27.6 % (ref 40.5–52.5)
HEMOGLOBIN: 8.7 G/DL (ref 13.5–17.5)
INR BLD: 3.88 (ref 0.86–1.14)
LYMPHOCYTES ABSOLUTE: 0.8 K/UL (ref 1–5.1)
LYMPHOCYTES RELATIVE PERCENT: 19.4 %
MAGNESIUM: 1.7 MG/DL (ref 1.8–2.4)
MCH RBC QN AUTO: 28.4 PG (ref 26–34)
MCHC RBC AUTO-ENTMCNC: 31.6 G/DL (ref 31–36)
MCV RBC AUTO: 89.9 FL (ref 80–100)
MONOCYTES ABSOLUTE: 0.4 K/UL (ref 0–1.3)
MONOCYTES RELATIVE PERCENT: 10.7 %
NEUTROPHILS ABSOLUTE: 2.3 K/UL (ref 1.7–7.7)
NEUTROPHILS RELATIVE PERCENT: 57.7 %
PDW BLD-RTO: 17 % (ref 12.4–15.4)
PERFORMED ON: ABNORMAL
PLATELET # BLD: 174 K/UL (ref 135–450)
PMV BLD AUTO: 7.9 FL (ref 5–10.5)
POTASSIUM REFLEX MAGNESIUM: 3.9 MMOL/L (ref 3.5–5.1)
PROTHROMBIN TIME: 44.2 SEC (ref 9.8–13)
RBC # BLD: 3.07 M/UL (ref 4.2–5.9)
SODIUM BLD-SCNC: 142 MMOL/L (ref 136–145)
WBC # BLD: 3.9 K/UL (ref 4–11)

## 2019-01-03 PROCEDURE — 2580000003 HC RX 258: Performed by: PHYSICAL MEDICINE & REHABILITATION

## 2019-01-03 PROCEDURE — 6360000002 HC RX W HCPCS: Performed by: PHYSICAL MEDICINE & REHABILITATION

## 2019-01-03 PROCEDURE — 97110 THERAPEUTIC EXERCISES: CPT

## 2019-01-03 PROCEDURE — 51702 INSERT TEMP BLADDER CATH: CPT

## 2019-01-03 PROCEDURE — 1280000000 HC REHAB R&B

## 2019-01-03 PROCEDURE — 97535 SELF CARE MNGMENT TRAINING: CPT

## 2019-01-03 PROCEDURE — 85025 COMPLETE CBC W/AUTO DIFF WBC: CPT

## 2019-01-03 PROCEDURE — 85610 PROTHROMBIN TIME: CPT

## 2019-01-03 PROCEDURE — 6370000000 HC RX 637 (ALT 250 FOR IP): Performed by: PHYSICAL MEDICINE & REHABILITATION

## 2019-01-03 PROCEDURE — 51701 INSERT BLADDER CATHETER: CPT

## 2019-01-03 PROCEDURE — 2580000003 HC RX 258

## 2019-01-03 PROCEDURE — 97530 THERAPEUTIC ACTIVITIES: CPT

## 2019-01-03 PROCEDURE — 2500000003 HC RX 250 WO HCPCS: Performed by: PHYSICAL MEDICINE & REHABILITATION

## 2019-01-03 PROCEDURE — 80048 BASIC METABOLIC PNL TOTAL CA: CPT

## 2019-01-03 PROCEDURE — 97127 HC SP THER IVNTJ W/FOCUS COG FUNCJ: CPT

## 2019-01-03 PROCEDURE — 83735 ASSAY OF MAGNESIUM: CPT

## 2019-01-03 PROCEDURE — 51798 US URINE CAPACITY MEASURE: CPT

## 2019-01-03 PROCEDURE — 97112 NEUROMUSCULAR REEDUCATION: CPT

## 2019-01-03 RX ORDER — 0.9 % SODIUM CHLORIDE 0.9 %
VIAL (ML) INJECTION
Status: COMPLETED
Start: 2019-01-03 | End: 2019-01-03

## 2019-01-03 RX ORDER — LACTULOSE 10 G/15ML
20 SOLUTION ORAL 2 TIMES DAILY PRN
Status: DISCONTINUED | OUTPATIENT
Start: 2019-01-03 | End: 2019-01-10 | Stop reason: HOSPADM

## 2019-01-03 RX ADMIN — Medication 400 MG: at 07:45

## 2019-01-03 RX ADMIN — MEROPENEM 1 G: 1 INJECTION, POWDER, FOR SOLUTION INTRAVENOUS at 11:28

## 2019-01-03 RX ADMIN — CYCLOBENZAPRINE HYDROCHLORIDE 10 MG: 10 TABLET, FILM COATED ORAL at 07:45

## 2019-01-03 RX ADMIN — Medication 10 ML: at 10:12

## 2019-01-03 RX ADMIN — SODIUM BICARBONATE 650 MG TABLET 650 MG: at 07:45

## 2019-01-03 RX ADMIN — SODIUM BICARBONATE 650 MG TABLET 650 MG: at 20:48

## 2019-01-03 RX ADMIN — CASTOR OIL AND BALSAM, PERU: 788; 87 OINTMENT TOPICAL at 20:53

## 2019-01-03 RX ADMIN — OXYCODONE AND ACETAMINOPHEN 2 TABLET: 5; 325 TABLET ORAL at 16:43

## 2019-01-03 RX ADMIN — SODIUM CHLORIDE 10 ML: 9 INJECTION, SOLUTION INTRAMUSCULAR; INTRAVENOUS; SUBCUTANEOUS at 10:12

## 2019-01-03 RX ADMIN — Medication 10 ML: at 02:24

## 2019-01-03 RX ADMIN — MICONAZOLE NITRATE: 2 POWDER TOPICAL at 20:53

## 2019-01-03 RX ADMIN — ASPIRIN 81 MG: 81 TABLET, COATED ORAL at 07:45

## 2019-01-03 RX ADMIN — INSULIN LISPRO 1 UNITS: 100 INJECTION, SOLUTION INTRAVENOUS; SUBCUTANEOUS at 17:24

## 2019-01-03 RX ADMIN — CLOPIDOGREL BISULFATE 75 MG: 75 TABLET ORAL at 07:45

## 2019-01-03 RX ADMIN — TAMSULOSIN HYDROCHLORIDE 0.4 MG: 0.4 CAPSULE ORAL at 07:45

## 2019-01-03 RX ADMIN — FUROSEMIDE 40 MG: 40 TABLET ORAL at 07:45

## 2019-01-03 RX ADMIN — ATORVASTATIN CALCIUM 80 MG: 80 TABLET, FILM COATED ORAL at 20:48

## 2019-01-03 RX ADMIN — ALLOPURINOL 300 MG: 300 TABLET ORAL at 07:45

## 2019-01-03 RX ADMIN — GABAPENTIN 300 MG: 300 CAPSULE ORAL at 20:48

## 2019-01-03 RX ADMIN — FAMOTIDINE 20 MG: 20 TABLET ORAL at 07:45

## 2019-01-03 RX ADMIN — METOPROLOL SUCCINATE 50 MG: 50 TABLET, EXTENDED RELEASE ORAL at 07:45

## 2019-01-03 RX ADMIN — Medication 10 MG: at 20:48

## 2019-01-03 RX ADMIN — AMLODIPINE BESYLATE 10 MG: 5 TABLET ORAL at 20:48

## 2019-01-03 RX ADMIN — Medication 10 ML: at 07:46

## 2019-01-03 RX ADMIN — CASTOR OIL AND BALSAM, PERU: 788; 87 OINTMENT TOPICAL at 07:41

## 2019-01-03 RX ADMIN — MEROPENEM 1 G: 1 INJECTION, POWDER, FOR SOLUTION INTRAVENOUS at 02:23

## 2019-01-03 RX ADMIN — OXYCODONE AND ACETAMINOPHEN 2 TABLET: 5; 325 TABLET ORAL at 07:45

## 2019-01-03 RX ADMIN — MEROPENEM 1 G: 1 INJECTION, POWDER, FOR SOLUTION INTRAVENOUS at 17:24

## 2019-01-03 RX ADMIN — INSULIN LISPRO 1 UNITS: 100 INJECTION, SOLUTION INTRAVENOUS; SUBCUTANEOUS at 11:28

## 2019-01-03 RX ADMIN — Medication 10 ML: at 20:48

## 2019-01-03 RX ADMIN — FAMOTIDINE 20 MG: 20 TABLET ORAL at 20:48

## 2019-01-03 ASSESSMENT — PAIN SCALES - GENERAL
PAINLEVEL_OUTOF10: 7
PAINLEVEL_OUTOF10: 0

## 2019-01-04 LAB
ANION GAP SERPL CALCULATED.3IONS-SCNC: 9 MMOL/L (ref 3–16)
BASOPHILS ABSOLUTE: 0 K/UL (ref 0–0.2)
BASOPHILS RELATIVE PERCENT: 0.2 %
BUN BLDV-MCNC: 22 MG/DL (ref 7–20)
CALCIUM SERPL-MCNC: 8.6 MG/DL (ref 8.3–10.6)
CHLORIDE BLD-SCNC: 103 MMOL/L (ref 99–110)
CO2: 28 MMOL/L (ref 21–32)
CREAT SERPL-MCNC: 1.4 MG/DL (ref 0.9–1.3)
EOSINOPHILS ABSOLUTE: 0.5 K/UL (ref 0–0.6)
EOSINOPHILS RELATIVE PERCENT: 11.1 %
GFR AFRICAN AMERICAN: >60
GFR NON-AFRICAN AMERICAN: 52
GLUCOSE BLD-MCNC: 103 MG/DL (ref 70–99)
GLUCOSE BLD-MCNC: 123 MG/DL (ref 70–99)
GLUCOSE BLD-MCNC: 136 MG/DL (ref 70–99)
GLUCOSE BLD-MCNC: 142 MG/DL (ref 70–99)
GLUCOSE BLD-MCNC: 88 MG/DL (ref 70–99)
HCT VFR BLD CALC: 27.2 % (ref 40.5–52.5)
HEMOGLOBIN: 8.6 G/DL (ref 13.5–17.5)
INR BLD: 2.68 (ref 0.86–1.14)
LYMPHOCYTES ABSOLUTE: 0.8 K/UL (ref 1–5.1)
LYMPHOCYTES RELATIVE PERCENT: 18.9 %
MAGNESIUM: 1.7 MG/DL (ref 1.8–2.4)
MCH RBC QN AUTO: 28.1 PG (ref 26–34)
MCHC RBC AUTO-ENTMCNC: 31.7 G/DL (ref 31–36)
MCV RBC AUTO: 88.7 FL (ref 80–100)
MONOCYTES ABSOLUTE: 0.5 K/UL (ref 0–1.3)
MONOCYTES RELATIVE PERCENT: 11.6 %
NEUTROPHILS ABSOLUTE: 2.4 K/UL (ref 1.7–7.7)
NEUTROPHILS RELATIVE PERCENT: 58.2 %
PDW BLD-RTO: 17.3 % (ref 12.4–15.4)
PERFORMED ON: ABNORMAL
PERFORMED ON: NORMAL
PLATELET # BLD: 219 K/UL (ref 135–450)
PMV BLD AUTO: 7.4 FL (ref 5–10.5)
POTASSIUM REFLEX MAGNESIUM: 3.8 MMOL/L (ref 3.5–5.1)
PROTHROMBIN TIME: 30.6 SEC (ref 9.8–13)
RBC # BLD: 3.07 M/UL (ref 4.2–5.9)
SLIDE REVIEW: ABNORMAL
SODIUM BLD-SCNC: 140 MMOL/L (ref 136–145)
WBC # BLD: 4.2 K/UL (ref 4–11)

## 2019-01-04 PROCEDURE — 2500000003 HC RX 250 WO HCPCS: Performed by: PHYSICAL MEDICINE & REHABILITATION

## 2019-01-04 PROCEDURE — 2580000003 HC RX 258: Performed by: PHYSICAL MEDICINE & REHABILITATION

## 2019-01-04 PROCEDURE — 51798 US URINE CAPACITY MEASURE: CPT

## 2019-01-04 PROCEDURE — 6360000002 HC RX W HCPCS: Performed by: PHYSICAL MEDICINE & REHABILITATION

## 2019-01-04 PROCEDURE — 97530 THERAPEUTIC ACTIVITIES: CPT

## 2019-01-04 PROCEDURE — 97606 NEG PRS WND THER DME>50 SQCM: CPT

## 2019-01-04 PROCEDURE — 80048 BASIC METABOLIC PNL TOTAL CA: CPT

## 2019-01-04 PROCEDURE — 97535 SELF CARE MNGMENT TRAINING: CPT

## 2019-01-04 PROCEDURE — 6370000000 HC RX 637 (ALT 250 FOR IP): Performed by: PHYSICAL MEDICINE & REHABILITATION

## 2019-01-04 PROCEDURE — 83735 ASSAY OF MAGNESIUM: CPT

## 2019-01-04 PROCEDURE — 97112 NEUROMUSCULAR REEDUCATION: CPT

## 2019-01-04 PROCEDURE — 97110 THERAPEUTIC EXERCISES: CPT

## 2019-01-04 PROCEDURE — 85610 PROTHROMBIN TIME: CPT

## 2019-01-04 PROCEDURE — 85025 COMPLETE CBC W/AUTO DIFF WBC: CPT

## 2019-01-04 PROCEDURE — 1280000000 HC REHAB R&B

## 2019-01-04 PROCEDURE — 51701 INSERT BLADDER CATHETER: CPT

## 2019-01-04 PROCEDURE — 51702 INSERT TEMP BLADDER CATH: CPT

## 2019-01-04 PROCEDURE — 97127 HC SP THER IVNTJ W/FOCUS COG FUNCJ: CPT

## 2019-01-04 RX ORDER — WARFARIN SODIUM 5 MG/1
5 TABLET ORAL
Status: COMPLETED | OUTPATIENT
Start: 2019-01-04 | End: 2019-01-04

## 2019-01-04 RX ADMIN — MICONAZOLE NITRATE: 2 POWDER TOPICAL at 08:23

## 2019-01-04 RX ADMIN — FAMOTIDINE 20 MG: 20 TABLET ORAL at 19:49

## 2019-01-04 RX ADMIN — ALLOPURINOL 300 MG: 300 TABLET ORAL at 08:22

## 2019-01-04 RX ADMIN — CASTOR OIL AND BALSAM, PERU: 788; 87 OINTMENT TOPICAL at 20:03

## 2019-01-04 RX ADMIN — STANDARDIZED SENNA CONCENTRATE AND DOCUSATE SODIUM 1 TABLET: 8.6; 5 TABLET, FILM COATED ORAL at 08:22

## 2019-01-04 RX ADMIN — MICONAZOLE NITRATE: 2 POWDER TOPICAL at 20:02

## 2019-01-04 RX ADMIN — Medication 10 ML: at 20:02

## 2019-01-04 RX ADMIN — FUROSEMIDE 40 MG: 40 TABLET ORAL at 08:22

## 2019-01-04 RX ADMIN — TAMSULOSIN HYDROCHLORIDE 0.4 MG: 0.4 CAPSULE ORAL at 08:22

## 2019-01-04 RX ADMIN — Medication 400 MG: at 08:23

## 2019-01-04 RX ADMIN — INSULIN LISPRO 1 UNITS: 100 INJECTION, SOLUTION INTRAVENOUS; SUBCUTANEOUS at 11:30

## 2019-01-04 RX ADMIN — MEROPENEM 1 G: 1 INJECTION, POWDER, FOR SOLUTION INTRAVENOUS at 11:31

## 2019-01-04 RX ADMIN — GABAPENTIN 300 MG: 300 CAPSULE ORAL at 19:50

## 2019-01-04 RX ADMIN — ASPIRIN 81 MG: 81 TABLET, COATED ORAL at 08:22

## 2019-01-04 RX ADMIN — CASTOR OIL AND BALSAM, PERU: 788; 87 OINTMENT TOPICAL at 08:22

## 2019-01-04 RX ADMIN — ATORVASTATIN CALCIUM 80 MG: 80 TABLET, FILM COATED ORAL at 19:50

## 2019-01-04 RX ADMIN — ALTEPLASE 1 MG: 2.2 INJECTION, POWDER, LYOPHILIZED, FOR SOLUTION INTRAVENOUS at 00:21

## 2019-01-04 RX ADMIN — METOPROLOL SUCCINATE 50 MG: 50 TABLET, EXTENDED RELEASE ORAL at 08:22

## 2019-01-04 RX ADMIN — CYCLOBENZAPRINE HYDROCHLORIDE 10 MG: 10 TABLET, FILM COATED ORAL at 19:50

## 2019-01-04 RX ADMIN — OXYCODONE AND ACETAMINOPHEN 2 TABLET: 5; 325 TABLET ORAL at 19:49

## 2019-01-04 RX ADMIN — WARFARIN SODIUM 5 MG: 5 TABLET ORAL at 18:00

## 2019-01-04 RX ADMIN — SODIUM BICARBONATE 650 MG TABLET 650 MG: at 08:22

## 2019-01-04 RX ADMIN — MEROPENEM 1 G: 1 INJECTION, POWDER, FOR SOLUTION INTRAVENOUS at 18:00

## 2019-01-04 RX ADMIN — AMLODIPINE BESYLATE 10 MG: 5 TABLET ORAL at 19:49

## 2019-01-04 RX ADMIN — Medication 10 ML: at 08:23

## 2019-01-04 RX ADMIN — CLOPIDOGREL BISULFATE 75 MG: 75 TABLET ORAL at 08:22

## 2019-01-04 RX ADMIN — FLUTICASONE PROPIONATE 2 SPRAY: 50 SPRAY, METERED NASAL at 08:23

## 2019-01-04 RX ADMIN — FAMOTIDINE 20 MG: 20 TABLET ORAL at 08:22

## 2019-01-04 RX ADMIN — Medication 10 MG: at 19:50

## 2019-01-04 RX ADMIN — SODIUM BICARBONATE 650 MG TABLET 650 MG: at 19:49

## 2019-01-04 RX ADMIN — MEROPENEM 1 G: 1 INJECTION, POWDER, FOR SOLUTION INTRAVENOUS at 02:49

## 2019-01-04 ASSESSMENT — PAIN SCALES - GENERAL
PAINLEVEL_OUTOF10: 0
PAINLEVEL_OUTOF10: 0
PAINLEVEL_OUTOF10: 7
PAINLEVEL_OUTOF10: 7
PAINLEVEL_OUTOF10: 0
PAINLEVEL_OUTOF10: 6
PAINLEVEL_OUTOF10: 0

## 2019-01-05 LAB
ANION GAP SERPL CALCULATED.3IONS-SCNC: 9 MMOL/L (ref 3–16)
ANISOCYTOSIS: ABNORMAL
ATYPICAL LYMPHOCYTE RELATIVE PERCENT: 1 % (ref 0–6)
BANDED NEUTROPHILS RELATIVE PERCENT: 15 % (ref 0–7)
BASOPHILS ABSOLUTE: 0 K/UL (ref 0–0.2)
BASOPHILS RELATIVE PERCENT: 0 %
BUN BLDV-MCNC: 24 MG/DL (ref 7–20)
CALCIUM SERPL-MCNC: 8.5 MG/DL (ref 8.3–10.6)
CHLORIDE BLD-SCNC: 105 MMOL/L (ref 99–110)
CO2: 29 MMOL/L (ref 21–32)
CREAT SERPL-MCNC: 1.5 MG/DL (ref 0.9–1.3)
EOSINOPHILS ABSOLUTE: 0.5 K/UL (ref 0–0.6)
EOSINOPHILS RELATIVE PERCENT: 13 %
GFR AFRICAN AMERICAN: 58
GFR NON-AFRICAN AMERICAN: 48
GLUCOSE BLD-MCNC: 102 MG/DL (ref 70–99)
GLUCOSE BLD-MCNC: 114 MG/DL (ref 70–99)
GLUCOSE BLD-MCNC: 139 MG/DL (ref 70–99)
GLUCOSE BLD-MCNC: 141 MG/DL (ref 70–99)
GLUCOSE BLD-MCNC: 94 MG/DL (ref 70–99)
HCT VFR BLD CALC: 26.7 % (ref 40.5–52.5)
HEMOGLOBIN: 8.7 G/DL (ref 13.5–17.5)
INR BLD: 2.26 (ref 0.86–1.14)
LYMPHOCYTES ABSOLUTE: 0.8 K/UL (ref 1–5.1)
LYMPHOCYTES RELATIVE PERCENT: 17 %
MACROCYTES: ABNORMAL
MAGNESIUM: 1.8 MG/DL (ref 1.8–2.4)
MCH RBC QN AUTO: 28.7 PG (ref 26–34)
MCHC RBC AUTO-ENTMCNC: 32.4 G/DL (ref 31–36)
MCV RBC AUTO: 88.7 FL (ref 80–100)
MICROCYTES: ABNORMAL
MONOCYTES ABSOLUTE: 0.3 K/UL (ref 0–1.3)
MONOCYTES RELATIVE PERCENT: 7 %
NEUTROPHILS ABSOLUTE: 2.6 K/UL (ref 1.7–7.7)
NEUTROPHILS RELATIVE PERCENT: 47 %
OVALOCYTES: ABNORMAL
PDW BLD-RTO: 17.2 % (ref 12.4–15.4)
PERFORMED ON: ABNORMAL
PERFORMED ON: NORMAL
PLATELET # BLD: 247 K/UL (ref 135–450)
PMV BLD AUTO: 7.2 FL (ref 5–10.5)
POLYCHROMASIA: ABNORMAL
POTASSIUM REFLEX MAGNESIUM: 3.8 MMOL/L (ref 3.5–5.1)
PROTHROMBIN TIME: 25.8 SEC (ref 9.8–13)
RBC # BLD: 3.01 M/UL (ref 4.2–5.9)
SODIUM BLD-SCNC: 143 MMOL/L (ref 136–145)
WBC # BLD: 4.2 K/UL (ref 4–11)

## 2019-01-05 PROCEDURE — 83735 ASSAY OF MAGNESIUM: CPT

## 2019-01-05 PROCEDURE — 6370000000 HC RX 637 (ALT 250 FOR IP): Performed by: PHYSICAL MEDICINE & REHABILITATION

## 2019-01-05 PROCEDURE — 1280000000 HC REHAB R&B

## 2019-01-05 PROCEDURE — 85610 PROTHROMBIN TIME: CPT

## 2019-01-05 PROCEDURE — 2580000003 HC RX 258: Performed by: PHYSICAL MEDICINE & REHABILITATION

## 2019-01-05 PROCEDURE — 85025 COMPLETE CBC W/AUTO DIFF WBC: CPT

## 2019-01-05 PROCEDURE — 80048 BASIC METABOLIC PNL TOTAL CA: CPT

## 2019-01-05 PROCEDURE — 6360000002 HC RX W HCPCS: Performed by: PHYSICAL MEDICINE & REHABILITATION

## 2019-01-05 RX ORDER — WARFARIN SODIUM 5 MG/1
5 TABLET ORAL
Status: COMPLETED | OUTPATIENT
Start: 2019-01-05 | End: 2019-01-05

## 2019-01-05 RX ADMIN — WARFARIN SODIUM 5 MG: 5 TABLET ORAL at 17:19

## 2019-01-05 RX ADMIN — Medication 10 ML: at 09:35

## 2019-01-05 RX ADMIN — MEROPENEM 1 G: 1 INJECTION, POWDER, FOR SOLUTION INTRAVENOUS at 02:39

## 2019-01-05 RX ADMIN — METOPROLOL SUCCINATE 50 MG: 50 TABLET, EXTENDED RELEASE ORAL at 09:34

## 2019-01-05 RX ADMIN — FLUTICASONE PROPIONATE 2 SPRAY: 50 SPRAY, METERED NASAL at 09:34

## 2019-01-05 RX ADMIN — MICONAZOLE NITRATE: 2 POWDER TOPICAL at 09:35

## 2019-01-05 RX ADMIN — FAMOTIDINE 20 MG: 20 TABLET ORAL at 09:34

## 2019-01-05 RX ADMIN — STANDARDIZED SENNA CONCENTRATE AND DOCUSATE SODIUM 1 TABLET: 8.6; 5 TABLET, FILM COATED ORAL at 09:34

## 2019-01-05 RX ADMIN — TAMSULOSIN HYDROCHLORIDE 0.4 MG: 0.4 CAPSULE ORAL at 09:34

## 2019-01-05 RX ADMIN — SODIUM BICARBONATE 650 MG TABLET 650 MG: at 09:34

## 2019-01-05 RX ADMIN — Medication 400 MG: at 09:35

## 2019-01-05 RX ADMIN — MEROPENEM 1 G: 1 INJECTION, POWDER, FOR SOLUTION INTRAVENOUS at 18:05

## 2019-01-05 RX ADMIN — ASPIRIN 81 MG: 81 TABLET, COATED ORAL at 09:34

## 2019-01-05 RX ADMIN — Medication 10 ML: at 21:11

## 2019-01-05 RX ADMIN — FUROSEMIDE 40 MG: 40 TABLET ORAL at 09:34

## 2019-01-05 RX ADMIN — AMLODIPINE BESYLATE 10 MG: 5 TABLET ORAL at 21:12

## 2019-01-05 RX ADMIN — ATORVASTATIN CALCIUM 80 MG: 80 TABLET, FILM COATED ORAL at 21:11

## 2019-01-05 RX ADMIN — MEROPENEM 1 G: 1 INJECTION, POWDER, FOR SOLUTION INTRAVENOUS at 11:40

## 2019-01-05 RX ADMIN — GABAPENTIN 300 MG: 300 CAPSULE ORAL at 21:12

## 2019-01-05 RX ADMIN — ALLOPURINOL 300 MG: 300 TABLET ORAL at 09:34

## 2019-01-05 RX ADMIN — Medication 10 MG: at 21:12

## 2019-01-05 RX ADMIN — CASTOR OIL AND BALSAM, PERU: 788; 87 OINTMENT TOPICAL at 09:35

## 2019-01-05 RX ADMIN — SODIUM BICARBONATE 650 MG TABLET 650 MG: at 21:12

## 2019-01-05 RX ADMIN — CLOPIDOGREL BISULFATE 75 MG: 75 TABLET ORAL at 09:34

## 2019-01-05 RX ADMIN — FAMOTIDINE 20 MG: 20 TABLET ORAL at 21:12

## 2019-01-05 ASSESSMENT — PAIN SCALES - GENERAL
PAINLEVEL_OUTOF10: 0

## 2019-01-06 LAB
ANION GAP SERPL CALCULATED.3IONS-SCNC: 9 MMOL/L (ref 3–16)
ANISOCYTOSIS: ABNORMAL
BANDED NEUTROPHILS RELATIVE PERCENT: 22 % (ref 0–7)
BASOPHILS ABSOLUTE: 0 K/UL (ref 0–0.2)
BASOPHILS RELATIVE PERCENT: 0 %
BUN BLDV-MCNC: 31 MG/DL (ref 7–20)
CALCIUM SERPL-MCNC: 8.3 MG/DL (ref 8.3–10.6)
CHLORIDE BLD-SCNC: 103 MMOL/L (ref 99–110)
CO2: 28 MMOL/L (ref 21–32)
CREAT SERPL-MCNC: 1.7 MG/DL (ref 0.9–1.3)
EOSINOPHILS ABSOLUTE: 0.2 K/UL (ref 0–0.6)
EOSINOPHILS RELATIVE PERCENT: 5 %
GFR AFRICAN AMERICAN: 50
GFR NON-AFRICAN AMERICAN: 41
GLUCOSE BLD-MCNC: 107 MG/DL (ref 70–99)
GLUCOSE BLD-MCNC: 108 MG/DL (ref 70–99)
GLUCOSE BLD-MCNC: 110 MG/DL (ref 70–99)
GLUCOSE BLD-MCNC: 174 MG/DL (ref 70–99)
GLUCOSE BLD-MCNC: 95 MG/DL (ref 70–99)
HCT VFR BLD CALC: 27.3 % (ref 40.5–52.5)
HEMOGLOBIN: 8.7 G/DL (ref 13.5–17.5)
INR BLD: 2.17 (ref 0.86–1.14)
LYMPHOCYTES ABSOLUTE: 0.7 K/UL (ref 1–5.1)
LYMPHOCYTES RELATIVE PERCENT: 14 %
MACROCYTES: ABNORMAL
MAGNESIUM: 1.8 MG/DL (ref 1.8–2.4)
MCH RBC QN AUTO: 28 PG (ref 26–34)
MCHC RBC AUTO-ENTMCNC: 32 G/DL (ref 31–36)
MCV RBC AUTO: 87.7 FL (ref 80–100)
METAMYELOCYTES RELATIVE PERCENT: 1 %
MICROCYTES: ABNORMAL
MONOCYTES ABSOLUTE: 0.5 K/UL (ref 0–1.3)
MONOCYTES RELATIVE PERCENT: 10 %
MYELOCYTE PERCENT: 8 %
NEUTROPHILS ABSOLUTE: 3.3 K/UL (ref 1.7–7.7)
NEUTROPHILS RELATIVE PERCENT: 40 %
PDW BLD-RTO: 17.5 % (ref 12.4–15.4)
PERFORMED ON: ABNORMAL
PERFORMED ON: NORMAL
PLATELET # BLD: 288 K/UL (ref 135–450)
PMV BLD AUTO: 7.6 FL (ref 5–10.5)
POLYCHROMASIA: ABNORMAL
POTASSIUM REFLEX MAGNESIUM: 3.7 MMOL/L (ref 3.5–5.1)
PROTHROMBIN TIME: 24.7 SEC (ref 9.8–13)
RBC # BLD: 3.11 M/UL (ref 4.2–5.9)
SODIUM BLD-SCNC: 140 MMOL/L (ref 136–145)
WBC # BLD: 4.7 K/UL (ref 4–11)

## 2019-01-06 PROCEDURE — 1280000000 HC REHAB R&B

## 2019-01-06 PROCEDURE — 2580000003 HC RX 258: Performed by: PHYSICAL MEDICINE & REHABILITATION

## 2019-01-06 PROCEDURE — 85025 COMPLETE CBC W/AUTO DIFF WBC: CPT

## 2019-01-06 PROCEDURE — 6370000000 HC RX 637 (ALT 250 FOR IP): Performed by: PHYSICAL MEDICINE & REHABILITATION

## 2019-01-06 PROCEDURE — 83735 ASSAY OF MAGNESIUM: CPT

## 2019-01-06 PROCEDURE — 80048 BASIC METABOLIC PNL TOTAL CA: CPT

## 2019-01-06 PROCEDURE — 85610 PROTHROMBIN TIME: CPT

## 2019-01-06 PROCEDURE — 6360000002 HC RX W HCPCS: Performed by: PHYSICAL MEDICINE & REHABILITATION

## 2019-01-06 RX ADMIN — INSULIN LISPRO 1 UNITS: 100 INJECTION, SOLUTION INTRAVENOUS; SUBCUTANEOUS at 11:54

## 2019-01-06 RX ADMIN — ATORVASTATIN CALCIUM 80 MG: 80 TABLET, FILM COATED ORAL at 21:53

## 2019-01-06 RX ADMIN — MEROPENEM 1 G: 1 INJECTION, POWDER, FOR SOLUTION INTRAVENOUS at 03:00

## 2019-01-06 RX ADMIN — MICONAZOLE NITRATE: 2 POWDER TOPICAL at 21:54

## 2019-01-06 RX ADMIN — WARFARIN SODIUM 6 MG: 5 TABLET ORAL at 17:53

## 2019-01-06 RX ADMIN — Medication 400 MG: at 09:24

## 2019-01-06 RX ADMIN — MEROPENEM 1 G: 1 INJECTION, POWDER, FOR SOLUTION INTRAVENOUS at 17:53

## 2019-01-06 RX ADMIN — MEROPENEM 1 G: 1 INJECTION, POWDER, FOR SOLUTION INTRAVENOUS at 11:14

## 2019-01-06 RX ADMIN — TAMSULOSIN HYDROCHLORIDE 0.4 MG: 0.4 CAPSULE ORAL at 09:24

## 2019-01-06 RX ADMIN — CASTOR OIL AND BALSAM, PERU: 788; 87 OINTMENT TOPICAL at 21:55

## 2019-01-06 RX ADMIN — MICONAZOLE NITRATE: 2 POWDER TOPICAL at 09:26

## 2019-01-06 RX ADMIN — METOPROLOL SUCCINATE 50 MG: 50 TABLET, EXTENDED RELEASE ORAL at 09:24

## 2019-01-06 RX ADMIN — ALLOPURINOL 300 MG: 300 TABLET ORAL at 09:24

## 2019-01-06 RX ADMIN — Medication 10 ML: at 11:14

## 2019-01-06 RX ADMIN — FAMOTIDINE 20 MG: 20 TABLET ORAL at 21:53

## 2019-01-06 RX ADMIN — ASPIRIN 81 MG: 81 TABLET, COATED ORAL at 09:24

## 2019-01-06 RX ADMIN — GABAPENTIN 300 MG: 300 CAPSULE ORAL at 21:54

## 2019-01-06 RX ADMIN — AMLODIPINE BESYLATE 10 MG: 5 TABLET ORAL at 21:54

## 2019-01-06 RX ADMIN — STANDARDIZED SENNA CONCENTRATE AND DOCUSATE SODIUM 1 TABLET: 8.6; 5 TABLET, FILM COATED ORAL at 09:24

## 2019-01-06 RX ADMIN — SODIUM BICARBONATE 650 MG TABLET 650 MG: at 09:24

## 2019-01-06 RX ADMIN — CLOPIDOGREL BISULFATE 75 MG: 75 TABLET ORAL at 09:24

## 2019-01-06 RX ADMIN — Medication 10 ML: at 21:54

## 2019-01-06 RX ADMIN — SODIUM BICARBONATE 650 MG TABLET 650 MG: at 21:53

## 2019-01-06 RX ADMIN — Medication 10 MG: at 21:53

## 2019-01-06 RX ADMIN — CYCLOBENZAPRINE HYDROCHLORIDE 10 MG: 10 TABLET, FILM COATED ORAL at 21:53

## 2019-01-06 RX ADMIN — FAMOTIDINE 20 MG: 20 TABLET ORAL at 09:24

## 2019-01-06 RX ADMIN — FUROSEMIDE 40 MG: 40 TABLET ORAL at 09:24

## 2019-01-06 ASSESSMENT — PAIN SCALES - GENERAL: PAINLEVEL_OUTOF10: 0

## 2019-01-07 LAB
A/G RATIO: 0.7 (ref 1.1–2.2)
ALBUMIN SERPL-MCNC: 2.3 G/DL (ref 3.4–5)
ALP BLD-CCNC: 167 U/L (ref 40–129)
ALT SERPL-CCNC: 64 U/L (ref 10–40)
ANION GAP SERPL CALCULATED.3IONS-SCNC: 11 MMOL/L (ref 3–16)
ANION GAP SERPL CALCULATED.3IONS-SCNC: 12 MMOL/L (ref 3–16)
ANISOCYTOSIS: ABNORMAL
AST SERPL-CCNC: 42 U/L (ref 15–37)
BANDED NEUTROPHILS RELATIVE PERCENT: 10 % (ref 0–7)
BASOPHILS ABSOLUTE: 0.1 K/UL (ref 0–0.2)
BASOPHILS RELATIVE PERCENT: 2 %
BILIRUB SERPL-MCNC: 0.4 MG/DL (ref 0–1)
BUN BLDV-MCNC: 28 MG/DL (ref 7–20)
BUN BLDV-MCNC: 29 MG/DL (ref 7–20)
C-REACTIVE PROTEIN: 84.4 MG/L (ref 0–5.1)
CALCIUM SERPL-MCNC: 8.6 MG/DL (ref 8.3–10.6)
CALCIUM SERPL-MCNC: 8.6 MG/DL (ref 8.3–10.6)
CHLORIDE BLD-SCNC: 103 MMOL/L (ref 99–110)
CHLORIDE BLD-SCNC: 105 MMOL/L (ref 99–110)
CO2: 26 MMOL/L (ref 21–32)
CO2: 28 MMOL/L (ref 21–32)
CREAT SERPL-MCNC: 1.5 MG/DL (ref 0.9–1.3)
CREAT SERPL-MCNC: 1.6 MG/DL (ref 0.9–1.3)
EOSINOPHILS ABSOLUTE: 0.5 K/UL (ref 0–0.6)
EOSINOPHILS RELATIVE PERCENT: 10 %
GFR AFRICAN AMERICAN: 54
GFR AFRICAN AMERICAN: 58
GFR NON-AFRICAN AMERICAN: 44
GFR NON-AFRICAN AMERICAN: 48
GLOBULIN: 3.5 G/DL
GLUCOSE BLD-MCNC: 118 MG/DL (ref 70–99)
GLUCOSE BLD-MCNC: 136 MG/DL (ref 70–99)
GLUCOSE BLD-MCNC: 137 MG/DL (ref 70–99)
GLUCOSE BLD-MCNC: 143 MG/DL (ref 70–99)
GLUCOSE BLD-MCNC: 95 MG/DL (ref 70–99)
GLUCOSE BLD-MCNC: 97 MG/DL (ref 70–99)
HCT VFR BLD CALC: 27.8 % (ref 40.5–52.5)
HEMOGLOBIN: 8.8 G/DL (ref 13.5–17.5)
INR BLD: 2.32 (ref 0.86–1.14)
LYMPHOCYTES ABSOLUTE: 1 K/UL (ref 1–5.1)
LYMPHOCYTES RELATIVE PERCENT: 20 %
MAGNESIUM: 1.8 MG/DL (ref 1.8–2.4)
MCH RBC QN AUTO: 27.9 PG (ref 26–34)
MCHC RBC AUTO-ENTMCNC: 31.7 G/DL (ref 31–36)
MCV RBC AUTO: 88.1 FL (ref 80–100)
METAMYELOCYTES RELATIVE PERCENT: 3 %
MONOCYTES ABSOLUTE: 0.5 K/UL (ref 0–1.3)
MONOCYTES RELATIVE PERCENT: 11 %
NEUTROPHILS ABSOLUTE: 2.8 K/UL (ref 1.7–7.7)
NEUTROPHILS RELATIVE PERCENT: 44 %
PDW BLD-RTO: 17.2 % (ref 12.4–15.4)
PERFORMED ON: ABNORMAL
PERFORMED ON: NORMAL
PLATELET # BLD: 291 K/UL (ref 135–450)
PMV BLD AUTO: 7.3 FL (ref 5–10.5)
POTASSIUM REFLEX MAGNESIUM: 3.7 MMOL/L (ref 3.5–5.1)
POTASSIUM REFLEX MAGNESIUM: 3.8 MMOL/L (ref 3.5–5.1)
PROTHROMBIN TIME: 26.5 SEC (ref 9.8–13)
RBC # BLD: 3.15 M/UL (ref 4.2–5.9)
SEDIMENTATION RATE, ERYTHROCYTE: 120 MM/HR (ref 0–20)
SODIUM BLD-SCNC: 142 MMOL/L (ref 136–145)
SODIUM BLD-SCNC: 143 MMOL/L (ref 136–145)
TOTAL PROTEIN: 5.8 G/DL (ref 6.4–8.2)
WBC # BLD: 4.9 K/UL (ref 4–11)

## 2019-01-07 PROCEDURE — 85652 RBC SED RATE AUTOMATED: CPT

## 2019-01-07 PROCEDURE — 97110 THERAPEUTIC EXERCISES: CPT

## 2019-01-07 PROCEDURE — 6360000002 HC RX W HCPCS: Performed by: PHYSICAL MEDICINE & REHABILITATION

## 2019-01-07 PROCEDURE — 97530 THERAPEUTIC ACTIVITIES: CPT

## 2019-01-07 PROCEDURE — 2580000003 HC RX 258: Performed by: PHYSICAL MEDICINE & REHABILITATION

## 2019-01-07 PROCEDURE — 6370000000 HC RX 637 (ALT 250 FOR IP): Performed by: PHYSICAL MEDICINE & REHABILITATION

## 2019-01-07 PROCEDURE — 97112 NEUROMUSCULAR REEDUCATION: CPT

## 2019-01-07 PROCEDURE — 51702 INSERT TEMP BLADDER CATH: CPT

## 2019-01-07 PROCEDURE — 80053 COMPREHEN METABOLIC PANEL: CPT

## 2019-01-07 PROCEDURE — 36591 DRAW BLOOD OFF VENOUS DEVICE: CPT

## 2019-01-07 PROCEDURE — 1280000000 HC REHAB R&B

## 2019-01-07 PROCEDURE — 97605 NEG PRS WND THER DME<=50SQCM: CPT

## 2019-01-07 PROCEDURE — 85025 COMPLETE CBC W/AUTO DIFF WBC: CPT

## 2019-01-07 PROCEDURE — 36592 COLLECT BLOOD FROM PICC: CPT

## 2019-01-07 PROCEDURE — 83735 ASSAY OF MAGNESIUM: CPT

## 2019-01-07 PROCEDURE — 86140 C-REACTIVE PROTEIN: CPT

## 2019-01-07 PROCEDURE — 85610 PROTHROMBIN TIME: CPT

## 2019-01-07 PROCEDURE — 97127 HC SP THER IVNTJ W/FOCUS COG FUNCJ: CPT

## 2019-01-07 RX ORDER — WARFARIN SODIUM 5 MG/1
5 TABLET ORAL
Status: COMPLETED | OUTPATIENT
Start: 2019-01-07 | End: 2019-01-07

## 2019-01-07 RX ADMIN — CYCLOBENZAPRINE HYDROCHLORIDE 10 MG: 10 TABLET, FILM COATED ORAL at 12:11

## 2019-01-07 RX ADMIN — GABAPENTIN 300 MG: 300 CAPSULE ORAL at 22:11

## 2019-01-07 RX ADMIN — FUROSEMIDE 40 MG: 40 TABLET ORAL at 08:13

## 2019-01-07 RX ADMIN — OXYCODONE AND ACETAMINOPHEN 2 TABLET: 5; 325 TABLET ORAL at 12:11

## 2019-01-07 RX ADMIN — CASTOR OIL AND BALSAM, PERU: 788; 87 OINTMENT TOPICAL at 08:12

## 2019-01-07 RX ADMIN — ALLOPURINOL 300 MG: 300 TABLET ORAL at 08:13

## 2019-01-07 RX ADMIN — ATORVASTATIN CALCIUM 80 MG: 80 TABLET, FILM COATED ORAL at 22:11

## 2019-01-07 RX ADMIN — MEROPENEM 1 G: 1 INJECTION, POWDER, FOR SOLUTION INTRAVENOUS at 03:20

## 2019-01-07 RX ADMIN — SODIUM BICARBONATE 650 MG TABLET 650 MG: at 08:13

## 2019-01-07 RX ADMIN — AMLODIPINE BESYLATE 10 MG: 5 TABLET ORAL at 22:11

## 2019-01-07 RX ADMIN — CASTOR OIL AND BALSAM, PERU: 788; 87 OINTMENT TOPICAL at 22:18

## 2019-01-07 RX ADMIN — FAMOTIDINE 20 MG: 20 TABLET ORAL at 08:13

## 2019-01-07 RX ADMIN — FAMOTIDINE 20 MG: 20 TABLET ORAL at 22:11

## 2019-01-07 RX ADMIN — CLOPIDOGREL BISULFATE 75 MG: 75 TABLET ORAL at 08:13

## 2019-01-07 RX ADMIN — SODIUM BICARBONATE 650 MG TABLET 650 MG: at 22:11

## 2019-01-07 RX ADMIN — WARFARIN SODIUM 5 MG: 5 TABLET ORAL at 18:01

## 2019-01-07 RX ADMIN — ASPIRIN 81 MG: 81 TABLET, COATED ORAL at 08:13

## 2019-01-07 RX ADMIN — MEROPENEM 1 G: 1 INJECTION, POWDER, FOR SOLUTION INTRAVENOUS at 18:01

## 2019-01-07 RX ADMIN — MEROPENEM 1 G: 1 INJECTION, POWDER, FOR SOLUTION INTRAVENOUS at 12:11

## 2019-01-07 RX ADMIN — Medication 400 MG: at 08:13

## 2019-01-07 RX ADMIN — Medication 10 ML: at 08:12

## 2019-01-07 RX ADMIN — Medication 10 ML: at 22:12

## 2019-01-07 RX ADMIN — MICONAZOLE NITRATE: 2 POWDER TOPICAL at 08:13

## 2019-01-07 RX ADMIN — MICONAZOLE NITRATE: 2 POWDER TOPICAL at 22:18

## 2019-01-07 RX ADMIN — TAMSULOSIN HYDROCHLORIDE 0.4 MG: 0.4 CAPSULE ORAL at 08:13

## 2019-01-07 RX ADMIN — METOPROLOL SUCCINATE 50 MG: 50 TABLET, EXTENDED RELEASE ORAL at 08:13

## 2019-01-07 ASSESSMENT — PAIN SCALES - GENERAL
PAINLEVEL_OUTOF10: 0
PAINLEVEL_OUTOF10: 1
PAINLEVEL_OUTOF10: 0
PAINLEVEL_OUTOF10: 0
PAINLEVEL_OUTOF10: 7
PAINLEVEL_OUTOF10: 1

## 2019-01-08 LAB
ANION GAP SERPL CALCULATED.3IONS-SCNC: 10 MMOL/L (ref 3–16)
BANDED NEUTROPHILS RELATIVE PERCENT: 1 % (ref 0–7)
BASOPHILS ABSOLUTE: 0.1 K/UL (ref 0–0.2)
BASOPHILS RELATIVE PERCENT: 2 %
BUN BLDV-MCNC: 24 MG/DL (ref 7–20)
CALCIUM SERPL-MCNC: 8.6 MG/DL (ref 8.3–10.6)
CHLORIDE BLD-SCNC: 104 MMOL/L (ref 99–110)
CO2: 28 MMOL/L (ref 21–32)
CREAT SERPL-MCNC: 1.4 MG/DL (ref 0.9–1.3)
EOSINOPHILS ABSOLUTE: 0.2 K/UL (ref 0–0.6)
EOSINOPHILS RELATIVE PERCENT: 5 %
GFR AFRICAN AMERICAN: >60
GFR NON-AFRICAN AMERICAN: 52
GLUCOSE BLD-MCNC: 103 MG/DL (ref 70–99)
GLUCOSE BLD-MCNC: 108 MG/DL (ref 70–99)
GLUCOSE BLD-MCNC: 137 MG/DL (ref 70–99)
GLUCOSE BLD-MCNC: 148 MG/DL (ref 70–99)
GLUCOSE BLD-MCNC: 90 MG/DL (ref 70–99)
HCT VFR BLD CALC: 27.3 % (ref 40.5–52.5)
HEMOGLOBIN: 8.7 G/DL (ref 13.5–17.5)
INR BLD: 3.59 (ref 0.86–1.14)
LYMPHOCYTES ABSOLUTE: 0.7 K/UL (ref 1–5.1)
LYMPHOCYTES RELATIVE PERCENT: 15 %
MAGNESIUM: 1.9 MG/DL (ref 1.8–2.4)
MCH RBC QN AUTO: 28.2 PG (ref 26–34)
MCHC RBC AUTO-ENTMCNC: 31.9 G/DL (ref 31–36)
MCV RBC AUTO: 88.2 FL (ref 80–100)
MONOCYTES ABSOLUTE: 0.3 K/UL (ref 0–1.3)
MONOCYTES RELATIVE PERCENT: 7 %
NEUTROPHILS ABSOLUTE: 3.3 K/UL (ref 1.7–7.7)
NEUTROPHILS RELATIVE PERCENT: 70 %
NUCLEATED RED BLOOD CELLS: 2 /100 WBC
PDW BLD-RTO: 17.8 % (ref 12.4–15.4)
PERFORMED ON: ABNORMAL
PERFORMED ON: NORMAL
PLATELET # BLD: 292 K/UL (ref 135–450)
PLATELET SLIDE REVIEW: ADEQUATE
PMV BLD AUTO: 7.1 FL (ref 5–10.5)
POTASSIUM REFLEX MAGNESIUM: 3.8 MMOL/L (ref 3.5–5.1)
PROTHROMBIN TIME: 40.9 SEC (ref 9.8–13)
RBC # BLD: 3.1 M/UL (ref 4.2–5.9)
SLIDE REVIEW: ABNORMAL
SODIUM BLD-SCNC: 142 MMOL/L (ref 136–145)
WBC # BLD: 4.7 K/UL (ref 4–11)

## 2019-01-08 PROCEDURE — 85025 COMPLETE CBC W/AUTO DIFF WBC: CPT

## 2019-01-08 PROCEDURE — 97530 THERAPEUTIC ACTIVITIES: CPT

## 2019-01-08 PROCEDURE — 6370000000 HC RX 637 (ALT 250 FOR IP): Performed by: PHYSICAL MEDICINE & REHABILITATION

## 2019-01-08 PROCEDURE — 36592 COLLECT BLOOD FROM PICC: CPT

## 2019-01-08 PROCEDURE — 97127 HC SP THER IVNTJ W/FOCUS COG FUNCJ: CPT

## 2019-01-08 PROCEDURE — 2580000003 HC RX 258: Performed by: PHYSICAL MEDICINE & REHABILITATION

## 2019-01-08 PROCEDURE — 6360000002 HC RX W HCPCS: Performed by: PHYSICAL MEDICINE & REHABILITATION

## 2019-01-08 PROCEDURE — 85610 PROTHROMBIN TIME: CPT

## 2019-01-08 PROCEDURE — 97112 NEUROMUSCULAR REEDUCATION: CPT

## 2019-01-08 PROCEDURE — 97110 THERAPEUTIC EXERCISES: CPT

## 2019-01-08 PROCEDURE — 80048 BASIC METABOLIC PNL TOTAL CA: CPT

## 2019-01-08 PROCEDURE — 83735 ASSAY OF MAGNESIUM: CPT

## 2019-01-08 PROCEDURE — 1280000000 HC REHAB R&B

## 2019-01-08 RX ADMIN — MEROPENEM 1 G: 1 INJECTION, POWDER, FOR SOLUTION INTRAVENOUS at 18:04

## 2019-01-08 RX ADMIN — MAGNESIUM HYDROXIDE 30 ML: 400 SUSPENSION ORAL at 18:03

## 2019-01-08 RX ADMIN — ALLOPURINOL 300 MG: 300 TABLET ORAL at 10:08

## 2019-01-08 RX ADMIN — FAMOTIDINE 20 MG: 20 TABLET ORAL at 10:07

## 2019-01-08 RX ADMIN — CASTOR OIL AND BALSAM, PERU: 788; 87 OINTMENT TOPICAL at 20:30

## 2019-01-08 RX ADMIN — ATORVASTATIN CALCIUM 80 MG: 80 TABLET, FILM COATED ORAL at 20:29

## 2019-01-08 RX ADMIN — FAMOTIDINE 20 MG: 20 TABLET ORAL at 20:29

## 2019-01-08 RX ADMIN — SODIUM BICARBONATE 650 MG TABLET 650 MG: at 20:29

## 2019-01-08 RX ADMIN — AMLODIPINE BESYLATE 10 MG: 5 TABLET ORAL at 20:29

## 2019-01-08 RX ADMIN — SODIUM BICARBONATE 650 MG TABLET 650 MG: at 10:08

## 2019-01-08 RX ADMIN — Medication 10 ML: at 20:34

## 2019-01-08 RX ADMIN — TAMSULOSIN HYDROCHLORIDE 0.4 MG: 0.4 CAPSULE ORAL at 10:08

## 2019-01-08 RX ADMIN — MICONAZOLE NITRATE: 2 POWDER TOPICAL at 20:30

## 2019-01-08 RX ADMIN — MICONAZOLE NITRATE: 2 POWDER TOPICAL at 10:10

## 2019-01-08 RX ADMIN — ASPIRIN 81 MG: 81 TABLET, COATED ORAL at 10:08

## 2019-01-08 RX ADMIN — FUROSEMIDE 40 MG: 40 TABLET ORAL at 10:08

## 2019-01-08 RX ADMIN — Medication 10 ML: at 10:10

## 2019-01-08 RX ADMIN — STANDARDIZED SENNA CONCENTRATE AND DOCUSATE SODIUM 1 TABLET: 8.6; 5 TABLET, FILM COATED ORAL at 10:08

## 2019-01-08 RX ADMIN — GABAPENTIN 300 MG: 300 CAPSULE ORAL at 20:29

## 2019-01-08 RX ADMIN — INSULIN LISPRO 1 UNITS: 100 INJECTION, SOLUTION INTRAVENOUS; SUBCUTANEOUS at 12:33

## 2019-01-08 RX ADMIN — CLOPIDOGREL BISULFATE 75 MG: 75 TABLET ORAL at 10:08

## 2019-01-08 RX ADMIN — MEROPENEM 1 G: 1 INJECTION, POWDER, FOR SOLUTION INTRAVENOUS at 03:30

## 2019-01-08 RX ADMIN — METOPROLOL SUCCINATE 50 MG: 50 TABLET, EXTENDED RELEASE ORAL at 10:08

## 2019-01-08 RX ADMIN — MEROPENEM 1 G: 1 INJECTION, POWDER, FOR SOLUTION INTRAVENOUS at 12:37

## 2019-01-08 RX ADMIN — CASTOR OIL AND BALSAM, PERU: 788; 87 OINTMENT TOPICAL at 10:11

## 2019-01-08 RX ADMIN — Medication 400 MG: at 10:08

## 2019-01-08 RX ADMIN — Medication 10 MG: at 20:29

## 2019-01-08 ASSESSMENT — PAIN SCALES - GENERAL
PAINLEVEL_OUTOF10: 0

## 2019-01-09 LAB
ANION GAP SERPL CALCULATED.3IONS-SCNC: 8 MMOL/L (ref 3–16)
ANISOCYTOSIS: ABNORMAL
ATYPICAL LYMPHOCYTE RELATIVE PERCENT: 3 % (ref 0–6)
BANDED NEUTROPHILS RELATIVE PERCENT: 23 % (ref 0–7)
BASOPHILS ABSOLUTE: 0.1 K/UL (ref 0–0.2)
BASOPHILS RELATIVE PERCENT: 1 %
BUN BLDV-MCNC: 25 MG/DL (ref 7–20)
CALCIUM SERPL-MCNC: 8.6 MG/DL (ref 8.3–10.6)
CHLORIDE BLD-SCNC: 107 MMOL/L (ref 99–110)
CO2: 28 MMOL/L (ref 21–32)
CREAT SERPL-MCNC: 1.4 MG/DL (ref 0.9–1.3)
EOSINOPHILS ABSOLUTE: 0.3 K/UL (ref 0–0.6)
EOSINOPHILS RELATIVE PERCENT: 5 %
GFR AFRICAN AMERICAN: >60
GFR NON-AFRICAN AMERICAN: 52
GLUCOSE BLD-MCNC: 103 MG/DL (ref 70–99)
GLUCOSE BLD-MCNC: 105 MG/DL (ref 70–99)
GLUCOSE BLD-MCNC: 106 MG/DL (ref 70–99)
GLUCOSE BLD-MCNC: 126 MG/DL (ref 70–99)
GLUCOSE BLD-MCNC: 144 MG/DL (ref 70–99)
HCT VFR BLD CALC: 26 % (ref 40.5–52.5)
HEMOGLOBIN: 8.4 G/DL (ref 13.5–17.5)
INR BLD: 3.57 (ref 0.86–1.14)
LYMPHOCYTES ABSOLUTE: 1.4 K/UL (ref 1–5.1)
LYMPHOCYTES RELATIVE PERCENT: 21 %
MACROCYTES: ABNORMAL
MAGNESIUM: 1.8 MG/DL (ref 1.8–2.4)
MCH RBC QN AUTO: 28.3 PG (ref 26–34)
MCHC RBC AUTO-ENTMCNC: 32.5 G/DL (ref 31–36)
MCV RBC AUTO: 87 FL (ref 80–100)
MICROCYTES: ABNORMAL
MONOCYTES ABSOLUTE: 0.2 K/UL (ref 0–1.3)
MONOCYTES RELATIVE PERCENT: 4 %
NEUTROPHILS ABSOLUTE: 3.9 K/UL (ref 1.7–7.7)
NEUTROPHILS RELATIVE PERCENT: 43 %
OVALOCYTES: ABNORMAL
PDW BLD-RTO: 18 % (ref 12.4–15.4)
PERFORMED ON: ABNORMAL
PLATELET # BLD: 265 K/UL (ref 135–450)
PLATELET SLIDE REVIEW: ADEQUATE
PMV BLD AUTO: 7.2 FL (ref 5–10.5)
POLYCHROMASIA: ABNORMAL
POTASSIUM REFLEX MAGNESIUM: 3.5 MMOL/L (ref 3.5–5.1)
PROTHROMBIN TIME: 40.7 SEC (ref 9.8–13)
RBC # BLD: 2.98 M/UL (ref 4.2–5.9)
SLIDE REVIEW: ABNORMAL
SODIUM BLD-SCNC: 143 MMOL/L (ref 136–145)
WBC # BLD: 5.9 K/UL (ref 4–11)

## 2019-01-09 PROCEDURE — 85610 PROTHROMBIN TIME: CPT

## 2019-01-09 PROCEDURE — 97127 HC SP THER IVNTJ W/FOCUS COG FUNCJ: CPT

## 2019-01-09 PROCEDURE — 2580000003 HC RX 258

## 2019-01-09 PROCEDURE — 2580000003 HC RX 258: Performed by: PHYSICAL MEDICINE & REHABILITATION

## 2019-01-09 PROCEDURE — 83735 ASSAY OF MAGNESIUM: CPT

## 2019-01-09 PROCEDURE — 6360000002 HC RX W HCPCS: Performed by: PHYSICAL MEDICINE & REHABILITATION

## 2019-01-09 PROCEDURE — 97535 SELF CARE MNGMENT TRAINING: CPT

## 2019-01-09 PROCEDURE — 80048 BASIC METABOLIC PNL TOTAL CA: CPT

## 2019-01-09 PROCEDURE — 97530 THERAPEUTIC ACTIVITIES: CPT

## 2019-01-09 PROCEDURE — 6370000000 HC RX 637 (ALT 250 FOR IP): Performed by: PHYSICAL MEDICINE & REHABILITATION

## 2019-01-09 PROCEDURE — 97605 NEG PRS WND THER DME<=50SQCM: CPT

## 2019-01-09 PROCEDURE — 1280000000 HC REHAB R&B

## 2019-01-09 PROCEDURE — 85025 COMPLETE CBC W/AUTO DIFF WBC: CPT

## 2019-01-09 PROCEDURE — 97110 THERAPEUTIC EXERCISES: CPT

## 2019-01-09 RX ORDER — SODIUM CHLORIDE 9 MG/ML
INJECTION, SOLUTION INTRAVENOUS
Status: COMPLETED
Start: 2019-01-09 | End: 2019-01-09

## 2019-01-09 RX ADMIN — FAMOTIDINE 20 MG: 20 TABLET ORAL at 20:34

## 2019-01-09 RX ADMIN — CLOPIDOGREL BISULFATE 75 MG: 75 TABLET ORAL at 07:41

## 2019-01-09 RX ADMIN — MEROPENEM 1 G: 1 INJECTION, POWDER, FOR SOLUTION INTRAVENOUS at 11:39

## 2019-01-09 RX ADMIN — METOPROLOL SUCCINATE 50 MG: 50 TABLET, EXTENDED RELEASE ORAL at 07:41

## 2019-01-09 RX ADMIN — Medication 10 MG: at 20:34

## 2019-01-09 RX ADMIN — STANDARDIZED SENNA CONCENTRATE AND DOCUSATE SODIUM 1 TABLET: 8.6; 5 TABLET, FILM COATED ORAL at 07:40

## 2019-01-09 RX ADMIN — Medication 10 ML: at 20:34

## 2019-01-09 RX ADMIN — Medication 400 MG: at 07:40

## 2019-01-09 RX ADMIN — LACTULOSE 20 G: 20 SOLUTION ORAL at 07:40

## 2019-01-09 RX ADMIN — Medication 10 ML: at 11:40

## 2019-01-09 RX ADMIN — TAMSULOSIN HYDROCHLORIDE 0.4 MG: 0.4 CAPSULE ORAL at 07:41

## 2019-01-09 RX ADMIN — SODIUM CHLORIDE 100 ML: 900 INJECTION INTRAVENOUS at 20:50

## 2019-01-09 RX ADMIN — MEROPENEM 1 G: 1 INJECTION, POWDER, FOR SOLUTION INTRAVENOUS at 03:00

## 2019-01-09 RX ADMIN — CASTOR OIL AND BALSAM, PERU: 788; 87 OINTMENT TOPICAL at 07:41

## 2019-01-09 RX ADMIN — MICONAZOLE NITRATE: 2 POWDER TOPICAL at 20:35

## 2019-01-09 RX ADMIN — FUROSEMIDE 40 MG: 40 TABLET ORAL at 07:40

## 2019-01-09 RX ADMIN — GABAPENTIN 300 MG: 300 CAPSULE ORAL at 20:35

## 2019-01-09 RX ADMIN — MICONAZOLE NITRATE: 2 POWDER TOPICAL at 07:41

## 2019-01-09 RX ADMIN — FAMOTIDINE 20 MG: 20 TABLET ORAL at 07:40

## 2019-01-09 RX ADMIN — SODIUM BICARBONATE 650 MG TABLET 650 MG: at 07:40

## 2019-01-09 RX ADMIN — ASPIRIN 81 MG: 81 TABLET, COATED ORAL at 07:41

## 2019-01-09 RX ADMIN — SODIUM BICARBONATE 650 MG TABLET 650 MG: at 20:35

## 2019-01-09 RX ADMIN — MEROPENEM 1 G: 1 INJECTION, POWDER, FOR SOLUTION INTRAVENOUS at 18:40

## 2019-01-09 RX ADMIN — INSULIN LISPRO 1 UNITS: 100 INJECTION, SOLUTION INTRAVENOUS; SUBCUTANEOUS at 20:40

## 2019-01-09 RX ADMIN — CASTOR OIL AND BALSAM, PERU: 788; 87 OINTMENT TOPICAL at 20:36

## 2019-01-09 RX ADMIN — ALLOPURINOL 300 MG: 300 TABLET ORAL at 07:40

## 2019-01-09 RX ADMIN — AMLODIPINE BESYLATE 10 MG: 5 TABLET ORAL at 20:34

## 2019-01-09 RX ADMIN — ATORVASTATIN CALCIUM 80 MG: 80 TABLET, FILM COATED ORAL at 20:35

## 2019-01-09 ASSESSMENT — PAIN DESCRIPTION - LOCATION: LOCATION: BUTTOCKS

## 2019-01-09 ASSESSMENT — PAIN SCALES - GENERAL
PAINLEVEL_OUTOF10: 0
PAINLEVEL_OUTOF10: 6

## 2019-01-09 ASSESSMENT — PAIN DESCRIPTION - PAIN TYPE: TYPE: ACUTE PAIN

## 2019-01-10 ENCOUNTER — TELEPHONE (OUTPATIENT)
Dept: CARDIOTHORACIC SURGERY | Age: 59
End: 2019-01-10

## 2019-01-10 VITALS
RESPIRATION RATE: 18 BRPM | TEMPERATURE: 98.1 F | SYSTOLIC BLOOD PRESSURE: 123 MMHG | DIASTOLIC BLOOD PRESSURE: 86 MMHG | WEIGHT: 235.23 LBS | OXYGEN SATURATION: 95 % | BODY MASS INDEX: 33.68 KG/M2 | HEART RATE: 103 BPM | HEIGHT: 70 IN

## 2019-01-10 LAB
ANION GAP SERPL CALCULATED.3IONS-SCNC: 10 MMOL/L (ref 3–16)
BANDED NEUTROPHILS RELATIVE PERCENT: 5 % (ref 0–7)
BASOPHILS ABSOLUTE: 0 K/UL (ref 0–0.2)
BASOPHILS RELATIVE PERCENT: 0 %
BUN BLDV-MCNC: 21 MG/DL (ref 7–20)
CALCIUM SERPL-MCNC: 8.7 MG/DL (ref 8.3–10.6)
CHLORIDE BLD-SCNC: 104 MMOL/L (ref 99–110)
CO2: 29 MMOL/L (ref 21–32)
CREAT SERPL-MCNC: 1.4 MG/DL (ref 0.9–1.3)
EOSINOPHILS ABSOLUTE: 0.3 K/UL (ref 0–0.6)
EOSINOPHILS RELATIVE PERCENT: 5 %
GFR AFRICAN AMERICAN: >60
GFR NON-AFRICAN AMERICAN: 52
GLUCOSE BLD-MCNC: 103 MG/DL (ref 70–99)
GLUCOSE BLD-MCNC: 106 MG/DL (ref 70–99)
GLUCOSE BLD-MCNC: 128 MG/DL (ref 70–99)
HCT VFR BLD CALC: 27.4 % (ref 40.5–52.5)
HEMOGLOBIN: 8.7 G/DL (ref 13.5–17.5)
INR BLD: 2.2 (ref 0.86–1.14)
LYMPHOCYTES ABSOLUTE: 0.6 K/UL (ref 1–5.1)
LYMPHOCYTES RELATIVE PERCENT: 10 %
MAGNESIUM: 1.9 MG/DL (ref 1.8–2.4)
MCH RBC QN AUTO: 27.7 PG (ref 26–34)
MCHC RBC AUTO-ENTMCNC: 31.7 G/DL (ref 31–36)
MCV RBC AUTO: 87.3 FL (ref 80–100)
MONOCYTES ABSOLUTE: 0.5 K/UL (ref 0–1.3)
MONOCYTES RELATIVE PERCENT: 9 %
NEUTROPHILS ABSOLUTE: 4.3 K/UL (ref 1.7–7.7)
NEUTROPHILS RELATIVE PERCENT: 71 %
PDW BLD-RTO: 17.9 % (ref 12.4–15.4)
PERFORMED ON: ABNORMAL
PERFORMED ON: ABNORMAL
PLATELET # BLD: 262 K/UL (ref 135–450)
PLATELET SLIDE REVIEW: ADEQUATE
PMV BLD AUTO: 7.3 FL (ref 5–10.5)
POTASSIUM REFLEX MAGNESIUM: 3.6 MMOL/L (ref 3.5–5.1)
PROTHROMBIN TIME: 25.1 SEC (ref 9.8–13)
RBC # BLD: 3.14 M/UL (ref 4.2–5.9)
SLIDE REVIEW: ABNORMAL
SODIUM BLD-SCNC: 143 MMOL/L (ref 136–145)
WBC # BLD: 5.6 K/UL (ref 4–11)

## 2019-01-10 PROCEDURE — 6360000002 HC RX W HCPCS: Performed by: PHYSICAL MEDICINE & REHABILITATION

## 2019-01-10 PROCEDURE — 80048 BASIC METABOLIC PNL TOTAL CA: CPT

## 2019-01-10 PROCEDURE — 85025 COMPLETE CBC W/AUTO DIFF WBC: CPT

## 2019-01-10 PROCEDURE — 2580000003 HC RX 258: Performed by: PHYSICAL MEDICINE & REHABILITATION

## 2019-01-10 PROCEDURE — 83735 ASSAY OF MAGNESIUM: CPT

## 2019-01-10 PROCEDURE — 97535 SELF CARE MNGMENT TRAINING: CPT

## 2019-01-10 PROCEDURE — 97530 THERAPEUTIC ACTIVITIES: CPT

## 2019-01-10 PROCEDURE — 97112 NEUROMUSCULAR REEDUCATION: CPT

## 2019-01-10 PROCEDURE — 6370000000 HC RX 637 (ALT 250 FOR IP): Performed by: PHYSICAL MEDICINE & REHABILITATION

## 2019-01-10 PROCEDURE — 97110 THERAPEUTIC EXERCISES: CPT

## 2019-01-10 PROCEDURE — 85610 PROTHROMBIN TIME: CPT

## 2019-01-10 RX ORDER — OXYCODONE HYDROCHLORIDE AND ACETAMINOPHEN 5; 325 MG/1; MG/1
1 TABLET ORAL EVERY 8 HOURS PRN
Qty: 9 TABLET | Refills: 0 | Status: SHIPPED | OUTPATIENT
Start: 2019-01-10 | End: 2019-01-13

## 2019-01-10 RX ORDER — CASTOR OIL AND BALSAM, PERU 788; 87 MG/G; MG/G
OINTMENT TOPICAL 2 TIMES DAILY
Refills: 0 | DISCHARGE
Start: 2019-01-10 | End: 2019-02-27 | Stop reason: ALTCHOICE

## 2019-01-10 RX ORDER — FAMOTIDINE 20 MG/1
20 TABLET, FILM COATED ORAL 2 TIMES DAILY
DISCHARGE
Start: 2019-01-10 | End: 2019-06-24 | Stop reason: SDUPTHER

## 2019-01-10 RX ORDER — FUROSEMIDE 40 MG/1
40 TABLET ORAL DAILY
Qty: 60 TABLET | Refills: 0 | DISCHARGE
Start: 2019-01-10 | End: 2019-02-27 | Stop reason: ALTCHOICE

## 2019-01-10 RX ORDER — CYCLOBENZAPRINE HCL 10 MG
10 TABLET ORAL 3 TIMES DAILY PRN
Qty: 30 TABLET | Refills: 0 | DISCHARGE
Start: 2019-01-10 | End: 2019-01-20

## 2019-01-10 RX ORDER — LACTULOSE 10 G/15ML
20 SOLUTION ORAL 2 TIMES DAILY PRN
Refills: 0 | DISCHARGE
Start: 2019-01-10 | End: 2019-06-24 | Stop reason: ALTCHOICE

## 2019-01-10 RX ORDER — WARFARIN SODIUM 2 MG/1
4 TABLET ORAL
Status: DISCONTINUED | OUTPATIENT
Start: 2019-01-10 | End: 2019-01-10 | Stop reason: HOSPADM

## 2019-01-10 RX ADMIN — FUROSEMIDE 40 MG: 40 TABLET ORAL at 09:56

## 2019-01-10 RX ADMIN — Medication 400 MG: at 09:56

## 2019-01-10 RX ADMIN — SODIUM BICARBONATE 650 MG TABLET 650 MG: at 09:56

## 2019-01-10 RX ADMIN — MEROPENEM 1 G: 1 INJECTION, POWDER, FOR SOLUTION INTRAVENOUS at 10:53

## 2019-01-10 RX ADMIN — STANDARDIZED SENNA CONCENTRATE AND DOCUSATE SODIUM 1 TABLET: 8.6; 5 TABLET, FILM COATED ORAL at 09:56

## 2019-01-10 RX ADMIN — ALLOPURINOL 300 MG: 300 TABLET ORAL at 09:56

## 2019-01-10 RX ADMIN — OXYCODONE AND ACETAMINOPHEN 2 TABLET: 5; 325 TABLET ORAL at 11:28

## 2019-01-10 RX ADMIN — TAMSULOSIN HYDROCHLORIDE 0.4 MG: 0.4 CAPSULE ORAL at 09:56

## 2019-01-10 RX ADMIN — CYCLOBENZAPRINE HYDROCHLORIDE 10 MG: 10 TABLET, FILM COATED ORAL at 11:29

## 2019-01-10 RX ADMIN — Medication 10 ML: at 09:58

## 2019-01-10 RX ADMIN — MEROPENEM 1 G: 1 INJECTION, POWDER, FOR SOLUTION INTRAVENOUS at 02:36

## 2019-01-10 RX ADMIN — CASTOR OIL AND BALSAM, PERU: 788; 87 OINTMENT TOPICAL at 09:55

## 2019-01-10 RX ADMIN — METOPROLOL SUCCINATE 50 MG: 50 TABLET, EXTENDED RELEASE ORAL at 09:56

## 2019-01-10 RX ADMIN — LACTULOSE 20 G: 20 SOLUTION ORAL at 13:05

## 2019-01-10 RX ADMIN — MICONAZOLE NITRATE: 2 POWDER TOPICAL at 09:55

## 2019-01-10 RX ADMIN — FAMOTIDINE 20 MG: 20 TABLET ORAL at 09:56

## 2019-01-10 RX ADMIN — ASPIRIN 81 MG: 81 TABLET, COATED ORAL at 09:56

## 2019-01-10 RX ADMIN — CLOPIDOGREL BISULFATE 75 MG: 75 TABLET ORAL at 09:56

## 2019-01-10 ASSESSMENT — PAIN SCALES - GENERAL
PAINLEVEL_OUTOF10: 0
PAINLEVEL_OUTOF10: 7
PAINLEVEL_OUTOF10: 0

## 2019-01-11 ENCOUNTER — CARE COORDINATION (OUTPATIENT)
Dept: CARE COORDINATION | Age: 59
End: 2019-01-11

## 2019-01-15 ENCOUNTER — TELEPHONE (OUTPATIENT)
Dept: CARDIOTHORACIC SURGERY | Age: 59
End: 2019-01-15

## 2019-01-30 ENCOUNTER — OFFICE VISIT (OUTPATIENT)
Dept: CARDIOTHORACIC SURGERY | Age: 59
End: 2019-01-30

## 2019-01-30 VITALS
OXYGEN SATURATION: 94 % | DIASTOLIC BLOOD PRESSURE: 74 MMHG | TEMPERATURE: 98 F | RESPIRATION RATE: 22 BRPM | BODY MASS INDEX: 33.86 KG/M2 | HEART RATE: 94 BPM | WEIGHT: 236 LBS | SYSTOLIC BLOOD PRESSURE: 134 MMHG

## 2019-01-30 DIAGNOSIS — I24.9 ACS (ACUTE CORONARY SYNDROME) (HCC): ICD-10-CM

## 2019-01-30 DIAGNOSIS — I25.119 CORONARY ARTERY DISEASE INVOLVING NATIVE CORONARY ARTERY OF NATIVE HEART WITH ANGINA PECTORIS (HCC): ICD-10-CM

## 2019-01-30 DIAGNOSIS — I21.4 NSTEMI (NON-ST ELEVATED MYOCARDIAL INFARCTION) (HCC): Primary | ICD-10-CM

## 2019-01-30 DIAGNOSIS — Z95.1 HISTORY OF CORONARY ARTERY BYPASS GRAFT X 3: ICD-10-CM

## 2019-01-30 PROCEDURE — 99024 POSTOP FOLLOW-UP VISIT: CPT | Performed by: THORACIC SURGERY (CARDIOTHORACIC VASCULAR SURGERY)

## 2019-02-07 ENCOUNTER — CARE COORDINATION (OUTPATIENT)
Dept: CARE COORDINATION | Age: 59
End: 2019-02-07

## 2019-02-27 ENCOUNTER — OFFICE VISIT (OUTPATIENT)
Dept: CARDIOTHORACIC SURGERY | Age: 59
End: 2019-02-27

## 2019-02-27 VITALS
HEART RATE: 98 BPM | SYSTOLIC BLOOD PRESSURE: 120 MMHG | TEMPERATURE: 97.1 F | OXYGEN SATURATION: 99 % | DIASTOLIC BLOOD PRESSURE: 80 MMHG

## 2019-02-27 DIAGNOSIS — I25.10 CAD, MULTIPLE VESSEL: Primary | ICD-10-CM

## 2019-02-27 PROCEDURE — 99024 POSTOP FOLLOW-UP VISIT: CPT | Performed by: THORACIC SURGERY (CARDIOTHORACIC VASCULAR SURGERY)

## 2019-02-27 RX ORDER — POTASSIUM CHLORIDE 20 MEQ/1
20 TABLET, EXTENDED RELEASE ORAL 2 TIMES DAILY
Status: ON HOLD | COMMUNITY
End: 2019-05-16 | Stop reason: HOSPADM

## 2019-02-27 RX ORDER — M-VIT,TX,IRON,MINS/CALC/FOLIC 27MG-0.4MG
1 TABLET ORAL DAILY
COMMUNITY

## 2019-02-27 RX ORDER — ASCORBIC ACID 500 MG
500 TABLET ORAL DAILY
COMMUNITY
End: 2019-06-24 | Stop reason: SDUPTHER

## 2019-02-27 RX ORDER — ADHESIVE TAPE 3"X 2.3 YD
1 TAPE, NON-MEDICATED TOPICAL NIGHTLY
Status: ON HOLD | COMMUNITY
End: 2019-07-11 | Stop reason: HOSPADM

## 2019-02-27 RX ORDER — FERROUS SULFATE 325(65) MG
325 TABLET ORAL
COMMUNITY

## 2019-02-27 RX ORDER — ALLOPURINOL 100 MG/1
100 TABLET ORAL DAILY
COMMUNITY
End: 2019-06-24

## 2019-03-06 ENCOUNTER — OFFICE VISIT (OUTPATIENT)
Dept: CARDIOLOGY CLINIC | Age: 59
End: 2019-03-06
Payer: COMMERCIAL

## 2019-03-06 VITALS
OXYGEN SATURATION: 98 % | DIASTOLIC BLOOD PRESSURE: 88 MMHG | HEIGHT: 70 IN | BODY MASS INDEX: 33.86 KG/M2 | SYSTOLIC BLOOD PRESSURE: 124 MMHG | HEART RATE: 87 BPM

## 2019-03-06 DIAGNOSIS — I48.0 PAF (PAROXYSMAL ATRIAL FIBRILLATION) (HCC): ICD-10-CM

## 2019-03-06 DIAGNOSIS — I25.119 CORONARY ARTERY DISEASE INVOLVING NATIVE CORONARY ARTERY OF NATIVE HEART WITH ANGINA PECTORIS (HCC): Primary | ICD-10-CM

## 2019-03-06 DIAGNOSIS — Z86.74 HISTORY OF CARDIAC ARREST: ICD-10-CM

## 2019-03-06 DIAGNOSIS — R94.39 ABNORMAL STRESS TEST: ICD-10-CM

## 2019-03-06 DIAGNOSIS — I21.4 NSTEMI (NON-ST ELEVATED MYOCARDIAL INFARCTION) (HCC): ICD-10-CM

## 2019-03-06 DIAGNOSIS — I10 ESSENTIAL HYPERTENSION: ICD-10-CM

## 2019-03-06 DIAGNOSIS — Z95.1 STATUS POST AORTO-CORONARY ARTERY BYPASS GRAFT: ICD-10-CM

## 2019-03-06 DIAGNOSIS — I25.5 ISCHEMIC CARDIOMYOPATHY: ICD-10-CM

## 2019-03-06 PROCEDURE — G8427 DOCREV CUR MEDS BY ELIG CLIN: HCPCS | Performed by: INTERNAL MEDICINE

## 2019-03-06 PROCEDURE — 1036F TOBACCO NON-USER: CPT | Performed by: INTERNAL MEDICINE

## 2019-03-06 PROCEDURE — G8598 ASA/ANTIPLAT THER USED: HCPCS | Performed by: INTERNAL MEDICINE

## 2019-03-06 PROCEDURE — G8484 FLU IMMUNIZE NO ADMIN: HCPCS | Performed by: INTERNAL MEDICINE

## 2019-03-06 PROCEDURE — 99214 OFFICE O/P EST MOD 30 MIN: CPT | Performed by: INTERNAL MEDICINE

## 2019-03-06 PROCEDURE — G8417 CALC BMI ABV UP PARAM F/U: HCPCS | Performed by: INTERNAL MEDICINE

## 2019-03-06 PROCEDURE — 3017F COLORECTAL CA SCREEN DOC REV: CPT | Performed by: INTERNAL MEDICINE

## 2019-03-06 RX ORDER — ACETAMINOPHEN 325 MG/1
650 TABLET ORAL EVERY 6 HOURS PRN
COMMUNITY

## 2019-03-06 RX ORDER — BALSAM PERU/CASTOR OIL
OINTMENT (GRAM) TOPICAL
Status: ON HOLD | COMMUNITY
End: 2019-05-16 | Stop reason: HOSPADM

## 2019-03-26 ENCOUNTER — CARE COORDINATION (OUTPATIENT)
Dept: CARE COORDINATION | Age: 59
End: 2019-03-26

## 2019-04-02 ENCOUNTER — HOSPITAL ENCOUNTER (OUTPATIENT)
Age: 59
Discharge: HOME | End: 2019-04-02
Payer: MEDICAID

## 2019-04-02 ENCOUNTER — HOSPITAL ENCOUNTER (OUTPATIENT)
Dept: CT IMAGING | Age: 59
Discharge: HOME | End: 2019-04-02
Payer: MEDICAID

## 2019-04-02 DIAGNOSIS — R33.9 RETENTION OF URINE: ICD-10-CM

## 2019-04-02 LAB
BUN BLDV-MCNC: 10 MG/DL (ref 7–20)
CREAT SERPL-MCNC: 1.2 MG/DL (ref 0.9–1.3)
GFR AFRICAN AMERICAN: >60
GFR NON-AFRICAN AMERICAN: >60

## 2019-04-02 PROCEDURE — 74178 CT ABD&PLV WO CNTR FLWD CNTR: CPT

## 2019-04-02 PROCEDURE — 6360000004 HC RX CONTRAST MEDICATION: Performed by: UROLOGY

## 2019-04-02 PROCEDURE — 84520 ASSAY OF UREA NITROGEN: CPT

## 2019-04-02 PROCEDURE — 36415 COLL VENOUS BLD VENIPUNCTURE: CPT

## 2019-04-02 PROCEDURE — 82565 ASSAY OF CREATININE: CPT

## 2019-04-02 RX ADMIN — IOPAMIDOL 120 ML: 755 INJECTION, SOLUTION INTRAVENOUS at 14:50

## 2019-04-04 NOTE — PROGRESS NOTES
1.  Do not eat or drink anything after 12 midnight prior to surgery. This includes no water, chewing gum or mints. 2.  Take the following pills with a small sip of water on the morning of surgery . 3. Aspirin, Ibuprofen, Advil, Naproxen, Vitamin E and other Anti-inflammatory products should be stopped for 5 days before surgery or as directed by your physician. 4.  Check with your doctor regarding stopping Plavix, Coumadin, Lovenox, Fragmin or other blood thinners. 5.  Do not smoke and do not drink alcoholic beverages 24 hours prior to surgery. This includes NA Beer. 6.  You may brush your teeth and gargle the morning of surgery. DO NOT SWALLOW WATER.  7.  You MUST make arrangements for a responsible adult to take you home after your surgery. You will not be allowed to leave alone or drive yourself home. It is strongly suggested someone stay with you the first 24 hours. Your surgery will be cancelled if you do not have a ride home. 8.  A parent/legal guardian must accompany a child scheduled for surgery and plan to stay at the hospital until the child is discharged. Please do not bring other children with you. 9.  Please wear simple, loose fitting clothing to the hospital.  Verdia Blanks not bring valuables ( money, credit cards, checkbooks, etc.)  Do not wear any makeup (including no eye makeup) or nail polish on your fingers or toes. 10.  Do not wear any jewelry or piercing on the day of surgery. All body piercing jewelry must be removed. 11.  If you have dentures, they will be removed before going to the OR; we will provide you a container. If you wear contact lenses or glasses, they will be removed; please bring a case for them. 12.  Please see your family doctor/pediatrician for a history & physical and/or concerning medications. Bring any test results/reports from your physician's office the day of surgery. 15.  Remember to bring Blood Bank Bracelet to the hospital on the day of surgery.   14.  If you have a Living Will and Durable Power of  for Healthcare, please bring in a copy. 13.  Notify your Surgeon if you develop any illness between now and surgery time; cough, cold, fever, sore throat, nausea, vomiting, etc.  Please notify your surgeon if you experience dizziness, shortness of breath or blurred vision between now and the time of your surgery. 16.  DO NOT shave your operative site 96 hours (4 days) prior to surgery. For face and neck surgery, men may use an electric razor 48 hours (2 days) prior to surgery. 17. Shower the night before surgery and the morning of surgery with  an antibacterial soap   or  Chlorhexidine gluconate (for total joint replacement). To provide excellent care, visitors will be limited to two in a room at any given time. Please no children under the age of 15 in the surgical department.

## 2019-04-05 ENCOUNTER — HOSPITAL ENCOUNTER (OUTPATIENT)
Age: 59
Setting detail: OUTPATIENT SURGERY
Discharge: HOME | End: 2019-04-05
Attending: UROLOGY | Admitting: UROLOGY
Payer: MEDICAID

## 2019-04-05 ENCOUNTER — ANESTHESIA EVENT (OUTPATIENT)
Dept: OPERATING ROOM | Age: 59
End: 2019-04-05
Payer: MEDICAID

## 2019-04-05 ENCOUNTER — ANESTHESIA (OUTPATIENT)
Dept: OPERATING ROOM | Age: 59
End: 2019-04-05
Payer: MEDICAID

## 2019-04-05 VITALS
OXYGEN SATURATION: 99 % | HEART RATE: 78 BPM | WEIGHT: 199 LBS | SYSTOLIC BLOOD PRESSURE: 128 MMHG | BODY MASS INDEX: 28.49 KG/M2 | TEMPERATURE: 97.4 F | HEIGHT: 70 IN | DIASTOLIC BLOOD PRESSURE: 68 MMHG | RESPIRATION RATE: 16 BRPM

## 2019-04-05 VITALS
SYSTOLIC BLOOD PRESSURE: 91 MMHG | DIASTOLIC BLOOD PRESSURE: 61 MMHG | RESPIRATION RATE: 3 BRPM | OXYGEN SATURATION: 100 %

## 2019-04-05 PROBLEM — N39.0 RECURRENT UTI: Status: ACTIVE | Noted: 2019-04-05

## 2019-04-05 PROBLEM — N32.9 LESION OF BLADDER: Status: ACTIVE | Noted: 2019-04-05

## 2019-04-05 LAB
GLUCOSE BLD-MCNC: 81 MG/DL (ref 70–99)
GLUCOSE BLD-MCNC: 88 MG/DL (ref 70–99)
PERFORMED ON: NORMAL
PERFORMED ON: NORMAL

## 2019-04-05 PROCEDURE — 7100000010 HC PHASE II RECOVERY - FIRST 15 MIN: Performed by: UROLOGY

## 2019-04-05 PROCEDURE — 7100000000 HC PACU RECOVERY - FIRST 15 MIN: Performed by: UROLOGY

## 2019-04-05 PROCEDURE — 3600000004 HC SURGERY LEVEL 4 BASE: Performed by: UROLOGY

## 2019-04-05 PROCEDURE — 3700000001 HC ADD 15 MINUTES (ANESTHESIA): Performed by: UROLOGY

## 2019-04-05 PROCEDURE — 7100000001 HC PACU RECOVERY - ADDTL 15 MIN: Performed by: UROLOGY

## 2019-04-05 PROCEDURE — 2580000003 HC RX 258: Performed by: ANESTHESIOLOGY

## 2019-04-05 PROCEDURE — 7100000011 HC PHASE II RECOVERY - ADDTL 15 MIN: Performed by: UROLOGY

## 2019-04-05 PROCEDURE — 3700000000 HC ANESTHESIA ATTENDED CARE: Performed by: UROLOGY

## 2019-04-05 PROCEDURE — 2709999900 HC NON-CHARGEABLE SUPPLY: Performed by: UROLOGY

## 2019-04-05 PROCEDURE — 3600000014 HC SURGERY LEVEL 4 ADDTL 15MIN: Performed by: UROLOGY

## 2019-04-05 PROCEDURE — 6360000002 HC RX W HCPCS: Performed by: UROLOGY

## 2019-04-05 PROCEDURE — 88305 TISSUE EXAM BY PATHOLOGIST: CPT

## 2019-04-05 PROCEDURE — 6360000002 HC RX W HCPCS: Performed by: NURSE ANESTHETIST, CERTIFIED REGISTERED

## 2019-04-05 PROCEDURE — 2500000003 HC RX 250 WO HCPCS: Performed by: NURSE ANESTHETIST, CERTIFIED REGISTERED

## 2019-04-05 PROCEDURE — 2580000003 HC RX 258: Performed by: UROLOGY

## 2019-04-05 PROCEDURE — 2500000003 HC RX 250 WO HCPCS: Performed by: ANESTHESIOLOGY

## 2019-04-05 RX ORDER — SODIUM CHLORIDE, SODIUM LACTATE, POTASSIUM CHLORIDE, CALCIUM CHLORIDE 600; 310; 30; 20 MG/100ML; MG/100ML; MG/100ML; MG/100ML
INJECTION, SOLUTION INTRAVENOUS CONTINUOUS
Status: DISCONTINUED | OUTPATIENT
Start: 2019-04-05 | End: 2019-04-05 | Stop reason: HOSPADM

## 2019-04-05 RX ORDER — HYDROMORPHONE HCL 110MG/55ML
0.5 PATIENT CONTROLLED ANALGESIA SYRINGE INTRAVENOUS EVERY 5 MIN PRN
Status: DISCONTINUED | OUTPATIENT
Start: 2019-04-05 | End: 2019-04-05 | Stop reason: HOSPADM

## 2019-04-05 RX ORDER — OXYCODONE HYDROCHLORIDE AND ACETAMINOPHEN 5; 325 MG/1; MG/1
2 TABLET ORAL PRN
Status: DISCONTINUED | OUTPATIENT
Start: 2019-04-05 | End: 2019-04-05 | Stop reason: HOSPADM

## 2019-04-05 RX ORDER — DIPHENHYDRAMINE HYDROCHLORIDE 50 MG/ML
12.5 INJECTION INTRAMUSCULAR; INTRAVENOUS
Status: DISCONTINUED | OUTPATIENT
Start: 2019-04-05 | End: 2019-04-05 | Stop reason: HOSPADM

## 2019-04-05 RX ORDER — ONDANSETRON 2 MG/ML
4 INJECTION INTRAMUSCULAR; INTRAVENOUS
Status: DISCONTINUED | OUTPATIENT
Start: 2019-04-05 | End: 2019-04-05 | Stop reason: HOSPADM

## 2019-04-05 RX ORDER — DEXAMETHASONE SODIUM PHOSPHATE 4 MG/ML
INJECTION, SOLUTION INTRA-ARTICULAR; INTRALESIONAL; INTRAMUSCULAR; INTRAVENOUS; SOFT TISSUE PRN
Status: DISCONTINUED | OUTPATIENT
Start: 2019-04-05 | End: 2019-04-05 | Stop reason: SDUPTHER

## 2019-04-05 RX ORDER — OXYCODONE HYDROCHLORIDE AND ACETAMINOPHEN 5; 325 MG/1; MG/1
1 TABLET ORAL PRN
Status: DISCONTINUED | OUTPATIENT
Start: 2019-04-05 | End: 2019-04-05 | Stop reason: HOSPADM

## 2019-04-05 RX ORDER — MEPERIDINE HYDROCHLORIDE 25 MG/ML
12.5 INJECTION INTRAMUSCULAR; INTRAVENOUS; SUBCUTANEOUS EVERY 5 MIN PRN
Status: DISCONTINUED | OUTPATIENT
Start: 2019-04-05 | End: 2019-04-05 | Stop reason: HOSPADM

## 2019-04-05 RX ORDER — HYDRALAZINE HYDROCHLORIDE 20 MG/ML
5 INJECTION INTRAMUSCULAR; INTRAVENOUS EVERY 10 MIN PRN
Status: DISCONTINUED | OUTPATIENT
Start: 2019-04-05 | End: 2019-04-05 | Stop reason: HOSPADM

## 2019-04-05 RX ORDER — HYDROMORPHONE HCL 110MG/55ML
0.25 PATIENT CONTROLLED ANALGESIA SYRINGE INTRAVENOUS EVERY 5 MIN PRN
Status: DISCONTINUED | OUTPATIENT
Start: 2019-04-05 | End: 2019-04-05 | Stop reason: HOSPADM

## 2019-04-05 RX ORDER — MORPHINE SULFATE 10 MG/ML
1 INJECTION, SOLUTION INTRAMUSCULAR; INTRAVENOUS EVERY 5 MIN PRN
Status: DISCONTINUED | OUTPATIENT
Start: 2019-04-05 | End: 2019-04-05 | Stop reason: HOSPADM

## 2019-04-05 RX ORDER — MAGNESIUM HYDROXIDE 1200 MG/15ML
LIQUID ORAL PRN
Status: DISCONTINUED | OUTPATIENT
Start: 2019-04-05 | End: 2019-04-05 | Stop reason: ALTCHOICE

## 2019-04-05 RX ORDER — MORPHINE SULFATE 10 MG/ML
2 INJECTION, SOLUTION INTRAMUSCULAR; INTRAVENOUS EVERY 5 MIN PRN
Status: DISCONTINUED | OUTPATIENT
Start: 2019-04-05 | End: 2019-04-05 | Stop reason: HOSPADM

## 2019-04-05 RX ORDER — PROMETHAZINE HYDROCHLORIDE 25 MG/ML
6.25 INJECTION, SOLUTION INTRAMUSCULAR; INTRAVENOUS
Status: DISCONTINUED | OUTPATIENT
Start: 2019-04-05 | End: 2019-04-05 | Stop reason: HOSPADM

## 2019-04-05 RX ORDER — PROPOFOL 10 MG/ML
INJECTION, EMULSION INTRAVENOUS PRN
Status: DISCONTINUED | OUTPATIENT
Start: 2019-04-05 | End: 2019-04-05 | Stop reason: SDUPTHER

## 2019-04-05 RX ORDER — LIDOCAINE HYDROCHLORIDE 20 MG/ML
INJECTION, SOLUTION INFILTRATION; PERINEURAL PRN
Status: DISCONTINUED | OUTPATIENT
Start: 2019-04-05 | End: 2019-04-05 | Stop reason: SDUPTHER

## 2019-04-05 RX ORDER — FENTANYL CITRATE 50 UG/ML
INJECTION, SOLUTION INTRAMUSCULAR; INTRAVENOUS PRN
Status: DISCONTINUED | OUTPATIENT
Start: 2019-04-05 | End: 2019-04-05 | Stop reason: SDUPTHER

## 2019-04-05 RX ORDER — LIDOCAINE HYDROCHLORIDE 10 MG/ML
2 INJECTION, SOLUTION EPIDURAL; INFILTRATION; INTRACAUDAL; PERINEURAL
Status: COMPLETED | OUTPATIENT
Start: 2019-04-05 | End: 2019-04-05

## 2019-04-05 RX ADMIN — FENTANYL CITRATE 50 MCG: 50 INJECTION INTRAMUSCULAR; INTRAVENOUS at 13:05

## 2019-04-05 RX ADMIN — SODIUM CHLORIDE, POTASSIUM CHLORIDE, SODIUM LACTATE AND CALCIUM CHLORIDE: 600; 310; 30; 20 INJECTION, SOLUTION INTRAVENOUS at 13:05

## 2019-04-05 RX ADMIN — PROPOFOL 300 MG: 10 INJECTION, EMULSION INTRAVENOUS at 13:10

## 2019-04-05 RX ADMIN — LIDOCAINE HYDROCHLORIDE 60 MG: 20 INJECTION, SOLUTION INFILTRATION; PERINEURAL at 13:10

## 2019-04-05 RX ADMIN — DEXAMETHASONE SODIUM PHOSPHATE 8 MG: 4 INJECTION, SOLUTION INTRAMUSCULAR; INTRAVENOUS at 13:18

## 2019-04-05 RX ADMIN — LIDOCAINE HYDROCHLORIDE 2 ML: 10 INJECTION, SOLUTION EPIDURAL; INFILTRATION; INTRACAUDAL; PERINEURAL at 10:27

## 2019-04-05 RX ADMIN — Medication 2 G: at 13:00

## 2019-04-05 RX ADMIN — SODIUM CHLORIDE, POTASSIUM CHLORIDE, SODIUM LACTATE AND CALCIUM CHLORIDE: 600; 310; 30; 20 INJECTION, SOLUTION INTRAVENOUS at 10:26

## 2019-04-05 ASSESSMENT — PULMONARY FUNCTION TESTS
PIF_VALUE: 7
PIF_VALUE: 16
PIF_VALUE: 17
PIF_VALUE: 17
PIF_VALUE: 2
PIF_VALUE: 15
PIF_VALUE: 13
PIF_VALUE: 11
PIF_VALUE: 16
PIF_VALUE: 2
PIF_VALUE: 15
PIF_VALUE: 15
PIF_VALUE: 4
PIF_VALUE: 2
PIF_VALUE: 7
PIF_VALUE: 16
PIF_VALUE: 16
PIF_VALUE: 17
PIF_VALUE: 16
PIF_VALUE: 14
PIF_VALUE: 4
PIF_VALUE: 12
PIF_VALUE: 13
PIF_VALUE: 15
PIF_VALUE: 13
PIF_VALUE: 13
PIF_VALUE: 1

## 2019-04-05 ASSESSMENT — PAIN SCALES - GENERAL
PAINLEVEL_OUTOF10: 0

## 2019-04-05 ASSESSMENT — PAIN - FUNCTIONAL ASSESSMENT: PAIN_FUNCTIONAL_ASSESSMENT: 0-10

## 2019-04-05 NOTE — ANESTHESIA PRE PROCEDURE
Department of Anesthesiology  Preprocedure Note       Name:  Pedro Ryan   Age:  60 y.o.  :  1960                                          MRN:  [id number]         Date:  2019      Surgeon: Lakshmi Tobias):  Ab Thompson MD    Procedure: CYSTOSCOPY, POSSIBLE BLADDER BIOPSY (N/A Bladder)    Medications prior to admission:   Prior to Admission medications    Medication Sig Start Date End Date Taking? Authorizing Provider   Balsam [place] Oil OINT Apply topically   Yes Historical Provider, MD   vancomycin in sterile water injection-oral sweetener syrup Take 125 mg by mouth 4 times daily   Yes Historical Provider, MD   allopurinol (ZYLOPRIM) 100 MG tablet Take 100 mg by mouth daily   Yes Historical Provider, MD   apixaban (ELIQUIS) 5 MG TABS tablet Take by mouth 2 times daily   Yes Historical Provider, MD   vitamin C (ASCORBIC ACID) 500 MG tablet Take 500 mg by mouth daily   Yes Historical Provider, MD   ferrous sulfate 325 (65 Fe) MG tablet Take 325 mg by mouth daily (with breakfast)   Yes Historical Provider, MD   Magnesium Oxide 200 MG TABS Take 1 tablet by mouth nightly   Yes Historical Provider, MD   Multiple Vitamins-Minerals (THERAPEUTIC MULTIVITAMIN-MINERALS) tablet Take 1 tablet by mouth daily   Yes Historical Provider, MD   potassium chloride (KLOR-CON M) 20 MEQ extended release tablet Take 20 mEq by mouth 2 times daily   Yes Historical Provider, MD   collagenase 250 UNIT/GM ointment Apply 1 each topically nightly Apply topically daily. Yes Historical Provider, MD   famotidine (PEPCID) 20 MG tablet Take 1 tablet by mouth 2 times daily One tab daily before bed 1/10/19  Yes Maricel Carlos MD   miconazole (MICOTIN) 2 % powder Apply topically 2 times daily.  Apply to skin surrounding sternal wound vac--apply then dab in with skin prep per Ralph Condon RN wound care 1/10/19  Yes Maricel Carlos MD   sodium bicarbonate 650 MG tablet Take 1 tablet by mouth 2 times daily 18  Yes Christina Martniez Rodriguez Morales MD   atorvastatin (LIPITOR) 80 MG tablet Take 1 tablet by mouth nightly 12/27/18  Yes Navneet David MD   sennosides-docusate sodium (SENOKOT-S) 8.6-50 MG tablet Take 1 tablet by mouth daily 12/27/18  Yes Navneet David MD   tamsulosin (FLOMAX) 0.4 MG capsule Take 1 capsule by mouth daily 12/27/18  Yes Navneet David MD   metoprolol succinate (TOPROL XL) 50 MG extended release tablet Take 1 tablet by mouth daily  Patient taking differently: Take 50 mg by mouth 2 times daily  12/27/18  Yes Navneet David MD   fluticasone (FLONASE) 50 MCG/ACT nasal spray 2 sprays by Each Nare route daily   Yes Historical Provider, MD   gabapentin (NEURONTIN) 300 MG capsule Take 300 mg by mouth nightly. .   Yes Historical Provider, MD   glucose blood VI test strips (FREESTYLE TEST STRIPS) strip 1 each by In Vitro route daily As needed. 5/3/18  Yes Parish Islas DO   aspirin (ASPIRIN LOW DOSE) 81 MG EC tablet TAKE ONE TABLET BY MOUTH ONCE DAILY 4/23/18  Yes Parish Islas DO   ACCU-CHEK MULTICLIX LANCETS MISC USE ONE  TO CHECK GLUCOSE ONCE DAILY 4/19/18  Yes Parish Islas DO   Blood Glucose Monitoring Suppl (ACCU-CHEK COMPACT CARE KIT) KIT 1 kit by Does not apply route daily 4/18/17  Yes Parish Islas DO   Melatonin 10 MG TABS Take 10 mg by mouth nightly    Yes Historical Provider, MD   acetaminophen (TYLENOL) 325 MG tablet Take 650 mg by mouth every 6 hours as needed for Pain    Historical Provider, MD   lactulose (CHRONULAC) 10 GM/15ML solution Take 30 mLs by mouth 2 times daily as needed (constipation) 1/10/19   Berlin Stokes MD       Current medications:    Current Facility-Administered Medications   Medication Dose Route Frequency Provider Last Rate Last Dose    ceFAZolin (ANCEF) 2 g in sterile water 20 mL IV syringe  2 g Intravenous Once Roma Abrams MD        lactated ringers infusion   Intravenous Continuous Ye Larkin  mL/hr at 04/05/19 1026         Allergies:     Allergies   Allergen Reactions    Enalapril     Nadolol     Verapamil        Problem List:    Patient Active Problem List   Diagnosis Code    Anoxic brain injury ([facility] G93.1    HTN (hypertension) I10    Carpal tunnel syndrome G56.00    Obstructive sleep apnea syndrome G47.33    Depression F32.9    Gout M10.9    ACS (acute coronary syndrome) ([facility]) I24.9    Gait disturbance R26.9    CKD (chronic kidney disease) stage 3, GFR 30-59 ml/min ([facility]) N18.3    Abnormal echocardiogram R93.1    Coronary artery disease involving native coronary artery of native heart with angina pectoris ([facility]) I25.119    Abnormal stress test R94.39    NSTEMI (non-ST elevated myocardial infarction) ([facility] I21.4    Ischemic cardiomyopathy T75.4    Acute systolic congestive heart failure ([facility]) I50.21    Acute renal failure with acute tubular necrosis superimposed on stage 3 chronic kidney disease ([facility]) N17.0, N18.3    Spinal cord ischemia ([facility]) G95.11    DM (diabetes mellitus), secondary, uncontrolled, w/neurologic complic ([facility]) U23.38, N01.28    Cervical spinal cord compression ([facility]) G95.20    Postoperative infection of wound of sternum T81.49XA    Serratia infection A49.8    Gram-negative bacteremia R78.81    Acute respiratory failure with hypoxia ([facility]) J96.01    Sepsis ([facility]) A41.9    Prolonged Q-T interval on ECG R94.31    Status post aorto-coronary artery bypass graft Z95.1    History of cardiac arrest Z86.74    Obesity, Class II, BMI 35-39.9 E66.9    Cervical myelopathy ([facility]) G95.9       Past Medical History:        Diagnosis Date    Anoxic brain injury ([facility] 1994    Cardiac arrest ([facility] 1994    Gout     Hyperlipidemia     Hypertension     Reflux     S/P CABG x 3 12/2018    Sleep apnea     Type 2 diabetes mellitus without complication [facility])        Past Surgical History:        Procedure Laterality Date    CARDIAC CATHETERIZATION  11/26/2018    Dr. Neetu Renee Left 03/25/2014    Dr. Lillian Stevens - w/trigger finger (3rd) & trigger thumb release    CARPAL TUNNEL RELEASE Right 04/14/2015    Dr. Megan Arroyo N/A 12/16/2018    Dr. Brendan Alcocer - decompressive corpectomy C6 (>90%), microdissection, anterior cervical arthrodesis w/PEEK allograft, placement of corpectomy cage & Synthes plate C2-1    COLONOSCOPY  09/01/2015    Dr. Oneal - elis-diverticulosis, transverse colon adenomatous polyps    CORONARY ARTERY BYPASS GRAFT  12/14/2018    Dr. Abi Coreas    EMG Bilateral 02/27/2014    Dr. Lois Bravo - Claudia Raisa POLYSOMNOGRAPHY  04/12/2016    Dr. Mario Israel. [facility]SA Muniz w[facility]    HI CABG, ARTERIAL, THREE N/A 12/4/2018    Dr. Brandy Li - x3 (LIMA-LAD, BL SVG-OM, SVG-PDA)    RECONSTRUCTIVE REPAIR STERNAL N/A 12/20/2018    Dr. Brandy Li - I&D of sternum w/placement of wound VAC    TRANSESOPHAGEAL ECHOCARDIOGRAM  12/04/2018    during CABG    TUNNELED VENOUS CATHETER PLACEMENT Right 12/24/2018    Dr. Atul Giron - PICC via IJ       Social History:    Social History     Tobacco Use    Smoking status: Never Smoker    Smokeless tobacco: Never Used   Substance Use Topics    Alcohol use: No     Alcohol/week: 0.0 oz                                Counseling given: Not Answered      Vital Signs (Current):   Vitals:    04/04/19 1012 04/05/19 1008   BP:  102/64   Pulse:  87   Resp:  16   Temp:  97.3 °F (36.3 °C)   TempSrc:  Temporal   SpO2:  97%   Weight: 199 lb (90.3 kg)    Height:  5' 10\" (1.778 m)                                              BP Readings from Last 3 Encounters:   04/05/19 102/64   03/06/19 124/88   02/27/19 120/80       NPO Status: Time of last liquid consumption: 0000                        Time of last solid consumption: 0000                        Date of last liquid consumption: 04/04/19                        Date of last solid food consumption: 04/04/19    BMI:   Wt Readings from Last 3 Encounters:   04/04/19 199 lb (90.3 kg)   01/30/19 236 lb (107 kg)   01/10/19 235 lb 3.7 oz (106.7 kg)     Body mass index is 28.55 kg/m². CBC:   Lab Results   Component Value Date    WBC 5.6 01/10/2019    RBC 3.14 01/10/2019    HGB 8.7 01/10/2019    HCT 27.4 01/10/2019    MCV 87.3 01/10/2019    RDW 17.9 01/10/2019     01/10/2019       CMP:   Lab Results   Component Value Date     01/10/2019    K 3.6 01/10/2019     01/10/2019    CO2 29 01/10/2019    BUN 10 04/02/2019    CREATININE 1.2 04/02/2019    GFRAA >60 04/02/2019    AGRATIO 0.7 01/07/2019    LABGLOM >60 04/02/2019    GLUCOSE 103 01/10/2019    PROT 5.8 01/07/2019    CALCIUM 8.7 01/10/2019    BILITOT 0.4 01/07/2019    ALKPHOS 167 01/07/2019    AST 42 01/07/2019    ALT 64 01/07/2019       POC Tests:   Recent Labs     04/05/19  1013   POCGLU 81       Coags:   Lab Results   Component Value Date    PROTIME 25.1 01/10/2019    INR 2.20 01/10/2019    APTT 34.2 11/28/2018       HCG (If Applicable): No results found for: PREGTESTUR, PREGSERUM, HCG, HCGQUANT     ABGs:   Lab Results   Component Value Date    PHART 7.402 12/21/2018    PO2ART 108.8 12/21/2018    RNW4ZYN 36.7 12/21/2018    EOM9EPH 22.8 12/21/2018    BEART -2 12/21/2018    [id number] 98 12/21/2018        Type & Screen (If Applicable):  No results found for: LABABO, LABRH    Anesthesia Evaluation   no history of anesthetic complications:   Airway: Mallampati: II  TM distance: <3 FB   Neck ROM: limited  Mouth opening: > = 3 FB Dental: normal exam         Pulmonary:   (+) sleep apnea:                             Cardiovascular:    (+) hypertension:, past MI:, CAD:, CABG/stent (CABG): no interval change, CHF:,     (-)  angina             ROS comment: H/o Cardiac arrest     Neuro/Psych:   (+) neuromuscular disease:, psychiatric history (anoxic brain injury):             ROS comment: H/o anoxic brain injury GI/Hepatic/Renal:   (+) renal disease: CRI,           Endo/Other:    (+) DiabetesType II DM, , .                 Abdominal:           Vascular: negative vascular ROS.

## 2019-04-05 NOTE — PROGRESS NOTES
[facility] here to take pt back to [facility] in [place]. Pt in stable condition at this time. Pt denies pain or any needs. POA at bedside.

## 2019-04-05 NOTE — ANESTHESIA POSTPROCEDURE EVALUATION
Department of Anesthesiology  Postprocedure Note    Patient: Ralph Hernandez  MRN: [id number]  YOB: 1960  Date of evaluation: 4/5/2019  Time:  2:04 PM     Procedure Summary     Date:  04/05/19 Room / Location:  [facility] 01 / [facility]    Anesthesia Start:  9590 Anesthesia Stop:  2531    Procedure:  CYSTOSCOPY (N/A Bladder) Diagnosis:  (RETENTION)    Surgeon:  Pako Grayson MD Responsible Provider:  Jannelle Prader, MD    Anesthesia Type:  general ASA Status:  3          Anesthesia Type: general    Mark Phase I: Mark Score: 8    Mark Phase II:      Last vitals: Reviewed and per EMR flowsheets.        Anesthesia Post Evaluation    Patient location during evaluation: PACU  Patient participation: complete - patient participated  Level of consciousness: awake and alert  Airway patency: patent  Nausea & Vomiting: no nausea and no vomiting  Complications: no  Cardiovascular status: blood pressure returned to baseline  Respiratory status: acceptable  Hydration status: euvolemic

## 2019-04-05 NOTE — INTERVAL H&P NOTE
H&P Update    Patient's History and Physical from was reviewed. Patient examined. There has been no change    See office notes. R/b/a cysto reviewed and any indicated procedures.  .    Davy Paris

## 2019-04-09 NOTE — H&P
Referring Provider:  No ref. provider found   Primary Care Provider:  Damian Bowie DO       Urology Attending H/P Note     Reason for H/P: surgery and as below    HPI:  Braulio Arteaga is a 60 y.o. male who presented with history of recurrent retention, poorly controlled diabetes. Robin multiple times for 1000cc urine. See office notes for further details. Past Medical History:   Diagnosis Date    Anoxic brain injury [facility]) 1994    Cardiac arrest ([facility] [address] 1994    Gout     Hyperlipidemia     Hypertension     Reflux     S/P CABG x 3 12/2018    Sleep apnea     Type 2 diabetes mellitus without complication [facility])        Past Surgical History:   Procedure Laterality Date    CARDIAC CATHETERIZATION  11/26/2018    Dr. Jones Fearing Left 03/25/2014    Dr. Mariama Tucker - w/trigger finger (3rd) & trigger thumb release    CARPAL TUNNEL RELEASE Right 04/14/2015    Dr. Alexander Foreman N/A 12/16/2018    Dr. Hair Farrell - decompressive corpectomy C6 (>90%), microdissection, anterior cervical arthrodesis w/PEEK allograft, placement of corpectomy cage & Synthes plate S3-0    COLONOSCOPY  09/01/2015    Dr. Oneal - gillis-diverticulosis, transverse colon adenomatous polyps    CORONARY ARTERY BYPASS GRAFT  12/14/2018    Dr. Melva Sesay  04/05/2019    cystoscopy/bladder biopsy f/c exchange    CYSTOSCOPY N/A 4/5/2019    CYSTOSCOPY performed by Twila Harrison MD at [facility] Bilateral 02/27/2014    Dr. Ara Roach POLYSOMNOGRAPHY  04/12/2016    Dr. Mookie Muniz w/[facility]    AL CABG, ARTERIAL, THREE N/A 12/4/2018    Dr. Jimmy White - x3 (LIMA-LAD, BL SVG-OM, SVG-PDA)    RECONSTRUCTIVE REPAIR STERNAL N/A 12/20/2018    Dr. Jimmy White - I&D of sternum w/placement of wound VAC    TRANSESOPHAGEAL ECHOCARDIOGRAM  12/04/2018    during CABG    TUNNELED VENOUS CATHETER PLACEMENT Right 12/24/2018    Dr. Mason Villegas - PICC via IJ       Medication List reviewed:     No current facility-administered medications for this encounter. Current Outpatient Medications   Medication Sig Dispense Refill    Balsam [place]-Castor Oil OINT Apply topically      vancomycin in sterile water injection-oral sweetener syrup Take 125 mg by mouth 4 times daily      allopurinol (ZYLOPRIM) 100 MG tablet Take 100 mg by mouth daily      apixaban (ELIQUIS) 5 MG TABS tablet Take by mouth 2 times daily      vitamin C (ASCORBIC ACID) 500 MG tablet Take 500 mg by mouth daily      ferrous sulfate 325 (65 Fe) MG tablet Take 325 mg by mouth daily (with breakfast)      Magnesium Oxide 200 MG TABS Take 1 tablet by mouth nightly      Multiple Vitamins-Minerals (THERAPEUTIC MULTIVITAMIN-MINERALS) tablet Take 1 tablet by mouth daily      potassium chloride (KLOR-CON M) 20 MEQ extended release tablet Take 20 mEq by mouth 2 times daily      collagenase 250 UNIT/GM ointment Apply 1 each topically nightly Apply topically daily.  famotidine (PEPCID) 20 MG tablet Take 1 tablet by mouth 2 times daily One tab daily before bed      miconazole (MICOTIN) 2 % powder Apply topically 2 times daily. Apply to skin surrounding sternal wound vac--apply then dab in with skin prep per Jesús Ferreira RN wound care 45 g 0    sodium bicarbonate 650 MG tablet Take 1 tablet by mouth 2 times daily 60 tablet 2    atorvastatin (LIPITOR) 80 MG tablet Take 1 tablet by mouth nightly 30 tablet 3    sennosides-docusate sodium (SENOKOT-S) 8.6-50 MG tablet Take 1 tablet by mouth daily 30 tablet 1    tamsulosin (FLOMAX) 0.4 MG capsule Take 1 capsule by mouth daily 30 capsule 3    metoprolol succinate (TOPROL XL) 50 MG extended release tablet Take 1 tablet by mouth daily (Patient taking differently: Take 50 mg by mouth 2 times daily ) 90 tablet 1    fluticasone (FLONASE) 50 MCG/ACT nasal spray 2 sprays by Each Nare route daily      gabapentin (NEURONTIN) 300 MG capsule Take 300 mg by mouth nightly. .      glucose blood VI test strips (FREESTYLE TEST STRIPS) strip 1 each by In Vitro route daily As needed. 100 each 3    aspirin (ASPIRIN LOW DOSE) 81 MG EC tablet TAKE ONE TABLET BY MOUTH ONCE DAILY 90 tablet 3    ACCU-CHEK MULTICLIX LANCETS MISC USE ONE  TO CHECK GLUCOSE ONCE DAILY 102 each 3    Blood Glucose Monitoring Suppl (ACCU-CHEK COMPACT CARE KIT) KIT 1 kit by Does not apply route daily 1 kit 0    Melatonin 10 MG TABS Take 10 mg by mouth nightly       acetaminophen (TYLENOL) 325 MG tablet Take 650 mg by mouth every 6 hours as needed for Pain      lactulose (CHRONULAC) 10 GM/15ML solution Take 30 mLs by mouth 2 times daily as needed (constipation)  0       Allergies   Allergen Reactions    Enalapril     Nadolol     Verapamil        Family History   Problem Relation Age of Onset    High Blood Pressure Mother     High Blood Pressure Father     High Blood Pressure Brother     High Blood Pressure Maternal Grandmother     High Blood Pressure Maternal Grandfather        Social History     Tobacco Use    Smoking status: Never Smoker    Smokeless tobacco: Never Used   Substance Use Topics    Alcohol use: No     Alcohol/week: 0.0 oz    Drug use: No         Review of Systems: A 12 point ROS was performed and was unremarkable unless listed in the history of present illness. No intake/output data recorded.     Physical Exam:  Vitals:    04/05/19 1533   BP: 128/68   Pulse: 78   Resp: 16   Temp:    SpO2: 99%       Constitutional: pleasant patient in NAD, with a nl body habitus   Eyes: pink conjunctivae, no ptosis  ENT: lips without cyanosis, normal appearance of ears and nose externally  Neck: no masses or lesions, trachea midline  Respiratory: normal respiratory movements without distress, no audible wheezes   Cardiovascular: regular rate and rhythm, lower extremities without edema   Abdomen: soft, NTND, no masses, no rebound or guarding  Back: no CVAT, no abnormal curvature of the spine  Lymphatic: no palpable cervical or supraclavicular lymphadenopathy  Skin: warm and dry, no rashes, lesions, or ulcers  Musculoskeletal: dcrROM    m.tone,    Wheelchair/stretcher bound. Psych: normal mood and affect, alert and appropriately answers questions but baseline Dimentia. : bladder not palpable, normal external genitalia, mohamud catheter yellow ruine    Labs:    Lab Results   Component Value Date    CREATININE 1.2 04/02/2019    CREATININE 1.4 (H) 01/10/2019    CREATININE 1.4 (H) 01/09/2019     Lab Results   Component Value Date    COLORU Yellow 11/26/2018    NITRU Negative 11/26/2018    GLUCOSEU Negative 11/26/2018    KETUA Negative 11/26/2018    UROBILINOGEN 0.2 11/26/2018    BILIRUBINUR Negative 11/26/2018    BILIRUBINUR n 10/11/2017     Lab Results   Component Value Date    WBC 5.6 01/10/2019    HGB 8.7 (L) 01/10/2019    HCT 27.4 (L) 01/10/2019    MCV 87.3 01/10/2019     01/10/2019     Lab Results   Component Value Date    PSA 0.42 10/17/2016      see office imaging    Impression:  Recurrent retention  Probable neurogenic atonic bladder     Plan:  R/b/a of surgery re-reviewed.   See office notes for further details    We will proceed with cysto, possible bx    Nila Whiting MD  Office: [phone]

## 2019-04-11 NOTE — H&P
Hospital Medicine History & Physical      PCP: Loan Melendez DO    Date of Admission: 11/22/2018    Date of Service: Pt seen/examined on 11/22/18    Chief Complaint:  Gait imbalance    History Of Present Illness:    60 y.o. male who presented to the hospital with a chief complaint of generalized pains in his upper extremities and gait disturbance. The patient had been in his normal state of health until this morning when he woke up and he felt something was wrong. He said he felt very vague symptoms such as tenderness and pain in his lower abdomen and arms. He said what concerned him the most is when he try to walk he felt he wasn't walking right. He actually thought he had a stroke but the only symptoms he had were gait symptoms. He denied any slurred speech weakness, paresthesias or visual disturbances. He denied any chest pain, shortness of breath, nausea or vomiting. He feels okay but still feels very abnormal when he walks. In the emergency room work up has been unrevealing. He will be admitted for observation and further medical evaluation. Past Medical History:        Diagnosis Date    Anoxic brain injury [facility]) 1994    Cardiac arrest ([facility] [address] 1994    [facility]     borderline    Gout     Hyperlipidemia     Hypertension     Reflux     Sleep apnea     Type 2 diabetes mellitus without complication [facility])        Past Surgical History:          Procedure Laterality Date    CARPAL TUNNEL RELEASE Left     CARPAL TUNNEL RELEASE Right 4/14/15    COLONOSCOPY  1/2014       Medications Prior to Admission:      Prior to Admission medications    Medication Sig Start Date End Date Taking?  Authorizing Provider   atorvastatin (LIPITOR) 20 MG tablet Take 20 mg by mouth daily   Yes Historical Provider, MD   bumetanide (BUMEX) 1 MG tablet Take 2 mg by mouth 2 times daily   Yes Historical Provider, MD   potassium chloride (KLOR-CON) 20 MEQ packet Take 20 mEq by mouth 2 times daily   Yes
Bob Hightower(Attending)

## 2019-04-29 ENCOUNTER — CARE COORDINATION (OUTPATIENT)
Dept: CARE COORDINATION | Age: 59
End: 2019-04-29

## 2019-05-03 ENCOUNTER — APPOINTMENT (OUTPATIENT)
Dept: GENERAL RADIOLOGY | Age: 59
DRG: 194 | End: 2019-05-03
Payer: MEDICAID

## 2019-05-03 ENCOUNTER — HOSPITAL ENCOUNTER (INPATIENT)
Age: 59
LOS: 13 days | Discharge: NURSING, REHAB OR LONG-TERM CARE | DRG: 194 | End: 2019-05-16
Attending: EMERGENCY MEDICINE | Admitting: INTERNAL MEDICINE
Payer: MEDICAID

## 2019-05-03 ENCOUNTER — APPOINTMENT (OUTPATIENT)
Dept: CT IMAGING | Age: 59
DRG: 194 | End: 2019-05-03
Payer: MEDICAID

## 2019-05-03 DIAGNOSIS — R06.02 SHORTNESS OF BREATH: ICD-10-CM

## 2019-05-03 DIAGNOSIS — E87.70 HYPERVOLEMIA, UNSPECIFIED HYPERVOLEMIA TYPE: Primary | ICD-10-CM

## 2019-05-03 PROBLEM — R60.1 ANASARCA: Status: ACTIVE | Noted: 2019-05-03

## 2019-05-03 LAB
A/G RATIO: 1.1 (ref 1.1–2.2)
ALBUMIN SERPL-MCNC: 3.3 G/DL (ref 3.4–5)
ALP BLD-CCNC: 109 U/L (ref 40–129)
ALT SERPL-CCNC: 10 U/L (ref 10–40)
ANION GAP SERPL CALCULATED.3IONS-SCNC: 12 MMOL/L (ref 3–16)
AST SERPL-CCNC: 21 U/L (ref 15–37)
BASOPHILS ABSOLUTE: 0 K/UL (ref 0–0.2)
BASOPHILS RELATIVE PERCENT: 0.5 %
BILIRUB SERPL-MCNC: 0.5 MG/DL (ref 0–1)
BUN BLDV-MCNC: 23 MG/DL (ref 7–20)
CALCIUM SERPL-MCNC: 9.5 MG/DL (ref 8.3–10.6)
CHLORIDE BLD-SCNC: 102 MMOL/L (ref 99–110)
CO2: 24 MMOL/L (ref 21–32)
CREAT SERPL-MCNC: 1.5 MG/DL (ref 0.9–1.3)
EOSINOPHILS ABSOLUTE: 0.1 K/UL (ref 0–0.6)
EOSINOPHILS RELATIVE PERCENT: 1.1 %
GFR AFRICAN AMERICAN: 58
GFR NON-AFRICAN AMERICAN: 48
GLOBULIN: 2.9 G/DL
GLUCOSE BLD-MCNC: 119 MG/DL (ref 70–99)
GLUCOSE BLD-MCNC: 121 MG/DL (ref 70–99)
HCT VFR BLD CALC: 35.2 % (ref 40.5–52.5)
HEMOGLOBIN: 10.8 G/DL (ref 13.5–17.5)
LIPASE: 17 U/L (ref 13–60)
LYMPHOCYTES ABSOLUTE: 0.8 K/UL (ref 1–5.1)
LYMPHOCYTES RELATIVE PERCENT: 10.5 %
MCH RBC QN AUTO: 26.9 PG (ref 26–34)
MCHC RBC AUTO-ENTMCNC: 30.8 G/DL (ref 31–36)
MCV RBC AUTO: 87.2 FL (ref 80–100)
MONOCYTES ABSOLUTE: 0.5 K/UL (ref 0–1.3)
MONOCYTES RELATIVE PERCENT: 6.3 %
NEUTROPHILS ABSOLUTE: 5.9 K/UL (ref 1.7–7.7)
NEUTROPHILS RELATIVE PERCENT: 81.6 %
PDW BLD-RTO: 19.8 % (ref 12.4–15.4)
PERFORMED ON: ABNORMAL
PLATELET # BLD: 135 K/UL (ref 135–450)
PMV BLD AUTO: 9.3 FL (ref 5–10.5)
POTASSIUM SERPL-SCNC: 3.8 MMOL/L (ref 3.5–5.1)
RBC # BLD: 4.03 M/UL (ref 4.2–5.9)
SODIUM BLD-SCNC: 138 MMOL/L (ref 136–145)
TOTAL PROTEIN: 6.2 G/DL (ref 6.4–8.2)
TROPONIN: 0.11 NG/ML
TROPONIN: 0.13 NG/ML
WBC # BLD: 7.3 K/UL (ref 4–11)

## 2019-05-03 PROCEDURE — 99285 EMERGENCY DEPT VISIT HI MDM: CPT

## 2019-05-03 PROCEDURE — 83036 HEMOGLOBIN GLYCOSYLATED A1C: CPT

## 2019-05-03 PROCEDURE — 80053 COMPREHEN METABOLIC PANEL: CPT

## 2019-05-03 PROCEDURE — 6360000002 HC RX W HCPCS: Performed by: EMERGENCY MEDICINE

## 2019-05-03 PROCEDURE — 84443 ASSAY THYROID STIM HORMONE: CPT

## 2019-05-03 PROCEDURE — 96374 THER/PROPH/DIAG INJ IV PUSH: CPT

## 2019-05-03 PROCEDURE — 6370000000 HC RX 637 (ALT 250 FOR IP): Performed by: INTERNAL MEDICINE

## 2019-05-03 PROCEDURE — 36415 COLL VENOUS BLD VENIPUNCTURE: CPT

## 2019-05-03 PROCEDURE — 71045 X-RAY EXAM CHEST 1 VIEW: CPT

## 2019-05-03 PROCEDURE — 84484 ASSAY OF TROPONIN QUANT: CPT

## 2019-05-03 PROCEDURE — 84439 ASSAY OF FREE THYROXINE: CPT

## 2019-05-03 PROCEDURE — 85025 COMPLETE CBC W/AUTO DIFF WBC: CPT

## 2019-05-03 PROCEDURE — 6360000002 HC RX W HCPCS: Performed by: INTERNAL MEDICINE

## 2019-05-03 PROCEDURE — 2580000003 HC RX 258: Performed by: INTERNAL MEDICINE

## 2019-05-03 PROCEDURE — 2060000000 HC ICU INTERMEDIATE R&B

## 2019-05-03 PROCEDURE — 74176 CT ABD & PELVIS W/O CONTRAST: CPT

## 2019-05-03 PROCEDURE — 83690 ASSAY OF LIPASE: CPT

## 2019-05-03 RX ORDER — ALLOPURINOL 100 MG/1
100 TABLET ORAL DAILY
Status: DISCONTINUED | OUTPATIENT
Start: 2019-05-04 | End: 2019-05-16 | Stop reason: HOSPADM

## 2019-05-03 RX ORDER — M-VIT,TX,IRON,MINS/CALC/FOLIC 27MG-0.4MG
1 TABLET ORAL DAILY
Status: DISCONTINUED | OUTPATIENT
Start: 2019-05-04 | End: 2019-05-16 | Stop reason: HOSPADM

## 2019-05-03 RX ORDER — SODIUM CHLORIDE 0.9 % (FLUSH) 0.9 %
10 SYRINGE (ML) INJECTION PRN
Status: DISCONTINUED | OUTPATIENT
Start: 2019-05-03 | End: 2019-05-16 | Stop reason: HOSPADM

## 2019-05-03 RX ORDER — DEXTROSE MONOHYDRATE 50 MG/ML
100 INJECTION, SOLUTION INTRAVENOUS PRN
Status: DISCONTINUED | OUTPATIENT
Start: 2019-05-03 | End: 2019-05-16 | Stop reason: HOSPADM

## 2019-05-03 RX ORDER — LACTULOSE 10 G/15ML
20 SOLUTION ORAL 2 TIMES DAILY PRN
Status: DISCONTINUED | OUTPATIENT
Start: 2019-05-03 | End: 2019-05-16 | Stop reason: HOSPADM

## 2019-05-03 RX ORDER — SODIUM CHLORIDE 0.9 % (FLUSH) 0.9 %
10 SYRINGE (ML) INJECTION EVERY 12 HOURS SCHEDULED
Status: DISCONTINUED | OUTPATIENT
Start: 2019-05-03 | End: 2019-05-16 | Stop reason: HOSPADM

## 2019-05-03 RX ORDER — FERROUS SULFATE 325(65) MG
325 TABLET ORAL
Status: DISCONTINUED | OUTPATIENT
Start: 2019-05-04 | End: 2019-05-16 | Stop reason: HOSPADM

## 2019-05-03 RX ORDER — POTASSIUM CHLORIDE 20 MEQ/1
20 TABLET, EXTENDED RELEASE ORAL 2 TIMES DAILY
Status: DISCONTINUED | OUTPATIENT
Start: 2019-05-03 | End: 2019-05-06

## 2019-05-03 RX ORDER — TAMSULOSIN HYDROCHLORIDE 0.4 MG/1
0.4 CAPSULE ORAL DAILY
Status: DISCONTINUED | OUTPATIENT
Start: 2019-05-03 | End: 2019-05-16 | Stop reason: HOSPADM

## 2019-05-03 RX ORDER — METOPROLOL SUCCINATE 50 MG/1
50 TABLET, EXTENDED RELEASE ORAL DAILY
Status: DISCONTINUED | OUTPATIENT
Start: 2019-05-03 | End: 2019-05-16 | Stop reason: HOSPADM

## 2019-05-03 RX ORDER — GABAPENTIN 300 MG/1
300 CAPSULE ORAL NIGHTLY
Status: DISCONTINUED | OUTPATIENT
Start: 2019-05-03 | End: 2019-05-16 | Stop reason: HOSPADM

## 2019-05-03 RX ORDER — ACETAMINOPHEN 325 MG/1
650 TABLET ORAL EVERY 6 HOURS PRN
Status: DISCONTINUED | OUTPATIENT
Start: 2019-05-03 | End: 2019-05-16 | Stop reason: HOSPADM

## 2019-05-03 RX ORDER — ASCORBIC ACID 500 MG
500 TABLET ORAL DAILY
Status: DISCONTINUED | OUTPATIENT
Start: 2019-05-03 | End: 2019-05-16 | Stop reason: HOSPADM

## 2019-05-03 RX ORDER — ATORVASTATIN CALCIUM 40 MG/1
80 TABLET, FILM COATED ORAL NIGHTLY
Status: DISCONTINUED | OUTPATIENT
Start: 2019-05-03 | End: 2019-05-16 | Stop reason: HOSPADM

## 2019-05-03 RX ORDER — ASPIRIN 81 MG/1
81 TABLET ORAL DAILY
Status: DISCONTINUED | OUTPATIENT
Start: 2019-05-03 | End: 2019-05-16 | Stop reason: HOSPADM

## 2019-05-03 RX ORDER — POTASSIUM CHLORIDE 20 MEQ/1
40 TABLET, EXTENDED RELEASE ORAL PRN
Status: DISCONTINUED | OUTPATIENT
Start: 2019-05-03 | End: 2019-05-15 | Stop reason: SDUPTHER

## 2019-05-03 RX ORDER — ONDANSETRON 2 MG/ML
4 INJECTION INTRAMUSCULAR; INTRAVENOUS EVERY 6 HOURS PRN
Status: DISCONTINUED | OUTPATIENT
Start: 2019-05-03 | End: 2019-05-16 | Stop reason: HOSPADM

## 2019-05-03 RX ORDER — POTASSIUM CHLORIDE 7.45 MG/ML
10 INJECTION INTRAVENOUS PRN
Status: DISCONTINUED | OUTPATIENT
Start: 2019-05-03 | End: 2019-05-15 | Stop reason: SDUPTHER

## 2019-05-03 RX ORDER — LANOLIN ALCOHOL/MO/W.PET/CERES
9 CREAM (GRAM) TOPICAL NIGHTLY
Status: DISCONTINUED | OUTPATIENT
Start: 2019-05-03 | End: 2019-05-16 | Stop reason: HOSPADM

## 2019-05-03 RX ORDER — FUROSEMIDE 10 MG/ML
40 INJECTION INTRAMUSCULAR; INTRAVENOUS ONCE
Status: COMPLETED | OUTPATIENT
Start: 2019-05-03 | End: 2019-05-03

## 2019-05-03 RX ORDER — SENNA AND DOCUSATE SODIUM 50; 8.6 MG/1; MG/1
1 TABLET, FILM COATED ORAL DAILY
Status: DISCONTINUED | OUTPATIENT
Start: 2019-05-03 | End: 2019-05-07

## 2019-05-03 RX ORDER — SODIUM BICARBONATE 650 MG/1
650 TABLET ORAL 2 TIMES DAILY
Status: DISCONTINUED | OUTPATIENT
Start: 2019-05-03 | End: 2019-05-06

## 2019-05-03 RX ORDER — DEXTROSE MONOHYDRATE 25 G/50ML
12.5 INJECTION, SOLUTION INTRAVENOUS PRN
Status: DISCONTINUED | OUTPATIENT
Start: 2019-05-03 | End: 2019-05-16 | Stop reason: HOSPADM

## 2019-05-03 RX ORDER — FAMOTIDINE 20 MG/1
20 TABLET, FILM COATED ORAL 2 TIMES DAILY
Status: DISCONTINUED | OUTPATIENT
Start: 2019-05-03 | End: 2019-05-16 | Stop reason: HOSPADM

## 2019-05-03 RX ORDER — NICOTINE POLACRILEX 4 MG
15 LOZENGE BUCCAL PRN
Status: DISCONTINUED | OUTPATIENT
Start: 2019-05-03 | End: 2019-05-16 | Stop reason: HOSPADM

## 2019-05-03 RX ORDER — MAGNESIUM SULFATE 1 G/100ML
1 INJECTION INTRAVENOUS PRN
Status: DISCONTINUED | OUTPATIENT
Start: 2019-05-03 | End: 2019-05-16 | Stop reason: HOSPADM

## 2019-05-03 RX ORDER — FLUTICASONE PROPIONATE 50 MCG
2 SPRAY, SUSPENSION (ML) NASAL DAILY
Status: DISCONTINUED | OUTPATIENT
Start: 2019-05-04 | End: 2019-05-16 | Stop reason: HOSPADM

## 2019-05-03 RX ORDER — FUROSEMIDE 10 MG/ML
40 INJECTION INTRAMUSCULAR; INTRAVENOUS 2 TIMES DAILY
Status: DISCONTINUED | OUTPATIENT
Start: 2019-05-03 | End: 2019-05-06

## 2019-05-03 RX ADMIN — FAMOTIDINE 20 MG: 20 TABLET, FILM COATED ORAL at 20:55

## 2019-05-03 RX ADMIN — FUROSEMIDE 40 MG: 10 INJECTION, SOLUTION INTRAMUSCULAR; INTRAVENOUS at 20:57

## 2019-05-03 RX ADMIN — SODIUM BICARBONATE 650 MG: 650 TABLET ORAL at 20:55

## 2019-05-03 RX ADMIN — METOPROLOL SUCCINATE 50 MG: 50 TABLET, EXTENDED RELEASE ORAL at 20:55

## 2019-05-03 RX ADMIN — Medication 200 MG: at 20:55

## 2019-05-03 RX ADMIN — MELATONIN 3 MG ORAL TABLET 9 MG: 3 TABLET ORAL at 20:54

## 2019-05-03 RX ADMIN — ONDANSETRON 4 MG: 2 INJECTION INTRAMUSCULAR; INTRAVENOUS at 19:04

## 2019-05-03 RX ADMIN — GABAPENTIN 300 MG: 300 CAPSULE ORAL at 20:55

## 2019-05-03 RX ADMIN — POTASSIUM CHLORIDE 20 MEQ: 20 TABLET, EXTENDED RELEASE ORAL at 20:56

## 2019-05-03 RX ADMIN — ATORVASTATIN CALCIUM 80 MG: 40 TABLET, FILM COATED ORAL at 20:54

## 2019-05-03 RX ADMIN — APIXABAN 5 MG: 5 TABLET, FILM COATED ORAL at 20:57

## 2019-05-03 RX ADMIN — Medication 10 ML: at 20:57

## 2019-05-03 RX ADMIN — FUROSEMIDE 40 MG: 10 INJECTION, SOLUTION INTRAMUSCULAR; INTRAVENOUS at 16:50

## 2019-05-03 ASSESSMENT — PAIN DESCRIPTION - LOCATION: LOCATION: ABDOMEN

## 2019-05-03 ASSESSMENT — PAIN DESCRIPTION - PAIN TYPE: TYPE: ACUTE PAIN

## 2019-05-03 ASSESSMENT — PAIN SCALES - GENERAL: PAINLEVEL_OUTOF10: 0

## 2019-05-03 ASSESSMENT — PAIN DESCRIPTION - DESCRIPTORS: DESCRIPTORS: TIGHTNESS

## 2019-05-03 NOTE — PROGRESS NOTES
4 Eyes Skin Assessment     The patient is being assess for   Admission    I agree that 2 RN's have performed a thorough Head to Toe Skin Assessment on the patient. ALL assessment sites listed below have been assessed. Areas assessed by both nurses:   [x]   Head, Face, and Ears   [x]   Shoulders, Back, and Chest, Abdomen  [x]   Arms, Elbows, and Hands   [x]   Coccyx, Sacrum, and Ischium  [x]   Legs, Feet, and Heels        Stage 2 to coccyx, open blister to L shin, scattered scabs, healing incision to sternum    **SHARE this note so that the co-signing nurse is able to place an eSignature**    Co-signer eSignature: Electronically signed by Christa Verde RN on 5/3/19 at 7:06 PM    Does the Patient have Skin Breakdown?   Yes [facility] WOUND CARE was Initiated documentation include the [place]-wound, Wound Assessment, Measurements, Dressing Treatment, Drainage, and Color\",          Shivam Prevention initiated:  Yes   Wound Care Orders initiated:  Yes      [id number]  nurse consulted for Pressure Injury (Stage 3,4, Unstageable, DTI, NWPT, Complex wounds)and New or Established Ostomies:  NA      Primary Nurse eSignature: Electronically signed by Haley Crocker RN on 5/3/19 at 6:50 PM

## 2019-05-03 NOTE — PLAN OF CARE
Anasarca, fluid overload    H/O CAD, recent CABG -  No proper follow up    ? Dementia , ?  If caregivers unable to provide care at home     - > PCU, IV lasix, cardio consult, may need placement

## 2019-05-03 NOTE — ED NOTES
1659- perfect serve sent to Dr. Judi Pond for admission     1726- 2nd call was placed to Dr. Brodie Martinez for admission. 1735- Call was returned by Dr. Brodie Martinez for admission and spoke with Dr. Chiara Aguilar.       Dona Fuller  05/03/19 [address] [facility]  05/03/19 7089

## 2019-05-03 NOTE — ED PROVIDER NOTES
Triage Chief Complaint:   Abdominal Pain (patient was brought in by EMS from [facility] for abdominal pain. patient is in rehab at [facility] for recovery after having a bypass 4 weeks ago)    [place]:  Maurisio Borden is a 60 y.o. male that presents with abdominal pain. Patient has been in [facility] for care after a bypass surgery 4 weeks ago. Patient describes waking up this morning with a bandlike feeling of numbness in his mid abdomen. He does admit to being a little bit constipated, but states he did have a bowel movement yesterday. No nausea, vomiting or diarrhea no change in his bladder habits.     ROS:  General:  No fevers, no chills, no weakness  Eyes:  No recent vison changes, no discharge  ENT:  No sore throat, no nasal congestion, no hearing changes  Cardiovascular:  No chest pain, no palpitations  Respiratory:  No shortness of breath, no cough, no wheezing  Gastrointestinal:  + pain, no nausea, no vomiting, no diarrhea  Musculoskeletal:  No muscle pain, no joint pain  Skin:  No rash, no pruritis, no easy bruising  Neurologic:  No speech problems, no headache, no extremity numbness, no extremity tingling, no extremity weakness  Psychiatric:  + anxiety  Genitourinary:  No dysuria, no hematuria  Endocrine:  No unexpected weight gain, no unexpected weight loss  Extremities:  no edema, no pain    Past Medical History:   Diagnosis Date    Anoxic brain injury ([facility] [facility] 1994    Cardiac arrest ([facility] [address] 1994    Gout     Hyperlipidemia     Hypertension     Reflux     S/P CABG x 3 12/2018    Sleep apnea     Type 2 diabetes mellitus without complication [facility])      Past Surgical History:   Procedure Laterality Date    CARDIAC CATHETERIZATION  11/26/2018    Dr. Gwen Turner Left 03/25/2014    Dr. Ebony Randall - w/trigger finger (3rd) & trigger thumb release    CARPAL TUNNEL RELEASE Right 04/14/2015    Dr. Lockwood Jorge N/A 12/16/2018    Dr. Kelsea Maxwell - decompressive corpectomy C6 (>90%), microdissection, anterior cervical arthrodesis w/PEEK allograft, placement of corpectomy cage & Synthes plate Q1-2    COLONOSCOPY  09/01/2015    Dr. Oneal - elis-diverticulosis, transverse colon adenomatous polyps    CORONARY ARTERY BYPASS GRAFT  12/14/2018    Dr. Meche Philippe  04/05/2019    cystoscopy/bladder biopsy f/c exchange    CYSTOSCOPY N/A 4/5/2019    CYSTOSCOPY performed by Eulalio Salazar MD at [facility] EMG Bilateral 02/27/2014    Dr. Acosta Gabriel POLYSOMNOGRAPHY  04/12/2016    Dr. Marga Borges. [facility] Muniz w[facility]    MN CABG, ARTERIAL, THREE N/A 12/4/2018    Dr. Samuel Mitchell - x3 (LIMA-LAD, BL SVG-OM, SVG-PDA)    RECONSTRUCTIVE REPAIR STERNAL N/A 12/20/2018    Dr. Samuel Mitchell - I&D of sternum w/placement of wound VAC    TRANSESOPHAGEAL ECHOCARDIOGRAM  12/04/2018    during CABG    TUNNELED VENOUS CATHETER PLACEMENT Right 12/24/2018    Dr. [facility] - PICC via IJ     Family History   Problem Relation Age of Onset    High Blood Pressure Mother     High Blood Pressure Father     High Blood Pressure Brother     High Blood Pressure Maternal Grandmother     High Blood Pressure Maternal Grandfather      Social History     Socioeconomic History    Marital status: Single     Spouse name: Not on file    Number of children: Not on file    Years of education: Not on file    Highest education level: Not on file   Occupational History    Not on file   Social Needs    Financial resource strain: Not on file    Food insecurity:     Worry: Not on file     Inability: Not on file    Transportation needs:     Medical: Not on file     Non-medical: Not on file   Tobacco Use    Smoking status: Never Smoker    Smokeless tobacco: Never Used   Substance and Sexual Activity    Alcohol use: No     Alcohol/week: 0.0 oz    Drug use: No    Sexual activity: Not on file   Lifestyle    Physical activity:     Days per week: Not on file     Minutes per session: Not on file    Stress: Not on file   Relationships    Social connections:     Talks on phone: Not on file     Gets together: Not on file     Attends [employer] service: Not on file     Active member of club or organization: Not on file     Attends meetings of clubs or organizations: Not on file     Relationship status: Not on file    Intimate partner violence:     Fear of current or ex partner: Not on file     Emotionally abused: Not on file     Physically abused: Not on file     Forced sexual activity: Not on file   Other Topics Concern    Not on file   Social History Narrative    Not on file     Current Facility-Administered Medications   Medication Dose Route Frequency Provider Last Rate Last Dose    melatonin tablet 9 mg  9 mg Oral Nightly Maria Esther Dougherty MD   9 mg at 05/03/19 2054    aspirin EC tablet 81 mg  81 mg Oral Daily Maria Esther Dougherty MD        fluticasone (FLONASE) 50 MCG/ACT nasal spray 2 spray  2 spray Each Nare Daily Maria Esther Dougherty MD        gabapentin (NEURONTIN) capsule 300 mg  300 mg Oral Nightly Maria Esther Dougherty MD   300 mg at 05/03/19 2055    sodium bicarbonate tablet 650 mg  650 mg Oral BID Maria Esther Dougherty MD   650 mg at 05/03/19 2055    atorvastatin (LIPITOR) tablet 80 mg  80 mg Oral Nightly Maria Esther Dougherty MD   80 mg at 05/03/19 2054    sennosides-docusate sodium (SENOKOT-S) 8.6-50 MG tablet 1 tablet  1 tablet Oral Daily Maria Esther Dougherty MD        tamsulosin (FLOMAX) capsule 0.4 mg  0.4 mg Oral Daily Maria Esther Dougherty MD        famotidine (PEPCID) tablet 20 mg  20 mg Oral BID Maria Esther Dougherty MD   20 mg at 05/03/19 2055    lactulose (CHRONULAC) 10 GM/15ML solution 20 g  20 g Oral BID PRN Maria Esther Dougherty MD        allopurinol (ZYLOPRIM) tablet 100 mg  100 mg Oral Daily Maria Esther Dougherty MD        apixaban Eric Edwin) tablet 5 mg  5 mg Oral BID Maria Esther Dougherty MD   5 mg at 05/03/19 2057    vitamin C (ASCORBIC ACID) tablet 500 mg  500 mg Oral Daily Maria Esther Dougherty MD        ferrous sulfate tablet 325 mg  325 mg Oral Daily with breakfast Eileen Desir MD        magnesium oxide (MAG-OX) tablet 200 mg  200 mg Oral Nightly Eileen Desir MD   200 mg at 05/03/19 2055    therapeutic multivitamin-minerals 1 tablet  1 tablet Oral Daily Eileen Desir MD        potassium chloride (KLOR-CON M) extended release tablet 20 mEq  20 mEq Oral BID Eileen Desir MD   20 mEq at 05/03/19 2056    acetaminophen (TYLENOL) tablet 650 mg  650 mg Oral Q6H PRN Eileen Desir MD        metoprolol succinate (TOPROL XL) extended release tablet 50 mg  50 mg Oral Daily Eileen Desir MD   50 mg at 05/03/19 2055    sodium chloride flush 0.9 % injection 10 mL  10 mL Intravenous 2 times per day Eileen Desir MD   10 mL at 05/03/19 2057    sodium chloride flush 0.9 % injection 10 mL  10 mL Intravenous PRN Eileen Desir MD        magnesium hydroxide (MILK OF MAGNESIA) 400 MG/5ML suspension 30 mL  30 mL Oral Daily PRN Eileen Desir MD        ondansetron [facility]) injection 4 mg  4 mg Intravenous Q6H PRN Eileen Desir MD   4 mg at 05/03/19 1904    furosemide (LASIX) injection 40 mg  40 mg Intravenous BID Eileen Desir MD   40 mg at 05/03/19 2057    insulin lispro (HUMALOG) injection vial 0-6 Units  0-6 Units Subcutaneous TID WC Eileen Desir MD        insulin lispro (HUMALOG) injection vial 0-3 Units  0-3 Units Subcutaneous Nightly Eileen Desir MD        glucose (GLUTOSE) 40 % oral gel 15 g  15 g Oral PRN Eileen Desir MD        dextrose 50 % solution 12.5 g  12.5 g Intravenous PRN Eileen Desir MD        glucagon (rDNA) injection 1 mg  1 mg Intramuscular PRN Eileen Desir MD        dextrose 5 % solution  100 mL/hr Intravenous PRN Eileen Desir MD        potassium chloride (KLOR-CON M) extended release tablet 40 mEq  40 mEq Oral PRN Eileen Desir MD        Or    potassium bicarb-citric acid (EFFER-K) effervescent tablet 40 mEq  40 mEq Oral PRN Eileen Desir MD        Or    potassium chloride 10 mEq/100 mL IVPB (Peripheral Line)  10 mEq Intravenous PRN Angy Ramirez MD        magnesium sulfate 1 g in dextrose 5% 100 mL IVPB  1 g Intravenous PRN Angy Ramirez MD         Allergies   Allergen Reactions    Enalapril     Nadolol     Verapamil        Nursing Notes Reviewed    Physical Exam:  ED Triage Vitals [05/03/19 1349]   Enc Vitals Group      BP (!) 141/108      Pulse 111      Resp 16      Temp 97.8 °F (36.6 °C)      Temp Source Oral      SpO2 98 %      Weight 260 lb (117.9 kg)      Height 5' 10\" (1.778 m)      Head Circumference       Peak Flow       Pain Score       Pain Loc       Pain Edu? Excl. in [address]? My pulse ox interpretation is - normal    General appearance:  No acute distress. Skin:  Warm. Dry. Eye:  Extraocular movements intact. Ears, nose, mouth and throat:  Oral mucosa moist .  Throat is clear   Neck:  Trachea midline. ,  Supple, full range of motion, no JVD, no stridor  Extremity:  2+ bilateral pedal edema with chronic venous stasis changes  Normal ROM for Patient  Heart:  Regular rate and rhythm,  Perfusion:  intact  Respiratory:  Lungs clear to auscultation bilaterally. Respirations nonlabored. Abdominal:  Normal bowel sounds. Soft. Nontender. Obese. Back:  No CVA tenderness to palpation     Neurological:  Alert and oriented times 3. No focal neuro deficits.              Psychiatric:  Appropriate    I have reviewed and interpreted all of the currently available lab results from this visit (if applicable):  Results for orders placed or performed during the hospital encounter of 05/03/19   CBC Auto Differential   Result Value Ref Range    WBC 7.3 4.0 - 11.0 K/uL    RBC 4.03 (L) 4.20 - 5.90 M/uL    Hemoglobin 10.8 (L) 13.5 - 17.5 g/dL    Hematocrit 35.2 (L) 40.5 - 52.5 %    MCV 87.2 80.0 - 100.0 fL    MCH 26.9 26.0 - 34.0 pg    MCHC 30.8 (L) 31.0 - 36.0 g/dL    RDW 19.8 (H) 12.4 - 15.4 %    Platelets 442 362 - 637 K/uL    MPV 9.3 5.0 - 10.5 fL    Neutrophils <0.01 ng/mL   POCT Glucose   Result Value Ref Range    POC Glucose 119 (H) 70 - 99 mg/dl    Performed on ACCU-CHEK    EKG 12 lead   Result Value Ref Range    Ventricular Rate 85 BPM    Atrial Rate 85 BPM    P-R Interval 180 ms    QRS Duration 110 ms    Q-T Interval 414 ms    QTc Calculation (Bazett) 492 ms    P Axis 54 degrees    R Axis 97 degrees    T Axis 265 degrees    Diagnosis       Normal sinus rhythmRightward axisIncomplete left bundle branch blockNonspecific T wave abnormalityProlonged QTAbnormal ECGWhen compared with ECG of 27-DEC-2018 09:24,Premature ventricular complexes are no longer PresentCriteria for Septal infarct are no longer PresentNonspecific T wave abnormality has replaced inverted T waves in Inferior leadsNonspecific T wave abnormality has replaced inverted T waves in Lateral leads      Radiographs (if obtained):  [] The following radiograph was interpreted by myself in the absence of a radiologist:   [x] Radiologist's Report Reviewed:  XR CHEST PORTABLE   Final Result   Limited study by low lung volumes. Bibasilar opacities may represent   atelectasis. Pneumonia is considered as less likely. Stable enlargement of the cardiac silhouette      RECOMMENDATION:   Eventual follow-up PA and lateral chest would be helpful. CT ABDOMEN PELVIS WO CONTRAST Additional Contrast? None   Final Result   Diffuse anasarca with moderate bilateral pleural effusions, small volume   ascites, and diffuse subcutaneous edema. This has increased considerably   when compared to the previous exam.      Gallstones are noted. There is subtle pericholecystic fat stranding, which   at least raise the possibility of cholecystitis, but that fat stranding is   equally likely to be secondary to anasarca.          XR CHEST STANDARD (2 VW)    (Results Pending)         EKG (if obtained): (All EKG's are interpreted by myself in the absence of a cardiologist)    Chart review shows recent radiographs:  No results found.    MDM:  62-year-old male with abdominal pain. Await labs and CT scan. .  4361 care to     Clinical Impression:  1. Hypervolemia, unspecified hypervolemia type    2. Shortness of breath      Disposition referral (if applicable):  Frances Fitzpatrick DO  [address]          Disposition medications (if applicable):  Current Discharge Medication List          Comment: Please note this report has been produced using speech recognition software and may contain errors related to that system including errors in grammar, punctuation, and spelling, as well as words and phrases that may be inappropriate. If there are any questions or concerns please feel free to contact the dictating provider for clarification. Lauren Baker MD  05/04/19 8467

## 2019-05-03 NOTE — PROGRESS NOTES
Bedside report given to [facility], RN. Patient in stable condition. Care transferred. ELIZABETH PinkN, RN.

## 2019-05-04 LAB
ANION GAP SERPL CALCULATED.3IONS-SCNC: 10 MMOL/L (ref 3–16)
BUN BLDV-MCNC: 23 MG/DL (ref 7–20)
CALCIUM SERPL-MCNC: 9 MG/DL (ref 8.3–10.6)
CHLORIDE BLD-SCNC: 105 MMOL/L (ref 99–110)
CHOLESTEROL, TOTAL: 115 MG/DL (ref 0–199)
CO2: 25 MMOL/L (ref 21–32)
CREAT SERPL-MCNC: 1.7 MG/DL (ref 0.9–1.3)
GFR AFRICAN AMERICAN: 50
GFR NON-AFRICAN AMERICAN: 41
GLUCOSE BLD-MCNC: 105 MG/DL (ref 70–99)
GLUCOSE BLD-MCNC: 106 MG/DL (ref 70–99)
GLUCOSE BLD-MCNC: 112 MG/DL (ref 70–99)
GLUCOSE BLD-MCNC: 89 MG/DL (ref 70–99)
GLUCOSE BLD-MCNC: 90 MG/DL (ref 70–99)
HCT VFR BLD CALC: 31.9 % (ref 40.5–52.5)
HDLC SERPL-MCNC: 38 MG/DL (ref 40–60)
HEMOGLOBIN: 9.9 G/DL (ref 13.5–17.5)
LDL CHOLESTEROL CALCULATED: 59 MG/DL
LV EF: 20 %
LVEF MODALITY: NORMAL
MAGNESIUM: 1.8 MG/DL (ref 1.8–2.4)
MCH RBC QN AUTO: 26.8 PG (ref 26–34)
MCHC RBC AUTO-ENTMCNC: 30.9 G/DL (ref 31–36)
MCV RBC AUTO: 86.8 FL (ref 80–100)
PDW BLD-RTO: 19.8 % (ref 12.4–15.4)
PERFORMED ON: ABNORMAL
PERFORMED ON: ABNORMAL
PERFORMED ON: NORMAL
PERFORMED ON: NORMAL
PLATELET # BLD: 134 K/UL (ref 135–450)
PMV BLD AUTO: 9.1 FL (ref 5–10.5)
POTASSIUM SERPL-SCNC: 4.5 MMOL/L (ref 3.5–5.1)
PRO-BNP: ABNORMAL PG/ML (ref 0–124)
RBC # BLD: 3.68 M/UL (ref 4.2–5.9)
SODIUM BLD-SCNC: 140 MMOL/L (ref 136–145)
T4 FREE: 1.3 NG/DL (ref 0.9–1.8)
TRIGL SERPL-MCNC: 88 MG/DL (ref 0–150)
TSH SERPL DL<=0.05 MIU/L-ACNC: 2.29 UIU/ML (ref 0.27–4.2)
VLDLC SERPL CALC-MCNC: 18 MG/DL
WBC # BLD: 5.9 K/UL (ref 4–11)

## 2019-05-04 PROCEDURE — 99223 1ST HOSP IP/OBS HIGH 75: CPT | Performed by: INTERNAL MEDICINE

## 2019-05-04 PROCEDURE — 6370000000 HC RX 637 (ALT 250 FOR IP): Performed by: INTERNAL MEDICINE

## 2019-05-04 PROCEDURE — 85027 COMPLETE CBC AUTOMATED: CPT

## 2019-05-04 PROCEDURE — 6360000002 HC RX W HCPCS: Performed by: INTERNAL MEDICINE

## 2019-05-04 PROCEDURE — 97165 OT EVAL LOW COMPLEX 30 MIN: CPT

## 2019-05-04 PROCEDURE — 93306 TTE W/DOPPLER COMPLETE: CPT

## 2019-05-04 PROCEDURE — 2060000000 HC ICU INTERMEDIATE R&B

## 2019-05-04 PROCEDURE — 97530 THERAPEUTIC ACTIVITIES: CPT

## 2019-05-04 PROCEDURE — 80061 LIPID PANEL: CPT

## 2019-05-04 PROCEDURE — 80048 BASIC METABOLIC PNL TOTAL CA: CPT

## 2019-05-04 PROCEDURE — 2580000003 HC RX 258: Performed by: INTERNAL MEDICINE

## 2019-05-04 PROCEDURE — 97166 OT EVAL MOD COMPLEX 45 MIN: CPT

## 2019-05-04 PROCEDURE — 83880 ASSAY OF NATRIURETIC PEPTIDE: CPT

## 2019-05-04 PROCEDURE — 83735 ASSAY OF MAGNESIUM: CPT

## 2019-05-04 PROCEDURE — 36415 COLL VENOUS BLD VENIPUNCTURE: CPT

## 2019-05-04 PROCEDURE — 93005 ELECTROCARDIOGRAM TRACING: CPT | Performed by: INTERNAL MEDICINE

## 2019-05-04 PROCEDURE — 97162 PT EVAL MOD COMPLEX 30 MIN: CPT

## 2019-05-04 RX ORDER — HYDRALAZINE HYDROCHLORIDE 20 MG/ML
10 INJECTION INTRAMUSCULAR; INTRAVENOUS EVERY 6 HOURS PRN
Status: DISCONTINUED | OUTPATIENT
Start: 2019-05-04 | End: 2019-05-16 | Stop reason: HOSPADM

## 2019-05-04 RX ADMIN — Medication 10 ML: at 10:07

## 2019-05-04 RX ADMIN — GABAPENTIN 300 MG: 300 CAPSULE ORAL at 20:57

## 2019-05-04 RX ADMIN — SODIUM BICARBONATE 650 MG: 650 TABLET ORAL at 10:06

## 2019-05-04 RX ADMIN — TAMSULOSIN HYDROCHLORIDE 0.4 MG: 0.4 CAPSULE ORAL at 10:06

## 2019-05-04 RX ADMIN — METOPROLOL SUCCINATE 50 MG: 50 TABLET, EXTENDED RELEASE ORAL at 10:06

## 2019-05-04 RX ADMIN — Medication 200 MG: at 20:57

## 2019-05-04 RX ADMIN — ALLOPURINOL 100 MG: 100 TABLET ORAL at 10:05

## 2019-05-04 RX ADMIN — POTASSIUM CHLORIDE 20 MEQ: 20 TABLET, EXTENDED RELEASE ORAL at 10:06

## 2019-05-04 RX ADMIN — Medication 10 ML: at 20:58

## 2019-05-04 RX ADMIN — ATORVASTATIN CALCIUM 80 MG: 40 TABLET, FILM COATED ORAL at 20:57

## 2019-05-04 RX ADMIN — FUROSEMIDE 40 MG: 10 INJECTION, SOLUTION INTRAMUSCULAR; INTRAVENOUS at 10:07

## 2019-05-04 RX ADMIN — MELATONIN 3 MG ORAL TABLET 9 MG: 3 TABLET ORAL at 20:57

## 2019-05-04 RX ADMIN — FAMOTIDINE 20 MG: 20 TABLET, FILM COATED ORAL at 20:57

## 2019-05-04 RX ADMIN — COLLAGENASE SANTYL: 250 OINTMENT TOPICAL at 15:12

## 2019-05-04 RX ADMIN — FERROUS SULFATE TAB 325 MG (65 MG ELEMENTAL FE) 325 MG: 325 (65 FE) TAB at 10:06

## 2019-05-04 RX ADMIN — SENNOSIDES AND DOCUSATE SODIUM 1 TABLET: 8.6; 5 TABLET ORAL at 10:06

## 2019-05-04 RX ADMIN — POTASSIUM CHLORIDE 20 MEQ: 20 TABLET, EXTENDED RELEASE ORAL at 20:49

## 2019-05-04 RX ADMIN — FUROSEMIDE 40 MG: 10 INJECTION, SOLUTION INTRAMUSCULAR; INTRAVENOUS at 20:57

## 2019-05-04 RX ADMIN — ASPIRIN 81 MG: 81 TABLET, COATED ORAL at 10:06

## 2019-05-04 RX ADMIN — MULTIPLE VITAMINS W/ MINERALS TAB 1 TABLET: TAB at 10:07

## 2019-05-04 RX ADMIN — APIXABAN 5 MG: 5 TABLET, FILM COATED ORAL at 10:06

## 2019-05-04 RX ADMIN — OXYCODONE HYDROCHLORIDE AND ACETAMINOPHEN 500 MG: 500 TABLET ORAL at 10:06

## 2019-05-04 RX ADMIN — FAMOTIDINE 20 MG: 20 TABLET, FILM COATED ORAL at 10:06

## 2019-05-04 RX ADMIN — APIXABAN 5 MG: 5 TABLET, FILM COATED ORAL at 20:49

## 2019-05-04 RX ADMIN — SODIUM BICARBONATE 650 MG: 650 TABLET ORAL at 20:57

## 2019-05-04 NOTE — ED PROVIDER NOTES
ED Course as of May 04 0105   Fri May 03, 2019   1637 Lipase:    Lipase 17.0 [WL]   1638 CBC Auto Differential(!):    WBC 7.3   RBC 4.03(!)   Hemoglobin Quant 10.8(!)   Hematocrit 35.2(!)   MCV 87.2   MCH 26.9   MCHC 30.8(!)   RDW 19.8(!)   Platelet Count 159   MPV 9.3   Neutrophils % 81.6   Lymphocyte % 10.5   Monocytes % 6.3   Eosinophils % 1.1   Basophils % 0.5   Neutrophils # 5.9   Lymphocytes # 0.8(!)   Monocytes # 0.5   Eosinophils # 0.1   Basophils # 0.0 [WL]   1638 Comprehensive Metabolic Panel(!):    Sodium 138   Potassium 3.8   Chloride 102   CO2 24   Anion Gap 12   Glucose 121(!)   BUN 23(!)   Creatinine 1.5(!)   GFR Non- 48(!)   GFR  58(!)   Calcium 9.5   Total Protein 6.2(!)   Albumin 3.3(!)   Albumin/Globulin Ratio 1.1   Bilirubin 0.5   Alk Phos 109   ALT 10   AST 21   Globulin 2.9 [WL]   1638 CT ABDOMEN PELVIS WO CONTRAST Additional Contrast? None [WL]   1638 XR CHEST PORTABLE [WL]   1703 Pt signed out to me pending results Ct. Briefly, pt presents for eval abnormal sensation along the top of his stomach. He is a very poor historian due to severe short term memory loss since cardiac surgery about 2 months ago. He has not had follow up and is having steadily increasing general swelling in spite of bumex QID. His new POA has been trying to arrange but unable to so far. No f/c. No cp. .  +sob    Pt nontoxic, nad. Anasarcic. Lungs diminished, otherwise clear. Pt with no followup and no current cardio. Volume overload with ckd.   Will benefit from admission for diuresis, sw, multiple specialist initial followup post-op and cardiology    [WL]      ED Course User Index  [WL] Shania Gonzales, [address] [place] [address],   05/04/19 1300

## 2019-05-04 NOTE — H&P
sennosides-docusate sodium (SENOKOT-S) 8.6-50 MG tablet Take 1 tablet by mouth daily 12/27/18   Yes Sammie Choi MD   tamsulosin (FLOMAX) 0.4 MG capsule Take 1 capsule by mouth daily 12/27/18   Yes Sammie Choi MD   metoprolol succinate (TOPROL XL) 50 MG extended release tablet Take 1 tablet by mouth daily  Patient taking differently: Take 50 mg by mouth 2 times daily  12/27/18   Yes Sammie Choi MD   fluticasone (FLONASE) 50 MCG/ACT nasal spray 2 sprays by Each Nare route daily     Yes Historical Provider, MD   glucose blood VI test strips (FREESTYLE TEST STRIPS) strip 1 each by In Vitro route daily As needed. 5/3/18   Yes Manual Nose, DO   aspirin (ASPIRIN LOW DOSE) 81 MG EC tablet TAKE ONE TABLET BY MOUTH ONCE DAILY 4/23/18   Yes Manual Nose, DO   ACCU-CHEK MULTICLIX LANCETS MISC USE ONE  TO CHECK GLUCOSE ONCE DAILY 4/19/18   Yes Manual Nose, DO   Blood Glucose Monitoring Suppl (ACCU-CHEK COMPACT CARE KIT) KIT 1 kit by Does not apply route daily 4/18/17   Yes Manual Nose, DO   Melatonin 10 MG TABS Take 10 mg by mouth nightly      Yes Historical Provider, MD   miconazole (MICOTIN) 2 % powder Apply topically 2 times daily. Apply to skin surrounding sternal wound vac--apply then dab in with skin prep per Munir Ontiveros RN wound care 1/10/19     Valerio Amos MD   sodium bicarbonate 650 MG tablet Take 1 tablet by mouth 2 times daily 12/27/18     Sammie Choi MD   gabapentin (NEURONTIN) 300 MG capsule Take 300 mg by mouth nightly. .       Historical Provider, MD            Allergies:  Enalapril; Nadolol; and Verapamil     Social History:  The patient currently lives in rehab     TOBACCO:   reports that he has never smoked. He has never used smokeless tobacco.  ETOH:   reports that he does not drink alcohol.        Family History:  Reviewed in detail and negative for DM, Early CAD, Cancer, CVA.  Positive as follows:     Family History       Problem Relation Age of Onset    High Blood Pressure Mother         [facility]  No results for input(s): INR in the last 72 hours. CARDIAC ENZYMES       Recent Labs     05/03/19  1905 05/03/19  2237   TROPONINI 0.11* 0.13*         U/A:          Lab Results   Component Value Date     NITRITE n 10/11/2017     COLORU Yellow 11/26/2018     WBCUA 0-2 11/26/2018     RBCUA 0-2 11/26/2018     MUCUS Rare 11/26/2018     CLARITYU Clear 11/26/2018     SPECGRAV 1.020 11/26/2018     LEUKOCYTESUR Negative 11/26/2018     BLOODU SMALL 11/26/2018     GLUCOSEU Negative 11/26/2018     AMORPHOUS Rare 11/26/2018         ABG          Lab Results   Component Value Date     NBW0UMQ 22.8 12/21/2018     BEART -2 12/21/2018     A2AKJFEF 98 12/21/2018     PHART 7.402 12/21/2018     DCV6YJI 36.7 12/21/2018     PO2ART 108.8 12/21/2018     ADU0GDH 24 12/21/2018                    Active Hospital Problems     Diagnosis Date Noted    PAF (paroxysmal [facility] fibrillation) ([facility]) [I48.0]      Benign essential HTN [I10]      CAMI (acute kidney injury) ([facility] [address] [N17.9]      Anasarca [R60.1] 05/03/2019    Prolonged QT interval [R94.31]      Acute on chronic systolic heart failure (HCC) [I50.23]      Coronary artery disease involving native coronary artery of native heart without angina pectoris [I25.10] 11/25/2018            PHYSICIANS CERTIFICATION:     I certify that Daiana Veras is expected to be hospitalized for > than 2 midnights based on the following assessment and plan:        ASSESSMENT/PLAN:     Acute systolic CHF/anasarca  - BNP at 24,546  - cardio consulted, started on IV lasix.    - Monitor I/O and daily wts  - no ACEi/ARB 2/2 renal failure, on toprol XL  - check echo     H/o CAD s/p CABG - 4 weeks ago, on eliquis/asa/statin/BB     HTN - uncontrolled, cont home meds, prn IV hydralazine     Elevated trop - suspect 2/2 above, trend trops, cardio consulted, check echo     ARF vs CKD - creat 1.5-->1.7, monitor on lasix, avoid nephrotoxic agents     Anemia - hb 10.8-->9.9, monitor        DVT Prophylaxis: eliquis  Diet: DIET CARDIAC;  Code Status: Full Code  PT/OT Eval Status: ordered     Dispo - cont care         Omer Pena MD     Thank you Galen Pastrana DO for the opportunity to be involved in this patient's care. If you have any questions or concerns please feel free to contact me at [phone].

## 2019-05-04 NOTE — PROGRESS NOTES
Inpatient Physical Therapy Evaluation and Treatment    Unit: PCU  Date:  5/4/2019  Patient Name:    Linda Hopkins  Admitting diagnosis:  Anasarca [R60.1]  Admit Date:  5/3/2019  Precautions/Restrictions/WB Status/ Lines/ Wounds/ Oxygen: fall risk, IV, bed/chair alarm and mohamud catheter , history of knees buckling    Treatment Time:  12:40-13:41  Treatment Number:  1   Timed Code Treatment Minutes: 49 minutes  Total Treatment Minutes:  59  minutes    Patient Goals for Therapy: \" wants to go home\"          Discharge Recommendations: Acute Rehab (ARU)/ Inpatient Rehab Facility (IRF)  DME needs for discharge: defer to facility       Therapy recommendations for staff:   Assist of 2 with use of rolling walker (RW) for all transfers or ambulation to/from chair    Home Health S4 Level Recommendation:  NA  AM-PAC Mobility Score            Preadmission Environment  has been living in hospital/rehab since Thanksgiving 2018, prior to that:  Pt. Lives Alone  Home environment:  one story home  Steps to enter first floor: No steps and 1 threshold stepSteps to enter  Steps to second floor: N/A  Bathroom: Bath Tub Shower and Standard height toilet  Equipment owned: none    Preadmission Status:   Pt. Able to drive: Yes  Pt Fully independent with ADLs: Yes  Pt. Required assistance from family for: Independent PTA  Pt. Fully independent for transfers and gait and walked with No Device  History of falls No     Patient has been in hospital or rehab since Thanksgiving 2018  Patient underwent a CABG in 12/2018. Has been in hospital or rehab since that time. Pt admit from [facility] for rehab. Via [address] to get information about pt level of functioning. Spoke with Khloe Lozano who was his nurse at [facility]:  Was at [place] [address] prior to [facility]. Had not been very mobile at that time. 2 person assist with RW, reports legs were weak and he would buckle   Pt will take some steps.  However, you never know if his legs will buckle. Was working with OT, SLP (cognition) and PT. For mobility, therapy always used 2 people. Confused, asks the same questions (anoxic brain injury in 89)   ADLs: feeds self; Washes face and under arms, staff assist for most ADL needs. He is able to assist with most of his daily needs. 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 RN reports pt struggles with anxiety. Pain   No    Cognition    A&O x4   Able to follow 2 step commands    Subjective  Patient sitting on EOB with no family present  Pt agreeable to this PT eval & tx. Upper Extremity ROM/Strength  Please see OT evaluation.       Lower Extremity ROM / Strength    AROM WFL: Yes  ROM limitations:     Strength Assessment (measured on a 0-5 scale):  R LE   Quad   4-   Ant Tib  4   Hamstring 4-   Iliopsoas 3+  L LE  Quad   4-   Ant Tib  4   Hamstring 4-   Iliopsoas 3+    Lower Extremity Sensation    NT denies numbness/tingling    Lower Extremity Proprioception:   NT    Coordination and Tone  NT    Balance  Static Sitting:  Good    Tolerance: sat EOB for interview  Dynamic Sitting:  Good   Static Standing: Good with RW   Tolerance:   Dynamic Standing: Good     Bed Mobility   Supine to Sit:   Not Tested  Sit to Supine:  Not Tested  Rolling:   Not Tested  Scooting at EOB: Independent  Scooting to [facility]:  Not Tested    Transfer Training     Sit to stand:   CGA  Stand to sit:   CGA  Bed to Chair:  CGA with use of rolling walker (RW)    Gait gait completed as indicated below  Distance:  4 ft  Deviations (firm surface/linoleum): step-to pattern   Assistive Device Used:  rolling walker (RW)  Level of Assist: CGA  Comment: VC for sequencing during turning    Stair Training deferred, pt unsafe/not appropriate to complete stairs at this time      Activity Tolerance   Pt completed therapy session with No nausea, dizziness, pain or SOB noted w/activity  SpO2:   HR:  BP:     Positioning Needs   Pt reclined in chair, call light and needs in reach and alarm set    Exercises Initiated    N/A    Other  Discussed recommendations for d/c with CM  Recommended a ST eval     Patient/Family Education   Pt educated on role of inpatient PT, POC, importance of continued activity, DC recommendations and calling for assist with mobility. Assessment  Pt seen for Physical Therapy evaluation in acute care setting. Pt demonstrated decreased Activity tolerance, Safety and Strength and decreased independence with Ambulation, Bed Mobility  and Transfers. Patient may benefit from continued skilled PT while an inpatient followed by rehab in the ARU setting prior to dc home. Goals : To be met in 3 visits:  1). Independent with LE Ex x 10 reps    To be met in 6 visits:  1). Supine to/from sit: Independent  2). Sit to/from stand: SBA  3). Bed to chair: SBA  4). Gait: Ambulate 50 ft  with  SBA  and use of rolling walker (RW)  5). Tolerate B LE exercises 3 sets of 10-15 reps  6). Ascend/descend steps with CGA with use of 1 threshold stepSteps to enter and LRAD. Rehabilitation Potential: Good  Strengths for achieving goals include:   Pt motivated, PLOF and Pt cooperative  Barriers to achieving goals include:    Complexity of condition    Plan    To be seen 5x / week  while in acute care setting for therapeutic exercises, bed mobility, transfers, progressive gait training, balance training, and family/patient education. Signature  Jimi Hager PT #[id number]    If patient discharges from this facility prior to next visit, this note will serve as the Discharge Summary.

## 2019-05-04 NOTE — PROGRESS NOTES
Inpatient Occupational Therapy  Evaluation and Treatment    Unit: PCU  Date:  5/4/2019  Patient Name:    Joss Blancas  Admitting diagnosis:  Kahlilrca [R60.1]  Admit Date:  5/3/2019  Precautions/Restrictions/WB Status/ Lines/ Wounds/ Oxygen: fall risk, IV, bed/chair alarm and mohamud catheter     Treatment Time:  2494-7936  Treatment Number: 1   Billable Treatment Time: 55 minutes   Total Treatment Time:  65    minutes    Patient Goals for Therapy: eventually \" to return home independently \"      Discharge Recommendations: Acute Rehab (ARU)/ Inpatient [facility] ([facility])  DME needs for discharge: defer to facility       Therapy recommendations for staff:   Assist of 1-2 with use of rolling walker (RW) and gait belt for all transfers to/from chair. Home Health S4 Level Recommendation:  DIONICIO  AM-PAC Score: 17    Preadmission Environment  has been living in hospital/rehab since Thanksgiving 2018, prior to that:  Pt. Lives Alone  Home environment:            one story home  Steps to enter first floor: No steps and 1 threshold stepSteps to enter  Steps to second floor:         N/A  Bathroom: Bath Tub Shower and Standard height toilet  Equipment owned: none     Preadmission Status:   Pt. Able to drive: Yes  Pt Fully independent with ADLs: Yes  Pt. Required assistance from family for: Independent PTA  Pt. Fully independent for transfers and gait and walked with No Device  History of falls          No      Patient has been in hospital or rehab since Thanksgiving 2018  Patient underwent a CABG in 12/2018. Has been in hospital or rehab since that time      Pt admit from [facility] where he was completing OT/PT/SLP. [address] to get information about pt level of functioning. Spoke with Oumar Aguilar who was his nurse at [facility]. Was at [place] [address] prior to [facility]. Had not been very mobile at that time.     2 person assist with RW, reports legs were weak and he would buckle   Pt will take some steps. However, you never know if his legs will buckle. Was working with OT, SLP (cognition) and PT. For mobility, therapy always used 2 people. Confused, asks the same questions (of note, pt had anoxic brain injury in 89. He was living independently up until the bypass surgery in Nov 2018)   ADLs: feeds self; Washes face and under arms, staff assist for most ADL needs. He is able to assist with most of his daily needs. 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 RN reports pt struggles with anxiety. Pain  No    Cognition    A&O x4   Able to follow 2 step commands    Subjective  Patient lying supine in bed with no family present  Pt agreeable to this OT eval & tx. Upper Extremity ROM:    WFL,  pt able to perform all bed mobility, transfers, and gait without ROM limitation. Upper Extremity Strength:    BUE strength WFL, but not formally assessed w/ MMT    Upper Extremity Sensation    WFL    Upper Extremity Proprioception:   WFL    Coordination and Tone  WFL    Balance  Functional Sitting Balance:  WFL  Functional Standing Balance:WFL  (no buckling during session)     Bed mobility:  Pt seated at EOB finishing breakfast upon entry)   Supine to sit:   Not Tested  Sit to supine:   Not Tested  Scooting to head of bed:   Not Tested  Scooting in sitting:  Independent  Rolling:   Not Tested  Bridging:   Not Tested    Transfers:    Sit to stand:  CGA and with use of RW, moved quickly   Stand to sit:  CGA and with use of RW  Bed to [facility]:  Not Tested  Bed to chair:   CGA and with use of RW  Standard toilet: Not Tested    Dressing:      UE: Mod A to don/doff hospital gown   LE:    Not Tested    Bathing:    UE:  Not Tested  LE:  Not Tested    Eating:   Not Tested    Toileting:  Not Tested    Positioning Needs:   Reclined in chair with call light and needs in reach.      Exercise / Activities Initiated:   N/A    Patient/Family Education:   Role of OT  Recommendations for DC  Safe RW use/hand placement    Assessment of Deficits: Pt seen for Occupational therapy evaluation in acute care setting. Pt demonstrated decreased Activity Tolerance, ADL's, Balance, Safety Awareness and Transfers. Pt functioning below baseline and will likely benefit from skilled occupational therapy services to maximize safety and independence. Goal(s) : To be met in 3 Visits:  1). Bed to toilet: Min A    To be met in 5 Visits:  1). Supine to Sit: Supervision  2). Upper Body Bathing:  Min A  3). Lower Body Bathing:  Min A  4). Upper Body Dressing: Min A  5). Lower Body Dressing: Min A  6). Pt to cielo UE exs x 15 reps    Rehabilitation Potential:  Good for goals listed above. Strengths for achieving goals include: Pt motivated and Pt cooperative  Barriers to achieving goals include:  Complexity of condition     Plan: To be seen 3-5 x/wk while in acute care setting for therapeutic exercises, bed mobility, transfers, dressing, bathing, family/patient education with adaptive equipment, breathing technique instruction.      Fritz Keene, KISHAN, OTR/L   [id number]           If patient discharges from this facility prior to next visit, this note will serve as the Discharge Summary

## 2019-05-04 NOTE — CONSULTS
[facility]   Cardiology Consultation   Date: 5/4/2019  Reason for Consultation: acute HF  Consult Requesting Physician: Lakesha Leon*     Chief Complaint   Patient presents with    Abdominal Pain     patient was brought in by EMS from [facility] for abdominal pain. patient is in rehab at [facility] for recovery after having a bypass 4 weeks ago     HPI: Sherine García is a 60 y.o. male that presents with abdominal pain. Patient has been in [address] for care after a bypass surgery X3, 12/2018. Patient describes waking up this morning with a bandlike feeling of numbness in his mid abdomen. He does admit to being a little bit constipated,    He had post op PEA and post op paroxysmal atrial fibrillation   Feels better, lying flat  He thinks his CABG was 4 weeks ago but actually it was in 2018      12/27/2018  PROCEDURES PERFORMED:  1. Coronary artery bypass x3, LIMA to LAD, reverse saphenous vein to  OM, reverse saphenous vein to PDA. 2.  Transesophageal echo. 3.  Endo vein harvest, right leg, left leg. 4. Intercostal nerve block.   5.  Doppler assessment of graft.         Past Medical History:   Diagnosis Date    Anoxic brain injury ([facility] 1994    Cardiac arrest ([facility] 1994    Gout     Hyperlipidemia     Hypertension     Reflux     S/P CABG x 3 12/2018    Sleep apnea     Type 2 diabetes mellitus without complication [facility])         Past Surgical History:   Procedure Laterality Date    CARDIAC CATHETERIZATION  11/26/2018    Dr. Jose M Yarbrough Left 03/25/2014    Dr. Guru Mccoy - w/trigger finger (3rd) & trigger thumb release    CARPAL TUNNEL RELEASE Right 04/14/2015    Dr. Daisy Islas N/A 12/16/2018    Dr. Aamir Acevedo - decompressive corpectomy C6 (>90%), microdissection, anterior cervical arthrodesis w/PEEK allograft, placement of corpectomy cage & Synthes plate Y4-8    COLONOSCOPY  09/01/2015    Dr. Neo Whiting - 3.59 01/08/2019     Lab Results   Component Value Date    CHOL 156 12/04/2018    HDL 28 12/04/2018    TRIG 225 12/04/2018       ECG: Normal sinus rhythmRightward axisIncomplete left bundle branch blockNonspecific T wave abnormalityProlonged   Echo: 12/2018  Conclusions      Summary   LV systolic function is mildly reduced with EF estimated from 40-45%.   More prominent septal hypokinesis is noted on certain views.   Left ventricular size is mildly increased.   There is mild concentric left ventricular hypertrophy.   Grade I diastolic dysfunction with normal filling pressure.   Aortic valve appears sclerotic but opens adequately.   Mild mitral regurgitation.   Inadequate tricuspid regurgitation to estimate systolic pulmonary artery   pressure. Cath:    LEFT HEART CATH  LM: luminals, mild calcium  LAD: heavy prox calcification. Mid long section of disease with focal lesion up to 80%, distal with sequential lesions of up to 60-70%                Daig1- mid 90%, distal vessel bifurcation disease of 50%  LCX: luminals, distal native LCX is ecctretic and severely diseased fed by R->Lt collaterals. OM1- 50% mid ribbon like lesion                OM2- smaller vessel, mild diffuse disease <30%  RCA: dominant, mild-moderate diffuse disease and calcification, lesions up to 20%                PDA- focal eccentric 80%                = some Rt-->Lt collaterals     LVEDP: 22-25  LVEF: Not done due to CKD      CT  Diffuse anasarca with moderate bilateral pleural effusions, small volume   ascites, and diffuse subcutaneous edema.  This has increased considerably   when compared to the previous exam.       Gallstones are noted. Mansoor Bearded is subtle pericholecystic fat stranding, which   at least raise the possibility of cholecystitis, but that fat stranding is   equally likely to be secondary to anasarca.      CXR    Limited study by low lung volumes.  Bibasilar opacities may represent   atelectasis.  Pneumonia is considered as less likely.       Stable enlargement of the cardiac silhouette    b/l effusion       Scheduled Meds:   melatonin  9 mg Oral Nightly    aspirin  81 mg Oral Daily    fluticasone  2 spray Each Nare Daily    gabapentin  300 mg Oral Nightly    sodium bicarbonate  650 mg Oral BID    atorvastatin  80 mg Oral Nightly    sennosides-docusate sodium  1 tablet Oral Daily    tamsulosin  0.4 mg Oral Daily    famotidine  20 mg Oral BID    allopurinol  100 mg Oral Daily    apixaban  5 mg Oral BID    vitamin C  500 mg Oral Daily    ferrous sulfate  325 mg Oral Daily with breakfast    magnesium oxide  200 mg Oral Nightly    therapeutic multivitamin-minerals  1 tablet Oral Daily    potassium chloride  20 mEq Oral BID    metoprolol succinate  50 mg Oral Daily    sodium chloride flush  10 mL Intravenous 2 times per day    furosemide  40 mg Intravenous BID    insulin lispro  0-6 Units Subcutaneous TID WC    insulin lispro  0-3 Units Subcutaneous Nightly     Continuous Infusions:   dextrose       PRN Meds:.lactulose, acetaminophen, sodium chloride flush, magnesium hydroxide, ondansetron, glucose, dextrose, glucagon (rDNA), dextrose, potassium chloride **OR** potassium alternative oral replacement **OR** potassium chloride, magnesium sulfate     Patient Active Problem List    Diagnosis Date Noted    Anasarca 05/03/2019    Lesion of bladder 04/05/2019    Recurrent UTI 04/05/2019    Cervical myelopathy (HCC) 12/27/2018    Prolonged Q-T interval on ECG     Status post aorto-coronary artery bypass graft     History of cardiac arrest     Obesity, Class II, BMI 35-39.9     Sepsis (HCC)     Postoperative infection of wound of sternum 12/21/2018    Serratia infection 12/21/2018    Gram-negative bacteremia 12/21/2018    Acute respiratory failure with hypoxia (HCC)     Cervical spinal cord compression ([facility] 12/07/2018    Spinal cord ischemia (HCC)     DM (diabetes mellitus), secondary, uncontrolled, w/neurologic complic ([facility]     Acute systolic congestive heart failure (HCC)     Acute renal failure with acute tubular necrosis superimposed on stage 3 chronic kidney disease ([facility]     NSTEMI (non-ST elevated myocardial infarction) ([facility]     Ischemic cardiomyopathy     Abnormal stress test 11/26/2018    Coronary artery disease involving native coronary artery of native heart with angina pectoris ([facility] 11/25/2018    Gait disturbance 11/23/2018    CKD (chronic kidney disease) stage 3, GFR 30-59 ml/min ([facility])     Abnormal echocardiogram     ACS (acute coronary syndrome) ([facility] 11/22/2018    Carpal tunnel syndrome 03/30/2015    Obstructive sleep apnea syndrome 03/06/2015    Anoxic brain injury ([facility] 06/09/2014    HTN (hypertension) 06/09/2014    Depression 04/20/2009    Gout 04/20/2009      Active Hospital Problems    Diagnosis Date Noted    Anasarca [R60.1] 05/03/2019       Assessment:       Plan:    - acute HFrEF/HFpEF with anasarca     Strict I/o   Albumin is slightly low   Continue IV lasix   On beta-blocker   BNP        - CAD   S/p CABG   No chest pain. On statin    - paroxysmal atrial fibrillation   Post op. Now sinus    On eliquis    - CAMI on CKD   Monitor renal function and electrolytes          - prolonged QT   Avoid QT prolonging drugs    - HTN   BP is acceptable for now. Will monitor    I independently reviewed **CT scan CXR     Thank you for allowing me to participate in the care of Pedro Ryan     NOTE: This report was transcribed using voice recognition software. Every effort was made to ensure accuracy, however, inadvertent computerized transcription errors may be present.

## 2019-05-04 NOTE — PROGRESS NOTES
N/A 12/20/2018    Dr. Jimmy White - I&D of sternum w/placement of wound VAC    TRANSESOPHAGEAL ECHOCARDIOGRAM  12/04/2018    during CABG    TUNNELED VENOUS CATHETER PLACEMENT Right 12/24/2018    Dr. Mason Villegas - PIC via IJ       Medications Prior to Admission:    Prior to Admission medications    Medication Sig Start Date End Date Taking? Authorizing Provider   acetaminophen (TYLENOL) 325 MG tablet Take 650 mg by mouth every 6 hours as needed for Pain   Yes Historical Provider, MD   Balsam [place]-Castor Oil OINT Apply topically   Yes Historical Provider, MD   allopurinol (ZYLOPRIM) 100 MG tablet Take 100 mg by mouth daily   Yes Historical Provider, MD   apixaban (ELIQUIS) 5 MG TABS tablet Take by mouth 2 times daily   Yes Historical Provider, MD   vitamin C (ASCORBIC ACID) 500 MG tablet Take 500 mg by mouth daily   Yes Historical Provider, MD   ferrous sulfate 325 (65 Fe) MG tablet Take 325 mg by mouth daily (with breakfast)   Yes Historical Provider, MD   Magnesium Oxide 200 MG TABS Take 1 tablet by mouth nightly   Yes Historical Provider, MD   Multiple Vitamins-Minerals (THERAPEUTIC MULTIVITAMIN-MINERALS) tablet Take 1 tablet by mouth daily   Yes Historical Provider, MD   potassium chloride (KLOR-CON M) 20 MEQ extended release tablet Take 20 mEq by mouth 2 times daily   Yes Historical Provider, MD   collagenase 250 UNIT/GM ointment Apply 1 each topically nightly Apply topically daily.    Yes Historical Provider, MD   famotidine (PEPCID) 20 MG tablet Take 1 tablet by mouth 2 times daily One tab daily before bed 1/10/19  Yes Leonel Jorge MD   lactulose [facility]) 10 GM/15ML solution Take 30 mLs by mouth 2 times daily as needed (constipation) 1/10/19  Yes Leonel Jorge MD   atorvastatin (LIPITOR) 80 MG tablet Take 1 tablet by mouth nightly 12/27/18  Yes Matheus Guerrero MD   sennosides-docusate sodium (SENOKOT-S) 8.6-50 MG tablet Take 1 tablet by mouth daily 12/27/18  Yes Matheus Guerrero MD   tamsulosin (FLOMAX) 0.4 MG capsule Take 1 capsule by mouth daily 12/27/18  Yes Mai Damon MD   metoprolol succinate (TOPROL XL) 50 MG extended release tablet Take 1 tablet by mouth daily  Patient taking differently: Take 50 mg by mouth 2 times daily  12/27/18  Yes Mai Damon MD   fluticasone (FLONASE) 50 MCG/ACT nasal spray 2 sprays by Each Nare route daily   Yes Historical Provider, MD   glucose blood VI test strips (FREESTYLE TEST STRIPS) strip 1 each by In Vitro route daily As needed. 5/3/18  Yes Frances Fitzpatrick DO   aspirin (ASPIRIN LOW DOSE) 81 MG EC tablet TAKE ONE TABLET BY MOUTH ONCE DAILY 4/23/18  Yes Frances Fitzpatrick DO   ACCU-CHEK MULTICLIX LANCETS MISC USE ONE  TO CHECK GLUCOSE ONCE DAILY 4/19/18  Yes Frances Fitzpatrick DO   Blood Glucose Monitoring Suppl (ACCU-CHEK COMPACT CARE KIT) KIT 1 kit by Does not apply route daily 4/18/17  Yes Frances Fitzpatrick DO   Melatonin 10 MG TABS Take 10 mg by mouth nightly    Yes Historical Provider, MD   miconazole (MICOTIN) 2 % powder Apply topically 2 times daily. Apply to skin surrounding sternal wound vac--apply then dab in with skin prep per Reese Lomax RN wound care 1/10/19   Marylou Hare MD   sodium bicarbonate 650 MG tablet Take 1 tablet by mouth 2 times daily 12/27/18   Mai Damon MD   gabapentin (NEURONTIN) 300 MG capsule Take 300 mg by mouth nightly. Tabatha Anderson Historical Provider, MD       Allergies:  Enalapril; Nadolol; and Verapamil    Social History:  The patient currently lives in rehab    TOBACCO:   reports that he has never smoked. He has never used smokeless tobacco.  ETOH:   reports that he does not drink alcohol. Family History:  Reviewed in detail and negative for DM, Early CAD, Cancer, CVA.  Positive as follows:        Problem Relation Age of Onset    High Blood Pressure Mother     High Blood Pressure Father     High Blood Pressure Brother     High Blood Pressure Maternal Grandmother     High Blood Pressure Maternal Grandfather        REVIEW OF SYSTEMS:   Positive for SOB and as noted in the HPI. All other systems reviewed and negative. PHYSICAL EXAM:    BP (!) 173/98   Pulse 86   Temp 97 °F (36.1 °C) (Oral)   Resp 16   Ht 5' 10\" (1.778 m)   Wt 233 lb (105.7 kg)   SpO2 96%   BMI 33.43 kg/m²     General appearance: No apparent distress appears stated age and cooperative. HEENT Normal cephalic, atraumatic without obvious deformity. Pupils equal, round, and reactive to light. Extra ocular muscles intact. Conjunctivae/corneas clear. Neck: Supple, No jugular venous distention/bruits. Trachea midline without thyromegaly or adenopathy with full range of motion. Lungs: diminished b/l  Heart: Regular rate and rhythm with Normal S1/S2   Abdomen: Soft, non-tender or non-distended without rigidity or guarding and positive bowel sounds   Extremities: No clubbing, cyanosis, 3+ edema bilaterally. Skin: Skin color, texture, turgor normal.  No rashes or lesions. Neurologic: Alert and oriented to self, neurovascularly intact with sensory/motor intact upper extremities/lower extremities, bilaterally. Cranial nerves: II-XII intact, grossly non-focal.  Mental status: Alert, awake, forgetful  Capillary Refill: Acceptable  < 3 seconds  Peripheral Pulses: +3 Easily felt, not easily obliterated with pressure      CXR:  I have reviewed the CXR with the following interpretation: cardiomegaly with some vascular congestion    CBC   Recent Labs     05/03/19  1428 05/04/19  0557   WBC 7.3 5.9   HGB 10.8* 9.9*   HCT 35.2* 31.9*    134*      RENAL  Recent Labs     05/03/19  1428 05/04/19  0557    140   K 3.8 4.5    105   CO2 24 25   BUN 23* 23*   CREATININE 1.5* 1.7*     LFT'S  Recent Labs     05/03/19  1428   AST 21   ALT 10   BILITOT 0.5   ALKPHOS 109     COAG  No results for input(s): INR in the last 72 hours.   CARDIAC ENZYMES  Recent Labs     05/03/19  1905 05/03/19  2237   TROPONINI 0.11* 0.13*       U/A:    Lab Results   Component Value Date    NITRITE n me at [phone].

## 2019-05-04 NOTE — PROGRESS NOTES
Pt resting in bed with eyes closed. Pt denies any pain or SOB. Assessment complete-see flowsheet. Medications given-see MAR. Pt denies further needs at this time. Call light and bedside table within reach. Will continue to monitor.

## 2019-05-05 LAB
ANION GAP SERPL CALCULATED.3IONS-SCNC: 9 MMOL/L (ref 3–16)
BUN BLDV-MCNC: 26 MG/DL (ref 7–20)
CALCIUM SERPL-MCNC: 9.1 MG/DL (ref 8.3–10.6)
CHLORIDE BLD-SCNC: 101 MMOL/L (ref 99–110)
CO2: 26 MMOL/L (ref 21–32)
CREAT SERPL-MCNC: 1.8 MG/DL (ref 0.9–1.3)
ESTIMATED AVERAGE GLUCOSE: 105.4 MG/DL
GFR AFRICAN AMERICAN: 47
GFR NON-AFRICAN AMERICAN: 39
GLUCOSE BLD-MCNC: 108 MG/DL (ref 70–99)
GLUCOSE BLD-MCNC: 116 MG/DL (ref 70–99)
GLUCOSE BLD-MCNC: 77 MG/DL (ref 70–99)
GLUCOSE BLD-MCNC: 87 MG/DL (ref 70–99)
GLUCOSE BLD-MCNC: 89 MG/DL (ref 70–99)
HBA1C MFR BLD: 5.3 %
MAGNESIUM: 1.7 MG/DL (ref 1.8–2.4)
PERFORMED ON: ABNORMAL
PERFORMED ON: NORMAL
POTASSIUM SERPL-SCNC: 4.2 MMOL/L (ref 3.5–5.1)
PRO-BNP: ABNORMAL PG/ML (ref 0–124)
SODIUM BLD-SCNC: 136 MMOL/L (ref 136–145)

## 2019-05-05 PROCEDURE — 83880 ASSAY OF NATRIURETIC PEPTIDE: CPT

## 2019-05-05 PROCEDURE — 2580000003 HC RX 258: Performed by: INTERNAL MEDICINE

## 2019-05-05 PROCEDURE — 36415 COLL VENOUS BLD VENIPUNCTURE: CPT

## 2019-05-05 PROCEDURE — 99233 SBSQ HOSP IP/OBS HIGH 50: CPT | Performed by: INTERNAL MEDICINE

## 2019-05-05 PROCEDURE — 80048 BASIC METABOLIC PNL TOTAL CA: CPT

## 2019-05-05 PROCEDURE — 6360000002 HC RX W HCPCS: Performed by: INTERNAL MEDICINE

## 2019-05-05 PROCEDURE — 2060000000 HC ICU INTERMEDIATE R&B

## 2019-05-05 PROCEDURE — 83735 ASSAY OF MAGNESIUM: CPT

## 2019-05-05 PROCEDURE — 6370000000 HC RX 637 (ALT 250 FOR IP): Performed by: INTERNAL MEDICINE

## 2019-05-05 RX ADMIN — SODIUM BICARBONATE 650 MG: 650 TABLET ORAL at 20:23

## 2019-05-05 RX ADMIN — TAMSULOSIN HYDROCHLORIDE 0.4 MG: 0.4 CAPSULE ORAL at 09:20

## 2019-05-05 RX ADMIN — FAMOTIDINE 20 MG: 20 TABLET, FILM COATED ORAL at 09:20

## 2019-05-05 RX ADMIN — POTASSIUM CHLORIDE 20 MEQ: 20 TABLET, EXTENDED RELEASE ORAL at 09:20

## 2019-05-05 RX ADMIN — FUROSEMIDE 40 MG: 10 INJECTION, SOLUTION INTRAMUSCULAR; INTRAVENOUS at 09:24

## 2019-05-05 RX ADMIN — ASPIRIN 81 MG: 81 TABLET, COATED ORAL at 09:20

## 2019-05-05 RX ADMIN — SODIUM BICARBONATE 650 MG: 650 TABLET ORAL at 09:20

## 2019-05-05 RX ADMIN — MELATONIN 3 MG ORAL TABLET 9 MG: 3 TABLET ORAL at 20:23

## 2019-05-05 RX ADMIN — GABAPENTIN 300 MG: 300 CAPSULE ORAL at 20:22

## 2019-05-05 RX ADMIN — ALLOPURINOL 100 MG: 100 TABLET ORAL at 09:23

## 2019-05-05 RX ADMIN — FUROSEMIDE 40 MG: 10 INJECTION, SOLUTION INTRAMUSCULAR; INTRAVENOUS at 20:23

## 2019-05-05 RX ADMIN — ATORVASTATIN CALCIUM 80 MG: 40 TABLET, FILM COATED ORAL at 20:23

## 2019-05-05 RX ADMIN — APIXABAN 5 MG: 5 TABLET, FILM COATED ORAL at 20:22

## 2019-05-05 RX ADMIN — Medication 10 ML: at 09:20

## 2019-05-05 RX ADMIN — MULTIPLE VITAMINS W/ MINERALS TAB 1 TABLET: TAB at 09:20

## 2019-05-05 RX ADMIN — SENNOSIDES AND DOCUSATE SODIUM 1 TABLET: 8.6; 5 TABLET ORAL at 09:20

## 2019-05-05 RX ADMIN — FERROUS SULFATE TAB 325 MG (65 MG ELEMENTAL FE) 325 MG: 325 (65 FE) TAB at 09:20

## 2019-05-05 RX ADMIN — APIXABAN 5 MG: 5 TABLET, FILM COATED ORAL at 09:20

## 2019-05-05 RX ADMIN — POTASSIUM CHLORIDE 20 MEQ: 20 TABLET, EXTENDED RELEASE ORAL at 20:23

## 2019-05-05 RX ADMIN — OXYCODONE HYDROCHLORIDE AND ACETAMINOPHEN 500 MG: 500 TABLET ORAL at 09:20

## 2019-05-05 RX ADMIN — ONDANSETRON 4 MG: 2 INJECTION INTRAMUSCULAR; INTRAVENOUS at 17:38

## 2019-05-05 RX ADMIN — Medication 200 MG: at 20:23

## 2019-05-05 RX ADMIN — Medication 10 ML: at 20:28

## 2019-05-05 RX ADMIN — FAMOTIDINE 20 MG: 20 TABLET, FILM COATED ORAL at 20:23

## 2019-05-05 RX ADMIN — COLLAGENASE SANTYL: 250 OINTMENT TOPICAL at 09:20

## 2019-05-05 RX ADMIN — METOPROLOL SUCCINATE 50 MG: 50 TABLET, EXTENDED RELEASE ORAL at 09:20

## 2019-05-05 NOTE — PLAN OF CARE
Problem: Falls - Risk of:  Goal: Will remain free from falls  Description  Will remain free from falls  Outcome: Ongoing  Goal: Absence of physical injury  Description  Absence of physical injury  Outcome: Ongoing     Problem: Risk for Impaired Skin Integrity  Goal: Tissue integrity - skin and mucous membranes  Description  Structural intactness and normal physiological function of skin and  mucous membranes.   Outcome: Ongoing  Note:   Stage 2 to coccyx-mepilex in place     Problem: Fluid Volume:  Goal: Hemodynamic stability will improve  Description  Hemodynamic stability will improve  Outcome: Ongoing  Goal: Ability to maintain a balanced intake and output will improve  Description  Ability to maintain a balanced intake and output will improve  Outcome: Ongoing

## 2019-05-05 NOTE — PROGRESS NOTES
[address]   Cardiology Progress Note     Admit Date: 5/3/2019     Reason for follow up: HF    HPI and Interval History: presents with abdominal pain. Patient has been in [address] for care after a bypass surgery X3, 2018. Patient describes waking up this morning with a bandlike feeling of numbness in his mid abdomen. He does admit to being a little bit constipated,     He had post op PEA and post op paroxysmal atrial fibrillation   Feels better, lying flat  He thinks his CABG was 4 weeks ago but actually it was in 2018     Patient seen and examined. Clinical notes reviewed. Telemetry reviewed. No new complaint today. No major events overnight. Denies having chest pain, shortness of breath, dyspnea on exertion, Orthopnea, PND at the time of this visit. Feels better . In chair  Review of System:  All other systems reviewed and are negative except for that noted above. Pertinent negatives are:     · General: negative for fever, chills   · Ophthalmic ROS: negative for - eye pain or loss of vision  · ENT ROS: negative for - headaches, sore throat   · Respiratory: negative for - cough, sputum  · Cardiovascular: Reviewed in HPI  · Gastrointestinal: negative for - abdominal pain, diarrhea, N/V  · Hematology: negative for - bleeding, blood clots, bruising or jaundice  · Genito-Urinary:  negative for - Dysuria or incontinence  · Musculoskeletal: negative for - Joint swelling, muscle pain  · Neurological: negative for - confusion, dizziness, headaches   · Psychiatric: No anxiety, no depression.   · Dermatological: negative for - rash      Physical Examination:  Vitals:    19 0345   BP: 123/86   Pulse: 88   Resp: 16   Temp: 97.4 °F (36.3 °C)   SpO2: 96%      In: 960 [P.O.:960]  Out: 1100    Wt Readings from Last 3 Encounters:   19 234 lb (106.1 kg)   19 199 lb (90.3 kg)   19 236 lb (107 kg)     Temp  Av.7 °F (36.5 °C)  Min: 97 °F (36.1 °C)  Max: 98.2 °F (36.8 °C)  Pulse Av  Min: 73  Max: 88  BP  Min: 106/69  Max: 173/98  SpO2  Av.5 %  Min: 96 %  Max: 99 %    Intake/Output Summary (Last 24 hours) at 2019 0737  Last data filed at 2019 0617  Gross per 24 hour   Intake 1210 ml   Output 1100 ml   Net 110 ml       · Telemetry: Sinus rhythm PAC  · Constitutional: Oriented. No distress. · Head: Normocephalic and atraumatic. · Mouth/Throat: Oropharynx is clear and moist.   · Eyes: Conjunctivae normal. EOM are normal.   · Neck: Neck supple. No rigidity. No JVD present. · Cardiovascular: Normal rate, regular rhythm, S1&S2. · Pulmonary/Chest: Bilateral respiratory sounds. No wheezes, No rhonchi. · Abdominal: Soft. Bowel sounds present. mild distension, No tenderness. · Musculoskeletal: No tenderness. + edema    · Lymphadenopathy: Has no cervical adenopathy. · Neurological: Alert and oriented. Cranial nerve appears intact, No Gross deficit   · Skin: Skin is warm and dry. No rash noted. , oozing skin  Of legs  · Psychiatric: Has a normal behavior       ·     Labs:  Reviewed. Recent Labs     19  1428 19  0557 19  0553    140 136   K 3.8 4.5 4.2    105 101   CO2 24 25 26   BUN 23* 23* 26*   CREATININE 1.5* 1.7* 1.8*     Recent Labs     19  1428 19  0557   WBC 7.3 5.9   HGB 10.8* 9.9*   HCT 35.2* 31.9*   MCV 87.2 86.8    134*     Lab Results   Component Value Date    CKTOTAL 537 10/30/2018    TROPONINI 0.13 2019     Estimated Creatinine Clearance: 54 mL/min (A) (based on SCr of 1.8 mg/dL (H)).    No results found for: BNP  Lab Results   Component Value Date    PROTIME 25.1 01/10/2019    PROTIME 40.7 2019    PROTIME 40.9 2019    INR 2.20 01/10/2019    INR 3.57 2019    INR 3.59 2019     Lab Results   Component Value Date    CHOL 115 2019    HDL 38 2019    TRIG 88 2019       Scheduled Meds:   collagenase   Topical Daily    melatonin  9 mg Oral Nightly    aspirin  81 mg Oral Daily    fluticasone  2 spray Each Nare Daily    gabapentin  300 mg Oral Nightly    sodium bicarbonate  650 mg Oral BID    atorvastatin  80 mg Oral Nightly    sennosides-docusate sodium  1 tablet Oral Daily    tamsulosin  0.4 mg Oral Daily    famotidine  20 mg Oral BID    allopurinol  100 mg Oral Daily    apixaban  5 mg Oral BID    vitamin C  500 mg Oral Daily    ferrous sulfate  325 mg Oral Daily with breakfast    magnesium oxide  200 mg Oral Nightly    therapeutic multivitamin-minerals  1 tablet Oral Daily    potassium chloride  20 mEq Oral BID    metoprolol succinate  50 mg Oral Daily    sodium chloride flush  10 mL Intravenous 2 times per day    furosemide  40 mg Intravenous BID    insulin lispro  0-6 Units Subcutaneous TID WC    insulin lispro  0-3 Units Subcutaneous Nightly     Continuous Infusions:   dextrose       PRN Meds:hydrALAZINE, lactulose, acetaminophen, sodium chloride flush, magnesium hydroxide, ondansetron, glucose, dextrose, glucagon (rDNA), dextrose, potassium chloride **OR** potassium alternative oral replacement **OR** potassium chloride, magnesium sulfate     Diagnostic and imaging results reviewed.      Patient Active Problem List    Diagnosis Date Noted    PAF (paroxysmal atrial fibrillation) (HCC)     Benign essential HTN     [facility]     Anasarca 05/03/2019    Lesion of bladder 04/05/2019    Recurrent UTI 04/05/2019    Cervical myelopathy (HCC) 12/27/2018    Prolonged QT interval     Status post aorto-coronary artery bypass graft     History of cardiac arrest     Obesity, Class II, BMI 35-39.9     Sepsis (HCC)     Postoperative infection of wound of sternum 12/21/2018    Serratia infection 12/21/2018    Gram-negative bacteremia 12/21/2018    Acute respiratory failure with hypoxia (HCC)     Cervical spinal cord compression ([facility] 12/07/2018    Spinal cord ischemia (HCC)     DM (diabetes mellitus), secondary, uncontrolled, w/[facility]rologic complic [facility]     Acute on chronic systolic heart failure (HCC)     Acute renal failure with acute tubular necrosis superimposed on stage 3 chronic kidney disease [facility]     NSTEMI (non-ST elevated myocardial infarction) [facility]     Ischemic cardiomyopathy     Abnormal stress test 11/26/2018    Coronary artery disease involving native coronary artery of native heart without angina pectoris 11/25/2018    Gait disturbance 11/23/2018    CKD (chronic kidney disease) stage 3, GFR 30-59 ml/min ([facility])     Abnormal echocardiogram     ACS (acute coronary syndrome) [facility] 11/22/2018    Carpal tunnel syndrome 03/30/2015    Obstructive sleep apnea syndrome 03/06/2015    Anoxic brain injury ([facility] 06/09/2014    HTN (hypertension) 06/09/2014    Depression 04/20/2009    Gout 04/20/2009      Active Hospital Problems    Diagnosis Date Noted    PAF (paroxysmal atrial fibrillation) ([facility]) [I48.0]     Benign essential HTN [I10]     CAMI (acute kidney injury) ([facility] [N17.9]     Anasarca [R60.1] 05/03/2019    Prolonged QT interval [R94.31]     Acute on chronic systolic heart failure ([facility]) [I50.23]     Coronary artery disease involving native coronary artery of native heart without angina pectoris [I25.10] 11/25/2018   echo    Conclusions      Summary     Left ventricle - normal size and thickness, severely reduced function with   EF of 20%, severe diffuse hypokinesis     Right ventricle - reduced function     Mitral valve - mod-severe regurgitation     Tricuspid valve - moderate regurgitation with PASP of 55mmHg     Pulmonic valve - mild regurgitation  Assessment: Plan:     -- acute HFrEF/HFpEF with anasarca                 Strict I/o              Albumin is slightly low              Continue IV lasix. Has not had adequate urine output based on the chart.   May switch to IV lasix drip if urine output stays low              On beta-blocker              pro BNP 84177      - cardiomyopathy   EF is 20% and mod-severe MR        - CAD              S/p CABG              No chest pain. On statin     - paroxysmal atrial fibrillation              Post op. Now sinus               On eliquis     - CAMI on CKD              Monitor renal function and electrolytes   Slightly worse                                 - prolonged QT              Avoid QT prolonging drugs     - HTN              BP is well controlled. Continue current meds. Will monitor            I independently reviewed *CXR     NOTE: This report was transcribed using voice recognition software. Every effort was made to ensure accuracy, however, inadvertent computerized transcription errors may be present.      Leticia Soler MD, [facility], [address] [place] [address]   Office: [phone]

## 2019-05-05 NOTE — PROGRESS NOTES
Pt up to chair watching TV. Assessment complete-see flowsheet. Medications given-see MAR. IV bandage changed as well as j-loop on IV due to leaking. IV remains patent in vein with good blood return and flushes. Pt denies further needs at this time. Call light and bedside table within reach. Will continue to monitor.

## 2019-05-05 NOTE — PROGRESS NOTES
Bedside report given to [facility], RN. Patient in stable condition. Care transferred. FELIPE Quesada, RN.

## 2019-05-05 NOTE — CARE COORDINATION
Case Management Assessment  Initial Evaluation    Date/Time of Evaluation: 5/5/2019 2:12 PM  Assessment Completed by: Rishabh Moore    Patient Name: Ricardo Forde  YOB: 1960  Diagnosis: Sid [R60.1]  Date / Time: 5/3/2019  1:42 PM  Admission status/Date:INPT 5/5/19  Chart Reviewed: Yes      Patient Interviewed: Yes   Family Interviewed:  No      Hospitalization in the last 30 days:  No    Contacts  :     Relationship to Patient:   Phone Number:    Alternate Contact:     Relationship to Patient:     Phone Number:    Met with:    Current PCP Joe Polk 94  Medicaid  Precert required for SNF : Y, N        3 night stay required - Y, N    ADLS  Support Systems: Family Members, Friends/Neighbors  Transportation: EMS transportation    Meal Preparation: per [employer]    Housing  Home Environment: from [facility]  Steps: n/a  Plans to Return to Present Housing: pt prefers ARU if possible  Other Identified Issues: -    Danika Glass 77  Currently active with [address] : No  Type of Home Care Services: OT, PT, Skilled Therapy  Passport/Waiver : No  :                      Phone Number:    Passport/Waiver Services: -          Durable Medical Equipment   DME Provider: per snf  Equipment: per snf  Walker__Cane__RTS__ BSC__Shower Chair__  02__ HHN__ CPAP__  BiPap__  Hospital Bed__ W/C___ Other__________      Has Home O2 in place on admit:  No  Informed of need to bring portable home O2 tank on day of discharge for nursing to connect prior to leaving:   Not Indicated  Verbalized agreement/Understanding:   Not Indicated    Community Service Affiliation  Dialysis:  No    · Name:  · Location  · Dialysis Schedule:  · Phone:   · Fax: Outpatient PT/OT: No    Cancer Center: No     CHF Clinic: No     Pulmonary Rehab: No  Pain Clinic: No  Community Mental Health: No    Wound Clinic: No     Other: -    DISCHARGE PLAN: reviewed chart and met with pt.  Pt reports to [facility] reside at [facility]. Pt states that he was living independently prior to cardiac bypass earlier this year. Pt states that he spent time in an acute hospital and went to ARU from there. Pt states that he then went to the [facility] but requested to change facilities due to receiving little to no therapy. Pt then transferred to [facility] where he has only been for 3 days. Pt feels that he is capable of doing more therapy than what is being offered at SNF level and feels he would benefit from going back to ARU and then returning home. Pt goal is to live independently and feels that SNF goals don't seem to match his own. Noted PT/OT recommendation is for ARU. Pt would like referral sent to [address] [place] [address]. Called and left [facility] for Jozef Medrano with AARU. Await return call. Called and spoke with staff at [facility]. No admissions or DON in today. Message left for [facility] to return call Monday re taking pt back at discharge if needed. Pt states that [facility] treated him well and if he does not qualify for ARU that he would like to return to [facility]. Will follow. Explained Case Management role/services.

## 2019-05-05 NOTE — PROGRESS NOTES
Shift assessment complete- see flow sheet. Patient is A/Ox4, forgetful at times. Patient is awake in chair. Patient with no complaints of abdominal pain or numbness at this time. Robin catheter patent and draining clear yellow urine. Patient denies any further needs at this time. Call light explained and in reach. Bed alarm set. Will continue to monitor. ELIZABETH TurciosN, RN.

## 2019-05-05 NOTE — PROGRESS NOTES
Hospitalist Progress Note      PCP: Fabiola Wallace DO    Date of Admission: 5/3/2019    Subjective: very forgetful, didn't diurese much    Medications:  Reviewed    Infusion Medications    dextrose       Scheduled Medications    collagenase   Topical Daily    melatonin  9 mg Oral Nightly    aspirin  81 mg Oral Daily    fluticasone  2 spray Each Nare Daily    gabapentin  300 mg Oral Nightly    sodium bicarbonate  650 mg Oral BID    atorvastatin  80 mg Oral Nightly    sennosides-docusate sodium  1 tablet Oral Daily    tamsulosin  0.4 mg Oral Daily    famotidine  20 mg Oral BID    allopurinol  100 mg Oral Daily    apixaban  5 mg Oral BID    vitamin C  500 mg Oral Daily    ferrous sulfate  325 mg Oral Daily with breakfast    magnesium oxide  200 mg Oral Nightly    therapeutic multivitamin-minerals  1 tablet Oral Daily    potassium chloride  20 mEq Oral BID    metoprolol succinate  50 mg Oral Daily    sodium chloride flush  10 mL Intravenous 2 times per day    furosemide  40 mg Intravenous BID    insulin lispro  0-6 Units Subcutaneous TID WC    insulin lispro  0-3 Units Subcutaneous Nightly     PRN Meds: hydrALAZINE, lactulose, acetaminophen, sodium chloride flush, magnesium hydroxide, ondansetron, glucose, dextrose, glucagon (rDNA), dextrose, potassium chloride **OR** potassium alternative oral replacement **OR** potassium chloride, magnesium sulfate      Intake/Output Summary (Last 24 hours) at 5/5/2019 1411  Last data filed at 5/5/2019 1105  Gross per 24 hour   Intake 1330 ml   Output 1100 ml   Net 230 ml       Physical Exam Performed:    BP (!) 123/90   Pulse 83   Temp 97.8 °F (36.6 °C) (Oral)   Resp 16   Ht 5' 10\" (1.778 m)   Wt 234 lb (106.1 kg)   SpO2 96%   BMI 33.58 kg/m²     General appearance: No apparent distress appears stated age and cooperative.   HEENT Normal cephalic, atraumatic without obvious deformity.  Pupils equal, round, and reactive to light.  Extra ocular muscles Result   Diffuse anasarca with moderate bilateral pleural effusions, small volume   ascites, and diffuse subcutaneous edema. This has increased considerably   when compared to the previous exam.      Gallstones are noted. There is subtle pericholecystic fat stranding, which   at least raise the possibility of cholecystitis, but that fat stranding is   equally likely to be secondary to anasarca. Assessment/Plan:    Active Hospital Problems    Diagnosis Date Noted    PAF [facility]oxysmal [facility] fibrillation) [facility] [I48.0]     Benign essential HTN [I10]     CAMI (acute kidney injury) ([facility] [N17.9]     Anasarca [R60.1] 05/03/2019    Prolonged QT interval [R94.31]     Acute on chronic systolic heart failure (HCC) [I50.23]     Coronary artery disease involving native coronary artery of native heart without angina pectoris [I25.10] 11/25/2018           Acute on chr systolic CHF/anasarca  - BNP at 24,546  - cardio consulted, started on IV lasix. - Monitor I/O and daily wts, hasnt diuresed much  - no ACEi/ARB 2/2 renal failure, on toprol XL  - check echo with EF 15-20%  - no change in BNP 24,000     H/o CAD s/p CABG - 4 weeks ago, on eliquis/asa/statin/BB     HTN - uncontrolled, cont home meds, prn IV hydralazine     Elevated trop - suspect 2/2 above, trend trops, cardio consulted, reviewed echo     ARF vs CKD - creat 1.5-->1.7-->1.8, monitor on lasix, avoid nephrotoxic agents. Might need lasix gtt     Anemia - hb 10.8-->9.9, monitor    Hypomag - replete        DVT Prophylaxis: eliquis  Diet: DIET CARDIAC;  Code Status: Full Code    PT/OT Eval Status: ordered    Dispo - needs more diuresis, cont care.  Might need lasix gtt if unable to diurese with IV lasix    Kary Delgadillo MD

## 2019-05-06 LAB
ANION GAP SERPL CALCULATED.3IONS-SCNC: 10 MMOL/L (ref 3–16)
BUN BLDV-MCNC: 26 MG/DL (ref 7–20)
CALCIUM SERPL-MCNC: 9.1 MG/DL (ref 8.3–10.6)
CHLORIDE BLD-SCNC: 104 MMOL/L (ref 99–110)
CO2: 26 MMOL/L (ref 21–32)
CREAT SERPL-MCNC: 1.9 MG/DL (ref 0.9–1.3)
EKG ATRIAL RATE: 85 BPM
EKG DIAGNOSIS: NORMAL
EKG P AXIS: 54 DEGREES
EKG P-R INTERVAL: 180 MS
EKG Q-T INTERVAL: 414 MS
EKG QRS DURATION: 110 MS
EKG QTC CALCULATION (BAZETT): 492 MS
EKG R AXIS: 97 DEGREES
EKG T AXIS: 265 DEGREES
EKG VENTRICULAR RATE: 85 BPM
GFR AFRICAN AMERICAN: 44
GFR NON-AFRICAN AMERICAN: 36
GLUCOSE BLD-MCNC: 103 MG/DL (ref 70–99)
GLUCOSE BLD-MCNC: 106 MG/DL (ref 70–99)
GLUCOSE BLD-MCNC: 120 MG/DL (ref 70–99)
GLUCOSE BLD-MCNC: 126 MG/DL (ref 70–99)
GLUCOSE BLD-MCNC: 88 MG/DL (ref 70–99)
MAGNESIUM: 1.6 MG/DL (ref 1.8–2.4)
PERFORMED ON: ABNORMAL
PERFORMED ON: NORMAL
POTASSIUM SERPL-SCNC: 4.7 MMOL/L (ref 3.5–5.1)
SODIUM BLD-SCNC: 140 MMOL/L (ref 136–145)

## 2019-05-06 PROCEDURE — 6370000000 HC RX 637 (ALT 250 FOR IP): Performed by: INTERNAL MEDICINE

## 2019-05-06 PROCEDURE — 97535 SELF CARE MNGMENT TRAINING: CPT

## 2019-05-06 PROCEDURE — 36415 COLL VENOUS BLD VENIPUNCTURE: CPT

## 2019-05-06 PROCEDURE — 97530 THERAPEUTIC ACTIVITIES: CPT

## 2019-05-06 PROCEDURE — 92523 SPEECH SOUND LANG COMPREHEN: CPT

## 2019-05-06 PROCEDURE — 92507 TX SP LANG VOICE COMM INDIV: CPT

## 2019-05-06 PROCEDURE — 2060000000 HC ICU INTERMEDIATE R&B

## 2019-05-06 PROCEDURE — 97116 GAIT TRAINING THERAPY: CPT

## 2019-05-06 PROCEDURE — 6360000002 HC RX W HCPCS: Performed by: INTERNAL MEDICINE

## 2019-05-06 PROCEDURE — 2580000003 HC RX 258: Performed by: INTERNAL MEDICINE

## 2019-05-06 PROCEDURE — 80048 BASIC METABOLIC PNL TOTAL CA: CPT

## 2019-05-06 PROCEDURE — 83735 ASSAY OF MAGNESIUM: CPT

## 2019-05-06 PROCEDURE — 93010 ELECTROCARDIOGRAM REPORT: CPT | Performed by: INTERNAL MEDICINE

## 2019-05-06 PROCEDURE — 99233 SBSQ HOSP IP/OBS HIGH 50: CPT | Performed by: INTERNAL MEDICINE

## 2019-05-06 PROCEDURE — 99232 SBSQ HOSP IP/OBS MODERATE 35: CPT | Performed by: INTERNAL MEDICINE

## 2019-05-06 RX ORDER — SPIRONOLACTONE 25 MG/1
25 TABLET ORAL DAILY
Status: DISCONTINUED | OUTPATIENT
Start: 2019-05-06 | End: 2019-05-16 | Stop reason: HOSPADM

## 2019-05-06 RX ADMIN — Medication 200 MG: at 21:04

## 2019-05-06 RX ADMIN — APIXABAN 5 MG: 5 TABLET, FILM COATED ORAL at 21:04

## 2019-05-06 RX ADMIN — FERROUS SULFATE TAB 325 MG (65 MG ELEMENTAL FE) 325 MG: 325 (65 FE) TAB at 11:04

## 2019-05-06 RX ADMIN — MAGNESIUM SULFATE HEPTAHYDRATE 1 G: 1 INJECTION, SOLUTION INTRAVENOUS at 12:45

## 2019-05-06 RX ADMIN — GABAPENTIN 300 MG: 300 CAPSULE ORAL at 21:03

## 2019-05-06 RX ADMIN — COLLAGENASE SANTYL: 250 OINTMENT TOPICAL at 11:05

## 2019-05-06 RX ADMIN — ALLOPURINOL 100 MG: 100 TABLET ORAL at 11:03

## 2019-05-06 RX ADMIN — FAMOTIDINE 20 MG: 20 TABLET, FILM COATED ORAL at 11:04

## 2019-05-06 RX ADMIN — ATORVASTATIN CALCIUM 80 MG: 40 TABLET, FILM COATED ORAL at 21:03

## 2019-05-06 RX ADMIN — TAMSULOSIN HYDROCHLORIDE 0.4 MG: 0.4 CAPSULE ORAL at 11:04

## 2019-05-06 RX ADMIN — MELATONIN 3 MG ORAL TABLET 9 MG: 3 TABLET ORAL at 21:03

## 2019-05-06 RX ADMIN — ASPIRIN 81 MG: 81 TABLET, COATED ORAL at 11:04

## 2019-05-06 RX ADMIN — Medication 10 ML: at 11:05

## 2019-05-06 RX ADMIN — FUROSEMIDE 10 MG/HR: 10 INJECTION, SOLUTION INTRAVENOUS at 21:09

## 2019-05-06 RX ADMIN — FUROSEMIDE 10 MG/HR: 10 INJECTION, SOLUTION INTRAVENOUS at 12:44

## 2019-05-06 RX ADMIN — OXYCODONE HYDROCHLORIDE AND ACETAMINOPHEN 500 MG: 500 TABLET ORAL at 11:04

## 2019-05-06 RX ADMIN — Medication 10 ML: at 21:09

## 2019-05-06 RX ADMIN — METOPROLOL SUCCINATE 50 MG: 50 TABLET, EXTENDED RELEASE ORAL at 11:05

## 2019-05-06 RX ADMIN — APIXABAN 5 MG: 5 TABLET, FILM COATED ORAL at 11:05

## 2019-05-06 RX ADMIN — SPIRONOLACTONE 25 MG: 25 TABLET ORAL at 11:21

## 2019-05-06 RX ADMIN — MAGNESIUM SULFATE HEPTAHYDRATE 1 G: 1 INJECTION, SOLUTION INTRAVENOUS at 11:55

## 2019-05-06 RX ADMIN — MULTIPLE VITAMINS W/ MINERALS TAB 1 TABLET: TAB at 11:03

## 2019-05-06 RX ADMIN — FAMOTIDINE 20 MG: 20 TABLET, FILM COATED ORAL at 21:04

## 2019-05-06 ASSESSMENT — PAIN SCALES - GENERAL
PAINLEVEL_OUTOF10: 0
PAINLEVEL_OUTOF10: 0

## 2019-05-06 NOTE — PROGRESS NOTES
Inpatient Physical Therapy Daily Treatment Note    Unit: PCU  Date:  5/6/2019  Patient Name:    Lavinia Valle  Admitting diagnosis:  Anasarca [R60.1]  Admit Date:  5/3/2019  Precautions/Restrictions:  fall risk, IV, bed/chair alarm and mohamud catheter       Discharge Recommendations: Acute Rehab (ARU)/ Inpatient Rehab Facility (IRF)  DME needs for discharge: defer to facility       Therapy recommendations for staff:   Assist of 2 with use of rolling walker (RW) for all transfers or ambulation to/from bedside [facility] S4 Level Recommendation: NA  AM-PAC Mobility Score   AM-PAC Inpatient Mobility Raw Score : 14       Treatment Time:  926-1000  Treatment number: 2  Timed Code Treatment Minutes: 34 minutes  Total Treatment Minutes:  34  minutes    Cognition    A&O x4   Able to follow 2 step commands. Hx of anoxic brain injury, which pt states causes him to lack short term memory. Subjective  Patient sitting on EOB with no family present  Pt agreeable to this PT tx.      Pain   No  Rating:  NA/10  Location:   Pain Medicine Status: Denies need     Bed Mobility   Supine to Sit:   Not Tested  Sit to Supine:  Not Tested  Rolling:   Not Tested  Scooting:   Independent    Transfer Training     Sit to stand:   CGA (EOB, BSC, w/c)  Stand to sit:   CGA (EOB, BSC, w/c, chair)  Bed to/from BSC:  Min A with use of rolling walker (RW), cues to use RW appropriately     Gait Training gait completed as indicated below  Distance:  12 ft + 3 ft  Deviations (firm surface/linoleum): decreased yaa, Decreased step length and other: B knee buckling with nearly every step, forward flexed posture    Assistive Device Used:  rolling walker (RW)  Level of Assist: Min A to Mod A  Comment: cues for improved use of RW    Stair Training deferred, pt unsafe/not appropriate to complete stairs at this time    Therapeutic Exercise all completed bilaterally unless indicated  Ankle pumps: x20    LAQ: x15  Marching: x15    Balance  Static Sitting:  Good -    Tolerance: WNL  Dynamic Sitting:  Good -   Static Standing: Fair    Tolerance: ~30 sec. To 2 min (varied)   Dynamic Standing: Poor    Patient Education      Role of PT, POC, Discharge recommendations, safety awareness, transfer techniques and calling for assist with mobility. Positioning Needs       Pt sitting up in chair, call light and needs in reach and alarm set. ROM Measurements N/A  Knee Flexion:  Knee Extension:     Activity Tolerance   Pt completed therapy session with Dizziness and SOB noted w/activity. Both subsided quickly with rest.   SpO2:   HR:  BP:     Other  Pt used BSC and noted to have soft stool. Pt also mentioned hx of C. Diff; this was reported to the nurse. Pt also mentioned using Cpap regularly at home while he sleeps, but has not been using this here. Mentioned this to pt's nurse as well. Assessment :  Patient participating well this AM with increased ambulation distance, but with this required increased assistance, up to mod A. Pt challenged by weakness & instability of BLEs. Continue to recommend ARU upon DC to safely progress mobility back to independence. Goals (all goals ongoing unless otherwise indicated)  To be met in 3 visits:  1). Independent with LE Ex x 10 reps     To be met in 6 visits:  1). Supine to/from sit: Independent  2). Sit to/from stand: SBA  3). Bed to chair: SBA  4). Gait: Ambulate 50 ft  with  SBA  and use of rolling walker (RW)  5). Tolerate B LE exercises 3 sets of 10-15 reps  6). Ascend/descend steps with CGA with use of 1 threshold stepSteps to enter and LRAD. Plan   Continue with plan of care. Johanna Garza, PT, DPT #[id number]     If patient discharges from this facility prior to next visit, this note will serve as the Discharge Summary.

## 2019-05-06 NOTE — PROGRESS NOTES
Occupational Therapy Daily Treatment Note    Unit: PCU  Date:  5/6/2019  Patient Name:    Toi Coates  Admitting diagnosis:  Anasarca [R60.1]  Admit Date:  5/3/2019  Precautions/Restrictions:  fall risk, mohamud catheter  and confusion      Discharge Recommendations: Acute Rehab (ARU)/ Inpatient [id number] ([facility])  DME needs for discharge: defer to facility       Therapy recommendations for staff:   Assist of 2 with use of rolling walker (RW) for all transfers to/from chair    AM-PAC Score: [id number] [facility] S4 Level: NA       Treatment Time:  361-5193  Treatment number:  2   Total Treatment Time:  23 minutes      Subjective:  Pt in the bed and willing to work with OT    Pain   No  Rating: NA  Location:NA  Pain Medicine Status: Denies need      Bed Mobility:   Supine to Sit:  Not Tested  Sit to Supine:  Not Tested  Rolling:           Not Tested  Scooting:        SBA    Transfer Training:   Sit to stand:   Min A of 2  Stand to sit:  Min A of 2  Bed to Chair:  Min A of 2- pt had to take a rest brake and sit in the W/C before making it to the chair. LEs were buckling   Bed to BSC:   Min A of 2  Standard toilet:   Not Tested    Activity Tolerance   Pt completed therapy session with No adverse symptoms    ADL Training:   Upper body dressing:  Not Tested  Upper body bathing:  Not Tested  Lower body dressing: Mod A to max  to don pull ups over feet and mod to pull up over hips  Lower body bathing:  Not Tested  Toileting:   Min A Pt able to stand briefly to wipe from bowel movement   Grooming/Hygiene:  IND with combing hair     Therapeutic Exercise:   N/A    Patient Education:   Use of AE    Positioning Needs:   Up in chair, call light and needs in reach. Family Present:  No    Assessment: Pt cooperative and willing to work with OT. The pt was able to stand with min assist and was min assist of two for ambulation around the end of the bed with rest period when partially to the chair.  The pts LEs were buckling when ambulating. The pt was mod to max assist for donning pulling up pullups over feet and mod to pull them over hips when standing. Pt requires further rehab to increase IND. GOALS  To be met in 3 Visits:  1). Bed to toilet: Min A     To be met in 5 Visits:  1). Supine to Sit: Supervision  2). Upper Body Bathing:  Min A  3). Lower Body Bathing:  Min A  4). Upper Body Dressing: Min A  5). Lower Body Dressing: Min A  6).  Pt to cielo UE exs x 15 reps       Plan: cont with POC    Leon Handley OTR/L [id number]      If patient discharges from this facility prior to next visit, this note will serve as the Discharge Summary

## 2019-05-06 NOTE — PROGRESS NOTES
Pt resting in bed with eyes closed. Pt c/o nausea, received zofran 3 hrs prior, updated board for next available medication time. Assessment complete-see flowsheet. Medications given-see MAR. Pt denies further needs at this time. Call light and bedside table within reach. Will continue to monitor.

## 2019-05-06 NOTE — PROGRESS NOTES
Report given to [facility], DEON RN. Transfer of care done at this time.

## 2019-05-06 NOTE — PROGRESS NOTES
[address] Daily Progress Note      Admit Date:  5/3/2019    Reason for Consultation: acute HF  Subjective:  Mr. Diego Scherer is seen for follow up of CHF  Seen for the first time by me this admission. He denies chest pain or palp   He is short of breath with minimal exertion  He came with swelling and shortness of breath   He has CPAP AT home but not wearing it. CABG in Dec complicated post op course including redo sternotomy and sternal repair. HPI on admission by my partner Dr. Hong Davison: Sonya Miles is a 60 y.o. male that presents with abdominal pain. Patient has been in [address] for care after a bypass surgery X3, 12/2018. Patient describes waking up this morning with a bandlike feeling of numbness in his mid abdomen.   He does admit to being a little bit constipated,  Roxana Dimas had post op PEA and post op paroxysmal atrial fibrillation          ROS:  12 point ROS negative in all areas as listed below except as in [place]  Constitutional, EENT, Cardiovascular, pulmonary, GI, , Musculoskeletal, skin, neurological, hematological, endocrine, Psychiatric    Past Medical History:   Diagnosis Date    Anoxic brain injury ([facility] [address] 1994    Cardiac arrest ([facility] [address] 1994    Gout     Hyperlipidemia     Hypertension     Reflux     S/P CABG x 3 12/2018    Sleep apnea     Type 2 diabetes mellitus without complication [facility])      Past Surgical History:   Procedure Laterality Date    CARDIAC CATHETERIZATION  11/26/2018    Dr. Bong Gruber Left 03/25/2014    Dr. Dilia Ruano - w/trigger finger (3rd) & trigger thumb release    CARPAL TUNNEL RELEASE Right 04/14/2015    Dr. Judyann Kocher N/A 12/16/2018    Dr. Kong Moore - decompressive corpectomy C6 (>90%), microdissection, anterior cervical arthrodesis w/PEEK allograft, placement of corpectomy cage & Synthes plate J8-0    COLONOSCOPY  09/01/2015    Dr. Oneal - gillis-diverticulosis, transverse colon adenomatous polyps    CORONARY ARTERY BYPASS GRAFT  12/14/2018    Dr. Rita Villatoro  04/05/2019    cystoscopy/bladder biopsy f/c exchange    CYSTOSCOPY N/A 4/5/2019    CYSTOSCOPY performed by Sean Lopez MD at [address] EMG Bilateral 02/27/2014    Dr. Fede Encarnacion POLYSOMNOGRAPHY  04/12/2016    Dr. Omar Montanez. HAFSA Muniz w/ Health    NM CABG, ARTERIAL, THREE N/A 12/4/2018    Dr. Sneed Prim - x3 (LIMA-LAD, BL SVG-OM, SVG-PDA)    RECONSTRUCTIVE REPAIR STERNAL N/A 12/20/2018    Dr. Sneed Prim - I&D of sternum w/placement of wound VAC    TRANSESOPHAGEAL ECHOCARDIOGRAM  12/04/2018    during CABG    TUNNELED VENOUS CATHETER PLACEMENT Right 12/24/2018    Dr. Lacy Lee - PICC via IJ       Objective:   /87   Pulse 94   Temp 97.9 °F (36.6 °C) (Oral)   Resp 16   Ht 5' 10\" (1.778 m)   Wt 232 lb 8 oz (105.5 kg)   SpO2 91%   BMI 33.36 kg/m²       Intake/Output Summary (Last 24 hours) at 5/6/2019 0935  Last data filed at 5/6/2019 0511  Gross per 24 hour   Intake 900 ml   Output 1425 ml   Net -525 ml       TELEMETRY:NSR    Physical Exam:  General: No Respiratory distress, appears well developed and well nourished. Eyes:  Sclera nonicteric  Nose/Sinuses:  negative findings: nose shows no deformity, asymmetry, or inflammation, nasal mucosa normal, septum midline with no perforation or bleeding  Back:  no pain to palpation  Joint:  no active joint inflammation  Musculoskeletal:  negative  Skin:  Warm and dry  Neck:  Negative for JVD and Carotid Bruits. Chest:  Crackles both bases to auscultation, respiration easy  Cardiovascular:  RRR, S1S2 normal, no murmur, no rub or thrill.   Abdomen:  Soft normal liver and spleen  Extremities:   4+ edema,   Neuro: intact    Medications:    collagenase   Topical Daily    melatonin  9 mg Oral Nightly    aspirin  81 mg Oral Daily    fluticasone  2 spray Each Nare Daily    gabapentin  300 mg Oral Nightly    sodium bicarbonate  650 mg Oral BID    atorvastatin  80 mg Oral Nightly    sennosides-docusate sodium  1 tablet Oral Daily    tamsulosin  0.4 mg Oral Daily    famotidine  20 mg Oral BID    allopurinol  100 mg Oral Daily    apixaban  5 mg Oral BID    vitamin C  500 mg Oral Daily    ferrous sulfate  325 mg Oral Daily with breakfast    magnesium oxide  200 mg Oral Nightly    therapeutic multivitamin-minerals  1 tablet Oral Daily    potassium chloride  20 mEq Oral BID    metoprolol succinate  50 mg Oral Daily    sodium chloride flush  10 mL Intravenous 2 times per day    furosemide  40 mg Intravenous BID    insulin lispro  0-6 Units Subcutaneous TID WC    insulin lispro  0-3 Units Subcutaneous Nightly      dextrose       hydrALAZINE, lactulose, acetaminophen, sodium chloride flush, magnesium hydroxide, ondansetron, glucose, dextrose, glucagon (rDNA), dextrose, potassium chloride **OR** potassium alternative oral replacement **OR** potassium chloride, magnesium sulfate    Lab Data:  CBC:   Recent Labs     05/03/19  1428 05/04/19  0557   WBC 7.3 5.9   HGB 10.8* 9.9*   HCT 35.2* 31.9*   MCV 87.2 86.8    134*     BMP:   Recent Labs     05/04/19  0557 05/05/19  0553 05/06/19  0557    136 140   K 4.5 4.2 4.7    101 104   CO2 25 26 26   BUN 23* 26* 26*   CREATININE 1.7* 1.8* 1.9*     LIVER PROFILE:   Recent Labs     05/03/19  1428   AST 21   ALT 10   LIPASE 17.0   BILITOT 0.5   ALKPHOS 109     PT/INR: No results for input(s): PROTIME, INR in the last 72 hours. APTT: No results for input(s): APTT in the last 72 hours. BNP:  No results for input(s): BNP in the last 72 hours.     IMAGING:   I have reviewed the following tests and documented in this encounter as follows:   EKG 5/4/19  Normal sinus rhythmRightward axisIncomplete left bundle branch blockNonspecific T wave abnormalityProlonged QTAbnormal ECGWhen compared with ECG of 27-DEC-2018 09:24,Premature ventricular complexes are no longer PresentCriteria for Septal infarct are no longer PresentNonspecific T wave abnormality has replaced inverted T waves in Inferior leadsNonspecific T wave abnormality has replaced inverted T waves in Lateral leadsConfirmed by Bhargav Black MD, [address] ([id number]) on 2019 5:35:34      ECHO 5/3/19  Summary     Left ventricle - normal size and thickness, severely reduced function with   EF of 20%, severe diffuse hypokinesis   Right ventricle - reduced function   Mitral valve - mod-severe regurgitation   Tricuspid valve - moderate regurgitation with PASP of 55mmHg   Pulmonic valve - mild regurgitation    EK2018 Sinus tach with short AT run, IVCD, possible septal infarct, PVC    2018   post-Op Diagnosis: Sternal Incision Infection      Procedure(s):  STERNUM INCISION AND DRAINAGE WITH WOUND VAC PLACEMENT          CABG 19  CORONARY ARTERY BYPASS GRAFTING X3, INTERNAL MAMMARY ARTERY, SAPHENOUS VEIN GRAFT, ON PUMP  LIMA to LAD reverse saphenous vein to OM reverse saphenous vein to PDA  Transesophageal echo  Endo-vein harvest right and left leg  Intercostal nerve block  Doppler assessment of graft      LEFT HEART CATH 18  LM: luminals, mild calcium  LAD: heavy prox calcification.  Mid long section of disease with focal lesion up to 80%, distal with sequential lesions of up to 60-70%                Daig1- mid 90%, distal vessel bifurcation disease of 50%  LCX: luminals, distal native LCX is ecctretic and severely diseased fed by R->Lt collaterals.                 OM1- 50% mid ribbon like lesion                OM2- smaller vessel, mild diffuse disease <30%  RCA: dominant, mild-moderate diffuse disease and calcification, lesions up to 20%                PDA- focal eccentric 80%                = some Rt-->Lt collaterals   LVEDP: 22-25  LVEF: Not done due to CKD    KLBC/YKCF: 87/74/38    LV systolic function is mildly reduced with EF estimated from 40-45%.   More prominent septal hypokinesis is noted on certain views.   Left ventricular size is mildly increased.   There is mild concentric left ventricular hypertrophy.   Grade I diastolic dysfunction with normal filling pressure.   Aortic valve appears sclerotic but opens adequately.   Mild mitral regurgitation.   Inadequate tricuspid regurgitation to estimate systolic pulmonary artery   pressure.         STRESS TEST: 11/22/18 \"Myoview\" Summary  Moderate-large sized inferior, septal, and distal apical wall significant  partial reversibility defects consistent with mainly ischemia and some  infarction in the territory of the proximal to distal RCA and/or LAD. LV  function is moderately reduced with more prominent inferior hypokinesis and  ejection fraction of 37%. Higher risk abnormal study         CARDIAC CATH: 12/4/18 CORONARY ARTERY BYPASS GRAFTING X3             Assessment:  1. Acute on chronic systolic CHF  2. Ischemic cardiomyopathy  3. parox afib  4.  CKD III  5. CAD s/p CABG complicated post op  Plan salt restriction fluid restriction started  Lasix drip  Aldactone 25 mg daily  Hold potassium daily in view of starting aldactone  Core Measures:  · Discharge instructions:   · LVEF documented: yes  · ACEI for LV dysfunction: hold off until renal failure stablized  · Smoking Cessation:  I have spent 35  minutes of face to face time with the patient with more than 50% spent counseling and coordinating care for this patient    Javy Marinelli MD 5/6/2019 9:35 AM

## 2019-05-06 NOTE — PLAN OF CARE
Problem: Falls - Risk of:  Goal: Will remain free from falls  Description  Will remain free from falls  5/6/2019 0150 by Jennifer Hernandez RN  Outcome: Ongoing  5/5/2019 1346 by Lawrence Brown RN  Outcome: Ongoing  Goal: Absence of physical injury  Description  Absence of physical injury  5/6/2019 0150 by Jennifer Hernandez RN  Outcome: Ongoing  5/5/2019 1346 by Lawrence Brown RN  Outcome: Ongoing     Problem: Risk for Impaired Skin Integrity  Goal: Tissue integrity - skin and mucous membranes  Description  Structural intactness and normal physiological function of skin and  mucous membranes. 5/6/2019 0150 by Jennifer Hernandez RN  Outcome: Ongoing  5/5/2019 1346 by Lawrence Brown RN  Outcome: Ongoing     Problem: Fluid Volume:  Description  [TRUNCATED] Hyponatremia indicates a relatively greater amount of water to sodium in plasma. In hypovolemia, a deficit of both total body water and sodium exists but relatively less water deficit. Euvolemia represents near normal total body sodium co .. Dewane Newness [TRUNCATED] Hyponatremia indicates a relatively greater amount of water to sodium in plasma. In hypovolemia, a deficit of both total body water and sodium exists but relatively less water deficit. Euvolemia represents near normal total body sodium co ...   Goal: Hemodynamic stability will improve  Description  Hemodynamic stability will improve  5/6/2019 0150 by Jennifer Hernandez RN  Outcome: Ongoing  5/5/2019 1346 by Lawrence Brown RN  Outcome: Ongoing  Goal: Ability to maintain a balanced intake and output will improve  Description  Ability to maintain a balanced intake and output will improve  5/6/2019 0150 by Jennifer Hernandez RN  Outcome: Ongoing  5/5/2019 1346 by Lawrence Brown RN  Outcome: Ongoing

## 2019-05-06 NOTE — PROGRESS NOTES
Hospitalist Progress Note      PCP: Mercedes De La Rosa DO    Date of Admission: 5/3/2019    Subjective: very forgetful, with poor diuresis, denies any concerns at this time    Medications:  Reviewed    Infusion Medications    furosemide (LASIX) infusion      dextrose       Scheduled Medications    spironolactone  25 mg Oral Daily    collagenase   Topical Daily    melatonin  9 mg Oral Nightly    aspirin  81 mg Oral Daily    fluticasone  2 spray Each Nare Daily    gabapentin  300 mg Oral Nightly    atorvastatin  80 mg Oral Nightly    sennosides-docusate sodium  1 tablet Oral Daily    tamsulosin  0.4 mg Oral Daily    famotidine  20 mg Oral BID    allopurinol  100 mg Oral Daily    apixaban  5 mg Oral BID    vitamin C  500 mg Oral Daily    ferrous sulfate  325 mg Oral Daily with breakfast    magnesium oxide  200 mg Oral Nightly    therapeutic multivitamin-minerals  1 tablet Oral Daily    metoprolol succinate  50 mg Oral Daily    sodium chloride flush  10 mL Intravenous 2 times per day    insulin lispro  0-6 Units Subcutaneous TID WC    insulin lispro  0-3 Units Subcutaneous Nightly     PRN Meds: hydrALAZINE, lactulose, acetaminophen, sodium chloride flush, magnesium hydroxide, ondansetron, glucose, dextrose, glucagon (rDNA), dextrose, potassium chloride **OR** potassium alternative oral replacement **OR** potassium chloride, magnesium sulfate      Intake/Output Summary (Last 24 hours) at 5/6/2019 1108  Last data filed at 5/6/2019 1015  Gross per 24 hour   Intake 1020 ml   Output 1425 ml   Net -405 ml       Physical Exam Performed:    /81   Pulse 91   Temp 98.4 °F (36.9 °C)   Resp 18   Ht 5' 10\" (1.778 m)   Wt 232 lb 8 oz (105.5 kg)   SpO2 97%   BMI 33.36 kg/m²     General appearance: No apparent distress appears stated age and cooperative. HEENT Normal cephalic, atraumatic without obvious deformity.  Conjunctivae/corneas clear.   Neck: Supple, No JVD/bruits.  Trachea midline without thyromegaly or adenopathy with full range of motion. Lungs: diminished b/l, on RA  Heart: Regular rate and rhythm with Normal S1/S2   Abdomen: Soft, non-tender or non-distended without rigidity or guarding and positive bowel sounds   Extremities: No clubbing, cyanosis, 3+ edema bilaterally. Skin: Skin color, texture, turgor normal.  No rashes or lesions. Neurologic: Alert and oriented to self, neurovascularly intact with sensory/motor intact upper extremities/lower extremities, bilaterally.  Cranial nerves: II-XII intact, grossly non-focal.  Mental status: Alert, awake, forgetful    Labs:   Recent Labs     05/03/19  1428 05/04/19  0557   WBC 7.3 5.9   HGB 10.8* 9.9*   HCT 35.2* 31.9*    134*     Recent Labs     05/04/19  0557 05/05/19  0553 05/06/19  0557    136 140   K 4.5 4.2 4.7    101 104   CO2 25 26 26   BUN 23* 26* 26*   CREATININE 1.7* 1.8* 1.9*   CALCIUM 9.0 9.1 9.1     Recent Labs     05/03/19  1428   AST 21   ALT 10   BILITOT 0.5   ALKPHOS 109     No results for input(s): INR in the last 72 hours. Recent Labs     05/03/19  1905 05/03/19  2237   TROPONINI 0.11* 0.13*       Urinalysis:      Lab Results   Component Value Date    NITRU Negative 11/26/2018    WBCUA 0-2 11/26/2018    RBCUA 0-2 11/26/2018    BLOODU SMALL 11/26/2018    SPECGRAV 1.020 11/26/2018    GLUCOSEU Negative 11/26/2018       Radiology:  XR CHEST PORTABLE   Final Result   Limited study by low lung volumes. Bibasilar opacities may represent   atelectasis. Pneumonia is considered as less likely. Stable enlargement of the cardiac silhouette      RECOMMENDATION:   Eventual follow-up PA and lateral chest would be helpful. CT ABDOMEN PELVIS WO CONTRAST Additional Contrast? None   Final Result   Diffuse anasarca with moderate bilateral pleural effusions, small volume   ascites, and diffuse subcutaneous edema. This has increased considerably   when compared to the previous exam.      Gallstones are noted. There is subtle pericholecystic fat stranding, which   at least raise the possibility of cholecystitis, but that fat stranding is   equally likely to be secondary to anasarca. [address] have reviewed the chart on [place] and personally interviewed and examined patient, reviewed the data (labs and imaging) and after discussion with my PA formulated the plan. Agree with note with the following edits. HPI:     Sitting on the chair. Feels about the same. Still has a lot of edema. He was started on a lasix drip today. I reviewed the patient's Past Medical History, Past Surgical History, Medications, and Allergies. Physical exam:    /86   Pulse 85   Temp 97.6 °F (36.4 °C) (Oral)   Resp 18   Ht 5' 10\" (1.778 m)   Wt 232 lb 8 oz (105.5 kg)   SpO2 98%   BMI 33.36 kg/m²     Gen: sick appearing. Looks much older than stated age. Eyes: PERRL. No sclera icterus. No conjunctival injection. ENT: No discharge. Pharynx clear. Neck: Trachea midline. Normal thyroid. Resp: No accessory muscle use. No crackles. No wheezes. No rhonchi. No dullness on percussion. CV: Regular rate. Regular rhythm. No murmur or rub. 4+ edema. Assessment/Plan:    Principal Problem:    Acute on chronic systolic heart failure (HCC)  Active Problems:    Coronary artery disease involving native coronary artery of native heart without angina pectoris    Prolonged QT interval    Anasarca    PAF (paroxysmal atrial fibrillation) ([facility])    Benign essential HTN    CAMI (acute kidney injury) ([facility] [address]  Resolved Problems:    * No resolved hospital problems. *       Acute on chr systolic CHF/anasarca  - BNP at 24,546  - cardio consulted, started on IV lasix.    - Monitor I/O and daily wts, hasnt diuresed much  - no ACEi/ARB 2/2 renal failure, on toprol XL  - checked echo with EF 15-20%  - no change in BNP 24,000  - start Lasix gtt, fluid restriction and Aldactone daily     H/o CAD s/p CABG  - 4 weeks ago, on eliquis/asa/statin/BB     HTN   - uncontrolled initially  - cont home meds, prn IV hydralazine  - much improved     Elevated trop   - suspect 2/2 above  - trend trops  - cardio consulted, reviewed echo     ARF vs CKD Stage III  - creat 1.5-->1.7-->1.8 --> 1.9  - avoid nephrotoxic agents   - monitor on lasix, Might need lasix gtt     Anemia   - hb 10.8-->9.9  - monitor    Hypomagnesemia  - 1.6  - replete  - monitor        DVT Prophylaxis: eliquis  Diet: DIET CARDIAC; Low Sodium (2 GM);  Daily Fluid Restriction: 1800 ml  Code Status: Full Code    PT/OT Eval Status: ordered    Dispo - needs more diuresis, cont care    Wilfrido Mcdowell PA-C 11:20 AM 5/6/2019       Maritza Nicole 5/6/2019 8:11 PM

## 2019-05-07 LAB
ANION GAP SERPL CALCULATED.3IONS-SCNC: 10 MMOL/L (ref 3–16)
BASOPHILS ABSOLUTE: 0 K/UL (ref 0–0.2)
BASOPHILS RELATIVE PERCENT: 0.3 %
BUN BLDV-MCNC: 25 MG/DL (ref 7–20)
CALCIUM SERPL-MCNC: 8.7 MG/DL (ref 8.3–10.6)
CHLORIDE BLD-SCNC: 102 MMOL/L (ref 99–110)
CO2: 24 MMOL/L (ref 21–32)
CREAT SERPL-MCNC: 1.8 MG/DL (ref 0.9–1.3)
EOSINOPHILS ABSOLUTE: 0.1 K/UL (ref 0–0.6)
EOSINOPHILS RELATIVE PERCENT: 1.7 %
GFR AFRICAN AMERICAN: 47
GFR NON-AFRICAN AMERICAN: 39
GLUCOSE BLD-MCNC: 103 MG/DL (ref 70–99)
GLUCOSE BLD-MCNC: 111 MG/DL (ref 70–99)
GLUCOSE BLD-MCNC: 112 MG/DL (ref 70–99)
GLUCOSE BLD-MCNC: 119 MG/DL (ref 70–99)
GLUCOSE BLD-MCNC: 136 MG/DL (ref 70–99)
HCT VFR BLD CALC: 35.9 % (ref 40.5–52.5)
HEMOGLOBIN: 11.3 G/DL (ref 13.5–17.5)
LYMPHOCYTES ABSOLUTE: 0.6 K/UL (ref 1–5.1)
LYMPHOCYTES RELATIVE PERCENT: 8.3 %
MAGNESIUM: 1.9 MG/DL (ref 1.8–2.4)
MCH RBC QN AUTO: 27.3 PG (ref 26–34)
MCHC RBC AUTO-ENTMCNC: 31.6 G/DL (ref 31–36)
MCV RBC AUTO: 86.5 FL (ref 80–100)
MONOCYTES ABSOLUTE: 0.7 K/UL (ref 0–1.3)
MONOCYTES RELATIVE PERCENT: 10.2 %
NEUTROPHILS ABSOLUTE: 5.7 K/UL (ref 1.7–7.7)
NEUTROPHILS RELATIVE PERCENT: 79.5 %
PDW BLD-RTO: 19.5 % (ref 12.4–15.4)
PERFORMED ON: ABNORMAL
PLATELET # BLD: 119 K/UL (ref 135–450)
PMV BLD AUTO: 9 FL (ref 5–10.5)
POTASSIUM SERPL-SCNC: 4.1 MMOL/L (ref 3.5–5.1)
RBC # BLD: 4.16 M/UL (ref 4.2–5.9)
SODIUM BLD-SCNC: 136 MMOL/L (ref 136–145)
WBC # BLD: 7.2 K/UL (ref 4–11)

## 2019-05-07 PROCEDURE — 97110 THERAPEUTIC EXERCISES: CPT

## 2019-05-07 PROCEDURE — 36415 COLL VENOUS BLD VENIPUNCTURE: CPT

## 2019-05-07 PROCEDURE — 6370000000 HC RX 637 (ALT 250 FOR IP): Performed by: INTERNAL MEDICINE

## 2019-05-07 PROCEDURE — 97530 THERAPEUTIC ACTIVITIES: CPT

## 2019-05-07 PROCEDURE — 99232 SBSQ HOSP IP/OBS MODERATE 35: CPT | Performed by: INTERNAL MEDICINE

## 2019-05-07 PROCEDURE — 2580000003 HC RX 258: Performed by: INTERNAL MEDICINE

## 2019-05-07 PROCEDURE — 2060000000 HC ICU INTERMEDIATE R&B

## 2019-05-07 PROCEDURE — 97535 SELF CARE MNGMENT TRAINING: CPT

## 2019-05-07 PROCEDURE — 85025 COMPLETE CBC W/AUTO DIFF WBC: CPT

## 2019-05-07 PROCEDURE — 6360000002 HC RX W HCPCS: Performed by: INTERNAL MEDICINE

## 2019-05-07 PROCEDURE — 83735 ASSAY OF MAGNESIUM: CPT

## 2019-05-07 PROCEDURE — 80048 BASIC METABOLIC PNL TOTAL CA: CPT

## 2019-05-07 RX ORDER — METOLAZONE 2.5 MG/1
2.5 TABLET ORAL DAILY
Status: DISCONTINUED | OUTPATIENT
Start: 2019-05-07 | End: 2019-05-10

## 2019-05-07 RX ADMIN — FAMOTIDINE 20 MG: 20 TABLET, FILM COATED ORAL at 21:41

## 2019-05-07 RX ADMIN — METOPROLOL SUCCINATE 50 MG: 50 TABLET, EXTENDED RELEASE ORAL at 10:07

## 2019-05-07 RX ADMIN — MELATONIN 3 MG ORAL TABLET 9 MG: 3 TABLET ORAL at 21:40

## 2019-05-07 RX ADMIN — Medication 200 MG: at 21:40

## 2019-05-07 RX ADMIN — ATORVASTATIN CALCIUM 80 MG: 40 TABLET, FILM COATED ORAL at 21:40

## 2019-05-07 RX ADMIN — OXYCODONE HYDROCHLORIDE AND ACETAMINOPHEN 500 MG: 500 TABLET ORAL at 10:07

## 2019-05-07 RX ADMIN — ASPIRIN 81 MG: 81 TABLET, COATED ORAL at 10:07

## 2019-05-07 RX ADMIN — ALLOPURINOL 100 MG: 100 TABLET ORAL at 10:06

## 2019-05-07 RX ADMIN — Medication 10 ML: at 21:41

## 2019-05-07 RX ADMIN — FUROSEMIDE 10 MG/HR: 10 INJECTION, SOLUTION INTRAVENOUS at 17:24

## 2019-05-07 RX ADMIN — FUROSEMIDE 10 MG/HR: 10 INJECTION, SOLUTION INTRAVENOUS at 07:50

## 2019-05-07 RX ADMIN — APIXABAN 5 MG: 5 TABLET, FILM COATED ORAL at 10:06

## 2019-05-07 RX ADMIN — METOLAZONE 2.5 MG: 2.5 TABLET ORAL at 10:07

## 2019-05-07 RX ADMIN — SPIRONOLACTONE 25 MG: 25 TABLET ORAL at 10:07

## 2019-05-07 RX ADMIN — COLLAGENASE SANTYL: 250 OINTMENT TOPICAL at 10:15

## 2019-05-07 RX ADMIN — FERROUS SULFATE TAB 325 MG (65 MG ELEMENTAL FE) 325 MG: 325 (65 FE) TAB at 10:07

## 2019-05-07 RX ADMIN — FAMOTIDINE 20 MG: 20 TABLET, FILM COATED ORAL at 10:08

## 2019-05-07 RX ADMIN — MULTIPLE VITAMINS W/ MINERALS TAB 1 TABLET: TAB at 10:07

## 2019-05-07 RX ADMIN — APIXABAN 5 MG: 5 TABLET, FILM COATED ORAL at 21:40

## 2019-05-07 RX ADMIN — TAMSULOSIN HYDROCHLORIDE 0.4 MG: 0.4 CAPSULE ORAL at 10:07

## 2019-05-07 RX ADMIN — GABAPENTIN 300 MG: 300 CAPSULE ORAL at 21:40

## 2019-05-07 NOTE — PROGRESS NOTES
Inpatient Physical Therapy Daily Treatment Note    Unit: PCU  Date:  5/7/2019  Patient Name:    Alan Rod  Admitting diagnosis:  Ana Msarca [R60.1]  Admit Date:  5/3/2019  Precautions/Restrictions:  fall risk, IV, bed/chair alarm and mohamud catheter       Discharge Recommendations: Acute Rehab (ARU)/ Inpatient Rehab Facility (IRF) (has been denied by ARU; next best option for pt would be SNF  Pt has requested to NOT go to [facility]. DME needs for discharge: defer to facility       Therapy recommendations for staff:   Assist of 2 with use of rolling walker (RW) for all transfers or ambulation to/from bedside [facility] S4 Level Recommendation: NA  AM-PAC Mobility Score   AM-PAC Inpatient Mobility Raw Score : 15       Treatment Time:  0912-0957  Treatment number: 3  Timed Code Treatment Minutes: 45 minutes  Total Treatment Minutes:  45 minutes    Cognition    A&O x4   Able to follow 2 step commands. Hx of anoxic brain injury, which pt states causes him to lack short term memory. Subjective  Patient lying supine in bed with no family present  Pt agreeable to this PT tx. Per nursing report, pt had been declining to get OOB to [facility] over night, and had primarily just been having BMs in his depends. When asked about this, pt stated \"I don't think so. \"     Pain   No  Rating:  NA/10  Location:   Pain Medicine Status: Denies need     Bed Mobility   Supine to Sit:   CGA  Sit to Supine:  Not Tested  Rolling:   SBA  Scooting:   Independent    Transfer Training     Sit to stand:   CGA (EOB, BSC, chair)  Stand to sit:   CGA (EOB, BSC, chair)  Bed to [facility] to chair: Min A with use of rolling walker (RW), cues to use RW appropriately and to pace task    Gait Training gait deferred due to fatigue and due to crowded room (pt requires w/c follow)   Distance:    Deviations (firm surface/linoleum): N/A   Assistive Device Used:  N/A  Level of Assist:   Comment:     Stair Training deferred, pt unsafe/not appropriate to complete stairs at this time    Therapeutic Exercise all completed bilaterally unless indicated; standing Treva completed with RW and min A (instability of B knees noted)   Ankle pumps: x20    LAQ: x10  Seated marching: x10  Standing marching: x10  Mini squats: x10   Sit to stands: x5    Balance  Static Sitting:  Good -    Tolerance: WNL  Dynamic Sitting:  Good -   Static Standing: Fair    Tolerance: ~30 sec. To 2 min (varied)    Dynamic Standing: Poor    Patient Education      Role of PT, POC, Discharge recommendations, safety awareness, transfer techniques and calling for assist with mobility. Positioning Needs       Pt sitting up in chair, call light and needs in reach and alarm set. ROM Measurements N/A  Knee Flexion:  Knee Extension:     Activity Tolerance   Pt completed therapy session with Dizziness, which subsided quickly with rest.   SpO2:   HR:  BP:     Other  None. Assessment :  Patient participating well this AM, completing bed mobility, transfers, and standing & seated Treva. Pt continues to be challenged by weakness & instability of BLEs. Unfortunately, pt has been denied admittance to the ARU, and instead is recommended to pursue DC to SNF when appropriate to safely progress mobility back to independence. Goals (all goals ongoing unless otherwise indicated)  To be met in 3 visits:  1). Independent with LE Ex x 10 reps     To be met in 6 visits:  1). Supine to/from sit: Independent  2). Sit to/from stand: SBA  3). Bed to chair: SBA  4). Gait: Ambulate 50 ft  with  SBA  and use of rolling walker (RW)  5). Tolerate B LE exercises 3 sets of 10-15 reps  6). Ascend/descend steps with CGA with use of 1 threshold stepSteps to enter and LRAD. Plan   Continue with plan of care. Nguyen Brenner, PT, DPT #[id number]     If patient discharges from this facility prior to next visit, this note will serve as the Discharge Summary.

## 2019-05-07 NOTE — PROGRESS NOTES
Patient's EF (Ejection Fraction) is less than 40%    Patient has a past medical history of Anoxic brain injury ([facility] [address] Cardiac arrest ([facility] [address] Gout, Hyperlipidemia, Hypertension, Reflux, S/P CABG x 3, Sleep apnea, and Type 2 diabetes mellitus without complication ([facility] [address] Comorbidities reviewed and education provided. Patient and family's stated goal of care: reduce shortness of breath, increase activity tolerance, better understand heart failure and disease management, be more comfortable and reduce lower extremity edema prior to discharge    Patient's current functional capacity:  Slight limitation of physical activity. Comfortable at rest. Ordinary physical activity results in fatigue, palpitation, dyspnea. Pt resting in bed at this time on room air. Pt denies shortness of breath. Pt with pitting lower extremity edema.  Patient's weights and intake/output reviewed:    Patient Vitals for the past 96 hrs (Last 3 readings):   Weight   05/06/19 0511 232 lb 8 oz (105.5 kg)   05/05/19 0617 234 lb (106.1 kg)   05/04/19 0430 233 lb (105.7 kg)       Intake/Output Summary (Last 24 hours) at 5/6/2019 2323  Last data filed at 5/6/2019 2318  Gross per 24 hour   Intake 1076 ml   Output 2100 ml   Net -1024 ml         >>For CHF and Comorbidity documentation on Education Time and Topics, please see Education Tab

## 2019-05-07 NOTE — PLAN OF CARE
Problem: Falls - Risk of:  Goal: Will remain free from falls  Description  Will remain free from falls  5/6/2019 2314 by Anh Moore RN  Outcome: Ongoing  5/6/2019 2301 by Anh Moore RN  Outcome: Ongoing  5/6/2019 1901 by Zachery Coreas RN  Outcome: Ongoing  Goal: Absence of physical injury  Description  Absence of physical injury  5/6/2019 1901 by Zachery Coreas RN  Outcome: Ongoing     Problem: Risk for Impaired Skin Integrity  Goal: Tissue integrity - skin and mucous membranes  Description  Structural intactness and normal physiological function of skin and  mucous membranes.   5/6/2019 2314 by Anh Moore RN  Note:   Foam dressing applied to sacrum  5/6/2019 2301 by Anh Moore RN  Outcome: Ongoing  Note:   Foam dressing placed to prevent   5/6/2019 1901 by Zachery Coreas RN  Outcome: Ongoing     Problem: Fluid Volume:  Goal: Hemodynamic stability will improve  Description  Hemodynamic stability will improve  5/6/2019 1901 by Zachery Coreas RN  Outcome: Ongoing  Goal: Ability to maintain a balanced intake and output will improve  Description  Ability to maintain a balanced intake and output will improve  5/6/2019 1901 by Zachery Coreas RN  Outcome: Ongoing     Problem: OXYGENATION/RESPIRATORY FUNCTION  Goal: Patient will maintain patent airway  5/6/2019 2314 by Anh Moore RN  Outcome: Ongoing  5/6/2019 1901 by Zachery Coreas RN  Outcome: Ongoing  Goal: Patient will achieve/maintain normal respiratory rate/effort  Description  Respiratory rate and effort will be within normal limits for the patient  5/6/2019 1901 by Zachery Coreas RN  Outcome: Ongoing     Problem: HEMODYNAMIC STATUS  Goal: Patient has stable vital signs and fluid balance  5/6/2019 2314 by Anh Moore RN  Outcome: Ongoing  5/6/2019 1901 by Zachery Coreas RN  Outcome: Ongoing     Problem: FLUID AND ELECTROLYTE IMBALANCE  Goal: Fluid and electrolyte balance are achieved/maintained  5/6/2019 2314 by Nancy Johnson MARVEL Jean RN  Note:   Fluid restriction   5/6/2019 1901 by Heraclio Samaniego RN  Outcome: Ongoing     Problem: ACTIVITY INTOLERANCE/IMPAIRED MOBILITY  Goal: Mobility/activity is maintained at optimum level for patient  5/6/2019 1901 by Heraclio Samaniego RN  Outcome: Ongoing

## 2019-05-07 NOTE — PROGRESS NOTES
Pt. In chair awake. No signs of distress noted. Pt. Assessment completed- see flow sheet. Pt. denies further needs at this time. Will continue to monitor. Call light is within reach.   Ira Sahu RN

## 2019-05-07 NOTE — PROGRESS NOTES
Speech Language Pathology  Facility/Department: [facility] PCU TELEMETRY  Initial Speech/Language/Cognitive Assessment    NAME: Alan Rod  : 1960   MRN: [id number]  ADMISSION DATE: 5/3/2019  ADMITTING DIAGNOSIS: has Anoxic brain injury ([address] HTN (hypertension); Carpal tunnel syndrome; Obstructive sleep apnea syndrome; Depression; Gout; ACS (acute coronary syndrome) ([address] Gait disturbance; CKD (chronic kidney disease) stage 3, GFR 30-59 ml/min ([address] Abnormal echocardiogram; Coronary artery disease involving native coronary artery of native heart without angina pectoris; Abnormal stress test; NSTEMI (non-ST elevated myocardial infarction) ([address] Ischemic cardiomyopathy; Acute on chronic systolic heart failure ([address] Acute renal failure with acute tubular necrosis superimposed on stage 3 chronic kidney disease ([address] Spinal cord ischemia ([address] DM (diabetes mellitus), secondary, uncontrolled, w/neurologic complic ([address] Cervical spinal cord compression ([address] Postoperative infection of wound of sternum; Serratia infection; Gram-negative bacteremia; Acute respiratory failure with hypoxia ([address] Sepsis ([address] Prolonged QT interval; Status post aorto-coronary artery bypass graft; History of cardiac arrest; Obesity, Class II, BMI 35-39.9; Cervical myelopathy ([address] Lesion of bladder; Recurrent UTI; Anasarca; PAF (paroxysmal atrial fibrillation) ([address] Benign essential HTN; and CAMI (acute kidney injury) ([address] on their problem list.  DATE ONSET:   5-3-19    Date of Eval: 2019   Evaluating Therapist: Judi Sampson [facility] SLP. JASON BCSS            Pain:  NONE REPORTED    Assessment:  18 CT HEAD PER MD REPORTS NO ACUTE ABNORMALITY. ALSO REPORTS OF MILD CEREBRAL ATROPHY. HE WAS ABLE TO STATE THE NAME OF US PRESIDENT. HE REPORTS HAVING DEFICITS WITH SHORT TERM MEMORY SINCE . HE REPORTS HE HAD \"ANOTHER EVENT\" EARLIER THIS YEAR DURING WHICH HIS MEMORY BECAME WORSE.  HE WAS ON ROOM AND HAD EYE GLASSES IN PLACE. HE DENIES HAVING PAIN. HE WAS ABLE TO PERFORM RESPONSIVE NAMING, VERBAL FLUENCY NAMING, CONVERGENT NAMING, DIVERGENT NAMING, AND VISUAL OBJECT ID WITHOUT OVERT ANOMIA. HE HAS DRY COUGH ON CUE. THERE WAS FAIR ORAL ROM ON COMMAND. HE HAD NO OVERT ORAL ASYMMETRY NOTED. HE HAD INTELLIGIBLE SPEECH. HE WAS ABLE TO READ THE CLOCK FROM ACROSS THE ROOM. HE WAS ABLE TO RECALL 4 WORDS AND 6 NUMBERS IN IMMEDIATE RECALL SEQUENCE. HE DENIES HAVING ANY DYSPHAGIA OR ASPIRATION RISK AT THIS POINT. THE PATIENT HAD REDUCTION TO THREE WORD RECALL AFTER A 30 SECOND DISTRACTOR. THE PATIENT WAS ABLE TO STATE HIS NAME, DID NOT KNOW THE DAY OF WEEK, DID NOT KNOW THE MONTH, HE DID NOT KNOW THE YEAR, HE  [address]. THERE IS HISTORY OF ANOXIC BRAIN INJURY AND CARDIAC ARREST. THE PATIENT IS S/P CABG. HE REPORTS HE USES A 3 RING BINDER TO RECORD OF HIS DAY EVENTS AND THEN HE TYPES THEM UP. HE WAS ORALLY DEFENSIVE TO GAG REFLEX TESTING. THERE WAS MILD RESPONSE LATENCY. HE HAS COGNITIVE COMMUNICATIONS DEFICITS BY CASE HISTORY. ALTHOUGH HE REPORTS HIS DEFICITS IN MEMORY SPAN BACK 25 YEARS, HE DOES REPORT RECENT WORSENING. HE WILL BE SEEN FOR TRIAL PERIOD OF THERAPY. SLP EXPLAINED SCOPE OF PRACTICE AND REPORTED FINDINGS TO THE ATTENDING NURSE. SLP EXPLAINED GOALS OF TREATMENT. THE PATIENT IS ABLE TO MAKE HIS BASIC NEEDS KNOWN. HE HAS SOME REDUCED RECALL AS TO THE SPECIFIC EVENTS INVOLVING HIS LENGTH OF STAY AND ADMISSION. REALITY ORIENTATION WAS PROVIDED. HE INDICATES HE IS MOTIVATED FOR A TRIAL OF THERAPY. Recommendations:  Requires SLP Intervention: Yes             Plan:   Goals:   LTG: IMPROVED COGNITIVE COMMUNICATION FUNCTION FOR ADL ( 1 WEEK)  STG (3-4 DAYS)  : 1. PATIENT WILL PROVIDE SPATIAL AND TEMPORAL ORIENTATION WITH 90% ACCURACY WITH MULTI-MODALITY CUES  2. ADDITIONAL COG ASSESSMENT OF PROBLEM SOLVING AND ABSTRACT REASONING.    3. PATIENT WILL RECALL 3 WORD SEQUENCE AFTER A 30 SECOND DISTRACTOR WITH 90 % ACCURACY WITH MULTI-MODALITY CUES. Patient/family involved in developing goals and treatment plan: PATIENT    Subjective:   Previous level of function and limitations:   General  Chart Reviewed: Yes  Additional Pertinent Hx: HE HAS BEEN SEEN FOR SPEECH THERAPY SERVICES EARLIER THIS YEAR IN THE ARU FOR SHORT TERM MEMORY DEFICITS. Family / Caregiver Present: No  General Comment  Comments: THE PATIENT WAS SEEN FOR SPEECH AND LANGUAGE ASSESSMENT. Subjective  Subjective: HE WAS ALERT IN CHAIR AT THE TIME OF THE VISIT. NO FAMILY PRESENT IN THE ROOM. Education:   EXPLAINED SCOPE OF PRACTICE AND PURPOSE OF THE VISIT. FINDINGS DISCUSSED WITH THE ATTENDING NURSE.                   Therapy Time:   Individual Concurrent Group Co-treatment   Time In  8806         Time Out  1105         Minutes  25             THIS IS A LATE ENTRY, HE WAS SEEN EARLIER TODAY    FORREST Beltran  7/1/9871 10:51 PM

## 2019-05-07 NOTE — PROGRESS NOTES
Occupational Therapy Daily Treatment Note    Unit: PCU  Date:  5/7/2019  Patient Name:    Alan Rod  Admitting diagnosis:  Anasarca [R60.1]  Admit Date:  5/3/2019  Precautions/Restrictions:  fall risk, mohamud catheter  and confusion, IV       Discharge Recommendations: SNF  DME needs for discharge: defer to facility       Therapy recommendations for staff:   Assist of 2 with use of rolling walker (RW) for all transfers to/from chair    AM-PAC Score: [id number] S4 Level: NA       Treatment Time: 0419-3765  Treatment number:  3  Total Treatment Time:  28 minutes      Subjective:  Pt in the bed and willing to work with OT    Pain   No  Rating: NA  Location:NA  Pain Medicine Status: Denies need      Bed Mobility:   Supine to Sit:  Not Tested  Sit to Supine:  Not Tested  Rolling:           Not Tested  Scooting:        SBA    Transfer Training:   Sit to stand:   CGA   Stand to sit:  CGA  Bed to Chair:             NT  Bed to [facility]:   NT  Standard toilet:   Not Tested    Activity Tolerance   Pt completed therapy session with No adverse symptoms    ADL Training:   Upper body dressing:  Min assist due to lines   Upper body bathing:  Supervision except for back which was max assist   Lower body dressing: Mod assist to don socks   Lower body bathing:  Pt able to reach to feet with SBA when sitting to bathe. Toileting:   NT  Grooming/Hygiene:  IND with combing hair and IND with washing hair using the shampoo cap to wash hair . Therapeutic Exercise:   N/A    Patient Education:   Use of AE and energy conservation    Positioning Needs:   Up in chair, call light and needs in reach. Family Present:  No    Assessment: Pt cooperative and willing to work with OT pt willing to participate in [facility] . The pt was min assist for UB dressing due to the lines. The ot was max assist toback back and supervision for other UB bathing. The pt was mod assist for LB dressing Pt requires further rehab to increase IND.  Pt is stating

## 2019-05-07 NOTE — PROGRESS NOTES
adenomatous polyps    CORONARY ARTERY BYPASS GRAFT  12/14/2018    Dr. Zac Roque  04/05/2019    cystoscopy/bladder biopsy f/c exchange    CYSTOSCOPY N/A 4/5/2019    CYSTOSCOPY performed by Mague Young MD at [facility] Bilateral 02/27/2014    Dr. Iza Gomez POLYSOMNOGRAPHY  04/12/2016    Dr. Mario Israel. [facility]    VT CABG, ARTERIAL, THREE N/A 12/4/2018    Dr. Brandy Li - x3 (LIMA-LAD, BL SVG-OM, SVG-PDA)    RECONSTRUCTIVE REPAIR STERNAL N/A 12/20/2018    Dr. Brandy Li - I&D of sternum w/placement of wound VAC    TRANSESOPHAGEAL ECHOCARDIOGRAM  12/04/2018    during CABG    TUNNELED VENOUS CATHETER PLACEMENT Right 12/24/2018    Dr. Atul Giron - PICC via IJ       Objective:   BP (!) 141/92   Pulse 91   Temp 97.2 °F (36.2 °C) (Oral)   Resp 18   Ht 5' 10\" (1.778 m)   Wt 228 lb 12.8 oz (103.8 kg)   SpO2 94%   BMI 32.83 kg/m²       Intake/Output Summary (Last 24 hours) at 5/7/2019 0530  Last data filed at 5/7/2019 0517  Gross per 24 hour   Intake 596 ml   Output 2200 ml   Net -1604 ml       TELEMETRY:NSR    Physical Exam:  General: No Respiratory distress, appears well developed and well nourished. Eyes:  Sclera nonicteric  Nose/Sinuses:  negative findings: nose shows no deformity, asymmetry, or inflammation, nasal mucosa normal, septum midline with no perforation or bleeding  Back:  no pain to palpation  Joint:  no active joint inflammation  Musculoskeletal:  negative  Skin:  Warm and dry  Neck:  Negative for JVD and Carotid Bruits. Chest:  Crackles both bases to auscultation, respiration easy  Cardiovascular:  RRR, S1S2 normal, no murmur, no rub or thrill.   Abdomen:  Soft normal liver and spleen  Extremities:   4+ edema,   Neuro: intact    Medications:    spironolactone  25 mg Oral Daily    collagenase   Topical Daily    melatonin  9 mg Oral Nightly    aspirin  81 mg Oral Daily    fluticasone  2 spray Each Nare Daily    gabapentin  300 mg Oral Nightly    atorvastatin 80 mg Oral Nightly    sennosides-docusate sodium  1 tablet Oral Daily    tamsulosin  0.4 mg Oral Daily    famotidine  20 mg Oral BID    allopurinol  100 mg Oral Daily    apixaban  5 mg Oral BID    vitamin C  500 mg Oral Daily    ferrous sulfate  325 mg Oral Daily with breakfast    magnesium oxide  200 mg Oral Nightly    therapeutic multivitamin-minerals  1 tablet Oral Daily    metoprolol succinate  50 mg Oral Daily    sodium chloride flush  10 mL Intravenous 2 times per day    insulin lispro  0-6 Units Subcutaneous TID WC    insulin lispro  0-3 Units Subcutaneous Nightly      furosemide (LASIX) 1mg/ml infusion 10 mg/hr (05/06/19 2109)    dextrose       hydrALAZINE, lactulose, acetaminophen, sodium chloride flush, magnesium hydroxide, ondansetron, glucose, dextrose, glucagon (rDNA), dextrose, potassium chloride **OR** potassium alternative oral replacement **OR** potassium chloride, magnesium sulfate    Lab Data:  CBC:   Recent Labs     05/04/19  0557 05/07/19  0504   WBC 5.9 7.2   HGB 9.9* 11.3*   HCT 31.9* 35.9*   MCV 86.8 86.5   * 119*     BMP:   Recent Labs     05/05/19  0553 05/06/19  0557 05/07/19  0504    140 136   K 4.2 4.7 4.1    104 102   CO2 26 26 24   BUN 26* 26* 25*   CREATININE 1.8* 1.9* 1.8*     LIVER PROFILE:   No results for input(s): AST, ALT, LIPASE, BILIDIR, BILITOT, ALKPHOS in the last 72 hours. Invalid input(s): AMYLASE,  ALB  PT/INR: No results for input(s): PROTIME, INR in the last 72 hours. APTT: No results for input(s): APTT in the last 72 hours. BNP:  No results for input(s): BNP in the last 72 hours.     IMAGING:   I have reviewed the following tests and documented in this encounter as follows:   EKG 5/4/19  Normal sinus rhythmRightward axisIncomplete left bundle branch blockNonspecific T wave abnormalityProlonged QTAbnormal ECGWhen compared with ECG of 27-DEC-2018 09:24,Premature ventricular complexes are no longer PresentCriteria for Septal infarct are no longer PresentNonspecific T wave abnormality has replaced inverted T waves in Inferior leadsNonspecific T wave abnormality has replaced inverted T waves in Lateral leadsConfirmed by Buddy Mittal MD, [address] ([id number]) on 2019 5:35:34      ECHO 5/3/19  Summary     Left ventricle - normal size and thickness, severely reduced function with   EF of 20%, severe diffuse hypokinesis   Right ventricle - reduced function   Mitral valve - mod-severe regurgitation   Tricuspid valve - moderate regurgitation with PASP of 55mmHg   Pulmonic valve - mild regurgitation    EK2018 Sinus tach with short AT run, IVCD, possible septal infarct, PVC    2018   post-Op Diagnosis: Sternal Incision Infection      Procedure(s):  STERNUM INCISION AND DRAINAGE WITH WOUND VAC PLACEMENT          CABG 19  CORONARY ARTERY BYPASS GRAFTING X3, INTERNAL MAMMARY ARTERY, SAPHENOUS VEIN GRAFT, ON PUMP  LIMA to LAD reverse saphenous vein to OM reverse saphenous vein to PDA  Transesophageal echo  Endo-vein harvest right and left leg  Intercostal nerve block  Doppler assessment of graft      LEFT HEART CATH 18  LM: luminals, mild calcium  LAD: heavy prox calcification.  Mid long section of disease with focal lesion up to 80%, distal with sequential lesions of up to 60-70%                Daig1- mid 90%, distal vessel bifurcation disease of 50%  LCX: luminals, distal native LCX is ecctretic and severely diseased fed by R->Lt collaterals.                 OM1- 50% mid ribbon like lesion                OM2- smaller vessel, mild diffuse disease <30%  RCA: dominant, mild-moderate diffuse disease and calcification, lesions up to 20%                PDA- focal eccentric 80%                = some Rt-->Lt collaterals   LVEDP: 22-25  LVEF: Not done due to CKD    UDBG/GDTK: 70/66/72    LV systolic function is mildly reduced with EF estimated from 40-45%.   More prominent septal hypokinesis is noted on certain views.   Left ventricular size is

## 2019-05-07 NOTE — PROGRESS NOTES
Shift assessment completed. Patient in bed awake,alert and oriented. Some memory issues. Patient denies any pain, feeling discomfort in abdomen, bed pan provided per patient request.  Will monitor. Vitals obtained see flow sheet. Evening medications given per [facility] order. Sacrum redness, foam dressing applied. Patient denies any needs at this time,will continue to monitor. Call light within reach.

## 2019-05-07 NOTE — PROGRESS NOTES
clear.  Neck: Supple, No JVD/bruits.  Trachea midline without thyromegaly or adenopathy with full range of motion. Lungs: diminished b/l, on RA  Heart: Regular rate and rhythm with Normal S1/S2   Abdomen: Soft, non-tender or non-distended without rigidity or guarding and positive bowel sounds   Extremities: No clubbing, cyanosis, 3+ edema bilaterally. Skin: Skin color, texture, turgor normal.  No rashes or lesions. Neurologic: Alert and oriented to self, neurovascularly intact with sensory/motor intact upper extremities/lower extremities, bilaterally.  Cranial nerves: II-XII intact, grossly non-focal.  Mental status: Alert, awake, forgetful    Labs:   Recent Labs     05/07/19  0504   WBC 7.2   HGB 11.3*   HCT 35.9*   *     Recent Labs     05/05/19  0553 05/06/19  0557 05/07/19  0504    140 136   K 4.2 4.7 4.1    104 102   CO2 26 26 24   BUN 26* 26* 25*   CREATININE 1.8* 1.9* 1.8*   CALCIUM 9.1 9.1 8.7     Urinalysis:      Lab Results   Component Value Date    NITRU Negative 11/26/2018    WBCUA 0-2 11/26/2018    RBCUA 0-2 11/26/2018    BLOODU SMALL 11/26/2018    SPECGRAV 1.020 11/26/2018    GLUCOSEU Negative 11/26/2018       Radiology:  XR CHEST PORTABLE   Final Result   Limited study by low lung volumes. Bibasilar opacities may represent   atelectasis. Pneumonia is considered as less likely. Stable enlargement of the cardiac silhouette      RECOMMENDATION:   Eventual follow-up PA and lateral chest would be helpful. CT ABDOMEN PELVIS WO CONTRAST Additional Contrast? None   Final Result   Diffuse anasarca with moderate bilateral pleural effusions, small volume   ascites, and diffuse subcutaneous edema. This has increased considerably   when compared to the previous exam.      Gallstones are noted. There is subtle pericholecystic fat stranding, which   at least raise the possibility of cholecystitis, but that fat stranding is   equally likely to be secondary to anasarca. Tremaine Girard  have reviewed the chart on Cynthia Beasley and personally interviewed and examined patient, reviewed the data (labs and imaging) and after discussion with my PA formulated the plan. Agree with note with the following edits. HPI:     Sitting on the chair. Feels about the same. Still has a lot of edema. He was started on a lasix drip today. 5/7- feeling some better. Is diuresing. I reviewed the patient's Past Medical History, Past Surgical History, Medications, and Allergies. Physical exam:    BP (!) 141/92   Pulse 91   Temp 97.2 °F (36.2 °C) (Oral)   Resp 18   Ht 5' 10\" (1.778 m)   Wt 228 lb 12.8 oz (103.8 kg)   SpO2 94%   BMI 32.83 kg/m²     Gen: sick appearing. Looks much older than stated age. Eyes: PERRL. No sclera icterus. No conjunctival injection. ENT: No discharge. Pharynx clear. Neck: Trachea midline. Normal thyroid. Resp: No accessory muscle use. No crackles. No wheezes. No rhonchi. No dullness on percussion. CV: Regular rate. Regular rhythm. No murmur or rub. 3+ edema. Assessment/Plan:    Principal Problem:    Acute on chronic systolic heart failure (HCC)  Active Problems:    Coronary artery disease involving native coronary artery of native heart without angina pectoris    Prolonged QT interval    Anasarca    PAF (paroxysmal atrial fibrillation) ([facility])    Benign essential HTN    CAMI (acute kidney injury) ([facility] [address]  Resolved Problems:    * No resolved hospital problems. *       Acute on chr systolic CHF/anasarca  - BNP at 24,546  - cardio consulted, started on IV lasix.    - Monitor I/O and daily wts, hasnt diuresed much  - no ACEi/ARB 2/2 renal failure, on toprol XL  - checked echo with EF 15-20%  - no change in BNP 24,000  - start Lasix gtt, fluid restriction and Aldactone daily, add Metolazone  - diuresed ~ 2.6 L thus far, down 4 lb overnight     H/o CAD s/p CABG  - 4 weeks ago, on eliquis/asa/statin/BB     HTN   - uncontrolled initially  - cont home meds, prn IV hydralazine  - much improved     Elevated trop   - suspect 2/2 above  - trend trops  - cardio consulted, reviewed echo     ARF vs CKD Stage III  - creat 1.5-->1.7-->1.8 --> 1.9 --> 1.8  - avoid nephrotoxic agents   - monitor on lasix, Might need lasix gtt     Anemia   - hb 10.8-->9.9 --> 11.3  - monitor    Hypomagnesemia - Resolved  - 1.6  - replete  - monitor --> 1.9        DVT Prophylaxis: eliquis  Diet: DIET CARDIAC; Low Sodium (2 GM);  Daily Fluid Restriction: 1800 ml  Code Status: Full Code    PT/OT Eval Status: ordered    Dispo - needs more diuresis, cont care      Allison De Leon PA-C 9:43 AM 5/7/2019     JOANA PEARCE 5/7/2019 2:37 PM

## 2019-05-07 NOTE — PROGRESS NOTES
Bedside report given to [facility], THE RN. Pt. denies further needs at this time. Call light is within reach.   Rosa Mcelroy RN

## 2019-05-07 NOTE — CARE COORDINATION
Spoke with pt re ARU unable to accept at discharge. Pt asked for list of SNFs. Pt current plan is to return to [facility] at discharge, however, wants to look at other options with assistance of family. Will follow.

## 2019-05-08 LAB
ANION GAP SERPL CALCULATED.3IONS-SCNC: 13 MMOL/L (ref 3–16)
ANION GAP SERPL CALCULATED.3IONS-SCNC: 14 MMOL/L (ref 3–16)
BUN BLDV-MCNC: 25 MG/DL (ref 7–20)
BUN BLDV-MCNC: 26 MG/DL (ref 7–20)
CALCIUM SERPL-MCNC: 9.3 MG/DL (ref 8.3–10.6)
CALCIUM SERPL-MCNC: 9.3 MG/DL (ref 8.3–10.6)
CHLORIDE BLD-SCNC: 94 MMOL/L (ref 99–110)
CHLORIDE BLD-SCNC: 98 MMOL/L (ref 99–110)
CO2: 28 MMOL/L (ref 21–32)
CO2: 30 MMOL/L (ref 21–32)
CREAT SERPL-MCNC: 1.9 MG/DL (ref 0.9–1.3)
CREAT SERPL-MCNC: 1.9 MG/DL (ref 0.9–1.3)
GFR AFRICAN AMERICAN: 44
GFR AFRICAN AMERICAN: 44
GFR NON-AFRICAN AMERICAN: 36
GFR NON-AFRICAN AMERICAN: 36
GLUCOSE BLD-MCNC: 100 MG/DL (ref 70–99)
GLUCOSE BLD-MCNC: 110 MG/DL (ref 70–99)
GLUCOSE BLD-MCNC: 111 MG/DL (ref 70–99)
GLUCOSE BLD-MCNC: 124 MG/DL (ref 70–99)
GLUCOSE BLD-MCNC: 164 MG/DL (ref 70–99)
GLUCOSE BLD-MCNC: 166 MG/DL (ref 70–99)
MAGNESIUM: 1.7 MG/DL (ref 1.8–2.4)
MAGNESIUM: 1.8 MG/DL (ref 1.8–2.4)
PERFORMED ON: ABNORMAL
POTASSIUM REFLEX MAGNESIUM: 3.4 MMOL/L (ref 3.5–5.1)
POTASSIUM SERPL-SCNC: 3.9 MMOL/L (ref 3.5–5.1)
SODIUM BLD-SCNC: 137 MMOL/L (ref 136–145)
SODIUM BLD-SCNC: 140 MMOL/L (ref 136–145)

## 2019-05-08 PROCEDURE — 2060000000 HC ICU INTERMEDIATE R&B

## 2019-05-08 PROCEDURE — 36415 COLL VENOUS BLD VENIPUNCTURE: CPT

## 2019-05-08 PROCEDURE — 2580000003 HC RX 258: Performed by: INTERNAL MEDICINE

## 2019-05-08 PROCEDURE — 83735 ASSAY OF MAGNESIUM: CPT

## 2019-05-08 PROCEDURE — 97127 HC SP THER IVNTJ W/FOCUS COG FUNCJ: CPT

## 2019-05-08 PROCEDURE — 6360000002 HC RX W HCPCS: Performed by: INTERNAL MEDICINE

## 2019-05-08 PROCEDURE — 80048 BASIC METABOLIC PNL TOTAL CA: CPT

## 2019-05-08 PROCEDURE — 6370000000 HC RX 637 (ALT 250 FOR IP): Performed by: INTERNAL MEDICINE

## 2019-05-08 PROCEDURE — 99232 SBSQ HOSP IP/OBS MODERATE 35: CPT | Performed by: INTERNAL MEDICINE

## 2019-05-08 PROCEDURE — 97530 THERAPEUTIC ACTIVITIES: CPT

## 2019-05-08 PROCEDURE — 6360000002 HC RX W HCPCS: Performed by: PHYSICIAN ASSISTANT

## 2019-05-08 PROCEDURE — 94761 N-INVAS EAR/PLS OXIMETRY MLT: CPT

## 2019-05-08 PROCEDURE — 97110 THERAPEUTIC EXERCISES: CPT

## 2019-05-08 RX ORDER — HYDRALAZINE HYDROCHLORIDE 10 MG/1
10 TABLET, FILM COATED ORAL EVERY 12 HOURS SCHEDULED
Status: DISCONTINUED | OUTPATIENT
Start: 2019-05-08 | End: 2019-05-09

## 2019-05-08 RX ORDER — MAGNESIUM SULFATE 1 G/100ML
1 INJECTION INTRAVENOUS ONCE
Status: COMPLETED | OUTPATIENT
Start: 2019-05-08 | End: 2019-05-08

## 2019-05-08 RX ORDER — ISOSORBIDE DINITRATE 10 MG/1
10 TABLET ORAL 2 TIMES DAILY
Status: DISCONTINUED | OUTPATIENT
Start: 2019-05-08 | End: 2019-05-16 | Stop reason: HOSPADM

## 2019-05-08 RX ADMIN — FUROSEMIDE 10 MG/HR: 10 INJECTION, SOLUTION INTRAVENOUS at 01:57

## 2019-05-08 RX ADMIN — ISOSORBIDE DINITRATE 10 MG: 10 TABLET ORAL at 21:11

## 2019-05-08 RX ADMIN — Medication 10 ML: at 21:11

## 2019-05-08 RX ADMIN — FAMOTIDINE 20 MG: 20 TABLET, FILM COATED ORAL at 09:16

## 2019-05-08 RX ADMIN — ATORVASTATIN CALCIUM 80 MG: 40 TABLET, FILM COATED ORAL at 21:11

## 2019-05-08 RX ADMIN — FERROUS SULFATE TAB 325 MG (65 MG ELEMENTAL FE) 325 MG: 325 (65 FE) TAB at 09:16

## 2019-05-08 RX ADMIN — TAMSULOSIN HYDROCHLORIDE 0.4 MG: 0.4 CAPSULE ORAL at 09:16

## 2019-05-08 RX ADMIN — APIXABAN 5 MG: 5 TABLET, FILM COATED ORAL at 09:16

## 2019-05-08 RX ADMIN — FUROSEMIDE 10 MG/HR: 10 INJECTION, SOLUTION INTRAVENOUS at 09:21

## 2019-05-08 RX ADMIN — ALLOPURINOL 100 MG: 100 TABLET ORAL at 09:16

## 2019-05-08 RX ADMIN — MAGNESIUM SULFATE HEPTAHYDRATE 1 G: 1 INJECTION, SOLUTION INTRAVENOUS at 10:43

## 2019-05-08 RX ADMIN — METOLAZONE 2.5 MG: 2.5 TABLET ORAL at 09:16

## 2019-05-08 RX ADMIN — APIXABAN 5 MG: 5 TABLET, FILM COATED ORAL at 21:11

## 2019-05-08 RX ADMIN — METOPROLOL SUCCINATE 50 MG: 50 TABLET, EXTENDED RELEASE ORAL at 09:16

## 2019-05-08 RX ADMIN — Medication 10 ML: at 09:17

## 2019-05-08 RX ADMIN — SPIRONOLACTONE 25 MG: 25 TABLET ORAL at 09:16

## 2019-05-08 RX ADMIN — HYDRALAZINE HYDROCHLORIDE 10 MG: 10 TABLET, FILM COATED ORAL at 11:17

## 2019-05-08 RX ADMIN — GABAPENTIN 300 MG: 300 CAPSULE ORAL at 21:11

## 2019-05-08 RX ADMIN — FUROSEMIDE 10 MG/HR: 10 INJECTION, SOLUTION INTRAVENOUS at 19:28

## 2019-05-08 RX ADMIN — ASPIRIN 81 MG: 81 TABLET, COATED ORAL at 09:16

## 2019-05-08 RX ADMIN — MULTIPLE VITAMINS W/ MINERALS TAB 1 TABLET: TAB at 09:16

## 2019-05-08 RX ADMIN — OXYCODONE HYDROCHLORIDE AND ACETAMINOPHEN 500 MG: 500 TABLET ORAL at 09:16

## 2019-05-08 RX ADMIN — HYDRALAZINE HYDROCHLORIDE 10 MG: 10 TABLET, FILM COATED ORAL at 21:18

## 2019-05-08 RX ADMIN — Medication 200 MG: at 21:11

## 2019-05-08 RX ADMIN — ISOSORBIDE DINITRATE 10 MG: 10 TABLET ORAL at 11:17

## 2019-05-08 RX ADMIN — ACETAMINOPHEN 650 MG: 325 TABLET ORAL at 15:02

## 2019-05-08 RX ADMIN — COLLAGENASE SANTYL: 250 OINTMENT TOPICAL at 09:18

## 2019-05-08 RX ADMIN — FAMOTIDINE 20 MG: 20 TABLET, FILM COATED ORAL at 21:11

## 2019-05-08 RX ADMIN — MELATONIN 3 MG ORAL TABLET 9 MG: 3 TABLET ORAL at 21:11

## 2019-05-08 ASSESSMENT — PAIN SCALES - GENERAL
PAINLEVEL_OUTOF10: 0
PAINLEVEL_OUTOF10: 0

## 2019-05-08 NOTE — PROGRESS NOTES
[address] Daily Progress Note      Admit Date:  5/3/2019    Reason for Consultation: acute HF  Subjective:  Mr. Jackie Mann is seen for follow up of CHF  Feeling better as far as SOB. He feels weak. Intermittent rectal burning pain. He is having excellent diuresis. He is able to lay flat on room air. No recent antibiotics. He denies chest pain or palp   He came with swelling and shortness of breath   He has CPAP AT home but not wearing it. CABG in Dec complicated post op course including redo sternotomy and sternal repair. HPI on admission by my partner Dr. Lisandro Mcclendon: Maurisio Borden is a 60 y.o. male that presents with abdominal pain. Patient has been in [address] for care after a bypass surgery X3, 12/2018. Patient describes waking up this morning with a bandlike feeling of numbness in his mid abdomen.   He does admit to being a little bit constipated,  [facility] had post op PEA and post op paroxysmal atrial fibrillation          ROS:  12 point ROS negative in all areas as listed below except as in [place]  Constitutional, EENT, Cardiovascular, pulmonary, GI, , Musculoskeletal, skin, neurological, hematological, endocrine, Psychiatric    Past Medical History:   Diagnosis Date    Anoxic brain injury ([facility] [address] 1994    Cardiac arrest ([facility] [address] 1994    Gout     Hyperlipidemia     Hypertension     Reflux     S/P CABG x 3 12/2018    Sleep apnea     Type 2 diabetes mellitus without complication [facility])      Past Surgical History:   Procedure Laterality Date    CARDIAC CATHETERIZATION  11/26/2018    Dr. Gwen Turner Left 03/25/2014    Dr. Ebony Randall - w/trigger finger (3rd) & trigger thumb release    CARPAL TUNNEL RELEASE Right 04/14/2015    Dr. Fabián Patel N/A 12/16/2018    Dr. Kelsea Maxwell - decompressive corpectomy C6 (>90%), microdissection, anterior cervical arthrodesis w/PEEK allograft, placement of corpectomy cage & Synthes plate V1-3    COLONOSCOPY  09/01/2015 Dr. Marnie Romo. Martha - pan-diverticulosis, transverse colon adenomatous polyps    CORONARY ARTERY BYPASS GRAFT  12/14/2018    Dr. Melva Sesay  04/05/2019    cystoscopy/bladder biopsy f/c exchange    CYSTOSCOPY N/A 4/5/2019    CYSTOSCOPY performed by Twila Harrison MD at [address] [facility]G Bilateral 02/27/2014    Dr. Ara Roach POLYSOMNOGRAPHY  04/12/2016    Dr. Mookie Sotomayor. [facility]    UT CABG, ARTERIAL, THREE N/A 12/4/2018    Dr. Jimmy White - x3 (LIMA-LAD, BL SVG-OM, SVG-PDA)    RECONSTRUCTIVE REPAIR STERNAL N/A 12/20/2018    Dr. Jimmy White - I&D of sternum w/placement of wound VAC    TRANSESOPHAGEAL ECHOCARDIOGRAM  12/04/2018    during CABG    TUNNELED VENOUS CATHETER PLACEMENT Right 12/24/2018    Dr. Mason Villegas - PICC via IJ       Objective:   BP (!) 131/91   Pulse 96   Temp 98.9 °F (37.2 °C) (Oral)   Resp 18   Ht 5' 10\" (1.778 m)   Wt 228 lb 12.8 oz (103.8 kg)   SpO2 94%   BMI 32.83 kg/m²       Intake/Output Summary (Last 24 hours) at 5/8/2019 0524  Last data filed at 5/8/2019 0427  Gross per 24 hour   Intake 720 ml   Output 2775 ml   Net -2055 ml       TELEMETRY: afib    Physical Exam:  General: No Respiratory distress, appears well developed and well nourished. Eyes:  Sclera nonicteric  Nose/Sinuses:  negative findings: nose shows no deformity, asymmetry, or inflammation, nasal mucosa normal, septum midline with no perforation or bleeding  Back:  no pain to palpation  Joint:  no active joint inflammation  Musculoskeletal:  negative  Skin:  Warm and dry  Neck:  Negative for JVD and Carotid Bruits. Chest:  Crackles both bases to auscultation, respiration easy  Cardiovascular:  irreg S1S2 normal, no murmur, no rub or thrill.   Abdomen:  Soft normal liver and spleen  Extremities:   4+ edema,   Neuro: intact    Medications:    metolazone  2.5 mg Oral Daily    spironolactone  25 mg Oral Daily    collagenase   Topical Daily    melatonin  9 mg Oral Nightly    aspirin  81 mg Oral Daily    fluticasone  2 spray Each Nare Daily    gabapentin  300 mg Oral Nightly    atorvastatin  80 mg Oral Nightly    tamsulosin  0.4 mg Oral Daily    famotidine  20 mg Oral BID    allopurinol  100 mg Oral Daily    apixaban  5 mg Oral BID    vitamin C  500 mg Oral Daily    ferrous sulfate  325 mg Oral Daily with breakfast    magnesium oxide  200 mg Oral Nightly    therapeutic multivitamin-minerals  1 tablet Oral Daily    metoprolol succinate  50 mg Oral Daily    sodium chloride flush  10 mL Intravenous 2 times per day    insulin lispro  0-6 Units Subcutaneous TID WC    insulin lispro  0-3 Units Subcutaneous Nightly      furosemide (LASIX) 1mg/ml infusion 10 mg/hr (05/08/19 0157)    dextrose       hydrALAZINE, lactulose, acetaminophen, sodium chloride flush, magnesium hydroxide, ondansetron, glucose, dextrose, glucagon (rDNA), dextrose, potassium chloride **OR** potassium alternative oral replacement **OR** potassium chloride, magnesium sulfate    Lab Data:  CBC:   Recent Labs     05/07/19  0504   WBC 7.2   HGB 11.3*   HCT 35.9*   MCV 86.5   *     BMP:   Recent Labs     05/05/19  0553 05/06/19  0557 05/07/19  0504    140 136   K 4.2 4.7 4.1    104 102   CO2 26 26 24   BUN 26* 26* 25*   CREATININE 1.8* 1.9* 1.8*     LIVER PROFILE:   No results for input(s): AST, ALT, LIPASE, BILIDIR, BILITOT, ALKPHOS in the last 72 hours. Invalid input(s): AMYLASE,  ALB  PT/INR: No results for input(s): PROTIME, INR in the last 72 hours. APTT: No results for input(s): APTT in the last 72 hours. BNP:  No results for input(s): BNP in the last 72 hours.     IMAGING:   I have reviewed the following tests and documented in this encounter as follows:   EKG 5/4/19  Normal sinus rhythmRightward axisIncomplete left bundle branch blockNonspecific T wave abnormalityProlonged QTAbnormal ECGWhen compared with ECG of 27-DEC-2018 09:24,Premature ventricular complexes are no longer PresentCriteria for Septal infarct are no longer PresentNonspecific T wave abnormality has replaced inverted T waves in Inferior leadsNonspecific T wave abnormality has replaced inverted T waves in Lateral leadsConfirmed by Brandie Solis MD, [address] ([id number]) on 2019 5:35:34      ECHO 5/3/19  Summary     Left ventricle - normal size and thickness, severely reduced function with   EF of 20%, severe diffuse hypokinesis   Right ventricle - reduced function   Mitral valve - mod-severe regurgitation   Tricuspid valve - moderate regurgitation with PASP of 55mmHg   Pulmonic valve - mild regurgitation    EK2018 Sinus tach with short AT run, IVCD, possible septal infarct, PVC    2018   post-Op Diagnosis: Sternal Incision Infection      Procedure(s):  STERNUM INCISION AND DRAINAGE WITH WOUND VAC PLACEMENT          CABG 19  CORONARY ARTERY BYPASS GRAFTING X3, INTERNAL MAMMARY ARTERY, SAPHENOUS VEIN GRAFT, ON PUMP  LIMA to LAD reverse saphenous vein to OM reverse saphenous vein to PDA  Transesophageal echo  Endo-vein harvest right and left leg  Intercostal nerve block  Doppler assessment of graft      LEFT HEART CATH 18  LM: luminals, mild calcium  LAD: heavy prox calcification.  Mid long section of disease with focal lesion up to 80%, distal with sequential lesions of up to 60-70%                Daig1- mid 90%, distal vessel bifurcation disease of 50%  LCX: luminals, distal native LCX is ecctretic and severely diseased fed by R->Lt collaterals.                 OM1- 50% mid ribbon like lesion                OM2- smaller vessel, mild diffuse disease <30%  RCA: dominant, mild-moderate diffuse disease and calcification, lesions up to 20%                PDA- focal eccentric 80%                = some Rt-->Lt collaterals   LVEDP: 22-25  LVEF: Not done due to CKD    ZBYV/JXED: 95/38/36    LV systolic function is mildly reduced with EF estimated from 40-45%.   More prominent septal hypokinesis is noted on certain views.   Left ventricular size is

## 2019-05-08 NOTE — PROGRESS NOTES
Hospitalist Progress Note      PCP: Alex Woodruff DO    Date of Admission: 5/3/2019    Subjective: is down 11 lb since yesterday. Feeling well. No complaints at this time. Medications:  Reviewed    Infusion Medications    furosemide (LASIX) 1mg/ml infusion 10 mg/hr (05/08/19 0921)    dextrose       Scheduled Medications    metolazone  2.5 mg Oral Daily    spironolactone  25 mg Oral Daily    collagenase   Topical Daily    melatonin  9 mg Oral Nightly    aspirin  81 mg Oral Daily    fluticasone  2 spray Each Nare Daily    gabapentin  300 mg Oral Nightly    atorvastatin  80 mg Oral Nightly    tamsulosin  0.4 mg Oral Daily    famotidine  20 mg Oral BID    allopurinol  100 mg Oral Daily    apixaban  5 mg Oral BID    vitamin C  500 mg Oral Daily    ferrous sulfate  325 mg Oral Daily with breakfast    magnesium oxide  200 mg Oral Nightly    therapeutic multivitamin-minerals  1 tablet Oral Daily    metoprolol succinate  50 mg Oral Daily    sodium chloride flush  10 mL Intravenous 2 times per day    insulin lispro  0-6 Units Subcutaneous TID WC    insulin lispro  0-3 Units Subcutaneous Nightly     PRN Meds: hydrALAZINE, lactulose, acetaminophen, sodium chloride flush, magnesium hydroxide, ondansetron, glucose, dextrose, glucagon (rDNA), dextrose, potassium chloride **OR** potassium alternative oral replacement **OR** potassium chloride, magnesium sulfate      Intake/Output Summary (Last 24 hours) at 5/8/2019 1022  Last data filed at 5/8/2019 0921  Gross per 24 hour   Intake 820 ml   Output 6275 ml   Net -5455 ml       Physical Exam Performed:    /83   Pulse 115   Temp 99.4 °F (37.4 °C) (Oral)   Resp 20   Ht 5' 10\" (1.778 m)   Wt 217 lb 4.8 oz (98.6 kg)   SpO2 94%   BMI 31.18 kg/m²   General appearance: No apparent distress appears stated age and cooperative. HEENT Normal cephalic, atraumatic without obvious deformity.  Conjunctivae/corneas clear. Neck: Supple, No JVD/bruits. reviewed the chart on Priti Jimenez and personally interviewed and examined patient, reviewed the data (labs and imaging) and after discussion with my PA formulated the plan. Agree with note with the following edits. HPI:     Sitting on the chair. Feels about the same. Still has a lot of edema. He was started on a lasix drip today. 5/7- feeling some better. Is diuresing. 5/8- is doing better. Somewhat sleepy this am.    I reviewed the patient's Past Medical History, Past Surgical History, Medications, and Allergies. Physical exam:    /83   Pulse 115   Temp 99.4 °F (37.4 °C) (Oral)   Resp 20   Ht 5' 10\" (1.778 m)   Wt 217 lb 4.8 oz (98.6 kg)   SpO2 94%   BMI 31.18 kg/m²     Gen: sick appearing. Looks much older than stated age. Eyes: PERRL. No sclera icterus. No conjunctival injection. ENT: No discharge. Pharynx clear. Neck: Trachea midline. Normal thyroid. Resp: No accessory muscle use. No crackles. No wheezes. No rhonchi. No dullness on percussion. CV: Regular rate. Regular rhythm. No murmur or rub. 3+ edema. Assessment/Plan:    Principal Problem:    Acute on chronic systolic heart failure (HCC)  Active Problems:    Coronary artery disease involving native coronary artery of native heart without angina pectoris    Prolonged QT interval    Anasarca    PAF (paroxysmal atrial fibrillation) ([facility])    Benign essential HTN    CAMI (acute kidney injury) ([facility] [address]  Resolved Problems:    * No resolved hospital problems. *       Acute on chr systolic CHF/anasarca  - BNP at 24,546  - cardio consulted, started on IV lasix.    - Monitor I/O and daily wts, hasnt diuresed much  - no ACEi/ARB 2/2 renal failure, on toprol XL  - checked echo with EF 15-20%  - no change in BNP 24,000  - start Lasix gtt, fluid restriction and Aldactone daily, add Metolazone  - diuresed ~ 8.1 L thus far, down 11 lb overnight     H/o CAD s/p CABG  - 4 weeks ago  - on eliquis/asa/statin/BB     HTN   - uncontrolled initially  - cont home meds, prn IV hydralazine  - much improved     Elevated trop   - suspect 2/2 above  - trended trops  - cardio consulted, reviewed echo     ARF vs CKD Stage III  - creat 1.5-->1.7-->1.8 --> 1.9 --> 1.8 -->1.9 today  - avoid nephrotoxic agents   - monitor on lasix gtt     Anemia   - hb 10.8-->9.9 --> 11.3  - monitor    Hypomagnesemia   - 1.7  - replete  - recheck tomorrow        DVT Prophylaxis: eliquis  Diet: DIET CARDIAC; Low Sodium (2 GM);  Daily Fluid Restriction: 1800 ml  Code Status: Full Code    PT/OT Eval Status: ordered    Dispo - with good diuresis thus far, [facility] [place] 10:26 AM 5/8/2019    JOANA PEARCE 5/8/2019 1:28 PM

## 2019-05-08 NOTE — PLAN OF CARE
Patient's EF (Ejection Fraction) is less than 40%    Patient has a past medical history of Anoxic brain injury ([facility] [address] Cardiac arrest ([facility] [address] Gout, Hyperlipidemia, Hypertension, Reflux, S/P CABG x 3, Sleep apnea, and Type 2 diabetes mellitus without complication ([facility] [address] Comorbidities reviewed and education provided. Patient and family's stated goal of care: reduce shortness of breath, increase activity tolerance, better understand heart failure and disease management, be more comfortable and reduce lower extremity edema prior to discharge    Patient's current functional capacity:  Marked limitation of physical activity. Comfortable at rest. Less than ordinary activity causes fatigue, palpitation, or dyspnea. Pt up in chair at this time on room air. Pt denies shortness of breath. Pt with pitting lower extremity edema.  Patient's weights and intake/output reviewed:    Patient Vitals for the past 96 hrs (Last 3 readings):   Weight   05/07/19 0345 228 lb 12.8 oz (103.8 kg)   05/06/19 0511 232 lb 8 oz (105.5 kg)   05/05/19 0617 234 lb (106.1 kg)       Intake/Output Summary (Last 24 hours) at 5/7/2019 2007  Last data filed at 5/7/2019 1829  Gross per 24 hour   Intake 776 ml   Output 2425 ml   Net -1649 ml         >>For CHF and Comorbidity documentation on Education Time and Topics, please see Education Tab

## 2019-05-08 NOTE — PROGRESS NOTES
SLP providing guided instruction and training using visual association task to enhance immediate and short term memory-adequate carryover exhibited. Speech to cont. Per POC. Patient/Family/Caregiver Education: Role of SLP      Plan:  Continued daily Speech/Language treatment with goals per plan of care.       Yoli Davis M.A., [id number] [facility] #[id number]  Speech-Language Pathologist

## 2019-05-08 NOTE — CARE COORDINATION
INTERDISCIPLINARY PLAN OF CARE CONFERENCE    Date/Time: 5/8/2019 11:56 AM  Completed by: Concetta Patel Case Management      Patient Name:  Pedro Ryan  YOB: 1960  Admitting Diagnosis: Anasarca [R60.1]     Admit Date/Time:  5/3/2019  1:42 PM    Chart reviewed. Interdisciplinary team met to discuss patient progress and discharge plans. Disciplines included Case Management, Nursing, and Dietitian. Current Status: Stable    PT/OT recommendation: ARU    Anticipated Discharge Date: TBD  Expected D/C Disposition:  Rehab  Confirmed plan with patient  Yes  Discharge Plan Comments: Reviewed chart and spoke with pt e: ARU unable to accept. Discussed dcp of returning to [facility] or going to new facility and pt has chosen to return o [facility] at [facility]. Discussed need to participate in therapy and pt states he will do his therapy. Update called to [facility]. [facility] to follow for dc needs.       Home O2 in place on admit: No  Pt informed of need to bring portable home O2 tank on day of discharge for nursing to connect prior to leaving:  Not Indicated  Verbalized agreement/Understanding:  Not Indicated

## 2019-05-08 NOTE — PROGRESS NOTES
Patient resting quietly in bed. No s/s of distress noted. Lasix gtt infusing at 10mL/hr. Shift assessment complete, see flow sheet. Denies needs at this time. Call light in reach. Will monitor.

## 2019-05-08 NOTE — PROGRESS NOTES
Occupational Therapy Daily Treatment Note    Unit: PCU  Date:  5/8/2019  Patient Name:    Rosales Germain  Admitting diagnosis:  Anasarca [R60.1]  Admit Date:  5/3/2019  Precautions/Restrictions:  fall risk, mohamud catheter  and confusion, IV       Discharge Recommendations: SNF  DME needs for discharge: defer to facility       Therapy recommendations for staff:   Assist of 2 with use of rolling walker (RW) for all transfers to/from chair    AM-PAC Score: 15  Home Health S4 Level: NA       Treatment Time: 8692-1904  Treatment number: 4  Total Treatment Time:  35 minutes      Subjective:  Pt in the bed and willing to work with OT  Pt instructed to ask the staff to get him up to sit in the chair during the day. Pain   No  Rating: NA  Location:NA  Pain Medicine Status: Denies need      Bed Mobility:   Supine to Sit:  Mod assist  Sit to Supine:  Not Tested  Rolling:           Not Tested  Scooting:        SBA    Transfer Training:   Sit to stand:   Min assist   Stand to sit:  Min assist   Bed to Chair:             Min assist with RW and VC  Bed to [facility]:   NT  Standard toilet:   Not Tested    Activity Tolerance   Pt completed therapy session with No adverse symptoms    ADL Training:   N  Grooming/Hygiene:  IND with combing hair   Therapeutic Exercise:   Shoulder shrugs 15x  Shoulder retraction 15x  Shoulder flexion 15x with theraband   Shoulder abuction 15x with theraband  Shoulder punches  15x with thera band   Patient Education:   Use of AE and energy conservation    Positioning Needs:   Up in chair, call light and needs in reach. Pillow put in chair for patient to sit on for comfort   Family Present:  No    Assessment: Pt cooperative and willing to work with OT pt willing to participate Pt willing now to go to SNF. Pt anxious about therapy and expectations in rehab. Pt tolerated tx well. Pt continues to require further rehab. GOALS  To be met in 3 Visits:  1). Bed to toilet: Min A     To be met in 5 Visits:  1).

## 2019-05-09 ENCOUNTER — APPOINTMENT (OUTPATIENT)
Dept: GENERAL RADIOLOGY | Age: 59
DRG: 194 | End: 2019-05-09
Payer: MEDICAID

## 2019-05-09 LAB
BACTERIA: ABNORMAL /HPF
BASOPHILS ABSOLUTE: 0 K/UL (ref 0–0.2)
BASOPHILS RELATIVE PERCENT: 0.4 %
BILIRUBIN URINE: NEGATIVE
BLOOD, URINE: ABNORMAL
C DIFFICILE TOXIN, EIA: ABNORMAL
CASTS: ABNORMAL /LPF
CLARITY: CLEAR
COLOR: YELLOW
EOSINOPHILS ABSOLUTE: 0 K/UL (ref 0–0.6)
EOSINOPHILS RELATIVE PERCENT: 0.1 %
EPITHELIAL CELLS, UA: ABNORMAL /HPF
GLUCOSE BLD-MCNC: 100 MG/DL (ref 70–99)
GLUCOSE BLD-MCNC: 122 MG/DL (ref 70–99)
GLUCOSE BLD-MCNC: 142 MG/DL (ref 70–99)
GLUCOSE BLD-MCNC: 143 MG/DL (ref 70–99)
GLUCOSE URINE: NEGATIVE MG/DL
HCT VFR BLD CALC: 35.3 % (ref 40.5–52.5)
HEMOGLOBIN: 11.2 G/DL (ref 13.5–17.5)
KETONES, URINE: NEGATIVE MG/DL
LEUKOCYTE ESTERASE, URINE: ABNORMAL
LYMPHOCYTES ABSOLUTE: 0.4 K/UL (ref 1–5.1)
LYMPHOCYTES RELATIVE PERCENT: 8.8 %
MCH RBC QN AUTO: 26.4 PG (ref 26–34)
MCHC RBC AUTO-ENTMCNC: 31.8 G/DL (ref 31–36)
MCV RBC AUTO: 83.2 FL (ref 80–100)
MICROSCOPIC EXAMINATION: YES
MONOCYTES ABSOLUTE: 0.4 K/UL (ref 0–1.3)
MONOCYTES RELATIVE PERCENT: 10 %
NEUTROPHILS ABSOLUTE: 3.5 K/UL (ref 1.7–7.7)
NEUTROPHILS RELATIVE PERCENT: 80.7 %
NITRITE, URINE: NEGATIVE
PDW BLD-RTO: 19.5 % (ref 12.4–15.4)
PERFORMED ON: ABNORMAL
PH UA: 6 (ref 5–8)
PLATELET # BLD: 128 K/UL (ref 135–450)
PMV BLD AUTO: 8.3 FL (ref 5–10.5)
PROCALCITONIN: 0.56 NG/ML (ref 0–0.15)
PROTEIN UA: 30 MG/DL
RBC # BLD: 4.25 M/UL (ref 4.2–5.9)
RBC UA: ABNORMAL /HPF (ref 0–2)
SPECIFIC GRAVITY UA: <=1.005 (ref 1–1.03)
URINE REFLEX TO CULTURE: YES
URINE TYPE: ABNORMAL
UROBILINOGEN, URINE: 0.2 E.U./DL
WBC # BLD: 4.3 K/UL (ref 4–11)
WBC UA: ABNORMAL /HPF (ref 0–5)

## 2019-05-09 PROCEDURE — 2580000003 HC RX 258: Performed by: INTERNAL MEDICINE

## 2019-05-09 PROCEDURE — 6370000000 HC RX 637 (ALT 250 FOR IP): Performed by: INTERNAL MEDICINE

## 2019-05-09 PROCEDURE — 87040 BLOOD CULTURE FOR BACTERIA: CPT

## 2019-05-09 PROCEDURE — 87077 CULTURE AEROBIC IDENTIFY: CPT

## 2019-05-09 PROCEDURE — 97530 THERAPEUTIC ACTIVITIES: CPT

## 2019-05-09 PROCEDURE — 36415 COLL VENOUS BLD VENIPUNCTURE: CPT

## 2019-05-09 PROCEDURE — 85025 COMPLETE CBC W/AUTO DIFF WBC: CPT

## 2019-05-09 PROCEDURE — 87324 CLOSTRIDIUM AG IA: CPT

## 2019-05-09 PROCEDURE — 81001 URINALYSIS AUTO W/SCOPE: CPT

## 2019-05-09 PROCEDURE — 6360000002 HC RX W HCPCS: Performed by: INTERNAL MEDICINE

## 2019-05-09 PROCEDURE — 97110 THERAPEUTIC EXERCISES: CPT

## 2019-05-09 PROCEDURE — 71045 X-RAY EXAM CHEST 1 VIEW: CPT

## 2019-05-09 PROCEDURE — 87449 NOS EACH ORGANISM AG IA: CPT

## 2019-05-09 PROCEDURE — 87086 URINE CULTURE/COLONY COUNT: CPT

## 2019-05-09 PROCEDURE — 87186 SC STD MICRODIL/AGAR DIL: CPT

## 2019-05-09 PROCEDURE — 84145 PROCALCITONIN (PCT): CPT

## 2019-05-09 PROCEDURE — 99232 SBSQ HOSP IP/OBS MODERATE 35: CPT | Performed by: INTERNAL MEDICINE

## 2019-05-09 PROCEDURE — 2060000000 HC ICU INTERMEDIATE R&B

## 2019-05-09 PROCEDURE — 87801 DETECT AGNT MULT DNA AMPLI: CPT

## 2019-05-09 PROCEDURE — 99233 SBSQ HOSP IP/OBS HIGH 50: CPT | Performed by: INTERNAL MEDICINE

## 2019-05-09 RX ORDER — HYDRALAZINE HYDROCHLORIDE 10 MG/1
20 TABLET, FILM COATED ORAL EVERY 12 HOURS SCHEDULED
Status: DISCONTINUED | OUTPATIENT
Start: 2019-05-09 | End: 2019-05-16 | Stop reason: HOSPADM

## 2019-05-09 RX ADMIN — ACETAMINOPHEN 650 MG: 325 TABLET ORAL at 17:45

## 2019-05-09 RX ADMIN — ISOSORBIDE DINITRATE 10 MG: 10 TABLET ORAL at 21:13

## 2019-05-09 RX ADMIN — ALLOPURINOL 100 MG: 100 TABLET ORAL at 09:01

## 2019-05-09 RX ADMIN — OXYCODONE HYDROCHLORIDE AND ACETAMINOPHEN 500 MG: 500 TABLET ORAL at 09:01

## 2019-05-09 RX ADMIN — ASPIRIN 81 MG: 81 TABLET, COATED ORAL at 09:01

## 2019-05-09 RX ADMIN — SPIRONOLACTONE 25 MG: 25 TABLET ORAL at 09:01

## 2019-05-09 RX ADMIN — HYDRALAZINE HYDROCHLORIDE 20 MG: 10 TABLET, FILM COATED ORAL at 21:14

## 2019-05-09 RX ADMIN — Medication 10 ML: at 21:14

## 2019-05-09 RX ADMIN — MULTIPLE VITAMINS W/ MINERALS TAB 1 TABLET: TAB at 09:00

## 2019-05-09 RX ADMIN — TAMSULOSIN HYDROCHLORIDE 0.4 MG: 0.4 CAPSULE ORAL at 09:01

## 2019-05-09 RX ADMIN — METOLAZONE 2.5 MG: 2.5 TABLET ORAL at 09:01

## 2019-05-09 RX ADMIN — ISOSORBIDE DINITRATE 10 MG: 10 TABLET ORAL at 09:00

## 2019-05-09 RX ADMIN — Medication 200 MG: at 21:13

## 2019-05-09 RX ADMIN — Medication 10 ML: at 09:00

## 2019-05-09 RX ADMIN — FERROUS SULFATE TAB 325 MG (65 MG ELEMENTAL FE) 325 MG: 325 (65 FE) TAB at 09:01

## 2019-05-09 RX ADMIN — FAMOTIDINE 20 MG: 20 TABLET, FILM COATED ORAL at 21:12

## 2019-05-09 RX ADMIN — ACETAMINOPHEN 650 MG: 325 TABLET ORAL at 09:00

## 2019-05-09 RX ADMIN — FUROSEMIDE 10 MG/HR: 10 INJECTION, SOLUTION INTRAVENOUS at 17:45

## 2019-05-09 RX ADMIN — HYDRALAZINE HYDROCHLORIDE 10 MG: 10 TABLET, FILM COATED ORAL at 09:01

## 2019-05-09 RX ADMIN — MELATONIN 3 MG ORAL TABLET 9 MG: 3 TABLET ORAL at 21:12

## 2019-05-09 RX ADMIN — APIXABAN 5 MG: 5 TABLET, FILM COATED ORAL at 21:13

## 2019-05-09 RX ADMIN — APIXABAN 5 MG: 5 TABLET, FILM COATED ORAL at 09:01

## 2019-05-09 RX ADMIN — FAMOTIDINE 20 MG: 20 TABLET, FILM COATED ORAL at 09:00

## 2019-05-09 RX ADMIN — Medication 125 MG: at 17:45

## 2019-05-09 RX ADMIN — GABAPENTIN 300 MG: 300 CAPSULE ORAL at 21:13

## 2019-05-09 RX ADMIN — METOPROLOL SUCCINATE 50 MG: 50 TABLET, EXTENDED RELEASE ORAL at 09:01

## 2019-05-09 RX ADMIN — FUROSEMIDE 10 MG/HR: 10 INJECTION, SOLUTION INTRAVENOUS at 06:03

## 2019-05-09 RX ADMIN — COLLAGENASE SANTYL: 250 OINTMENT TOPICAL at 09:01

## 2019-05-09 RX ADMIN — ATORVASTATIN CALCIUM 80 MG: 40 TABLET, FILM COATED ORAL at 21:13

## 2019-05-09 ASSESSMENT — PAIN SCALES - GENERAL: PAINLEVEL_OUTOF10: 0

## 2019-05-09 NOTE — PROGRESS NOTES
cephalic, atraumatic without obvious deformity.  Conjunctivae/corneas clear. Neck: Supple, No JVD/bruits.  Trachea midline without thyromegaly or adenopathy with full range of motion. Lungs: diminished b/l, on RA  Heart: Regular rate and rhythm with Normal S1/S2   Abdomen: Soft, non-tender or non-distended without rigidity or guarding and positive bowel sounds   Extremities: No clubbing, cyanosis, 3+ edema bilaterally. Skin: Skin color, texture, turgor normal.  No rashes or lesions. Neurologic: Alert and oriented to self, neurovascularly intact with sensory/motor intact upper extremities/lower extremities, bilaterally.  Cranial nerves: II-XII intact, grossly non-focal.  Mental status: Alert, awake, forgetful    Labs:   Recent Labs     05/07/19  0504 05/09/19  0516   WBC 7.2 4.3   HGB 11.3* 11.2*   HCT 35.9* 35.3*   * 128*     Recent Labs     05/07/19  0504 05/08/19  0441 05/08/19  1243    140 137   K 4.1 3.9 3.4*    98* 94*   CO2 24 28 30   BUN 25* 26* 25*   CREATININE 1.8* 1.9* 1.9*   CALCIUM 8.7 9.3 9.3     Urinalysis:      Lab Results   Component Value Date    NITRU Negative 11/26/2018    WBCUA 0-2 11/26/2018    RBCUA 0-2 11/26/2018    BLOODU SMALL 11/26/2018    SPECGRAV 1.020 11/26/2018    GLUCOSEU Negative 11/26/2018       Radiology:  XR CHEST PORTABLE   Final Result   Limited study by low lung volumes. Bibasilar opacities may represent   atelectasis. Pneumonia is considered as less likely. Stable enlargement of the cardiac silhouette      RECOMMENDATION:   Eventual follow-up PA and lateral chest would be helpful. CT ABDOMEN PELVIS WO CONTRAST Additional Contrast? None   Final Result   Diffuse anasarca with moderate bilateral pleural effusions, small volume   ascites, and diffuse subcutaneous edema. This has increased considerably   when compared to the previous exam.      Gallstones are noted.   There is subtle pericholecystic fat stranding, which   at least raise the possibility of cholecystitis, but that fat stranding is   equally likely to be secondary to anasarca. Gema Espinal   have reviewed the chart on Tabith[facility] Blanco and personally interviewed and examined patient, reviewed the data (labs and imaging) and after discussion with my PA formulated the plan. Agree with note with the following edits. HPI:     Sitting on the chair. Feels about the same. Still has a lot of edema. He was started on a lasix drip today. 5/7- feeling some better. Is diuresing. 5/8- is doing better. Somewhat sleepy this am.    5/9- he spiked a fever. Had multiple episodes of diarrhea. He has prior history of C diff. I reviewed the patient's Past Medical History, Past Surgical History, Medications, and Allergies. Physical exam:    /84   Pulse 124   Temp 101.3 °F (38.5 °C) (Oral)   Resp 18   Ht 5' 10\" (1.778 m)   Wt 214 lb 3.2 oz (97.2 kg)   SpO2 96%   BMI 30.73 kg/m²     Gen: appears improved. Eyes: PERRL. No sclera icterus. No conjunctival injection. ENT: No discharge. Pharynx clear. Neck: Trachea midline. Normal thyroid. Resp: No accessory muscle use. No crackles. No wheezes. No rhonchi. No dullness on percussion. CV: Regular rate. Regular rhythm. No murmur or rub. 3+ edema. Assessment/Plan:    Principal Problem:    Acute on chronic systolic heart failure (HCC)  Active Problems:    Coronary artery disease involving native coronary artery of native heart without angina pectoris    Prolonged QT interval    Anasarca    PAF (paroxysmal atrial fibrillation) ([facility])    Benign essential HTN    CAMI (acute kidney injury) ([facility] [address]  Resolved Problems:    * No resolved hospital problems. *       Acute on chr systolic CHF/anasarca  - BNP at 24,546  - cardio consulted, started on IV lasix.    - Monitor I/O and daily wts  - no ACEi/ARB 2/2 renal failure, on toprol XL  - checked echo with EF 15-20%  - start Lasix gtt, fluid restriction and Aldactone

## 2019-05-09 NOTE — PROGRESS NOTES
decompressive corpectomy C6 (>90%), microdissection, anterior cervical arthrodesis w/PEEK allograft, placement of corpectomy cage & Synthes plate O0-1    COLONOSCOPY  09/01/2015    Dr. Oneal - gillis-diverticulosis, transverse colon adenomatous polyps    CORONARY ARTERY BYPASS GRAFT  12/14/2018    Dr. Melva Sesay  04/05/2019    cystoscopy/bladder biopsy f/c exchange    CYSTOSCOPY N/A 4/5/2019    CYSTOSCOPY performed by Twila Harrison MD at [address] EMG Bilateral 02/27/2014    Dr. Ara Roach POLYSOMNOGRAPHY  04/12/2016    Dr. Mookie Sotomayor. HAFSA Muniz w/ Health    IL CABG, ARTERIAL, THREE N/A 12/4/2018    Dr. Jimmy White - x3 (LIMA-LAD, BL SVG-OM, SVG-PDA)    RECONSTRUCTIVE REPAIR STERNAL N/A 12/20/2018    Dr. Jimmy White - I&D of sternum w/placement of wound VAC    TRANSESOPHAGEAL ECHOCARDIOGRAM  12/04/2018    during CABG    TUNNELED VENOUS CATHETER PLACEMENT Right 12/24/2018    Dr. Mason Villegas - PICC via IJ       Objective:   /70   Pulse 99   Temp 102.1 °F (38.9 °C) (Oral)   Resp 16   Ht 5' 10\" (1.778 m)   Wt 217 lb 4.8 oz (98.6 kg)   SpO2 93%   BMI 31.18 kg/m²       Intake/Output Summary (Last 24 hours) at 5/9/2019 0531  Last data filed at 5/9/2019 0307  Gross per 24 hour   Intake 772 ml   Output 6500 ml   Net -5728 ml       TELEMETRY: afib    Physical Exam:  General: No Respiratory distress, appears well developed and well nourished. Eyes:  Sclera nonicteric  Nose/Sinuses:  negative findings: nose shows no deformity, asymmetry, or inflammation, nasal mucosa normal, septum midline with no perforation or bleeding  Back:  no pain to palpation  Joint:  no active joint inflammation  Musculoskeletal:  negative  Skin:  Warm and dry  Neck:  Negative for JVD and Carotid Bruits. Chest:  Clear  to auscultation, respiration easy  Cardiovascular:  irreg S1S2 normal, no murmur, no rub or thrill.   Abdomen:  Soft normal liver and spleen  Extremities:  1+ edema,   Neuro: intact    Medications:    isosorbide dinitrate  10 mg Oral BID    hydrALAZINE  10 mg Oral 2 times per day    metolazone  2.5 mg Oral Daily    spironolactone  25 mg Oral Daily    collagenase   Topical Daily    melatonin  9 mg Oral Nightly    aspirin  81 mg Oral Daily    fluticasone  2 spray Each Nare Daily    gabapentin  300 mg Oral Nightly    atorvastatin  80 mg Oral Nightly    tamsulosin  0.4 mg Oral Daily    famotidine  20 mg Oral BID    allopurinol  100 mg Oral Daily    apixaban  5 mg Oral BID    vitamin C  500 mg Oral Daily    ferrous sulfate  325 mg Oral Daily with breakfast    magnesium oxide  200 mg Oral Nightly    therapeutic multivitamin-minerals  1 tablet Oral Daily    metoprolol succinate  50 mg Oral Daily    sodium chloride flush  10 mL Intravenous 2 times per day    insulin lispro  0-6 Units Subcutaneous TID WC    insulin lispro  0-3 Units Subcutaneous Nightly      furosemide (LASIX) 1mg/ml infusion 10 mg/hr (05/08/19 1928)    dextrose       hydrALAZINE, lactulose, acetaminophen, sodium chloride flush, magnesium hydroxide, ondansetron, glucose, dextrose, glucagon (rDNA), dextrose, potassium chloride **OR** potassium alternative oral replacement **OR** potassium chloride, magnesium sulfate    Lab Data:  CBC:   Recent Labs     05/07/19  0504   WBC 7.2   HGB 11.3*   HCT 35.9*   MCV 86.5   *     BMP:   Recent Labs     05/07/19  0504 05/08/19  0441 05/08/19  1243    140 137   K 4.1 3.9 3.4*    98* 94*   CO2 24 28 30   BUN 25* 26* 25*   CREATININE 1.8* 1.9* 1.9*     LIVER PROFILE:   No results for input(s): AST, ALT, LIPASE, BILIDIR, BILITOT, ALKPHOS in the last 72 hours. Invalid input(s): AMYLASE,  ALB  PT/INR: No results for input(s): PROTIME, INR in the last 72 hours. APTT: No results for input(s): APTT in the last 72 hours. BNP:  No results for input(s): BNP in the last 72 hours.     IMAGING:   I have reviewed the following tests and documented in this encounter as follows:   EKG 19  Normal sinus rhythmRightward axisIncomplete left bundle branch blockNonspecific T wave abnormalityProlonged QTAbnormal ECGWhen compared with ECG of 27-DEC-2018 09:24,Premature ventricular complexes are no longer PresentCriteria for Septal infarct are no longer PresentNonspecific T wave abnormality has replaced inverted T waves in Inferior leadsNonspecific T wave abnormality has replaced inverted T waves in Lateral leadsConfirmed by Ted Melendez MD, [address] ([id number]) on 2019 5:35:34      ECHO 5/3/19  Summary     Left ventricle - normal size and thickness, severely reduced function with   EF of 20%, severe diffuse hypokinesis   Right ventricle - reduced function   Mitral valve - mod-severe regurgitation   Tricuspid valve - moderate regurgitation with PASP of 55mmHg   Pulmonic valve - mild regurgitation    EK2018 Sinus tach with short AT run, IVCD, possible septal infarct, PVC    2018   post-Op Diagnosis: Sternal Incision Infection      Procedure(s):  STERNUM INCISION AND DRAINAGE WITH WOUND VAC PLACEMENT          CABG 19  CORONARY ARTERY BYPASS GRAFTING X3, INTERNAL MAMMARY ARTERY, SAPHENOUS VEIN GRAFT, ON PUMP  LIMA to LAD reverse saphenous vein to OM reverse saphenous vein to PDA  Transesophageal echo  Endo-vein harvest right and left leg  Intercostal nerve block  Doppler assessment of graft      LEFT HEART CATH 18  LM: luminals, mild calcium  LAD: heavy prox calcification.  Mid long section of disease with focal lesion up to 80%, distal with sequential lesions of up to 60-70%                Daig1- mid 90%, distal vessel bifurcation disease of 50%  LCX: luminals, distal native LCX is ecctretic and severely diseased fed by R->Lt collaterals.                 OM1- 50% mid ribbon like lesion                OM2- smaller vessel, mild diffuse disease <30%  RCA: dominant, mild-moderate diffuse disease and calcification, lesions up to 20%                PDA- focal eccentric 80%                = 5:31 AM

## 2019-05-09 NOTE — PROGRESS NOTES
Inpatient Physical Therapy Daily Treatment Note    Unit: PCU  Date:  5/9/2019  Patient Name:    Lowell Mobley  Admitting diagnosis:  Anasarca [R60.1]  Admit Date:  5/3/2019  Precautions/Restrictions:  fall risk, IV, mohamud catheter  and telemetry      Discharge Recommendations: SNF  DME needs for discharge: defer to facility       Therapy recommendations for staff:   Assist of 2 with use of rolling walker (RW) for all transfers to/from bedside [facility] S4 Level Recommendation: NA  AM-PAC Mobility Score   AM-PAC Inpatient Mobility Raw Score : 12       Treatment Time:  11:31-12:14  Treatment number: 4  Timed Code Treatment Minutes: 43 minutes  Total Treatment Minutes:  43  minutes    Cognition    A&O Place  and Time  (month)- did not assess for self   Able to follow 2 step commands    Subjective  Patient lying supine in bed with no family present  Pt agreeable to this PT tx. Pt expresses interest in getting out of bed. Pt appeared more lethargic today. Pt showed delayed responses to questions and difficulty finishing sentences throughout tx. Pt noted to have soiled the bed. PCA alerted to assist with clean-up. Pain   No  Rating:  NA/10  Location:   Pain Medicine Status: Denies need     Bed Mobility   Supine to Sit:   Max A  Sit to Supine: Mod A  Rolling:   Min A to R side Mod A to L side   Scooting:   Min A (to EOB) Max A of 2 (to [facility])    Transfer Training     Sit to stand:   Not Tested  Stand to sit:   Not Tested  Bed to Chair:  Not Tested with use of N/A    Gait Training gait deferred due to fatigue  Distance:   ft  Deviations (firm surface/linoleum): N/A   Assistive Device Used:  N/A  Level of Assist: N/A  Comment:     Stair Training deferred, pt unsafe/not appropriate to complete stairs at this time  Pt ascended/descended  stairs with N/A with N/A, and use of N/A.   Pattern: N/A    Therapeutic Exercise all completed bilaterally unless indicated  Ankle pumps: 2x10  Quad sets:   Glut sets: from this facility prior to next visit, this note will serve as the Discharge Summary.

## 2019-05-09 NOTE — PROGRESS NOTES
Assessment completed as charted. Patient had oral temp. 101.3 medicated with tylenol. Notified Supriya Shah. Lasix gtt infusing as ordered. Call light in reach will monitor.

## 2019-05-09 NOTE — PROGRESS NOTES
Occupational Therapy Daily Treatment Note    Unit: PCU  Date:  5/9/2019  Patient Name:    Nacho Madden  Admitting diagnosis:  Anasarca [R60.1]  Admit Date:  5/3/2019  Precautions/Restrictions:  fall risk, mohamud catheter  and confusion, IV , C diff precautions       Discharge Recommendations: SNF  DME needs for discharge: defer to facility       Therapy recommendations for staff:   Assist of 2 with use of rolling walker (RW) for all transfers to/from chair    AM-PAC Score: 15  Home Health S4 Level: NA       Treatment Time: 3941-9885   Treatment number: 5  Total Treatment Time:  31 minutes      Subjective:  Pt in the bed and willing to work with OT. Pt states he feels weak. /75 sitting on the edge of the bed     Pain   No  Rating: NA  Location:NA  Pain Medicine Status: Denies need      Bed Mobility: pt sat on the edge of the bed for approx 20 mins with good balance   Supine to Sit:  Mod assist  Sit to Supine:  Not Tested  Rolling:           Not Tested  Scooting:        SBA    Transfer Training: Pt able to stand no more than 30 seconds  2x then had to sit due to weakness. Pt has no reports of dizziness. Sit to stand:   Min assist   Stand to sit:  Min assist   Bed to Chair:             NT due to LEs buckling when standing and having difficulty side stepping by the bed. Bed to [facility]:   NT  Standard toilet:   Not Tested    Activity Tolerance   Pt completed therapy session with No adverse symptoms    ADL Training:   N  Grooming/Hygiene:  IND with combing hair   Therapeutic Exercise:   Shoulder shrugs 20x  Shoulder retraction 12x  Reaching with UEs while sitting    Patient Education:   Mildred Solares in the need to increase activity level    Positioning Needs: In the bed with needs at reach   Family Present:  No    Assessment: Pt cooperative and willing to work with OT pt willing to participate Pt feeling weaker today and now has C diff precautions as of today.  Pt having increased difficulty with side stepping at the edge of the bed and tolerated standing with RW for no more than 30 mins. Pt continues to require further rehab. GOALS  To be met in 3 Visits:  1). Bed to toilet: Min A     To be met in 5 Visits:  1). Supine to Sit: Supervision  2). Upper Body Bathing:  Min A  3). Lower Body Bathing:  Min A  4). Upper Body Dressing: Min A  5). Lower Body Dressing: Min A  6).  Pt to cielo UE exs x 15 reps       Plan: cont with MAYCOL Handley OTR/L [id number]      If patient discharges from this facility prior to next visit, this note will serve as the Discharge Summary

## 2019-05-09 NOTE — PROGRESS NOTES
Patient's EF (Ejection Fraction) is less than 40%    Patient has a past medical history of Anoxic brain injury ([facility] [address] Cardiac arrest ([facility] [address] Gout, Hyperlipidemia, Hypertension, Reflux, S/P CABG x 3, Sleep apnea, and Type 2 diabetes mellitus without complication ([facility] [address] Comorbidities reviewed and education provided. Patient and family's stated goal of care: reduce shortness of breath, increase activity tolerance, better understand heart failure and disease management, be more comfortable and reduce lower extremity edema prior to discharge    Patient's current functional capacity:  Slight limitation of physical activity. Comfortable at rest. Ordinary physical activity results in fatigue, palpitation, dyspnea. Pt resting in bed at this time on room air. Pt denies shortness of breath. Pt with pitting lower extremity edema.  Patient's weights and intake/output reviewed:    Patient Vitals for the past 96 hrs (Last 3 readings):   Weight   05/09/19 0547 214 lb 3.2 oz (97.2 kg)   05/08/19 0541 217 lb 4.8 oz (98.6 kg)   05/07/19 0345 228 lb 12.8 oz (103.8 kg)       Intake/Output Summary (Last 24 hours) at 5/9/2019 1049  Last data filed at 5/9/2019 0958  Gross per 24 hour   Intake 914 ml   Output 5750 ml   Net -4836 ml         >>For CHF and Comorbidity documentation on Education Time and Topics, please see Education Tab

## 2019-05-09 NOTE — PLAN OF CARE
Problem: Falls - Risk of:  Goal: Will remain free from falls  Description  Will remain free from falls  Outcome: Ongoing  Goal: Absence of physical injury  Description  Absence of physical injury  Outcome: Ongoing     Problem: Risk for Impaired Skin Integrity  Goal: Tissue integrity - skin and mucous membranes  Description  Structural intactness and normal physiological function of skin and  mucous membranes. Outcome: Ongoing     Problem: Fluid Volume:  Description  [TRUNCATED] Hyponatremia indicates a relatively greater amount of water to sodium in plasma. In hypovolemia, a deficit of both total body water and sodium exists but relatively less water deficit. Euvolemia represents near normal total body sodium co .. Ben Velez [TRUNCATED] Hyponatremia indicates a relatively greater amount of water to sodium in plasma. In hypovolemia, a deficit of both total body water and sodium exists but relatively less water deficit. Euvolemia represents near normal total body sodium co ...   Goal: Hemodynamic stability will improve  Description  Hemodynamic stability will improve  Outcome: Ongoing  Goal: Ability to maintain a balanced intake and output will improve  Description  Ability to maintain a balanced intake and output will improve  Outcome: Ongoing     Problem: OXYGENATION/RESPIRATORY FUNCTION  Goal: Patient will maintain patent airway  Outcome: Ongoing  Goal: Patient will achieve/maintain normal respiratory rate/effort  Description  Respiratory rate and effort will be within normal limits for the patient  Outcome: Ongoing

## 2019-05-10 LAB
ANION GAP SERPL CALCULATED.3IONS-SCNC: 13 MMOL/L (ref 3–16)
BUN BLDV-MCNC: 39 MG/DL (ref 7–20)
CALCIUM SERPL-MCNC: 8.5 MG/DL (ref 8.3–10.6)
CHLORIDE BLD-SCNC: 84 MMOL/L (ref 99–110)
CO2: 33 MMOL/L (ref 21–32)
CREAT SERPL-MCNC: 2.2 MG/DL (ref 0.9–1.3)
GFR AFRICAN AMERICAN: 37
GFR NON-AFRICAN AMERICAN: 31
GLUCOSE BLD-MCNC: 103 MG/DL (ref 70–99)
GLUCOSE BLD-MCNC: 120 MG/DL (ref 70–99)
GLUCOSE BLD-MCNC: 177 MG/DL (ref 70–99)
GLUCOSE BLD-MCNC: 179 MG/DL (ref 70–99)
GLUCOSE BLD-MCNC: 185 MG/DL (ref 70–99)
PERFORMED ON: ABNORMAL
POTASSIUM SERPL-SCNC: 2.7 MMOL/L (ref 3.5–5.1)
SODIUM BLD-SCNC: 130 MMOL/L (ref 136–145)

## 2019-05-10 PROCEDURE — 80048 BASIC METABOLIC PNL TOTAL CA: CPT

## 2019-05-10 PROCEDURE — 6360000002 HC RX W HCPCS: Performed by: INTERNAL MEDICINE

## 2019-05-10 PROCEDURE — 2580000003 HC RX 258

## 2019-05-10 PROCEDURE — 6370000000 HC RX 637 (ALT 250 FOR IP): Performed by: INTERNAL MEDICINE

## 2019-05-10 PROCEDURE — 99232 SBSQ HOSP IP/OBS MODERATE 35: CPT | Performed by: INTERNAL MEDICINE

## 2019-05-10 PROCEDURE — 2060000000 HC ICU INTERMEDIATE R&B

## 2019-05-10 PROCEDURE — 2580000003 HC RX 258: Performed by: INTERNAL MEDICINE

## 2019-05-10 PROCEDURE — 92507 TX SP LANG VOICE COMM INDIV: CPT

## 2019-05-10 PROCEDURE — 36415 COLL VENOUS BLD VENIPUNCTURE: CPT

## 2019-05-10 RX ORDER — SODIUM CHLORIDE 9 MG/ML
INJECTION, SOLUTION INTRAVENOUS
Status: COMPLETED
Start: 2019-05-10 | End: 2019-05-10

## 2019-05-10 RX ORDER — TORSEMIDE 20 MG/1
20 TABLET ORAL 2 TIMES DAILY
Status: DISCONTINUED | OUTPATIENT
Start: 2019-05-10 | End: 2019-05-14

## 2019-05-10 RX ORDER — METOLAZONE 2.5 MG/1
2.5 TABLET ORAL WEEKLY
Status: DISCONTINUED | OUTPATIENT
Start: 2019-05-17 | End: 2019-05-13

## 2019-05-10 RX ADMIN — TORSEMIDE 20 MG: 20 TABLET ORAL at 14:44

## 2019-05-10 RX ADMIN — Medication 125 MG: at 00:22

## 2019-05-10 RX ADMIN — Medication 125 MG: at 06:25

## 2019-05-10 RX ADMIN — VANCOMYCIN HYDROCHLORIDE 500 MG: 500 INJECTION, POWDER, LYOPHILIZED, FOR SOLUTION INTRAVENOUS at 21:12

## 2019-05-10 RX ADMIN — Medication 125 MG: at 17:50

## 2019-05-10 RX ADMIN — INSULIN LISPRO 1 UNITS: 100 INJECTION, SOLUTION INTRAVENOUS; SUBCUTANEOUS at 21:03

## 2019-05-10 RX ADMIN — FERROUS SULFATE TAB 325 MG (65 MG ELEMENTAL FE) 325 MG: 325 (65 FE) TAB at 08:33

## 2019-05-10 RX ADMIN — FAMOTIDINE 20 MG: 20 TABLET, FILM COATED ORAL at 20:55

## 2019-05-10 RX ADMIN — APIXABAN 5 MG: 5 TABLET, FILM COATED ORAL at 08:33

## 2019-05-10 RX ADMIN — METOPROLOL SUCCINATE 50 MG: 50 TABLET, EXTENDED RELEASE ORAL at 08:33

## 2019-05-10 RX ADMIN — METOLAZONE 2.5 MG: 2.5 TABLET ORAL at 08:33

## 2019-05-10 RX ADMIN — POTASSIUM CHLORIDE 10 MEQ: 7.46 INJECTION, SOLUTION INTRAVENOUS at 22:05

## 2019-05-10 RX ADMIN — Medication 10 ML: at 20:38

## 2019-05-10 RX ADMIN — GABAPENTIN 300 MG: 300 CAPSULE ORAL at 20:55

## 2019-05-10 RX ADMIN — ISOSORBIDE DINITRATE 10 MG: 10 TABLET ORAL at 20:54

## 2019-05-10 RX ADMIN — FUROSEMIDE 10 MG/HR: 10 INJECTION, SOLUTION INTRAVENOUS at 04:26

## 2019-05-10 RX ADMIN — ALLOPURINOL 100 MG: 100 TABLET ORAL at 08:37

## 2019-05-10 RX ADMIN — ACETAMINOPHEN 650 MG: 325 TABLET ORAL at 08:33

## 2019-05-10 RX ADMIN — Medication 200 MG: at 20:55

## 2019-05-10 RX ADMIN — ATORVASTATIN CALCIUM 80 MG: 40 TABLET, FILM COATED ORAL at 20:55

## 2019-05-10 RX ADMIN — Medication 125 MG: at 14:44

## 2019-05-10 RX ADMIN — ISOSORBIDE DINITRATE 10 MG: 10 TABLET ORAL at 08:33

## 2019-05-10 RX ADMIN — HYDRALAZINE HYDROCHLORIDE 20 MG: 10 TABLET, FILM COATED ORAL at 21:00

## 2019-05-10 RX ADMIN — MELATONIN 3 MG ORAL TABLET 9 MG: 3 TABLET ORAL at 20:55

## 2019-05-10 RX ADMIN — OXYCODONE HYDROCHLORIDE AND ACETAMINOPHEN 500 MG: 500 TABLET ORAL at 08:33

## 2019-05-10 RX ADMIN — ASPIRIN 81 MG: 81 TABLET, COATED ORAL at 08:33

## 2019-05-10 RX ADMIN — MULTIPLE VITAMINS W/ MINERALS TAB 1 TABLET: TAB at 08:33

## 2019-05-10 RX ADMIN — TAMSULOSIN HYDROCHLORIDE 0.4 MG: 0.4 CAPSULE ORAL at 08:33

## 2019-05-10 RX ADMIN — POTASSIUM CHLORIDE 10 MEQ: 7.46 INJECTION, SOLUTION INTRAVENOUS at 20:37

## 2019-05-10 RX ADMIN — FAMOTIDINE 20 MG: 20 TABLET, FILM COATED ORAL at 08:33

## 2019-05-10 RX ADMIN — SODIUM CHLORIDE 1000 ML: 9 INJECTION, SOLUTION INTRAVENOUS at 22:05

## 2019-05-10 RX ADMIN — HYDRALAZINE HYDROCHLORIDE 20 MG: 10 TABLET, FILM COATED ORAL at 08:37

## 2019-05-10 RX ADMIN — APIXABAN 5 MG: 5 TABLET, FILM COATED ORAL at 20:55

## 2019-05-10 RX ADMIN — SODIUM CHLORIDE 250 ML: 9 INJECTION, SOLUTION INTRAVENOUS at 20:37

## 2019-05-10 RX ADMIN — COLLAGENASE SANTYL: 250 OINTMENT TOPICAL at 08:35

## 2019-05-10 RX ADMIN — SPIRONOLACTONE 25 MG: 25 TABLET ORAL at 08:33

## 2019-05-10 RX ADMIN — TORSEMIDE 20 MG: 20 TABLET ORAL at 20:55

## 2019-05-10 ASSESSMENT — PAIN - FUNCTIONAL ASSESSMENT: PAIN_FUNCTIONAL_ASSESSMENT: PREVENTS OR INTERFERES SOME ACTIVE ACTIVITIES AND ADLS

## 2019-05-10 ASSESSMENT — PAIN SCALES - GENERAL
PAINLEVEL_OUTOF10: 0
PAINLEVEL_OUTOF10: 7
PAINLEVEL_OUTOF10: 5
PAINLEVEL_OUTOF10: 8
PAINLEVEL_OUTOF10: 7

## 2019-05-10 ASSESSMENT — PAIN DESCRIPTION - PAIN TYPE
TYPE: ACUTE PAIN

## 2019-05-10 ASSESSMENT — PAIN DESCRIPTION - FREQUENCY
FREQUENCY: CONTINUOUS
FREQUENCY: CONTINUOUS

## 2019-05-10 ASSESSMENT — PAIN DESCRIPTION - LOCATION
LOCATION: RECTUM
LOCATION: RECTUM
LOCATION: COCCYX
LOCATION: RECTUM

## 2019-05-10 ASSESSMENT — PAIN DESCRIPTION - DESCRIPTORS: DESCRIPTORS: BURNING

## 2019-05-10 ASSESSMENT — PAIN DESCRIPTION - PROGRESSION: CLINICAL_PROGRESSION: NOT CHANGED

## 2019-05-10 ASSESSMENT — PAIN DESCRIPTION - ONSET: ONSET: ON-GOING

## 2019-05-10 NOTE — PROGRESS NOTES
Hospitalist Progress Note      PCP: Jose Russo DO    Date of Admission: 5/3/2019    Subjective:  Continues to have diarrhea. No issues with SOB. Pt denies any other concerns.  Afebrile thus far     Medications:  Reviewed    Infusion Medications    furosemide (LASIX) 1mg/ml infusion 10 mg/hr (05/10/19 0426)    dextrose       Scheduled Medications    hydrALAZINE  20 mg Oral 2 times per day    vancomycin  125 mg Oral 4 times per day    isosorbide dinitrate  10 mg Oral BID    metolazone  2.5 mg Oral Daily    spironolactone  25 mg Oral Daily    collagenase   Topical Daily    melatonin  9 mg Oral Nightly    aspirin  81 mg Oral Daily    fluticasone  2 spray Each Nare Daily    gabapentin  300 mg Oral Nightly    atorvastatin  80 mg Oral Nightly    tamsulosin  0.4 mg Oral Daily    famotidine  20 mg Oral BID    allopurinol  100 mg Oral Daily    apixaban  5 mg Oral BID    vitamin C  500 mg Oral Daily    ferrous sulfate  325 mg Oral Daily with breakfast    magnesium oxide  200 mg Oral Nightly    therapeutic multivitamin-minerals  1 tablet Oral Daily    metoprolol succinate  50 mg Oral Daily    sodium chloride flush  10 mL Intravenous 2 times per day    insulin lispro  0-6 Units Subcutaneous TID WC    insulin lispro  0-3 Units Subcutaneous Nightly     PRN Meds: hydrALAZINE, lactulose, acetaminophen, sodium chloride flush, magnesium hydroxide, ondansetron, glucose, dextrose, glucagon (rDNA), dextrose, potassium chloride **OR** potassium alternative oral replacement **OR** potassium chloride, magnesium sulfate      Intake/Output Summary (Last 24 hours) at 5/10/2019 1038  Last data filed at 5/10/2019 0813  Gross per 24 hour   Intake 1464 ml   Output 3200 ml   Net -1736 ml       Physical Exam Performed:    /85   Pulse 113   Temp 98.5 °F (36.9 °C) (Oral)   Resp 18   Ht 5' 10\" (1.778 m)   Wt 214 lb 6.4 oz (97.3 kg)   SpO2 92%   BMI 30.76 kg/m²   General appearance: No apparent distress appears stated age and cooperative. Pleasant  male  HEENT Normal cephalic, atraumatic without obvious deformity.  Conjunctivae/corneas clear. Neck: Supple, No JVD/bruits.  Trachea midline without thyromegaly or adenopathy with full range of motion. Lungs: diminished b/l, on RA  Heart: Regular rate and rhythm with Normal S1/S2   Abdomen: Soft, non-tender or non-distended without rigidity or guarding and positive bowel sounds   Extremities: No clubbing, cyanosis, 3+ edema bilaterally. Skin: Skin color, texture, turgor normal.  No rashes or lesions. Neurologic: Alert and oriented to self, neurovascularly intact with sensory/motor intact upper extremities/lower extremities, bilaterally.  Cranial nerves: II-XII intact, grossly non-focal.  Mental status: Alert, awake, forgetful    Labs:   Recent Labs     05/09/19  0516   WBC 4.3   HGB 11.2*   HCT 35.3*   *     Recent Labs     05/08/19  0441 05/08/19  1243    137   K 3.9 3.4*   CL 98* 94*   CO2 28 30   BUN 26* 25*   CREATININE 1.9* 1.9*   CALCIUM 9.3 9.3     Urinalysis:      Lab Results   Component Value Date    NITRU Negative 05/09/2019    WBCUA 10-20 05/09/2019    BACTERIA 1+ 05/09/2019    RBCUA 3-5 05/09/2019    BLOODU SMALL 05/09/2019    SPECGRAV <=1.005 05/09/2019    GLUCOSEU Negative 05/09/2019       Radiology:  XR CHEST PORTABLE   Final Result   Right-sided pleural effusion. Stable cardiomegaly         XR CHEST PORTABLE   Final Result   Limited study by low lung volumes. Bibasilar opacities may represent   atelectasis. Pneumonia is considered as less likely. Stable enlargement of the cardiac silhouette      RECOMMENDATION:   Eventual follow-up PA and lateral chest would be helpful. CT ABDOMEN PELVIS WO CONTRAST Additional Contrast? None   Final Result   Diffuse anasarca with moderate bilateral pleural effusions, small volume   ascites, and diffuse subcutaneous edema.   This has increased considerably   when compared to the previous

## 2019-05-10 NOTE — PLAN OF CARE
Libertad Du  Outcome: Ongoing  5/9/2019 1047 by Wing Munoz RN  Outcome: Ongoing     Problem: ACTIVITY INTOLERANCE/IMPAIRED MOBILITY  Goal: Mobility/activity is maintained at optimum level for patient  5/10/2019 0011 by Libertad Du  Outcome: Ongoing  5/9/2019 1047 by Wing Munoz RN  Outcome: Ongoing   Patient free from falls, further skin breakdown, and injury this shift but management is ongoing. Fluid volume disturbance is in ongoing treatment. Pt is able to maintain airway and adequate SpO2 on roomair.

## 2019-05-10 NOTE — PLAN OF CARE
Patient's EF (Ejection Fraction) is less than 40%    Patient has a past medical history of Anoxic brain injury ([facility] [address] Cardiac arrest ([facility] [address] Gout, Hyperlipidemia, Hypertension, Reflux, S/P CABG x 3, Sleep apnea, and Type 2 diabetes mellitus without complication ([facility] [address] Comorbidities reviewed and education provided. Patient and family's stated goal of care: reduce shortness of breath, increase activity tolerance, better understand heart failure and disease management, be more comfortable and reduce lower extremity edema prior to discharge    Patient's current functional capacity:  Slight limitation of physical activity. Comfortable at rest. Ordinary physical activity results in fatigue, palpitation, dyspnea. Pt resting in bed at this time on room air. Pt denies shortness of breath. Pt without lower extremity edema.  Patient's weights and intake/output reviewed:    Patient Vitals for the past 96 hrs (Last 3 readings):   Weight   05/10/19 0336 214 lb 6.4 oz (97.3 kg)   05/09/19 0547 214 lb 3.2 oz (97.2 kg)   05/08/19 0541 217 lb 4.8 oz (98.6 kg)       Intake/Output Summary (Last 24 hours) at 5/10/2019 1246  Last data filed at 5/10/2019 0813  Gross per 24 hour   Intake 1464 ml   Output 3200 ml   Net -1736 ml         >>For CHF and Comorbidity documentation on Education Time and Topics, please see Education Tab

## 2019-05-10 NOTE — PLAN OF CARE
Patient's EF (Ejection Fraction) is less than 40%    Patient has a past medical history of Anoxic brain injury ([facility] [address] Cardiac arrest ([facility] [address] Gout, Hyperlipidemia, Hypertension, Reflux, S/P CABG x 3, Sleep apnea, and Type 2 diabetes mellitus without complication ([facility] [address] Comorbidities reviewed and education provided. Patient and family's stated goal of care: reduce shortness of breath, increase activity tolerance and reduce lower extremity edema prior to discharge    Patient's current functional capacity:  Slight limitation of physical activity. Comfortable at rest. Ordinary physical activity results in fatigue, palpitation, dyspnea. Pt resting in bed at this time on room air. Pt with complaints of shortness of breath. Pt with pitting lower extremity edema.  Patient's weights and intake/output reviewed:    Patient Vitals for the past 96 hrs (Last 3 readings):   Weight   05/09/19 0547 214 lb 3.2 oz (97.2 kg)   05/08/19 0541 217 lb 4.8 oz (98.6 kg)   05/07/19 0345 228 lb 12.8 oz (103.8 kg)       Intake/Output Summary (Last 24 hours) at 5/10/2019 0115  Last data filed at 5/9/2019 2148  Gross per 24 hour   Intake 1626 ml   Output 2900 ml   Net -1274 ml         >>For CHF and Comorbidity documentation on Education Time and Topics, please see Education Tab

## 2019-05-10 NOTE — PROGRESS NOTES
Bedside report given to Alexandra Randhawa RN at this time. Patient alert in bed, resting with unlabored breathing.

## 2019-05-10 NOTE — PROGRESS NOTES
Speech Language Pathology  Facility/Department: [facility] PCU TELEMETRY  Daily Treatment Note  NAME: Shaan Barcenas  : 1960  MRN: [id number]    Date of Eval: 5/10/2019  Evaluating Therapist: Joe Bush    Patient Diagnosis(es): has Anoxic brain injury ([address] HTN (hypertension); Carpal tunnel syndrome; Obstructive sleep apnea syndrome; Depression; Gout; ACS (acute coronary syndrome) ([address] Gait disturbance; CKD (chronic kidney disease) stage 3, GFR 30-59 ml/min ([address] Abnormal echocardiogram; Coronary artery disease involving native coronary artery of native heart without angina pectoris; Abnormal stress test; NSTEMI (non-ST elevated myocardial infarction) ([address] Ischemic cardiomyopathy; Acute on chronic systolic heart failure ([address] Acute renal failure with acute tubular necrosis superimposed on stage 3 chronic kidney disease ([address] Spinal cord ischemia ([address] DM (diabetes mellitus), secondary, uncontrolled, w/neurologic complic ([address] Cervical spinal cord compression ([address] Postoperative infection of wound of sternum; Serratia infection; Gram-negative bacteremia; Acute respiratory failure with hypoxia ([address] Sepsis ([address] Prolonged QT interval; Status post aorto-coronary artery bypass graft; History of cardiac arrest; Obesity, Class II, BMI 35-39.9; Cervical myelopathy ([address] Lesion of bladder; Recurrent UTI; Anasarca; PAF (paroxysmal atrial fibrillation) ([address] Benign essential HTN; and CAMI (acute kidney injury) ([address] on their problem list.  Onset Date:   See assessment        Pain:  No report of pain    Assessment: The patient was alert in bed. The patient has pre-existing memory and cognitive linguistic deficits from prior to this admission. He feels that he is able to function well in the hospital since the day is very regimented and time controlled. He reports that he will continue to use his memory book once he is discharged, which he feels will be today, so that he can track his needs.  He can make his basic ADL hospital needs known at the present time. His medical record does not show any recent outpatient Neurology visits. This was suggested by SLP at the time of this visit and the patient is in agreement. He does not recall the prior ARU speech therapy sessions he had in January. He indicates at this point that he would rather pursue cognitive therapy as an outpatient as he feels it will be more relevant to his ADL needs. Plan:  DC therapy at patient request.                               Judi Sampson CCC-SLP. D [employer]  5-10-19  Speech therapy  [id number]

## 2019-05-10 NOTE — PROGRESS NOTES
[address] Daily Progress Note      Admit Date:  5/3/2019    Reason for Consultation: acute HF  Subjective:  Mr. Yael Villela is seen for follow up of CHF  He had diarrhea now has C diff colitis  Feeling better as far as SOB. He feels weak. Intermittent rectal burning pain. He is having excellent diuresis. He is able to lay flat on room air. No recent antibiotics. He denies chest pain or palp   He came with swelling and shortness of breath   He has CPAP AT home but not wearing it. CABG in [id number] complicated post op course including redo sternotomy and sternal repair. He was admitted this time from nursing home where he is refusing to do physical therapy as I was told by D/C planjacey King. weight down 20lbs since admission. He is still on lasix drip. HPI on admission by my partner Dr. Alfonzo Villar: Rosales Germain is a 60 y.o. male that presents with abdominal pain. Patient has been in [address] for care after a bypass surgery X3, 12/2018. Patient describes waking up this morning with a bandlike feeling of numbness in his mid abdomen.   He does admit to being a little bit constipated,  Dexter Copper had post op PEA and post op paroxysmal atrial fibrillation          ROS:  12 point ROS negative in all areas as listed below except as in [place]  Constitutional, EENT, Cardiovascular, pulmonary, GI, , Musculoskeletal, skin, neurological, hematological, endocrine, Psychiatric    Past Medical History:   Diagnosis Date    Anoxic brain injury ([facility] [address] 1994    Cardiac arrest ([facility] [address] 1994    Gout     Hyperlipidemia     Hypertension     Reflux     S/P CABG x 3 12/2018    Sleep apnea     Type 2 diabetes mellitus without complication [facility])      Past Surgical History:   Procedure Laterality Date    CARDIAC CATHETERIZATION  11/26/2018    Dr. Champ Lechuga Left 03/25/2014    Dr. Betsy Renteria - w/trigger finger (3rd) & trigger thumb release    CARPAL TUNNEL RELEASE Right 04/14/2015    Dr. Bakari Felipe CERVICAL DISC ARTHROPLASTY N/A 12/16/2018    Dr. Juan Lou - decompressive corpectomy C6 (>90%), microdissection, anterior cervical arthrodesis w/PEEK allograft, placement of corpectomy cage & Synthes plate U5-7    COLONOSCOPY  09/01/2015    Dr. Oneal - gillis-diverticulosis, transverse colon adenomatous polyps    CORONARY ARTERY BYPASS GRAFT  12/14/2018    Dr. Pearl Torres  04/05/2019    cystoscopy/bladder biopsy f/c exchange    CYSTOSCOPY N/A 4/5/2019    CYSTOSCOPY performed by Verenice Clifton MD at [facility] Bilateral 02/27/2014    Dr. Steven Nieves POLYSOMNOGRAPHY  04/12/2016    Dr. Bishop Navarro. HAFSA Muniz w/    LA CABG, ARTERIAL, THREE N/A 12/4/2018    Dr. Radha Noonan - x3 (LIMA-LAD, BL SVG-OM, SVG-PDA)    RECONSTRUCTIVE REPAIR STERNAL N/A 12/20/2018    Dr. Radha Noonan - I&D of sternum w/placement of wound VAC    TRANSESOPHAGEAL ECHOCARDIOGRAM  12/04/2018    during CABG    TUNNELED VENOUS CATHETER PLACEMENT Right 12/24/2018    Dr. Robina Jean - PICC via IJ       Objective:   /83   Pulse 102   Temp 97.8 °F (36.6 °C) (Axillary)   Resp 17   Ht 5' 10\" (1.778 m)   Wt 214 lb 6.4 oz (97.3 kg)   SpO2 95%   BMI 30.76 kg/m²       Intake/Output Summary (Last 24 hours) at 5/10/2019 0524  Last data filed at 5/10/2019 4129  Gross per 24 hour   Intake 1824 ml   Output 2050 ml   Net -226 ml       TELEMETRY: afib    Physical Exam:  General: No Respiratory distress, appears well developed and well nourished. Eyes:  Sclera nonicteric  Nose/Sinuses:  negative findings: nose shows no deformity, asymmetry, or inflammation, nasal mucosa normal, septum midline with no perforation or bleeding  Back:  no pain to palpation  Joint:  no active joint inflammation  Musculoskeletal:  negative  Skin:  Warm and dry  Neck:  Negative for JVD and Carotid Bruits. Chest:  Clear  to auscultation, respiration easy  Cardiovascular:  irreg S1S2 normal, no murmur, no rub or thrill.   Abdomen:  Soft normal liver and the following tests and documented in this encounter as follows:   EKG 19  Normal sinus rhythmRightward axisIncomplete left bundle branch blockNonspecific T wave abnormalityProlonged QTAbnormal ECGWhen compared with ECG of 27-DEC-2018 09:24,Premature ventricular complexes are no longer PresentCriteria for Septal infarct are no longer PresentNonspecific T wave abnormality has replaced inverted T waves in Inferior leadsNonspecific T wave abnormality has replaced inverted T waves in Lateral leadsConfirmed by Starr Solis MD, [address] () on 2019 5:35:34      ECHO 5/3/19  Summary     Left ventricle - normal size and thickness, severely reduced function with   EF of 20%, severe diffuse hypokinesis   Right ventricle - reduced function   Mitral valve - mod-severe regurgitation   Tricuspid valve - moderate regurgitation with PASP of 55mmHg   Pulmonic valve - mild regurgitation    EK2018 Sinus tach with short AT run, IVCD, possible septal infarct, PVC    2018   post-Op Diagnosis: Sternal Incision Infection      Procedure(s):  STERNUM INCISION AND DRAINAGE WITH WOUND VAC PLACEMENT          CABG 19  CORONARY ARTERY BYPASS GRAFTING X3, INTERNAL MAMMARY ARTERY, SAPHENOUS VEIN GRAFT, ON PUMP  LIMA to LAD reverse saphenous vein to OM reverse saphenous vein to PDA  Transesophageal echo  Endo-vein harvest right and left leg  Intercostal nerve block  Doppler assessment of graft      LEFT HEART CATH 18  LM: luminals, mild calcium  LAD: heavy prox calcification.  Mid long section of disease with focal lesion up to 80%, distal with sequential lesions of up to 60-70%                Daig1- mid 90%, distal vessel bifurcation disease of 50%  LCX: luminals, distal native LCX is ecctretic and severely diseased fed by R->Lt collaterals.                 OM1- 50% mid ribbon like lesion                OM2- smaller vessel, mild diffuse disease <30%  RCA: dominant, mild-moderate diffuse disease and calcification, lesions up to 20%                PDA- focal eccentric 80%                = some Rt-->Lt collaterals   LVEDP: 22-25  LVEF: Not done due to CKD    TMPO/VFAF: 98/92/74    LV systolic function is mildly reduced with EF estimated from 40-45%.   More prominent septal hypokinesis is noted on certain views.   Left ventricular size is mildly increased.   There is mild concentric left ventricular hypertrophy.   Grade I diastolic dysfunction with normal filling pressure.   Aortic valve appears sclerotic but opens adequately.   Mild mitral regurgitation.   Inadequate tricuspid regurgitation to estimate systolic pulmonary artery   pressure.         STRESS TEST: 11/22/18 \"Myoview\" Summary  Moderate-large sized inferior, septal, and distal apical wall significant  partial reversibility defects consistent with mainly ischemia and some  infarction in the territory of the proximal to distal RCA and/or LAD. LV  function is moderately reduced with more prominent inferior hypokinesis and  ejection fraction of 37%. Higher risk abnormal study         CARDIAC CATH: 12/4/18 CORONARY ARTERY BYPASS GRAFTING X3             Assessment  1. Acute on chronic systolic CHF started to diurese well with lasix drip and metolazone, also on aldactone  2. C diff colitis  3. Ischemic cardiomyopathy  4. parox afib   5. CKD III renal function improving with diuresis  6. CAD s/p CABG complicated post op stable no angina  Plan salt restriction fluid restriction started  D/C Lasix drip switch to demadex PO  Aldactone 25 mg daily  Avoid PO  potassium daily in view of starting aldactone  On  Metolazone switch to 2.5 mg once a week every week. Daily weight  Discussed with attending and cardiology will be signing off please call us if needed to see him again.   Core Measures:  · Discharge instructions:   · LVEF documented: yes  · ACEI for LV dysfunction:  Listed allergy to enalapril  Will start nitrates and hydralazine  · Smoking Cessation:  · PT OT   · Anticipate discharge on Friday to rehab  I have spent 25  minutes of face to face time with the patient with more than 50% spent counseling and coordinating care for this patient    Sandy Francois MD 5/10/2019 5:24 AM

## 2019-05-10 NOTE — CONSULTS
Pharmacy to dose IV Vanco:  Please give 500mg IV Q12hrs to provide Gram+ organism coverage to include MRSA. Trough 5/12 at 1730.   Abdiaziz Rsusell PharmD 5/10/2019 5:19 PM

## 2019-05-10 NOTE — PROGRESS NOTES
Bedside report given to Sacha Roberto and Kerri Masters RN. Patient in stable condition. Care transferred. ELIZABETH CrowderN, RN.

## 2019-05-11 LAB
ANION GAP SERPL CALCULATED.3IONS-SCNC: 13 MMOL/L (ref 3–16)
BASOPHILS ABSOLUTE: 0.1 K/UL (ref 0–0.2)
BASOPHILS RELATIVE PERCENT: 0.6 %
BUN BLDV-MCNC: 43 MG/DL (ref 7–20)
CALCIUM SERPL-MCNC: 8.5 MG/DL (ref 8.3–10.6)
CHLORIDE BLD-SCNC: 88 MMOL/L (ref 99–110)
CO2: 30 MMOL/L (ref 21–32)
CREAT SERPL-MCNC: 2.1 MG/DL (ref 0.9–1.3)
EKG ATRIAL RATE: 394 BPM
EKG DIAGNOSIS: NORMAL
EKG Q-T INTERVAL: 360 MS
EKG QRS DURATION: 118 MS
EKG QTC CALCULATION (BAZETT): 529 MS
EKG R AXIS: 84 DEGREES
EKG T AXIS: 269 DEGREES
EKG VENTRICULAR RATE: 130 BPM
EOSINOPHILS ABSOLUTE: 0.1 K/UL (ref 0–0.6)
EOSINOPHILS RELATIVE PERCENT: 0.8 %
GFR AFRICAN AMERICAN: 39
GFR NON-AFRICAN AMERICAN: 32
GLUCOSE BLD-MCNC: 116 MG/DL (ref 70–99)
GLUCOSE BLD-MCNC: 129 MG/DL (ref 70–99)
GLUCOSE BLD-MCNC: 142 MG/DL (ref 70–99)
GLUCOSE BLD-MCNC: 143 MG/DL (ref 70–99)
GLUCOSE BLD-MCNC: 180 MG/DL (ref 70–99)
HCT VFR BLD CALC: 35.8 % (ref 40.5–52.5)
HEMOGLOBIN: 11.6 G/DL (ref 13.5–17.5)
LYMPHOCYTES ABSOLUTE: 0.7 K/UL (ref 1–5.1)
LYMPHOCYTES RELATIVE PERCENT: 8.2 %
MAGNESIUM: 1.7 MG/DL (ref 1.8–2.4)
MCH RBC QN AUTO: 26.9 PG (ref 26–34)
MCHC RBC AUTO-ENTMCNC: 32.3 G/DL (ref 31–36)
MCV RBC AUTO: 83.1 FL (ref 80–100)
MONOCYTES ABSOLUTE: 1.3 K/UL (ref 0–1.3)
MONOCYTES RELATIVE PERCENT: 14.5 %
NEUTROPHILS ABSOLUTE: 6.7 K/UL (ref 1.7–7.7)
NEUTROPHILS RELATIVE PERCENT: 75.9 %
PDW BLD-RTO: 19.4 % (ref 12.4–15.4)
PERFORMED ON: ABNORMAL
PLATELET # BLD: 126 K/UL (ref 135–450)
PMV BLD AUTO: 8.8 FL (ref 5–10.5)
POTASSIUM REFLEX MAGNESIUM: 3.3 MMOL/L (ref 3.5–5.1)
RBC # BLD: 4.3 M/UL (ref 4.2–5.9)
REPORT: NORMAL
SODIUM BLD-SCNC: 131 MMOL/L (ref 136–145)
WBC # BLD: 8.9 K/UL (ref 4–11)

## 2019-05-11 PROCEDURE — 6360000002 HC RX W HCPCS: Performed by: INTERNAL MEDICINE

## 2019-05-11 PROCEDURE — 99253 IP/OBS CNSLTJ NEW/EST LOW 45: CPT | Performed by: INTERNAL MEDICINE

## 2019-05-11 PROCEDURE — 2580000003 HC RX 258: Performed by: INTERNAL MEDICINE

## 2019-05-11 PROCEDURE — 99233 SBSQ HOSP IP/OBS HIGH 50: CPT | Performed by: INTERNAL MEDICINE

## 2019-05-11 PROCEDURE — 6370000000 HC RX 637 (ALT 250 FOR IP): Performed by: INTERNAL MEDICINE

## 2019-05-11 PROCEDURE — 2060000000 HC ICU INTERMEDIATE R&B

## 2019-05-11 PROCEDURE — 85025 COMPLETE CBC W/AUTO DIFF WBC: CPT

## 2019-05-11 PROCEDURE — 93010 ELECTROCARDIOGRAM REPORT: CPT | Performed by: INTERNAL MEDICINE

## 2019-05-11 PROCEDURE — 93005 ELECTROCARDIOGRAM TRACING: CPT | Performed by: INTERNAL MEDICINE

## 2019-05-11 PROCEDURE — 83735 ASSAY OF MAGNESIUM: CPT

## 2019-05-11 PROCEDURE — 36415 COLL VENOUS BLD VENIPUNCTURE: CPT

## 2019-05-11 PROCEDURE — 80048 BASIC METABOLIC PNL TOTAL CA: CPT

## 2019-05-11 RX ADMIN — VANCOMYCIN HYDROCHLORIDE 500 MG: 500 INJECTION, POWDER, LYOPHILIZED, FOR SOLUTION INTRAVENOUS at 06:33

## 2019-05-11 RX ADMIN — Medication 125 MG: at 12:03

## 2019-05-11 RX ADMIN — SPIRONOLACTONE 25 MG: 25 TABLET ORAL at 08:34

## 2019-05-11 RX ADMIN — POTASSIUM CHLORIDE 10 MEQ: 7.46 INJECTION, SOLUTION INTRAVENOUS at 01:05

## 2019-05-11 RX ADMIN — APIXABAN 5 MG: 5 TABLET, FILM COATED ORAL at 20:15

## 2019-05-11 RX ADMIN — MELATONIN 3 MG ORAL TABLET 9 MG: 3 TABLET ORAL at 20:15

## 2019-05-11 RX ADMIN — INSULIN LISPRO 1 UNITS: 100 INJECTION, SOLUTION INTRAVENOUS; SUBCUTANEOUS at 12:04

## 2019-05-11 RX ADMIN — POTASSIUM CHLORIDE 40 MEQ: 20 TABLET, EXTENDED RELEASE ORAL at 06:34

## 2019-05-11 RX ADMIN — POTASSIUM CHLORIDE 10 MEQ: 7.46 INJECTION, SOLUTION INTRAVENOUS at 00:18

## 2019-05-11 RX ADMIN — ATORVASTATIN CALCIUM 80 MG: 40 TABLET, FILM COATED ORAL at 20:15

## 2019-05-11 RX ADMIN — FAMOTIDINE 20 MG: 20 TABLET, FILM COATED ORAL at 08:34

## 2019-05-11 RX ADMIN — Medication 200 MG: at 20:15

## 2019-05-11 RX ADMIN — Medication 125 MG: at 06:33

## 2019-05-11 RX ADMIN — APIXABAN 5 MG: 5 TABLET, FILM COATED ORAL at 08:34

## 2019-05-11 RX ADMIN — FERROUS SULFATE TAB 325 MG (65 MG ELEMENTAL FE) 325 MG: 325 (65 FE) TAB at 08:33

## 2019-05-11 RX ADMIN — ISOSORBIDE DINITRATE 10 MG: 10 TABLET ORAL at 20:15

## 2019-05-11 RX ADMIN — TORSEMIDE 20 MG: 20 TABLET ORAL at 08:33

## 2019-05-11 RX ADMIN — POTASSIUM CHLORIDE 10 MEQ: 7.46 INJECTION, SOLUTION INTRAVENOUS at 02:05

## 2019-05-11 RX ADMIN — ISOSORBIDE DINITRATE 10 MG: 10 TABLET ORAL at 08:34

## 2019-05-11 RX ADMIN — VANCOMYCIN HYDROCHLORIDE 500 MG: 500 INJECTION, POWDER, LYOPHILIZED, FOR SOLUTION INTRAVENOUS at 17:55

## 2019-05-11 RX ADMIN — MULTIPLE VITAMINS W/ MINERALS TAB 1 TABLET: TAB at 08:34

## 2019-05-11 RX ADMIN — INSULIN LISPRO 1 UNITS: 100 INJECTION, SOLUTION INTRAVENOUS; SUBCUTANEOUS at 17:23

## 2019-05-11 RX ADMIN — ALLOPURINOL 100 MG: 100 TABLET ORAL at 08:48

## 2019-05-11 RX ADMIN — GABAPENTIN 300 MG: 300 CAPSULE ORAL at 20:15

## 2019-05-11 RX ADMIN — TAMSULOSIN HYDROCHLORIDE 0.4 MG: 0.4 CAPSULE ORAL at 08:34

## 2019-05-11 RX ADMIN — Medication 125 MG: at 00:20

## 2019-05-11 RX ADMIN — Medication 10 ML: at 20:15

## 2019-05-11 RX ADMIN — METOPROLOL SUCCINATE 50 MG: 50 TABLET, EXTENDED RELEASE ORAL at 08:34

## 2019-05-11 RX ADMIN — Medication 125 MG: at 17:55

## 2019-05-11 RX ADMIN — ASPIRIN 81 MG: 81 TABLET, COATED ORAL at 08:33

## 2019-05-11 RX ADMIN — HYDRALAZINE HYDROCHLORIDE 20 MG: 10 TABLET, FILM COATED ORAL at 08:48

## 2019-05-11 RX ADMIN — POTASSIUM CHLORIDE 10 MEQ: 7.46 INJECTION, SOLUTION INTRAVENOUS at 03:18

## 2019-05-11 RX ADMIN — COLLAGENASE SANTYL: 250 OINTMENT TOPICAL at 08:37

## 2019-05-11 RX ADMIN — HYDRALAZINE HYDROCHLORIDE 20 MG: 10 TABLET, FILM COATED ORAL at 20:17

## 2019-05-11 RX ADMIN — OXYCODONE HYDROCHLORIDE AND ACETAMINOPHEN 500 MG: 500 TABLET ORAL at 08:34

## 2019-05-11 RX ADMIN — FAMOTIDINE 20 MG: 20 TABLET, FILM COATED ORAL at 20:15

## 2019-05-11 RX ADMIN — INSULIN LISPRO 1 UNITS: 100 INJECTION, SOLUTION INTRAVENOUS; SUBCUTANEOUS at 20:17

## 2019-05-11 ASSESSMENT — PAIN SCALES - GENERAL: PAINLEVEL_OUTOF10: 0

## 2019-05-11 NOTE — PLAN OF CARE
Problem: Falls - Risk of:  Goal: Will remain free from falls  Description  Will remain free from falls  5/11/2019 0236 by Francee Guard  Outcome: Ongoing  5/10/2019 1246 by Frances Osei RN  Outcome: Ongoing  Goal: Absence of physical injury  Description  Absence of physical injury  5/11/2019 0236 by Francee Guard  Outcome: Ongoing  5/10/2019 1246 by Frances Osei RN  Outcome: Ongoing     Problem: Risk for Impaired Skin Integrity  Goal: Tissue integrity - skin and mucous membranes  Description  Structural intactness and normal physiological function of skin and  mucous membranes.   5/11/2019 0236 by Francee Guard  Outcome: Ongoing  5/10/2019 1246 by Frances Osei RN  Outcome: Ongoing     Problem: Fluid Volume:  Goal: Hemodynamic stability will improve  Description  Hemodynamic stability will improve  5/11/2019 0236 by Francee Guard  Outcome: Ongoing  5/10/2019 1246 by Frances Osei RN  Outcome: Ongoing  Goal: Ability to maintain a balanced intake and output will improve  Description  Ability to maintain a balanced intake and output will improve  5/11/2019 0236 by Francee Guard  Outcome: Ongoing  5/10/2019 1246 by Frances Osei RN  Outcome: Ongoing     Problem: OXYGENATION/RESPIRATORY FUNCTION  Goal: Patient will maintain patent airway  5/11/2019 0236 by Francee Guard  Outcome: Ongoing  5/10/2019 1246 by Frances Osei RN  Outcome: Ongoing  Goal: Patient will achieve/maintain normal respiratory rate/effort  Description  Respiratory rate and effort will be within normal limits for the patient  5/11/2019 0236 by Francee Guard  Outcome: Ongoing  5/10/2019 1246 by Frances Osei RN  Outcome: Ongoing     Problem: HEMODYNAMIC STATUS  Goal: Patient has stable vital signs and fluid balance  5/11/2019 0236 by Francee Guard  Outcome: Ongoing  5/10/2019 1246 by Frances Osei RN  Outcome: Ongoing     Problem: FLUID AND ELECTROLYTE IMBALANCE  Goal: Fluid and electrolyte balance are achieved/maintained  5/11/2019 0236 by Romaine Regan  Outcome: Ongoing  5/10/2019 1246 by Artem Fernandes RN  Outcome: Ongoing     Problem: ACTIVITY INTOLERANCE/IMPAIRED MOBILITY  Goal: Mobility/activity is maintained at optimum level for patient  5/11/2019 0236 by Romaine Regan  Outcome: Ongoing  5/10/2019 1246 by Artem Fernandes RN  Outcome: Ongoing     Problem: Pain:  Goal: Pain level will decrease  Description  Pain level will decrease  5/11/2019 0236 by Romaine Regan  Outcome: Ongoing  5/10/2019 1246 by Artem Fernandes RN  Outcome: Ongoing  Goal: Control of acute pain  Description  Control of acute pain  5/11/2019 0236 by Romaine Regan  Outcome: Ongoing  5/10/2019 1246 by Artem Fernandes RN  Outcome: Ongoing  Goal: Control of chronic pain  Description  Control of chronic pain  5/11/2019 0236 by Romaine Regan  Outcome: Ongoing  5/10/2019 1246 by Artem Fernandes RN  Outcome: Ongoing     Patient free from falls and injury this shift, care and management is ongoing. Skin integrity is an ongoing issue, heels and ankles noted to be red, blanching and a new suspected DTI on left heel. Wound care nurse consult placed, BLE elevated and foam dressing and boots applied. Patient is on room air with O2 demands met. Ongoing nursing measures in place.

## 2019-05-11 NOTE — PROGRESS NOTES
Patient again incontinent, brett care provided and repositioned. On skin assessment, noted that patient's heels and ankles are red and blanchable, with a purple area to left lateral heel. Barrier wipes used and foam dressing applied to bilateral heels. Patient placed in bilat boots and feet elevated on pillow. Wound care nurse consult made. Will continue to closely monitor.

## 2019-05-11 NOTE — PROGRESS NOTES
Shift assessment complete as documented. VSS. NSR-ST on monitor. Patient denies pain at this time. Meds administered as per MAR. Patient's breathing is unlabored and regular, on room air. Pt found to be incontinent of loose green/brown stool; bathed and brett care provided, changed linens and repositioned at this time. Denies any needs. Safety measures in place and call light within reach.

## 2019-05-11 NOTE — PROGRESS NOTES
Patient resting in bed quietly in bed with eyes closed and unlabored breathing. No acute distress at this time.

## 2019-05-11 NOTE — PROGRESS NOTES
Received results of blood cultures, patient is + MRSA in blood. Pharmacist, Mariano, says that patient's current antiobiotices of PO vanc and IV vanc should be adequate coverage. Sagar MARTINEZ sent to Dr. Brandie Del Castillo at this time.

## 2019-05-11 NOTE — PLAN OF CARE
Problem: Falls - Risk of:  Goal: Will remain free from falls  Description  Will remain free from falls  Outcome: Ongoing  Goal: Absence of physical injury  Description  Absence of physical injury  Outcome: Ongoing     Problem: Risk for Impaired Skin Integrity  Goal: Tissue integrity - skin and mucous membranes  Description  Structural intactness and normal physiological function of skin and  mucous membranes.   Outcome: Ongoing     Problem: Fluid Volume:  Goal: Hemodynamic stability will improve  Description  Hemodynamic stability will improve  Outcome: Ongoing  Goal: Ability to maintain a balanced intake and output will improve  Description  Ability to maintain a balanced intake and output will improve  Outcome: Ongoing     Problem: OXYGENATION/RESPIRATORY FUNCTION  Goal: Patient will maintain patent airway  5/11/2019 0330 by Cammie Patrick  Outcome: Ongoing  5/11/2019 0236 by Cammie Patrick  Outcome: Ongoing  Goal: Patient will achieve/maintain normal respiratory rate/effort  Description  Respiratory rate and effort will be within normal limits for the patient  5/11/2019 0330 by Cammie Patrick  Outcome: Ongoing  5/11/2019 0236 by Cammie Patrick  Outcome: Ongoing     Problem: HEMODYNAMIC STATUS  Goal: Patient has stable vital signs and fluid balance  5/11/2019 0330 by Cammie Patrick  Outcome: Ongoing  5/11/2019 0236 by aCmmie Patrick  Outcome: Ongoing     Problem: FLUID AND ELECTROLYTE IMBALANCE  Goal: Fluid and electrolyte balance are achieved/maintained  5/11/2019 0330 by Cammie Patrick  Outcome: Ongoing  5/11/2019 0236 by Cammie Patrick  Outcome: Ongoing     Problem: ACTIVITY INTOLERANCE/IMPAIRED MOBILITY  Goal: Mobility/activity is maintained at optimum level for patient  5/11/2019 0330 by Cammie Patrick  Outcome: Ongoing  5/11/2019 0236 by Cammie Patrick  Outcome: Ongoing     Problem: Pain:  Goal: Pain level will decrease  Description  Pain level will decrease  Outcome: Ongoing  Goal: Control of acute pain  Description  Control of acute pain  Outcome: Ongoing  Goal: Control of chronic pain  Description  Control of chronic pain  Outcome: Ongoing              Patient's EF (Ejection Fraction) is less than 40%    Patient's weights and intake/output reviewed:    Patient's Last Weight: 211 lbs obtained by bed scale. Today's weight is noted to be 3lb less than last documented weight. Intake/Output Summary (Last 24 hours) at 5/11/2019 0331  Last data filed at 5/11/2019 0316  Gross per 24 hour   Intake 1553 ml   Output 4000 ml   Net -2447 ml       Patient's current functional capacity:  Slight limitation of physical activity. Comfortable at rest. Ordinary physical activity results in fatigue, palpitation, dyspnea. Pt sitting in bed at this time on room air. Pt denies shortness of breath. Pt without lower extremity edema. Patient and family's stated goal of care: reduce shortness of breath, increase activity tolerance, better understand heart failure and disease management, be more comfortable and reduce lower extremity edema prior to discharge        Patient has a past medical history of Anoxic brain injury ([facility] [address] Cardiac arrest ([facility] [address] Gout, Hyperlipidemia, Hypertension, Reflux, S/P CABG x 3, Sleep apnea, and Type 2 diabetes mellitus without complication ([address] Comorbidities reviewed and education provided.     >>For CHF and Comorbidity documentation on Education Time and Topics, please see Education Tab

## 2019-05-11 NOTE — PROGRESS NOTES
[address]   Progress Note  Cardiology    Chief Complaint   Patient presents with    Abdominal Pain     patient was brought in by EMS from [facility] for abdominal pain. patient is in rehab at [facility] for recovery after having a bypass 4 weeks ago     HPI: Hx of PAF and admitted for chf and Cdiff. Has been aggressively diuresed and now has gradually increasing HR with the AF over the past 48 hours, now up to 130. On metoprolol xl 50 and eliquis. EF 20% with significant valvular heart disease. Hx of CABG with postop sternal wound infection. Preop EF was 45%. BP is low normal.    Medications/Labs all Reviewed    Recent Labs     05/11/19  0521   WBC 8.9   HGB 11.6*   HCT 35.8*   MCV 83.1   *     Recent Labs     05/11/19  0521   CREATININE 2.1*   BUN 43*   *   K 3.3*   CL 88*   CO2 30     No results for input(s): INR, PROTIME in the last 72 hours.      MEDICATIONS:   Sobia Lord ON 5/17/2019] metolazone  2.5 mg Oral Weekly    torsemide  20 mg Oral BID    vancomycin  500 mg Intravenous Q12H    hydrALAZINE  20 mg Oral 2 times per day    vancomycin  125 mg Oral 4 times per day    isosorbide dinitrate  10 mg Oral BID    spironolactone  25 mg Oral Daily    collagenase   Topical Daily    melatonin  9 mg Oral Nightly    aspirin  81 mg Oral Daily    fluticasone  2 spray Each Nare Daily    gabapentin  300 mg Oral Nightly    atorvastatin  80 mg Oral Nightly    tamsulosin  0.4 mg Oral Daily    famotidine  20 mg Oral BID    allopurinol  100 mg Oral Daily    apixaban  5 mg Oral BID    vitamin C  500 mg Oral Daily    ferrous sulfate  325 mg Oral Daily with breakfast    magnesium oxide  200 mg Oral Nightly    therapeutic multivitamin-minerals  1 tablet Oral Daily    metoprolol succinate  50 mg Oral Daily    sodium chloride flush  10 mL Intravenous 2 times per day    insulin lispro  0-6 Units Subcutaneous TID     insulin lispro  0-3 Units Subcutaneous Nightly      dextrose Physical Examination:    BP (!) 128/90   Pulse 150   Temp 98.7 °F (37.1 °C) (Oral)   Resp 16   Ht 5' 10\" (1.778 m)   Wt 201 lb 8 oz (91.4 kg)   SpO2 96%   BMI 28.91 kg/m²     Respiratory:  · Resp Assessment: Normal respiratory effort  · Resp Auscultation: Clear to auscultation bilaterally   Cardiovascular:  · Auscultation: irregular rate and rhythm, normal S1S2, no murmur, rub or gallop  · Palpation:  Nl PMI  · JVP:  normal  · Extremities: No Edema  Abdomen:  · Soft, non-tender  · Normal bowel sounds  Extremities:  ·  No Cyanosis or Clubbing  Neurological/Psychiatric:  · Oriented to time, place, and person  · Non-anxious  Skin Warm and dry    Assessment:    Patient Active Hospital Problem List:      1. Acute on chronic systolic CHF - he has been diuresed well with lasix drip and metolazone, also on aldactone. Now on demadex. 2. C diff colitis  3. Ischemic cardiomyopathy- severe  4. parox afib - now more persistent. Heart rates are faster gradually over 48 hours; I suspect due to over diuresis. 5. CKD III renal function improving with diuresis  6. CAD s/p CABG complicated post op stable no angina  Plan hold demadex today and allow him to drink more fluids  Increase metoprolol to 100 XL daily. He may benefit from entresto if his BP is a bit higher. He may also need an AICD in the future.                Carmen Hussein MD, 5/11/2019 10:10 AM

## 2019-05-11 NOTE — PROGRESS NOTES
Physical/Occupational  Therapy  Hold this date. Patient with elevated temp and HR overnight. Plan to follow and re-attempt 5/12.     Ashly Ferrera, PT #[id number]  Omayra Duran, [address] [place] [address], OTR/L   [id number]

## 2019-05-11 NOTE — PROGRESS NOTES
Patient's EF (Ejection Fraction) is less than 40%    Patient's weights and intake/output reviewed:    Patient's Last Weight: 91.4 kg obtained by bed scale. Today's weight is noted to be 5.851 kg less than last documented weight. Intake/Output Summary (Last 24 hours) at 5/11/2019 1315  Last data filed at 5/11/2019 5407  Gross per 24 hour   Intake 1115 ml   Output 2850 ml   Net -1735 ml       Patient's current functional capacity:  Marked limitation of physical activity. Comfortable at rest. Less than ordinary activity causes fatigue, palpitation, or dyspnea. Pt resting in bed at this time on room air. Pt denies shortness of breath. Pt without lower extremity edema. Patient and family's stated goal of care: reduce shortness of breath, increase activity tolerance, better understand heart failure and disease management, be more comfortable and reduce lower extremity edema prior to discharge        Patient has a past medical history of Anoxic brain injury ([facility] [address] Cardiac arrest ([facility] [address] Gout, Hyperlipidemia, Hypertension, Reflux, S/P CABG x 3, Sleep apnea, and Type 2 diabetes mellitus without complication ([facility] [address] Comorbidities reviewed and education provided.     >>For CHF and Comorbidity documentation on Education Time and Topics, please see Education Tab

## 2019-05-11 NOTE — PROGRESS NOTES
Hospitalist Progress Note      PCP: Frances Fitzpatrick DO    Date of Admission: 5/3/2019    Subjective:      5/7- feeling some better. Is diuresing. 5/8- is doing better. Somewhat sleepy this am.    5/9- he spiked a fever. Had multiple episodes of diarrhea. He has prior history of C diff. 5/10- diarrhea some better. Urine culture is showing Enterococcus     5/11- he is tachycardic today. He has a fib and his rate is elevated.     Medications:  Reviewed    Infusion Medications    dextrose       Scheduled Medications    [START ON 5/17/2019] metolazone  2.5 mg Oral Weekly    torsemide  20 mg Oral BID    vancomycin  500 mg Intravenous Q12H    hydrALAZINE  20 mg Oral 2 times per day    vancomycin  125 mg Oral 4 times per day    isosorbide dinitrate  10 mg Oral BID    spironolactone  25 mg Oral Daily    collagenase   Topical Daily    melatonin  9 mg Oral Nightly    aspirin  81 mg Oral Daily    fluticasone  2 spray Each Nare Daily    gabapentin  300 mg Oral Nightly    atorvastatin  80 mg Oral Nightly    tamsulosin  0.4 mg Oral Daily    famotidine  20 mg Oral BID    allopurinol  100 mg Oral Daily    apixaban  5 mg Oral BID    vitamin C  500 mg Oral Daily    ferrous sulfate  325 mg Oral Daily with breakfast    magnesium oxide  200 mg Oral Nightly    therapeutic multivitamin-minerals  1 tablet Oral Daily    metoprolol succinate  50 mg Oral Daily    sodium chloride flush  10 mL Intravenous 2 times per day    insulin lispro  0-6 Units Subcutaneous TID WC    insulin lispro  0-3 Units Subcutaneous Nightly     PRN Meds: hydrALAZINE, lactulose, acetaminophen, sodium chloride flush, magnesium hydroxide, ondansetron, glucose, dextrose, glucagon (rDNA), dextrose, potassium chloride **OR** potassium alternative oral replacement **OR** potassium chloride, magnesium sulfate      Intake/Output Summary (Last 24 hours) at 5/11/2019 1440  Last data filed at 5/11/2019 1433  Gross per 24 hour   Intake 1115 ml Output 3600 ml   Net -2485 ml       Physical Exam Performed:    /75   Pulse 101   Temp 98 °F (36.7 °C) (Oral)   Resp 16   Ht 5' 10\" (1.778 m)   Wt 201 lb 8 oz (91.4 kg)   SpO2 95%   BMI 28.91 kg/m²   General appearance: No apparent distress appears stated age and cooperative. Pleasant  male  HEENT Normal cephalic, atraumatic without obvious deformity.  Conjunctivae/corneas clear. Neck: Supple, No JVD/bruits.  Trachea midline without thyromegaly or adenopathy with full range of motion. Lungs: diminished b/l, on RA  Heart: Irregular rate and rhythm. with Normal S1/S2   Abdomen: Soft, non-tender or non-distended without rigidity or guarding and positive bowel sounds   Extremities: No clubbing, cyanosis, + edema bilaterally. Skin: Skin color, texture, turgor normal.  No rashes or lesions. Neurologic: Alert and oriented to self, neurovascularly intact with sensory/motor intact upper extremities/lower extremities, bilaterally.  Cranial nerves: II-XII intact, grossly non-focal.  Mental status: Alert, awake, forgetful    Labs:   Recent Labs     05/09/19  0516 05/11/19  0521   WBC 4.3 8.9   HGB 11.2* 11.6*   HCT 35.3* 35.8*   * 126*     Recent Labs     05/10/19  1918 05/11/19  0521   * 131*   K 2.7* 3.3*   CL 84* 88*   CO2 33* 30   BUN 39* 43*   CREATININE 2.2* 2.1*   CALCIUM 8.5 8.5     Urinalysis:      Lab Results   Component Value Date    NITRU Negative 05/09/2019    WBCUA 10-20 05/09/2019    BACTERIA 1+ 05/09/2019    RBCUA 3-5 05/09/2019    BLOODU SMALL 05/09/2019    SPECGRAV <=1.005 05/09/2019    GLUCOSEU Negative 05/09/2019       Radiology:  XR CHEST PORTABLE   Final Result   Right-sided pleural effusion. Stable cardiomegaly         XR CHEST PORTABLE   Final Result   Limited study by low lung volumes. Bibasilar opacities may represent   atelectasis. Pneumonia is considered as less likely.       Stable enlargement of the cardiac silhouette      RECOMMENDATION:   Eventual trops  - cardio consulted, reviewed echo     ARF vs CKD Stage III  - creat 1.5-->1.7-->1.8 --> 1.9 --> 1.8 -->1.9 today  - avoid nephrotoxic agents   - monitor on lasix gtt     Anemia   - hb 10.8-->9.9 --> 11.3 --> 11.2  - monitor    Hypomagnesemia - Resolved  - 1.7 --> 1.8  - replete  - rechecked     Hypokalemia   - PO K+ replacement   - monitor    UTI  -  Pharmacy to dose Vancomycin. Await final results    DVT Prophylaxis: eliquis  Diet: DIET CARDIAC; Low Sodium (2 GM);  Daily Fluid Restriction: 1800 ml  Code Status: Full Code    PT/OT Eval Status: ordered    Dispo - continue care          Sukhdeep Bueno 5/11/2019 2:40 PM

## 2019-05-12 LAB
ANION GAP SERPL CALCULATED.3IONS-SCNC: 15 MMOL/L (ref 3–16)
BUN BLDV-MCNC: 47 MG/DL (ref 7–20)
CALCIUM SERPL-MCNC: 8.7 MG/DL (ref 8.3–10.6)
CHLORIDE BLD-SCNC: 86 MMOL/L (ref 99–110)
CO2: 29 MMOL/L (ref 21–32)
CREAT SERPL-MCNC: 2.2 MG/DL (ref 0.9–1.3)
GFR AFRICAN AMERICAN: 37
GFR NON-AFRICAN AMERICAN: 31
GLUCOSE BLD-MCNC: 123 MG/DL (ref 70–99)
GLUCOSE BLD-MCNC: 139 MG/DL (ref 70–99)
GLUCOSE BLD-MCNC: 149 MG/DL (ref 70–99)
GLUCOSE BLD-MCNC: 155 MG/DL (ref 70–99)
GLUCOSE BLD-MCNC: 167 MG/DL (ref 70–99)
ORGANISM: ABNORMAL
PERFORMED ON: ABNORMAL
POTASSIUM SERPL-SCNC: 3.1 MMOL/L (ref 3.5–5.1)
SODIUM BLD-SCNC: 130 MMOL/L (ref 136–145)
URINE CULTURE, ROUTINE: ABNORMAL
URINE CULTURE, ROUTINE: ABNORMAL
VANCOMYCIN TROUGH: 10 UG/ML (ref 10–20)

## 2019-05-12 PROCEDURE — 6360000002 HC RX W HCPCS: Performed by: INTERNAL MEDICINE

## 2019-05-12 PROCEDURE — 6370000000 HC RX 637 (ALT 250 FOR IP): Performed by: INTERNAL MEDICINE

## 2019-05-12 PROCEDURE — 36415 COLL VENOUS BLD VENIPUNCTURE: CPT

## 2019-05-12 PROCEDURE — 99233 SBSQ HOSP IP/OBS HIGH 50: CPT | Performed by: INTERNAL MEDICINE

## 2019-05-12 PROCEDURE — 80202 ASSAY OF VANCOMYCIN: CPT

## 2019-05-12 PROCEDURE — 80048 BASIC METABOLIC PNL TOTAL CA: CPT

## 2019-05-12 PROCEDURE — 2060000000 HC ICU INTERMEDIATE R&B

## 2019-05-12 PROCEDURE — 2580000003 HC RX 258: Performed by: INTERNAL MEDICINE

## 2019-05-12 PROCEDURE — 94761 N-INVAS EAR/PLS OXIMETRY MLT: CPT

## 2019-05-12 PROCEDURE — 99232 SBSQ HOSP IP/OBS MODERATE 35: CPT | Performed by: INTERNAL MEDICINE

## 2019-05-12 PROCEDURE — 87040 BLOOD CULTURE FOR BACTERIA: CPT

## 2019-05-12 RX ADMIN — ISOSORBIDE DINITRATE 10 MG: 10 TABLET ORAL at 09:06

## 2019-05-12 RX ADMIN — INSULIN LISPRO 1 UNITS: 100 INJECTION, SOLUTION INTRAVENOUS; SUBCUTANEOUS at 11:52

## 2019-05-12 RX ADMIN — APIXABAN 5 MG: 5 TABLET, FILM COATED ORAL at 09:06

## 2019-05-12 RX ADMIN — MELATONIN 3 MG ORAL TABLET 9 MG: 3 TABLET ORAL at 21:36

## 2019-05-12 RX ADMIN — FERROUS SULFATE TAB 325 MG (65 MG ELEMENTAL FE) 325 MG: 325 (65 FE) TAB at 09:06

## 2019-05-12 RX ADMIN — ISOSORBIDE DINITRATE 10 MG: 10 TABLET ORAL at 21:37

## 2019-05-12 RX ADMIN — ATORVASTATIN CALCIUM 80 MG: 40 TABLET, FILM COATED ORAL at 21:36

## 2019-05-12 RX ADMIN — INSULIN LISPRO 1 UNITS: 100 INJECTION, SOLUTION INTRAVENOUS; SUBCUTANEOUS at 17:34

## 2019-05-12 RX ADMIN — OXYCODONE HYDROCHLORIDE AND ACETAMINOPHEN 500 MG: 500 TABLET ORAL at 09:06

## 2019-05-12 RX ADMIN — VANCOMYCIN HYDROCHLORIDE 500 MG: 500 INJECTION, POWDER, LYOPHILIZED, FOR SOLUTION INTRAVENOUS at 05:43

## 2019-05-12 RX ADMIN — ALLOPURINOL 100 MG: 100 TABLET ORAL at 09:07

## 2019-05-12 RX ADMIN — Medication 125 MG: at 17:32

## 2019-05-12 RX ADMIN — Medication 200 MG: at 21:37

## 2019-05-12 RX ADMIN — GABAPENTIN 300 MG: 300 CAPSULE ORAL at 21:37

## 2019-05-12 RX ADMIN — HYDRALAZINE HYDROCHLORIDE 20 MG: 10 TABLET, FILM COATED ORAL at 09:07

## 2019-05-12 RX ADMIN — Medication 10 ML: at 09:07

## 2019-05-12 RX ADMIN — HYDRALAZINE HYDROCHLORIDE 20 MG: 10 TABLET, FILM COATED ORAL at 21:37

## 2019-05-12 RX ADMIN — METOPROLOL SUCCINATE 50 MG: 50 TABLET, EXTENDED RELEASE ORAL at 09:06

## 2019-05-12 RX ADMIN — POTASSIUM CHLORIDE 40 MEQ: 20 TABLET, EXTENDED RELEASE ORAL at 11:51

## 2019-05-12 RX ADMIN — VANCOMYCIN HYDROCHLORIDE 500 MG: 500 INJECTION, POWDER, LYOPHILIZED, FOR SOLUTION INTRAVENOUS at 17:32

## 2019-05-12 RX ADMIN — Medication 125 MG: at 05:43

## 2019-05-12 RX ADMIN — TAMSULOSIN HYDROCHLORIDE 0.4 MG: 0.4 CAPSULE ORAL at 09:06

## 2019-05-12 RX ADMIN — COLLAGENASE SANTYL: 250 OINTMENT TOPICAL at 09:11

## 2019-05-12 RX ADMIN — Medication 125 MG: at 00:11

## 2019-05-12 RX ADMIN — FAMOTIDINE 20 MG: 20 TABLET, FILM COATED ORAL at 21:37

## 2019-05-12 RX ADMIN — MULTIPLE VITAMINS W/ MINERALS TAB 1 TABLET: TAB at 09:06

## 2019-05-12 RX ADMIN — FAMOTIDINE 20 MG: 20 TABLET, FILM COATED ORAL at 09:06

## 2019-05-12 RX ADMIN — ASPIRIN 81 MG: 81 TABLET, COATED ORAL at 09:06

## 2019-05-12 RX ADMIN — ACETAMINOPHEN 650 MG: 325 TABLET ORAL at 12:32

## 2019-05-12 RX ADMIN — Medication 10 ML: at 21:39

## 2019-05-12 RX ADMIN — Medication 125 MG: at 11:52

## 2019-05-12 RX ADMIN — APIXABAN 5 MG: 5 TABLET, FILM COATED ORAL at 21:37

## 2019-05-12 ASSESSMENT — PAIN SCALES - GENERAL
PAINLEVEL_OUTOF10: 6
PAINLEVEL_OUTOF10: 5

## 2019-05-12 NOTE — PROGRESS NOTES
5/12/2019 1012  Last data filed at 5/12/2019 0551  Gross per 24 hour   Intake 740 ml   Output 1701 ml   Net -961 ml       Physical Exam Performed:    /80   Pulse 93   Temp 97.3 °F (36.3 °C) (Oral)   Resp 18   Ht 5' 10\" (1.778 m)   Wt 202 lb 3.2 oz (91.7 kg)   SpO2 95%   BMI 29.01 kg/m²   General appearance: No apparent distress appears stated age and cooperative. Pleasant  male  HEENT Normal cephalic, atraumatic without obvious deformity.  Conjunctivae/corneas clear. Neck: Supple, No JVD/bruits.  Trachea midline without thyromegaly or adenopathy with full range of motion. Lungs: diminished b/l, on RA  Heart: Irregular rate and rhythm. with Normal S1/S2   Abdomen: Soft, non-tender or non-distended without rigidity or guarding and positive bowel sounds   Extremities: No clubbing, cyanosis, + edema bilaterally. Skin: Skin color, texture, turgor normal.  No rashes or lesions. Neurologic: Alert and oriented to self, neurovascularly intact with sensory/motor intact upper extremities/lower extremities, bilaterally.  Cranial nerves: II-XII intact, grossly non-focal.  Mental status: Alert, awake, forgetful    Labs:   Recent Labs     05/11/19  0521   WBC 8.9   HGB 11.6*   HCT 35.8*   *     Recent Labs     05/10/19  1918 05/11/19  0521   * 131*   K 2.7* 3.3*   CL 84* 88*   CO2 33* 30   BUN 39* 43*   CREATININE 2.2* 2.1*   CALCIUM 8.5 8.5     Urinalysis:      Lab Results   Component Value Date    NITRU Negative 05/09/2019    WBCUA 10-20 05/09/2019    BACTERIA 1+ 05/09/2019    RBCUA 3-5 05/09/2019    BLOODU SMALL 05/09/2019    SPECGRAV <=1.005 05/09/2019    GLUCOSEU Negative 05/09/2019       Radiology:  XR CHEST PORTABLE   Final Result   Right-sided pleural effusion. Stable cardiomegaly         XR CHEST PORTABLE   Final Result   Limited study by low lung volumes. Bibasilar opacities may represent   atelectasis. Pneumonia is considered as less likely.       Stable enlargement of the cardiac silhouette      RECOMMENDATION:   Eventual follow-up PA and lateral chest would be helpful. CT ABDOMEN PELVIS WO CONTRAST Additional Contrast? None   Final Result   Diffuse anasarca with moderate bilateral pleural effusions, small volume   ascites, and diffuse subcutaneous edema. This has increased considerably   when compared to the previous exam.      Gallstones are noted. There is subtle pericholecystic fat stranding, which   at least raise the possibility of cholecystitis, but that fat stranding is   equally likely to be secondary to anasarca. Culture  Enterococcus faecalis (1)     Antibiotic Interpretation MARY Status    ampicillin Sensitive <=2 mcg/mL     ciprofloxacin Resistant >2 mcg/mL     levofloxacin Resistant >4 mcg/mL     nitrofurantoin Sensitive <=32 mcg/mL     tetracycline Sensitive <=4 mcg/mL     vancomycin Sensitive 2 mcg/mL             Assessment/Plan:    Principal Problem:    Acute on chronic systolic heart failure (HCC)  Active Problems:    Coronary artery disease involving native coronary artery of native heart without angina pectoris    Prolonged QT interval    Anasarca    PAF (paroxysmal atrial fibrillation) ([facility])    Benign essential HTN    CAMI (acute kidney injury) ([facility] [address]  Resolved Problems:    * No resolved hospital problems. *       Acute on chr systolic CHF/anasarca  - BNP at 24,546  - cardio consulted, started on IV lasix. - Monitor I/O and daily wts  - no ACEi/ARB 2/2 renal failure, on toprol XL  - checked echo with EF 15-20%  - hold diuretics today also. - diuresed ~16 L, down 33 lbs     MRSA bacteremia  -  Etiology unclear. I consulted ID. He was started on Vancomycin on 5/10/19 for UTI from Enterococcus. Continue IV Vancomycin. Acute Fever - Resolved  C diff Colitis  - Up to 101.3 this morning   - CXR, Blood Cx, PCT ordered and results noted  - UA ordered, C.diff positive.  Likely cause of fever  - c diff precautions; start PO Vanc- diarrhea is improved. - afebrile today    H/o CAD s/p CABG  - 4 weeks ago  - on eliquis/asa/statin/BB    Par A fib  -  HR high today. Likely over diuresis. I will give some fluids and monitor HR.     HTN   - uncontrolled initially  - cont home meds, prn IV hydralazine  - much improved     Elevated trop   - suspect 2/2 above  - trended trops  - cardio consulted, reviewed echo     ARF vs CKD Stage III  - creat 1.5-->1.7-->1.8 --> 1.9 --> 1.8 -->1.9 today  - avoid nephrotoxic agents   - monitor on lasix gtt     Anemia   - hb 10.8-->9.9 --> 11.3 --> 11.2  - monitor    Hypomagnesemia - Resolved  - 1.7 --> 1.8  - replete  - rechecked     Hypokalemia   - PO K+ replacement   - monitor    UTI  -  Pharmacy to dose Vancomycin. Unsure if it is a significant UTI    DVT Prophylaxis: eliquis  Diet: DIET CARDIAC; Low Sodium (2 GM);  Daily Fluid Restriction: 1800 ml  Code Status: Full Code    PT/OT Eval Status: ordered    Dispo - continue care          Shawn Nava 5/12/2019 10:12 AM

## 2019-05-12 NOTE — PROGRESS NOTES
Pt agreed to get up to [facility] instead of using bedpan. Pt continues to have small loose BMs. Pt is incontinent at times. Pt has been up in chair multiple times today. Waffle cushion placed under patient. Pt is reminded to reposition q2h. Sacral heart changed. Healing stage 2 to coccyx.

## 2019-05-12 NOTE — PROGRESS NOTES
Pt is lying in bed with their eyes closed. Respirations are easy and even. Call light within reach bed in lowest position with the wheels locked. Will continue to monitor.  Excell Melville

## 2019-05-12 NOTE — PROGRESS NOTES
Potassium 3.1  Replaced with 40 mEq of PO potassium chloride per potassium replacement orders. 2 liters O2 NC

## 2019-05-12 NOTE — PROGRESS NOTES
[address]   Progress Note  Cardiology    Chief Complaint   Patient presents with    Abdominal Pain     patient was brought in by EMS from [facility] for abdominal pain. patient is in rehab at [facility] for recovery after having a bypass 4 weeks ago     HPI: Hx of PAF and admitted for chf and Cdiff. Has been aggressively diuresed. Yesterday his heart rate was 130 and I held his torsemide. His heart rate is now about . He has developed AF on this admit and is on eliquis and metoprolol  Was increased yesterday. EF 20% with significant valvular heart disease. Hx of recent CABG with postop sternal wound infection. Preop EF was 45%. BP is low normal, probably still too low to start entresto. Today he states that he feels \"out of it\" without specific complaints. (?)    Medications/Labs all Reviewed    Recent Labs     05/11/19  0521   WBC 8.9   HGB 11.6*   HCT 35.8*   MCV 83.1   *     Recent Labs     05/11/19  0521   CREATININE 2.1*   BUN 43*   *   K 3.3*   CL 88*   CO2 30     No results for input(s): INR, PROTIME in the last 72 hours.      MEDICATIONS:   Ivette Méndez ON 5/17/2019] metolazone  2.5 mg Oral Weekly    torsemide  20 mg Oral BID    vancomycin  500 mg Intravenous Q12H    hydrALAZINE  20 mg Oral 2 times per day    vancomycin  125 mg Oral 4 times per day    isosorbide dinitrate  10 mg Oral BID    spironolactone  25 mg Oral Daily    collagenase   Topical Daily    melatonin  9 mg Oral Nightly    aspirin  81 mg Oral Daily    fluticasone  2 spray Each Nare Daily    gabapentin  300 mg Oral Nightly    atorvastatin  80 mg Oral Nightly    tamsulosin  0.4 mg Oral Daily    famotidine  20 mg Oral BID    allopurinol  100 mg Oral Daily    apixaban  5 mg Oral BID    vitamin C  500 mg Oral Daily    ferrous sulfate  325 mg Oral Daily with breakfast    magnesium oxide  200 mg Oral Nightly    therapeutic multivitamin-minerals  1 tablet Oral Daily    metoprolol succinate  50 mg Oral Daily    sodium chloride flush  10 mL Intravenous 2 times per day    insulin lispro  0-6 Units Subcutaneous TID WC    insulin lispro  0-3 Units Subcutaneous Nightly      dextrose         Physical Examination:    /80   Pulse 93   Temp 97.3 °F (36.3 °C) (Oral)   Resp 18   Ht 5' 10\" (1.778 m)   Wt 202 lb 3.2 oz (91.7 kg)   SpO2 95%   BMI 29.01 kg/m²     Respiratory:  · Resp Assessment: Normal respiratory effort  · Resp Auscultation: Clear to auscultation bilaterally   Cardiovascular:  · Auscultation: irregular rate and rhythm, normal S1S2, no murmur, rub or gallop  · Palpation:  Nl PMI  · JVP:  normal  · Extremities: No Edema  Abdomen:  · Soft, non-tender  · Normal bowel sounds  Extremities:  ·  No Cyanosis or Clubbing  Neurological/Psychiatric:  · Oriented to time, place, and person  · Non-anxious  Skin Warm and dry    Assessment:    Patient Active Hospital Problem List:      1. Acute on chronic systolic CHF - he has been diuresed well with lasix drip and metolazone, also on aldactone. Now on demadex which has been held since his dose yesterday am.   2. C diff colitis  3. Ischemic cardiomyopathy- severe  4. parox afib - now more persistent. Heart rate is better with more metoprolol and po fluids  gradually over 48 hours; I suspect due to over diuresis. Consider CV. 5. CKD III renal function - I think he has been overdiuresed. BUN and CR continue to rise. 6. CAD s/p CABG complicated post op stable no angina  Plan hold demadex again today and allow him to drink more fluids  Continue  metoprolol to 100 XL daily. He may benefit from entresto if his BP is a bit higher. He may also need an AICD in the future.                Keshawn Lainez MD, 5/12/2019 9:26 AM

## 2019-05-12 NOTE — PLAN OF CARE
Problem: Falls - Risk of:  Goal: Will remain free from falls  Description  Will remain free from falls  Outcome: Ongoing     Problem: Risk for Impaired Skin Integrity  Goal: Tissue integrity - skin and mucous membranes  Description  Structural intactness and normal physiological function of skin and  mucous membranes.   Outcome: Ongoing     Problem: OXYGENATION/RESPIRATORY FUNCTION  Goal: Patient will maintain patent airway  Outcome: Ongoing  Goal: Patient will achieve/maintain normal respiratory rate/effort  Description  Respiratory rate and effort will be within normal limits for the patient  Outcome: Ongoing     Problem: HEMODYNAMIC STATUS  Goal: Patient has stable vital signs and fluid balance  Outcome: Ongoing     Problem: FLUID AND ELECTROLYTE IMBALANCE  Goal: Fluid and electrolyte balance are achieved/maintained  Outcome: Ongoing     Problem: ACTIVITY INTOLERANCE/IMPAIRED MOBILITY  Goal: Mobility/activity is maintained at optimum level for patient  Outcome: Ongoing

## 2019-05-12 NOTE — CONSULTS
[address]                 [address] [address]                                  CONSULTATION    PATIENT NAME: Daniel Zeng                     :        1960  MED REC NO:   [id number]                          ROOM:         ACCOUNT NO:   [de-identified]                           ADMIT DATE: 2019  PROVIDER:     Erin Valerio MD    INFECTIOUS DISEASE CONSULTATION    DATE OF CONSULTATION:  2019    ATTENDING PHYSICIAN:  Mark Carrillo MD    HISTORY OF PRESENT ILLNESS:  The patient is a 77-year-old male who in  2018 had coronary artery bypass surgery complicated by operative site  infection due to Serratia. The patient has been in rehabilitation at  [facility]. He was transported from [facility] to the  emergency room by squad for belly pain, increasing edema and shortness  of breath. Following his admission to the hospital, on about  2019, the patient developed fever and diarrhea and one out of two  blood cultures was positive for MRSA. I have been asked to see the  patient in consultation regarding antibiotic management. The patient's  chief complaint is a positive blood culture. The patient denies fevers,  chills or sweats in the last couple of days following his fever on  2019. PAST MEDICAL HISTORY:  Anoxic brain injury in . The patient has a  history of gout, hyperlipidemia, hypertension, gastroesophageal reflux  disease, coronary artery bypass surgery, sleep apnea, diabetes, history  of atrial fibrillation, carpal tunnel surgery, cervical disk surgery,  sternal reconstruction. FAMILY HISTORY:  Noncontributory. SOCIAL HISTORY:  The patient is currently residing at [facility] as I  understand it. The patient has never been a smoker. He does not drink  alcohol.     CURRENT MEDICATIONS:  Allopurinol, Apixaban, aspirin, atorvastatin,  famotidine, ferrous sulfate, fluticasone, gabapentin, hydralazine,  insulin sliding scale, isosorbide, magnesium oxide, melatonin,  Zaroxolyn, tamsulosin, vancomycin, ascorbic acid. Vancomycin is being  given both orally and intravenously at present. ALLERGIES:  ENALAPRIL, NADOLOL, VERAPAMIL. PHYSICAL EXAMINATION:  GENERAL:  The patient is an overweight middle-aged male in no acute  distress. VITAL SIGNS:  Temperature 97.3, heart rate 93, respirations 18, blood  pressure 106/80. HEENT:  Eyes:  PERRL. There are no conjunctival lesions. Oral cavity:   No mucosal lesions. NECK:  Supple, without nodes. There are no remarkable supraclavicular  nodes. CHEST:  Clear to auscultation. Respirations are unlabored. HEART:  Regular rhythm, S1 and S2 are normal.  There are no murmurs,  rubs or gallops. ABDOMEN:  Soft, nontender, without mass or hepatosplenomegaly. EXTREMITIES:  The patient has a bullous lesion on his left heel, which  looks slightly inflamed. There is no purulence however. The patient  appears to have some soft tissue pressure injury over his sacrum, but  there is not armand ulceration at this time. There is erythema and some  edema of the presacral soft tissues. NEUROLOGIC:  The patient is alert and appropriate. There are no obvious  focal findings. There is no arm drift. ASSESSMENT/PLAN:  This patient has bacteriuria due to enterococcus. It  is not clear that this is symptomatic. The patient currently  has a Robin catheter. He has one blood culture out of two that is  positive for MRSA. There is no obvious source for this presumed bacteremia. The patient has pressure injury on the L heel and over the sacrum but neither site looks sufficiently infected to be a likely source of bacteremia. Still possible that the heel lesion could be the source. The patient has C. difficile colitis. I agree with continuing treatment with vancomycin intravenously and  orally.   If his creatinine rises above what it has been,

## 2019-05-13 LAB
ANION GAP SERPL CALCULATED.3IONS-SCNC: 14 MMOL/L (ref 3–16)
BLOOD CULTURE, ROUTINE: ABNORMAL
BUN BLDV-MCNC: 52 MG/DL (ref 7–20)
CALCIUM SERPL-MCNC: 9 MG/DL (ref 8.3–10.6)
CHLORIDE BLD-SCNC: 87 MMOL/L (ref 99–110)
CO2: 30 MMOL/L (ref 21–32)
CREAT SERPL-MCNC: 2.4 MG/DL (ref 0.9–1.3)
GFR AFRICAN AMERICAN: 34
GFR NON-AFRICAN AMERICAN: 28
GLUCOSE BLD-MCNC: 113 MG/DL (ref 70–99)
GLUCOSE BLD-MCNC: 139 MG/DL (ref 70–99)
GLUCOSE BLD-MCNC: 142 MG/DL (ref 70–99)
GLUCOSE BLD-MCNC: 153 MG/DL (ref 70–99)
GLUCOSE BLD-MCNC: 153 MG/DL (ref 70–99)
HCT VFR BLD CALC: 35.3 % (ref 40.5–52.5)
HEMOGLOBIN: 11.3 G/DL (ref 13.5–17.5)
MCH RBC QN AUTO: 26.5 PG (ref 26–34)
MCHC RBC AUTO-ENTMCNC: 32 G/DL (ref 31–36)
MCV RBC AUTO: 82.9 FL (ref 80–100)
ORGANISM: ABNORMAL
ORGANISM: ABNORMAL
PDW BLD-RTO: 19.6 % (ref 12.4–15.4)
PERFORMED ON: ABNORMAL
PLATELET # BLD: 157 K/UL (ref 135–450)
PMV BLD AUTO: 9.3 FL (ref 5–10.5)
POTASSIUM SERPL-SCNC: 4.2 MMOL/L (ref 3.5–5.1)
RBC # BLD: 4.26 M/UL (ref 4.2–5.9)
SODIUM BLD-SCNC: 131 MMOL/L (ref 136–145)
WBC # BLD: 11.3 K/UL (ref 4–11)

## 2019-05-13 PROCEDURE — 2580000003 HC RX 258: Performed by: INTERNAL MEDICINE

## 2019-05-13 PROCEDURE — 36415 COLL VENOUS BLD VENIPUNCTURE: CPT

## 2019-05-13 PROCEDURE — 6370000000 HC RX 637 (ALT 250 FOR IP): Performed by: INTERNAL MEDICINE

## 2019-05-13 PROCEDURE — 99232 SBSQ HOSP IP/OBS MODERATE 35: CPT | Performed by: INTERNAL MEDICINE

## 2019-05-13 PROCEDURE — 87040 BLOOD CULTURE FOR BACTERIA: CPT

## 2019-05-13 PROCEDURE — 97530 THERAPEUTIC ACTIVITIES: CPT

## 2019-05-13 PROCEDURE — 6360000002 HC RX W HCPCS: Performed by: INTERNAL MEDICINE

## 2019-05-13 PROCEDURE — 85027 COMPLETE CBC AUTOMATED: CPT

## 2019-05-13 PROCEDURE — 80048 BASIC METABOLIC PNL TOTAL CA: CPT

## 2019-05-13 PROCEDURE — 97110 THERAPEUTIC EXERCISES: CPT

## 2019-05-13 PROCEDURE — 2060000000 HC ICU INTERMEDIATE R&B

## 2019-05-13 RX ADMIN — GABAPENTIN 300 MG: 300 CAPSULE ORAL at 22:22

## 2019-05-13 RX ADMIN — ISOSORBIDE DINITRATE 10 MG: 10 TABLET ORAL at 09:17

## 2019-05-13 RX ADMIN — HYDRALAZINE HYDROCHLORIDE 20 MG: 10 TABLET, FILM COATED ORAL at 10:18

## 2019-05-13 RX ADMIN — ATORVASTATIN CALCIUM 80 MG: 40 TABLET, FILM COATED ORAL at 22:22

## 2019-05-13 RX ADMIN — DAPTOMYCIN 500 MG: 500 INJECTION, POWDER, LYOPHILIZED, FOR SOLUTION INTRAVENOUS at 14:25

## 2019-05-13 RX ADMIN — METOPROLOL SUCCINATE 50 MG: 50 TABLET, EXTENDED RELEASE ORAL at 09:17

## 2019-05-13 RX ADMIN — Medication 125 MG: at 01:10

## 2019-05-13 RX ADMIN — INSULIN LISPRO 1 UNITS: 100 INJECTION, SOLUTION INTRAVENOUS; SUBCUTANEOUS at 17:30

## 2019-05-13 RX ADMIN — Medication 10 ML: at 22:22

## 2019-05-13 RX ADMIN — APIXABAN 5 MG: 5 TABLET, FILM COATED ORAL at 09:17

## 2019-05-13 RX ADMIN — ACETAMINOPHEN 650 MG: 325 TABLET ORAL at 11:00

## 2019-05-13 RX ADMIN — FAMOTIDINE 20 MG: 20 TABLET, FILM COATED ORAL at 09:17

## 2019-05-13 RX ADMIN — Medication 10 ML: at 09:18

## 2019-05-13 RX ADMIN — ACETAMINOPHEN 650 MG: 325 TABLET ORAL at 17:29

## 2019-05-13 RX ADMIN — Medication 200 MG: at 22:22

## 2019-05-13 RX ADMIN — TAMSULOSIN HYDROCHLORIDE 0.4 MG: 0.4 CAPSULE ORAL at 09:17

## 2019-05-13 RX ADMIN — MELATONIN 3 MG ORAL TABLET 9 MG: 3 TABLET ORAL at 22:22

## 2019-05-13 RX ADMIN — APIXABAN 2.5 MG: 5 TABLET, FILM COATED ORAL at 22:22

## 2019-05-13 RX ADMIN — COLLAGENASE SANTYL: 250 OINTMENT TOPICAL at 17:29

## 2019-05-13 RX ADMIN — Medication 125 MG: at 06:05

## 2019-05-13 RX ADMIN — ISOSORBIDE DINITRATE 10 MG: 10 TABLET ORAL at 22:25

## 2019-05-13 RX ADMIN — ASPIRIN 81 MG: 81 TABLET, COATED ORAL at 09:17

## 2019-05-13 RX ADMIN — FAMOTIDINE 20 MG: 20 TABLET, FILM COATED ORAL at 22:22

## 2019-05-13 RX ADMIN — FIDAXOMICIN 200 MG: 200 TABLET, FILM COATED ORAL at 12:30

## 2019-05-13 RX ADMIN — TORSEMIDE 20 MG: 20 TABLET ORAL at 22:22

## 2019-05-13 RX ADMIN — ALLOPURINOL 100 MG: 100 TABLET ORAL at 10:18

## 2019-05-13 RX ADMIN — FERROUS SULFATE TAB 325 MG (65 MG ELEMENTAL FE) 325 MG: 325 (65 FE) TAB at 09:17

## 2019-05-13 RX ADMIN — HYDRALAZINE HYDROCHLORIDE 20 MG: 10 TABLET, FILM COATED ORAL at 22:25

## 2019-05-13 RX ADMIN — OXYCODONE HYDROCHLORIDE AND ACETAMINOPHEN 500 MG: 500 TABLET ORAL at 09:17

## 2019-05-13 RX ADMIN — VANCOMYCIN HYDROCHLORIDE 1250 MG: 10 INJECTION, POWDER, LYOPHILIZED, FOR SOLUTION INTRAVENOUS at 06:05

## 2019-05-13 RX ADMIN — MULTIPLE VITAMINS W/ MINERALS TAB 1 TABLET: TAB at 09:17

## 2019-05-13 RX ADMIN — INSULIN LISPRO 1 UNITS: 100 INJECTION, SOLUTION INTRAVENOUS; SUBCUTANEOUS at 09:18

## 2019-05-13 RX ADMIN — FIDAXOMICIN 200 MG: 200 TABLET, FILM COATED ORAL at 22:24

## 2019-05-13 ASSESSMENT — PAIN DESCRIPTION - LOCATION
LOCATION: NECK

## 2019-05-13 ASSESSMENT — PAIN DESCRIPTION - PROGRESSION
CLINICAL_PROGRESSION: GRADUALLY WORSENING
CLINICAL_PROGRESSION: RAPIDLY IMPROVING
CLINICAL_PROGRESSION: GRADUALLY WORSENING

## 2019-05-13 ASSESSMENT — PAIN DESCRIPTION - DESCRIPTORS
DESCRIPTORS: SORE

## 2019-05-13 ASSESSMENT — PAIN - FUNCTIONAL ASSESSMENT
PAIN_FUNCTIONAL_ASSESSMENT: PREVENTS OR INTERFERES SOME ACTIVE ACTIVITIES AND ADLS
PAIN_FUNCTIONAL_ASSESSMENT: ACTIVITIES ARE NOT PREVENTED
PAIN_FUNCTIONAL_ASSESSMENT: ACTIVITIES ARE NOT PREVENTED

## 2019-05-13 ASSESSMENT — PAIN SCALES - GENERAL
PAINLEVEL_OUTOF10: 2
PAINLEVEL_OUTOF10: 7
PAINLEVEL_OUTOF10: 0
PAINLEVEL_OUTOF10: 8

## 2019-05-13 ASSESSMENT — PAIN DESCRIPTION - FREQUENCY
FREQUENCY: INTERMITTENT
FREQUENCY: INTERMITTENT
FREQUENCY: CONTINUOUS

## 2019-05-13 ASSESSMENT — PAIN DESCRIPTION - DIRECTION
RADIATING_TOWARDS: LEFT SHOULDER

## 2019-05-13 ASSESSMENT — PAIN DESCRIPTION - PAIN TYPE
TYPE: ACUTE PAIN

## 2019-05-13 ASSESSMENT — PAIN DESCRIPTION - ONSET
ONSET: GRADUAL
ONSET: ON-GOING
ONSET: ON-GOING

## 2019-05-13 NOTE — PROGRESS NOTES
Pharmacy Vancomycin Consult     Vancomycin Day: 3  Current Dosing: Vancomycin 500 mg IVPB q 12 hours    Temp max:  97.3    Recent Labs     05/11/19  0521 05/12/19  0429   BUN 43* 47*       Recent Labs     05/11/19  0521 05/12/19  0429   CREATININE 2.1* 2.2*       Recent Labs     05/11/19  0521   WBC 8.9         Intake/Output Summary (Last 24 hours) at 5/12/2019 2147  Last data filed at 5/12/2019 1747  Gross per 24 hour   Intake 600 ml   Output 951 ml   Net -351 ml         Ht Readings from Last 1 Encounters:   05/03/19 5' 10\" (1.778 m)        Wt Readings from Last 1 Encounters:   05/12/19 202 lb 3.2 oz (91.7 kg)         Body mass index is 29.01 kg/m². Estimated Creatinine Clearance: 41 mL/min (A) (based on SCr of 2.2 mg/dL (H)). Trough: 10    Assessment/Plan:  Vancomycin changed to Vancomycin 1250 mg IVPB q 24 hours with first dose 5/13/19 at 0600. Vancomycin trough ordered for 5/15/19 at 0530. Sticky note on chart.    Leticia ANG Ph 5/12/2019 9:49 PM

## 2019-05-13 NOTE — PROGRESS NOTES
Vancomycin Day: 4    Patient's labs, cultures, vitals, and vancomycin regimen reviewed. No changes today.   Trough due on 15th at Josue GOMEZ 2/[id number] AM  .

## 2019-05-13 NOTE — PROGRESS NOTES
Pt is lying in bed with their eyes closed. Respirations are easy and even. Call light within reach bed in lowest position with the wheels locked. Will continue to monitor.  Roro Erickson

## 2019-05-13 NOTE — PROGRESS NOTES
Hospitalist Progress Note      PCP: Lenin Stokes DO    Date of Admission: 5/3/2019    50-year-old male came from nursing home with anasarca , fluid overload. Status post Lasix drip for acute on chronic systolic congestive heart failure   Lasix gtt discontinued on 5/10 - >switched to oral diuretics diuretics, which are  currently on hold for the last couple of days due to worsening renal function creatinine is up to 2.4. Has history of anoxic brain injury from cardiac arrest in 1994 he has short-term memory loss    Also with C. diff colitis currently on Difficid     MRSA  + blod culture . vancomycin discontinued due to renal function, on IV daptomycin now    Chronic indwelling Robin      Subjective:      5/7- feeling some better. Is diuresing. 5/8- is doing better. Somewhat sleepy this am.    5/9- he spiked a fever. Had multiple episodes of diarrhea. He has prior history of C diff. 5/10- diarrhea some better. Urine culture is showing Enterococcus     5/11- he is tachycardic today. He has a fib and his rate is elevated. 5/12- HR is better. Still in a fib. BC positive for MRSA. ID has seen patient. 5/13 - better . Awake, up  to the chair  .  Diarrhea decreased   Weight down 31 lbs since admission         Medications:  Reviewed    Infusion Medications    dextrose       Scheduled Medications    apixaban  2.5 mg Oral BID    Fidaxomicin  200 mg Oral BID    daptomycin (CUBICIN) IVPB  500 mg Intravenous Q24H    torsemide  20 mg Oral BID    hydrALAZINE  20 mg Oral 2 times per day    isosorbide dinitrate  10 mg Oral BID    spironolactone  25 mg Oral Daily    collagenase   Topical Daily    melatonin  9 mg Oral Nightly    aspirin  81 mg Oral Daily    fluticasone  2 spray Each Nare Daily    gabapentin  300 mg Oral Nightly    atorvastatin  80 mg Oral Nightly    tamsulosin  0.4 mg Oral Daily    famotidine  20 mg Oral BID    allopurinol  100 mg Oral Daily    vitamin C  500 mg Oral Daily    Labs     05/11/19  0521 05/12/19  0429 05/13/19  0502   * 130* 131*   K 3.3* 3.1* 4.2   CL 88* 86* 87*   CO2 30 29 30   BUN 43* 47* 52*   CREATININE 2.1* 2.2* 2.4*   CALCIUM 8.5 8.7 9.0     Urinalysis:      Lab Results   Component Value Date    NITRU Negative 05/09/2019    WBCUA 10-20 05/09/2019    BACTERIA 1+ 05/09/2019    RBCUA 3-5 05/09/2019    BLOODU SMALL 05/09/2019    SPECGRAV <=1.005 05/09/2019    GLUCOSEU Negative 05/09/2019       Radiology:  XR CHEST PORTABLE   Final Result   Right-sided pleural effusion. Stable cardiomegaly         XR CHEST PORTABLE   Final Result   Limited study by low lung volumes. Bibasilar opacities may represent   atelectasis. Pneumonia is considered as less likely. Stable enlargement of the cardiac silhouette      RECOMMENDATION:   Eventual follow-up PA and lateral chest would be helpful. CT ABDOMEN PELVIS WO CONTRAST Additional Contrast? None   Final Result   Diffuse anasarca with moderate bilateral pleural effusions, small volume   ascites, and diffuse subcutaneous edema. This has increased considerably   when compared to the previous exam.      Gallstones are noted. There is subtle pericholecystic fat stranding, which   at least raise the possibility of cholecystitis, but that fat stranding is   equally likely to be secondary to anasarca.            Culture  Enterococcus faecalis (1)     Antibiotic Interpretation MARY Status    ampicillin Sensitive <=2 mcg/mL     ciprofloxacin Resistant >2 mcg/mL     levofloxacin Resistant >4 mcg/mL     nitrofurantoin Sensitive <=32 mcg/mL     tetracycline Sensitive <=4 mcg/mL     vancomycin Sensitive 2 mcg/mL             Assessment/Plan:    Principal Problem:    Acute on chronic systolic heart failure (HCC)  Active Problems:    Coronary artery disease involving native coronary artery of native heart without angina pectoris    Prolonged QT interval    Anasarca    PAF (paroxysmal atrial fibrillation) ([facility])    Benign essential

## 2019-05-13 NOTE — PROGRESS NOTES
Shift handoff given to [facility], DEON, RN. Pt up in chair, no s/s of distress noted. Care transferred.

## 2019-05-13 NOTE — PROGRESS NOTES
([facility] [address]    Benign essential HTN    CAMI (acute kidney injury) ([facility] [address]    Hypervolemia    Positive culture finding    Colitis due to Clostridium difficile  Resolved Problems:    * No resolved hospital problems. *  mild leukocytosis  Diarrhea due to C diff resolving: this was apparently a relapse of C diff that the patient had at the [facility]. Creatinine up to 2.4  Follow up blood culture NGSF; transient MRSA bacteremia could have been from a peripheral IV which has been removed. Pressure ulcer over L heel does not look infected: ie, does not seem like a likely source of bacteremia  Has superficial pressure to pre sacral area which likewise did not look infected to be a time of consult. Enterococcal bacteruria: tend to doubt that this represents a UTI; pt has chronic Robin. Plan:   Due to renal dysfunction, will switch IV vancomycin to daptomycin; could be switched to linezolid at discharge if that would make outpatient therapy easier. . Will need a total of two weeks of anti-MRSA antibiotic therapy   Switch po vancomycin to Dificid

## 2019-05-13 NOTE — PROGRESS NOTES
Pt assisted back to bed. New Mepilex applied to coccyx. Prevlon boots to BLLE. Pt repositioned for comfort. No other needs at this time. Call light within reach. Bed exit alarm on.

## 2019-05-13 NOTE — PROGRESS NOTES
Inpatient Physical Therapy Daily Treatment Note    Unit: PCU  Date:  2019  Patient Name:    Sharla Bernheim  Admitting diagnosis:  Ana Msarca [R60.1]  Admit Date:  5/3/2019  Precautions/Restrictions:  fall risk, IV, bed/chair alarm, mohamud catheter , telemetry and contact plus precautions (C. Diff)     Discharge Recommendations: Acute Rehab (ARU)/ Inpatient Rehab Facility (IRF) (has been denied by ARU; next best option for pt would be SNF)  DME needs for discharge: defer to facility     Therapy recommendations for staff:   Assist of 2 with use of rolling walker (RW) for all transfers or ambulation to/from bedside [facility] S4 Level Recommendation: NA  AM-PAC Mobility Score   AM-PAC Inpatient Mobility Raw Score : 14     Treatment Time: 16:10-16:45  Treatment number: 5  Timed Code Treatment Minutes:  35 minutes  Total Treatment Minutes:  35 minutes    Cognition    A&O Person  and Place    Able to follow 2 step commands. Hx of anoxic brain injury, which pt states causes him to lack short term memory. Subjective  Patient sitting up in chair with no family present  Pt agreeable to this PT tx. Pt reports pain in his left shoulder and neck but is willing to participate in leg exercises sitting in chair.      Pain   Yes  Ratin/10  Location: Left shoulder and neck  Pain Medicine Status: RN notified     Bed Mobility   Supine to Sit:   Not Tested  Sit to Supine:  Not Tested  Rolling:   Not Tested  Scooting:   Supervision    Transfer Training     Sit to stand:   Min A   Stand to sit:   Min A and VC for hand placement   Chair to [facility]: Min A of 2 with use of rolling walker (RW)- pt had decrease step height, decreased step length, B knee buckling   BSC to Chair: Min A of 2 with use of rolling walker (RW)    Gait Training gait deferred due to difficulty with transfers    Distance:    Deviations (firm surface/linoleum): N/A   Assistive Device Used:  N/A  Level of Assist:   Comment:     Stair Training deferred, pt unsafe/not appropriate to complete stairs at this time    Therapeutic Exercise all completed bilaterally unless indicated  Ankle pumps:   Seated Calf Raises: x20  Quad sets:   Glut sets: x10  Clamshells: 2x10   Heel slides:   SLR:   Hip abd/add:   LAQ: 2x10  Bicep curls: x10     Balance  Static Sitting:  Good    Tolerance: WNL  Dynamic Sitting:  Good -   Static Standing: Fair +   Tolerance: tolerated standing ~1 minute each bout (2 bouts) with RW   Dynamic Standing: Poor    Patient Education      Role of PT, POC, Discharge recommendations, transfer techniques and calling for assist with mobility, positioning for pain reduction in UE. Positioning Needs       Pt sitting up in chair, call light and needs in reach and alarm set. ROM Measurements N/A  Knee Flexion:  Knee Extension:     Activity Tolerance   Pt completed therapy session with No nausea, dizziness, pain or SOB noted w/activity. SpO2:   HR:  BP:     Other  None. Assessment :  Patient tolerated sitting Treva and transferring to [facility] with no more than min A of 2. Patient continues to demonstrate decreased activity tolerance and decreased lower extremity strength. Pt would benefit from SNF to improve functional mobility to baseline. Goals (all goals ongoing unless otherwise indicated)  To be met in 3 visits:  1). Independent with LE Ex x 10 reps     To be met in 6 visits:  1). Supine to/from sit: Independent  2). Sit to/from stand: SBA  3). Bed to chair: SBA  4). Gait: Ambulate 50 ft  with  SBA  and use of rolling walker (RW)  5). Tolerate B LE exercises 3 sets of 10-15 reps  6). Ascend/descend steps with CGA with use of 1 threshold stepSteps to enter and LRAD. Plan   Continue with plan of care. Leighann Chow, SPT     PT present for entire treatment session, providing direct one on one service, and making all skilled judgements and assessments for the treatment while allowing student participation.         If patient discharges from this facility prior to next visit, this note will serve as the Discharge Summary.

## 2019-05-13 NOTE — PROGRESS NOTES
[address] Daily Progress Note      Admit Date:  5/3/2019    Reason for Consultation: acute HF  Subjective:  Mr. Balderas is seen for follow up of CHF  He had diarrhea now has C diff colitis. He thinks his diarrhea is firming up. He ate all his meals yesterday  One blood culture growing MRSA from 5.9.19  Feeling better as far as SOB. His renal function is worsening, holding diuretics  He is able to lay flat on room air. No recent antibiotics. He denies chest pain or palp   He came with swelling and shortness of breath   He has CPAP AT home but not wearing it. CABG in [id number] complicated post op course including redo sternotomy and sternal repair. He was admitted this time from nursing home where he is refusing to do physical therapy as I was told by D/C calos Lyn. weight down 20lbs since admission. HPI on admission by my partner Dr. Josephine Yates: Juan A Oneil is a 60 y.o. male that presents with abdominal pain. Patient has been in [address] for care after a bypass surgery X3, 12/2018. Patient describes waking up this morning with a bandlike feeling of numbness in his mid abdomen.   He does admit to being a little bit constipated,  [facility] had post op PEA and post op paroxysmal atrial fibrillation          ROS:  12 point ROS negative in all areas as listed below except as in [place]  Constitutional, EENT, Cardiovascular, pulmonary, GI, , Musculoskeletal, skin, neurological, hematological, endocrine, Psychiatric    Past Medical History:   Diagnosis Date    Anoxic brain injury ([facility] [address] 1994    Cardiac arrest ([facility] [address] 1994    Gout     Hyperlipidemia     Hypertension     Reflux     S/P CABG x 3 12/2018    Sleep apnea     Type 2 diabetes mellitus without complication [facility])      Past Surgical History:   Procedure Laterality Date    CARDIAC CATHETERIZATION  11/26/2018    Dr. Mahesh Ackerman Left 03/25/2014    Dr. Faisal Gregorio - w/trigger finger (3rd) & trigger thumb release    CARPAL TUNNEL RELEASE Right 04/14/2015    Dr. Kerry Valera N/A 12/16/2018    Dr. Cyn Boyle - decompressive corpectomy C6 (>90%), microdissection, anterior cervical arthrodesis w/PEEK allograft, placement of corpectomy cage & Synthes plate X1-7    COLONOSCOPY  09/01/2015    Dr. Oneal - gillis-diverticulosis, transverse colon adenomatous polyps    CORONARY ARTERY BYPASS GRAFT  12/14/2018    Dr. Brenden Li  04/05/2019    cystoscopy/bladder biopsy f/c exchange    CYSTOSCOPY N/A 4/5/2019    [facility] by Pako Grayson [facility] Bilateral 02/27/2014    Dr. Ranjeet Mendoza POLYSOMNOGRAPHY  04/12/2016    Dr. Bert Strickland. HAFSA Muniz w/[facility]    PA CABG, ARTERIAL, THREE N/A 12/4/2018    Dr. Carmen Gallardo - x3 (LIMA-LAD, BL SVG-OM, SVG-PDA)    RECONSTRUCTIVE REPAIR STERNAL N/A 12/20/2018    Dr. Carmen Gallardo - I&D of sternum w/placement of wound VAC    TRANSESOPHAGEAL ECHOCARDIOGRAM  12/04/2018    during CABG    TUNNELED VENOUS CATHETER PLACEMENT Right 12/24/2018    Dr. Luis Jaramillo - PICC via IJ       Objective:   /84   Pulse 88   Temp 96.7 °F (35.9 °C) (Oral)   Resp 16   Ht 5' 10\" (1.778 m)   Wt 202 lb 3.2 oz (91.7 kg)   SpO2 93%   BMI 29.01 kg/m²       Intake/Output Summary (Last 24 hours) at 5/13/2019 0529  Last data filed at 5/13/2019 0319  Gross per 24 hour   Intake 600 ml   Output 651 ml   Net -51 ml       TELEMETRY: NSR    Physical Exam:  General: No Respiratory distress, appears well developed and well nourished. Eyes:  Sclera nonicteric  Nose/Sinuses:  negative findings: nose shows no deformity, asymmetry, or inflammation, nasal mucosa normal, septum midline with no perforation or bleeding  Back:  no pain to palpation  Joint:  no active joint inflammation  Musculoskeletal:  negative  Skin:  Warm and dry  Neck:  Negative for JVD and Carotid Bruits.    Chest:  Clear  to auscultation, respiration easy  Cardiovascular:  irreg S1S2 normal, no murmur, no rub or thrill. Abdomen:  Soft normal liver and spleen  Neuro: intact    Medications:    vancomycin  1,250 mg Intravenous Q24H    [START ON 5/17/2019] metolazone  2.5 mg Oral Weekly    torsemide  20 mg Oral BID    hydrALAZINE  20 mg Oral 2 times per day    vancomycin  125 mg Oral 4 times per day    isosorbide dinitrate  10 mg Oral BID    spironolactone  25 mg Oral Daily    collagenase   Topical Daily    melatonin  9 mg Oral Nightly    aspirin  81 mg Oral Daily    fluticasone  2 spray Each Nare Daily    gabapentin  300 mg Oral Nightly    atorvastatin  80 mg Oral Nightly    tamsulosin  0.4 mg Oral Daily    famotidine  20 mg Oral BID    allopurinol  100 mg Oral Daily    apixaban  5 mg Oral BID    vitamin C  500 mg Oral Daily    ferrous sulfate  325 mg Oral Daily with breakfast    magnesium oxide  200 mg Oral Nightly    therapeutic multivitamin-minerals  1 tablet Oral Daily    metoprolol succinate  50 mg Oral Daily    sodium chloride flush  10 mL Intravenous 2 times per day    insulin lispro  0-6 Units Subcutaneous TID WC    insulin lispro  0-3 Units Subcutaneous Nightly      dextrose       hydrALAZINE, lactulose, acetaminophen, sodium chloride flush, magnesium hydroxide, ondansetron, glucose, dextrose, glucagon (rDNA), dextrose, potassium chloride **OR** potassium alternative oral replacement **OR** potassium chloride, magnesium sulfate    Lab Data:  CBC:   Recent Labs     05/11/19  0521   WBC 8.9   HGB 11.6*   HCT 35.8*   MCV 83.1   *     BMP:   Recent Labs     05/10/19  1918 05/11/19  0521 05/12/19  0429   * 131* 130*   K 2.7* 3.3* 3.1*   CL 84* 88* 86*   CO2 33* 30 29   BUN 39* 43* 47*   CREATININE 2.2* 2.1* 2.2*     LIVER PROFILE:   No results for input(s): AST, ALT, LIPASE, BILIDIR, BILITOT, ALKPHOS in the last 72 hours. Invalid input(s): AMYLASE,  ALB  PT/INR: No results for input(s): PROTIME, INR in the last 72 hours. APTT: No results for input(s):  APTT in the last 72 with attending and cardiology will be signing off please call us if needed to see him again.   Core Measures:  · Discharge instructions:   · LVEF documented: yes  · ACEI for LV dysfunction:  Listed allergy to enalapril  Will start nitrates and hydralazine  · Smoking Cessation:  · PT OT   I have spent 25  minutes of face to face time with the patient with more than 50% spent counseling and coordinating care for this patient    Aleida Clarke MD 5/13/2019 5:29 AM

## 2019-05-14 LAB
ANION GAP SERPL CALCULATED.3IONS-SCNC: 14 MMOL/L (ref 3–16)
BUN BLDV-MCNC: 50 MG/DL (ref 7–20)
CALCIUM SERPL-MCNC: 8.8 MG/DL (ref 8.3–10.6)
CHLORIDE BLD-SCNC: 87 MMOL/L (ref 99–110)
CO2: 28 MMOL/L (ref 21–32)
CREAT SERPL-MCNC: 2.3 MG/DL (ref 0.9–1.3)
CULTURE, BLOOD 2: NORMAL
GFR AFRICAN AMERICAN: 35
GFR NON-AFRICAN AMERICAN: 29
GLUCOSE BLD-MCNC: 101 MG/DL (ref 70–99)
GLUCOSE BLD-MCNC: 119 MG/DL (ref 70–99)
GLUCOSE BLD-MCNC: 136 MG/DL (ref 70–99)
GLUCOSE BLD-MCNC: 149 MG/DL (ref 70–99)
GLUCOSE BLD-MCNC: 194 MG/DL (ref 70–99)
MAGNESIUM: 2.1 MG/DL (ref 1.8–2.4)
PERFORMED ON: ABNORMAL
POTASSIUM SERPL-SCNC: 2.9 MMOL/L (ref 3.5–5.1)
SODIUM BLD-SCNC: 129 MMOL/L (ref 136–145)

## 2019-05-14 PROCEDURE — 36415 COLL VENOUS BLD VENIPUNCTURE: CPT

## 2019-05-14 PROCEDURE — 2580000003 HC RX 258: Performed by: INTERNAL MEDICINE

## 2019-05-14 PROCEDURE — 99232 SBSQ HOSP IP/OBS MODERATE 35: CPT | Performed by: INTERNAL MEDICINE

## 2019-05-14 PROCEDURE — 6370000000 HC RX 637 (ALT 250 FOR IP): Performed by: INTERNAL MEDICINE

## 2019-05-14 PROCEDURE — 97110 THERAPEUTIC EXERCISES: CPT

## 2019-05-14 PROCEDURE — 97530 THERAPEUTIC ACTIVITIES: CPT

## 2019-05-14 PROCEDURE — 83735 ASSAY OF MAGNESIUM: CPT

## 2019-05-14 PROCEDURE — 6360000002 HC RX W HCPCS: Performed by: INTERNAL MEDICINE

## 2019-05-14 PROCEDURE — 87040 BLOOD CULTURE FOR BACTERIA: CPT

## 2019-05-14 PROCEDURE — 80048 BASIC METABOLIC PNL TOTAL CA: CPT

## 2019-05-14 PROCEDURE — 97535 SELF CARE MNGMENT TRAINING: CPT

## 2019-05-14 PROCEDURE — 2060000000 HC ICU INTERMEDIATE R&B

## 2019-05-14 RX ORDER — POTASSIUM CHLORIDE 20 MEQ/1
40 TABLET, EXTENDED RELEASE ORAL ONCE
Status: COMPLETED | OUTPATIENT
Start: 2019-05-14 | End: 2019-05-14

## 2019-05-14 RX ORDER — SODIUM CHLORIDE 9 MG/ML
INJECTION, SOLUTION INTRAVENOUS
Status: DISPENSED
Start: 2019-05-14 | End: 2019-05-14

## 2019-05-14 RX ORDER — POTASSIUM CHLORIDE 20 MEQ/1
40 TABLET, EXTENDED RELEASE ORAL
Status: DISCONTINUED | OUTPATIENT
Start: 2019-05-14 | End: 2019-05-16 | Stop reason: HOSPADM

## 2019-05-14 RX ORDER — LINEZOLID 600 MG/1
600 TABLET, FILM COATED ORAL EVERY 12 HOURS SCHEDULED
Status: DISCONTINUED | OUTPATIENT
Start: 2019-05-14 | End: 2019-05-16 | Stop reason: HOSPADM

## 2019-05-14 RX ADMIN — LINEZOLID 600 MG: 600 TABLET, FILM COATED ORAL at 12:31

## 2019-05-14 RX ADMIN — ACETAMINOPHEN 650 MG: 325 TABLET ORAL at 20:49

## 2019-05-14 RX ADMIN — TAMSULOSIN HYDROCHLORIDE 0.4 MG: 0.4 CAPSULE ORAL at 08:58

## 2019-05-14 RX ADMIN — LINEZOLID 600 MG: 600 TABLET, FILM COATED ORAL at 20:52

## 2019-05-14 RX ADMIN — MELATONIN 3 MG ORAL TABLET 9 MG: 3 TABLET ORAL at 20:49

## 2019-05-14 RX ADMIN — APIXABAN 2.5 MG: 5 TABLET, FILM COATED ORAL at 20:50

## 2019-05-14 RX ADMIN — APIXABAN 2.5 MG: 5 TABLET, FILM COATED ORAL at 08:59

## 2019-05-14 RX ADMIN — FERROUS SULFATE TAB 325 MG (65 MG ELEMENTAL FE) 325 MG: 325 (65 FE) TAB at 08:58

## 2019-05-14 RX ADMIN — FIDAXOMICIN 200 MG: 200 TABLET, FILM COATED ORAL at 20:50

## 2019-05-14 RX ADMIN — Medication 200 MG: at 20:52

## 2019-05-14 RX ADMIN — INSULIN LISPRO 1 UNITS: 100 INJECTION, SOLUTION INTRAVENOUS; SUBCUTANEOUS at 17:09

## 2019-05-14 RX ADMIN — ISOSORBIDE DINITRATE 10 MG: 10 TABLET ORAL at 20:52

## 2019-05-14 RX ADMIN — INSULIN LISPRO 1 UNITS: 100 INJECTION, SOLUTION INTRAVENOUS; SUBCUTANEOUS at 20:59

## 2019-05-14 RX ADMIN — GABAPENTIN 300 MG: 300 CAPSULE ORAL at 20:52

## 2019-05-14 RX ADMIN — POTASSIUM CHLORIDE 10 MEQ: 7.46 INJECTION, SOLUTION INTRAVENOUS at 06:34

## 2019-05-14 RX ADMIN — FAMOTIDINE 20 MG: 20 TABLET, FILM COATED ORAL at 20:49

## 2019-05-14 RX ADMIN — POTASSIUM CHLORIDE 40 MEQ: 20 TABLET, EXTENDED RELEASE ORAL at 12:31

## 2019-05-14 RX ADMIN — SPIRONOLACTONE 25 MG: 25 TABLET ORAL at 08:57

## 2019-05-14 RX ADMIN — POTASSIUM CHLORIDE 40 MEQ: 20 TABLET, EXTENDED RELEASE ORAL at 15:02

## 2019-05-14 RX ADMIN — HYDRALAZINE HYDROCHLORIDE 20 MG: 10 TABLET, FILM COATED ORAL at 08:59

## 2019-05-14 RX ADMIN — OXYCODONE HYDROCHLORIDE AND ACETAMINOPHEN 500 MG: 500 TABLET ORAL at 08:58

## 2019-05-14 RX ADMIN — MULTIPLE VITAMINS W/ MINERALS TAB 1 TABLET: TAB at 08:58

## 2019-05-14 RX ADMIN — ALLOPURINOL 100 MG: 100 TABLET ORAL at 08:58

## 2019-05-14 RX ADMIN — COLLAGENASE SANTYL: 250 OINTMENT TOPICAL at 08:58

## 2019-05-14 RX ADMIN — METOPROLOL SUCCINATE 50 MG: 50 TABLET, EXTENDED RELEASE ORAL at 08:58

## 2019-05-14 RX ADMIN — FAMOTIDINE 20 MG: 20 TABLET, FILM COATED ORAL at 15:03

## 2019-05-14 RX ADMIN — MELATONIN 3 MG ORAL TABLET 9 MG: 3 TABLET ORAL at 22:57

## 2019-05-14 RX ADMIN — Medication 10 ML: at 20:50

## 2019-05-14 RX ADMIN — ATORVASTATIN CALCIUM 80 MG: 40 TABLET, FILM COATED ORAL at 20:49

## 2019-05-14 RX ADMIN — FIDAXOMICIN 200 MG: 200 TABLET, FILM COATED ORAL at 08:58

## 2019-05-14 RX ADMIN — ASPIRIN 81 MG: 81 TABLET, COATED ORAL at 08:58

## 2019-05-14 RX ADMIN — ISOSORBIDE DINITRATE 10 MG: 10 TABLET ORAL at 08:58

## 2019-05-14 RX ADMIN — POTASSIUM CHLORIDE 10 MEQ: 7.46 INJECTION, SOLUTION INTRAVENOUS at 08:47

## 2019-05-14 RX ADMIN — HYDRALAZINE HYDROCHLORIDE 20 MG: 10 TABLET, FILM COATED ORAL at 20:50

## 2019-05-14 ASSESSMENT — PAIN DESCRIPTION - ONSET: ONSET: GRADUAL

## 2019-05-14 ASSESSMENT — PAIN - FUNCTIONAL ASSESSMENT: PAIN_FUNCTIONAL_ASSESSMENT: ACTIVITIES ARE NOT PREVENTED

## 2019-05-14 ASSESSMENT — PAIN SCALES - GENERAL
PAINLEVEL_OUTOF10: 8
PAINLEVEL_OUTOF10: 3

## 2019-05-14 ASSESSMENT — PAIN DESCRIPTION - FREQUENCY: FREQUENCY: INTERMITTENT

## 2019-05-14 ASSESSMENT — PAIN DESCRIPTION - DESCRIPTORS: DESCRIPTORS: PRESSURE

## 2019-05-14 ASSESSMENT — PAIN DESCRIPTION - DIRECTION: RADIATING_TOWARDS: DOES NOT RADIATE

## 2019-05-14 ASSESSMENT — PAIN DESCRIPTION - PAIN TYPE: TYPE: ACUTE PAIN

## 2019-05-14 NOTE — CONSULTS
[facility]  HEART FAILURE PROGRAM      Ibrahima Hernández 1960    History:  Past Medical History:   Diagnosis Date    Anoxic brain injury ([facility] 1994    Cardiac arrest ([facility] 1994    Clostridium difficile infection 05/09/2019    Gout     Hyperlipidemia     Hypertension     MRSA (methicillin resistant staph aureus) culture positive 05/09/2019    bacteremia    Reflux     S/P CABG x 3 12/2018    Sleep apnea     Type 2 diabetes mellitus without complication ([facility]        ECHO: 20%    Discharge plans: PATIENT IS ADAMANTLY REFUSING SNF. He has a brain injury that impairs his memory. He will agree to SNF and then the next person he talks to he will tell them he is not going back to a SNF. Family Present: none    Advanced Directives: patient has advance directives scanned in the chart    Patient's stated goal of care: patient wishes to improve EF      Patient's current functional capacity:  Marked limitation of physical activity. Comfortable at rest. Less than ordinary activity causes fatigue, palpitation, or dyspnea. Pt up in chair at this time on room air. Pt with complaints of shortness of breath. Pt with pitting lower extremity edema. Patient's weights and intake/output reviewed:         Last three weights hospital weights reviewed:    Patient Vitals for the past 96 hrs (Last 3 readings):   Weight   05/14/19 0400 204 lb 1.6 oz (92.6 kg)   05/13/19 0556 203 lb 14.4 oz (92.5 kg)   05/12/19 0320 202 lb 3.2 oz (91.7 kg)       Patient provided with both written and verbal education on CHF signs/symptoms, causes, discharge medications, daily weights, low sodium diet, activity, and follow-up. HF zone self management written instructions provided/reviewed and advised to call MD when in \"yellow\" zone.      Instructed them to call the doctor post discharge if they experiences increasing worsening shortness of breath, worsening chest pains, increased swelling from their baseline, worsening cough, or weight gain of >2- 3 lbs in a day/ 5 lb gain in a week. Also advised to call the doctor if they feels dizzy, increased fatigue, decreased or difficulty urinating. [place]'s education needs reinforcement. No additional questions at this time. Stated he will alert nurse with any questions. PATIENT/CAREGIVER TEACHING:    Level of patient/caregiver understanding able to:   [ X ] Verbalize understanding [ ] Demonstrate understanding [ ] Teach back   [ X ] Needs reinforcement [ ] Other:     Education Time: 40 minutes    Recommendations:   1. Encourage follow-up appointment compliance. Next appointment: SNF  2. Educate further on fluid restriction of <64oz during inpatient stay so they can understand how to measure intake at home. 3. Review sodium restrictions. Encouraged to not add table salt to their foods and avoid foods that are high in sodium. 4. Continue to educate on S/S. Stress the importance of calling the MD with the earliest signs of an acute exacerbation. 5. Emphasize daily weights - instructed to call the MD if the patient gains 3 lb in a day or 5 lb in a week. 6. Provided patient with CHF Resource Line for questions and concerns.      Eva Aguilar HF BSN-RN  Heart Failure Navigator  [address] [place] [address]  [phone]

## 2019-05-14 NOTE — PROGRESS NOTES
to Clostridium difficile  Resolved Problems:    * No resolved hospital problems. *       Acute on chr systolic CHF/anasarca  - BNP at 24,546  - cardio consulted, started on IV lasix. - Monitor I/O and daily wts  - no ACEi/ARB 2/2 renal failure, on toprol XL  - checked echo  - shows severe cardiomyopathy and valvular heart disease , with EF 15-20%, severe MR, moderate TR  - off lasix gtt now , held diuretics due to elevated creatinine   - diuresed ~16 L, down 30 lbs . Monitor BMP    Seen by cardiology  - plan resume Aldactone for severe CNP . crtn 2.3 today . Monitor     he is hypokalemic being repleted   continue to monitor BMP      MRSA bacteremia  -  Etiology unclear. Seen by ID. He was started on Vancomycin on 5/10/19 for UTI from Enterococcus. Switched to daptomycin due to elevated crtn  Lost IV access . On Po zyvox now  ( needs 10 more days )    Enterococcal UTI  - was on daptomycin - > switched to Po zyvox  now     Acute Fever - Resolved  C diff Colitis  - CXR, Blood Cx, PCT ordered and results noted  - UA ordered, C.diff positive. Likely cause of fever  - c diff precautions; started on  PO Vanc-  Switched to PO Dificid now - diarrhea is improved. - afebrile today    H/o CAD s/p CABG  - 4 weeks ago  - on eliquis/asa/statin/BB    Par A fib  - stable HR.     HTN   - uncontrolled initially  - cont home meds, prn IV hydralazine  - much improved     Elevated trop   - suspect 2/2 above  - trended trops  - cardio consulted, reviewed echo     ARF vs CKD Stage III  - creat 1.5-->1.7-->1.8 --> 1.9 --> 1.8 -->1.9- > 2.4 -- > 2.3 today  - avoid nephrotoxic agents   - diuretics  On hold     Anemia   - hb 10.8-->9.9 --> 11.3 --> 11.2  - monitor    Hypomagnesemia - Resolved  - 1.7 --> 1.8  - replete  - rechecked     Hypokalemia   - PO K+ replacement   - monitor    Debility   - continue PT,OT      DVT Prophylaxis: eliquis  Diet: DIET CARDIAC; Low Sodium (2 GM);  Daily Fluid Restriction: 1800 ml  Code Status: Full

## 2019-05-14 NOTE — PROGRESS NOTES
Dr. Nicole Britton at the bedside to examine pt. See progress note for details. Continue to hold diuretic.

## 2019-05-14 NOTE — PROGRESS NOTES
AM check complete. Patient resting in bed, appears comfortable. Respirations unlabored and even. Will continue to monitor.

## 2019-05-14 NOTE — PROGRESS NOTES
Updated Dr. Germaine Stanley at this time regarding Potassium of 2.9, prn IV replacement initiated at this time. Was also ordered to ensure there has been a Magnesium level checked in the last 24 hr, and if not to place order.

## 2019-05-14 NOTE — PROGRESS NOTES
ID Follow-up NOTE    CC: admitted with CC of abdominal pain. Subjective:     Patient denies nausea, abdominal pain or diarrhea, cough, SOB, fever; family member says patient had C diff at [facility]. Feels better. Objective:     Patient Vitals for the past 24 hrs:   BP Temp Temp src Pulse Resp SpO2 Weight   19 0844 120/82 96.3 °F (35.7 °C) Oral 87 16 97 % --   19 0453 114/72 96.8 °F (36 °C) Oral 72 16 98 % --   19 0400 -- -- -- -- -- -- 204 lb 1.6 oz (92.6 kg)   19 2220 114/75 97 °F (36.1 °C) Oral 88 16 97 % --   19 1515 100/71 97 °F (36.1 °C) Oral 89 16 95 % --     Temp (24hrs), Av.8 °F (36 °C), Min:96.3 °F (35.7 °C), Max:97 °F (36.1 °C)          EXAM:  General: alert appropriate afebrile      LUNGS: Resp unlabored; clear to auscultation     CV: RR s M     ABD: soft, non-tender, without mass     EXT: superficial pressure ulcer L heel does not look infected. Mild edema  Skin: no rash      Data Review:    Lab Results   Component Value Date    WBC 11.3 (H) 2019    HGB 11.3 (L) 2019    HCT 35.3 (L) 2019    MCV 82.9 2019     2019     Lab Results   Component Value Date    CREATININE 2.3 (H) 2019    BUN 50 (H) 2019     (L) 2019    K 2.9 (LL) 2019    CL 87 (L) 2019    CO2 28 2019     Lab Results   Component Value Date    ALT 10 2019    AST 21 2019    ALKPHOS 109 2019    BILITOT 0.5 2019         MICRO: 1/2 of blood cultures drawn on  positive for MRSA; stool for C diff toxin +; urine culture positive for enterococcus.   IMAGING: CXR : R pleural effusion     Assessment:     Principal Problem:    Acute on chronic systolic heart failure (HCC)  Active Problems:    Coronary artery disease involving native coronary artery of native heart without angina pectoris    Prolonged QT interval    Anasarca    PAF (paroxysmal atrial fibrillation) ([facility])    Benign essential HTN    [facility] (acute

## 2019-05-14 NOTE — PROGRESS NOTES
Dr. Weems Christopher at the bedside to examine pt. See progress note for details. Patient has received a total of 20 mEq of IV potassium chloride and 40 mEq oral potassium chloride. Patient is refusing the pther 40 mEq of IV potassium chloride to be given- MD aware- no new orders.

## 2019-05-14 NOTE — PROGRESS NOTES
Inpatient Physical Therapy Daily Treatment Note/Progess Note    Unit: PCU  Date:  5/14/2019  Patient Name:    David Guillen  Admitting diagnosis:  Sid [R60.1]  Admit Date:  5/3/2019  Precautions/Restrictions:  fall risk, IV, bed/chair alarm, mohamud catheter , telemetry and contact plus precautions (C. Diff)     Discharge Recommendations: SNF   DME needs for discharge: defer to facility     Therapy recommendations for staff:   Assist of 2 with use of rolling walker (RW) for all transfers or ambulation [facility] S4 Level Recommendation: NA  AM-PAC Mobility Score   AM-PAC Inpatient Mobility Raw Score : 14        Treatment Time: 16:27 - 1702  Treatment number: 6  Timed Code Treatment Minutes:  35 minutes  Total Treatment Minutes:  35 minutes    Cognition    A&O Person  and Place   Able to follow 2 step commands. Hx of anoxic brain injury, which pt states causes him to lack short term memory. Subjective  Patient sitting up in chair with no family present  Pt agreeable to this PT tx. With encouragement. States he had 2-3 visitors today, which has him \"worn out. \"      Pain   None at rest, some discomfort with brett-care post BM (noted redness & bleeding)  Rating:  moderate/10  Location: anus/brett-area  Pain Medicine Status: RN notified and into assess; cream & new mepilex placed by nursing staff    Bed Mobility   Supine to Sit:   Not Tested  Sit to Supine:  Not Tested  Rolling:   Not Tested  Scooting:   Independent in chair    Transfer Training     Sit to stand:   Min A (chair, BSC; multiple trials)   Stand to sit:   Min A and VC for hand placement  (chair, BSC; multiple trials)   Chair to/from [facility]:  Min A with use of rolling walker (RW)    Gait Training gait deferred due to difficulty with transfers    Distance:    Deviations (firm surface/linoleum): N/A   Assistive Device Used:  N/A  Level of Assist:   Comment:     Stair Training deferred, pt unsafe/not appropriate to complete stairs at this time    Therapeutic Exercise all completed bilaterally unless indicated    Seated heel/toe raises: x15    Hip abd/add: x15  LAQ: x15  Seated march: x15  Attempted standing Treva, but pt noted to have soiled depends and requested to use BSC. Balance  Static Sitting:  Good    Tolerance: WNL  Dynamic Sitting:  Good -   Static Standing: Fair +   Tolerance: tolerated standing ~1 minute at a time, x4  Dynamic Standing: Poor    Patient Education      Role of PT, POC, Discharge recommendations, transfer techniques and calling for assist with mobility, importance of clean brett-area for wound healing. Positioning Needs       Pt sitting up in chair, call light and needs in reach and alarm set, waffle cushion in place. ROM Measurements N/A  Knee Flexion:  Knee Extension:     Activity Tolerance   Pt completed therapy session with No adverse symptoms. SpO2:   HR:  BP:     Other  None. Assessment :  Patient progressing slowly with therapy, and mobility has been limited by pt's frequent need to use BSC to have BM. Pt is currently requiring min A x1-2 and use of RW for transfers and is participating in many seated Treva. Patient continues to demonstrate decreased activity tolerance and decreased lower extremity strength. Continue to recommend SNF upon DC to restore functional mobility to baseline. Goals (all goals ongoing unless otherwise indicated)  To be met in 3 visits:  1). Independent with LE Ex x 10 reps     To be met in 6 visits:  1). Supine to/from sit: Independent  2). Sit to/from stand: SBA  3). Bed to chair: SBA  4). Gait: Ambulate 50 ft  with  SBA  and use of rolling walker (RW)  5). Tolerate B LE exercises 3 sets of 10-15 reps  6). Ascend/descend 1 threshold step with CGA and use of LRAD. Plan   Continue with plan of care. Louisa Najjar, PT, DPT #[id number]     If patient discharges from this facility prior to next visit, this note will serve as the Discharge Summary.

## 2019-05-14 NOTE — PROGRESS NOTES
Occupational Therapy Daily Treatment Note    Unit: PCU  Date:  5/14/2019  Patient Name:    Eitan Rodriguez  Admitting diagnosis:  Anasarca [R60.1]  Admit Date:  5/3/2019  Precautions/Restrictions:  fall risk, mohamud catheter  and confusion, IV , C diff precautions       Discharge Recommendations: SNF  DME needs for discharge: defer to facility       Therapy recommendations for staff:   Assist of 2 with use of rolling walker (RW) for all transfers to/from chair    AM-PAC Score: [id number] [facility] S4 Level: NA       Treatment Time: 9:35-10:17  Treatment number: 6  Total Treatment Time:  43 minutes      Subjective:  Pt sitting in chair and agreeable to treatment. Patient repots having right visual field cut from recent stroke. Requesting activities to improve vision. Pain   No  Rating: NA  Location:NA  Pain Medicine Status: Denies need      Bed Mobility:   Supine to Sit:  Not Tested   Sit to Supine:  Not Tested  Rolling:           Not Tested  Scooting:        SBA    Transfer Training:   Sit to stand:   Min assist   Stand to sit:  Min assist   Bed to Chair:             NT   Bed to [facility]:   Min A with use of RW. Patient demonstrated knee buckling during transfer  Standard toilet:   Not Tested    Activity Tolerance   Pt completed therapy session with No adverse symptoms. Patient is limited by weakness in B LEs during transfers. ADL Training: Toileting: max A to complete pericare efficiently   Lower body dressing: mod A  Grooming/Hygiene:  IND with combing hair     Therapeutic Exercise: Ankle pumps with B LEs elevated to decrease swelling. Patient Education:   Provided patient with visual scanning worksheets to improve visual impairment. Also encouraged patient to take notes throughout the day on important questions he has for medical team. Patient has a history of short term memory loss. Positioning Needs:   Reclined in chair with all needs within reach.      Family Present:  No    Assessment: Pt cooperative and willing to work with OT. Patient continues to have weakness and is at risk of further debilitation and falls. Patient will need SNF at [place] to improve independence and activity tolerance. GOALS  To be met in 3 Visits:  1). Bed to toilet: Min A     To be met in 5 Visits:  1). Supine to Sit: Supervision  2). Upper Body Bathing:  Min A  3). Lower Body Bathing:  Min A  4). Upper Body Dressing: Min A  5). Lower Body Dressing: Min A  6).  Pt to cielo UE exs x 15 reps       Plan: cont with [facility], OTR/L #[id number]    If patient discharges from this facility prior to next visit, this note will serve as the Discharge Summary

## 2019-05-14 NOTE — PROGRESS NOTES
IV to LFA infiltrated- removed at this time. Two attempts made by this RN to regain access- unsuccessful. Attempt made by Charge RN- unsuccessful. Clinical notified.

## 2019-05-14 NOTE — PROGRESS NOTES
[address] Daily Progress Note      Admit Date:  5/3/2019    Reason for Consultation: acute HF  Subjective:  Mr. Humberto Arellano is seen for follow up of CHF  He had diarrhea now has C diff colitis. He thinks his diarrhea is firming up. 3 months ago had same problem. He ate all his meals yesterday denies chest pain abdominal pain but has pain in perianal area due to diarrhea. Feeling better as far as SOB. His renal function is stable now still holding diuretics. He has severe hypokalemia and now hyponatremia confounded by diarrhea  He is able to lay flat on room air. He came with swelling and shortness of breath   He has CPAP AT home but not wearing it. CABG in [id number] complicated post op course including redo sternotomy and sternal repair. He was admitted this time from nursing home where he is refusing to do physical therapy as I was told by D/C calos Lyn. weight down 20lbs since admission. HPI on admission by my partner Dr. Cynthia Garcia: Tara Qureshi is a 60 y.o. male that presents with abdominal pain. Patient has been in [address] for care after a bypass surgery X3, 12/2018. Patient describes waking up this morning with a bandlike feeling of numbness in his mid abdomen.   He does admit to being a little bit constipated,  Camilo Koenig had post op PEA and post op paroxysmal atrial fibrillation          ROS:  12 point ROS negative in all areas as listed below except as in [place]  Constitutional, EENT, Cardiovascular, pulmonary, GI, , Musculoskeletal, skin, neurological, hematological, endocrine, Psychiatric    Past Medical History:   Diagnosis Date    Anoxic brain injury ([facility] [address] [address] 1994    Cardiac arrest ([facility] 1994    Clostridium difficile infection 05/09/2019    Gout     Hyperlipidemia     Hypertension     MRSA (methicillin resistant staph aureus) culture positive 05/09/2019    bacteremia    Reflux     S/P CABG x 3 12/2018    Sleep apnea     Type 2 diabetes mellitus without complication ([facility] Past Surgical History:   Procedure Laterality Date    CARDIAC CATHETERIZATION  11/26/2018    Dr. Shalonda Gandara Left 03/25/2014    Dr. Jodee Butler - w/trigger finger (3rd) & trigger thumb release    CARPAL TUNNEL RELEASE Right 04/14/2015    Dr. Tierra Kamara N/A 12/16/2018    Dr. Smith Dhillon - decompressive corpectomy C6 (>90%), microdissection, anterior cervical arthrodesis w/PEEK allograft, placement of corpectomy cage & Synthes plate D4-8    COLONOSCOPY  09/01/2015    Dr. Oneal - elis-diverticulosis, transverse colon adenomatous polyps    CORONARY ARTERY BYPASS GRAFT  12/14/2018    Dr. Jorge Cuellar  04/05/2019    cystoscopy/bladder biopsy f/c exchange    CYSTOSCOPY N/A 4/5/2019    CYSTOSCOPY performed by Shanda Person MD at [address] [place] [address] EMG Bilateral 02/27/2014    Dr. Sergey Turner POLYSOMNOGRAPHY  04/12/2016    Dr. Anibal Wilder. HAFSA Muniz w/ Health    FL CABG, ARTERIAL, THREE N/A 12/4/2018    Dr. Yun Connor - x3 (LIMA-LAD, BL SVG-OM, SVG-PDA)    RECONSTRUCTIVE REPAIR STERNAL N/A 12/20/2018    Dr. Yun Connor - I&D of sternum w/placement of wound VAC    TRANSESOPHAGEAL ECHOCARDIOGRAM  12/04/2018    during CABG    TUNNELED VENOUS CATHETER PLACEMENT Right 12/24/2018    Dr. Vania Joshua - PICC via IJ       Objective:   /72   Pulse 72   Temp 96.8 °F (36 °C) (Oral)   Resp 16   Ht 5' 10\" (1.778 m)   Wt 203 lb 14.4 oz (92.5 kg)   SpO2 98%   BMI 29.26 kg/m²       Intake/Output Summary (Last 24 hours) at 5/14/2019 0548  Last data filed at 5/13/2019 2245  Gross per 24 hour   Intake 1447 ml   Output 950 ml   Net 497 ml       TELEMETRY: NSR    Physical Exam:  General: No Respiratory distress, appears well developed and well nourished.    Eyes:  Sclera nonicteric  Nose/Sinuses:  negative findings: nose shows no deformity, asymmetry, or inflammation, nasal mucosa normal, septum midline with no perforation or bleeding  Back:  no pain to palpation  Joint:  no ALB  PT/INR: No results for input(s): PROTIME, INR in the last 72 hours. APTT: No results for input(s): APTT in the last 72 hours. BNP:  No results for input(s): BNP in the last 72 hours. IMAGING:   I have reviewed the following tests and documented in this encounter as follows:     CXR 19  Right-sided pleural effusion. Stable cardiomegaly   FINDINGS: Status post median sternotomy. Stable cardiomegaly. Right-sided pleural effusion. No new focal pulmonary consolidation. EKG 19  Normal sinus rhythmRightward axisIncomplete left bundle branch blockNonspecific T wave abnormalityProlonged QTAbnormal ECGWhen compared with ECG of 27-DEC-2018 09:24,Premature ventricular complexes are no longer PresentCriteria for Septal infarct are no longer PresentNonspecific T wave abnormality has replaced inverted T waves in Inferior leadsNonspecific T wave abnormality has replaced inverted T waves in Lateral leadsConfirmed by Sara Lee MD, [email or web address] ([id number]) on 2019 5:35:34      ECHO 5/3/19  Summary     Left ventricle - normal size and thickness, severely reduced function with   EF of 20%, severe diffuse hypokinesis   Right ventricle - reduced function   Mitral valve - mod-severe regurgitation   Tricuspid valve - moderate regurgitation with PASP of 55mmHg   Pulmonic valve - mild regurgitation    [id number] Sinus tach with short AT run, IVCD, possible septal infarct, PVC    2018   post-Op Diagnosis: Sternal Incision Infection      Procedure(s):  STERNUM INCISION AND DRAINAGE WITH WOUND VAC PLACEMENT          CABG 19  CORONARY ARTERY BYPASS GRAFTING X3, INTERNAL MAMMARY ARTERY, SAPHENOUS VEIN GRAFT, ON PUMP  LIMA to LAD reverse saphenous vein to OM reverse saphenous vein to PDA  Transesophageal echo  Endo-vein harvest right and left leg  Intercostal nerve block  Doppler assessment of graft      LEFT HEART CATH 18  LM: luminals, mild calcium  LAD: heavy prox calcification.  Mid long section of disease with focal lesion up to 80%, distal with sequential lesions of up to 60-70%                Daig1- mid 90%, distal vessel bifurcation disease of 50%  LCX: luminals, distal native LCX is ecctretic and severely diseased fed by R->Lt collaterals.                 OM1- 50% mid ribbon like lesion                OM2- smaller vessel, mild diffuse disease <30%  RCA: dominant, mild-moderate diffuse disease and calcification, lesions up to 20%                PDA- focal eccentric 80%                = some Rt-->Lt collaterals   LVEDP: 22-25  LVEF: Not done due to CKD    PKIH/WSMU: 15/31/91    LV systolic function is mildly reduced with EF estimated from 40-45%.   More prominent septal hypokinesis is noted on certain views.   Left ventricular size is mildly increased.   There is mild concentric left ventricular hypertrophy.   Grade I diastolic dysfunction with normal filling pressure.   Aortic valve appears sclerotic but opens adequately.   Mild mitral regurgitation.   Inadequate tricuspid regurgitation to estimate systolic pulmonary artery   pressure.         STRESS TEST: 11/22/18 \"Myoview\" Summary  Moderate-large sized inferior, septal, and distal apical wall significant  partial reversibility defects consistent with mainly ischemia and some  infarction in the territory of the proximal to distal RCA and/or LAD. LV  function is moderately reduced with more prominent inferior hypokinesis and  ejection fraction of 37%. Higher risk abnormal study         CARDIAC CATH: 12/4/18 CORONARY ARTERY BYPASS GRAFTING X3             Assessment  1. Acute on chronic systolic CHF He is getting reehydrated holding demadex may continue aldactone since he is still hypokalemic  2. One  + ve blood culture with [place] ? contaminent  3. C diff colitis  4. Ischemic cardiomyopathy  5. parox afib   6. CKD III renal function improving with diuresis  7.  CAD s/p CABG complicated post op stable no angina  Plan  Redo echo for EF in 90 days if EF<35 will need to

## 2019-05-14 NOTE — PLAN OF CARE
Patient's EF (Ejection Fraction) is less than 40%    Patient's weights and intake/output reviewed:    Patient's Last Weight: 203 lbs obtained by standing scale. Today's weight is noted to be less less than last documented weight. Intake/Output Summary (Last 24 hours) at 5/13/2019 2352  Last data filed at 5/13/2019 2245  Gross per 24 hour   Intake 1447 ml   Output 1600 ml   Net -153 ml       Patient's current functional capacity:  Marked limitation of physical activity. Comfortable at rest. Less than ordinary activity causes fatigue, palpitation, or dyspnea. Pt resting in bed at this time on room air. Pt denies shortness of breath. Pt with pitting lower extremity edema. Patient and family's stated goal of care: reduce shortness of breath, increase activity tolerance, be more comfortable and reduce lower extremity edema prior to discharge        Patient has a past medical history of Anoxic brain injury ([facility] [address] Cardiac arrest ([facility] [address] Clostridium difficile infection, Gout, Hyperlipidemia, Hypertension, MRSA (methicillin resistant staph aureus) culture positive, Reflux, S/P CABG x 3, Sleep apnea, and Type 2 diabetes mellitus without complication ([facility] [address] Comorbidities reviewed and education provided.     >>For CHF and Comorbidity documentation on Education Time and Topics, please see Education Tab

## 2019-05-14 NOTE — PLAN OF CARE
Problem: FLUID AND ELECTROLYTE IMBALANCE  Goal: Fluid and electrolyte balance are achieved/maintained  Outcome: Ongoing  Note:             Patient's EF (Ejection Fraction) is less than 40%    Patient's weights and intake/output reviewed:    Patient's Last Weight: 92.48 kg obtained by standing scale. Today's weight is noted to be .09 kg more than last documented weight. Intake/Output Summary (Last 24 hours) at 5/14/2019 1544  Last data filed at 5/14/2019 1526  Gross per 24 hour   Intake 2228 ml   Output 1700 ml   Net 528 ml       Patient's current functional capacity:  No limitation of physical activity. Ordinary physical activity does not cause undue fatigue, palpitation, dyspnea. Pt up in chair at this time on room air. Pt denies shortness of breath. Pt with pitting lower extremity edema. Patient and family's stated goal of care: increase activity tolerance, better understand heart failure and disease management, be more comfortable and reduce lower extremity edema prior to discharge        Patient has a past medical history of Anoxic brain injury ([facility] [address] Cardiac arrest ([facility] [address] Clostridium difficile infection, Gout, Hyperlipidemia, Hypertension, MRSA (methicillin resistant staph aureus) culture positive, Reflux, S/P CABG x 3, Sleep apnea, and Type 2 diabetes mellitus without complication ([facility] [address] Comorbidities reviewed and education provided.     >>For CHF and Comorbidity documentation on Education Time and Topics, please see Education Tab

## 2019-05-15 LAB
ANION GAP SERPL CALCULATED.3IONS-SCNC: 12 MMOL/L (ref 3–16)
BUN BLDV-MCNC: 51 MG/DL (ref 7–20)
CALCIUM SERPL-MCNC: 8.6 MG/DL (ref 8.3–10.6)
CHLORIDE BLD-SCNC: 88 MMOL/L (ref 99–110)
CO2: 25 MMOL/L (ref 21–32)
CREAT SERPL-MCNC: 2.2 MG/DL (ref 0.9–1.3)
GFR AFRICAN AMERICAN: 37
GFR NON-AFRICAN AMERICAN: 31
GLUCOSE BLD-MCNC: 121 MG/DL (ref 70–99)
GLUCOSE BLD-MCNC: 139 MG/DL (ref 70–99)
GLUCOSE BLD-MCNC: 152 MG/DL (ref 70–99)
GLUCOSE BLD-MCNC: 154 MG/DL (ref 70–99)
GLUCOSE BLD-MCNC: 169 MG/DL (ref 70–99)
PERFORMED ON: ABNORMAL
POTASSIUM SERPL-SCNC: 3.9 MMOL/L (ref 3.5–5.1)
SODIUM BLD-SCNC: 125 MMOL/L (ref 136–145)

## 2019-05-15 PROCEDURE — 36415 COLL VENOUS BLD VENIPUNCTURE: CPT

## 2019-05-15 PROCEDURE — 6370000000 HC RX 637 (ALT 250 FOR IP): Performed by: INTERNAL MEDICINE

## 2019-05-15 PROCEDURE — 2580000003 HC RX 258: Performed by: INTERNAL MEDICINE

## 2019-05-15 PROCEDURE — 97530 THERAPEUTIC ACTIVITIES: CPT

## 2019-05-15 PROCEDURE — 97110 THERAPEUTIC EXERCISES: CPT

## 2019-05-15 PROCEDURE — 99232 SBSQ HOSP IP/OBS MODERATE 35: CPT | Performed by: INTERNAL MEDICINE

## 2019-05-15 PROCEDURE — 80048 BASIC METABOLIC PNL TOTAL CA: CPT

## 2019-05-15 PROCEDURE — 2060000000 HC ICU INTERMEDIATE R&B

## 2019-05-15 PROCEDURE — 97535 SELF CARE MNGMENT TRAINING: CPT

## 2019-05-15 RX ORDER — TOLVAPTAN 15 MG/1
15 TABLET ORAL ONCE
Status: COMPLETED | OUTPATIENT
Start: 2019-05-15 | End: 2019-05-15

## 2019-05-15 RX ORDER — POTASSIUM CHLORIDE 7.45 MG/ML
10 INJECTION INTRAVENOUS PRN
Status: DISCONTINUED | OUTPATIENT
Start: 2019-05-15 | End: 2019-05-16 | Stop reason: HOSPADM

## 2019-05-15 RX ORDER — POTASSIUM CHLORIDE 20 MEQ/1
40 TABLET, EXTENDED RELEASE ORAL PRN
Status: DISCONTINUED | OUTPATIENT
Start: 2019-05-15 | End: 2019-05-16 | Stop reason: HOSPADM

## 2019-05-15 RX ADMIN — METOPROLOL SUCCINATE 50 MG: 50 TABLET, EXTENDED RELEASE ORAL at 09:55

## 2019-05-15 RX ADMIN — GABAPENTIN 300 MG: 300 CAPSULE ORAL at 20:48

## 2019-05-15 RX ADMIN — POTASSIUM CHLORIDE 40 MEQ: 1500 TABLET, EXTENDED RELEASE ORAL at 11:54

## 2019-05-15 RX ADMIN — ASPIRIN 81 MG: 81 TABLET, COATED ORAL at 09:56

## 2019-05-15 RX ADMIN — POTASSIUM CHLORIDE 40 MEQ: 1500 TABLET, EXTENDED RELEASE ORAL at 09:56

## 2019-05-15 RX ADMIN — TAMSULOSIN HYDROCHLORIDE 0.4 MG: 0.4 CAPSULE ORAL at 09:57

## 2019-05-15 RX ADMIN — TOLVAPTAN 15 MG: 15 TABLET ORAL at 12:57

## 2019-05-15 RX ADMIN — ATORVASTATIN CALCIUM 80 MG: 40 TABLET, FILM COATED ORAL at 20:48

## 2019-05-15 RX ADMIN — OXYCODONE HYDROCHLORIDE AND ACETAMINOPHEN 500 MG: 500 TABLET ORAL at 09:56

## 2019-05-15 RX ADMIN — Medication 200 MG: at 20:48

## 2019-05-15 RX ADMIN — SPIRONOLACTONE 25 MG: 25 TABLET ORAL at 09:57

## 2019-05-15 RX ADMIN — FAMOTIDINE 20 MG: 20 TABLET, FILM COATED ORAL at 09:56

## 2019-05-15 RX ADMIN — ISOSORBIDE DINITRATE 10 MG: 10 TABLET ORAL at 09:56

## 2019-05-15 RX ADMIN — Medication 10 ML: at 09:55

## 2019-05-15 RX ADMIN — COLLAGENASE SANTYL: 250 OINTMENT TOPICAL at 10:03

## 2019-05-15 RX ADMIN — HYDRALAZINE HYDROCHLORIDE 20 MG: 10 TABLET, FILM COATED ORAL at 09:57

## 2019-05-15 RX ADMIN — MELATONIN 3 MG ORAL TABLET 9 MG: 3 TABLET ORAL at 20:48

## 2019-05-15 RX ADMIN — Medication 10 ML: at 20:48

## 2019-05-15 RX ADMIN — LINEZOLID 600 MG: 600 TABLET, FILM COATED ORAL at 20:48

## 2019-05-15 RX ADMIN — LINEZOLID 600 MG: 600 TABLET, FILM COATED ORAL at 09:56

## 2019-05-15 RX ADMIN — Medication 125 MG: at 20:48

## 2019-05-15 RX ADMIN — FAMOTIDINE 20 MG: 20 TABLET, FILM COATED ORAL at 20:48

## 2019-05-15 RX ADMIN — FERROUS SULFATE TAB 325 MG (65 MG ELEMENTAL FE) 325 MG: 325 (65 FE) TAB at 09:56

## 2019-05-15 RX ADMIN — ALLOPURINOL 100 MG: 100 TABLET ORAL at 09:57

## 2019-05-15 RX ADMIN — APIXABAN 2.5 MG: 5 TABLET, FILM COATED ORAL at 09:56

## 2019-05-15 RX ADMIN — HYDRALAZINE HYDROCHLORIDE 20 MG: 10 TABLET, FILM COATED ORAL at 20:48

## 2019-05-15 RX ADMIN — FIDAXOMICIN 200 MG: 200 TABLET, FILM COATED ORAL at 09:57

## 2019-05-15 RX ADMIN — POTASSIUM CHLORIDE 40 MEQ: 1500 TABLET, EXTENDED RELEASE ORAL at 16:48

## 2019-05-15 RX ADMIN — APIXABAN 2.5 MG: 5 TABLET, FILM COATED ORAL at 20:48

## 2019-05-15 RX ADMIN — ISOSORBIDE DINITRATE 10 MG: 10 TABLET ORAL at 20:48

## 2019-05-15 RX ADMIN — MULTIPLE VITAMINS W/ MINERALS TAB 1 TABLET: TAB at 09:56

## 2019-05-15 NOTE — PROGRESS NOTES
Assessment completed as charted. Patient working with therapy at this time. Patient ready to get up in the chair. Denies any needs. Call light in reach will monitor.

## 2019-05-15 NOTE — PROGRESS NOTES
lispro  0-6 Units Subcutaneous TID WC    insulin lispro  0-3 Units Subcutaneous Nightly     PRN Meds: hydrALAZINE, lactulose, acetaminophen, sodium chloride flush, magnesium hydroxide, ondansetron, glucose, dextrose, glucagon (rDNA), dextrose, potassium chloride **OR** potassium alternative oral replacement **OR** potassium chloride, magnesium sulfate      Intake/Output Summary (Last 24 hours) at 5/15/2019 1258  Last data filed at 5/15/2019 0945  Gross per 24 hour   Intake 1544 ml   Output 1425 ml   Net 119 ml       Physical Exam Performed:    /81   Pulse 88   Temp 95.2 °F (35.1 °C) (Axillary)   Resp 18   Ht 5' 10\" (1.778 m)   Wt 196 lb 3.2 oz (89 kg)   SpO2 95%   BMI 28.15 kg/m²   General appearance: No apparent distress appears stated age and cooperative. Pleasant  male  HEENT Normal cephalic, atraumatic without obvious deformity.  Conjunctivae/corneas clear. Neck: Supple, No JVD/bruits.  Trachea midline without thyromegaly or adenopathy with full range of motion. Lungs: diminished b/l, on RA  Heart: Irregular rate and rhythm. with Normal S1/S2   Abdomen: Soft, non-tender or non-distended without rigidity or guarding and positive bowel sounds   Extremities: No clubbing, cyanosis, + edema bilaterally. Skin: Skin color, texture, turgor normal.  No rashes or lesions.   Neurologic: Alert and oriented to self, neurovascularly intact with sensory/motor intact upper extremities/lower extremities, bilaterally.  Cranial nerves: II-XII intact, grossly non-focal.  Mental status: Alert, awake, forgetful    Labs:   Recent Labs     05/13/19  0502   WBC 11.3*   HGB 11.3*   HCT 35.3*        Recent Labs     05/13/19  0502 05/14/19  0511 05/15/19  0511   * 129* 125*   K 4.2 2.9* 3.9   CL 87* 87* 88*   CO2 30 28 25   BUN 52* 50* 51*   CREATININE 2.4* 2.3* 2.2*   CALCIUM 9.0 8.8 8.6     Urinalysis:      Lab Results   Component Value Date    NITRU Negative 05/09/2019    WBCUA 10-20 05/09/2019 monitor    Hypomagnesemia   - Repleted      Hypokalemia   -  repleted ,  on PO K+ replacement   - monitor. Potassium levels normal today     Debility   - continue PT,OT      DVT Prophylaxis: eliquis  Diet: DIET CARDIAC; Low Sodium (2 GM); Daily Fluid Restriction: 1800 ml  Code Status: Full Code        Dispo - continue care.  Likely DC to SNF soon if renal function stabilizes         June Valles 5/15/2019 12:58 PM

## 2019-05-15 NOTE — PROGRESS NOTES
Patient's EF (Ejection Fraction) is less than 40%    Patient's weights and intake/output reviewed:    Patient's Last Weight: 204 lbs obtained by standing scale. Today's weight is noted to be 8lb less than last documented weight. Intake/Output Summary (Last 24 hours) at 5/15/2019 0255  Last data filed at 5/15/2019 0012  Gross per 24 hour   Intake 1808 ml   Output 1600 ml   Net 208 ml       Patient's current functional capacity:  Slight limitation of physical activity. Comfortable at rest. Ordinary physical activity results in fatigue, palpitation, dyspnea. Pt resting in bed at this time on room air. Pt denies shortness of breath. Pt with pitting lower extremity edema. Patient and family's stated goal of care: reduce shortness of breath, increase activity tolerance, better understand heart failure and disease management, be more comfortable and reduce lower extremity edema prior to discharge        Patient has a past medical history of Anoxic brain injury ([facility] [address] Cardiac arrest ([facility] [address] Clostridium difficile infection, Gout, Hyperlipidemia, Hypertension, MRSA (methicillin resistant staph aureus) culture positive, Reflux, S/P CABG x 3, Sleep apnea, and Type 2 diabetes mellitus without complication ([facility] [address] Comorbidities reviewed and education provided.     >>For CHF and Comorbidity documentation on Education Time and Topics, please see Education Tab

## 2019-05-15 NOTE — PROGRESS NOTES
ID Follow-up NOTE    CC: admitted with CC of abdominal pain. Subjective:     Patient denies nausea, abdominal pain or diarrhea, cough, SOB, fever; family member says patient had C diff at [facility] [employer]. Feels better. Says his appetite is good. Objective:     Patient Vitals for the past 24 hrs:   BP Temp Temp src Pulse Resp SpO2 Weight   05/15/19 0945 117/81 95.2 °F (35.1 °C) Axillary 88 18 95 % --   05/15/19 0628 -- -- -- -- -- -- 196 lb 3.2 oz (89 kg)   05/15/19 0343 119/85 96.1 °F (35.6 °C) Axillary 90 16 97 % --   19 2034 111/79 97.9 °F (36.6 °C) Oral 98 16 99 % --   19 1506 107/71 96.6 °F (35.9 °C) Oral 93 16 100 % --     Temp (24hrs), Av.5 °F (35.8 °C), Min:95.2 °F (35.1 °C), Max:97.9 °F (36.6 °C)          EXAM:  General: alert appropriate afebrile      LUNGS: Resp unlabored; clear to auscultation     CV: RR s M     ABD: soft, non-tender, without mass     EXT: superficial pressure ulcer L heel does not look infected. Mild edema  Skin: no rash      Data Review:    Lab Results   Component Value Date    WBC 11.3 (H) 2019    HGB 11.3 (L) 2019    HCT 35.3 (L) 2019    MCV 82.9 2019     2019     Lab Results   Component Value Date    CREATININE 2.2 (H) 05/15/2019    BUN 51 (H) 05/15/2019     (L) 05/15/2019    K 3.9 05/15/2019    CL 88 (L) 05/15/2019    CO2 25 05/15/2019     Lab Results   Component Value Date    ALT 10 2019    AST 21 2019    ALKPHOS 109 2019    BILITOT 0.5 2019         MICRO: 1/2 of blood cultures drawn on  positive for MRSA; stool for C diff toxin +; urine culture positive for enterococcus.   IMAGING: CXR : R pleural effusion     Assessment:     Principal Problem:    Acute on chronic systolic heart failure (HCC)  Active Problems:    Coronary artery disease involving native coronary artery of native heart without angina pectoris    Prolonged QT interval    Anasarca    [facility]ox[facility] Benign essential HTN    CAMI (acute kidney injury) (HCC)    Hypervolemia    Positive culture finding    Colitis due to Clostridium difficile  Resolved Problems:    * No resolved hospital problems. *  mild leukocytosis  Diarrhea due to C diff resolving: this was apparently a relapse of C diff that the patient had at the [facility]. Creatinine slowly fallin.2 today  1/2 blood cultures positive for MRSA source uncertain; follow up blood cultures NGSF; transient MRSA bacteremia could have been from a peripheral IV which has been removed. Pressure ulcer over L heel does not look infected: ie, does not seem like a likely source of bacteremia  Has superficial pressure to pre sacral area which likewise did not look infected to be at time of consult. Enterococcal bacteruria: tend to doubt that this represents a UTI; pt has chronic Robin. Plan: Will need a total of two weeks of anti-MRSA antibiotic therapy in total (has had 6 days so far), so I would favor continuing linezolid for 8 more days. Will switch Dificid back to po vancomycin 125 mg bid. Would probably continue that for roughly two more weeks.

## 2019-05-15 NOTE — PROGRESS NOTES
dry  Neck:  Negative for JVD and Carotid Bruits. Chest:  Clear  to auscultation, respiration easy  Cardiovascular:  Reg  S1S2 normal, no murmur, no rub or thrill. Abdomen:  Soft normal liver and spleen  2+ bilat leg edema  Neuro: intact    Medications:    linezolid  600 mg Oral 2 times per day    potassium chloride  40 mEq Oral TID WC    apixaban  2.5 mg Oral BID    Fidaxomicin  200 mg Oral BID    hydrALAZINE  20 mg Oral 2 times per day    isosorbide dinitrate  10 mg Oral BID    spironolactone  25 mg Oral Daily    collagenase   Topical Daily    melatonin  9 mg Oral Nightly    aspirin  81 mg Oral Daily    fluticasone  2 spray Each Nare Daily    gabapentin  300 mg Oral Nightly    atorvastatin  80 mg Oral Nightly    tamsulosin  0.4 mg Oral Daily    famotidine  20 mg Oral BID    allopurinol  100 mg Oral Daily    vitamin C  500 mg Oral Daily    ferrous sulfate  325 mg Oral Daily with breakfast    magnesium oxide  200 mg Oral Nightly    therapeutic multivitamin-minerals  1 tablet Oral Daily    metoprolol succinate  50 mg Oral Daily    sodium chloride flush  10 mL Intravenous 2 times per day    insulin lispro  0-6 Units Subcutaneous TID WC    insulin lispro  0-3 Units Subcutaneous Nightly      dextrose       hydrALAZINE, lactulose, acetaminophen, sodium chloride flush, magnesium hydroxide, ondansetron, glucose, dextrose, glucagon (rDNA), dextrose, potassium chloride **OR** potassium alternative oral replacement **OR** potassium chloride, magnesium sulfate    Lab Data:  CBC:   Recent Labs     05/13/19  0502   WBC 11.3*   HGB 11.3*   HCT 35.3*   MCV 82.9        BMP:   Recent Labs     05/13/19  0502 05/14/19  0511 05/15/19  0511   * 129* 125*   K 4.2 2.9* 3.9   CL 87* 87* 88*   CO2 30 28 25   BUN 52* 50* 51*   CREATININE 2.4* 2.3* 2.2*     LIVER PROFILE:   No results for input(s): AST, ALT, LIPASE, BILIDIR, BILITOT, ALKPHOS in the last 72 hours. Invalid input(s):   AMYLASE, with focal lesion up to 80%, distal with sequential lesions of up to 60-70%                Daig1- mid 90%, distal vessel bifurcation disease of 50%  LCX: luminals, distal native LCX is ecctretic and severely diseased fed by R->Lt collaterals.                 OM1- 50% mid ribbon like lesion                OM2- smaller vessel, mild diffuse disease <30%  RCA: dominant, mild-moderate diffuse disease and calcification, lesions up to 20%                PDA- focal eccentric 80%                = some Rt-->Lt collaterals   LVEDP: 22-25  LVEF: Not done due to CKD    EFND/AJA    LV systolic function is mildly reduced with EF estimated from 40-45%.   More prominent septal hypokinesis is noted on certain views.   Left ventricular size is mildly increased.   There is mild concentric left ventricular hypertrophy.   Grade I diastolic dysfunction with normal filling pressure.   Aortic valve appears sclerotic but opens adequately.   Mild mitral regurgitation.   Inadequate tricuspid regurgitation to estimate systolic pulmonary artery   pressure.         STRESS TEST: 18 \"Myoview\" Summary  Moderate-large sized inferior, septal, and distal apical wall significant  partial reversibility defects consistent with mainly ischemia and some  infarction in the territory of the proximal to distal RCA and/or LAD. LV  function is moderately reduced with more prominent inferior hypokinesis and  ejection fraction of 37%. Higher risk abnormal study         CARDIAC CATH: 18 CORONARY ARTERY BYPASS GRAFTING X3         Assessment  1. Acute on chronic systolic CHF He is hyponatremic,  2. One  + ve blood culture with Mersa ? contaminent  3. C diff colitis  4. Ischemic cardiomyopathy  5. parox afib   6. CKD III renal function improving with diuresis  7.  CAD s/p CABG complicated post op stable no angina    Plan  Redo echo for EF in 90 days if EF<35 will need to consider AICD for primary prevention  Aldactone 25 mg daily  Daily weight  On nitrates and hydralazine for CHF with reduced EF  Due to CKD avoiding ace inhibitors arb arni  A dose of tolvaptan    Core Measures:  · Discharge instructions:   · LVEF documented: yes  · ACEI for LV dysfunction:  Listed allergy to enalapril  Will start nitrates and hydralazine  · Smoking Cessation:  · PT OT   I have spent 25  minutes of face to face time with the patient with more than 50% spent counseling and coordinating care for this patient    Suzanne Retana MD 5/15/2019 5:50 AM

## 2019-05-15 NOTE — PROGRESS NOTES
Inpatient Physical Therapy Daily Treatment Note    Unit: PCU  Date:  5/15/2019  Patient Name:    Cisco Adjutant  Admitting diagnosis:  Sid [R60.1]  Admit Date:  5/3/2019  Precautions/Restrictions:  fall risk, IV, bed/chair alarm, mohamud catheter  and telemetry, contact precautions (c. Diff)       Discharge Recommendations: SNF  (if patient declines-recommending 24/7 assist and home therapy)  DME needs for discharge: defer to facility       Therapy recommendations for staff:   Assist of 2 with use of rolling walker (RW) for all transfers [facility] Recommendation: NA  AM-PAC Mobility Score   AM-PAC Inpatient Mobility Raw Score : 15       Treatment Time:  14:11-14:50  Treatment number: 7  Timed Code Treatment Minutes: 39 minutes  Total Treatment Minutes:  39  minutes    Cognition    A&O Person , Place  and Time  (month & year)   Able to follow 2 step commands     Hx of anoxic brain injury, which pt states causes him to lack short term memory. Subjective  Patient reclined in chair with no family present  Pt agreeable to this PT tx. Pt says he feels weak and tired today but willing to participate in seated exercises. Pain   Yes  Ratin/10  Location: L shoulder and neck  Pain Medicine Status: Denies need- assisted pt to reposition in chair to alleviate symptoms     Bed Mobility   Supine to Sit:   Not Tested  Sit to Supine:  Not Tested  Rolling:   Not Tested  Scooting:   Supervision (at edge of chair)    Transfer Training     Sit to stand:   Min A  Stand to sit:   Min A  Bed to Chair:  Not Tested with use of N/A    Gait Training gait deferred due to fatigue  Distance:   ft  Deviations (firm surface/linoleum): N/A   Assistive Device Used:  N/A  Level of Assist: N/A  Comment:     Stair Training deferred, pt unsafe/not appropriate to complete stairs at this time  Pt ascended/descended  stairs with Not Tested with N/A, and use of N/A.   Pattern: N/A    Therapeutic Exercise all completed bilaterally unless indicated  Ankle pumps:  Quad sets:   Glut sets:   Heel slides:   SLR:   Hip abd/add:   LAQ: 2x10   Scapular Retraction: x10  Upper Trap Stretch: 1x10 secs   Seated Calf Raises: x20  Standing Marching*: x10   Seated Marching: 2x10  Seated Clamshells: x10 (with pillow between legs to facilitate contraction)  *2nd attempt made for standing marching, pt only able to tolerate 2 repetitions before reporting feeling unsteady and needing to sit     Balance  Static Sitting:  Good    Tolerance: WNL  Dynamic Sitting:  Good -   Static Standing: Fair +   Tolerance: Tolerated standing at edge of chair with RW and Min A ~1 minute   Dynamic Standing: Poor    Patient Education      Role of PT, POC, DC recommendations and calling for assist with mobility. Explained D/C to SNF being PT recommendation due to lack of independence and safety with functional mobility. Positioning Needs       Pt sitting up in chair, call light and needs in reach, alarm set and pillows placed for support/comfort. ROM Measurements N/A  Knee Flexion:  Knee Extension:     Activity Tolerance   Pt completed therapy session with No nausea, dizziness, pain or SOB noted w/activity. SpO2:   HR:  BP:     Other  None. Assessment :  Patient tolerated Treva sitting EOB. Pt fatigue and weakness continue to limit activity tolerance with standing Treva and ambulation. Pt would benefit from SNF to improve independence with functional mobility to baseline. Goals (all goals ongoing unless otherwise indicated)  To be met in 3 visits:  1). Independent with LE Ex x 10 reps     To be met in 6 visits:  1).  Supine to/from sit: Independent  2).  Sit to/from stand: SBA  3).  Bed to chair: SBA  4).  Gait: Ambulate 50 ft  with  SBA  and use of rolling walker (RW)  5).  Tolerate B LE exercises 3 sets of 10-15 reps  6).  Ascend/descend 1 threshold step with CGA and use of LRAD. Plan   Continue with plan of care.     Phi Jones, SPT PT present for entire treatment session, providing direct one on one service, and making all skilled judgements and assessments for the treatment while allowing student participation. If patient discharges from this facility prior to next visit, this note will serve as the Discharge Summary.

## 2019-05-15 NOTE — PROGRESS NOTES
Pt is lying in bed with their eyes closed. Respirations are easy and even. Call light within reach bed in lowest position with the wheels locked. Will continue to monitor.  Alvaro Modi

## 2019-05-15 NOTE — PROGRESS NOTES
Occupational Therapy Daily Treatment Note    Unit: PCU  Date:  5/15/2019  Patient Name:    Ana Laura Cardoza  Admitting diagnosis:  Anasarca [R60.1]  Admit Date:  5/3/2019  Precautions/Restrictions: fall risk, IV, bed/chair alarm, mohamud catheter , telemetry and contact plus precautions (C. Diff)           Discharge Recommendations: SNF; if refusing pt will require 24/7 assist and HHOT/PT as well as aide services  DME needs for discharge: defer to facility; id refusing, pt would benefit from RW, BSC, bedrail, tub transfer bench/seat       Therapy recommendations for staff:   Assist of 2 with use of rolling walker (RW) for all transfers to/from bedside commode    AM-PAC Score: [address] S4 Level: NA       Treatment Time:  7507-1085  Treatment number:  2   Total Treatment Time:   45 minutes      Subjective:  Pt supine in bed, agreeable to therapy, eager to get OOB. Pt steadfastly refusing SNF recommendation, however he demonstrates impaired safety awareness, and would require 24/7 assist at home for all transfers and ADLs    Pain   Yes  Rating: moderate  Location:coccyx  Pain Medicine Status: Denies need      Bed Mobility:   Supine to Sit:  Min A  Sit to Supine:  Not Tested  Rolling:           Min A  Scooting:        CGA    Transfer Training:   Sit to stand:   CGA  Stand to sit:  CGA  Bed to Chair:  Min A  Bed to [facility]:   Min A  Standard toilet:   Not Tested    Activity Tolerance   Pt completed therapy session with No adverse symptoms; able to tolerate standing with RW x 1 min but unable to march in place. Pt completed all ADLs seated      ADL Training:   Upper body dressing:  Min A  Upper body bathing:  Min A  Lower body dressing: Mod A  Lower body bathing: Mod A to knees, Max A for feet and brett area  Toileting:    Max A to complete brett care   Grooming/Hygiene:  Not Tested    Therapeutic Exercise:   N/A    Patient Education:   Role of OT  Recommendations for DC  Energy conservation techniques  Safe RW use/hand

## 2019-05-15 NOTE — PLAN OF CARE
Problem: Falls - Risk of:  Goal: Will remain free from falls  Description  Will remain free from falls  Outcome: Ongoing  Goal: Absence of physical injury  Description  Absence of physical injury  Outcome: Ongoing     Problem: Risk for Impaired Skin Integrity  Goal: Tissue integrity - skin and mucous membranes  Description  Structural intactness and normal physiological function of skin and  mucous membranes. Outcome: Ongoing     Problem: Fluid Volume:  Description  [TRUNCATED] Hyponatremia indicates a relatively greater amount of water to sodium in plasma. In hypovolemia, a deficit of both total body water and sodium exists but relatively less water deficit. Euvolemia represents near normal total body sodium co .. Kay Ny [TRUNCATED] Hyponatremia indicates a relatively greater amount of water to sodium in plasma. In hypovolemia, a deficit of both total body water and sodium exists but relatively less water deficit. Euvolemia represents near normal total body sodium co ...   Goal: Hemodynamic stability will improve  Description  Hemodynamic stability will improve  Outcome: Ongoing  Goal: Ability to maintain a balanced intake and output will improve  Description  Ability to maintain a balanced intake and output will improve  Outcome: Ongoing     Problem: OXYGENATION/RESPIRATORY FUNCTION  Goal: Patient will maintain patent airway  Outcome: Ongoing  Goal: Patient will achieve/maintain normal respiratory rate/effort  Description  Respiratory rate and effort will be within normal limits for the patient  Outcome: Ongoing     Problem: HEMODYNAMIC STATUS  Goal: Patient has stable vital signs and fluid balance  Outcome: Ongoing     Problem: FLUID AND ELECTROLYTE IMBALANCE  Goal: Fluid and electrolyte balance are achieved/maintained  Outcome: Ongoing     Problem: ACTIVITY INTOLERANCE/IMPAIRED MOBILITY  Goal: Mobility/activity is maintained at optimum level for patient  Outcome: Ongoing     Problem: Pain:  Description  Pain management should include both nonpharmacologic and pharmacologic interventions.   Goal: Pain level will decrease  Description  Pain level will decrease  Outcome: Ongoing  Goal: Control of acute pain  Description  Control of acute pain  Outcome: Ongoing  Goal: Control of chronic pain  Description  Control of chronic pain  Outcome: Ongoing

## 2019-05-15 NOTE — PROGRESS NOTES
Bedside report and Pt care transferred to [facility]. Pt denies any assistance at this time.

## 2019-05-16 VITALS
RESPIRATION RATE: 16 BRPM | HEART RATE: 93 BPM | DIASTOLIC BLOOD PRESSURE: 87 MMHG | OXYGEN SATURATION: 100 % | WEIGHT: 206.2 LBS | HEIGHT: 70 IN | SYSTOLIC BLOOD PRESSURE: 123 MMHG | TEMPERATURE: 97 F | BODY MASS INDEX: 29.52 KG/M2

## 2019-05-16 LAB
ANION GAP SERPL CALCULATED.3IONS-SCNC: 13 MMOL/L (ref 3–16)
BUN BLDV-MCNC: 50 MG/DL (ref 7–20)
CALCIUM SERPL-MCNC: 8.9 MG/DL (ref 8.3–10.6)
CHLORIDE BLD-SCNC: 93 MMOL/L (ref 99–110)
CO2: 26 MMOL/L (ref 21–32)
CREAT SERPL-MCNC: 2.3 MG/DL (ref 0.9–1.3)
GFR AFRICAN AMERICAN: 35
GFR NON-AFRICAN AMERICAN: 29
GLUCOSE BLD-MCNC: 108 MG/DL (ref 70–99)
GLUCOSE BLD-MCNC: 122 MG/DL (ref 70–99)
GLUCOSE BLD-MCNC: 204 MG/DL (ref 70–99)
PERFORMED ON: ABNORMAL
PERFORMED ON: ABNORMAL
POTASSIUM SERPL-SCNC: 4.4 MMOL/L (ref 3.5–5.1)
SODIUM BLD-SCNC: 132 MMOL/L (ref 136–145)

## 2019-05-16 PROCEDURE — 2580000003 HC RX 258: Performed by: INTERNAL MEDICINE

## 2019-05-16 PROCEDURE — 99232 SBSQ HOSP IP/OBS MODERATE 35: CPT | Performed by: INTERNAL MEDICINE

## 2019-05-16 PROCEDURE — 6370000000 HC RX 637 (ALT 250 FOR IP): Performed by: INTERNAL MEDICINE

## 2019-05-16 PROCEDURE — 36415 COLL VENOUS BLD VENIPUNCTURE: CPT

## 2019-05-16 PROCEDURE — 80048 BASIC METABOLIC PNL TOTAL CA: CPT

## 2019-05-16 PROCEDURE — 99238 HOSP IP/OBS DSCHRG MGMT 30/<: CPT | Performed by: INTERNAL MEDICINE

## 2019-05-16 RX ORDER — TORSEMIDE 20 MG/1
20 TABLET ORAL DAILY
Status: DISCONTINUED | OUTPATIENT
Start: 2019-05-16 | End: 2019-05-16 | Stop reason: HOSPADM

## 2019-05-16 RX ORDER — METOLAZONE 2.5 MG/1
2.5 TABLET ORAL
Status: DISCONTINUED | OUTPATIENT
Start: 2019-05-20 | End: 2019-05-16 | Stop reason: HOSPADM

## 2019-05-16 RX ORDER — TORSEMIDE 20 MG/1
20 TABLET ORAL DAILY
DISCHARGE
Start: 2019-05-16 | End: 2019-06-18 | Stop reason: DRUGHIGH

## 2019-05-16 RX ORDER — ISOSORBIDE DINITRATE 10 MG/1
10 TABLET ORAL 2 TIMES DAILY
Status: ON HOLD | DISCHARGE
Start: 2019-05-16 | End: 2019-07-11 | Stop reason: HOSPADM

## 2019-05-16 RX ORDER — POTASSIUM CHLORIDE 20 MEQ/1
40 TABLET, EXTENDED RELEASE ORAL
DISCHARGE
Start: 2019-05-16 | End: 2019-06-24 | Stop reason: ALTCHOICE

## 2019-05-16 RX ORDER — METOLAZONE 2.5 MG/1
2.5 TABLET ORAL WEEKLY
Status: ON HOLD | DISCHARGE
Start: 2019-05-16 | End: 2019-07-11 | Stop reason: HOSPADM

## 2019-05-16 RX ORDER — LINEZOLID 600 MG/1
600 TABLET, FILM COATED ORAL EVERY 12 HOURS SCHEDULED
Qty: 16 TABLET | Refills: 0 | DISCHARGE
Start: 2019-05-16 | End: 2019-05-24

## 2019-05-16 RX ORDER — HYDRALAZINE HYDROCHLORIDE 10 MG/1
20 TABLET, FILM COATED ORAL EVERY 12 HOURS SCHEDULED
Status: ON HOLD | DISCHARGE
Start: 2019-05-16 | End: 2019-07-11 | Stop reason: HOSPADM

## 2019-05-16 RX ORDER — SPIRONOLACTONE 25 MG/1
25 TABLET ORAL DAILY
Status: ON HOLD | DISCHARGE
Start: 2019-05-17 | End: 2019-07-11 | Stop reason: HOSPADM

## 2019-05-16 RX ADMIN — FERROUS SULFATE TAB 325 MG (65 MG ELEMENTAL FE) 325 MG: 325 (65 FE) TAB at 08:26

## 2019-05-16 RX ADMIN — Medication 125 MG: at 08:25

## 2019-05-16 RX ADMIN — MULTIPLE VITAMINS W/ MINERALS TAB 1 TABLET: TAB at 08:26

## 2019-05-16 RX ADMIN — ALLOPURINOL 100 MG: 100 TABLET ORAL at 08:26

## 2019-05-16 RX ADMIN — TORSEMIDE 20 MG: 20 TABLET ORAL at 11:35

## 2019-05-16 RX ADMIN — Medication 10 ML: at 08:26

## 2019-05-16 RX ADMIN — COLLAGENASE SANTYL: 250 OINTMENT TOPICAL at 08:24

## 2019-05-16 RX ADMIN — OXYCODONE HYDROCHLORIDE AND ACETAMINOPHEN 500 MG: 500 TABLET ORAL at 08:25

## 2019-05-16 RX ADMIN — FAMOTIDINE 20 MG: 20 TABLET, FILM COATED ORAL at 08:25

## 2019-05-16 RX ADMIN — LINEZOLID 600 MG: 600 TABLET, FILM COATED ORAL at 08:25

## 2019-05-16 RX ADMIN — TAMSULOSIN HYDROCHLORIDE 0.4 MG: 0.4 CAPSULE ORAL at 08:25

## 2019-05-16 RX ADMIN — POTASSIUM CHLORIDE 40 MEQ: 1500 TABLET, EXTENDED RELEASE ORAL at 11:35

## 2019-05-16 RX ADMIN — SPIRONOLACTONE 25 MG: 25 TABLET ORAL at 08:25

## 2019-05-16 RX ADMIN — APIXABAN 2.5 MG: 5 TABLET, FILM COATED ORAL at 08:25

## 2019-05-16 RX ADMIN — METOPROLOL SUCCINATE 50 MG: 50 TABLET, EXTENDED RELEASE ORAL at 08:26

## 2019-05-16 RX ADMIN — ASPIRIN 81 MG: 81 TABLET, COATED ORAL at 08:26

## 2019-05-16 RX ADMIN — ISOSORBIDE DINITRATE 10 MG: 10 TABLET ORAL at 08:25

## 2019-05-16 RX ADMIN — HYDRALAZINE HYDROCHLORIDE 20 MG: 10 TABLET, FILM COATED ORAL at 08:26

## 2019-05-16 RX ADMIN — POTASSIUM CHLORIDE 40 MEQ: 1500 TABLET, EXTENDED RELEASE ORAL at 08:25

## 2019-05-16 NOTE — DISCHARGE SUMMARY
55mmHg     Pulmonic valve - mild regurgitation    Discharge Medications     Medication List      START taking these medications    hydrALAZINE 10 MG tablet  Commonly known as:  APRESOLINE  Take 2 tablets by mouth every 12 hours     isosorbide dinitrate 10 MG tablet  Commonly known as:  ISORDIL  Take 1 tablet by mouth 2 times daily     linezolid 600 MG tablet  Commonly known as:  ZYVOX  Take 1 tablet by mouth every 12 hours for 8 days     metolazone 2.5 MG tablet  Commonly known as:  ZAROXOLYN  Take 1 tablet by mouth once a week On mondays     spironolactone 25 MG tablet  Commonly known as:  ALDACTONE  Take 1 tablet by mouth daily  Start taking on:  5/17/2019     torsemide 20 MG tablet  Commonly known as:  DEMADEX  Take 1 tablet by mouth daily     vancomycin 50 mg/mL oral solution  Commonly known as:  VANCOCIN  Take 2.5 mLs by mouth every 12 hours for 14 days        CHANGE how you take these medications    apixaban 2.5 MG Tabs tablet  Commonly known as:  ELIQUIS  Take 1 tablet by mouth 2 times daily  What changed:    · medication strength  · how much to take     metoprolol succinate 50 MG extended release tablet  Commonly known as:  TOPROL XL  Take 1 tablet by mouth daily  What changed:  when to take this     potassium chloride 20 MEQ extended release tablet  Commonly known as:  KLOR-CON M  Take 2 tablets by mouth 3 times daily (with meals)  What changed:    · how much to take  · when to take this        CONTINUE taking these medications    ACCU-CHEK COMPACT CARE KIT Kit  1 kit by Does not apply route daily     ACCU-CHEK MULTICLIX LANCETS Misc  USE ONE  TO CHECK GLUCOSE ONCE DAILY     acetaminophen 325 MG tablet  Commonly known as:  TYLENOL     allopurinol 100 MG tablet  Commonly known as:  ZYLOPRIM     aspirin 81 MG EC tablet  Commonly known as:  ASPIRIN LOW DOSE  TAKE ONE TABLET BY MOUTH ONCE DAILY     atorvastatin 80 MG tablet  Commonly known as:  LIPITOR  Take 1 tablet by mouth nightly     blood glucose test strips strip  Commonly known as:  FREESTYLE TEST STRIPS  1 each by In Vitro route daily As needed. collagenase 250 UNIT/GM ointment     famotidine 20 MG tablet  Commonly known as:  PEPCID  Take 1 tablet by mouth 2 times daily One tab daily before bed     ferrous sulfate 325 (65 Fe) MG tablet     fluticasone 50 MCG/ACT nasal spray  Commonly known as:  FLONASE     gabapentin 300 MG capsule  Commonly known as:  NEURONTIN     lactulose 10 GM/15ML solution  Commonly known as:  CHRONULAC  Take 30 mLs by mouth 2 times daily as needed (constipation)     Magnesium Oxide 200 MG Tabs     Melatonin 10 MG Tabs     tamsulosin 0.4 MG capsule  Commonly known as:  FLOMAX  Take 1 capsule by mouth daily     therapeutic multivitamin-minerals tablet     vitamin C 500 MG tablet  Commonly known as:  ASCORBIC ACID        STOP taking these medications    Balsam [place] Oil Oint     miconazole 2 % powder  Commonly known as:  MICOTIN     sennosides-docusate sodium 8.6-50 MG tablet  Commonly known as:  SENOKOT-S     sodium bicarbonate 650 MG tablet     vancomycin in sterile water injection-oral sweetener syrup           Where to Get Your Medications      Information about where to get these medications is not yet available    Ask your nurse or doctor about these medications  · apixaban 2.5 MG Tabs tablet  · hydrALAZINE 10 MG tablet  · isosorbide dinitrate 10 MG tablet  · linezolid 600 MG tablet  · metolazone 2.5 MG tablet  · potassium chloride 20 MEQ extended release tablet  · spironolactone 25 MG tablet  · torsemide 20 MG tablet  · vancomycin 50 mg/mL oral solution           Discharged in stable condition to [facility]. Follow Up: Follow up with physician at [facility].           America Del Castillo MD  5/16/19

## 2019-05-16 NOTE — DISCHARGE INSTR - COC
Continuity of Care Form    Patient Name: Ralph Hernandez   :  1960  MRN:  [id number]    Admit date:  5/3/2019  Discharge date:  19    Code Status Order: Full Code   Advance Directives:   [address] Directive Type of Healthcare Directive Copy in [address] Agent's Name Healthcare Agent's Phone Number    19 Tisha Thomason  --  --  --  Healthcare power of   --  --    19 1837  Yes, patient has an advance directive for healthcare treatment  --  Yes, copy in chart  --  Samuel Sanders   --          Admitting Physician:  Ute Guzmán MD  PCP: Fabiola Wallace DO    Discharging Nurse: [facility] Unit/Room#: /0282-41  Discharging Unit Phone Number: [phone]    Emergency Contact:   Extended Emergency Contact Information  Primary Emergency Contact: Katie Calero  Address: [address] Phone: [phone]  Relation: Other  Secondary Emergency Contact: Martha Coto   [place]  Relation: Other    Past Surgical History:  Past Surgical History:   Procedure Laterality Date    CARDIAC CATHETERIZATION  2018    Dr. Christina Bear Left 2014    Dr. Anny Suarez - w/trigger finger (3rd) & trigger thumb release    CARPAL TUNNEL RELEASE Right 2015    Dr. Kerry Valera N/A 2018    Dr. Cyn Boyle - decompressive corpectomy C6 (>90%), microdissection, anterior cervical arthrodesis w/PEEK allograft, placement of corpectomy cage & Synthes plate J7-4    COLONOSCOPY  2015    Dr. Kimmy gillis-diverticulosis, transverse colon adenomatous polyps    CORONARY ARTERY BYPASS GRAFT  2018    Dr. Brenden Li  2019    cystoscopy/bladder biopsy f/c exchange    CYSTOSCOPY N/A 2019    CYSTOSCOPY performed by Donna Lopez MD at [facility] Bilateral 02/27/2014    Dr. Suarez Sa POLYSOMNOGRAPHY  04/12/2016    Dr. Mari Ferrell. [facility]SA Muniz w[facility] Health    AZ CABG, ARTERIAL, THREE N/A 12/4/2018    Dr. Juan Lamb - x3 (LIMA-LAD, BL SVG-OM, SVG-PDA)    RECONSTRUCTIVE REPAIR STERNAL N/A 12/20/2018    Dr. Juan Lamb - I&D of sternum w/placement of wound VAC    TRANSESOPHAGEAL ECHOCARDIOGRAM  12/04/2018    during CABG    TUNNELED VENOUS CATHETER PLACEMENT Right 12/24/2018    Dr. Rebekah Trujillo - PICC via IJ       Immunization History: There is no immunization history on file for this patient.     Active Problems:  Patient Active Problem List   Diagnosis Code    Anoxic brain injury ([facility] [address] G93.1    HTN (hypertension) I10    Carpal tunnel syndrome G56.00    Obstructive sleep apnea syndrome G47.33    Depression F32.9    Gout M10.9    ACS (acute coronary syndrome) ([facility]) I24.9    Gait disturbance R26.9    CKD (chronic kidney disease) stage 3, GFR 30-59 ml/min ([facility]) N18.3    Abnormal echocardiogram R93.1    Coronary artery disease involving native coronary artery of native heart without angina pectoris I25.10    Abnormal stress test R94.39    NSTEMI (non-ST elevated myocardial infarction) ([facility]) I21.4    Ischemic cardiomyopathy I25.5    Acute on chronic systolic heart failure ([facility]) I50.23    Acute renal failure with acute tubular necrosis superimposed on stage 3 chronic kidney disease ([facility]) N17.0, N18.3    Spinal cord ischemia ([facility]) G95.11    DM (diabetes mellitus), secondary, uncontrolled, w/neurologic complic ([facility]) G28.96, D71.99    Cervical spinal cord compression ([facility]) G95.20    Postoperative infection of wound of sternum T81.49XA    Serratia infection A49.8    Gram-negative bacteremia R78.81    Acute respiratory failure with hypoxia ([facility]) J96.01    Sepsis ([facility]) A41.9    Prolonged QT interval R94.31    Status post aorto-coronary artery bypass graft Z95.1    History of cardiac arrest Z86.74    Obesity, Class II, BMI 35-39.9 E66.9    Cervical myelopathy (HCC) G95.9    Lesion of bladder N32.9    Recurrent UTI N39.0    Anasarca R60.1    PAF (paroxysmal atrial fibrillation) (HCC) I48.0    Benign essential HTN I10    CAMI (acute kidney injury) (HCC) N17.9    Hypervolemia E87.70    Positive culture finding R89.5    Colitis due to Clostridium difficile A04.72       Isolation/Infection:   Isolation          C Diff Contact        Patient Infection Status     Infection Encounter Level?  Onset Date Added Added By Resolved Resolved By Review Date    MRSA No 05/09/19 05/12/19 Culture blood #1       C-diff (Clostridium difficile) Yes 05/09/19 05/09/19 Clostridium difficile toxin/antigen   05/16/19          Nurse Assessment:  Last Vital Signs: /87   Pulse 93   Temp 97 °F (36.1 °C) (Oral)   Resp 16   Ht 5' 10\" (1.778 m)   Wt 206 lb 3.2 oz (93.5 kg)   SpO2 100%   BMI 29.59 kg/m²     Last documented pain score (0-10 scale): Pain Level: 3  Last Weight:   Wt Readings from Last 1 Encounters:   05/16/19 206 lb 3.2 oz (93.5 kg)     Mental Status:  oriented and alert, forgetful at times  IV Access:  - None    Nursing Mobility/ADLs:  Walking   Assisted  Transfer  Assisted  Bathing  Assisted  Dressing  Assisted  Toileting  Assisted  Feeding  Independent  Med Admin  Independent  Med Delivery   whole    Wound Care Documentation and Therapy:  Negative Pressure Wound Therapy Chest Anterior;Medial (Active)   Number of days: 146       Incision 11/26/18 Groin Right (Active)   Number of days: 170       Incision 12/16/18 Neck (Active)   Number of days: 150       Wound 12/22/18 Coccyx Mid Ishmael Ulcer-Dark puprle with left edge partial open 1cm (Active)   Number of days: 144       Wound 12/26/18 Sternum Mid Midline surgical non-healing from 12/4/18 (Active)   Number of days: 140       Wound 05/03/19 Sternum Mid healing incision  (Active)   Wound Other 5/15/2019  8:45 PM   Dressing Status Other (Comment) 5/15/2019  8:45 PM Dressing/Treatment Open to air 5/16/2019  8:15 AM   Wound Assessment Clean;Dry; Intact 5/16/2019  8:15 AM   Drainage Amount None 5/16/2019  8:15 AM   Odor None 5/16/2019  8:15 AM   Margins Attached edges 5/16/2019  8:15 AM   [place]-wound Assessment Clean;Dry; Intact; Pink 5/16/2019  8:15 AM   Number of days: 12       Wound 05/03/19 Coccyx Mid stage II to coccyx (Active)   Wound Pressure Stage  2 5/15/2019  8:45 PM   Dressing Status Clean;Dry; Intact 5/16/2019  8:15 AM   Dressing Changed Changed/New 5/16/2019  8:15 AM   Dressing/Treatment Foam;Collagen 5/16/2019  8:15 AM   Dressing Change Due 05/17/19 5/15/2019 10:25 AM   Wound Length (cm) 1 cm 5/13/2019  4:05 PM   Wound Width (cm) 0.5 cm 5/13/2019  4:05 PM   Wound Surface Area (cm^2) 0.5 cm^2 5/13/2019  4:05 PM   Wound Assessment Intact; Other (Comment) 5/16/2019  8:15 AM   Drainage Amount None 5/16/2019  8:15 AM   Odor None 5/16/2019  8:15 AM   Margins Defined edges 5/16/2019  8:15 AM   [place]-wound Assessment Dry;Pink; Intact 5/16/2019  8:15 AM   [place]%Wound Bed 100 5/15/2019 10:25 AM   Red%Wound Bed 100 5/13/2019  9:47 AM   Number of days: 12       Wound 05/10/19 Heel Lateral;Left Nonblanching, purple, 0.5x0.5cm (Active)   Wound Image   5/13/2019  4:05 PM   Wound Deep tissue/Injury 5/15/2019  8:45 PM   Dressing Status Clean;Dry; Intact 5/16/2019  8:15 AM   Dressing Changed Dressing reinforced 5/13/2019 10:20 PM   Dressing/Treatment Foam 5/15/2019 10:25 AM   Dressing Change Due 05/16/19 5/15/2019 10:25 AM   Wound Length (cm) 2 cm 5/13/2019  4:05 PM   Wound Width (cm) 2 cm 5/13/2019  4:05 PM   Wound Surface Area (cm^2) 4 cm^2 5/13/2019  4:05 PM   Change in Wound Size % (l*w) -1500 5/13/2019  4:05 PM   Wound Assessment Intact; Light purple;Blood filled blister 5/15/2019 10:25 AM   Drainage Amount Scant 5/15/2019  8:45 PM   Drainage Description Serosanguinous 5/15/2019  8:45 PM   Odor None 5/16/2019  8:15 AM   Margins Attached edges; Defined edges 5/16/2019  8:15 AM   [place]-wound Assessment Intact; Blanchable erythema 5/16/2019  8:15 AM   Purple%Wound Bed 100 5/13/2019  4:05 PM   Number of days: 5       Wound 05/13/19 Tibial Anterior;Mid;Left (Active)   Wound Image   5/13/2019  4:05 PM   Wound Traumatic 5/15/2019  8:45 PM   Dressing Status Clean;Dry; Intact 5/16/2019  8:15 AM   Dressing Changed Dressing reinforced 5/15/2019  8:45 PM   Wound Cleansed Wound cleanser 5/13/2019 10:20 PM   Dressing Change Due 05/16/19 5/13/2019  4:05 PM   Wound Length (cm) 1 cm 5/13/2019  4:05 PM   Wound Width (cm) 0.5 cm 5/13/2019  4:05 PM   Wound Surface Area (cm^2) 0.5 cm^2 5/13/2019  4:05 PM   Wound Assessment Red 5/15/2019  8:45 PM   Drainage Amount Scant 5/15/2019  8:45 PM   Drainage Description Serous 5/15/2019  8:45 PM   Margins Attached edges; Defined edges 5/16/2019  8:15 AM   Red%Wound Bed 100 5/13/2019  4:05 PM   Number of days: 2        Elimination:  Continence:   · Bowel: Yes  · Bladder: Robin  Urinary Catheter: Insertion Date: 5/9   Colostomy/Ileostomy/Ileal Conduit: No       Date of Last BM: 5/16    Intake/Output Summary (Last 24 hours) at 5/16/2019 1022  Last data filed at 5/16/2019 0932  Gross per 24 hour   Intake 858 ml   Output 1175 ml   Net -317 ml     I/O last 3 completed shifts: In: [address] [facility] [P.O.:998]  Out: [address] [facility] [Urine:1175]    Safety Concerns: At Risk for Falls    Impairments/Disabilities:      None    Nutrition Therapy:  Current Nutrition Therapy:   - Oral Diet:  Cardiac, Low Sodium (2gm) and 1800mL daily fluid restriction    Routes of Feeding: Oral  Liquids: Thin Liquids  Daily Fluid Restriction: yes - amount 1800  Last Modified Barium Swallow with Video (Video Swallowing Test): not done    Treatments at the Time of Hospital Discharge:   Respiratory Treatments:   Oxygen Therapy:  is not on home oxygen therapy.   Ventilator:    - No ventilator support    Rehab Therapies: Physical Therapy and Occupational Therapy  Weight Bearing Status/Restrictions: No weight bearing restirctions  Other Medical Equipment (for information only, NOT a DME order):  walker  Other Treatments:     Patient's personal belongings (please select all that are sent with patient):  Glasses    RN SIGNATURE:  Electronically signed by Catarino Mcburney, RN on 5/16/19 at 11:17 AM    CASE MANAGEMENT/SOCIAL WORK SECTION    Inpatient Status Date: 5/3/19    Readmission Risk Assessment Score:  Readmission Risk              Risk of Unplanned Readmission:        40           Discharging to Facility/ Agency   · Name: [facility]  · Phone: [phone]  · WNK:[phone]      / signature: Electronically signed by Lyn Jefferson RN on 5/16/19 at 10:42 AM    PHYSICIAN SECTION    Prognosis: Good    Condition at Discharge: Stable    Rehab Potential (if transferring to Rehab): Good    Recommended Labs or Other Treatments After Discharge:     Physician Certification: I certify the above information and transfer of Tabitha Blanco  is necessary for the continuing treatment of the diagnosis listed and that he requires [facility] for less 30 days.      Update Admission H&P: No change in H&P    PHYSICIAN SIGNATURE:  Electronically signed by America Del Castillo MD on 5/16/19 at 10:23 AM

## 2019-05-16 NOTE — CARE COORDINATION
DISCHARGE ORDER  Date/Time 2019 10:57 AM  Completed by: Ciaran Marinelli, Case Management    Patient Name: Ana Laura Cardoza    : 1960  Admitting Diagnosis: Anasarca [R60.1]  Admit Date/Time: 5/3/2019  1:42 PM    Noted discharge order. Confirmed discharge plan with patient / family (Amy Thomas): Yes   Discharge Plan: Order for dc noted. Spoke with pt who now states will return to [facility]. Pt states Litzy Kern is DPOA and writer to call her to confirm dc plan. Spoke with Litzy Kern who also confirms pt is to return to [facility] for rehab. Spoke with Tyra at [facility] who states can accept today. Chart reviewed and no other dc needs identified. Discharge orders and Continuity of Care faxed to facility: Yes  Hospital Exemption Notification System complete: No-LTC  Transportation arranged: Yes - [facility]  Pick-up @ 14:001. Patient / Family (Amy Thomas) aware of  time: Yes   Nursing aware of  time: Yes  Receiving facility aware of  time: Yes  Pre-cert obtained?   N/A    ADDENDUM:  16:00 Spoke with Stacey Urbina at [facility] as MD request writer fax updated dc inst as Vanrobyn was not included on original dc inst. Per Tyra can fax updated inst.

## 2019-05-16 NOTE — PROGRESS NOTES
ID Follow-up NOTE    CC: admitted with CC of abdominal pain. Subjective:     Patient denies nausea, abdominal pain or diarrhea, cough, SOB, fever; family member says patient had C diff at [facility]. Feels better. Says his appetite is good. Objective:     Patient Vitals for the past 24 hrs:   BP Temp Temp src Pulse Resp SpO2 Weight   19 0815 123/87 97 °F (36.1 °C) Oral 93 16 100 % --   19 0803 -- -- -- -- -- -- 206 lb 3.2 oz (93.5 kg)   19 0435 118/80 96.9 °F (36.1 °C) Oral 90 16 96 % --   05/15/19 2014 118/80 97.2 °F (36.2 °C) Oral 95 16 100 % --   05/15/19 1545 107/76 97.6 °F (36.4 °C) Oral 85 16 99 % --     Temp (24hrs), Av.2 °F (36.2 °C), Min:96.9 °F (36.1 °C), Max:97.6 °F (36.4 °C)          EXAM:  General: alert appropriate afebrile      LUNGS: Resp unlabored; clear to auscultation     CV: RR s M     ABD: soft, non-tender, without mass     EXT: superficial pressure ulcer L heel does not look infected. skin: no rash   Presacral decubitus very superficial not infected appearing     Data Review:    Lab Results   Component Value Date    WBC 11.3 (H) 2019    HGB 11.3 (L) 2019    HCT 35.3 (L) 2019    MCV 82.9 2019     2019     Lab Results   Component Value Date    CREATININE 2.3 (H) 2019    BUN 50 (H) 2019     (L) 2019    K 4.4 2019    CL 93 (L) 2019    CO2 26 2019     Lab Results   Component Value Date    ALT 10 2019    AST 21 2019    ALKPHOS 109 2019    BILITOT 0.5 2019         MICRO: 1/2 of blood cultures drawn on  positive for MRSA; stool for C diff toxin +; urine culture positive for enterococcus.   IMAGING: CXR : R pleural effusion     Assessment:     Principal Problem:    Acute on chronic systolic heart failure (HCC)  Active Problems:    Coronary artery disease involving native coronary artery of native heart without angina pectoris    Prolonged QT interval    Anasarca    PAF (paroxysmal atrial fibrillation) (HCC)    Benign essential HTN    CAMI (acute kidney injury) ([facility] [address]    Hypervolemia    Positive culture finding    Colitis due to Clostridium difficile  Resolved Problems:    * No resolved hospital problems. *  mild leukocytosis  Diarrhea due to C diff resolving: this was apparently a relapse of C diff that the patient had at the [facility]. Creat 2.3  1/2 blood cultures positive for MRSA source uncertain; follow up blood cultures NGSF; transient MRSA bacteremia could have been from a peripheral IV which has been removed. Pressure ulcer over L heel does not look infected: ie, does not seem like a likely source of bacteremia  Has superficial pressure to pre sacral area which likewise did not look infected to be at time of consult. I examined it today and it continues not to appear infected. Quite superficial ulceration. Plan: Will need a total of two weeks of anti-MRSA antibiotic therapy in total (has had 6 days so far), so I would favor continuing linezolid for 8 more days. Will switch Dificid back to po vancomycin 125 mg bid. Would probably continue that for roughly two more weeks.

## 2019-05-16 NOTE — PROGRESS NOTES
Called [facility] to give report. Nurse currently busy, number given at this time for nurse to call when available.

## 2019-05-16 NOTE — FLOWSHEET NOTE
05/07/19 2103   Vital Signs   Temp 98.9 °F (37.2 °C)   Temp Source Oral   Pulse 96   Heart Rate Source Monitor   Resp 18   BP (!) 131/91   BP Location Left lower arm   Level of Consciousness 0   MEWS Score 1   Patient Currently in Pain Denies   Oxygen Therapy   SpO2 94 %   O2 Device None (Room air)   Shift assessment complete, see flow sheets. Patient is alert and oriented,Pain assessed and no c/o and appears to be in no distress at this time. Vital signs stable, call light within reach, will continue to monitor.
05/13/19 0800   Vital Signs   Temp 96.6 °F (35.9 °C)   Temp Source Oral   Pulse 88   Resp 16   BP (!) 126/94   BP Location Right upper arm   Level of Consciousness 0   MEWS Score 1   Patient Currently in Pain Denies   Pain Assessment   Pain Assessment 0-10   Pain Level 0   Oxygen Therapy   SpO2 97 %   O2 Device None (Room air)     Shift assessment completed, see flow sheet. Morning medications given- demadex and aldactone held. Pt a/o x 3, easily reoriented- denies any pain or shortness of breath. NSR on monitor. Respirations are easy, even, and unlabored. Bilateral lung sounds clear, but diminished in bases. +1 pitting edema to BLE. PIV WNL. Robin in place and patent with STAT lock. Pt assisted up to [facility] and back to bed- no other needs expressed at this time. Call light and bedside table within reach. Bed alarm on. Will continue to monitor.
05/14/19 0844   Vital Signs   Temp 96.3 °F (35.7 °C)   Temp Source Oral   Pulse 87   Resp 16   /82   BP Location Right upper arm   Patient Position Semi fowlers   Level of Consciousness 0   MEWS Score 1   Patient Currently in Pain Yes   Pain Assessment   Pain Assessment 0-10   Pain Location   (\"anus\")   Pain Descriptors Pressure   Pain Type Acute pain   Pain Level 8   Patient's Stated Pain Goal 2   Pain Radiating Towards does not radiate   Pain Frequency Intermittent   Pain Onset Gradual   Functional Pain Assessment Activities are not prevented   Non-Pharmaceutical Pain Intervention(s) Repositioned; Rest   Multiple Pain Sites No   Oxygen Therapy   SpO2 97 %   O2 Device None (Room air)     Shift assessment completed, see flow sheet. Morning medications given- demadex held per cardiology. Pt a/o x 4, forgetful, c/o 8/10 acute pain to \"anus\"- no hemorrhoids noted- repositioning and cream provided to bottom. NSR on moniotr. Respirations are easy, even, and unlabored. Bilateral lung sounds clear. +1 pitting edema to BLE. PIV WNL with potassium chloride infusing @ 100 mL/hr. Robin in place and patent with STAT lock. Patient assisted up to [facility], then to chair- no other needs expressed at this time. Call light and bedside table within reach. Bed alarm on. Will continue to monitor.
05/15/19 2014   Vital Signs   Temp 97.2 °F (36.2 °C)   Temp Source Oral   Pulse 95   Heart Rate Source Monitor   Resp 16   /80   BP Location Left upper arm   BP Upper/Lower Upper   Patient Position Sitting   Oxygen Therapy   SpO2 100 %   O2 Device None (Room air)   Shift assessment complete, see flow sheets. Patient is alert and oriented,Pain assessed and no c/o pain and appears to be in no distress. Vital signs stable, call light within reach, will continue to monitor. Bed alarm on and call light in reach.
Shift assessment complete, see flow sheets. Patient is alert and oriented,Pain assessed and no c/o pain and appears to be in no distress at this time. Vital signs stable, call light within reach, will continue to monitor.
Description Serosanguinous   Margins Attached edges; Defined edges   [place]-wound Assessment Intact; Blanchable erythema   Purple%Wound Bed 100   Wound 05/13/19 Tibial Anterior;Mid;Left   Date First Assessed/Time First Assessed: 05/13/19 1605   Present on Hospital Admission: (c)   Wound Approximate Age at First Assessment (Weeks): (c)   Primary Wound Type: Traumatic  Location: Tibial  Wound Location Orientation: Anterior;Mid;Left   Wound Image    Wound Traumatic  (unknown)   Dressing Status Clean;Dry; Intact   Dressing Changed Changed/New   Wound Cleansed Wound cleanser   Dressing Change Due 05/16/19   Wound Length (cm) 1 cm   Wound Width (cm) 0.5 cm   Wound Surface Area (cm^2) 0.5 cm^2   Wound Assessment Red   Drainage Amount Scant   Drainage Description Serous   Margins Attached edges; Defined edges   Red%Wound Bed 100     Wound care [place] - request to see patient with skin issues on the coccyx with resolving stage 3 wound, resolving sternal incision, left lateral heel with deep tissue pressure injury, and left pre-tibial area with unknown cause, likely abrasion. Pt denies any discomfort. Wounds dressed with Foam dressings. Pressure Injury Prevention and Wound Treatment interventions in place per Protocols. Pt verbalizes understanding of treatments and interventions. Will continue to follow as needed.     Thanks,  Michelle Pitts RN, [id number]

## 2019-05-16 NOTE — PLAN OF CARE
Problem: OXYGENATION/RESPIRATORY FUNCTION  Goal: Patient will maintain patent airway  5/16/2019 1143 by Casie Escobar RN  Outcome: Met This Shift  Note:             Patient's EF (Ejection Fraction) is less than 40%    Patient's weights and intake/output reviewed:    Patient's Last Weight: 196 lbs obtained by standing scale. Today's weight is noted to be 10lb more than last documented weight. Intake/Output Summary (Last 24 hours) at 5/16/2019 1143  Last data filed at 5/16/2019 0932  Gross per 24 hour   Intake 1098 ml   Output 1175 ml   Net -77 ml       Patient's current functional capacity:  No limitation of physical activity. Ordinary physical activity does not cause undue fatigue, palpitation, dyspnea. Pt up in chair at this time on room air. Pt denies shortness of breath. Pt with pitting lower extremity edema. Patient and family's stated goal of care: better understand heart failure and disease management and reduce lower extremity edema prior to discharge        Patient has a past medical history of Anoxic brain injury ([facility] [address] Cardiac arrest ([address] Clostridium difficile infection, Gout, Hyperlipidemia, Hypertension, MRSA (methicillin resistant staph aureus) culture positive, Reflux, S/P CABG x 3, Sleep apnea, and Type 2 diabetes mellitus without complication ([address] Comorbidities reviewed and education provided.     >>For CHF and Comorbidity documentation on Education Time and Topics, please see Education Tab        5/16/2019 0048 by Lima Woodward, RN  Outcome: Ongoing  Goal: Patient will achieve/maintain normal respiratory rate/effort  5/16/2019 1143 by Casie Escobar RN  Outcome: Met This Shift  5/16/2019 0048 by Lima Woodward RN  Outcome: Ongoing

## 2019-05-16 NOTE — PROGRESS NOTES
[address] Daily Progress Note      Admit Date:  5/3/2019    Reason for Consultation: acute HF    Subjective:  Mr. Zahira Flanagan is seen for follow up of CHF  He had diarrhea now has C diff colitis, but diarrhea has resolved. .3 months ago had same problem. Denies chest pain abdominal pain Today only complaint is neck and left arm pain musculosketal pain worse with movement has had for over 1 week. Overall feels good wants to go back to rehab. More energetic  His renal function is stable now still holding diuretics. BUN 50 Cr 2.3  He came with swelling and shortness of breath   He has CPAP AT home but not wearing it. CABG in [id number] complicated post op course including redo sternotomy and sternal repair. He was admitted this time from nursing home where he is refusing to do physical therapy as I was told by D/C calos Lyn. weight down 20lbs since admission. HPI on admission by my partner Dr. Africa De Santiago: Ibrahima Hernández is a 60 y.o. male that presents with abdominal pain. Patient has been in [address] for care after a bypass surgery X3, 12/2018. Patient describes waking up this morning with a bandlike feeling of numbness in his mid abdomen.   He does admit to being a little bit constipated,  [facility] had post op PEA and post op paroxysmal atrial fibrillation          ROS:  12 point ROS negative in all areas as listed below except as in [place]  Constitutional, EENT, Cardiovascular, pulmonary, GI, , Musculoskeletal, skin, neurological, hematological, endocrine, Psychiatric    Past Medical History:   Diagnosis Date    Anoxic brain injury ([facility] [address] 1994    Cardiac arrest ([facility] [address] 1994    Clostridium difficile infection 05/09/2019    Gout     Hyperlipidemia     Hypertension     MRSA (methicillin resistant staph aureus) culture positive 05/09/2019    bacteremia    Reflux     S/P CABG x 3 12/2018    Sleep apnea     Type 2 diabetes mellitus without complication [facility])      Past Surgical History:   Procedure Laterality Date    CARDIAC CATHETERIZATION  11/26/2018    Dr. Olivia Webster Left 03/25/2014    Dr. Ana Luisa Julien - w/trigger finger (3rd) & trigger thumb release    CARPAL TUNNEL RELEASE Right 04/14/2015    Dr. Alfred Croft N/A 12/16/2018    Dr. Gleda Duverney - decompressive corpectomy C6 (>90%), microdissection, anterior cervical arthrodesis w/PEEK allograft, placement of corpectomy cage & Synthes plate C5-3    COLONOSCOPY  09/01/2015    Dr. Oneal - gillis-diverticulosis, transverse colon adenomatous polyps    CORONARY ARTERY BYPASS GRAFT  12/14/2018    Dr. Mikle Duverney  04/05/2019    cystoscopy/bladder biopsy f/c exchange    CYSTOSCOPY N/A 4/5/2019    CYSTOSCOPY performed by Talia Aj MD at [facility] Bilateral 02/27/2014    Dr. Fidel Elias POLYSOMNOGRAPHY  04/12/2016    Dr. Neeraj Leal. [facility]    SC CABG, ARTERIAL, THREE N/A 12/4/2018    Dr. Kailyn Melendez - x3 (LIMA-LAD, BL SVG-OM, SVG-PDA)    RECONSTRUCTIVE REPAIR STERNAL N/A 12/20/2018    Dr. Kailyn Melendez - I&D of sternum w/placement of wound VAC    TRANSESOPHAGEAL ECHOCARDIOGRAM  12/04/2018    during CABG    TUNNELED VENOUS CATHETER PLACEMENT Right 12/24/2018    Dr. Brit Arguello - PICC via IJ       Objective:   /80   Pulse 90   Temp 96.9 °F (36.1 °C) (Oral)   Resp 16   Ht 5' 10\" (1.778 m)   Wt 196 lb 3.2 oz (89 kg)   SpO2 96%   BMI 28.15 kg/m²       Intake/Output Summary (Last 24 hours) at 5/16/2019 0501  Last data filed at 5/15/2019 2111  Gross per 24 hour   Intake 998 ml   Output 1375 ml   Net -377 ml       TELEMETRY: NSR    Physical Exam:comfortable at room air  General: No Respiratory distress, appears well developed and well nourished.    Eyes:  Sclera nonicteric  Nose/Sinuses:  negative findings: nose shows no deformity, asymmetry, or inflammation, nasal mucosa normal, septum midline with no perforation or bleeding  Back:  no pain to palpation  Joint:  no active joint inflammation  Musculoskeletal:  negative  Skin:  Warm and dry  Neck:  Negative for JVD and Carotid Bruits. Chest:  Clear  to auscultation, respiration easy  Cardiovascular:  Reg  S1S2 normal, no murmur, no rub or thrill.   Abdomen:  Soft normal liver and spleen  2+ bilat leg edema  Neuro: intact    Medications:    vancomycin  125 mg Oral 2 times per day    linezolid  600 mg Oral 2 times per day    potassium chloride  40 mEq Oral TID WC    apixaban  2.5 mg Oral BID    hydrALAZINE  20 mg Oral 2 times per day    isosorbide dinitrate  10 mg Oral BID    spironolactone  25 mg Oral Daily    collagenase   Topical Daily    melatonin  9 mg Oral Nightly    aspirin  81 mg Oral Daily    fluticasone  2 spray Each Nare Daily    gabapentin  300 mg Oral Nightly    atorvastatin  80 mg Oral Nightly    tamsulosin  0.4 mg Oral Daily    famotidine  20 mg Oral BID    allopurinol  100 mg Oral Daily    vitamin C  500 mg Oral Daily    ferrous sulfate  325 mg Oral Daily with breakfast    magnesium oxide  200 mg Oral Nightly    therapeutic multivitamin-minerals  1 tablet Oral Daily    metoprolol succinate  50 mg Oral Daily    sodium chloride flush  10 mL Intravenous 2 times per day    insulin lispro  0-6 Units Subcutaneous TID WC    insulin lispro  0-3 Units Subcutaneous Nightly      dextrose       potassium chloride **OR** potassium alternative oral replacement **OR** potassium chloride, hydrALAZINE, lactulose, acetaminophen, sodium chloride flush, magnesium hydroxide, ondansetron, glucose, dextrose, glucagon (rDNA), dextrose, magnesium sulfate    Lab Data:  CBC:   Recent Labs     05/13/19  0502   WBC 11.3*   HGB 11.3*   HCT 35.3*   MCV 82.9        BMP:   Recent Labs     05/13/19  0502 05/14/19  0511 05/15/19  0511   * 129* 125*   K 4.2 2.9* 3.9   CL 87* 87* 88*   CO2 30 28 25   BUN 52* 50* 51*   CREATININE 2.4* 2.3* 2.2*     LIVER PROFILE:   No results for input(s): AST, ALT, LIPASE, BILIDIR, BILITOT, ALKPHOS in the last 72 hours. Invalid input(s): AMYLASE,  ALB  PT/INR: No results for input(s): PROTIME, INR in the last 72 hours. APTT: No results for input(s): APTT in the last 72 hours. BNP:  No results for input(s): BNP in the last 72 hours. IMAGING:   I have reviewed the following tests and documented in this encounter as follows:     CXR 19  Right-sided pleural effusion. Stable cardiomegaly   FINDINGS: Status post median sternotomy. Stable cardiomegaly. Right-sided pleural effusion. No new focal pulmonary consolidation.      EKG 19  Normal sinus rhythmRightward axisIncomplete left bundle branch blockNonspecific T wave abnormalityProlonged QTAbnormal ECGWhen compared with ECG of 27-DEC-2018 09:24,Premature ventricular complexes are no longer PresentCriteria for Septal infarct are no longer PresentNonspecific T wave abnormality has replaced inverted T waves in Inferior leadsNonspecific T wave abnormality has replaced inverted T waves in Lateral leadsConfirmed by Jenny Ruiz MD, [address] ([id number]) on 2019 5:35:34      ECHO 5/3/19  Summary     Left ventricle - normal size and thickness, severely reduced function with   EF of 20%, severe diffuse hypokinesis   Right ventricle - reduced function   Mitral valve - mod-severe regurgitation   Tricuspid valve - moderate regurgitation with PASP of 55mmHg   Pulmonic valve - mild regurgitation    [id number] Sinus tach with short AT run, IVCD, possible septal infarct, PVC    2018   post-Op Diagnosis: Sternal Incision Infection      Procedure(s):  STERNUM INCISION AND DRAINAGE WITH WOUND VAC PLACEMENT          CABG 19  CORONARY ARTERY BYPASS GRAFTING X3, INTERNAL MAMMARY ARTERY, SAPHENOUS VEIN GRAFT, ON PUMP  LIMA to LAD reverse saphenous vein to OM reverse saphenous vein to PDA  Transesophageal echo  Endo-vein harvest right and left leg  Intercostal nerve block  Doppler assessment of graft      LEFT HEART CATH 18  LM: luminals, mild calcium  LAD: heavy prox calcification. Mid long section of disease with focal lesion up to 80%, distal with sequential lesions of up to 60-70%                Daig1- mid 90%, distal vessel bifurcation disease of 50%  LCX: luminals, distal native LCX is ecctretic and severely diseased fed by R->Lt collaterals.                 OM1- 50% mid ribbon like lesion                OM2- smaller vessel, mild diffuse disease <30%  RCA: dominant, mild-moderate diffuse disease and calcification, lesions up to 20%                PDA- focal eccentric 80%                = some Rt-->Lt collaterals   LVEDP: 22-25  LVEF: Not done due to CKD    AYAI/SBQN: 80/64/44    LV systolic function is mildly reduced with EF estimated from 40-45%.   More prominent septal hypokinesis is noted on certain views.   Left ventricular size is mildly increased.   There is mild concentric left ventricular hypertrophy.   Grade I diastolic dysfunction with normal filling pressure.   Aortic valve appears sclerotic but opens adequately.   Mild mitral regurgitation.   Inadequate tricuspid regurgitation to estimate systolic pulmonary artery   pressure.         STRESS TEST: 11/22/18 \"Myoview\" Summary  Moderate-large sized inferior, septal, and distal apical wall significant  partial reversibility defects consistent with mainly ischemia and some  infarction in the territory of the proximal to distal RCA and/or LAD. LV  function is moderately reduced with more prominent inferior hypokinesis and  ejection fraction of 37%. Higher risk abnormal study         CARDIAC CATH: 12/4/18 CORONARY ARTERY BYPASS GRAFTING X3         Assessment  1. Acute on chronic systolic CHF   2. One  + ve blood culture with M[place] ? contaminent  3. C diff colitis on treatment improving  4. Ischemic cardiomyopathy stable   5. parox afib now in sinus anticoagulated on low dose as he is also on asa for CAD  6. CKD III renal function improving with diuresis  7.  CAD s/p CABG complicated post op stable no angina    Plan  Redo echo for EF in 90 days if EF<35 will need to consider AICD for primary prevention see above results  Aldactone 25 mg daily, metolazone weekly torsemide daily  Daily weight  On nitrates and hydralazine for CHF with reduced EF  Due to CKD avoiding ace inhibitors arb arni  OK TO DISCHARGE discussed with Dr Cata Sullivan    Core Measures:  · Discharge instructions:   · LVEF documented: yes  · ACEI for LV dysfunction:  Listed allergy to enalapril  Will start nitrates and hydralazine  · Smoking Cessation:  · PT OT   I have spent 25  minutes of face to face time with the patient with more than 50% spent counseling and coordinating care for this patient    Anusha Collins MD 5/16/2019 5:01 AM

## 2019-05-16 NOTE — PROGRESS NOTES
[facility]   Cardiovascular Evaluation    PATIENT: Lowell Mobley  DATE: 2019  MRN: [id number]  CSN: [id number]  : 1960      Primary Care Doctor: Lenin Stokes DO  Reason for evaluation:   Follow-Up from Hospital and Coronary Artery Disease      Subjective:   History of present illness on initial date of evaluation:   Lowell Mobley is a 60 y.o. patient who presents for follow up s/p CABAG x3 on 18 by Dr. Mitchell Polo. Pt originally admitted 2018 for gait disturbance. During admission pt had abnormal lexiscan and was then transferred to [facility] where a LHC was preformed on 18. This showed severe multi-vessels CAD. Thus CABAG x3 was preformed. 18 STERNUM INCISION AND DRAINAGE WITH WOUND VAC PLACEMENT was preformed. He presents on a gurney. He is currently at the [facility] for rehab. He states he is feeling better. He states his breathing hasn't changed. He doesn't feel his heart racing anymore. He denies cp, dizziness or syncope. Today he presents for hospital follow up from 5/3/19. He presented to the hospital with abdominal pain. Cardiology was consulted for CHF. Echo on 5/3/19 shows EF 20%, R ventricle reduced function, mod-severe MR, moderate TR and mild NE. He presents in a wheel chair. Staff from the nursing home is present. He states he does have BLE edema. Patient denies chest pain, sob, palpitations, dizziness or syncope.          Patient Active Problem List   Diagnosis    [facility]oxic [facility] [address]    HTN (hypertension)    Carpal tunnel syndrome    Obstructive sleep apnea syndrome    Depression    Gout    ACS (acute coronary syndrome) ([facility])    Gait disturbance    CKD (chronic kidney disease) stage 3, GFR 30-59 ml/min ([facility])    Abnormal echocardiogram    Coronary artery disease involving native coronary artery of native heart without angina pectoris    Abnormal stress test    NSTEMI (non-ST elevated myocardial infarction) ([facility] [address]    Ischemic cardiomyopathy    Acute on chronic systolic heart failure (HCC)    Acute renal failure with acute tubular necrosis superimposed on stage 3 chronic kidney disease (HCC)    Spinal cord ischemia (HCC)    DM (diabetes mellitus), secondary, uncontrolled, w/neurologic complic (HCC)    Cervical spinal cord compression (HCC)    Postoperative infection of wound of sternum    Serratia infection    Gram-negative bacteremia    Acute respiratory failure with hypoxia (HCC)    Sepsis (HCC)    Prolonged QT interval    Status post aorto-coronary artery bypass graft    History of cardiac arrest    Obesity, Class II, BMI 35-39.9    Cervical myelopathy (HCC)    Lesion of bladder    Recurrent UTI    Anasarca    PAF [facility]paroxysmal atrial fibrillation) ([facility]    Benign essential HTN    CAMI (acute kidney injury) ([facility])    Hypervolemia    Positive culture finding    Colitis due to Clostridium difficile    Decreased cardiac function         Past Medical History:   has a past medical history of Anoxic brain injury ([facility] [address] Cardiac arrest ([facility] [address] Clostridium difficile infection, Gout, Hyperlipidemia, Hypertension, MRSA (methicillin resistant staph aureus) culture positive, Reflux, S/P CABG x 3, Sleep apnea, and Type 2 diabetes mellitus without complication ([facility] [address] Surgical History:   has a past surgical history that includes Colonoscopy (09/01/2015); Carpal tunnel release (Left, 03/25/2014); Carpal tunnel release (Right, 04/14/2015); pr cabg, arterial, three (N/A, 12/4/2018); Cervical disc arthroplasty (N/A, 12/16/2018); RECONSTRUCTIVE REPAIR STERNAL (N/A, 12/20/2018); Cardiac catheterization (11/26/2018); transesophageal echocardiogram (12/04/2018); Tunneled venous catheter placement (Right, 12/24/2018); polysomnography (04/12/2016); EMG (Bilateral, 02/27/2014); Coronary artery bypass graft (12/14/2018); Cystoscopy (04/05/2019); and Cystoscopy (N/A, 4/5/2019). Social History:   reports that he has never smoked. He has never used smokeless tobacco. He reports that he does not drink alcohol or use drugs. Family History:  No evidence for sudden cardiac death or premature CAD    Home Medications:  Reviewed and are listed in nursing record. and/or listed below  Current Outpatient Medications   Medication Sig Dispense Refill    isosorbide dinitrate (ISORDIL) 10 MG tablet Take 1 tablet by mouth 2 times daily      apixaban (ELIQUIS) 2.5 MG TABS tablet Take 1 tablet by mouth 2 times daily      hydrALAZINE (APRESOLINE) 10 MG tablet Take 2 tablets by mouth every 12 hours      metolazone (ZAROXOLYN) 2.5 MG tablet Take 1 tablet by mouth once a week On mondays      spironolactone (ALDACTONE) 25 MG tablet Take 1 tablet by mouth daily      torsemide (DEMADEX) 20 MG tablet Take 1 tablet by mouth daily      potassium chloride (KLOR-CON M) 20 MEQ extended release tablet Take 2 tablets by mouth 3 times daily (with meals)      linezolid (ZYVOX) 600 MG tablet Take 1 tablet by mouth every 12 hours for 8 days 16 tablet 0    acetaminophen (TYLENOL) 325 MG tablet Take 650 mg by mouth every 6 hours as needed for Pain      allopurinol (ZYLOPRIM) 100 MG tablet Take 100 mg by mouth daily      vitamin C (ASCORBIC ACID) 500 MG tablet Take 500 mg by mouth daily      ferrous sulfate 325 (65 Fe) MG tablet Take 325 mg by mouth daily (with breakfast)      Magnesium Oxide 200 MG TABS Take 1 tablet by mouth nightly      Multiple Vitamins-Minerals (THERAPEUTIC MULTIVITAMIN-MINERALS) tablet Take 1 tablet by mouth daily      collagenase 250 UNIT/GM ointment Apply 1 each topically nightly Apply topically daily.       famotidine (PEPCID) 20 MG tablet Take 1 tablet by mouth 2 times daily One tab daily before bed      lactulose (CHRONULAC) 10 GM/15ML solution Take 30 mLs by mouth 2 times daily as needed (constipation)  0    atorvastatin (LIPITOR) 80 MG tablet Take 1 tablet by mouth nightly 30 tablet 3    tamsulosin (FLOMAX) 0.4 MG capsule Take 1 capsule by mouth daily 30 capsule 3    metoprolol succinate (TOPROL XL) 50 MG extended release tablet Take 1 tablet by mouth daily 90 tablet 1    fluticasone (FLONASE) 50 MCG/ACT nasal spray 2 sprays by Each Nare route daily      gabapentin (NEURONTIN) 300 MG capsule Take 300 mg by mouth nightly. .      glucose blood VI test strips (FREESTYLE TEST STRIPS) strip 1 each by In Vitro route daily As needed. 100 each 3    aspirin (ASPIRIN LOW DOSE) 81 MG EC tablet TAKE ONE TABLET BY MOUTH ONCE DAILY 90 tablet 3    ACCU-CHEK MULTICLIX LANCETS MISC USE ONE  TO CHECK GLUCOSE ONCE DAILY 102 each 3    Blood Glucose Monitoring Suppl (ACCU-CHEK COMPACT CARE KIT) KIT 1 kit by Does not apply route daily 1 kit 0    Melatonin 10 MG TABS Take 10 mg by mouth nightly       vancomycin (VANCOCIN) 50 mg/mL oral solution Take 2.5 mLs by mouth every 12 hours for 14 days 70 mL 0     No current facility-administered medications for this visit. Allergies:  Enalapril; Nadolol; and Verapamil     Review of Systems:   A 14 point review of symptoms completed. Pertinent positives identified in the HPI, all other review of symptoms negative as below.     Objective:   PHYSICAL EXAM:    Vitals:    05/20/19 1014   BP: 110/76   Pulse: 98   SpO2: 100%    Weight: 208 lb (94.3 kg)     Wt Readings from Last 3 Encounters:   05/20/19 208 lb (94.3 kg)   05/16/19 206 lb 3.2 oz (93.5 kg)   04/04/19 199 lb (90.3 kg)         General Appearance:  Alert, cooperative, no distress, appears stated age   Head:  Normocephalic, atraumatic   Eyes:  PERRL, conjunctiva/corneas clear   Nose: Nares normal, no drainage or sinus tenderness   Throat: Lips, mucosa, and tongue normal   Neck: Supple, symmetrical, trachea midline, NL thyroid no carotid bruit or JVD   Lungs:   CTAB, respirations unlabored   Chest Wall:  No tenderness or deformity   Heart:  Regular rhythm and normal rate; S1, S2 are normal;   no murmur noted; no rub or gallop   Abdomen:   Soft, size and thickness, severely reduced function with   EF of 20%, severe diffuse hypokinesis  Right ventricle - reduced function  Mitral valve - mod-severe regurgitation  Tricuspid valve - moderate regurgitation with PASP of 55mmHg  Pulmonic valve - mild regurgitation     ECHO/MUGA: 73/81/33    LV systolic function is mildly reduced with EF estimated from 40-45%. More prominent septal hypokinesis is noted on certain views. Left ventricular size is mildly increased. There is mild concentric left ventricular hypertrophy. Grade I diastolic dysfunction with normal filling pressure. Aortic valve appears sclerotic but opens adequately. Mild mitral regurgitation. Inadequate tricuspid regurgitation to estimate systolic pulmonary artery   pressure. STRESS TEST: 11/22/18 \"Myoview\" Summary  Moderate-large sized inferior, septal, and distal apical wall significant  partial reversibility defects consistent with mainly ischemia and some  infarction in the territory of the proximal to distal RCA and/or LAD. LV  function is moderately reduced with more prominent inferior hypokinesis and  ejection fraction of 37%. Higher risk abnormal study       CARDIAC CATH: 12/4/18 CORONARY ARTERY BYPASS GRAFTING X3       LEFT HEART CATH 11/26/18  LM: luminals, mild calcium  LAD: heavy prox calcification. Mid long section of disease with focal lesion up to 80%, distal with sequential lesions of up to 60-70%                Daig1- mid 90%, distal vessel bifurcation disease of 50%  LCX: luminals, distal native LCX is ecctretic and severely diseased fed by R->Lt collaterals. OM1- 50% mid ribbon like lesion                OM2- smaller vessel, mild diffuse disease <30%  RCA: dominant, mild-moderate diffuse disease and calcification, lesions up to 20%                PDA- focal eccentric 80%                = some Rt-->Lt collaterals   LVEDP: 22-25  LVEF: Not done due to CKD        Assessment  1.  Severe multivessel CAD as above                - will likely need staged PCI, may be done as outpt  2. Lesions should be able to be treated by PCI but will need to see Cr response to today's dye load and assess future risk to staged PCI with regards to GAMALIEL. - would start with stagged PCI to mid diag and LAD first but continued GAMALIEL risk with this                - start ticagrelor now in addition to ASA. BB, statin. 3. Cont IVF for 4 hrs following cath, lasix to prevent fluid overload. Total dye: 54mL         VASCULAR/OTHER IMAGING:      Assessment and Plan   Ibrahima Hernández is a 60 y.o. male who presents today for the following problems:      Per last discharge summary    History of Present Illness/Acute Hospital Course:  61 yo M w/ Hx of cardiac arrest (1994) anoxic brain injury, HTN, HLD, FELIPE, DMII, CKD 3, admitted to [facility] with NSTEMI and underwent 3 vessel CABG on 12/4. He started having paresthesias in bilateral upper extremities (fingers 3-5) and weakness in bilateral lower extremities, and was found to have cervical spinal cord compression. He was transferred to [facility] for neurosurgical evaluation. Neurosurgery took him for cervical decompression with ACDF C5-7 w/ C6 corpectomy on 12/16/18 with J collar in place. Post-op course complicated by PEA arrest on 12/20. Patient also had sternotomy wound dehiscence requiring wound vac placement on 12/20, and Serratia marcescens bacteriemia. On 12/23, Patient developed intermitted A-fib with RVR. Patient now on coumadin, with Target INR 1.8-2.2. 1.  CAD: severe multi vessel CAD on 11/26/2018 cath   - s/p CABG 12/4/2018 LIMA-LAD, SVG-OM, SVG-PDA  2. Ischemic cardiomyopathy: 40-45%-->20%  3. CKD  4. HTN  5. HLD  6. Afib: in NSR today on exam   - seeing EP  7. Short term memory issues    MD Plan:  1. LVEF has fallen following CABG. Given above, ? Stress cardiomyopathy or tachycardia from afib or CAD   - sergey-myoview   - 20day monitor for afib burden and HR  2. Increase toprol to 75mg poqday  3. Increase torsemide to 40mg po qday until edema resolves   - Renal and BNP in 1 wk   - f/u Callie Ortiz NP in 2-3 wks  4. Limited echo for LVEF in 3 months  5. Cont ASA, lipitor, eliquis, toprol   - holding ACE/ARB, entresto given ? ACE allergy  6. Bring NH vitals to next visit (staff in room useless and had no pt vitals or info)         Patient Active Problem List   Diagnosis    Anoxic brain injury ([facility] [address]    HTN (hypertension)    Carpal tunnel syndrome    Obstructive sleep apnea syndrome    Depression    Gout    ACS (acute [facility] syndrome) [facility]    Gait disturbance    CKD (chronic kidney disease) stage 3, GFR 30-59 ml/min ([facility])    Abnormal echocardiogram    Coronary artery disease involving native coronary artery of native heart without angina pectoris    Abnormal stress test    NSTEMI (non-ST elevated myocardial infarction) ([facility] [address]    Ischemic cardiomyopathy    Acute on chronic systolic heart failure (HCC)    Acute renal failure with acute tubular necrosis superimposed on stage 3 chronic kidney disease ([facility] [address]    Spinal cord ischemia ([facility]    DM (diabetes mellitus), secondary, uncontrolled, w/neurologic complic ([facility] [address]    Cervical spinal cord compression ([facility])    Postoperative infection of wound of sternum    Serratia infection    Gram-negative bacteremia    Acute respiratory failure with hypoxia ([facility])    Sepsis ([facility])    Prolonged QT interval    Status post aorto-coronary artery bypass graft    History of cardiac arrest    Obesity, Class II, BMI 35-39.9    Cervical myelopathy ([facility]    Lesion of bladder    Recurrent UTI    Anasarca    PAF (paroxysmal atrial fibrillation) ([facility]    Benign essential HTN    CAMI (acute kidney injury) ([facility])    Hypervolemia    Positive culture finding    Colitis due to Clostridium difficile    Decreased cardiac function       Patient Plan:  1. Stress Test (Lexiscan)  2. Labs - TSH, Renal, BNP ( in 1 week)  3.  Referral to Nephrologist (Dr Kia Gonzalez)  4. Compression stocking (below the knee)  5. With next visit bring vitals and weights  6. Increase the torsemide to 40 mg daily until swelling goes down  7. 30 day event monitor  8. Follow up with NP Richard Anderson     It is a pleasure to assist in the care of Ricardo Forde. Please call with any questions. This note was scribed in the presence of DR. Bear HIGGINBOTHAM by Ryan Tran RN. The scribes documentation has been prepared under my direction and personally reviewed by me in its entirety. I confirm that the note above accurately reflects all work, treatment, procedures, and medical decision making performed by me. I, Dr. Kris Fisher MD, personally performed the services described in this documentation as scribed by Juan Antonio Madison in my presence, and it is both accurate and complete to the best of our ability.                Kris Fisher MD, [facility] Cardiologist  [facility]  [phone] [facility]  [phone] [address] [place] [address]  5/20/2019  10:32 AM

## 2019-05-16 NOTE — PROGRESS NOTES
Patient up to chair this AM. No s/s of distress noted. Shift assessment complete, see flow sheet. Denies needs at this time. Call light in reach. Will monitor.

## 2019-05-16 NOTE — PLAN OF CARE
Problem: OXYGENATION/RESPIRATORY FUNCTION  Goal: Patient will maintain patent airway  Outcome: Ongoing  Goal: Patient will achieve/maintain normal respiratory rate/effort  Description  Respiratory rate and effort will be within normal limits for the patient  Outcome: Ongoing     Problem: HEMODYNAMIC STATUS  Goal: Patient has stable vital signs and fluid balance  Outcome: Ongoing     Problem: FLUID AND ELECTROLYTE IMBALANCE  Goal: Fluid and electrolyte balance are achieved/maintained  Outcome: Ongoing     Problem: ACTIVITY INTOLERANCE/IMPAIRED MOBILITY  Goal: Mobility/activity is maintained at optimum level for patient  Outcome: Ongoing               Patient's EF (Ejection Fraction) is less than 40%    Patient's weights and intake/output reviewed:    Patient's Last Weight: 196.3 lbs obtained by standing scale. Today's weight is noted to be 196.3 less than last documented weight. Intake/Output Summary (Last 24 hours) at 5/16/2019 0048  Last data filed at 5/15/2019 2111  Gross per 24 hour   Intake 998 ml   Output 1375 ml   Net -377 ml       Patient's current functional capacity:  Marked limitation of physical activity. Comfortable at rest. Less than ordinary activity causes fatigue, palpitation, or dyspnea. Pt resting in bed at this time on room air. Pt with complaints of shortness of breath. Pt with pitting lower extremity edema. Patient and family's stated goal of care: reduce shortness of breath, increase activity tolerance and be more comfortable prior to discharge        Patient has a past medical history of Anoxic brain injury ([facility] [address] Cardiac arrest ([facility] [address] Clostridium difficile infection, Gout, Hyperlipidemia, Hypertension, MRSA (methicillin resistant staph aureus) culture positive, Reflux, S/P CABG x 3, Sleep apnea, and Type 2 diabetes mellitus without complication ([facility] Comorbidities reviewed and education provided.     >>For CHF and Comorbidity documentation on Education Time and Topics, please see Education Tab

## 2019-05-17 LAB — BLOOD CULTURE, ROUTINE: NORMAL

## 2019-05-18 LAB — BLOOD CULTURE, ROUTINE: NORMAL

## 2019-05-19 LAB — BLOOD CULTURE, ROUTINE: NORMAL

## 2019-05-20 ENCOUNTER — OFFICE VISIT (OUTPATIENT)
Dept: CARDIOLOGY CLINIC | Age: 59
End: 2019-05-20
Payer: MEDICAID

## 2019-05-20 VITALS
SYSTOLIC BLOOD PRESSURE: 110 MMHG | DIASTOLIC BLOOD PRESSURE: 76 MMHG | HEART RATE: 98 BPM | WEIGHT: 208 LBS | HEIGHT: 70 IN | BODY MASS INDEX: 29.78 KG/M2 | OXYGEN SATURATION: 100 %

## 2019-05-20 DIAGNOSIS — R09.89 DECREASED CARDIAC FUNCTION: ICD-10-CM

## 2019-05-20 DIAGNOSIS — I50.23 ACUTE ON CHRONIC SYSTOLIC CONGESTIVE HEART FAILURE (HCC): ICD-10-CM

## 2019-05-20 DIAGNOSIS — I48.0 PAF (PAROXYSMAL ATRIAL FIBRILLATION) (HCC): ICD-10-CM

## 2019-05-20 DIAGNOSIS — I25.10 CORONARY ARTERY DISEASE INVOLVING NATIVE CORONARY ARTERY OF NATIVE HEART WITHOUT ANGINA PECTORIS: Primary | ICD-10-CM

## 2019-05-20 DIAGNOSIS — N18.9 CHRONIC KIDNEY DISEASE, UNSPECIFIED CKD STAGE: ICD-10-CM

## 2019-05-20 DIAGNOSIS — R06.02 SOB (SHORTNESS OF BREATH): ICD-10-CM

## 2019-05-20 PROCEDURE — 99214 OFFICE O/P EST MOD 30 MIN: CPT | Performed by: INTERNAL MEDICINE

## 2019-05-20 NOTE — PATIENT INSTRUCTIONS
Patient Plan:  1. Stress Test (Lexiscan)  2. Labs - TSH, Renal, BNP ( in 1 week)  3. Referral to Nephrologist (Dr Ramiro Ray)  4. Compression stocking (below the knee)  5. With next visit bring vitals and weights  6. Increase the torsemide to 40 mg daily until swelling goes down  7. 30 day event monitor  8.  Follow up with NP Ebenezer Ford

## 2019-05-20 NOTE — LETTER
[address] Cardiology - [address] [facility] [address] [place] [address]  Phone: [phone]  Fax: [phone]    Melissa Ray MD        May 20, 2019     Chi Yo DO  [address] [facility] [address]  [address] [facility]    Patient: Oral Castaneda  MR Number: [id number]  YOB: 1960  Date of Visit: 2019    Dear Dr. Chi Yo:    Today I saw our mutual patient named above. Below are the relevant portions of my assessment and plan of care. If you have questions, please do not hesitate to call me. I look forward to following Dez Meier along with you. [address] [facility]   Cardiovascular Evaluation    PATIENT: Oral Castaneda  DATE: 2019  MRN: [id number]  CSN: [id number]  : 1960      Primary Care Doctor: Chi Yo DO  Reason for evaluation:   Follow-Up from Hospital and Coronary Artery Disease      Subjective:   History of present illness on initial date of evaluation:   Oral Castaneda is a 60 y.o. patient who presents for follow up s/p CABAG x3 on 18 by Dr. Thony Orozco. Pt originally admitted 2018 for gait disturbance. During admission pt had abnormal lexiscan and was then transferred to [facility] where a LHC was preformed on 18. This showed severe multi-vessels CAD. Thus CABAG x3 was preformed. 18 STERNUM INCISION AND DRAINAGE WITH WOUND VAC PLACEMENT was preformed. He presents on a gurney. He is currently at the [facility] for rehab. He states he is feeling better. He states his breathing hasn't changed. He doesn't feel his heart racing anymore. He denies cp, dizziness or syncope. Today he presents for hospital follow up from 5/3/19. He presented to the hospital with abdominal pain. Cardiology was consulted for CHF. Echo on 5/3/19 shows EF 20%, R ventricle reduced function, mod-severe MR, moderate TR and mild MO. He presents in a wheel chair. Staff from the nursing home is present. He states he does have BLE edema. Patient denies chest pain, sob, palpitations, dizziness or syncope. Patient Active Problem List   Diagnosis    Anoxic brain injury ([facility]    HTN (hypertension)    Carpal tunnel syndrome    Obstructive sleep apnea syndrome    Depression    Gout    ACS (acute coronary syndrome) ([facility])    Gait disturbance    CKD (chronic kidney disease) stage 3, GFR 30-59 ml/min ([facility])    Abnormal echocardiogram    Coronary artery disease involving native coronary artery of native heart without angina pectoris    Abnormal stress test    NSTEMI (non-ST elevated myocardial infarction) ([facility]    Ischemic cardiomyopathy    Acute on chronic systolic heart failure ([facility])    Acute renal failure with acute tubular necrosis superimposed on stage 3 chronic kidney disease ([facility])    Spinal cord ischemia ([facility])    DM (diabetes mellitus), secondary, uncontrolled, w/neurologic complic ([facility])    Cervical spinal cord compression ([facility])    Postoperative infection of wound of sternum    Serratia infection    Gram-negative bacteremia    Acute respiratory failure with hypoxia ([facility])    Sepsis ([facility])    Prolonged QT interval    Status post aorto-coronary artery bypass graft    History of cardiac arrest    Obesity, Class II, BMI 35-39.9    Cervical myelopathy ([facility])    Lesion of bladder    Recurrent UTI    Anasarca    PAF (paroxysmal atrial fibrillation) ([facility])    Benign essential HTN    CAMI (acute kidney injury) ([facility]    Hypervolemia    Positive culture finding    Colitis due to Clostridium difficile    Decreased cardiac function         Past Medical History:   has a past medical history of Anoxic brain injury ([facility] Cardiac arrest ([facility] Clostridium difficile infection, Gout, Hyperlipidemia, Hypertension, MRSA (methicillin resistant staph aureus) culture positive, Reflux, S/P CABG x 3, Sleep apnea, and Type 2 diabetes mellitus without complication ([facility]     Surgical History: has a past surgical history that includes Colonoscopy (09/01/2015); Carpal tunnel release (Left, 03/25/2014); Carpal tunnel release (Right, 04/14/2015); pr cabg, arterial, three (N/A, 12/4/2018); Cervical disc arthroplasty (N/A, 12/16/2018); RECONSTRUCTIVE REPAIR STERNAL (N/A, 12/20/2018); Cardiac catheterization (11/26/2018); transesophageal echocardiogram (12/04/2018); Tunneled venous catheter placement (Right, 12/24/2018); polysomnography (04/12/2016); EMG (Bilateral, 02/27/2014); Coronary artery bypass graft (12/14/2018); Cystoscopy (04/05/2019); and Cystoscopy (N/A, 4/5/2019). Social History:   reports that he has never smoked. He has never used smokeless tobacco. He reports that he does not drink alcohol or use drugs. Family History:  No evidence for sudden cardiac death or premature CAD    Home Medications:  Reviewed and are listed in nursing record.  and/or listed below  Current Outpatient Medications   Medication Sig Dispense Refill    isosorbide dinitrate (ISORDIL) 10 MG tablet Take 1 tablet by mouth 2 times daily      apixaban (ELIQUIS) 2.5 MG TABS tablet Take 1 tablet by mouth 2 times daily      hydrALAZINE (APRESOLINE) 10 MG tablet Take 2 tablets by mouth every 12 hours      metolazone (ZAROXOLYN) 2.5 MG tablet Take 1 tablet by mouth once a week On mondays      spironolactone (ALDACTONE) 25 MG tablet Take 1 tablet by mouth daily      torsemide (DEMADEX) 20 MG tablet Take 1 tablet by mouth daily      potassium chloride (KLOR-CON M) 20 MEQ extended release tablet Take 2 tablets by mouth 3 times daily (with meals)      linezolid (ZYVOX) 600 MG tablet Take 1 tablet by mouth every 12 hours for 8 days 16 tablet 0    acetaminophen (TYLENOL) 325 MG tablet Take 650 mg by mouth every 6 hours as needed for Pain      allopurinol (ZYLOPRIM) 100 MG tablet Take 100 mg by mouth daily      vitamin C (ASCORBIC ACID) 500 MG tablet Take 500 mg by mouth daily  ferrous sulfate 325 (65 Fe) MG tablet Take 325 mg by mouth daily (with breakfast)      Magnesium Oxide 200 MG TABS Take 1 tablet by mouth nightly      Multiple Vitamins-Minerals (THERAPEUTIC MULTIVITAMIN-MINERALS) tablet Take 1 tablet by mouth daily      collagenase 250 UNIT/GM ointment Apply 1 each topically nightly Apply topically daily.  famotidine (PEPCID) 20 MG tablet Take 1 tablet by mouth 2 times daily One tab daily before bed      lactulose (CHRONULAC) 10 GM/15ML solution Take 30 mLs by mouth 2 times daily as needed (constipation)  0    atorvastatin (LIPITOR) 80 MG tablet Take 1 tablet by mouth nightly 30 tablet 3    tamsulosin (FLOMAX) 0.4 MG capsule Take 1 capsule by mouth daily 30 capsule 3    metoprolol succinate (TOPROL XL) 50 MG extended release tablet Take 1 tablet by mouth daily 90 tablet 1    fluticasone (FLONASE) 50 MCG/ACT nasal spray 2 sprays by Each Nare route daily      gabapentin (NEURONTIN) 300 MG capsule Take 300 mg by mouth nightly. .      glucose blood VI test strips (FREESTYLE TEST STRIPS) strip 1 each by In Vitro route daily As needed. 100 each 3    aspirin (ASPIRIN LOW DOSE) 81 MG EC tablet TAKE ONE TABLET BY MOUTH ONCE DAILY 90 tablet 3    ACCU-CHEK MULTICLIX LANCETS MISC USE ONE  TO CHECK GLUCOSE ONCE DAILY 102 each 3    Blood Glucose Monitoring Suppl (ACCU-CHEK COMPACT CARE KIT) KIT 1 kit by Does not apply route daily 1 kit 0    Melatonin 10 MG TABS Take 10 mg by mouth nightly       vancomycin (VANCOCIN) 50 mg/mL oral solution Take 2.5 mLs by mouth every 12 hours for 14 days 70 mL 0     No current facility-administered medications for this visit. Allergies:  Enalapril; Nadolol; and Verapamil     Review of Systems:   A 14 point review of symptoms completed. Pertinent positives identified in the HPI, all other review of symptoms negative as below.     Objective:   PHYSICAL EXAM:    Vitals:    05/20/19 1014   BP: 110/76   Pulse: 98   SpO2: 100% Weight: 208 lb (94.3 kg)     Wt Readings from Last 3 Encounters:   05/20/19 208 lb (94.3 kg)   05/16/19 206 lb 3.2 oz (93.5 kg)   04/04/19 199 lb (90.3 kg)         General Appearance:  Alert, cooperative, no distress, appears stated age   Head:  Normocephalic, atraumatic   Eyes:  PERRL, conjunctiva/corneas clear   Nose: Nares normal, no drainage or sinus tenderness   Throat: Lips, mucosa, and tongue normal   Neck: Supple, symmetrical, trachea midline, NL thyroid no carotid bruit or JVD   Lungs:   CTAB, respirations unlabored   Chest Wall:  No tenderness or deformity   Heart:  Regular rhythm and normal rate; S1, S2 are normal;   no murmur noted; no rub or gallop   Abdomen:   Soft, non-tender, +BS x 4, no masses, no organomegaly   Extremities: Extremities normal, atraumatic, no cyanosis, 3+ BLE pitting edema edema   Pulses: 2+ and symmetric   Skin: Skin color, texture, turgor normal, no rashes or lesions   Pysch: Normal mood and affect   Neurologic: Normal gross motor and sensory exam.         LABS   CBC:      Lab Results   Component Value Date    WBC 11.3 05/13/2019    RBC 4.26 05/13/2019    HGB 11.3 05/13/2019    HCT 35.3 05/13/2019    MCV 82.9 05/13/2019    RDW 19.6 05/13/2019     05/13/2019     CMP:  Lab Results   Component Value Date     05/16/2019    K 4.4 05/16/2019    K 3.3 05/11/2019    CL 93 05/16/2019    CO2 26 05/16/2019    BUN 50 05/16/2019    CREATININE 2.3 05/16/2019    GFRAA 35 05/16/2019    AGRATIO 1.1 05/03/2019    LABGLOM 29 05/16/2019    GLUCOSE 122 05/16/2019    PROT 6.2 05/03/2019    CALCIUM 8.9 05/16/2019    BILITOT 0.5 05/03/2019    ALKPHOS 109 05/03/2019    AST 21 05/03/2019    ALT 10 05/03/2019     PT/INR:   No results found for: PTINR  Liver:  No components found for: CHLPL  Lab Results   Component Value Date    ALT 10 05/03/2019    AST 21 05/03/2019    ALKPHOS 109 05/03/2019    BILITOT 0.5 05/03/2019     Lab Results   Component Value Date    LABA1C 5.3 05/03/2019     Lipids: Lab Results   Component Value Date    TRIG 88 2019    TRIG 225 (H) 2018    TRIG 422 (H) 2018            Lab Results   Component Value Date    HDL 38 (L) 2019    HDL 28 (L) 2018    HDL 27 (L) 2018            Lab Results   Component Value Date    LDLCALC 59 2019    [facility] 83 2018    [facility] see below 2018            Lab Results   Component Value Date    LABVLDL 18 2019    LABVLDL 45 2018    LABVLDL see below 2018         CARDIAC DATA   EK2018 Sinus tach with short AT run, IVCD, possible septal infarct, PVC  2019 afib with RVR at 130, iLBBB    ECHO 5/3/19  Summary  Left ventricle - normal size and thickness, severely reduced function with   EF of 20%, severe diffuse hypokinesis  Right ventricle - reduced function  Mitral valve - mod-severe regurgitation  Tricuspid valve - moderate regurgitation with PASP of 55mmHg  Pulmonic valve - mild regurgitation     ECHO/MUGA: 42/36/39    LV systolic function is mildly reduced with EF estimated from 40-45%. More prominent septal hypokinesis is noted on certain views. Left ventricular size is mildly increased. There is mild concentric left ventricular hypertrophy. Grade I diastolic dysfunction with normal filling pressure. Aortic valve appears sclerotic but opens adequately. Mild mitral regurgitation. Inadequate tricuspid regurgitation to estimate systolic pulmonary artery   pressure. STRESS TEST: 18 \"Myoview\" Summary  Moderate-large sized inferior, septal, and distal apical wall significant  partial reversibility defects consistent with mainly ischemia and some  infarction in the territory of the proximal to distal RCA and/or LAD. LV  function is moderately reduced with more prominent inferior hypokinesis and  ejection fraction of 37%.  Higher risk abnormal study       CARDIAC CATH: 18 CORONARY ARTERY BYPASS GRAFTING X3       LEFT HEART CATH 18 place. Post-op course complicated by PEA arrest on 12/20. Patient also had sternotomy wound dehiscence requiring wound vac placement on 12/20, and Serratia marcescens bacteriemia. On 12/23, Patient developed intermitted A-fib with RVR. Patient now on coumadin, with Target INR 1.8-2.2. 1.  CAD: severe multi vessel CAD on 11/26/2018 cath   - s/p CABG 12/4/2018 LIMA-LAD, SVG-OM, SVG-PDA  2. Ischemic cardiomyopathy: 40-45%-->20%  3. CKD  4. HTN  5. HLD  6. Afib: in NSR today on exam   - seeing EP  7. Short term memory issues    MD Plan:  1. LVEF has fallen following CABG. Given above, ? Stress cardiomyopathy or tachycardia from afib or CAD   - sergey-myoview   - 20day monitor for afib burden and HR  2. Increase toprol to 75mg poqday  3. Increase torsemide to 40mg po qday until edema resolves   - Renal and BNP in 1 wk   - f/u Wing Sargent NP in 2-3 wks  4. Limited echo for LVEF in 3 months  5. Cont ASA, lipitor, eliquis, toprol   - holding ACE/ARB, entresto given ? ACE allergy  6.  Bring NH vitals to next visit (staff in room useless and had no pt vitals or info)         Patient Active Problem List   Diagnosis    Anoxic brain injury ([address]    HTN (hypertension)    Carpal tunnel syndrome    Obstructive sleep apnea syndrome    Depression    Gout    ACS (acute [facility] syndrome) ([facility]    Gait disturbance    CKD (chronic kidney disease) stage 3, GFR 30-59 ml/min ([facility])    Abnormal echocardiogram    Coronary artery disease involving native coronary artery of native heart without angina pectoris    Abnormal stress test    NSTEMI (non-ST elevated myocardial infarction) ([address]    Ischemic cardiomyopathy    Acute on chronic systolic heart failure (HCC)    Acute renal failure with acute tubular necrosis superimposed on stage 3 chronic kidney disease ([address]    Spinal cord ischemia ([address]    DM (diabetes mellitus), secondary, uncontrolled, w/neurologic complic ([address]    Cervical spinal cord compression ([address]  Postoperative infection of wound of sternum    Serratia infection    Gram-negative bacteremia    Acute respiratory failure with hypoxia (HCC)    Sepsis (HCC)    Prolonged QT interval    Status post aorto-coronary artery bypass graft    History of cardiac arrest    Obesity, Class II, BMI 35-39.9    Cervical myelopathy (HCC)    Lesion of bladder    Recurrent UTI    Anasarca    PAF (paroxysmal atrial fibrillation) (HCC)    Benign essential HTN    CAMI (acute kidney injury) (H[facility])    Hypervolemia    Positive culture finding    Colitis due to Clostridium difficile    Decreased cardiac function       Patient Plan:  1. Stress Test (Lexiscan)  2. Labs - TSH, Renal, BNP ( in 1 week)  3. Referral to Nephrologist (Dr Natalya Torres)  4. Compression stocking (below the knee)  5. With next visit bring vitals and weights  6. Increase the torsemide to 40 mg daily until swelling goes down  7. 30 day event monitor  8. Follow up with NP Ana Cristina Miranda     It is a pleasure to assist in the care of Denilson House. Please call with any questions. This note was scribed in the presence of DR. Bear HIGGINBOTHAM by Shauna Crocker RN. The scribes documentation has been prepared under my direction and personally reviewed by me in its entirety. I confirm that the note above accurately reflects all work, treatment, procedures, and medical decision making performed by me. I, Dr. Sandra Mendoza MD, personally performed the services described in this documentation as scribed by [employer] in my presence, and it is both accurate and complete to the best of our ability.                Sandra Mendoza MD, [address] [facility] Cardiologist  [facility]  [phone] [facility]  [phone] [address] [place] [address]  5/20/2019  10:32 AM              Sincerely,        Nery Chilel MD

## 2019-05-27 LAB
ALBUMIN SERPL-MCNC: 3.2 G/DL
B-TYPE NATRIURETIC PEPTIDE: 1667.8 PG/ML
BUN / CREAT RATIO: 19
BUN BLDV-MCNC: 38 MG/DL
CALCIUM SERPL-MCNC: 9 MG/DL
CHLORIDE BLD-SCNC: 102 MMOL/L
CO2: 23 MMOL/L
CREAT SERPL-MCNC: 2 MG/DL
GLUCOSE: 78
PHOSPHORUS: 4.4 MG/DL
POTASSIUM SERPL-SCNC: 3.8 MMOL/L
SODIUM BLD-SCNC: 141 MMOL/L
TSH SERPL DL<=0.05 MIU/L-ACNC: 1.01 UIU/ML

## 2019-05-28 ENCOUNTER — CARE COORDINATION (OUTPATIENT)
Dept: CARE COORDINATION | Age: 59
End: 2019-05-28

## 2019-05-28 NOTE — CARE COORDINATION
Per chart review, patient discharged back to [facility] for rehab. Future Appointments   Date Time Provider Samuel Donna   5/29/2019  2:00 PM MD Ananda Ni   6/3/2019  9:00 AM [facility] CARDIAC CAMERA 2 [facility] NM [facility]   6/3/2019  9:30 AM [facility] STRESS RADHAMES [facility] STRESS None   6/18/2019  1:45 PM Lilian Maldonado APRN - MARY Nicolas MSN, RN  Care Coordinator  [phone]  [email or web address]

## 2019-06-13 ENCOUNTER — TELEPHONE (OUTPATIENT)
Dept: CARDIOLOGY CLINIC | Age: 59
End: 2019-06-13

## 2019-06-13 NOTE — TELEPHONE ENCOUNTER
ALCIRAOVRAQUEL relaying message. Asked pt to return call to let us know if he has a nephrologist. If not we can refer him to one.

## 2019-06-14 ENCOUNTER — TELEPHONE (OUTPATIENT)
Dept: CARDIOLOGY CLINIC | Age: 59
End: 2019-06-14

## 2019-06-14 NOTE — TELEPHONE ENCOUNTER
David Blankenship from [facility] sts pt has a scar that developed a scab at the top and it is now draining. David Blankenship not sure when pt had bipass surgery, sts it was not in pt chart at nursing home, just knows was some time ago. Patient has appt with nprb on 6/18/19. Does nprb want patient seen sooner due to this drainage? Please advise.    Can call NH at [phone]  Or David Blankenship on her personal cell [phone]

## 2019-06-17 NOTE — TELEPHONE ENCOUNTER
Attempted home phone, not accepting calls. Called mobile [facility] and [facility] requesting a call to the office.

## 2019-06-18 ENCOUNTER — OFFICE VISIT (OUTPATIENT)
Dept: CARDIOLOGY CLINIC | Age: 59
End: 2019-06-18
Payer: MEDICAID

## 2019-06-18 ENCOUNTER — TELEPHONE (OUTPATIENT)
Dept: CARDIOTHORACIC SURGERY | Age: 59
End: 2019-06-18

## 2019-06-18 VITALS
DIASTOLIC BLOOD PRESSURE: 70 MMHG | HEIGHT: 70 IN | WEIGHT: 202 LBS | BODY MASS INDEX: 28.92 KG/M2 | SYSTOLIC BLOOD PRESSURE: 100 MMHG | OXYGEN SATURATION: 98 % | HEART RATE: 97 BPM

## 2019-06-18 DIAGNOSIS — Z95.1 S/P CABG (CORONARY ARTERY BYPASS GRAFT): ICD-10-CM

## 2019-06-18 DIAGNOSIS — N18.30 CKD (CHRONIC KIDNEY DISEASE) STAGE 3, GFR 30-59 ML/MIN (HCC): ICD-10-CM

## 2019-06-18 DIAGNOSIS — I25.5 ISCHEMIC CARDIOMYOPATHY: Primary | ICD-10-CM

## 2019-06-18 DIAGNOSIS — T81.32XD STERNAL WOUND DEHISCENCE, SUBSEQUENT ENCOUNTER: ICD-10-CM

## 2019-06-18 PROCEDURE — 99215 OFFICE O/P EST HI 40 MIN: CPT | Performed by: NURSE PRACTITIONER

## 2019-06-18 RX ORDER — METOPROLOL SUCCINATE 50 MG/1
75 TABLET, EXTENDED RELEASE ORAL DAILY
Qty: 90 TABLET | Refills: 1 | Status: ON HOLD
Start: 2019-05-20 | End: 2019-07-11 | Stop reason: HOSPADM

## 2019-06-18 RX ORDER — TORSEMIDE 20 MG/1
40 TABLET ORAL DAILY
Qty: 30 TABLET | Refills: 1 | Status: ON HOLD
Start: 2019-05-20 | End: 2019-07-11 | Stop reason: HOSPADM

## 2019-06-18 NOTE — TELEPHONE ENCOUNTER
Patient is s/p CABGx3; sternal wound dehiscence 12/24/18. Patient is scheduled with Dr. Khoi Khan Monday 6/24/19 for sternal wound check but Dr. Khoi Khan would like patient to be seen sooner. I have called patient and left message asking patient to call me back.

## 2019-06-18 NOTE — PROGRESS NOTES
[address]   Cardiac Follow-up    Primary Care Doctor:  Randy Yoon DO    Chief Complaint   Patient presents with    Follow-up        History of Present Illness:   I had the pleasure of seeing Trish Marshall in follow up for cardiomyopathy. Hx of CABG 12/2018, sternal wound dehiscence required wound vac post-op. Recently admitted to [facility] from 5/3/19-5/16/19 with acute on chronic heart failure, worsening cardiomyopathy EF down to 15-20%, severe MR and moderate TR, required lasix infusion and diuresed 16Liters. Also treated for MRSA bacteremia of unknown source and Enterococcal UTI treated with zyvox. Developed c-diff and discharged on PO vanc. Since last visit, seen by Dr. Hargis Scheuermann, stress test was ordered, monitor ordered, toprol was ordered to be increased and torsemide was increased to 40mg daily as he continued with edema. Stress test was cancelled and rescheduled. He is currently wearing holter monitor. Still Has mohamud catheter. Patient is a poor historian. His anterior chest wound is draining, he tells me it has always had a dressing on it. Not sure when he started to have drainage from the sternal incision. No pain. Currently not on antibiotics. No fever or chills per the patient. Continues with LE edema- but stable. Breathing is stable for him. Past Medical History:   has a past medical history of Anoxic brain injury ([address] Cardiac arrest ([address] Clostridium difficile infection, Gout, Hyperlipidemia, Hypertension, MRSA (methicillin resistant staph aureus) culture positive, Reflux, S/P CABG x 3, Sleep apnea, and Type 2 diabetes mellitus without complication ([address] Surgical History:   has a past surgical history that includes Colonoscopy (09/01/2015); Carpal tunnel release (Left, 03/25/2014); Carpal tunnel release (Right, 04/14/2015); pr cabg, arterial, three (N/A, 12/4/2018); Cervical disc arthroplasty (N/A, 12/16/2018); RECONSTRUCTIVE REPAIR STERNAL (N/A, 12/20/2018);  Cardiac catheterization (11/26/2018); transesophageal echocardiogram (12/04/2018); Tunneled venous catheter placement (Right, 12/24/2018); polysomnography (04/12/2016); EMG (Bilateral, 02/27/2014); Coronary artery bypass graft (12/14/2018); Cystoscopy (04/05/2019); and Cystoscopy (N/A, 4/5/2019). Social History:   reports that he has never smoked. He has never used smokeless tobacco. He reports that he does not drink alcohol or use drugs. Family History:   Family History   Problem Relation Age of Onset    High Blood Pressure Mother     High Blood Pressure Father     High Blood Pressure Brother     High Blood Pressure Maternal Grandmother     High Blood Pressure Maternal Grandfather        Home Medications:  Prior to Admission medications    Medication Sig Start Date End Date Taking?  Authorizing Provider   metoprolol succinate (TOPROL XL) 50 MG extended release tablet Take 1.5 tablets by mouth daily 5/20/19  Yes PAYTON Arana CNP   torsemide [facility]) 20 MG tablet Take 2 tablets by mouth daily 5/20/19  Yes PAYTON Arana CNP   isosorbide dinitrate (ISORDIL) 10 MG tablet Take 1 tablet by mouth 2 times daily 5/16/19  Yes Alexei Quintero MD   apixaban (ELIQUIS) 2.5 MG TABS tablet Take 1 tablet by mouth 2 times daily 5/16/19  Yes Alexei Quintero MD   hydrALAZINE (APRESOLINE) 10 MG tablet Take 2 tablets by mouth every 12 hours 5/16/19  Yes Alexei Quintero MD   metolazone (ZAROXOLYN) 2.5 MG tablet Take 1 tablet by mouth once a week On mondays 5/16/19  Yes Alexei Quintero MD   spironolactone (ALDACTONE) 25 MG tablet Take 1 tablet by mouth daily 5/17/19  Yes Alexei Quintero MD   potassium chloride (KLOR-CON M) 20 MEQ extended release tablet Take 2 tablets by mouth 3 times daily (with meals) 5/16/19  Yes Alexei Quintero MD   allopurinol (ZYLOPRIM) 100 MG tablet Take 100 mg by mouth daily   Yes Historical Provider, MD   vitamin C (ASCORBIC ACID) 500 MG tablet Take 500 mg by mouth daily   Yes Historical weight loss. · Eyes: No vision changes  · ENT: No Headaches, no nasal congestion. No mouth sores or sore throat. · Cardiovascular: Reviewed in HPI  · Respiratory: No cough or wheezing, no sputum production. · Gastrointestinal: No abdominal pain, no constipation or diarrhea  · Genitourinary: No dysuria, trouble voiding, or hematuria. + mohamud catheter  · Musculoskeletal:  + weakness or joint complaints. · Integumentary: No rash or pruritis. · Neurological: No numbness or tingling. No weakness. No tremor. · Psychiatric: No anxiety, no depression. · Endocrine:  No excessive thirst or urination. · Hematologic/Lymphatic: No abnormal bruising or bleeding, blood clots or swollen lymph nodes. Physical Examination:    Vitals:    06/18/19 1414   BP: 100/70   Pulse: 97   SpO2: 98%   Weight: 202 lb (91.6 kg)   Height: 5' 10\" (1.778 m)        Constitutional and General Appearance: no apparent distress, appear older than stated age  [de-identified]: non-icteric sclera, oropharynx without exudate, oral mucosa moist  Respiratory:  · No use of accessory muscles  · Diminished breath sounds throughout, no wheezing, no crackles, no rhonchi  Cardiovascular: mid sternal incision mostly healed, except for a 0.5cm x 0.5cm hole at the incision about a 1/3 of the way down.  + yellow/green drainage. Mild odor.    · The apical impulses not displaced  ·  + murmur no rub/gallop  · Regular rate and rhythm, S1,S2 normal  · Radial pulses 2+ and equal bilaterally  · ++ edema  Abdomen:  · No masses or tenderness  · Liver: No Abnormalities Noted  Musculoskeletal/Skin:  · wheelchair  · There is no clubbing, cyanosis of the extremities  · Skin is warm and dry  · Moves extremities   Neurological/Psychiatric:  · Alert and oriented , poor memory  · No abnormalities of mood, affect, mentation, or behavior are noted    Lab Data:  CBC:   Lab Results   Component Value Date    WBC 11.3 05/13/2019    WBC 8.9 05/11/2019    WBC 4.3 05/09/2019    RBC 4.26 05/13/2019    RBC 4.30 05/11/2019    RBC 4.25 05/09/2019    HGB 11.3 05/13/2019    HGB 11.6 05/11/2019    HGB 11.2 05/09/2019    HCT 35.3 05/13/2019    HCT 35.8 05/11/2019    HCT 35.3 05/09/2019    MCV 82.9 05/13/2019    MCV 83.1 05/11/2019    MCV 83.2 05/09/2019    RDW 19.6 05/13/2019    RDW 19.4 05/11/2019    RDW 19.5 05/09/2019     05/13/2019     05/11/2019     05/09/2019     Iron: No results found for: IRON, TIBC, FERRITIN  BMP:   Lab Results   Component Value Date     05/27/2019     05/16/2019     05/15/2019    K 3.8 05/27/2019    K 4.4 05/16/2019    K 3.9 05/15/2019    K 3.3 05/11/2019    K 3.4 05/08/2019    K 3.6 01/10/2019     05/27/2019    CL 93 05/16/2019    CL 88 05/15/2019    CO2 23 05/27/2019    CO2 26 05/16/2019    CO2 25 05/15/2019    PHOS 4.4 05/27/2019    PHOS 3.9 12/27/2018    PHOS 3.8 12/26/2018    BUN 38 05/27/2019    BUN 50 05/16/2019    BUN 51 05/15/2019    CREATININE 2.0 05/27/2019    CREATININE 2.3 05/16/2019    CREATININE 2.2 05/15/2019     BNP:   Lab Results   Component Value Date    PROBNP 24,733 05/05/2019    PROBNP 24,546 05/04/2019     Lipids:   Lab Results   Component Value Date    CHOL 115 05/04/2019        Lab Results   Component Value Date    TRIG 88 05/04/2019        Lab Results   Component Value Date    HDL 38 (L) 05/04/2019        Lab Results   Component Value Date    LDLCALC 59 05/04/2019        Lab Results   Component Value Date    LABVLDL 18 05/04/2019      No results found for: CHOLHDLRATIO    EF:   Lab Results   Component Value Date    LVEF 20 05/04/2019       Recent Testing:    ECHO 5/3/19  Summary  Left ventricle - normal size and thickness, severely reduced function with   EF of 20%, severe diffuse hypokinesis  Right ventricle - reduced function  Mitral valve - mod-severe regurgitation  Tricuspid valve - moderate regurgitation with PASP of 55mmHg  Pulmonic valve - mild regurgitation     QOLB/LORIJ: 20/03/18    LV systolic function is mildly reduced with EF estimated from 40-45%.   More prominent septal hypokinesis is noted on certain views.   Left ventricular size is mildly increased.   There is mild concentric left ventricular hypertrophy.   Grade I diastolic dysfunction with normal filling pressure.   Aortic valve appears sclerotic but opens adequately.   Mild mitral regurgitation.   Inadequate tricuspid regurgitation to estimate systolic pulmonary artery   pressure.      STRESS TEST: 11/22/18 \"Myoview\" Summary  Moderate-large sized inferior, septal, and distal apical wall significant  partial reversibility defects consistent with mainly ischemia and some  infarction in the territory of the proximal to distal RCA and/or LAD. LV  function is moderately reduced with more prominent inferior hypokinesis and  ejection fraction of 37%. Higher risk abnormal study      CARDIAC CATH: 12/4/18 CORONARY ARTERY BYPASS GRAFTING X3      LEFT HEART CATH 11/26/18  LM: luminals, mild calcium  LAD: heavy prox calcification. Mid long section of disease with focal lesion up to 80%, distal with sequential lesions of up to 60-70%                Daig1- mid 90%, distal vessel bifurcation disease of 50%  LCX: luminals, distal native LCX is ecctretic and severely diseased fed by R->Lt collaterals.                 OM1- 50% mid ribbon like lesion                OM2- smaller vessel, mild diffuse disease <30%  RCA: dominant, mild-moderate diffuse disease and calcification, lesions up to 20%                PDA- focal eccentric 80%                = some Rt-->Lt collaterals   LVEDP: 22-25  LVEF: Not done due to CKD     Assessment  1. Severe multivessel CAD as above                - will likely need staged PCI, may be done as outpt  2. Lesions should be able to be treated by PCI but will need to see Cr response to today's dye load and assess future risk to staged PCI with regards to GAMALIEL.                  - would start with stagged PCI to mid diag and LAD first but continued GAMALIEL risk with this                - start ticagrelor now in addition to ASA. BB, statin. 3. Cont IVF for 4 hrs following cath, lasix to prevent fluid overload. Total dye: 54mL       NYHA:   III  ACC/ AHA Stage:    C    Pertinent Problems:  · Chronic systolic heart failure; LVEF 40-45% down to 20%  · CAD s/p CABG 12/4/2018; hx of sternal wound dehiscence requiring wound vac   · Ischemic cardiomyopathy  · CK  · HTN  · HLD  · Afib  Recent C-diff 5/2019  Poor historian    Visit Diagnosis:    1. Ischemic cardiomyopathy    2. CKD (chronic kidney disease) stage 3, GFR 30-59 ml/min ([facility])    3. S/P CABG (coronary artery bypass graft)    4. Sternal wound dehiscence, subsequent encounter      Plan:   1. Check labs BMP, CBC; will plan to discuss with CT surgery   2. Wound care to sternum incision daily   3. Monitor for fever  4. Stress test as ordered  5. Continue heart monitor as planned  6. Continue to follow up with nephrology as planned  7. No changes to medications today. Off of ACE/ARB ? Due to allergy vs. Renal function  8. Follow up Dr. Andre Werner in 3 weeks       QUALITY MEASURES  1. Tobacco Cessation Counseling: NA  2. Retake of BP if >140/90:   NA  3. Documentation to PCP/referring for new patient:  Sent to PCP at close of office visit  4. CAD patient on anti-platelet: Yes  5. CAD patient on STATIN therapy:  Yes  6. Patient with CHF and aFib on anticoagulation:  yes    I appreciate the opportunity for caring for this patient. Killian Dhillon CNP, 6/18/2019, 12:56 PM     Addendum:  Spoke with Dr. Maximo Bates. Orders for Chest CT without contrast   No need for blood cultures at this time. The office will try to arrange an earlier appointment with CT surgery    Spoke with Marcianne Meckel at [facility]. Informed her of the need for a chest CT without contrast ASAP. She is not sure how long the sternum wound had been draining. Informed her that [facility] may call to get appointment this week.      Richard Anderson, CNP, 6/18/2019, 4:16 PM    More than 35 minutes of time was spent in direct patient contact during this visit, more than 50% of which was spent counseling and/or coordinating care.

## 2019-06-18 NOTE — LETTER
12/20/2018); Cardiac catheterization (11/26/2018); transesophageal echocardiogram (12/04/2018); Tunneled venous catheter placement (Right, 12/24/2018); polysomnography (04/12/2016); EMG (Bilateral, 02/27/2014); Coronary artery bypass graft (12/14/2018); Cystoscopy (04/05/2019); and Cystoscopy (N/A, 4/5/2019). Social History:   reports that he has never smoked. He has never used smokeless tobacco. He reports that he does not drink alcohol or use drugs. Family History:   Family History   Problem Relation Age of Onset    High Blood Pressure Mother     High Blood Pressure Father     High Blood Pressure Brother     High Blood Pressure Maternal Grandmother     High Blood Pressure Maternal Grandfather        Home Medications:  Prior to Admission medications    Medication Sig Start Date End Date Taking?  Authorizing Provider   metoprolol succinate (TOPROL XL) 50 MG extended release tablet Take 1.5 tablets by mouth daily 5/20/19  Yes Jeanna Habermann, APRN - CNP   torsemide [facility]) 20 MG tablet Take 2 tablets by mouth daily 5/20/19  Yes Jeanna Habermann, APRN - CNP   isosorbide dinitrate (ISORDIL) 10 MG tablet Take 1 tablet by mouth 2 times daily 5/16/19  Yes Julio Ziegler MD   apixaban (ELIQUIS) 2.5 MG TABS tablet Take 1 tablet by mouth 2 times daily 5/16/19  Yes Julio Ziegler MD   hydrALAZINE (APRESOLINE) 10 MG tablet Take 2 tablets by mouth every 12 hours 5/16/19  Yes Julio Ziegler MD   metolazone (ZAROXOLYN) 2.5 MG tablet Take 1 tablet by mouth once a week On mondays 5/16/19  Yes Julio Ziegler MD   spironolactone (ALDACTONE) 25 MG tablet Take 1 tablet by mouth daily 5/17/19  Yes Julio Ziegler MD   potassium chloride (KLOR-CON M) 20 MEQ extended release tablet Take 2 tablets by mouth 3 times daily (with meals) 5/16/19  Yes Julio Ziegler MD   allopurinol (ZYLOPRIM) 100 MG tablet Take 100 mg by mouth daily   Yes Historical Provider, MD vitamin C (ASCORBIC ACID) 500 MG tablet Take 500 mg by mouth daily   Yes Historical Provider, MD   ferrous sulfate 325 (65 Fe) MG tablet Take 325 mg by mouth daily (with breakfast)   Yes Historical Provider, MD   Magnesium Oxide 200 MG TABS Take 1 tablet by mouth nightly   Yes Historical Provider, MD   Multiple Vitamins-Minerals (THERAPEUTIC MULTIVITAMIN-MINERALS) tablet Take 1 tablet by mouth daily   Yes Historical Provider, MD   famotidine (PEPCID) 20 MG tablet Take 1 tablet by mouth 2 times daily One tab daily before bed 1/10/19  Yes Missy Cade MD   atorvastatin (LIPITOR) 80 MG tablet Take 1 tablet by mouth nightly 12/27/18  Yes Jessica Rojas MD   tamsulosin (FLOMAX) 0.4 MG capsule Take 1 capsule by mouth daily 12/27/18  Yes Jessica Rojas MD   fluticasone (FLONASE) 50 MCG/ACT nasal spray 2 sprays by Each Nare route daily   Yes Historical Provider, MD   gabapentin (NEURONTIN) 300 MG capsule Take 300 mg by mouth nightly. .   Yes Historical Provider, MD   aspirin (ASPIRIN LOW DOSE) 81 MG EC tablet TAKE ONE TABLET BY MOUTH ONCE DAILY 4/23/18  Yes David Parikh DO   Melatonin 10 MG TABS Take 10 mg by mouth nightly    Yes Historical Provider, MD   acetaminophen (TYLENOL) 325 MG tablet Take 650 mg by mouth every 6 hours as needed for Pain    Historical Provider, MD   collagenase 250 UNIT/GM ointment Apply 1 each topically nightly Apply topically daily. Historical Provider, MD   lactulose (CHRONULAC) 10 GM/15ML solution Take 30 mLs by mouth 2 times daily as needed (constipation) 1/10/19   Missy Cade MD   glucose blood VI test strips (FREESTYLE TEST STRIPS) strip 1 each by In Vitro route daily As needed.  5/3/18   David Parikh, DO   ACCU-CHEK MULTICLIX LANCETS MISC USE ONE  TO CHECK GLUCOSE ONCE DAILY 4/19/18   David Parikh DO   Blood Glucose Monitoring Suppl (ACCU-CHEK COMPACT CARE KIT) KIT 1 kit by Does not apply route daily 4/18/17   David Parikh, DO        Allergies: Enalapril; Nadolol; and Verapamil     Review of Systems:   · Constitutional: there has been no unanticipated weight loss. · Eyes: No vision changes  · ENT: No Headaches, no nasal congestion. No mouth sores or sore throat. · Cardiovascular: Reviewed in HPI  · Respiratory: No cough or wheezing, no sputum production. · Gastrointestinal: No abdominal pain, no constipation or diarrhea  · Genitourinary: No dysuria, trouble voiding, or hematuria. + mohamud catheter  · Musculoskeletal:  + weakness or joint complaints. · Integumentary: No rash or pruritis. · Neurological: No numbness or tingling. No weakness. No tremor. · Psychiatric: No anxiety, no depression. · Endocrine:  No excessive thirst or urination. · Hematologic/Lymphatic: No abnormal bruising or bleeding, blood clots or swollen lymph nodes. Physical Examination:    Vitals:    06/18/19 1414   BP: 100/70   Pulse: 97   SpO2: 98%   Weight: 202 lb (91.6 kg)   Height: 5' 10\" (1.778 m)        Constitutional and General Appearance: no apparent distress, appear older than stated age  [de-identified]: non-icteric sclera, oropharynx without exudate, oral mucosa moist  Respiratory:  · No use of accessory muscles  · Diminished breath sounds throughout, no wheezing, no crackles, no rhonchi  Cardiovascular: mid sternal incision mostly healed, except for a 0.5cm x 0.5cm hole at the incision about a 1/3 of the way down.  + yellow/green drainage. Mild odor.    · The apical impulses not displaced  ·  + murmur no rub/gallop  · Regular rate and rhythm, S1,S2 normal  · Radial pulses 2+ and equal bilaterally  · ++ edema  Abdomen:  · No masses or tenderness  · Liver: No Abnormalities Noted  Musculoskeletal/Skin:  · wheelchair  · There is no clubbing, cyanosis of the extremities  · Skin is warm and dry  · Moves extremities   Neurological/Psychiatric:  · Alert and oriented , poor memory  · No abnormalities of mood, affect, mentation, or behavior are noted    Lab Data:  CBC: Tricuspid valve - moderate regurgitation with PASP of 55mmHg  Pulmonic valve - mild regurgitation     FYOJ/XGNB: 52/00/48    LV systolic function is mildly reduced with EF estimated from 40-45%.   More prominent septal hypokinesis is noted on certain views.   Left ventricular size is mildly increased.   There is mild concentric left ventricular hypertrophy.   Grade I diastolic dysfunction with normal filling pressure.   Aortic valve appears sclerotic but opens adequately.   Mild mitral regurgitation.   Inadequate tricuspid regurgitation to estimate systolic pulmonary artery   pressure.      STRESS TEST: 11/22/18 \"Myoview\" Summary  Moderate-large sized inferior, septal, and distal apical wall significant  partial reversibility defects consistent with mainly ischemia and some  infarction in the territory of the proximal to distal RCA and/or LAD. LV  function is moderately reduced with more prominent inferior hypokinesis and  ejection fraction of 37%. Higher risk abnormal study      CARDIAC CATH: 12/4/18 CORONARY ARTERY BYPASS GRAFTING X3      LEFT HEART CATH 11/26/18  LM: luminals, mild calcium  LAD: heavy prox calcification.  Mid long section of disease with focal lesion up to 80%, distal with sequential lesions of up to 60-70%                Daig1- mid 90%, distal vessel bifurcation disease of 50%  LCX: luminals, distal native LCX is ecctretic and severely diseased fed by R->Lt collaterals.                 OM1- 50% mid ribbon like lesion                OM2- smaller vessel, mild diffuse disease <30%  RCA: dominant, mild-moderate diffuse disease and calcification, lesions up to 20%                PDA- focal eccentric 80%                = some Rt-->Lt collaterals   LVEDP: 22-25  LVEF: Not done due to CKD     Assessment  1. Severe multivessel CAD as above                - will likely need staged PCI, may be done as outpt  2. Lesions should be able to be treated by PCI but will need to see Cr Informed her of the need for a chest CT without contrast ASAP. She is not sure how long the sternum wound had been draining. Informed her that [facility] may call to get appointment this week. Richard Anderson CNP, 6/18/2019, 4:16 PM    More than 35 minutes of time was spent in direct patient contact during this visit, more than 50% of which was spent counseling and/or coordinating care.

## 2019-06-18 NOTE — TELEPHONE ENCOUNTER
Attempted to contact pt, could not leave a message. Operated states the call is not accepting incoming calls. I will mail a letter to the pt to have him contact the office.

## 2019-06-18 NOTE — PATIENT INSTRUCTIONS
Plan:   1. Check labs BMP, CBC; will plan to discuss with CT surgery   2. Wound care to sternum incision daily   3. Monitor for fever  4. Stress test as ordered  5. Continue heart monitor as planned  6. Continue to follow up with nephrology as planned  7. No changes to medications today.   Follow up Dr. Robi Friend in 3 weeks

## 2019-06-19 ENCOUNTER — HOSPITAL ENCOUNTER (OUTPATIENT)
Dept: CT IMAGING | Age: 59
Discharge: HOME | End: 2019-06-19
Payer: MEDICAID

## 2019-06-19 ENCOUNTER — TELEPHONE (OUTPATIENT)
Dept: CARDIOTHORACIC SURGERY | Age: 59
End: 2019-06-19

## 2019-06-19 DIAGNOSIS — Z95.1 S/P CABG (CORONARY ARTERY BYPASS GRAFT): ICD-10-CM

## 2019-06-19 DIAGNOSIS — T81.32XD STERNAL WOUND DEHISCENCE, SUBSEQUENT ENCOUNTER: ICD-10-CM

## 2019-06-19 PROCEDURE — 71250 CT THORAX DX C-: CPT

## 2019-06-19 NOTE — TELEPHONE ENCOUNTER
Requested by Richard Anderson NP to evaluate pt chest wound that is open slightly and draining yellow fluid 6 months after surgery. Attempts have been unsuccessful as pt phone numbers are disconnected. [facility] for alternate contact.   Requested call back.  Carla Hu

## 2019-06-20 ENCOUNTER — TELEPHONE (OUTPATIENT)
Dept: CARDIOTHORACIC SURGERY | Age: 59
End: 2019-06-20

## 2019-06-20 NOTE — TELEPHONE ENCOUNTER
Again reached out to pt. No working number. Called ER contact, Willard Lawton again offering appt for tomorrow for possible wound infection. Requested call back.   If no response then will keep appt with MOM

## 2019-06-21 ENCOUNTER — TELEPHONE (OUTPATIENT)
Dept: CARDIOTHORACIC SURGERY | Age: 59
End: 2019-06-21

## 2019-06-21 ENCOUNTER — OFFICE VISIT (OUTPATIENT)
Dept: CARDIOTHORACIC SURGERY | Age: 59
End: 2019-06-21
Payer: MEDICAID

## 2019-06-21 VITALS
WEIGHT: 200 LBS | SYSTOLIC BLOOD PRESSURE: 124 MMHG | DIASTOLIC BLOOD PRESSURE: 70 MMHG | HEIGHT: 70 IN | BODY MASS INDEX: 28.63 KG/M2 | HEART RATE: 110 BPM | OXYGEN SATURATION: 97 % | TEMPERATURE: 98.2 F

## 2019-06-21 DIAGNOSIS — L24.A9 WOUND DRAINAGE: Primary | ICD-10-CM

## 2019-06-21 PROCEDURE — 99213 OFFICE O/P EST LOW 20 MIN: CPT | Performed by: THORACIC SURGERY (CARDIOTHORACIC VASCULAR SURGERY)

## 2019-06-21 NOTE — PROGRESS NOTES
MD   ACCU-CHEK MULTICLIX LANCETS MISC USE ONE  TO CHECK GLUCOSE ONCE DAILY 4/19/18   Kathie Clamp, DO        Data Review:  CBC:   Lab Results   Component Value Date    WBC 11.3 05/13/2019    HGB 11.3 05/13/2019    HCT 35.3 05/13/2019    MCV 82.9 05/13/2019     05/13/2019     BMP:   Lab Results   Component Value Date     05/27/2019    K 3.8 05/27/2019    K 3.3 05/11/2019     05/27/2019    CO2 23 05/27/2019    PHOS 4.4 05/27/2019    BUN 38 05/27/2019    CREATININE 2.0 05/27/2019    CALCIUM 9.0 05/27/2019    MG 2.10 05/14/2019     PT/INR:   Lab Results   Component Value Date    PROTIME 25.1 01/10/2019    INR 2.20 01/10/2019       Assessment/Plan:  - skin opening with drainage  CT chest to evaluate wound/tract (for pt, [facility] is [facility] site)  I'm guessing will ultimately require limited local exploration, maybe removal of underlying sternal wire    - on eliquis, presumably for CAD  Would need to hold if procedure required    Yue Austin MD  6/21/2019  10:18 AM

## 2019-06-21 NOTE — LETTER
06/21/19        [facility]  [address]  [address] [place] [address]        RE:    Juan Arriaza,   1960    Dear Dr. Fran Juárez,    It was my pleasure to see your patient, Juan Arriaza, in the office today in follow up of cabgx3 12/24/18 at [facility].    I have attached a copy of the office evaluation. Thank you for allowing me to participate in the care of this patient.       Sincerely,     Nannette Parr MD    CC: Sandrita Auguste MD  [address] [place] [address]  [address] [place] [address] [address] [place] [address]  [address] [place] [address] [place], [place]  [address], [facility] [place] [address]  VIA In [place]

## 2019-06-24 RX ORDER — ATORVASTATIN CALCIUM 80 MG/1
80 TABLET, FILM COATED ORAL DAILY
Status: ON HOLD | COMMUNITY
End: 2019-07-11 | Stop reason: HOSPADM

## 2019-06-24 RX ORDER — FUROSEMIDE 40 MG/1
40 TABLET ORAL DAILY
Status: ON HOLD | COMMUNITY
Start: 2019-02-08 | End: 2019-07-05 | Stop reason: ALTCHOICE

## 2019-06-24 RX ORDER — ASCORBIC ACID 500 MG
500 TABLET ORAL DAILY
COMMUNITY

## 2019-06-24 RX ORDER — ALLOPURINOL 100 MG/1
100 TABLET ORAL DAILY
COMMUNITY
Start: 2015-07-13

## 2019-06-24 RX ORDER — FAMOTIDINE 20 MG/1
20 TABLET, FILM COATED ORAL DAILY
COMMUNITY

## 2019-06-26 ENCOUNTER — ANESTHESIA EVENT (OUTPATIENT)
Dept: OPERATING ROOM | Age: 59
DRG: 813 | End: 2019-06-26
Payer: MEDICAID

## 2019-06-26 ENCOUNTER — APPOINTMENT (OUTPATIENT)
Dept: CT IMAGING | Age: 59
DRG: 813 | End: 2019-06-26
Payer: MEDICAID

## 2019-06-26 ENCOUNTER — HOSPITAL ENCOUNTER (EMERGENCY)
Age: 59
Discharge: HOME | DRG: 813 | End: 2019-06-26
Attending: EMERGENCY MEDICINE
Payer: MEDICAID

## 2019-06-26 VITALS
SYSTOLIC BLOOD PRESSURE: 118 MMHG | RESPIRATION RATE: 23 BRPM | TEMPERATURE: 97.6 F | WEIGHT: 200 LBS | OXYGEN SATURATION: 99 % | DIASTOLIC BLOOD PRESSURE: 81 MMHG | HEART RATE: 109 BPM | BODY MASS INDEX: 28.7 KG/M2

## 2019-06-26 DIAGNOSIS — S16.1XXA STRAIN OF NECK MUSCLE, INITIAL ENCOUNTER: Primary | ICD-10-CM

## 2019-06-26 PROCEDURE — 6360000002 HC RX W HCPCS: Performed by: EMERGENCY MEDICINE

## 2019-06-26 PROCEDURE — 99284 EMERGENCY DEPT VISIT MOD MDM: CPT

## 2019-06-26 PROCEDURE — 72125 CT NECK SPINE W/O DYE: CPT

## 2019-06-26 PROCEDURE — 96374 THER/PROPH/DIAG INJ IV PUSH: CPT

## 2019-06-26 RX ORDER — MORPHINE SULFATE 4 MG/ML
4 INJECTION, SOLUTION INTRAMUSCULAR; INTRAVENOUS ONCE
Status: COMPLETED | OUTPATIENT
Start: 2019-06-26 | End: 2019-06-26

## 2019-06-26 RX ORDER — METHOCARBAMOL 750 MG/1
750 TABLET, FILM COATED ORAL 3 TIMES DAILY PRN
Qty: 12 TABLET | Refills: 0 | Status: ON HOLD | OUTPATIENT
Start: 2019-06-26 | End: 2019-07-11

## 2019-06-26 RX ADMIN — MORPHINE SULFATE 4 MG: 4 INJECTION INTRAVENOUS at 03:22

## 2019-06-26 ASSESSMENT — PAIN SCALES - GENERAL
PAINLEVEL_OUTOF10: 7
PAINLEVEL_OUTOF10: 6
PAINLEVEL_OUTOF10: 4
PAINLEVEL_OUTOF10: 7

## 2019-06-26 ASSESSMENT — PAIN DESCRIPTION - LOCATION
LOCATION: NECK

## 2019-06-26 ASSESSMENT — PAIN DESCRIPTION - PAIN TYPE
TYPE: ACUTE PAIN

## 2019-06-26 ASSESSMENT — PAIN DESCRIPTION - ORIENTATION: ORIENTATION: MID

## 2019-06-26 NOTE — ED NOTES
Patient reports pain to neck that increases when he turns it up and down and side to side that started last night. Patient reports that pain is relieved somewhat when is lying supine. Patient reports that pain is bearable when laying flat. Patient reports pain started yesterday.        [facility]  06/26/19 [id number]

## 2019-06-27 ENCOUNTER — ANESTHESIA (OUTPATIENT)
Dept: OPERATING ROOM | Age: 59
DRG: 813 | End: 2019-06-27
Payer: MEDICAID

## 2019-06-27 NOTE — ED PROVIDER NOTES
this encounter.       Current Outpatient Medications   Medication Sig Dispense Refill    methocarbamol (ROBAXIN-750) 750 MG tablet Take 1 tablet by mouth 3 times daily as needed (PAIN) 12 tablet 0    furosemide (LASIX) 40 MG tablet Take 40 mg by mouth daily      miconazole (LOTRIMIN AF) 2 % powder by Intratympanic route      allopurinol (ZYLOPRIM) 100 MG tablet Take 100 mg by mouth daily      vitamin C (ASCORBIC ACID) 500 MG tablet Take 500 mg by mouth daily      atorvastatin (LIPITOR) 80 MG tablet Take 80 mg by mouth daily      famotidine (PEPCID) 20 MG tablet Take 20 mg by mouth 2 times daily      VANCOMYCIN HCL PO Take 250 mg by mouth 4 times daily      Digestive Enzymes (ACIDOLL PO) Take 1 tablet by mouth 2 times daily      metoprolol succinate (TOPROL XL) 50 MG extended release tablet Take 1.5 tablets by mouth daily 90 tablet 1    torsemide (DEMADEX) 20 MG tablet Take 2 tablets by mouth daily 30 tablet 1    isosorbide dinitrate (ISORDIL) 10 MG tablet Take 1 tablet by mouth 2 times daily      apixaban (ELIQUIS) 2.5 MG TABS tablet Take 1 tablet by mouth 2 times daily (Patient taking differently: Take 2.5 mg by mouth 2 times daily Stopped on 6/21/2019 for surgery.)      hydrALAZINE (APRESOLINE) 10 MG tablet Take 2 tablets by mouth every 12 hours      metolazone (ZAROXOLYN) 2.5 MG tablet Take 1 tablet by mouth once a week On mondays      spironolactone (ALDACTONE) 25 MG tablet Take 1 tablet by mouth daily      acetaminophen (TYLENOL) 325 MG tablet Take 650 mg by mouth every 6 hours as needed for Pain      ferrous sulfate 325 (65 Fe) MG tablet Take 325 mg by mouth daily (with breakfast)      Magnesium Oxide 200 MG TABS Take 1 tablet by mouth nightly      Multiple Vitamins-Minerals (THERAPEUTIC MULTIVITAMIN-MINERALS) tablet Take 1 tablet by mouth daily      tamsulosin (FLOMAX) 0.4 MG capsule Take 1 capsule by mouth daily 30 capsule 3    fluticasone (FLONASE) 50 MCG/ACT nasal spray 2 sprays by with C6 corpectomy. There seems to be loosening of the   inferior screws at C7 with backing out of the plate which appears angled   outward at this level, possibly with an attached bone fragment. There is no   prevertebral soft tissue swelling at this level. Comparison with any   postoperative imaging would be helpful if available. Mild canal and moderate foraminal narrowing at C5-C6. Large bilateral pleural effusions. ED COURSE/MDM  Patient seen and evaluated. Labs and imaging reviewed and results discussed with patient. Plan of care discussed with patient. Patient in agreement with plan. I told the patient they can come back to the ER at any time if the S/S persist or worsen or they have any other S/S of concern. Discharge Medication List as of 6/26/2019  4:11 AM      START taking these medications    Details   methocarbamol (ROBAXIN-750) 750 MG tablet Take 1 tablet by mouth 3 times daily as needed (PAIN), Disp-12 tablet, R-0Print           CLINICAL IMPRESSION  1. Strain of neck muscle, initial encounter        Blood pressure 118/81, pulse 109, temperature 97.6 °F (36.4 °C), temperature source Oral, resp. rate 23, weight 200 lb (90.7 kg), SpO2 99 %. DISPOSITION  Rigo Ahumada was discharged to home in stable condition. This chart was generated in part by using Dragon Dictation system and may contain errors related to that system including errors in grammar, punctuation, and spelling, as well as words and phrases that may be inappropriate. When dictating, effort is made to correct spelling/grammar errors. If there are any questions or concerns please feel free to contact the dictating provider for clarification.      Ema Jones DO  EMERGENCY MEDICINE        [facility]  06/27/19 0750

## 2019-06-28 ENCOUNTER — HOSPITAL ENCOUNTER (INPATIENT)
Age: 59
LOS: 13 days | Discharge: NURSING, REHAB OR LONG-TERM CARE | DRG: 813 | End: 2019-07-11
Attending: THORACIC SURGERY (CARDIOTHORACIC VASCULAR SURGERY) | Admitting: THORACIC SURGERY (CARDIOTHORACIC VASCULAR SURGERY)
Payer: MEDICAID

## 2019-06-28 VITALS
OXYGEN SATURATION: 100 % | RESPIRATION RATE: 10 BRPM | DIASTOLIC BLOOD PRESSURE: 73 MMHG | SYSTOLIC BLOOD PRESSURE: 93 MMHG

## 2019-06-28 DIAGNOSIS — S16.1XXA STRAIN OF NECK MUSCLE, INITIAL ENCOUNTER: ICD-10-CM

## 2019-06-28 PROBLEM — T14.8XXA WOUND INFECTION: Status: ACTIVE | Noted: 2019-06-28

## 2019-06-28 PROBLEM — L08.9 WOUND INFECTION: Status: ACTIVE | Noted: 2019-06-28

## 2019-06-28 LAB
ABO/RH: NORMAL
ANION GAP SERPL CALCULATED.3IONS-SCNC: 18 MMOL/L (ref 3–16)
ANTIBODY SCREEN: NORMAL
BILIRUBIN URINE: NEGATIVE
BLOOD, URINE: ABNORMAL
BUN BLDV-MCNC: 86 MG/DL (ref 7–20)
CALCIUM SERPL-MCNC: 9.3 MG/DL (ref 8.3–10.6)
CHLORIDE BLD-SCNC: 92 MMOL/L (ref 99–110)
CLARITY: ABNORMAL
CO2: 25 MMOL/L (ref 21–32)
COLOR: YELLOW
COMMENT UA: ABNORMAL
CREAT SERPL-MCNC: 2.7 MG/DL (ref 0.9–1.3)
EKG ATRIAL RATE: 90 BPM
EKG DIAGNOSIS: NORMAL
EKG P AXIS: 45 DEGREES
EKG P-R INTERVAL: 174 MS
EKG Q-T INTERVAL: 402 MS
EKG QRS DURATION: 120 MS
EKG QTC CALCULATION (BAZETT): 491 MS
EKG R AXIS: 98 DEGREES
EKG T AXIS: -86 DEGREES
EKG VENTRICULAR RATE: 90 BPM
GFR AFRICAN AMERICAN: 29
GFR NON-AFRICAN AMERICAN: 24
GLUCOSE BLD-MCNC: 101 MG/DL (ref 70–99)
GLUCOSE BLD-MCNC: 123 MG/DL (ref 70–99)
GLUCOSE URINE: NEGATIVE MG/DL
HCT VFR BLD CALC: 35 % (ref 40.5–52.5)
HEMOGLOBIN: 10.9 G/DL (ref 13.5–17.5)
KETONES, URINE: NEGATIVE MG/DL
LEUKOCYTE ESTERASE, URINE: ABNORMAL
MCH RBC QN AUTO: 25 PG (ref 26–34)
MCHC RBC AUTO-ENTMCNC: 31.2 G/DL (ref 31–36)
MCV RBC AUTO: 80.3 FL (ref 80–100)
MICROSCOPIC EXAMINATION: YES
NITRITE, URINE: NEGATIVE
PDW BLD-RTO: 20.7 % (ref 12.4–15.4)
PERFORMED ON: ABNORMAL
PH UA: 7 (ref 5–8)
PLATELET # BLD: 249 K/UL (ref 135–450)
PMV BLD AUTO: 8.6 FL (ref 5–10.5)
POTASSIUM REFLEX MAGNESIUM: 4 MMOL/L (ref 3.5–5.1)
PROTEIN UA: >=300 MG/DL
RBC # BLD: 4.36 M/UL (ref 4.2–5.9)
RBC UA: ABNORMAL /HPF (ref 0–2)
SODIUM BLD-SCNC: 135 MMOL/L (ref 136–145)
SPECIFIC GRAVITY UA: 1.01 (ref 1–1.03)
URINE REFLEX TO CULTURE: YES
URINE TYPE: ABNORMAL
UROBILINOGEN, URINE: 0.2 E.U./DL
WBC # BLD: 11.8 K/UL (ref 4–11)
WBC UA: >100 /HPF (ref 0–5)

## 2019-06-28 PROCEDURE — 1200000000 HC SEMI PRIVATE

## 2019-06-28 PROCEDURE — 2580000003 HC RX 258: Performed by: THORACIC SURGERY (CARDIOTHORACIC VASCULAR SURGERY)

## 2019-06-28 PROCEDURE — 3700000000 HC ANESTHESIA ATTENDED CARE: Performed by: THORACIC SURGERY (CARDIOTHORACIC VASCULAR SURGERY)

## 2019-06-28 PROCEDURE — 6360000002 HC RX W HCPCS: Performed by: NURSE ANESTHETIST, CERTIFIED REGISTERED

## 2019-06-28 PROCEDURE — 87086 URINE CULTURE/COLONY COUNT: CPT

## 2019-06-28 PROCEDURE — 3600000003 HC SURGERY LEVEL 3 BASE: Performed by: THORACIC SURGERY (CARDIOTHORACIC VASCULAR SURGERY)

## 2019-06-28 PROCEDURE — 2580000003 HC RX 258: Performed by: ANESTHESIOLOGY

## 2019-06-28 PROCEDURE — 3600000013 HC SURGERY LEVEL 3 ADDTL 15MIN: Performed by: THORACIC SURGERY (CARDIOTHORACIC VASCULAR SURGERY)

## 2019-06-28 PROCEDURE — 21627 STERNAL DEBRIDEMENT: CPT | Performed by: THORACIC SURGERY (CARDIOTHORACIC VASCULAR SURGERY)

## 2019-06-28 PROCEDURE — 86850 RBC ANTIBODY SCREEN: CPT

## 2019-06-28 PROCEDURE — 86901 BLOOD TYPING SEROLOGIC RH(D): CPT

## 2019-06-28 PROCEDURE — 86923 COMPATIBILITY TEST ELECTRIC: CPT

## 2019-06-28 PROCEDURE — 80048 BASIC METABOLIC PNL TOTAL CA: CPT

## 2019-06-28 PROCEDURE — 0WH80YZ INSERTION OF OTHER DEVICE INTO CHEST WALL, OPEN APPROACH: ICD-10-PCS | Performed by: THORACIC SURGERY (CARDIOTHORACIC VASCULAR SURGERY)

## 2019-06-28 PROCEDURE — 6370000000 HC RX 637 (ALT 250 FOR IP): Performed by: THORACIC SURGERY (CARDIOTHORACIC VASCULAR SURGERY)

## 2019-06-28 PROCEDURE — 2060000000 HC ICU INTERMEDIATE R&B

## 2019-06-28 PROCEDURE — 2500000003 HC RX 250 WO HCPCS: Performed by: NURSE ANESTHETIST, CERTIFIED REGISTERED

## 2019-06-28 PROCEDURE — C1729 CATH, DRAINAGE: HCPCS | Performed by: THORACIC SURGERY (CARDIOTHORACIC VASCULAR SURGERY)

## 2019-06-28 PROCEDURE — 2709999900 HC NON-CHARGEABLE SUPPLY: Performed by: THORACIC SURGERY (CARDIOTHORACIC VASCULAR SURGERY)

## 2019-06-28 PROCEDURE — 6360000002 HC RX W HCPCS: Performed by: THORACIC SURGERY (CARDIOTHORACIC VASCULAR SURGERY)

## 2019-06-28 PROCEDURE — 86900 BLOOD TYPING SEROLOGIC ABO: CPT

## 2019-06-28 PROCEDURE — 93005 ELECTROCARDIOGRAM TRACING: CPT | Performed by: THORACIC SURGERY (CARDIOTHORACIC VASCULAR SURGERY)

## 2019-06-28 PROCEDURE — 3700000001 HC ADD 15 MINUTES (ANESTHESIA): Performed by: THORACIC SURGERY (CARDIOTHORACIC VASCULAR SURGERY)

## 2019-06-28 PROCEDURE — 7100000000 HC PACU RECOVERY - FIRST 15 MIN: Performed by: THORACIC SURGERY (CARDIOTHORACIC VASCULAR SURGERY)

## 2019-06-28 PROCEDURE — 81001 URINALYSIS AUTO W/SCOPE: CPT

## 2019-06-28 PROCEDURE — 36415 COLL VENOUS BLD VENIPUNCTURE: CPT

## 2019-06-28 PROCEDURE — 85027 COMPLETE CBC AUTOMATED: CPT

## 2019-06-28 PROCEDURE — 93010 ELECTROCARDIOGRAM REPORT: CPT | Performed by: INTERNAL MEDICINE

## 2019-06-28 PROCEDURE — 7100000001 HC PACU RECOVERY - ADDTL 15 MIN: Performed by: THORACIC SURGERY (CARDIOTHORACIC VASCULAR SURGERY)

## 2019-06-28 PROCEDURE — 87186 SC STD MICRODIL/AGAR DIL: CPT

## 2019-06-28 PROCEDURE — 87077 CULTURE AEROBIC IDENTIFY: CPT

## 2019-06-28 RX ORDER — METOLAZONE 2.5 MG/1
2.5 TABLET ORAL WEEKLY
Status: DISCONTINUED | OUTPATIENT
Start: 2019-06-28 | End: 2019-06-29

## 2019-06-28 RX ORDER — ATORVASTATIN CALCIUM 80 MG/1
80 TABLET, FILM COATED ORAL DAILY
Status: DISCONTINUED | OUTPATIENT
Start: 2019-06-28 | End: 2019-07-07

## 2019-06-28 RX ORDER — LIDOCAINE HYDROCHLORIDE 20 MG/ML
INJECTION, SOLUTION INFILTRATION; PERINEURAL PRN
Status: DISCONTINUED | OUTPATIENT
Start: 2019-06-28 | End: 2019-06-28 | Stop reason: SDUPTHER

## 2019-06-28 RX ORDER — MORPHINE SULFATE 2 MG/ML
2 INJECTION, SOLUTION INTRAMUSCULAR; INTRAVENOUS EVERY 5 MIN PRN
Status: DISCONTINUED | OUTPATIENT
Start: 2019-06-28 | End: 2019-06-28 | Stop reason: HOSPADM

## 2019-06-28 RX ORDER — EPHEDRINE SULFATE 50 MG/ML
INJECTION INTRAVENOUS PRN
Status: DISCONTINUED | OUTPATIENT
Start: 2019-06-28 | End: 2019-06-28 | Stop reason: SDUPTHER

## 2019-06-28 RX ORDER — ALLOPURINOL 100 MG/1
100 TABLET ORAL DAILY
Status: DISCONTINUED | OUTPATIENT
Start: 2019-06-28 | End: 2019-07-11 | Stop reason: HOSPADM

## 2019-06-28 RX ORDER — DEXAMETHASONE SODIUM PHOSPHATE 4 MG/ML
INJECTION, SOLUTION INTRA-ARTICULAR; INTRALESIONAL; INTRAMUSCULAR; INTRAVENOUS; SOFT TISSUE PRN
Status: DISCONTINUED | OUTPATIENT
Start: 2019-06-28 | End: 2019-06-28 | Stop reason: SDUPTHER

## 2019-06-28 RX ORDER — M-VIT,TX,IRON,MINS/CALC/FOLIC 27MG-0.4MG
1 TABLET ORAL DAILY
Status: DISCONTINUED | OUTPATIENT
Start: 2019-06-28 | End: 2019-07-11 | Stop reason: HOSPADM

## 2019-06-28 RX ORDER — MORPHINE SULFATE 2 MG/ML
1 INJECTION, SOLUTION INTRAMUSCULAR; INTRAVENOUS EVERY 5 MIN PRN
Status: DISCONTINUED | OUTPATIENT
Start: 2019-06-28 | End: 2019-06-28 | Stop reason: HOSPADM

## 2019-06-28 RX ORDER — PROPOFOL 10 MG/ML
INJECTION, EMULSION INTRAVENOUS PRN
Status: DISCONTINUED | OUTPATIENT
Start: 2019-06-28 | End: 2019-06-28 | Stop reason: SDUPTHER

## 2019-06-28 RX ORDER — DIPHENHYDRAMINE HYDROCHLORIDE 50 MG/ML
12.5 INJECTION INTRAMUSCULAR; INTRAVENOUS
Status: DISCONTINUED | OUTPATIENT
Start: 2019-06-28 | End: 2019-06-28 | Stop reason: HOSPADM

## 2019-06-28 RX ORDER — HYDRALAZINE HYDROCHLORIDE 20 MG/ML
5 INJECTION INTRAMUSCULAR; INTRAVENOUS EVERY 10 MIN PRN
Status: DISCONTINUED | OUTPATIENT
Start: 2019-06-28 | End: 2019-06-28 | Stop reason: HOSPADM

## 2019-06-28 RX ORDER — LIDOCAINE HYDROCHLORIDE 10 MG/ML
0.3 INJECTION, SOLUTION EPIDURAL; INFILTRATION; INTRACAUDAL; PERINEURAL
Status: DISCONTINUED | OUTPATIENT
Start: 2019-06-28 | End: 2019-06-28 | Stop reason: HOSPADM

## 2019-06-28 RX ORDER — ASPIRIN 81 MG/1
81 TABLET ORAL DAILY
Status: DISCONTINUED | OUTPATIENT
Start: 2019-06-28 | End: 2019-07-01

## 2019-06-28 RX ORDER — FUROSEMIDE 40 MG/1
40 TABLET ORAL DAILY
Status: DISCONTINUED | OUTPATIENT
Start: 2019-06-28 | End: 2019-06-30

## 2019-06-28 RX ORDER — MAGNESIUM HYDROXIDE 1200 MG/15ML
LIQUID ORAL CONTINUOUS PRN
Status: COMPLETED | OUTPATIENT
Start: 2019-06-28 | End: 2019-06-28

## 2019-06-28 RX ORDER — ONDANSETRON 2 MG/ML
4 INJECTION INTRAMUSCULAR; INTRAVENOUS PRN
Status: DISCONTINUED | OUTPATIENT
Start: 2019-06-28 | End: 2019-06-28 | Stop reason: HOSPADM

## 2019-06-28 RX ORDER — TAMSULOSIN HYDROCHLORIDE 0.4 MG/1
0.4 CAPSULE ORAL DAILY
Status: DISCONTINUED | OUTPATIENT
Start: 2019-06-28 | End: 2019-07-07

## 2019-06-28 RX ORDER — SODIUM CHLORIDE 0.9 % (FLUSH) 0.9 %
10 SYRINGE (ML) INJECTION EVERY 12 HOURS SCHEDULED
Status: DISCONTINUED | OUTPATIENT
Start: 2019-06-28 | End: 2019-06-28 | Stop reason: HOSPADM

## 2019-06-28 RX ORDER — PROMETHAZINE HYDROCHLORIDE 25 MG/ML
6.25 INJECTION, SOLUTION INTRAMUSCULAR; INTRAVENOUS
Status: DISCONTINUED | OUTPATIENT
Start: 2019-06-28 | End: 2019-06-28 | Stop reason: HOSPADM

## 2019-06-28 RX ORDER — SODIUM CHLORIDE, SODIUM LACTATE, POTASSIUM CHLORIDE, CALCIUM CHLORIDE 600; 310; 30; 20 MG/100ML; MG/100ML; MG/100ML; MG/100ML
INJECTION, SOLUTION INTRAVENOUS CONTINUOUS
Status: DISCONTINUED | OUTPATIENT
Start: 2019-06-28 | End: 2019-06-28

## 2019-06-28 RX ORDER — GABAPENTIN 300 MG/1
300 CAPSULE ORAL NIGHTLY
Status: DISCONTINUED | OUTPATIENT
Start: 2019-06-28 | End: 2019-07-11 | Stop reason: HOSPADM

## 2019-06-28 RX ORDER — SPIRONOLACTONE 25 MG/1
25 TABLET ORAL DAILY
Status: DISCONTINUED | OUTPATIENT
Start: 2019-06-28 | End: 2019-06-29

## 2019-06-28 RX ORDER — FLUTICASONE PROPIONATE 50 MCG
2 SPRAY, SUSPENSION (ML) NASAL DAILY
Status: DISCONTINUED | OUTPATIENT
Start: 2019-06-28 | End: 2019-07-11 | Stop reason: HOSPADM

## 2019-06-28 RX ORDER — OXYCODONE HYDROCHLORIDE AND ACETAMINOPHEN 5; 325 MG/1; MG/1
1 TABLET ORAL PRN
Status: DISCONTINUED | OUTPATIENT
Start: 2019-06-28 | End: 2019-06-28 | Stop reason: HOSPADM

## 2019-06-28 RX ORDER — MEPERIDINE HYDROCHLORIDE 50 MG/ML
12.5 INJECTION INTRAMUSCULAR; INTRAVENOUS; SUBCUTANEOUS EVERY 5 MIN PRN
Status: DISCONTINUED | OUTPATIENT
Start: 2019-06-28 | End: 2019-06-28 | Stop reason: HOSPADM

## 2019-06-28 RX ORDER — ACETAMINOPHEN 325 MG/1
650 TABLET ORAL EVERY 6 HOURS PRN
Status: DISCONTINUED | OUTPATIENT
Start: 2019-06-28 | End: 2019-07-11 | Stop reason: HOSPADM

## 2019-06-28 RX ORDER — MIDAZOLAM HYDROCHLORIDE 1 MG/ML
INJECTION INTRAMUSCULAR; INTRAVENOUS PRN
Status: DISCONTINUED | OUTPATIENT
Start: 2019-06-28 | End: 2019-06-28 | Stop reason: SDUPTHER

## 2019-06-28 RX ORDER — CHOLECALCIFEROL (VITAMIN D3) 125 MCG
10 CAPSULE ORAL NIGHTLY
Status: DISCONTINUED | OUTPATIENT
Start: 2019-06-28 | End: 2019-07-11 | Stop reason: HOSPADM

## 2019-06-28 RX ORDER — SODIUM CHLORIDE 0.9 % (FLUSH) 0.9 %
10 SYRINGE (ML) INJECTION PRN
Status: DISCONTINUED | OUTPATIENT
Start: 2019-06-28 | End: 2019-06-28 | Stop reason: HOSPADM

## 2019-06-28 RX ORDER — ROCURONIUM BROMIDE 10 MG/ML
INJECTION, SOLUTION INTRAVENOUS PRN
Status: DISCONTINUED | OUTPATIENT
Start: 2019-06-28 | End: 2019-06-28 | Stop reason: SDUPTHER

## 2019-06-28 RX ORDER — HYDRALAZINE HYDROCHLORIDE 10 MG/1
20 TABLET, FILM COATED ORAL EVERY 12 HOURS SCHEDULED
Status: DISCONTINUED | OUTPATIENT
Start: 2019-06-28 | End: 2019-06-29

## 2019-06-28 RX ORDER — ASCORBIC ACID 500 MG
500 TABLET ORAL DAILY
Status: DISCONTINUED | OUTPATIENT
Start: 2019-06-28 | End: 2019-07-11 | Stop reason: HOSPADM

## 2019-06-28 RX ORDER — FENTANYL CITRATE 50 UG/ML
INJECTION, SOLUTION INTRAMUSCULAR; INTRAVENOUS PRN
Status: DISCONTINUED | OUTPATIENT
Start: 2019-06-28 | End: 2019-06-28 | Stop reason: SDUPTHER

## 2019-06-28 RX ORDER — METHOCARBAMOL 500 MG/1
750 TABLET, FILM COATED ORAL 3 TIMES DAILY PRN
Status: DISCONTINUED | OUTPATIENT
Start: 2019-06-28 | End: 2019-07-01

## 2019-06-28 RX ORDER — FERROUS SULFATE 325(65) MG
325 TABLET ORAL
Status: DISCONTINUED | OUTPATIENT
Start: 2019-06-29 | End: 2019-07-11 | Stop reason: HOSPADM

## 2019-06-28 RX ORDER — FAMOTIDINE 20 MG/1
20 TABLET, FILM COATED ORAL DAILY
Status: DISCONTINUED | OUTPATIENT
Start: 2019-06-28 | End: 2019-07-11 | Stop reason: HOSPADM

## 2019-06-28 RX ORDER — ISOSORBIDE DINITRATE 10 MG/1
10 TABLET ORAL 2 TIMES DAILY
Status: DISCONTINUED | OUTPATIENT
Start: 2019-06-28 | End: 2019-07-07

## 2019-06-28 RX ORDER — TORSEMIDE 20 MG/1
40 TABLET ORAL DAILY
Status: DISCONTINUED | OUTPATIENT
Start: 2019-06-28 | End: 2019-06-29

## 2019-06-28 RX ORDER — OXYCODONE HYDROCHLORIDE AND ACETAMINOPHEN 5; 325 MG/1; MG/1
2 TABLET ORAL PRN
Status: DISCONTINUED | OUTPATIENT
Start: 2019-06-28 | End: 2019-06-28 | Stop reason: HOSPADM

## 2019-06-28 RX ADMIN — Medication 250 MG: at 21:36

## 2019-06-28 RX ADMIN — LIDOCAINE HYDROCHLORIDE 50 MG: 20 INJECTION, SOLUTION INFILTRATION; PERINEURAL at 13:22

## 2019-06-28 RX ADMIN — SUGAMMADEX 200 MG: 100 INJECTION, SOLUTION INTRAVENOUS at 13:42

## 2019-06-28 RX ADMIN — MELATONIN TAB 5 MG 10 MG: 5 TAB at 21:29

## 2019-06-28 RX ADMIN — PROPOFOL 70 MG: 10 INJECTION, EMULSION INTRAVENOUS at 13:22

## 2019-06-28 RX ADMIN — EPHEDRINE SULFATE 10 MG: 50 INJECTION INTRAVENOUS at 13:35

## 2019-06-28 RX ADMIN — SODIUM CHLORIDE, POTASSIUM CHLORIDE, SODIUM LACTATE AND CALCIUM CHLORIDE: 600; 310; 30; 20 INJECTION, SOLUTION INTRAVENOUS at 10:37

## 2019-06-28 RX ADMIN — MIDAZOLAM HYDROCHLORIDE 2 MG: 2 INJECTION, SOLUTION INTRAMUSCULAR; INTRAVENOUS at 13:13

## 2019-06-28 RX ADMIN — HYDRALAZINE HYDROCHLORIDE 20 MG: 10 TABLET, FILM COATED ORAL at 21:29

## 2019-06-28 RX ADMIN — PHENYLEPHRINE HYDROCHLORIDE 100 MCG: 10 INJECTION INTRAVENOUS at 13:22

## 2019-06-28 RX ADMIN — Medication 2 G: at 13:31

## 2019-06-28 RX ADMIN — MAGNESIUM GLUCONATE 500 MG ORAL TABLET 400 MG: 500 TABLET ORAL at 21:29

## 2019-06-28 RX ADMIN — SODIUM CHLORIDE, POTASSIUM CHLORIDE, SODIUM LACTATE AND CALCIUM CHLORIDE: 600; 310; 30; 20 INJECTION, SOLUTION INTRAVENOUS at 13:14

## 2019-06-28 RX ADMIN — APIXABAN 2.5 MG: 5 TABLET, FILM COATED ORAL at 21:30

## 2019-06-28 RX ADMIN — ROCURONIUM BROMIDE 40 MG: 10 SOLUTION INTRAVENOUS at 13:22

## 2019-06-28 RX ADMIN — GABAPENTIN 300 MG: 300 CAPSULE ORAL at 21:30

## 2019-06-28 RX ADMIN — PHENYLEPHRINE HYDROCHLORIDE 100 MCG: 10 INJECTION INTRAVENOUS at 13:35

## 2019-06-28 RX ADMIN — PHENYLEPHRINE HYDROCHLORIDE 100 MCG: 10 INJECTION INTRAVENOUS at 13:29

## 2019-06-28 RX ADMIN — FENTANYL CITRATE 50 MCG: 50 INJECTION INTRAMUSCULAR; INTRAVENOUS at 13:22

## 2019-06-28 RX ADMIN — DEXAMETHASONE SODIUM PHOSPHATE 6 MG: 4 INJECTION, SOLUTION INTRAMUSCULAR; INTRAVENOUS at 13:32

## 2019-06-28 RX ADMIN — FENTANYL CITRATE 50 MCG: 50 INJECTION INTRAMUSCULAR; INTRAVENOUS at 13:37

## 2019-06-28 RX ADMIN — ISOSORBIDE DINITRATE 10 MG: 10 TABLET ORAL at 21:29

## 2019-06-28 ASSESSMENT — PULMONARY FUNCTION TESTS
PIF_VALUE: 17
PIF_VALUE: 18
PIF_VALUE: 19
PIF_VALUE: 4
PIF_VALUE: 19
PIF_VALUE: 3
PIF_VALUE: 22
PIF_VALUE: 19
PIF_VALUE: 8
PIF_VALUE: 1
PIF_VALUE: 22
PIF_VALUE: 18
PIF_VALUE: 1
PIF_VALUE: 24
PIF_VALUE: 4
PIF_VALUE: 18
PIF_VALUE: 19
PIF_VALUE: 16
PIF_VALUE: 20
PIF_VALUE: 23
PIF_VALUE: 19
PIF_VALUE: 3
PIF_VALUE: 20
PIF_VALUE: 22
PIF_VALUE: 1
PIF_VALUE: 18
PIF_VALUE: 18
PIF_VALUE: 14
PIF_VALUE: 2
PIF_VALUE: 19
PIF_VALUE: 25
PIF_VALUE: 1
PIF_VALUE: 1
PIF_VALUE: 3
PIF_VALUE: 18
PIF_VALUE: 30
PIF_VALUE: 3
PIF_VALUE: 2
PIF_VALUE: 20
PIF_VALUE: 18

## 2019-06-28 ASSESSMENT — PAIN DESCRIPTION - ORIENTATION: ORIENTATION: RIGHT

## 2019-06-28 ASSESSMENT — PAIN DESCRIPTION - PAIN TYPE: TYPE: ACUTE PAIN;CHRONIC PAIN

## 2019-06-28 ASSESSMENT — PAIN - FUNCTIONAL ASSESSMENT: PAIN_FUNCTIONAL_ASSESSMENT: 0-10

## 2019-06-28 ASSESSMENT — PAIN SCALES - GENERAL
PAINLEVEL_OUTOF10: 6
PAINLEVEL_OUTOF10: 0

## 2019-06-28 ASSESSMENT — PAIN DESCRIPTION - LOCATION: LOCATION: ARM

## 2019-06-28 ASSESSMENT — PAIN DESCRIPTION - DESCRIPTORS: DESCRIPTORS: STABBING

## 2019-06-28 NOTE — ANESTHESIA POSTPROCEDURE EVALUATION
Department of Anesthesiology  Postprocedure Note    Patient: Sharla Bernheim  MRN: [id number]  YOB: 1960  Date of evaluation: 6/28/2019  Time:  5:25 PM     Procedure Summary     Date:  06/28/19 Room / Location:  [facility]    Anesthesia Start:  1314 Anesthesia Stop:  4133    Procedure:  STERNAL WOUND EXPLORATION WITH POSSIBLE WOUND VACUUM PLACEMENT (N/A ) Diagnosis:       Disruption or dehiscence of closure of fascia, superficial or muscular, initial encounter      (STERNAL WOUND DEHISCENSE)    Surgeon:  Negar Pal MD Responsible Provider:  Scarlett Charles MD    Anesthesia Type:  general ASA Status:  3          Anesthesia Type: general    Mark Phase I: Mark Score: 10    Mark Phase II:      Last vitals: Reviewed and per EMR flowsheets.        Anesthesia Post Evaluation    Comments: Postoperative Anesthesia Note    Name:    Sharla Bernheim  MRN:      [id number]    Patient Vitals in the past 12 hrs:  06/28/19 1624, BP:103/75, Pulse:88, Resp:16, SpO2:91 %  06/28/19 1540, BP:102/74, Pulse:81, Resp:15, SpO2:98 %  06/28/19 1535, BP:99/73, Resp:17, SpO2:98 %  06/28/19 1530, BP:105/84, Pulse:82, Resp:12, SpO2:98 %  06/28/19 1525, BP:100/80, Pulse:82, Resp:9, SpO2:96 %  06/28/19 1520, Pulse:82, Resp:15, SpO2:98 %  06/28/19 1515, Resp:15  06/28/19 1510, BP:96/82, Pulse:84, Resp:14, SpO2:94 %  06/28/19 1500, BP:101/81, Temp:97 °F (36.1 °C), Pulse:83, Resp:11, SpO2:98 %  06/28/19 1435, BP:101/80, Pulse:84, Resp:10, SpO2:100 %  06/28/19 1430, BP:99/77, Pulse:83, Resp:8, SpO2:100 %  06/28/19 1425, BP:89/71, Pulse:83, Resp:12, SpO2:99 %  06/28/19 1420, Pulse:81, Resp:11, SpO2:99 %  06/28/19 1415, BP:90/71, Pulse:83, Resp:9, SpO2:99 %  06/28/19 1410, BP:90/71, Pulse:82, Resp:12, SpO2:99 %  06/28/19 1405, BP:89/68, Pulse:83, Resp:12, SpO2:99 %  06/28/19 1400, BP:89/69, Temp:97.2 °F (36.2 °C), Temp src:Temporal, Pulse:83, Resp:16, SpO2:98 %  06/28/19 0956, Pulse:89, Resp:12, SpO2:97 %  06/28/19 0946,

## 2019-06-28 NOTE — PROGRESS NOTES
(ASPIRIN LOW DOSE) 81 MG EC tablet TAKE ONE TABLET BY MOUTH ONCE DAILY 4/23/18   Bharati Din, DO   Blood Glucose Monitoring Suppl (ACCU-CHEK COMPACT CARE KIT) KIT 1 kit by Does not apply route daily 4/18/17   Bharati Din, DO        Data Review:  CBC:   Lab Results   Component Value Date    WBC 11.8 06/28/2019    HGB 10.9 06/28/2019    HCT 35.0 06/28/2019    MCV 80.3 06/28/2019     06/28/2019     BMP:   Lab Results   Component Value Date     06/28/2019    K 4.0 06/28/2019    CL 92 06/28/2019    CO2 25 06/28/2019    PHOS 4.4 05/27/2019    BUN 86 06/28/2019    CREATININE 2.7 06/28/2019    CALCIUM 9.3 06/28/2019    MG 2.10 05/14/2019     PT/INR:   Lab Results   Component Value Date    PROTIME 25.1 01/10/2019    INR 2.20 01/10/2019       Assessment/Plan:  - skin opening with drainage  CT chest to evaluate wound/tract (for pt, [facility] is [facility] site)  I'm guessing will ultimately require limited local exploration, maybe removal of underlying sternal wire    - on eliquis, presumably for CAD  Would need to hold if procedure required    Jenna Villarreal MD  6/28/2019  1:03 PM

## 2019-06-28 NOTE — ANESTHESIA PRE PROCEDURE
Department of Anesthesiology  Preprocedure Note       Name:  Maurisio Borden   Age:  60 y.o.  :  1960                                          MRN:  [id number]         Date:  2019      Surgeon: Marielena Singh):  Reinaldo Steiner MD    Procedure: STERNAL WOUND EXPLORATION WITH POSSIBLE WOUND VACUUM PLACEMENT (N/A )    Medications prior to admission:   Prior to Admission medications    Medication Sig Start Date End Date Taking?  Authorizing Provider   furosemide (LASIX) 40 MG tablet Take 40 mg by mouth daily 19  Yes Historical Provider, MD   allopurinol (ZYLOPRIM) 100 MG tablet Take 100 mg by mouth daily 7/13/15  Yes Historical Provider, MD   famotidine (PEPCID) 20 MG tablet Take 20 mg by mouth 2 times daily   Yes Historical Provider, MD   VANCOMYCIN HCL PO Take 250 mg by mouth 4 times daily   Yes Historical Provider, MD   Digestive Enzymes (ACIDOLL PO) Take 1 tablet by mouth 2 times daily   Yes Historical Provider, MD   metoprolol succinate (TOPROL XL) 50 MG extended release tablet Take 1.5 tablets by mouth daily 19  Yes PAYTON Hdez CNP   torsemide [facility]) 20 MG tablet Take 2 tablets by mouth daily 19  Yes PAYTON Hdez CNP   isosorbide dinitrate (ISORDIL) 10 MG tablet Take 1 tablet by mouth 2 times daily 19  Yes Justin Damon MD   hydrALAZINE (APRESOLINE) 10 MG tablet Take 2 tablets by mouth every 12 hours 19  Yes Justin Damon MD   metolazone (ZAROXOLYN) 2.5 MG tablet Take 1 tablet by mouth once a week On 19  Yes Justin Damon MD   spironolactone (ALDACTONE) 25 MG tablet Take 1 tablet by mouth daily 19  Yes Justin Damon MD   ferrous sulfate 325 (65 Fe) MG tablet Take 325 mg by mouth daily (with breakfast)   Yes Historical Provider, MD   Magnesium Oxide 200 MG TABS Take 1 tablet by mouth nightly   Yes Historical Provider, MD   tamsulosin (FLOMAX) 0.4 MG capsule Take 1 capsule by mouth daily 18  Yes Mandie Lambert MD   fluticasone (FLONASE) 50 MCG/ACT nasal spray 2 sprays by Each Nare route daily   Yes Historical Provider, MD   gabapentin (NEURONTIN) 300 MG capsule Take 300 mg by mouth nightly. .   Yes Historical Provider, MD   glucose blood VI test strips (FREESTYLE TEST STRIPS) strip 1 each by In Vitro route daily As needed. 5/3/18  Yes Ez Arroyo, DO   ACCU-CHEK MULTICLIX LANCETS MISC USE ONE  TO CHECK GLUCOSE ONCE DAILY 4/19/18  Yes Ez Arroyo, DO   Melatonin 10 MG TABS Take 10 mg by mouth nightly    Yes Historical Provider, MD   methocarbamol (ROBAXIN-750) 750 MG tablet Take 1 tablet by mouth 3 times daily as needed (PAIN) 6/26/19 7/6/19  Pipe Barrientos, DO   miconazole (LOTRIMIN AF) 2 % powder by Intratympanic route    Historical Provider, MD   vitamin C (ASCORBIC ACID) 500 MG tablet Take 500 mg by mouth daily    Historical Provider, MD   atorvastatin (LIPITOR) 80 MG tablet Take 80 mg by mouth daily    Historical Provider, MD   apixaban (ELIQUIS) 2.5 MG TABS tablet Take 1 tablet by mouth 2 times daily  Patient taking differently: Take 2.5 mg by mouth 2 times daily Stopped on 6/21/2019 for surgery.  5/16/19   Krystyna Huff MD   acetaminophen (TYLENOL) 325 MG tablet Take 650 mg by mouth every 6 hours as needed for Pain    Historical Provider, MD   Multiple Vitamins-Minerals (THERAPEUTIC MULTIVITAMIN-MINERALS) tablet Take 1 tablet by mouth daily    Historical Provider, MD   aspirin (ASPIRIN LOW DOSE) 81 MG EC tablet TAKE ONE TABLET BY MOUTH ONCE DAILY 4/23/18   Ez Arroyo, DO   Blood Glucose Monitoring Suppl (ACCU-CHEK COMPACT CARE KIT) KIT 1 kit by Does not apply route daily 4/18/17   Ez Arroyo, DO       Current medications:    Current Facility-Administered Medications   Medication Dose Route Frequency Provider Last Rate Last Dose    ceFAZolin (ANCEF) 2 g in sterile water 20 mL IV syringe  2 g Intravenous Once Mahendra Acevedo MD        lactated ringers infusion   Intravenous Continuous Allyson Blizzard, MD [place] Obesity, Class II, BMI 35-39.9 E66.9    Cervical myelopathy (HCC) G95.9    Lesion of bladder N32.9    Recurrent UTI N39.0    Anasarca R60.1    PAF (paroxysmal atrial fibrillation) (HCC) I48.0    Benign essential HTN I10    CAMI (acute kidney injury) (HCC) N17.9    Hypervolemia E87.70    Positive culture finding R89.5    Colitis due to Clostridium difficile A04.72    Decreased cardiac function R09.89       Past Medical History:        Diagnosis Date    Anoxic brain injury ([facility] [address] 1994    Cardiac arrest ([facility] [address] 1994    Clostridium difficile infection 05/09/2019    Gout     Hyperlipidemia     Hypertension     Memory deficit     poor short term memory    MRSA (methicillin resistant staph aureus) culture positive 05/09/2019    bacteremia    Reflux     S/P CABG x 3 12/2018    Sleep apnea     Type 2 diabetes mellitus without complication [facility])        Past Surgical History:        Procedure Laterality Date    CARDIAC CATHETERIZATION  11/26/2018    Dr. Yvonne Beach Left 03/25/2014    Dr. Lexie Batista - w/trigger finger (3rd) & trigger thumb release    CARPAL TUNNEL RELEASE Right 04/14/2015    Dr. Irina Penaloza N/A 12/16/2018    Dr. Nilson Monteiro - decompressive corpectomy C6 (>90%), microdissection, anterior cervical arthrodesis w/PEEK allograft, placement of corpectomy cage & Synthes plate T7-4    COLONOSCOPY  09/01/2015    Dr. Oneal - gillis-diverticulosis, transverse colon adenomatous polyps    CORONARY ARTERY BYPASS GRAFT  12/14/2018    Dr. Jenita Kanner  04/05/2019    cystoscopy/bladder biopsy f/c exchange    CYSTOSCOPY N/A 4/5/2019    CYSTOSCOPY performed by Robina Chavez MD at [address] [place] [address] EMG Bilateral 02/27/2014    Dr. Martha Triana POLYSOMNOGRAPHY  04/12/2016    Dr. Uziel Muniz w[facility] Health    NM CABG, ARTERIAL, THREE N/A 12/4/2018    Dr. Zeyad Mesa - x3 (LIMA-LAD, BL SVG-OM, SVG-PDA)    RECONSTRUCTIVE REPAIR STERNAL N/A 12/20/2018 Dr. Antonieta Deng - I&D of sternum w/placement of wound VAC    TRANSESOPHAGEAL ECHOCARDIOGRAM  12/04/2018    during CABG    TUNNELED VENOUS CATHETER PLACEMENT Right 12/24/2018    Dr. Obdulia Catalan - PICC via IJ       Social History:    Social History     Tobacco Use    Smoking status: Never Smoker    Smokeless tobacco: Never Used   Substance Use Topics    Alcohol use: No     Alcohol/week: 0.0 oz                                Counseling given: Not Answered      Vital Signs (Current):   Vitals:    06/24/19 1459 06/28/19 0946 06/28/19 0956   BP:  (!) 107/46    Pulse:   89   Resp:   12   Temp:  96.4 °F (35.8 °C)    TempSrc:  Temporal    SpO2:   97%   Weight: 200 lb (90.7 kg) 200 lb (90.7 kg)    Height: 5' 10\" (1.778 m) 5' 10\" (1.778 m)                                               BP Readings from Last 3 Encounters:   06/28/19 (!) 107/46   06/26/19 118/81   06/21/19 124/70       NPO Status: Time of last liquid consumption: 1700                        Time of last solid consumption: 1700                        Date of last liquid consumption: 06/27/19                        Date of last solid food consumption: 06/27/19    BMI:   Wt Readings from Last 3 Encounters:   06/28/19 200 lb (90.7 kg)   06/26/19 200 lb (90.7 kg)   06/21/19 200 lb (90.7 kg)     Body mass index is 28.7 kg/m².     CBC:   Lab Results   Component Value Date    WBC 11.8 06/28/2019    RBC 4.36 06/28/2019    HGB 10.9 06/28/2019    HCT 35.0 06/28/2019    MCV 80.3 06/28/2019    RDW 20.7 06/28/2019     06/28/2019       CMP:   Lab Results   Component Value Date     06/28/2019    K 4.0 06/28/2019    CL 92 06/28/2019    CO2 25 06/28/2019    BUN 86 06/28/2019    CREATININE 2.7 06/28/2019    GFRAA 29 06/28/2019    AGRATIO 1.1 05/03/2019    LABGLOM 24 06/28/2019    GLUCOSE 123 06/28/2019    GLUCOSE 78 05/27/2019    PROT 6.2 05/03/2019    CALCIUM 9.3 06/28/2019    BILITOT 0.5 05/03/2019    ALKPHOS 109 05/03/2019    AST 21 05/03/2019    ALT 10 05/03/2019 regurgitation with PASP of 55mmHg     Pulmonic valve - mild regurgitation      Signature      ------------------------------------------------------------------   Electronically signed by Rohit Hubbard MD (Interpreting  [phone]) on 05/04/2019 at 01:34 PM    Pre-Operative Diagnosis: STERNAL WOUND DEHISCENSE    60 y.o.   BMI:  Body mass index is 28.7 kg/m².      Vitals:    06/24/19 1459 06/28/19 0946 06/28/19 0956   BP:  (!) 107/46    Pulse:   89   Resp:   12   Temp:  96.4 °F (35.8 °C)    TempSrc:  Temporal    SpO2:   97%   Weight: 200 lb (90.7 kg) 200 lb (90.7 kg)    Height: 5' 10\" (1.778 m) 5' 10\" (1.778 m)        Allergies   Allergen Reactions    Enalapril     Nadolol     Verapamil        Social History     Tobacco Use    Smoking status: Never Smoker    Smokeless tobacco: Never Used   Substance Use Topics    Alcohol use: No     Alcohol/week: 0.0 oz       LABS:    CBC  Lab Results   Component Value Date/Time    WBC 11.8 (H) 06/28/2019 09:49 AM    HGB 10.9 (L) 06/28/2019 09:49 AM    HCT 35.0 (L) 06/28/2019 09:49 AM     06/28/2019 09:49 AM     RENAL  Lab Results   Component Value Date/Time     (L) 06/28/2019 09:49 AM    K 4.0 06/28/2019 09:49 AM    CL 92 (L) 06/28/2019 09:49 AM    CO2 25 06/28/2019 09:49 AM    BUN 86 (HH) 06/28/2019 09:49 AM    CREATININE 2.7 (H) 06/28/2019 09:49 AM    GLUCOSE 123 (H) 06/28/2019 09:49 AM    GLUCOSE 78 05/27/2019     COAGS  Lab Results   Component Value Date/Time    PROTIME 25.1 (H) 01/10/2019 05:20 AM    INR 2.20 (H) 01/10/2019 05:20 AM    APTT 34.2 11/28/2018 06:34 AM       Sarah Price MD   6/28/2019

## 2019-06-28 NOTE — PROGRESS NOTES
4 Eyes Skin Assessment     The patient is being assess for   Post-Op Surgical    I agree that 2 RN's have performed a thorough Head to Toe Skin Assessment on the patient. ALL assessment sites listed below have been assessed. Areas assessed by both nurses:   [x]   Head, Face, and Ears   [x]   Shoulders, Back, and Chest, Abdomen  [x]   Arms, Elbows, and Hands   [x]   Coccyx, Sacrum, and Ischium  [x]   Legs, Feet, and Heels        Sternal wound with wound vac  Stage 2 coccyx- mepilex applied       **SHARE this note so that the co-signing nurse is able to place an eSignature**    Co-signer eSignature: Electronically signed by Subhash Alarcon RN on 6/28/19 at 4:02 PM    Does the Patient have Skin Breakdown?   Yes LDA WOUND CARE was Initiated documentation include the [place]-wound, Wound Assessment, Measurements, Dressing Treatment, Drainage, and Color\",          Shivam Prevention initiated:  Yes   Wound Care Orders initiated:  Yes      [id number]  nurse consulted for Pressure Injury (Stage 3,4, Unstageable, DTI, NWPT, Complex wounds)and New or Established Ostomies:  Yes      Primary Nurse eSignature: Electronically signed by Rachna Arceo RN on 6/28/19 at 4:43 PM

## 2019-06-29 LAB
ALBUMIN SERPL-MCNC: 2.5 G/DL (ref 3.4–5)
ANION GAP SERPL CALCULATED.3IONS-SCNC: 21 MMOL/L (ref 3–16)
BUN BLDV-MCNC: 93 MG/DL (ref 7–20)
CALCIUM SERPL-MCNC: 8.9 MG/DL (ref 8.3–10.6)
CHLORIDE BLD-SCNC: 90 MMOL/L (ref 99–110)
CO2: 20 MMOL/L (ref 21–32)
CREAT SERPL-MCNC: 3.6 MG/DL (ref 0.9–1.3)
GFR AFRICAN AMERICAN: 21
GFR NON-AFRICAN AMERICAN: 17
GLUCOSE BLD-MCNC: 122 MG/DL (ref 70–99)
GLUCOSE BLD-MCNC: 152 MG/DL (ref 70–99)
GLUCOSE BLD-MCNC: 174 MG/DL (ref 70–99)
GLUCOSE BLD-MCNC: 190 MG/DL (ref 70–99)
MAGNESIUM: 2.2 MG/DL (ref 1.8–2.4)
PERFORMED ON: ABNORMAL
PHOSPHORUS: 7.9 MG/DL (ref 2.5–4.9)
POTASSIUM SERPL-SCNC: 4.1 MMOL/L (ref 3.5–5.1)
SODIUM BLD-SCNC: 131 MMOL/L (ref 136–145)

## 2019-06-29 PROCEDURE — 36415 COLL VENOUS BLD VENIPUNCTURE: CPT

## 2019-06-29 PROCEDURE — 1200000000 HC SEMI PRIVATE

## 2019-06-29 PROCEDURE — 6360000002 HC RX W HCPCS: Performed by: THORACIC SURGERY (CARDIOTHORACIC VASCULAR SURGERY)

## 2019-06-29 PROCEDURE — 2580000003 HC RX 258

## 2019-06-29 PROCEDURE — 51798 US URINE CAPACITY MEASURE: CPT

## 2019-06-29 PROCEDURE — 80069 RENAL FUNCTION PANEL: CPT

## 2019-06-29 PROCEDURE — 6370000000 HC RX 637 (ALT 250 FOR IP): Performed by: THORACIC SURGERY (CARDIOTHORACIC VASCULAR SURGERY)

## 2019-06-29 PROCEDURE — 99024 POSTOP FOLLOW-UP VISIT: CPT | Performed by: THORACIC SURGERY (CARDIOTHORACIC VASCULAR SURGERY)

## 2019-06-29 PROCEDURE — 2580000003 HC RX 258: Performed by: THORACIC SURGERY (CARDIOTHORACIC VASCULAR SURGERY)

## 2019-06-29 PROCEDURE — 83735 ASSAY OF MAGNESIUM: CPT

## 2019-06-29 PROCEDURE — 2580000003 HC RX 258: Performed by: INTERNAL MEDICINE

## 2019-06-29 RX ORDER — SODIUM CHLORIDE 9 MG/ML
INJECTION, SOLUTION INTRAVENOUS CONTINUOUS
Status: DISCONTINUED | OUTPATIENT
Start: 2019-06-29 | End: 2019-06-30

## 2019-06-29 RX ORDER — 0.9 % SODIUM CHLORIDE 0.9 %
1000 INTRAVENOUS SOLUTION INTRAVENOUS ONCE
Status: COMPLETED | OUTPATIENT
Start: 2019-06-29 | End: 2019-06-30

## 2019-06-29 RX ORDER — FUROSEMIDE 10 MG/ML
40 INJECTION INTRAMUSCULAR; INTRAVENOUS ONCE
Status: DISCONTINUED | OUTPATIENT
Start: 2019-06-29 | End: 2019-06-29

## 2019-06-29 RX ORDER — AMOXICILLIN AND CLAVULANATE POTASSIUM 875; 125 MG/1; MG/1
1 TABLET, FILM COATED ORAL EVERY 12 HOURS SCHEDULED
Status: DISCONTINUED | OUTPATIENT
Start: 2019-06-29 | End: 2019-06-30

## 2019-06-29 RX ORDER — SODIUM CHLORIDE 9 MG/ML
INJECTION, SOLUTION INTRAVENOUS
Status: DISPENSED
Start: 2019-06-29 | End: 2019-06-30

## 2019-06-29 RX ORDER — SODIUM CHLORIDE 0.9 % (FLUSH) 0.9 %
SYRINGE (ML) INJECTION
Status: COMPLETED
Start: 2019-06-29 | End: 2019-06-29

## 2019-06-29 RX ORDER — FUROSEMIDE 10 MG/ML
10 INJECTION INTRAMUSCULAR; INTRAVENOUS ONCE
Status: COMPLETED | OUTPATIENT
Start: 2019-06-29 | End: 2019-06-29

## 2019-06-29 RX ORDER — SODIUM CHLORIDE 9 MG/ML
INJECTION, SOLUTION INTRAVENOUS CONTINUOUS
Status: DISCONTINUED | OUTPATIENT
Start: 2019-06-29 | End: 2019-06-29

## 2019-06-29 RX ORDER — SODIUM CHLORIDE 9 MG/ML
INJECTION, SOLUTION INTRAVENOUS ONCE
Status: COMPLETED | OUTPATIENT
Start: 2019-06-29 | End: 2019-06-29

## 2019-06-29 RX ADMIN — SPIRONOLACTONE 25 MG: 25 TABLET ORAL at 08:57

## 2019-06-29 RX ADMIN — ISOSORBIDE DINITRATE 10 MG: 10 TABLET ORAL at 20:15

## 2019-06-29 RX ADMIN — APIXABAN 2.5 MG: 5 TABLET, FILM COATED ORAL at 08:57

## 2019-06-29 RX ADMIN — FLUTICASONE PROPIONATE 2 SPRAY: 50 SPRAY, METERED NASAL at 09:03

## 2019-06-29 RX ADMIN — AMOXICILLIN AND CLAVULANATE POTASSIUM 1 TABLET: 875; 125 TABLET, FILM COATED ORAL at 20:15

## 2019-06-29 RX ADMIN — Medication 250 MG: at 20:17

## 2019-06-29 RX ADMIN — METOPROLOL SUCCINATE 75 MG: 50 TABLET, EXTENDED RELEASE ORAL at 08:56

## 2019-06-29 RX ADMIN — FERROUS SULFATE TAB 325 MG (65 MG ELEMENTAL FE) 325 MG: 325 (65 FE) TAB at 08:57

## 2019-06-29 RX ADMIN — MAGNESIUM GLUCONATE 500 MG ORAL TABLET 400 MG: 500 TABLET ORAL at 20:15

## 2019-06-29 RX ADMIN — GABAPENTIN 300 MG: 300 CAPSULE ORAL at 20:19

## 2019-06-29 RX ADMIN — Medication 250 MG: at 17:31

## 2019-06-29 RX ADMIN — FUROSEMIDE 10 MG: 10 INJECTION, SOLUTION INTRAMUSCULAR; INTRAVENOUS at 19:35

## 2019-06-29 RX ADMIN — SODIUM CHLORIDE 1000 ML: 9 INJECTION, SOLUTION INTRAVENOUS at 23:19

## 2019-06-29 RX ADMIN — FAMOTIDINE 20 MG: 20 TABLET ORAL at 08:57

## 2019-06-29 RX ADMIN — MELATONIN TAB 5 MG 10 MG: 5 TAB at 20:16

## 2019-06-29 RX ADMIN — ALLOPURINOL 100 MG: 100 TABLET ORAL at 08:57

## 2019-06-29 RX ADMIN — METHOCARBAMOL TABLETS 750 MG: 500 TABLET, COATED ORAL at 00:01

## 2019-06-29 RX ADMIN — Medication 250 MG: at 14:36

## 2019-06-29 RX ADMIN — OXYCODONE HYDROCHLORIDE AND ACETAMINOPHEN 500 MG: 500 TABLET ORAL at 08:57

## 2019-06-29 RX ADMIN — ISOSORBIDE DINITRATE 10 MG: 10 TABLET ORAL at 08:57

## 2019-06-29 RX ADMIN — TAMSULOSIN HYDROCHLORIDE 0.4 MG: 0.4 CAPSULE ORAL at 08:56

## 2019-06-29 RX ADMIN — HYDRALAZINE HYDROCHLORIDE 20 MG: 10 TABLET, FILM COATED ORAL at 08:57

## 2019-06-29 RX ADMIN — Medication 1 TABLET: at 09:03

## 2019-06-29 RX ADMIN — SODIUM CHLORIDE: 9 INJECTION, SOLUTION INTRAVENOUS at 17:54

## 2019-06-29 RX ADMIN — ASPIRIN 81 MG: 81 TABLET ORAL at 08:57

## 2019-06-29 RX ADMIN — TORSEMIDE 40 MG: 20 TABLET ORAL at 08:56

## 2019-06-29 RX ADMIN — Medication 250 MG: at 08:59

## 2019-06-29 RX ADMIN — ATORVASTATIN CALCIUM 80 MG: 80 TABLET, FILM COATED ORAL at 08:57

## 2019-06-29 RX ADMIN — ACETAMINOPHEN 650 MG: 325 TABLET ORAL at 00:02

## 2019-06-29 RX ADMIN — Medication: at 17:55

## 2019-06-29 RX ADMIN — APIXABAN 2.5 MG: 5 TABLET, FILM COATED ORAL at 20:15

## 2019-06-29 RX ADMIN — ACETAMINOPHEN 650 MG: 325 TABLET ORAL at 20:18

## 2019-06-29 ASSESSMENT — PAIN SCALES - GENERAL
PAINLEVEL_OUTOF10: 3
PAINLEVEL_OUTOF10: 0
PAINLEVEL_OUTOF10: 0
PAINLEVEL_OUTOF10: 6
PAINLEVEL_OUTOF10: 0

## 2019-06-29 NOTE — PROGRESS NOTES
Vitals:    06/29/19 0819   BP: 106/73   Pulse: 86   Resp: 18   Temp: 97.2 °F (36.2 °C)   SpO2: 100%     Pt resting quietly in bed alert and oriented. Pt had no acute events overnight. Pt denies having pain at this time and denies having further needs. Wound vac in place mid/chest. Bed locked in lowest position, call light within reach, bedside table within reach.  Will continue to monitor

## 2019-06-29 NOTE — PROGRESS NOTES
Patient to unit from PACU. Four eyed skin assessment completed in PACU. Patient came to hospital with mohamud. Was told in report that tubing and bag was changed; nurse unsure if new mohamud was inserted. No documentation to clarify. Patient poor historian and when asked was unable to remember how long he had mohamud in and whether he received a new mohamud upon admission. Writer did not change out mohamud. Patient oriented to room. Bed in lowest position with brake locked. Table, call light, and phone within reach. Patient stated that he had a watch when admitted, and it is noted in chart that it was in his belongings but it was not with him when he transported to unit. When asked if his Faria Apo, may have taken it home, he was unsure. Patient also stated that he had a pair of clogs, which were not present. Patient has holter monitor . It was not attached at time of report. No complaints at this time.

## 2019-06-29 NOTE — PROGRESS NOTES
Care center called to get an update. Ethan Shrestha stated that pt was medically stable for discharge when the facility is ready with a wound vac, referred her to the discharge planner. Pt refused scd's. Pt refused lunch. It is at the bedside and he will eat it when he is ready. Room tidied. Bony prominences cushioned with pillows.

## 2019-06-29 NOTE — CARE COORDINATION
No call back from liason regarding status of wound vac today. Call placed to facility and spoke to pt primary nurse. She confirms they do have wound vac to place on pt once he arrives and can accept back tomorrow. MD please place wound care orders and signature on d/c ecoc.

## 2019-06-29 NOTE — PROGRESS NOTES
Decreased urine output. Decreased po intake. Pt has chronic mohamud that was placed by urology. Will bladder scan patient and called out to doc. Bladder scanned pt and the result was 95cc. Reviewed labs and ins and outs. Call placed to md.     See orders.

## 2019-06-29 NOTE — DISCHARGE INSTR - COC
Restriction: no  Last Modified Barium Swallow with Video (Video Swallowing Test): not done    Treatments at the Time of Hospital Discharge:   Respiratory Treatments:   Oxygen Therapy:  is not on home oxygen therapy. Ventilator:    - No ventilator support    Rehab Therapies: Physical Therapy and Occupational Therapy  Weight Bearing Status/Restrictions: No weight bearing restirctions  Other Medical Equipment (for information only, NOT a DME order):  wheelchair  Other Treatments:     Patient's personal belongings (please select all that are sent with patient):  Letty    RN SIGNATURE:  Electronically signed by Sheila Bae RN on 7/11/19 at 3:01 PM    CASE MANAGEMENT/SOCIAL WORK SECTION    Inpatient Status Date: ***    Readmission Risk Assessment Score:  Readmission Risk              Risk of Unplanned Readmission:        33           Discharging to Facility/ Agency   · Name: [facility]  · Address:  · Phone:[phone]  · EVS:[phone]        / signature: Electronically signed by Abbey Sams RN on 7/11/19 at 2:44 PM    PHYSICIAN SECTION    Recommended Follow-up, Labs or Other Treatments After Discharge:    Wound care orders:  Negative pressure wound therapy to mid sternal wound. Change 3 times a week. Connect to 125 mmHg low continuous suction. Follow up with Dr Colt Hanna in one week. Call for appointment  Left Heel, Clean with Normal saline. Prep brett wound with zinc ointment, apply nickel thick Santyl into wound bed then cover with lightly moist 2x2 dressing (avoid placing saline dressing on healthy skin); cover with 4x4 and secure with roll guaze and change daily.                 Prognosis: {Prognosis:[id number]}    Condition at Discharge: [facility] Patient Condition:[id number]}    Rehab Potential (if transferring to Rehab): {Prognosis:[id number]}    Recommended Labs or Other Treatments After Discharge:                           Out-patient antibiotic therapy (OPAT)/ Miguel Angel Flores MD on 7/11/19 at 12:58 PM

## 2019-06-30 ENCOUNTER — APPOINTMENT (OUTPATIENT)
Dept: GENERAL RADIOLOGY | Age: 59
DRG: 813 | End: 2019-06-30
Attending: THORACIC SURGERY (CARDIOTHORACIC VASCULAR SURGERY)
Payer: MEDICAID

## 2019-06-30 ENCOUNTER — APPOINTMENT (OUTPATIENT)
Dept: ULTRASOUND IMAGING | Age: 59
DRG: 813 | End: 2019-06-30
Attending: THORACIC SURGERY (CARDIOTHORACIC VASCULAR SURGERY)
Payer: MEDICAID

## 2019-06-30 LAB
ALBUMIN SERPL-MCNC: 2.9 G/DL (ref 3.4–5)
ANION GAP SERPL CALCULATED.3IONS-SCNC: 19 MMOL/L (ref 3–16)
ANION GAP SERPL CALCULATED.3IONS-SCNC: 22 MMOL/L (ref 3–16)
ANISOCYTOSIS: ABNORMAL
BANDED NEUTROPHILS RELATIVE PERCENT: 6 % (ref 0–7)
BASOPHILS ABSOLUTE: 0 K/UL (ref 0–0.2)
BASOPHILS RELATIVE PERCENT: 0 %
BUN BLDV-MCNC: 102 MG/DL (ref 7–20)
BUN BLDV-MCNC: 95 MG/DL (ref 7–20)
CALCIUM SERPL-MCNC: 8.6 MG/DL (ref 8.3–10.6)
CALCIUM SERPL-MCNC: 8.9 MG/DL (ref 8.3–10.6)
CHLORIDE BLD-SCNC: 91 MMOL/L (ref 99–110)
CHLORIDE BLD-SCNC: 96 MMOL/L (ref 99–110)
CO2: 15 MMOL/L (ref 21–32)
CO2: 20 MMOL/L (ref 21–32)
CREAT SERPL-MCNC: 4 MG/DL (ref 0.9–1.3)
CREAT SERPL-MCNC: 4 MG/DL (ref 0.9–1.3)
EOSINOPHILS ABSOLUTE: 0 K/UL (ref 0–0.6)
EOSINOPHILS RELATIVE PERCENT: 0 %
GFR AFRICAN AMERICAN: 19
GFR AFRICAN AMERICAN: 19
GFR NON-AFRICAN AMERICAN: 15
GFR NON-AFRICAN AMERICAN: 15
GLUCOSE BLD-MCNC: 111 MG/DL (ref 70–99)
GLUCOSE BLD-MCNC: 114 MG/DL (ref 70–99)
GLUCOSE BLD-MCNC: 129 MG/DL (ref 70–99)
GLUCOSE BLD-MCNC: 135 MG/DL (ref 70–99)
GLUCOSE BLD-MCNC: 141 MG/DL (ref 70–99)
GLUCOSE BLD-MCNC: 145 MG/DL (ref 70–99)
HCT VFR BLD CALC: 36 % (ref 40.5–52.5)
HEMOGLOBIN: 11.4 G/DL (ref 13.5–17.5)
LYMPHOCYTES ABSOLUTE: 0.9 K/UL (ref 1–5.1)
LYMPHOCYTES RELATIVE PERCENT: 7 %
MCH RBC QN AUTO: 25.2 PG (ref 26–34)
MCHC RBC AUTO-ENTMCNC: 31.6 G/DL (ref 31–36)
MCV RBC AUTO: 79.7 FL (ref 80–100)
MONOCYTES ABSOLUTE: 0.5 K/UL (ref 0–1.3)
MONOCYTES RELATIVE PERCENT: 4 %
NEUTROPHILS ABSOLUTE: 11.7 K/UL (ref 1.7–7.7)
NEUTROPHILS RELATIVE PERCENT: 83 %
NUCLEATED RED BLOOD CELLS: 1 /100 WBC
ORGANISM: ABNORMAL
ORGANISM: ABNORMAL
OVALOCYTES: ABNORMAL
PDW BLD-RTO: 20.8 % (ref 12.4–15.4)
PERFORMED ON: ABNORMAL
PHOSPHORUS: 7.8 MG/DL (ref 2.5–4.9)
PLATELET # BLD: 289 K/UL (ref 135–450)
PLATELET SLIDE REVIEW: ADEQUATE
PMV BLD AUTO: 8.6 FL (ref 5–10.5)
POIKILOCYTES: ABNORMAL
POLYCHROMASIA: ABNORMAL
POTASSIUM SERPL-SCNC: 4.2 MMOL/L (ref 3.5–5.1)
POTASSIUM SERPL-SCNC: 4.6 MMOL/L (ref 3.5–5.1)
RBC # BLD: 4.51 M/UL (ref 4.2–5.9)
SCHISTOCYTES: ABNORMAL
SODIUM BLD-SCNC: 130 MMOL/L (ref 136–145)
SODIUM BLD-SCNC: 133 MMOL/L (ref 136–145)
TARGET CELLS: ABNORMAL
URINE CULTURE, ROUTINE: ABNORMAL
WBC # BLD: 13.2 K/UL (ref 4–11)

## 2019-06-30 PROCEDURE — 1200000000 HC SEMI PRIVATE

## 2019-06-30 PROCEDURE — 85025 COMPLETE CBC W/AUTO DIFF WBC: CPT

## 2019-06-30 PROCEDURE — 71045 X-RAY EXAM CHEST 1 VIEW: CPT

## 2019-06-30 PROCEDURE — 80069 RENAL FUNCTION PANEL: CPT

## 2019-06-30 PROCEDURE — P9047 ALBUMIN (HUMAN), 25%, 50ML: HCPCS | Performed by: INTERNAL MEDICINE

## 2019-06-30 PROCEDURE — 6360000002 HC RX W HCPCS: Performed by: INTERNAL MEDICINE

## 2019-06-30 PROCEDURE — 6370000000 HC RX 637 (ALT 250 FOR IP): Performed by: THORACIC SURGERY (CARDIOTHORACIC VASCULAR SURGERY)

## 2019-06-30 PROCEDURE — 76770 US EXAM ABDO BACK WALL COMP: CPT

## 2019-06-30 PROCEDURE — 2580000003 HC RX 258: Performed by: INTERNAL MEDICINE

## 2019-06-30 PROCEDURE — 36415 COLL VENOUS BLD VENIPUNCTURE: CPT

## 2019-06-30 RX ORDER — ALBUMIN (HUMAN) 12.5 G/50ML
25 SOLUTION INTRAVENOUS EVERY 8 HOURS
Status: COMPLETED | OUTPATIENT
Start: 2019-06-30 | End: 2019-06-30

## 2019-06-30 RX ORDER — FUROSEMIDE 10 MG/ML
80 INJECTION INTRAMUSCULAR; INTRAVENOUS ONCE
Status: COMPLETED | OUTPATIENT
Start: 2019-06-30 | End: 2019-06-30

## 2019-06-30 RX ORDER — DOPAMINE HYDROCHLORIDE 160 MG/100ML
2.5 INJECTION, SOLUTION INTRAVENOUS CONTINUOUS
Status: DISCONTINUED | OUTPATIENT
Start: 2019-06-30 | End: 2019-07-04

## 2019-06-30 RX ADMIN — ACETAMINOPHEN 650 MG: 325 TABLET ORAL at 14:25

## 2019-06-30 RX ADMIN — Medication 250 MG: at 22:01

## 2019-06-30 RX ADMIN — TAMSULOSIN HYDROCHLORIDE 0.4 MG: 0.4 CAPSULE ORAL at 09:32

## 2019-06-30 RX ADMIN — CEFEPIME 500 MG: 1 INJECTION, POWDER, FOR SOLUTION INTRAMUSCULAR; INTRAVENOUS at 16:10

## 2019-06-30 RX ADMIN — GABAPENTIN 300 MG: 300 CAPSULE ORAL at 21:52

## 2019-06-30 RX ADMIN — ISOSORBIDE DINITRATE 10 MG: 10 TABLET ORAL at 09:32

## 2019-06-30 RX ADMIN — ALBUMIN (HUMAN) 25 G: 0.25 INJECTION, SOLUTION INTRAVENOUS at 21:54

## 2019-06-30 RX ADMIN — SODIUM CHLORIDE: 9 INJECTION, SOLUTION INTRAVENOUS at 03:07

## 2019-06-30 RX ADMIN — METOPROLOL SUCCINATE 75 MG: 50 TABLET, EXTENDED RELEASE ORAL at 09:31

## 2019-06-30 RX ADMIN — APIXABAN 2.5 MG: 5 TABLET, FILM COATED ORAL at 21:52

## 2019-06-30 RX ADMIN — MAGNESIUM GLUCONATE 500 MG ORAL TABLET 400 MG: 500 TABLET ORAL at 21:52

## 2019-06-30 RX ADMIN — AMOXICILLIN AND CLAVULANATE POTASSIUM 1 TABLET: 875; 125 TABLET, FILM COATED ORAL at 09:32

## 2019-06-30 RX ADMIN — Medication 250 MG: at 09:33

## 2019-06-30 RX ADMIN — DOPAMINE HYDROCHLORIDE 2.5 MCG/KG/MIN: 160 INJECTION, SOLUTION INTRAVENOUS at 13:27

## 2019-06-30 RX ADMIN — FUROSEMIDE 80 MG: 10 INJECTION, SOLUTION INTRAMUSCULAR; INTRAVENOUS at 14:09

## 2019-06-30 RX ADMIN — OXYCODONE HYDROCHLORIDE AND ACETAMINOPHEN 500 MG: 500 TABLET ORAL at 09:32

## 2019-06-30 RX ADMIN — FERROUS SULFATE TAB 325 MG (65 MG ELEMENTAL FE) 325 MG: 325 (65 FE) TAB at 09:32

## 2019-06-30 RX ADMIN — Medication 250 MG: at 16:11

## 2019-06-30 RX ADMIN — FAMOTIDINE 20 MG: 20 TABLET ORAL at 09:32

## 2019-06-30 RX ADMIN — ALBUMIN (HUMAN) 25 G: 0.25 INJECTION, SOLUTION INTRAVENOUS at 14:04

## 2019-06-30 RX ADMIN — FUROSEMIDE 5 MG/HR: 10 INJECTION, SOLUTION INTRAMUSCULAR; INTRAVENOUS at 14:21

## 2019-06-30 RX ADMIN — ATORVASTATIN CALCIUM 80 MG: 80 TABLET, FILM COATED ORAL at 09:32

## 2019-06-30 RX ADMIN — Medication 250 MG: at 13:10

## 2019-06-30 RX ADMIN — FLUTICASONE PROPIONATE 2 SPRAY: 50 SPRAY, METERED NASAL at 09:33

## 2019-06-30 RX ADMIN — MELATONIN TAB 5 MG 10 MG: 5 TAB at 21:52

## 2019-06-30 RX ADMIN — ALLOPURINOL 100 MG: 100 TABLET ORAL at 09:32

## 2019-06-30 RX ADMIN — ASPIRIN 81 MG: 81 TABLET ORAL at 09:32

## 2019-06-30 RX ADMIN — Medication 1 TABLET: at 09:32

## 2019-06-30 RX ADMIN — APIXABAN 2.5 MG: 5 TABLET, FILM COATED ORAL at 09:32

## 2019-06-30 ASSESSMENT — PAIN SCALES - GENERAL
PAINLEVEL_OUTOF10: 10
PAINLEVEL_OUTOF10: 8
PAINLEVEL_OUTOF10: 0

## 2019-06-30 NOTE — PROGRESS NOTES
New orders from nephrology: Lasix gtt. At 5 ml/hr. Dopamine gtt. At 9.2 ml/hr. IV bolus lasix 80 mg once.

## 2019-06-30 NOTE — CONSULTS
[facility]    Consult Note           Reason for Consult:  CAMI on CKD  Requesting Physician:  Dr. Thu Hurtado    Chief Complaint:  Sternal wound dehiscense    History of Present Illness on 6/30/19:    60 y.o. yo male with PMH of anoxic encephalopathy, CAD s/p CABG 12/2018, CHFrEF, chronic mohamud fro urinary retention (cysto n mohamud in 4/5/19 by dr Tim Turner), CKD stage IV  who is admitted after wound vac placement of the dehisced sternal wound on 6/28/19    Nephrology consulted worsening renal failure and oliguria ; cr increased to from 2.7 to 3.6 to 4 received iv lasix 10 mg , torsemide, metolazone , spironolactone until 6/29; also received at least 2l NS bolus per RN yesterday and 3rdl of NS over 10 h at 100 ml/h; BP in the , did receive 75 mg of metoprolol, isordil this am    Was at [address] from 5/3-16/19 for acute on chronic chf ( ef down to 20% on 5/2019) needing lasix gtt, also had MRSA bacteremia and enterococcus UTI all of which was treated initially with vancomycin and later changed to dapto then zyvox on dc until 5/25/19.  At that time developed c diff colitis and treated with vanc po    He is currently on Augmentin for wound infection along with po vancomycin    He is a poor historian    Past Medical History:        Diagnosis Date    Anoxic brain injury ([facility] [address] 1994    Cardiac arrest ([facility] [address] 1994    Clostridium difficile infection 05/09/2019    Gout     Hyperlipidemia     Hypertension     Memory deficit     poor short term memory    MRSA (methicillin resistant staph aureus) culture positive 05/09/2019    bacteremia    Reflux     S/P CABG x 3 12/2018    Sleep apnea     Type 2 diabetes mellitus without complication [facility])        Past Surgical History:        Procedure Laterality Date    CARDIAC CATHETERIZATION  11/26/2018    Dr. Austin Solares Left 03/25/2014    Dr. Rubia Santillan - w/trigger finger (3rd) & trigger thumb release    CARPAL TUNNEL RELEASE Right of education: Not on file    Highest education level: Not on file   Occupational History    Not on file   Social Needs    Financial resource strain: Not on file    Food insecurity:     Worry: Not on file     Inability: Not on file    Transportation needs:     Medical: Not on file     Non-medical: Not on file   Tobacco Use    Smoking status: Never Smoker    Smokeless tobacco: Never Used   Substance and Sexual Activity    Alcohol use: No     Alcohol/week: 0.0 oz    Drug use: No    Sexual activity: Not on file   Lifestyle    Physical activity:     Days per week: Not on file     Minutes per session: Not on file    Stress: Not on file   Relationships    Social connections:     Talks on phone: Not on file     Gets together: Not on file     Attends Christianity service: Not on file     Active member of club or organization: Not on file     Attends meetings of clubs or organizations: Not on file     Relationship status: Not on file    Intimate partner violence:     Fear of current or ex partner: Not on file     Emotionally abused: Not on file     Physically abused: Not on file     Forced sexual activity: Not on file   Other Topics Concern    Not on file   Social History Narrative    Not on file       Family History:   Family History   Problem Relation Age of Onset    High Blood Pressure Mother     High Blood Pressure Father     High Blood Pressure Brother     High Blood Pressure Maternal Grandmother     High Blood Pressure Maternal Grandfather        Review of Systems:   Pertinent positives stated above in HPI. All other 10 systems were reviewed and were negative.      Physical exam:   Constitutional:  VITALS:  BP 90/64   Pulse 74   Temp 98.1 °F (36.7 °C)   Resp 16   Ht 5' 10\" (1.778 m)   Wt 216 lb 11.4 oz (98.3 kg)   SpO2 98%   BMI 31.09 kg/m²   Gen: alert, awake, nad  Skin: no rash, turgor wnl  Heent:  eomi, mmm  Neck: no bruits or jvd noted, thyroid normal  Cardiovascular:  S1, S2 without

## 2019-06-30 NOTE — PROGRESS NOTES
Notified Dr. Chencho Vogel about BUN, creat, and low urine output. New orders and nephro consult placed.

## 2019-07-01 ENCOUNTER — TELEPHONE (OUTPATIENT)
Dept: CARDIOLOGY CLINIC | Age: 59
End: 2019-07-01

## 2019-07-01 LAB
ALBUMIN SERPL-MCNC: 3.2 G/DL (ref 3.4–5)
ANION GAP SERPL CALCULATED.3IONS-SCNC: 19 MMOL/L (ref 3–16)
BASOPHILS ABSOLUTE: 0 K/UL (ref 0–0.2)
BASOPHILS RELATIVE PERCENT: 0.1 %
BUN BLDV-MCNC: 100 MG/DL (ref 7–20)
CALCIUM SERPL-MCNC: 9.1 MG/DL (ref 8.3–10.6)
CHLORIDE BLD-SCNC: 92 MMOL/L (ref 99–110)
CO2: 21 MMOL/L (ref 21–32)
CREAT SERPL-MCNC: 4 MG/DL (ref 0.8–1.3)
EOSINOPHILS ABSOLUTE: 0.2 K/UL (ref 0–0.6)
EOSINOPHILS RELATIVE PERCENT: 1.5 %
GFR AFRICAN AMERICAN: 19
GFR NON-AFRICAN AMERICAN: 15
GLUCOSE BLD-MCNC: 109 MG/DL (ref 70–99)
GLUCOSE BLD-MCNC: 130 MG/DL (ref 70–99)
GLUCOSE BLD-MCNC: 133 MG/DL (ref 70–99)
GLUCOSE BLD-MCNC: 135 MG/DL (ref 70–99)
GLUCOSE BLD-MCNC: 136 MG/DL (ref 70–99)
HCT VFR BLD CALC: 35.4 % (ref 40.5–52.5)
HEMOGLOBIN: 11.3 G/DL (ref 13.5–17.5)
LYMPHOCYTES ABSOLUTE: 1.1 K/UL (ref 1–5.1)
LYMPHOCYTES RELATIVE PERCENT: 9.5 %
MCH RBC QN AUTO: 25.3 PG (ref 26–34)
MCHC RBC AUTO-ENTMCNC: 31.8 G/DL (ref 31–36)
MCV RBC AUTO: 79.5 FL (ref 80–100)
MONOCYTES ABSOLUTE: 0.6 K/UL (ref 0–1.3)
MONOCYTES RELATIVE PERCENT: 4.8 %
NEUTROPHILS ABSOLUTE: 10.2 K/UL (ref 1.7–7.7)
NEUTROPHILS RELATIVE PERCENT: 84.1 %
PDW BLD-RTO: 20.9 % (ref 12.4–15.4)
PERFORMED ON: ABNORMAL
PHOSPHORUS: 7.9 MG/DL (ref 2.5–4.9)
PLATELET # BLD: 289 K/UL (ref 135–450)
PMV BLD AUTO: 8.5 FL (ref 5–10.5)
POTASSIUM SERPL-SCNC: 4.5 MMOL/L (ref 3.5–5.1)
RBC # BLD: 4.45 M/UL (ref 4.2–5.9)
SODIUM BLD-SCNC: 132 MMOL/L (ref 136–145)
WBC # BLD: 12.1 K/UL (ref 4–11)

## 2019-07-01 PROCEDURE — 80069 RENAL FUNCTION PANEL: CPT

## 2019-07-01 PROCEDURE — 6370000000 HC RX 637 (ALT 250 FOR IP): Performed by: NURSE PRACTITIONER

## 2019-07-01 PROCEDURE — 1200000000 HC SEMI PRIVATE

## 2019-07-01 PROCEDURE — 6370000000 HC RX 637 (ALT 250 FOR IP): Performed by: INTERNAL MEDICINE

## 2019-07-01 PROCEDURE — 97605 NEG PRS WND THER DME<=50SQCM: CPT

## 2019-07-01 PROCEDURE — 6360000002 HC RX W HCPCS: Performed by: INTERNAL MEDICINE

## 2019-07-01 PROCEDURE — 2580000003 HC RX 258: Performed by: INTERNAL MEDICINE

## 2019-07-01 PROCEDURE — 36415 COLL VENOUS BLD VENIPUNCTURE: CPT

## 2019-07-01 PROCEDURE — 85025 COMPLETE CBC W/AUTO DIFF WBC: CPT

## 2019-07-01 PROCEDURE — 6370000000 HC RX 637 (ALT 250 FOR IP): Performed by: THORACIC SURGERY (CARDIOTHORACIC VASCULAR SURGERY)

## 2019-07-01 RX ORDER — METHOCARBAMOL 500 MG/1
1000 TABLET, FILM COATED ORAL 4 TIMES DAILY PRN
Status: DISCONTINUED | OUTPATIENT
Start: 2019-07-01 | End: 2019-07-11 | Stop reason: HOSPADM

## 2019-07-01 RX ADMIN — FUROSEMIDE 5 MG/HR: 10 INJECTION, SOLUTION INTRAMUSCULAR; INTRAVENOUS at 18:06

## 2019-07-01 RX ADMIN — OXYCODONE HYDROCHLORIDE AND ACETAMINOPHEN 500 MG: 500 TABLET ORAL at 09:17

## 2019-07-01 RX ADMIN — ALLOPURINOL 100 MG: 100 TABLET ORAL at 09:13

## 2019-07-01 RX ADMIN — Medication 250 MG: at 20:07

## 2019-07-01 RX ADMIN — DOPAMINE HYDROCHLORIDE 2.5 MCG/KG/MIN: 160 INJECTION, SOLUTION INTRAVENOUS at 16:25

## 2019-07-01 RX ADMIN — Medication 1 TABLET: at 09:15

## 2019-07-01 RX ADMIN — ISOSORBIDE DINITRATE 10 MG: 10 TABLET ORAL at 09:35

## 2019-07-01 RX ADMIN — ATORVASTATIN CALCIUM 80 MG: 80 TABLET, FILM COATED ORAL at 09:15

## 2019-07-01 RX ADMIN — GABAPENTIN 300 MG: 300 CAPSULE ORAL at 20:06

## 2019-07-01 RX ADMIN — ASPIRIN 81 MG: 81 TABLET ORAL at 09:11

## 2019-07-01 RX ADMIN — MAGNESIUM GLUCONATE 500 MG ORAL TABLET 400 MG: 500 TABLET ORAL at 20:06

## 2019-07-01 RX ADMIN — Medication 250 MG: at 17:41

## 2019-07-01 RX ADMIN — FAMOTIDINE 20 MG: 20 TABLET ORAL at 09:15

## 2019-07-01 RX ADMIN — APIXABAN 2.5 MG: 5 TABLET, FILM COATED ORAL at 09:16

## 2019-07-01 RX ADMIN — COLLAGENASE SANTYL: 250 OINTMENT TOPICAL at 17:41

## 2019-07-01 RX ADMIN — METHOCARBAMOL TABLETS 750 MG: 500 TABLET, COATED ORAL at 09:21

## 2019-07-01 RX ADMIN — METOPROLOL SUCCINATE 75 MG: 50 TABLET, EXTENDED RELEASE ORAL at 09:13

## 2019-07-01 RX ADMIN — Medication 250 MG: at 14:16

## 2019-07-01 RX ADMIN — Medication 250 MG: at 09:25

## 2019-07-01 RX ADMIN — TAMSULOSIN HYDROCHLORIDE 0.4 MG: 0.4 CAPSULE ORAL at 09:15

## 2019-07-01 RX ADMIN — FERROUS SULFATE TAB 325 MG (65 MG ELEMENTAL FE) 325 MG: 325 (65 FE) TAB at 09:16

## 2019-07-01 RX ADMIN — MELATONIN TAB 5 MG 10 MG: 5 TAB at 20:07

## 2019-07-01 RX ADMIN — CEFEPIME 500 MG: 1 INJECTION, POWDER, FOR SOLUTION INTRAMUSCULAR; INTRAVENOUS at 14:19

## 2019-07-01 RX ADMIN — FUROSEMIDE 5 MG/HR: 10 INJECTION, SOLUTION INTRAMUSCULAR; INTRAVENOUS at 07:42

## 2019-07-01 ASSESSMENT — PAIN DESCRIPTION - LOCATION: LOCATION: HEAD;NECK;SHOULDER

## 2019-07-01 ASSESSMENT — PAIN SCALES - GENERAL: PAINLEVEL_OUTOF10: 8

## 2019-07-01 ASSESSMENT — PAIN DESCRIPTION - ORIENTATION: ORIENTATION: RIGHT;LEFT;MID

## 2019-07-01 ASSESSMENT — PAIN DESCRIPTION - PAIN TYPE: TYPE: ACUTE PAIN

## 2019-07-01 NOTE — TELEPHONE ENCOUNTER
Brii Espinosa from patient's nursing home called in. He is currently inpatient and they are wondering if Dr. Curt Olivares can see him while he is inpatient. Advised that Dr. Curt Olivares is out of town at the moment but will check when he is back tomorrow. She verbalized understanding. Will route to Dr. Curt Olivares.

## 2019-07-01 NOTE — CONSULTS
[address] [id number] Continence Nurse  Consult Note       NAME:  Miguel Buchanan  MEDICAL RECORD NUMBER:  [id number]  AGE: 60 y.o.    GENDER: male  : 1960  TODAY'S DATE:  2019    Subjective: I don't feel good, I have a headache   Reason for WOCN Evaluation and Assessment: DTI on L heel; wound vac to medial chest      Miguel Buchanan is a 60 y.o. male referred by:   [x] Physician  [x] Nursing  [] Other:     Wound Identification:  Wound Type: pressure and non-healing/non-surgical  Contributing Factors: diabetes, chronic pressure and decreased mobility    Wound History: STERNAL WOUND DEHISCENSE;     Current Wound Care Treatment: Negative Pressure Wound Therapy; Sandy Duran 19    Patient Goal of Care:  [x] Wound Healing  [] Odor Control  [] Palliative Care  [] Pain Control   [] Other:         PAST MEDICAL HISTORY        Diagnosis Date    [facility] [address]     [facility] arrest [facility]     Clostridium difficile infection 2019    Gout     Hyperlipidemia     Hypertension     MDRO (multiple drug resistant organisms) resistance 2019    urine (ecoli)    Memory deficit     poor short term memory    MRSA (methicillin resistant staph aureus) culture positive 2019    bacteremia    Reflux     S/P CABG x 3 2018    Sleep apnea     Type 2 diabetes mellitus without complication ([facility] [address]        PAST SURGICAL HISTORY    Past Surgical History:   Procedure Laterality Date    CARDIAC CATHETERIZATION  2018    Dr. Stacey Liu Left 2014    Dr. Segundo Hyde - w/trigger finger (3rd) & trigger thumb release    CARPAL TUNNEL RELEASE Right 2015    Dr. Shira Gotti N/A 2018    Dr. Knight Majestic - decompressive corpectomy C6 (>90%), microdissection, anterior cervical arthrodesis w/PEEK allograft, placement of corpectomy cage & Synthes plate Z5-9    wound of sternum T81.49XA    Serratia infection A49.8    Gram-negative bacteremia R78.81    Acute respiratory failure with hypoxia ([facility]) J96.01    Sepsis ([facility]) A41.9    Prolonged QT interval R94.31    Status post aorto-coronary artery bypass graft Z95.1    History of cardiac arrest Z86.74    Obesity, Class II, BMI 35-39.9 E66.9    Cervical myelopathy ([facility]) G95.9    Lesion of bladder N32.9    Recurrent UTI N39.0    Anasarca R60.1    PAF (paroxysmal atrial fibrillation) ([facility]) I48.0    Benign essential HTN I10    CAMI (acute kidney injury) ([facility]) N17.9    Hypervolemia E87.70    Positive culture finding R89.5    Colitis due to Clostridium difficile A04.72    Decreased cardiac function R09.89    Wound infection T14. 8XXA, L08.9       Measurements:  Negative Pressure Wound Therapy Chest Anterior;Medial (Active)   $ Standard NPWT <=50 sq cm PER TX $ Yes 7/1/2019 12:38 PM   Wound Type Surgical 7/1/2019 12:38 PM   Unit Type [facility] 7/1/2019 12:38 PM   Dressing Type Black foam 7/1/2019 12:38 PM   Number of pieces used 1 7/1/2019 12:38 PM   Cycle Continuous 7/1/2019 12:38 PM   Target Pressure (mmHg) 125 7/1/2019 12:38 PM   Intensity 1 7/1/2019 12:38 PM   Canister changed? Yes 6/28/2019  3:19 PM   Dressing Status Changed 7/1/2019 12:38 PM   Dressing Changed Changed/New 7/1/2019 12:38 PM   Drainage Amount Small 7/1/2019 12:38 PM   Drainage Description Serosanguinous 7/1/2019 12:38 PM   Dressing Change Due 07/03/19 7/1/2019 12:38 PM   Output (ml) 0 ml 6/30/2019 10:00 PM   Wound Assessment Red 7/1/2019 12:38 PM   [place]-wound Assessment Clean;Dry; Intact 7/1/2019 12:38 PM   Shape oval 7/1/2019 12:38 PM   Odor None 7/1/2019 12:38 PM   Number of days: 192      Sternal Wound:           Wound 12/22/18 Coccyx Mid Crystal Back Ulcer-Dark puprle with left edge partial open 1cm (Active)   Number of days: 190       Wound 05/03/19 Coccyx Mid stage II to coccyx (Active)   Number of days: 58       Wound 05/10/19 Heel Understanding  [] Demonstrated understanding       [] No evidence of learning  [] Refused teaching         [] N/A       Electronically signed by Rosa Maria Estrada RN, Radha Overton on 7/1/2019 at 1:03 PM

## 2019-07-01 NOTE — H&P
[facility] History & Physical      PCP: Mort Olszewski, DO    Date of Admission: 6/28/2019    Date of Service: Pt seen/examined on 7/1/2019 requested by Dr. Chencho Vogel      Chief Complaint:  Consult request for medical management      History Of Present Illness: The patient is a 60 y.o. male who presents to [facility] with concern regarding sternal wound infection. Patient has a well-known history of coronary artery disease and is status post CABG on 12/2018. He was admitted for wound VAC placement of dehisced sternal wound on 6/28/2019. He has had a complicated hospitalization stay with worsening renal failure and oliguria. Nephrology has been consulted and has attempted treatment with Lasix and dopamine without much success. Plan is to proceed with hemodialysis in the next few days to recover renal function. Patient also has a significant past medical history of anoxic encephalopathy, congestive heart failure, and chronic urinary retention requiring Robin placement. Presently the patient offers no new medical complaints.     Past Medical History:        Diagnosis Date    Anoxic brain injury [facility]) 1994    Cardiac arrest ([address] 1994    Clostridium difficile infection 05/09/2019    Gout     Hyperlipidemia     Hypertension     MDRO (multiple drug resistant organisms) resistance 06/28/2019    urine (ecoli)    Memory deficit     poor short term memory    MRSA (methicillin resistant staph aureus) culture positive 05/09/2019    bacteremia    Reflux     S/P CABG x 3 12/2018    Sleep apnea     Type 2 diabetes mellitus without complication [facility])        Past Surgical History:        Procedure Laterality Date    CARDIAC CATHETERIZATION  11/26/2018    Dr. Lauri Aviles Left 03/25/2014    Dr. Kalina Ortiz - w/trigger finger (3rd) & trigger thumb release    CARPAL TUNNEL RELEASE Right 04/14/2015    Dr. Leilani Pollock lower extremity muscular atrophy bilaterally. Chronic venous stasis bilaterally. +1 pitting pedal edema to level of shin  Skin: Skin color, texture, turgor normal.  Neurologic: Alert and oriented X 3,  NVI w/ sensory/motor intact UE/LE Bilaterally. Mental status: Alert, oriented, thought content appropriate, slowed responses  Cranial nerves:II-XII Grossly intact  Musculoskeletal:  no evidence of joint instability, strength normal at baseline    CXR:  I have reviewed the CXR with the following interpretation:        FINDINGS:   Sternotomy wires are present.  The cardiopericardial silhouette is enlarged,   stable.       Bilateral pleural effusions are suspected.  No overt edema or lobar   consolidation is identified.           Impression   Small bilateral pleural effusions.             EKG:  I have reviewed the EKG with the following interpretation:   Sinus rhythm Rightward axis incomplete left bundle branch block Nonspecific T wave abnormality  Abnormal ECGConfirmed by Carlos Ivan MD, [facility] on 6/28/2019 3:18:41 PM    CBC   Recent Labs     06/30/19  1754 07/01/19  0444   WBC 13.2* 12.1*   HGB 11.4* 11.3*   HCT 36.0* 35.4*    289      RENAL  Recent Labs     06/29/19  2132 06/30/19  0436 06/30/19  1754 07/01/19  0444   * 133* 130* 132*   K 4.1 4.6 4.2 4.5   CL 90* 96* 91* 92*   CO2 20* 15* 20* 21   PHOS 7.9*  --  7.8* 7.9*   BUN 93* 102* 95* 100*   CREATININE 3.6* 4.0* 4.0* 4.0*     LFT'S  No results for input(s): AST, ALT, ALB, BILIDIR, BILITOT, ALKPHOS in the last 72 hours. COAG  No results for input(s): INR in the last 72 hours. CARDIAC ENZYMES  No results for input(s): CKTOTAL, CKMB, CKMBINDEX, TROPONINI in the last 72 hours.     U/A:    Lab Results   Component Value Date    NITRITE n 10/11/2017    COLORU Yellow 06/28/2019    WBCUA >100 06/28/2019    RBCUA see below 06/28/2019    MUCUS Rare 11/26/2018    BACTERIA 1+ 05/09/2019    CLARITYU CLOUDY 06/28/2019    SPECGRAV 1.015 06/28/2019 and dopamine trials  -Nephrology consultation: plan for hemodialysis  -Holding aspirin and Eliquis for dialysis catheter placement    Essential hypertension and hyperlipidemia in the setting of known coronary artery disease  -Medications: Metoprolol, Lipitor  -Continue to optimize blood pressure control  -To need to optimize cholesterol control    Acute cystitis: E. coli and Morganella, multidrug resistance  -Antibiotics: Sensitive to cefepime  -Micro: Urinary culture consistent with E. coli and Morganella  -Chronic indwelling Robin. Exchanged by urology consultation    Diabetes mellitus type 2  -Hemoglobin A1c 5/3/2019 5.3  -Presently diet controlled  -Consider sliding scale insulin if blood sugar remains greater than 150  -Continue to follow-up as outpatient to optimize glycemic control    Gout  -Medications: Allopurinol renally dosed  -Continue to monitor joint function    Microcytic anemia: MCV 78, hemoglobin 11  -Labs: CBC monitoring, iron studies  -Consider holding iron in the acute setting of infection    Chronic urinary retention  -Patient to undergo hemodialysis secondary to oliguria. Consider discontinuing Flomax once hemodialysis is underway    C. difficile colitis  -Diagnosed on 5/9/2019  -Antibiotics:  oral vancomycin  -Micro: Repeat C. Difficile  -ID consultation to assist with continued need. DVT Prophylaxis: Hold for Dialysis catheter placement  Diet: DIET CARB CONTROL; Dietary Nutrition Supplements: Wound Healing Oral Supplement  Code Status: Prior  PT/OT Eval Status: Pt is non weight bearing and WC bound    Dispo -expect 3 to 5 days pending clinical response to medical therapy       Edmundo Mejia MD    Thank you Matilde Chapman DO for the opportunity to be involved in this patient's care. If you have any questions or concerns please feel free to contact me at [phone].

## 2019-07-01 NOTE — PROGRESS NOTES
Robin catheter changed per verbal order from [facility]. Sterile technique observed. Pt tolerated well.

## 2019-07-01 NOTE — CONSULTS
Urology Consult Note      Reason for Consult: Mohamud issues    History:   Pt is a 59 yo WM admitted for sternal wound infection. Kidney function over the weekend has worsened and there was concern about his decreased urine output. Bladder scan yesterday 186. Renal u/s yesterday showed no hydronephrosis and collapsed bladder. Ascites also noted. He is s/p cystoscopy and bladder biopsy on 19-some lateral lobe hypertrophy. Mohamud catheter was to be exchanged at [place]. I attempted to call but was unable to get any definitive answers as to when his mohamud was last exchanged. Meds: see med rec  Family History, Social History, Review of Systems:  Reviewed and agreed to as per chart    Exam:    Vitals:  /75   Pulse 94   Temp 97.2 °F (36.2 °C) (Oral)   Resp 15   Ht 5' 10\" (1.778 m)   Wt 217 lb 2.5 oz (98.5 kg)   SpO2 96%   BMI 31.16 kg/m²   Temp  Av.3 °F (36.3 °C)  Min: 96.8 °F (36 °C)  Max: 98.1 °F (36.7 °C)    Intake/Output Summary (Last 24 hours) at 2019 1014  Last data filed at 2019 1008  Gross per 24 hour   Intake 0 ml   Output 285 ml   Net -285 ml       Physical:    Well developed, well nourished in no acute distress   Mood indicates no abnormalities. Pt doesnt appear depressed   Orientated to time and place   Neck is supple, trachea is midline   Respiratory effort is normal   Cardiovascular show no extremity swelling   Abdomen no masses or hernias are palpated, there is no tenderness. Liver and Spleen appear normal.   Skin show no abnormal lesions   Lymph nodes are not palpated in the inguinal, neck, or axillary area.      Male :   Penis appears normal with redundant foreskin   Urethral meatus is normal in size and location   Scrotum appears normal and both testicles appear normal in size and location   Mohamud catheter currently being exchanged      Labs:  WBC:    Lab Results   Component Value Date    WBC 12.1 2019     Hemoglobin/Hematocrit:    Lab Results Component Value Date    HGB 11.3 07/01/2019    HCT 35.4 07/01/2019     BMP:    Lab Results   Component Value Date     07/01/2019    K 4.5 07/01/2019    K 4.0 06/28/2019    CL 92 07/01/2019    CO2 21 07/01/2019     07/01/2019    LABALBU 3.2 07/01/2019    CREATININE 4.0 07/01/2019    CALCIUM 9.1 07/01/2019    GFRAA 19 07/01/2019    LABGLOM 15 07/01/2019     PT/INR:    Lab Results   Component Value Date    PROTIME 25.1 01/10/2019    INR 2.20 01/10/2019     PTT:    Lab Results   Component Value Date    APTT 34.2 11/28/2018   [APTT    Urinalysis:     Imaging:    EXAMINATION:   RETROPERITONEAL ULTRASOUND OF THE KIDNEYS AND URINARY BLADDER       6/30/2019       COMPARISON:   None.       HISTORY:   ORDERING SYSTEM PROVIDED HISTORY: RENAL FAILURE, ACUTE (KIDNEY INJURY)   TECHNOLOGIST PROVIDED HISTORY:   Reason for exam:->chronic mohamud, kwesi   Ordering Physician Provided Reason for Exam: chronic mohamud, kwesi; Renal   failure, acute (kidney injury   Acuity: Acute       FINDINGS:       Kidneys:       The right kidney measures 11.7 x 5.8 x 5.6 cm in length and the left kidney   measures 12.1 x 5.7 x 7.0 cm in length.       Kidneys are increased in echogenicity with no evidence of hydronephrosis.    Study somewhat limited by patient body habitus.  Hypoechoic structures likely   representing a cyst are noted compatible with simple appearing cysts noted on   study from 11/25/2018.  Largest cyst on the right measures 2.3 x 2.2 x 2.2   cm, enlarged on the left measures 2.7 x 1.9 x 2.5 cm.           Bladder:       Bladder is limited secondary to placement of Mohamud catheter.       Ascites is noted in the upper abdomen and pelvis.           Impression   Medical renal disease.       Bilateral renal cyst.       Nonvisualization of bladder secondary to placement of Mohamud catheter.       Ascites.                 Impression/Plan:   Bladder scan picking up patient's ascites  Mohamud exchanged today  Will sign off    Earl Nicolas,

## 2019-07-01 NOTE — PROGRESS NOTES
Progress Note    HISTORY     CC:  Sternal wound dehiscence           We are following for acute kidney injury       Subjective/   HPI:  On room air. C/o pain in back. Eating breakfast.  Despite Dopamine and lasix his urine output is low.       ROS:  Constitutional:  No fevers, No Chills, + weakness  Cardiovascular:  No palpations, + edema  Respiratory:  No wheezing, no cough  Skin:  No rash, no itching  :  No hematuria, no dysuria     Social Hx:  No family at bedside     Past Medical and Surgical History:  - Reviewed, no changes     EXAM       Objective/     Vitals:    06/30/19 2336 07/01/19 0428 07/01/19 0545 07/01/19 0815   BP: 105/76 101/73  107/75   Pulse: 85 94  94   Resp: 16 16  15   Temp: 96.9 °F (36.1 °C) 96.8 °F (36 °C)  97.2 °F (36.2 °C)   TempSrc: Rectal Rectal  Oral   SpO2: 96% 95%  96%   Weight:   217 lb 2.5 oz (98.5 kg)    Height:         24HR INTAKE/OUTPUT:      Intake/Output Summary (Last 24 hours) at 7/1/2019 0919  Last data filed at 7/1/2019 0545  Gross per 24 hour   Intake 0 ml   Output 210 ml   Net -210 ml     Constitutional:  Alert, awake, no apparent distress  Eyes:  Pupils reactive, sclera clear   Neck:  Normal thyroid, no masses   Cardiovascular:  Regular, no rub  Respiratory:  No distress, no wheezing  Psychiatry:  Appropriate mood/affect, alert  Abdomen: +bs, soft, nt, no masses   Musculoskeletal: + LE edema, no clubbing   Lymphatics:  No LAD in neck, no supraclavicular nodes       MEDICAL DECISION MAKING       Data/  Recent Labs     06/28/19  0949 06/30/19  1754 07/01/19  0444   WBC 11.8* 13.2* 12.1*   HGB 10.9* 11.4* 11.3*   HCT 35.0* 36.0* 35.4*   MCV 80.3 79.7* 79.5*    289 289     Recent Labs     06/29/19  2132 06/30/19  0436 06/30/19  1754 07/01/19  0444   * 133* 130* 132*   K 4.1 4.6 4.2 4.5   CL 90* 96* 91* 92*   CO2 20* 15* 20* 21   GLUCOSE 174* 145* 135* 136*   PHOS 7.9*  --  7.8* 7.9*   MG 2.20  --   --   --    BUN 93* 102* 95* 100*   CREATININE 3.6*

## 2019-07-02 ENCOUNTER — TELEPHONE (OUTPATIENT)
Dept: CARDIOLOGY CLINIC | Age: 59
End: 2019-07-02

## 2019-07-02 LAB
ALBUMIN SERPL-MCNC: 2.9 G/DL (ref 3.4–5)
ANION GAP SERPL CALCULATED.3IONS-SCNC: 18 MMOL/L (ref 3–16)
ANISOCYTOSIS: ABNORMAL
APTT: 31.2 SEC (ref 26–36)
ATYPICAL LYMPHOCYTE RELATIVE PERCENT: 1 % (ref 0–6)
BANDED NEUTROPHILS RELATIVE PERCENT: 3 % (ref 0–7)
BASOPHILS ABSOLUTE: 0 K/UL (ref 0–0.2)
BASOPHILS RELATIVE PERCENT: 0 %
BLOOD BANK DISPENSE STATUS: NORMAL
BLOOD BANK DISPENSE STATUS: NORMAL
BLOOD BANK PRODUCT CODE: NORMAL
BLOOD BANK PRODUCT CODE: NORMAL
BPU ID: NORMAL
BPU ID: NORMAL
BUN BLDV-MCNC: 106 MG/DL (ref 7–20)
BURR CELLS: ABNORMAL
CALCIUM SERPL-MCNC: 9 MG/DL (ref 8.3–10.6)
CHLORIDE BLD-SCNC: 91 MMOL/L (ref 99–110)
CO2: 21 MMOL/L (ref 21–32)
CREAT SERPL-MCNC: 3.8 MG/DL (ref 0.8–1.3)
DESCRIPTION BLOOD BANK: NORMAL
DESCRIPTION BLOOD BANK: NORMAL
EOSINOPHILS ABSOLUTE: 0 K/UL (ref 0–0.6)
EOSINOPHILS RELATIVE PERCENT: 0 %
FERRITIN: 453.4 NG/ML (ref 30–400)
GFR AFRICAN AMERICAN: 20
GFR NON-AFRICAN AMERICAN: 16
GLUCOSE BLD-MCNC: 115 MG/DL (ref 70–99)
GLUCOSE BLD-MCNC: 135 MG/DL (ref 70–99)
GLUCOSE BLD-MCNC: 150 MG/DL (ref 70–99)
GLUCOSE BLD-MCNC: 159 MG/DL (ref 70–99)
GLUCOSE BLD-MCNC: 165 MG/DL (ref 70–99)
HCT VFR BLD CALC: 38.6 % (ref 40.5–52.5)
HEMOGLOBIN: 11.8 G/DL (ref 13.5–17.5)
INR BLD: 1.89 (ref 0.86–1.14)
IRON SATURATION: 16 % (ref 20–50)
IRON: 35 UG/DL (ref 59–158)
LYMPHOCYTES ABSOLUTE: 1.3 K/UL (ref 1–5.1)
LYMPHOCYTES RELATIVE PERCENT: 11 %
MCH RBC QN AUTO: 25.6 PG (ref 26–34)
MCHC RBC AUTO-ENTMCNC: 30.5 G/DL (ref 31–36)
MCV RBC AUTO: 83.9 FL (ref 80–100)
MONOCYTES ABSOLUTE: 0 K/UL (ref 0–1.3)
MONOCYTES RELATIVE PERCENT: 0 %
NEUTROPHILS ABSOLUTE: 9.5 K/UL (ref 1.7–7.7)
NEUTROPHILS RELATIVE PERCENT: 85 %
OVALOCYTES: ABNORMAL
PDW BLD-RTO: 21.6 % (ref 12.4–15.4)
PERFORMED ON: ABNORMAL
PHOSPHORUS: 7.8 MG/DL (ref 2.5–4.9)
PLATELET # BLD: 244 K/UL (ref 135–450)
PLATELET SLIDE REVIEW: ADEQUATE
PMV BLD AUTO: 8.2 FL (ref 5–10.5)
POIKILOCYTES: ABNORMAL
POLYCHROMASIA: ABNORMAL
POTASSIUM SERPL-SCNC: 4.7 MMOL/L (ref 3.5–5.1)
PROTHROMBIN TIME: 21.1 SEC (ref 9.8–13)
RBC # BLD: 4.6 M/UL (ref 4.2–5.9)
REASON FOR REJECTION: NORMAL
REASON FOR REJECTION: NORMAL
REJECTED TEST: NORMAL
REJECTED TEST: NORMAL
SCHISTOCYTES: ABNORMAL
SLIDE REVIEW: ABNORMAL
SODIUM BLD-SCNC: 130 MMOL/L (ref 136–145)
TARGET CELLS: ABNORMAL
TOTAL IRON BINDING CAPACITY: 217 UG/DL (ref 260–445)
WBC # BLD: 10.8 K/UL (ref 4–11)

## 2019-07-02 PROCEDURE — 99999 PR OFFICE/OUTPT VISIT,PROCEDURE ONLY: CPT | Performed by: THORACIC SURGERY (CARDIOTHORACIC VASCULAR SURGERY)

## 2019-07-02 PROCEDURE — 85025 COMPLETE CBC W/AUTO DIFF WBC: CPT

## 2019-07-02 PROCEDURE — 83540 ASSAY OF IRON: CPT

## 2019-07-02 PROCEDURE — 85610 PROTHROMBIN TIME: CPT

## 2019-07-02 PROCEDURE — 87340 HEPATITIS B SURFACE AG IA: CPT

## 2019-07-02 PROCEDURE — 82728 ASSAY OF FERRITIN: CPT

## 2019-07-02 PROCEDURE — 80069 RENAL FUNCTION PANEL: CPT

## 2019-07-02 PROCEDURE — 1200000000 HC SEMI PRIVATE

## 2019-07-02 PROCEDURE — 6360000002 HC RX W HCPCS: Performed by: INTERNAL MEDICINE

## 2019-07-02 PROCEDURE — 85730 THROMBOPLASTIN TIME PARTIAL: CPT

## 2019-07-02 PROCEDURE — 6360000002 HC RX W HCPCS: Performed by: NURSE PRACTITIONER

## 2019-07-02 PROCEDURE — 83550 IRON BINDING TEST: CPT

## 2019-07-02 PROCEDURE — 36415 COLL VENOUS BLD VENIPUNCTURE: CPT

## 2019-07-02 PROCEDURE — 99255 IP/OBS CONSLTJ NEW/EST HI 80: CPT | Performed by: INTERNAL MEDICINE

## 2019-07-02 PROCEDURE — 2580000003 HC RX 258: Performed by: INTERNAL MEDICINE

## 2019-07-02 PROCEDURE — 6370000000 HC RX 637 (ALT 250 FOR IP): Performed by: THORACIC SURGERY (CARDIOTHORACIC VASCULAR SURGERY)

## 2019-07-02 PROCEDURE — 6370000000 HC RX 637 (ALT 250 FOR IP): Performed by: INTERNAL MEDICINE

## 2019-07-02 RX ORDER — HEPARIN SODIUM 10000 [USP'U]/100ML
10.8 INJECTION, SOLUTION INTRAVENOUS CONTINUOUS
Status: DISCONTINUED | OUTPATIENT
Start: 2019-07-02 | End: 2019-07-05

## 2019-07-02 RX ORDER — HEPARIN SODIUM 1000 [USP'U]/ML
4000 INJECTION, SOLUTION INTRAVENOUS; SUBCUTANEOUS PRN
Status: DISCONTINUED | OUTPATIENT
Start: 2019-07-02 | End: 2019-07-05

## 2019-07-02 RX ORDER — HEPARIN SODIUM 1000 [USP'U]/ML
4000 INJECTION, SOLUTION INTRAVENOUS; SUBCUTANEOUS ONCE
Status: COMPLETED | OUTPATIENT
Start: 2019-07-02 | End: 2019-07-02

## 2019-07-02 RX ORDER — HEPARIN SODIUM 1000 [USP'U]/ML
2000 INJECTION, SOLUTION INTRAVENOUS; SUBCUTANEOUS PRN
Status: DISCONTINUED | OUTPATIENT
Start: 2019-07-02 | End: 2019-07-05

## 2019-07-02 RX ADMIN — Medication 250 MG: at 12:34

## 2019-07-02 RX ADMIN — GABAPENTIN 300 MG: 300 CAPSULE ORAL at 20:26

## 2019-07-02 RX ADMIN — Medication 250 MG: at 20:27

## 2019-07-02 RX ADMIN — Medication 1 TABLET: at 09:04

## 2019-07-02 RX ADMIN — ALLOPURINOL 100 MG: 100 TABLET ORAL at 09:03

## 2019-07-02 RX ADMIN — DOPAMINE HYDROCHLORIDE 2.5 MCG/KG/MIN: 160 INJECTION, SOLUTION INTRAVENOUS at 14:23

## 2019-07-02 RX ADMIN — COLLAGENASE SANTYL: 250 OINTMENT TOPICAL at 09:04

## 2019-07-02 RX ADMIN — OXYCODONE HYDROCHLORIDE AND ACETAMINOPHEN 500 MG: 500 TABLET ORAL at 09:04

## 2019-07-02 RX ADMIN — HEPARIN SODIUM AND DEXTROSE 10 ML/HR: 10000; 5 INJECTION INTRAVENOUS at 12:24

## 2019-07-02 RX ADMIN — HEPARIN SODIUM 4000 UNITS: 1000 INJECTION, SOLUTION INTRAVENOUS; SUBCUTANEOUS at 12:24

## 2019-07-02 RX ADMIN — FUROSEMIDE 5 MG/HR: 10 INJECTION, SOLUTION INTRAMUSCULAR; INTRAVENOUS at 14:23

## 2019-07-02 RX ADMIN — Medication 250 MG: at 09:05

## 2019-07-02 RX ADMIN — MELATONIN TAB 5 MG 10 MG: 5 TAB at 20:26

## 2019-07-02 RX ADMIN — ATORVASTATIN CALCIUM 80 MG: 80 TABLET, FILM COATED ORAL at 09:04

## 2019-07-02 RX ADMIN — FAMOTIDINE 20 MG: 20 TABLET ORAL at 09:04

## 2019-07-02 RX ADMIN — METOPROLOL SUCCINATE 75 MG: 50 TABLET, EXTENDED RELEASE ORAL at 09:04

## 2019-07-02 RX ADMIN — TAMSULOSIN HYDROCHLORIDE 0.4 MG: 0.4 CAPSULE ORAL at 09:04

## 2019-07-02 RX ADMIN — FERROUS SULFATE TAB 325 MG (65 MG ELEMENTAL FE) 325 MG: 325 (65 FE) TAB at 09:03

## 2019-07-02 RX ADMIN — Medication 250 MG: at 16:35

## 2019-07-02 RX ADMIN — FLUTICASONE PROPIONATE 2 SPRAY: 50 SPRAY, METERED NASAL at 09:04

## 2019-07-02 RX ADMIN — CEFEPIME 500 MG: 1 INJECTION, POWDER, FOR SOLUTION INTRAMUSCULAR; INTRAVENOUS at 15:11

## 2019-07-02 RX ADMIN — ISOSORBIDE DINITRATE 10 MG: 10 TABLET ORAL at 09:03

## 2019-07-02 RX ADMIN — MAGNESIUM GLUCONATE 500 MG ORAL TABLET 400 MG: 500 TABLET ORAL at 20:26

## 2019-07-02 RX ADMIN — ACETAMINOPHEN 650 MG: 325 TABLET ORAL at 03:52

## 2019-07-02 ASSESSMENT — PAIN SCALES - GENERAL: PAINLEVEL_OUTOF10: 8

## 2019-07-02 NOTE — CONSULTS
disease) stage 3, GFR 30-59 ml/min ([facility])    Abnormal echocardiogram    Coronary artery disease involving native coronary artery of native heart without angina pectoris    Abnormal stress test    NSTEMI (non-ST elevated myocardial infarction) ([facility])    Ischemic cardiomyopathy    Acute on chronic systolic heart failure (HCC)    Acute renal failure with acute tubular necrosis superimposed on stage 3 chronic kidney disease ([facility])    Spinal cord ischemia ([facility])    DM (diabetes mellitus), secondary, uncontrolled, w/neurologic complic ([facility])    Cervical spinal cord compression ([facility])    Postoperative infection of wound of sternum    Serratia infection    Gram-negative bacteremia    Acute respiratory failure with hypoxia ([facility])    Sepsis ([facility])    Prolonged QT interval    Status post aorto-coronary artery bypass graft    History of cardiac arrest    Obesity, Class II, BMI 35-39.9    Cervical myelopathy ([facility])    Lesion of bladder    Recurrent UTI    Anasarca    PAF (paroxysmal atrial fibrillation) ([facility])    Benign essential HTN    CAMI (acute kidney injury) ([facility])    Hypervolemia    Positive culture finding    Colitis due to Clostridium difficile    Decreased cardiac function    Wound infection     Multiple medical problems    CAD s/p CABG 12/2018  Sternal wound infection, non-union, likely OM  Isolation of Serratia in sternal wound culture 12/2018  S/p I&D, VAC placement 6/28. No cultures sent     Recent MRSA bacteremia without clear source. Suspicious that this have been secondary to sternal wound infection     S/p C6 corpectomy and hardware stabilization  Acute neck pain and CT evidence of hardware failure   Among other concerns, consider bacteremic seeding and infection     Recurrent C diff infection. Quiescent.   High risk of recurrence in the face of ongoing abx therapy     Mixed bacteriuria of unclear significance    CAMI, plan for HD noted       Recs:  Check BC and inflammatory markers  Given

## 2019-07-02 NOTE — FLOWSHEET NOTE
07/02/19 0756   Assessment   Charting Type Shift assessment   Neurological   Neuro (WDL) X   Level of Consciousness 0   Orientation Level Oriented to person;Oriented to time;Disoriented to situation;Disoriented to place   Cognition Short term memory loss;Poor attention/concentration;Poor safety awareness; Follows commands   Language Clear; Appropriate for developmental age   [de-identified] Coma Scale   Eye Opening 4   Best Verbal Response 4   Best Motor Response 6   Krystyna Coma Scale Score 14   NIH/MNHISS Stroke Scale   NIH/MNIHSS Stroke Scale Assessed No   HEENT   HEENT (WDL) X   Right Eye Intact; Impaired vision   Left Eye Intact; Impaired vision   Respiratory   Respiratory (WDL) WDL   Respiratory Pattern Regular   L Breath Sounds Clear   R Breath Sounds Clear   Cardiac   Cardiac (WDL) WDL   Cardiac Regularity Regular   Heart Sounds S1, S2   Cardiac Rhythm NSR   Rhythm Interpretation   Pulse 93   Cardiac Monitor   Telemetry Monitor On Yes   Telemetry Audible Yes   Telemetry Alarms Set Yes   Telemetry Box Number 94   Gastrointestinal   Abdominal (WDL) WDL   Peripheral Vascular   Peripheral Vascular (WDL) X   Edema Right lower extremity; Left lower extremity   RLE Edema Pitting;+4   LLE Edema Pitting;+4   Skin Color/Condition   Skin Color/Condition (WDL) WDL   Skin Integrity   Skin Integrity (WDL) X   Musculoskeletal   Musculoskeletal (WDL) X   RUE Full movement   LUE Full movement   RL Extremity Full movement; Unsteady;Weakness   LL Extremity Full movement; Unsteady;Weakness   Genitourinary   Genitourinary (WDL) X  (mohamud)   Flank Tenderness No   Suprapubic Tenderness No   Dysuria JEFFREY   Anus/Rectum   Anus/Rectum (WDL) WDL   Wound 05/10/19 Heel Lateral;Left Nonblanching, purple, 0.5x0.5cm   Date First Assessed/Time First Assessed: 05/10/19 2050   Present on Hospital Admission: No  Wound Approximate Age at First Assessment (Weeks): 0 weeks  Primary Wound Type: (c) Pressure Injury  Location: Heel  Wound Location Orientation:

## 2019-07-02 NOTE — PROGRESS NOTES
[id number]      Recent Labs     06/30/19  1754 07/01/19  0444 07/02/19  0445   * 132* 130*   K 4.2 4.5 4.7   CL 91* 92* 91*   CO2 20* 21 21   PHOS 7.8* 7.9* 7.8*   BUN 95* 100* 106*   CREATININE 4.0* 4.0* 3.8*     No results for input(s): AST, ALT, ALB, BILIDIR, BILITOT, ALKPHOS in the last 72 hours. No results for input(s): INR in the last 72 hours. No results for input(s): CKTOTAL, CKMB, CKMBINDEX, TROPONINI in the last 72 hours. Consults:     IP CONSULT TO SPIRITUAL SERVICES  IP CONSULT TO NEPHROLOGY  IP CONSULT TO PHARMACY  IP CONSULT TO UROLOGY  IP CONSULT TO HOSPITALIST  IP CONSULT TO INFECTIOUS DISEASES  IP CONSULT TO PHARMACY    ASSESSMENT AND PLAN      Active Hospital Problems    Diagnosis Date Noted    Wound infection [T14. 8XXA, L08.9] 06/28/2019    Benign essential HTN [I10]     CAMI (acute kidney injury) ([facility] [N17.9]     Status post aorto-coronary artery bypass graft [Z95.1]     Postoperative infection of wound of sternum [T81.49XA] 12/21/2018    DM (diabetes mellitus), secondary, uncontrolled, w/neurologic complic ([facility] [I46.48, S38.55]     Acute on chronic systolic heart failure ([facility]) [I50.23]     Coronary artery disease involving native coronary artery of native heart without angina pectoris [I25.10] 11/25/2018    CKD (chronic kidney disease) stage 3, GFR 30-59 ml/min ([facility]) [N18.3]     Anoxic brain injury ([facility] [G93.1] 06/09/2014          ASSESSMENT/PLAN:  Postoperative infection of wound of sternum status post CABG x3 in the setting of coronary artery disease.  -Antibiotics: Cefepime placed by CTS team  -Wound VAC placement status post cardiothoracic surgical exploration  -Medications: Lipitor, metoprolol, Isordil  -Infectious disease consultation ordered. Requesting assistance with antibiotics. No perioperative sample identified for culture at this time     Acute on chronic systolic congestive heart failure exacerbation  -Medications: Lipitor, metoprolol.  Holding ACEI in setting

## 2019-07-02 NOTE — PROGRESS NOTES
Spoke with RAMAKRISHNA Carpio regarding Tunneled HD Catheter to be placed tomorrow as patient had aspirin and Eloquis yesterday. Patient needs to be NPO after midnight, hold blood thinners and obtain consent from P.O.A. Shirin verbalized understanding.

## 2019-07-03 ENCOUNTER — HOSPITAL ENCOUNTER (OUTPATIENT)
Dept: INTERVENTIONAL RADIOLOGY/VASCULAR | Age: 59
Discharge: HOME | DRG: 813 | End: 2019-07-03
Attending: THORACIC SURGERY (CARDIOTHORACIC VASCULAR SURGERY)
Payer: MEDICAID

## 2019-07-03 ENCOUNTER — APPOINTMENT (OUTPATIENT)
Dept: GENERAL RADIOLOGY | Age: 59
DRG: 813 | End: 2019-07-03
Attending: THORACIC SURGERY (CARDIOTHORACIC VASCULAR SURGERY)
Payer: MEDICAID

## 2019-07-03 DIAGNOSIS — I48.0 PAF (PAROXYSMAL ATRIAL FIBRILLATION) (HCC): ICD-10-CM

## 2019-07-03 LAB
A/G RATIO: 0.9 (ref 1.1–2.2)
ALBUMIN SERPL-MCNC: 2.8 G/DL (ref 3.4–5)
ALP BLD-CCNC: 131 U/L (ref 40–129)
ALT SERPL-CCNC: 10 U/L (ref 10–40)
ANION GAP SERPL CALCULATED.3IONS-SCNC: 19 MMOL/L (ref 3–16)
APTT: 33.5 SEC (ref 26–36)
APTT: 33.5 SEC (ref 26–36)
APTT: 49.2 SEC (ref 26–36)
APTT: 58.3 SEC (ref 26–36)
AST SERPL-CCNC: 24 U/L (ref 15–37)
BASOPHILS ABSOLUTE: 0.1 K/UL (ref 0–0.2)
BASOPHILS RELATIVE PERCENT: 0.8 %
BILIRUB SERPL-MCNC: 0.3 MG/DL (ref 0–1)
BUN BLDV-MCNC: 116 MG/DL (ref 7–20)
C-REACTIVE PROTEIN: 61.6 MG/L (ref 0–5.1)
CALCIUM SERPL-MCNC: 8.6 MG/DL (ref 8.3–10.6)
CHLORIDE BLD-SCNC: 91 MMOL/L (ref 99–110)
CO2: 20 MMOL/L (ref 21–32)
CREAT SERPL-MCNC: 3.8 MG/DL (ref 0.8–1.3)
EOSINOPHILS ABSOLUTE: 0.2 K/UL (ref 0–0.6)
EOSINOPHILS RELATIVE PERCENT: 2.1 %
GFR AFRICAN AMERICAN: 20
GFR NON-AFRICAN AMERICAN: 16
GLOBULIN: 3.2 G/DL
GLUCOSE BLD-MCNC: 107 MG/DL (ref 70–99)
GLUCOSE BLD-MCNC: 115 MG/DL (ref 70–99)
GLUCOSE BLD-MCNC: 124 MG/DL (ref 70–99)
GLUCOSE BLD-MCNC: 124 MG/DL (ref 70–99)
GLUCOSE BLD-MCNC: 94 MG/DL (ref 70–99)
HCT VFR BLD CALC: 34.5 % (ref 40.5–52.5)
HEMOGLOBIN: 10.8 G/DL (ref 13.5–17.5)
HEPATITIS B SURFACE ANTIGEN INTERPRETATION: NORMAL
INR BLD: 1.68 (ref 0.86–1.14)
LYMPHOCYTES ABSOLUTE: 0.7 K/UL (ref 1–5.1)
LYMPHOCYTES RELATIVE PERCENT: 6.3 %
MCH RBC QN AUTO: 25 PG (ref 26–34)
MCHC RBC AUTO-ENTMCNC: 31.4 G/DL (ref 31–36)
MCV RBC AUTO: 79.7 FL (ref 80–100)
MONOCYTES ABSOLUTE: 0.4 K/UL (ref 0–1.3)
MONOCYTES RELATIVE PERCENT: 3.7 %
NEUTROPHILS ABSOLUTE: 10 K/UL (ref 1.7–7.7)
NEUTROPHILS RELATIVE PERCENT: 87.1 %
PDW BLD-RTO: 20.9 % (ref 12.4–15.4)
PERFORMED ON: ABNORMAL
PERFORMED ON: NORMAL
PHOSPHORUS: 6.7 MG/DL (ref 2.5–4.9)
PLATELET # BLD: 229 K/UL (ref 135–450)
PMV BLD AUTO: 7.7 FL (ref 5–10.5)
POTASSIUM SERPL-SCNC: 3.8 MMOL/L (ref 3.5–5.1)
PROTHROMBIN TIME: 19.2 SEC (ref 9.8–13)
RBC # BLD: 4.33 M/UL (ref 4.2–5.9)
SEDIMENTATION RATE, ERYTHROCYTE: 31 MM/HR (ref 0–20)
SODIUM BLD-SCNC: 130 MMOL/L (ref 136–145)
TOTAL PROTEIN: 6 G/DL (ref 6.4–8.2)
VANCOMYCIN RANDOM: 15.1 UG/ML
WBC # BLD: 11.5 K/UL (ref 4–11)

## 2019-07-03 PROCEDURE — 85730 THROMBOPLASTIN TIME PARTIAL: CPT

## 2019-07-03 PROCEDURE — 87801 DETECT AGNT MULT DNA AMPLI: CPT

## 2019-07-03 PROCEDURE — 76937 US GUIDE VASCULAR ACCESS: CPT

## 2019-07-03 PROCEDURE — 6360000002 HC RX W HCPCS: Performed by: NURSE PRACTITIONER

## 2019-07-03 PROCEDURE — 2580000003 HC RX 258: Performed by: INTERNAL MEDICINE

## 2019-07-03 PROCEDURE — 86140 C-REACTIVE PROTEIN: CPT

## 2019-07-03 PROCEDURE — 36415 COLL VENOUS BLD VENIPUNCTURE: CPT

## 2019-07-03 PROCEDURE — 85610 PROTHROMBIN TIME: CPT

## 2019-07-03 PROCEDURE — 85025 COMPLETE CBC W/AUTO DIFF WBC: CPT

## 2019-07-03 PROCEDURE — 77001 FLUOROGUIDE FOR VEIN DEVICE: CPT

## 2019-07-03 PROCEDURE — 36556 INSERT NON-TUNNEL CV CATH: CPT

## 2019-07-03 PROCEDURE — 90935 HEMODIALYSIS ONE EVALUATION: CPT

## 2019-07-03 PROCEDURE — 2580000003 HC RX 258

## 2019-07-03 PROCEDURE — 80202 ASSAY OF VANCOMYCIN: CPT

## 2019-07-03 PROCEDURE — 99024 POSTOP FOLLOW-UP VISIT: CPT | Performed by: THORACIC SURGERY (CARDIOTHORACIC VASCULAR SURGERY)

## 2019-07-03 PROCEDURE — 87205 SMEAR GRAM STAIN: CPT

## 2019-07-03 PROCEDURE — 85652 RBC SED RATE AUTOMATED: CPT

## 2019-07-03 PROCEDURE — 87340 HEPATITIS B SURFACE AG IA: CPT

## 2019-07-03 PROCEDURE — 87077 CULTURE AEROBIC IDENTIFY: CPT

## 2019-07-03 PROCEDURE — 6360000002 HC RX W HCPCS: Performed by: INTERNAL MEDICINE

## 2019-07-03 PROCEDURE — 5A1D70Z PERFORMANCE OF URINARY FILTRATION, INTERMITTENT, LESS THAN 6 HOURS PER DAY: ICD-10-PCS | Performed by: INTERNAL MEDICINE

## 2019-07-03 PROCEDURE — 6370000000 HC RX 637 (ALT 250 FOR IP): Performed by: INTERNAL MEDICINE

## 2019-07-03 PROCEDURE — 71045 X-RAY EXAM CHEST 1 VIEW: CPT

## 2019-07-03 PROCEDURE — 93268 ECG RECORD/REVIEW: CPT | Performed by: INTERNAL MEDICINE

## 2019-07-03 PROCEDURE — 87040 BLOOD CULTURE FOR BACTERIA: CPT

## 2019-07-03 PROCEDURE — 6370000000 HC RX 637 (ALT 250 FOR IP): Performed by: THORACIC SURGERY (CARDIOTHORACIC VASCULAR SURGERY)

## 2019-07-03 PROCEDURE — 97605 NEG PRS WND THER DME<=50SQCM: CPT

## 2019-07-03 PROCEDURE — 1200000000 HC SEMI PRIVATE

## 2019-07-03 PROCEDURE — 86706 HEP B SURFACE ANTIBODY: CPT

## 2019-07-03 PROCEDURE — 6370000000 HC RX 637 (ALT 250 FOR IP): Performed by: NURSE PRACTITIONER

## 2019-07-03 PROCEDURE — 02H633Z INSERTION OF INFUSION DEVICE INTO RIGHT ATRIUM, PERCUTANEOUS APPROACH: ICD-10-PCS | Performed by: RADIOLOGY

## 2019-07-03 PROCEDURE — 99232 SBSQ HOSP IP/OBS MODERATE 35: CPT | Performed by: INTERNAL MEDICINE

## 2019-07-03 PROCEDURE — 80053 COMPREHEN METABOLIC PANEL: CPT

## 2019-07-03 PROCEDURE — 87186 SC STD MICRODIL/AGAR DIL: CPT

## 2019-07-03 PROCEDURE — 87070 CULTURE OTHR SPECIMN AEROBIC: CPT

## 2019-07-03 PROCEDURE — 84100 ASSAY OF PHOSPHORUS: CPT

## 2019-07-03 RX ORDER — SODIUM CHLORIDE 9 MG/ML
INJECTION, SOLUTION INTRAVENOUS
Status: COMPLETED
Start: 2019-07-03 | End: 2019-07-03

## 2019-07-03 RX ORDER — HEPARIN SODIUM 1000 [USP'U]/ML
2800 INJECTION, SOLUTION INTRAVENOUS; SUBCUTANEOUS PRN
Status: DISCONTINUED | OUTPATIENT
Start: 2019-07-03 | End: 2019-07-05

## 2019-07-03 RX ORDER — METOPROLOL SUCCINATE 25 MG/1
75 TABLET, EXTENDED RELEASE ORAL DAILY
Status: DISCONTINUED | OUTPATIENT
Start: 2019-07-03 | End: 2019-07-05

## 2019-07-03 RX ADMIN — Medication 1 TABLET: at 08:47

## 2019-07-03 RX ADMIN — FUROSEMIDE 5 MG/HR: 10 INJECTION, SOLUTION INTRAMUSCULAR; INTRAVENOUS at 16:34

## 2019-07-03 RX ADMIN — HEPARIN SODIUM 2800 UNITS: 1000 INJECTION, SOLUTION INTRAVENOUS; SUBCUTANEOUS at 20:04

## 2019-07-03 RX ADMIN — VANCOMYCIN HYDROCHLORIDE 500 MG: 500 INJECTION, POWDER, LYOPHILIZED, FOR SOLUTION INTRAVENOUS at 21:31

## 2019-07-03 RX ADMIN — MAGNESIUM GLUCONATE 500 MG ORAL TABLET 400 MG: 500 TABLET ORAL at 21:21

## 2019-07-03 RX ADMIN — Medication 125 MG: at 21:21

## 2019-07-03 RX ADMIN — FLUTICASONE PROPIONATE 2 SPRAY: 50 SPRAY, METERED NASAL at 08:48

## 2019-07-03 RX ADMIN — Medication 125 MG: at 10:51

## 2019-07-03 RX ADMIN — TAMSULOSIN HYDROCHLORIDE 0.4 MG: 0.4 CAPSULE ORAL at 08:47

## 2019-07-03 RX ADMIN — MELATONIN TAB 5 MG 10 MG: 5 TAB at 21:21

## 2019-07-03 RX ADMIN — COLLAGENASE SANTYL: 250 OINTMENT TOPICAL at 13:02

## 2019-07-03 RX ADMIN — MEROPENEM 500 MG: 500 INJECTION, POWDER, FOR SOLUTION INTRAVENOUS at 22:33

## 2019-07-03 RX ADMIN — METHOCARBAMOL TABLETS 1000 MG: 500 TABLET, COATED ORAL at 22:29

## 2019-07-03 RX ADMIN — GABAPENTIN 300 MG: 300 CAPSULE ORAL at 21:23

## 2019-07-03 RX ADMIN — FAMOTIDINE 20 MG: 20 TABLET ORAL at 08:47

## 2019-07-03 RX ADMIN — MEROPENEM 500 MG: 500 INJECTION, POWDER, FOR SOLUTION INTRAVENOUS at 08:48

## 2019-07-03 RX ADMIN — SODIUM CHLORIDE 250 ML: 9 INJECTION, SOLUTION INTRAVENOUS at 08:49

## 2019-07-03 RX ADMIN — MEROPENEM 500 MG: 500 INJECTION, POWDER, FOR SOLUTION INTRAVENOUS at 00:47

## 2019-07-03 RX ADMIN — VANCOMYCIN HYDROCHLORIDE 1.5 G: 10 INJECTION, POWDER, LYOPHILIZED, FOR SOLUTION INTRAVENOUS at 00:51

## 2019-07-03 RX ADMIN — ATORVASTATIN CALCIUM 80 MG: 80 TABLET, FILM COATED ORAL at 08:47

## 2019-07-03 RX ADMIN — ISOSORBIDE DINITRATE 10 MG: 10 TABLET ORAL at 08:47

## 2019-07-03 RX ADMIN — OXYCODONE HYDROCHLORIDE AND ACETAMINOPHEN 500 MG: 500 TABLET ORAL at 08:47

## 2019-07-03 RX ADMIN — HEPARIN SODIUM 4000 UNITS: 1000 INJECTION, SOLUTION INTRAVENOUS; SUBCUTANEOUS at 03:01

## 2019-07-03 RX ADMIN — METOPROLOL SUCCINATE 75 MG: 25 TABLET, FILM COATED, EXTENDED RELEASE ORAL at 10:51

## 2019-07-03 RX ADMIN — ALLOPURINOL 100 MG: 100 TABLET ORAL at 08:47

## 2019-07-03 RX ADMIN — ISOSORBIDE DINITRATE 10 MG: 10 TABLET ORAL at 21:21

## 2019-07-03 RX ADMIN — FUROSEMIDE 5 MG/HR: 10 INJECTION, SOLUTION INTRAMUSCULAR; INTRAVENOUS at 03:08

## 2019-07-03 RX ADMIN — FERROUS SULFATE TAB 325 MG (65 MG ELEMENTAL FE) 325 MG: 325 (65 FE) TAB at 08:47

## 2019-07-03 ASSESSMENT — PAIN SCALES - GENERAL
PAINLEVEL_OUTOF10: 6
PAINLEVEL_OUTOF10: 0

## 2019-07-03 ASSESSMENT — PAIN DESCRIPTION - LOCATION: LOCATION: NECK

## 2019-07-03 NOTE — PROGRESS NOTES
Phone call received from IR, unable to place tunneled vas-cath while blood cultures are out and unresulted. Tunneled vas-cath placement will either have to wait until Monday, or they can place a temporary cath. Perfect serve sent to MD Torres to clarify.

## 2019-07-03 NOTE — PROGRESS NOTES
[facility] Wound Ostomy Continence Nurse  Follow-up Progress Note       NAME:  Jet Menard  MEDICAL RECORD NUMBER:  [id number]  AGE:  60 y.o. GENDER:  male  :  1960  TODAY'S DATE:  7/3/2019    Subjective:  I am sleepy today   Wound Identification:  Wound Type: non-healing surgical - mid sternal wound  Contributing Factors: diabetes, shear force, obesity and recent I & D. Patient Goal of Care:  [x] Wound Healing  [] Odor Control   [] Palliative Care  [] Pain Control   [] Other:     Objective:  Lying in bed. VAC with secure seal    /74   Pulse 92   Temp 97.3 °F (36.3 °C) (Oral)   Resp 16   Ht 5' 10\" (1.778 m)   Wt 223 lb 5.2 oz (101.3 kg)   SpO2 98%   BMI 32.04 kg/m²   Shivam Risk Score: Shivam Scale Score: 16  Assessment:  Wound bed red and moist.  Granulation tissue present   Measurements:  Negative Pressure Wound Therapy Chest Anterior;Medial (Active)   $ Standard NPWT <=50 sq cm PER TX $ Yes 7/3/2019 12:51 PM   Wound Type Surgical 7/3/2019 12:51 PM   Unit Type KCI VAC 7/3/2019 12:51 PM   Dressing Type Black foam 7/3/2019 12:51 PM   Number of pieces used 2 7/3/2019 12:51 PM   Cycle Continuous 7/3/2019 12:51 PM   Target Pressure (mmHg) 125 7/3/2019 12:51 PM   Intensity 1 7/3/2019 12:51 PM   Canister changed? No 7/3/2019 12:51 PM   Dressing Status Changed 7/3/2019 12:51 PM   Dressing Changed Changed/New 7/3/2019 12:51 PM   Drainage Amount Small 7/3/2019 12:51 PM   Drainage Description Serosanguinous 7/3/2019 12:51 PM   Dressing Change Due 07/05/19 7/3/2019 12:51 PM   Output (ml) 0 ml 7/3/2019  6:00 AM   Wound Assessment JEFFREY 7/3/2019  7:39 AM   Shasta-wound Assessment Clean;Dry; Intact 7/3/2019 12:51 PM   Shape oval 7/3/2019 12:51 PM   Odor None 7/3/2019 12:51 PM   Number of days: 194       Wound 18 Coccyx Mid Rayshawn Selby Ulcer-Dark puprle with left edge partial open 1cm (Active)   Number of days: 192       Wound 19 Coccyx Mid stage II to coccyx (Active) 7/3/2019 12:51 PM   Drainage Description Serosanguinous 7/3/2019 12:51 PM   Odor None 7/3/2019 12:51 PM   Dressing/Treatment Barrier Film;Vacuum dressing 7/3/2019 12:51 PM   Dressing Changed Changed/New 7/3/2019 12:51 PM   Dressing Status Changed 7/3/2019 12:51 PM   Dressing Change Due 07/05/19 7/3/2019 12:51 PM   Number of days: 4       Response to treatment:  Well tolerated by patient. Pain Assessment:  Severity:  0 / 10  Quality of pain: N/A  Wound Pain Timing/Severity: none  Premedicated: No  Plan:   Plan of Care: Incision 06/28/19 Sternum-Dressing/Treatment: Barrier Film, Vacuum dressing  Wound 05/10/19 Heel Lateral;Left Nonblanching, purple, 0.5x0.5cm-Dressing/Treatment: Pharmaceutical agent (see MAR), Moist to dry, Dry Dressing, Adhesive bandage(santyl)       Recommendation:   Sternal Wound- Negative Pressure Wound Therapy (NPWT) change 3 times a week (Monday, Wednesday, Friday); remove old dressing; clean wound with NSS; apply VAC drape to periwound; place black foam into wound bed; apply VAC drape over foam; Connect track pad to VAC at 125 mmHg low continuous suction. This treatment was done today. Wound culture done and sent to lab. L Heel- Clean with NSS; apply nickel thick Santyl; Zinc ointment on periwound; apply moist 2x2, avoid healthy skin; cover with 4x4; use roll gauze to hold in place; change daily. This was not done today.       Call wound care for problems with seal [phone], Pager [phone].  Wound care to continue to follow     Specialty Bed Required : Yes   [] Low Air Loss   [x] Pressure Redistribution isoflex  [] Fluid Immersion  [] Bariatric  [] Total Pressure Relief  [] Other:     Current Diet: Dietary Nutrition Supplements: Wound Healing Oral Supplement  Diet NPO, After Midnight  Dietician consult:  Yes    Discharge Plan:  Placement for patient upon discharge: intermediate care facility   Patient appropriate for Outpatient [address] [facility]: Yes    Referrals:  [x] Social Worker following  [] [facility]  [] Supplies  [] Other    Patient/Caregiver Teaching:  Level of patient/caregiver understanding able to:   [] Indicates understanding       [] Needs reinforcement  [] Unsuccessful      [] Verbal Understanding  [] Demonstrated understanding       [] No evidence of learning  [] Refused teaching         [x] N/A       Electronically signed by Donato Larkin RN, MSN, Cata Arana on 7/3/2019 at 1:27 PM

## 2019-07-03 NOTE — PROGRESS NOTES
Infectious Disease Follow up Notes    CC :  Sternal wound infection/OM     Antibiotics:   vancby level per pharmacy started 7/2  Meropenem 500 q12 started 7/2  Po vanc 125 BID     Admit Date:   6/28/2019  Hospital Day: 6    Subjective:   He remains afebrile  On dopamine, heparin, insulin infusions    He says he is feeling ok right now, denies pain. No diarrhea. Objective:     Patient Vitals for the past 8 hrs:   BP Temp Temp src Pulse Resp SpO2 Weight   07/03/19 1045 111/74 97.3 °F (36.3 °C) Oral 92 16 98 % --   07/03/19 0730 102/78 97.4 °F (36.3 °C) Oral 95 18 96 % --   07/03/19 0501 -- -- -- -- -- -- 223 lb 5.2 oz (101.3 kg)       EXAM:  General:  Alert, cooperative. Appears comfortable     HEENT:  PERRL, sclera anicteric. MMM, no thrush    NECK:  supple  LUNGS:  Decreased breath sounds at bases. +BS   CV:  RRR without murmur  VAC chest wall. Sternum stable   ABD:  Protuberant, soft, NT.  +BS   EXT: No focal rash.   Trace LE edema       LINE: PIV sites ok         Scheduled Meds:   metoprolol succinate  75 mg Oral Daily    meropenem  500 mg Intravenous Q12H    vancomycin  125 mg Oral BID    vancomycin (VANCOCIN) intermittent dosing (placeholder)   Other RX Placeholder    collagenase   Topical Daily    melatonin  10 mg Oral Nightly    fluticasone  2 spray Each Nostril Daily    gabapentin  300 mg Oral Nightly    tamsulosin  0.4 mg Oral Daily    ferrous sulfate  325 mg Oral Daily with breakfast    magnesium oxide  400 mg Oral Nightly    therapeutic multivitamin-minerals  1 tablet Oral Daily    isosorbide dinitrate  10 mg Oral BID    allopurinol  100 mg Oral Daily    vitamin C  500 mg Oral Daily    atorvastatin  80 mg Oral Daily    famotidine  20 mg Oral Daily       Continuous Infusions:   heparin (porcine) 6.6 mL/hr (07/03/19 0301)    DOPamine 2.5 mcg/kg/min (07/02/19 1423)    furosemide (LASIX) 1mg/ml infusion 5 postoperative imaging would be helpful if available.       Mild canal and moderate foraminal narrowing at C5-C6.       Large bilateral pleural effusions.        Assessment:     Patient Active Problem List    Diagnosis Date Noted    Sternal osteomyelitis ([address]     MRSA infection     Wound infection 06/28/2019    Decreased cardiac function 05/20/2019    Hypervolemia     Positive culture finding     Colitis due to Clostridium difficile     PAF (paroxysmal atrial fibrillation) (HCC)     Benign essential HTN     CAMI (acute kidney injury) ([address]     Anasarca 05/03/2019    Lesion of bladder 04/05/2019    Recurrent UTI 04/05/2019    Cervical myelopathy (HCC) 12/27/2018    Prolonged QT interval     Status post aorto-coronary artery bypass graft     History of cardiac arrest     Obesity, Class II, BMI 35-39.9     Sepsis ([facility]     Postoperative infection of wound of sternum 12/21/2018    Serratia infection 12/21/2018    Gram-negative bacteremia 12/21/2018    Acute respiratory failure with hypoxia ([facility])     Cervical spinal cord compression ([address] 12/07/2018    Spinal cord ischemia ([facility])     DM (diabetes mellitus), secondary, uncontrolled, w/neurologic complic ([facility])     Acute on chronic systolic heart failure ([facility])     Acute renal failure with acute tubular necrosis superimposed on stage 3 chronic kidney disease ([address]     NSTEMI (non-ST elevated myocardial infarction) ([address]     Ischemic cardiomyopathy     Abnormal stress test 11/26/2018    Coronary artery disease involving native coronary artery of native heart without angina pectoris 11/25/2018    Gait disturbance 11/23/2018    CKD (chronic kidney disease) stage 3, GFR 30-59 ml/min ([facility])     Abnormal echocardiogram     ACS (acute coronary syndrome) ([facility] 11/22/2018    Carpal tunnel syndrome 03/30/2015    Obstructive sleep apnea syndrome 03/06/2015     Overview Note:     Overview:   PSG 2/19/15  AHI 75 with desat to 72%  Wt 263  CPAP 9  AHI 1

## 2019-07-03 NOTE — PROGRESS NOTES
kidney disease) stage 3, GFR 30-59 ml/min ([facility]) [N18.3]     Anoxic brain injury ([facility] [G93.1] 06/09/2014          ASSESSMENT/PLAN:  Postoperative infection of wound of sternum status post CABG x3 in the setting of coronary artery disease.  -Antibiotics: Merrem, Vanco  -Wound VAC placement status post cardiothoracic surgical exploration  -Medications: Lipitor, metoprolol, Isordil  -Infectious disease consultation ordered. Requesting assistance with antibiotics. No perioperative sample identified for culture at this time. Known prior hx of serratia ( located to sternum) and MRSA ( blood), will cover empirically for these pathogens. Plan for 6 weeks of IV therapy for OM.     Acute on chronic systolic congestive heart failure exacerbation  -Medications: Lipitor, metoprolol. Holding ACEI in setting of CAMI  -Patient has failed diuretic therapy, unresponsive to Lasix with oliguria and worsening renal function  -Echo: 5/4/2019 EF 20%, severe diffuse hypokinesis, reduced right ventricular function, moderate to severe mitral valve regurgitation,      Acute on chronic kidney injury with CKD type IV secondary to cardiorenal syndrome  -Nonresponsive to Lasix and dopamine trials  -Nephrology consultation: plan for hemodialysis  -Holding aspirin and Eliquis for dialysis catheter placement on 7/3 and patient to receive first HD treatment on 7/3 and plan for tomorrow 7/4.     Essential hypertension and hyperlipidemia in the setting of known coronary artery disease  -Medications: Metoprolol, Lipitor  -Continue to optimize blood pressure control  -Continue to optimize cholesterol control     Acute cystitis: E. coli and Morganella, multidrug resistance  -Antibiotics: Sensitive to merrem  -Micro: Urinary culture consistent with E. coli and Morganella  -Chronic indwelling Robin.   Exchanged by urology consultation     Diabetes mellitus type 2  -Hemoglobin A1c 5/3/2019 5.3  -Presently diet controlled  -Consider sliding scale insulin if

## 2019-07-04 LAB
ALBUMIN SERPL-MCNC: 2.9 G/DL (ref 3.4–5)
ANION GAP SERPL CALCULATED.3IONS-SCNC: 16 MMOL/L (ref 3–16)
APTT: 46.6 SEC (ref 26–36)
APTT: 51.3 SEC (ref 26–36)
APTT: 85.6 SEC (ref 26–36)
BUN BLDV-MCNC: 88 MG/DL (ref 7–20)
CALCIUM SERPL-MCNC: 8.6 MG/DL (ref 8.3–10.6)
CHLORIDE BLD-SCNC: 95 MMOL/L (ref 99–110)
CO2: 24 MMOL/L (ref 21–32)
CREAT SERPL-MCNC: 2.7 MG/DL (ref 0.8–1.3)
GFR AFRICAN AMERICAN: 29
GFR NON-AFRICAN AMERICAN: 24
GLUCOSE BLD-MCNC: 117 MG/DL (ref 70–99)
GLUCOSE BLD-MCNC: 123 MG/DL (ref 70–99)
GLUCOSE BLD-MCNC: 123 MG/DL (ref 70–99)
GLUCOSE BLD-MCNC: 89 MG/DL (ref 70–99)
HBV SURFACE AB TITR SER: <3.5 MIU/ML
HEPATITIS B SURFACE ANTIGEN INTERPRETATION: NORMAL
PERFORMED ON: ABNORMAL
PERFORMED ON: ABNORMAL
PERFORMED ON: NORMAL
PHOSPHORUS: 5.4 MG/DL (ref 2.5–4.9)
POTASSIUM SERPL-SCNC: 3.6 MMOL/L (ref 3.5–5.1)
REPORT: NORMAL
SODIUM BLD-SCNC: 135 MMOL/L (ref 136–145)
VANCOMYCIN RANDOM: 16.8 UG/ML

## 2019-07-04 PROCEDURE — 2580000003 HC RX 258: Performed by: INTERNAL MEDICINE

## 2019-07-04 PROCEDURE — 6370000000 HC RX 637 (ALT 250 FOR IP): Performed by: NURSE PRACTITIONER

## 2019-07-04 PROCEDURE — 85730 THROMBOPLASTIN TIME PARTIAL: CPT

## 2019-07-04 PROCEDURE — 6360000002 HC RX W HCPCS: Performed by: INTERNAL MEDICINE

## 2019-07-04 PROCEDURE — 1200000000 HC SEMI PRIVATE

## 2019-07-04 PROCEDURE — 6370000000 HC RX 637 (ALT 250 FOR IP): Performed by: INTERNAL MEDICINE

## 2019-07-04 PROCEDURE — 90935 HEMODIALYSIS ONE EVALUATION: CPT

## 2019-07-04 PROCEDURE — 6370000000 HC RX 637 (ALT 250 FOR IP): Performed by: THORACIC SURGERY (CARDIOTHORACIC VASCULAR SURGERY)

## 2019-07-04 PROCEDURE — 80069 RENAL FUNCTION PANEL: CPT

## 2019-07-04 PROCEDURE — 36415 COLL VENOUS BLD VENIPUNCTURE: CPT

## 2019-07-04 PROCEDURE — 80202 ASSAY OF VANCOMYCIN: CPT

## 2019-07-04 RX ORDER — HEPARIN SODIUM 1000 [USP'U]/ML
2000 INJECTION, SOLUTION INTRAVENOUS; SUBCUTANEOUS ONCE
Status: COMPLETED | OUTPATIENT
Start: 2019-07-04 | End: 2019-07-04

## 2019-07-04 RX ORDER — BUSPIRONE HYDROCHLORIDE 5 MG/1
15 TABLET ORAL ONCE
Status: COMPLETED | OUTPATIENT
Start: 2019-07-04 | End: 2019-07-04

## 2019-07-04 RX ADMIN — BUSPIRONE HYDROCHLORIDE 15 MG: 5 TABLET ORAL at 22:02

## 2019-07-04 RX ADMIN — MEROPENEM 500 MG: 500 INJECTION, POWDER, FOR SOLUTION INTRAVENOUS at 22:02

## 2019-07-04 RX ADMIN — METHOCARBAMOL TABLETS 1000 MG: 500 TABLET, COATED ORAL at 19:02

## 2019-07-04 RX ADMIN — ALLOPURINOL 100 MG: 100 TABLET ORAL at 11:35

## 2019-07-04 RX ADMIN — VANCOMYCIN HYDROCHLORIDE 500 MG: 500 INJECTION, POWDER, LYOPHILIZED, FOR SOLUTION INTRAVENOUS at 11:34

## 2019-07-04 RX ADMIN — TAMSULOSIN HYDROCHLORIDE 0.4 MG: 0.4 CAPSULE ORAL at 11:35

## 2019-07-04 RX ADMIN — HEPARIN SODIUM 2000 UNITS: 1000 INJECTION, SOLUTION INTRAVENOUS; SUBCUTANEOUS at 15:06

## 2019-07-04 RX ADMIN — FERROUS SULFATE TAB 325 MG (65 MG ELEMENTAL FE) 325 MG: 325 (65 FE) TAB at 11:35

## 2019-07-04 RX ADMIN — COLLAGENASE SANTYL: 250 OINTMENT TOPICAL at 11:34

## 2019-07-04 RX ADMIN — METOPROLOL SUCCINATE 75 MG: 25 TABLET, FILM COATED, EXTENDED RELEASE ORAL at 11:35

## 2019-07-04 RX ADMIN — OXYCODONE HYDROCHLORIDE AND ACETAMINOPHEN 500 MG: 500 TABLET ORAL at 11:35

## 2019-07-04 RX ADMIN — DOPAMINE HYDROCHLORIDE 2.5 MCG/KG/MIN: 160 INJECTION, SOLUTION INTRAVENOUS at 01:13

## 2019-07-04 RX ADMIN — Medication 125 MG: at 20:57

## 2019-07-04 RX ADMIN — ACETAMINOPHEN 650 MG: 325 TABLET ORAL at 13:20

## 2019-07-04 RX ADMIN — FUROSEMIDE 5 MG/HR: 10 INJECTION, SOLUTION INTRAMUSCULAR; INTRAVENOUS at 05:57

## 2019-07-04 RX ADMIN — FAMOTIDINE 20 MG: 20 TABLET ORAL at 11:35

## 2019-07-04 RX ADMIN — MAGNESIUM GLUCONATE 500 MG ORAL TABLET 400 MG: 500 TABLET ORAL at 20:57

## 2019-07-04 RX ADMIN — Medication 1 TABLET: at 11:35

## 2019-07-04 RX ADMIN — MEROPENEM 500 MG: 500 INJECTION, POWDER, FOR SOLUTION INTRAVENOUS at 13:20

## 2019-07-04 RX ADMIN — GABAPENTIN 300 MG: 300 CAPSULE ORAL at 20:56

## 2019-07-04 RX ADMIN — METHOCARBAMOL TABLETS 1000 MG: 500 TABLET, COATED ORAL at 11:53

## 2019-07-04 RX ADMIN — MELATONIN TAB 5 MG 10 MG: 5 TAB at 20:56

## 2019-07-04 RX ADMIN — ATORVASTATIN CALCIUM 80 MG: 80 TABLET, FILM COATED ORAL at 11:35

## 2019-07-04 RX ADMIN — ISOSORBIDE DINITRATE 10 MG: 10 TABLET ORAL at 11:35

## 2019-07-04 RX ADMIN — Medication 125 MG: at 11:38

## 2019-07-04 ASSESSMENT — PAIN SCALES - GENERAL
PAINLEVEL_OUTOF10: 6
PAINLEVEL_OUTOF10: 8
PAINLEVEL_OUTOF10: 0
PAINLEVEL_OUTOF10: 9
PAINLEVEL_OUTOF10: 0
PAINLEVEL_OUTOF10: 6
PAINLEVEL_OUTOF10: 0
PAINLEVEL_OUTOF10: 4

## 2019-07-04 ASSESSMENT — PAIN DESCRIPTION - LOCATION: LOCATION: NECK

## 2019-07-04 NOTE — PROGRESS NOTES
Wound vac continued to alarm blockage. Wound vac dressing changed with assistance of [employer]. Pt tolerated change well. No alarm noted post dressing change.

## 2019-07-04 NOTE — FLOWSHEET NOTE
07/03/19 2010   Vital Signs   /75   Temp 97.3 °F (36.3 °C)   Pulse 84   Resp 16   SpO2 100 %   Weight 227 lb 11.8 oz (103.3 kg)   Percent Weight Change 0   Post-Hemodialysis Assessment   Post-Treatment Procedures Blood returned;Catheter capped, clamped and heparinized x 2 ports   Machine Disinfection Process Acid/Vinegar Clean;Heat Disinfect; Exterior Machine Disinfection   Rinseback Volume (ml) 400 ml   Total Liters Processed (l/min) 29.5 l/min   Dialyzer Clearance Moderately streaked   Duration of Treatment (minutes) 120 minutes   Heparin amount administered during treatment (units) 0 units   Hemodialysis Intake (ml) 600 ml   Hemodialysis Output (ml) 600 ml   NET Removed (ml) 0 ml   Tolerated Treatment Good   Bilateral Breath Sounds Clear   Edema None     Treatment time: 120    Net UF: 0    Pre weight: 103.3kg bed scale  Post weight: 103.3kg bed scale  EDW: TBD    Access used: RIJ Temporary Catheter  Access function: good    Medications or blood products given: none    Regular outpatient schedule: TBD    Summary of response to treatment: Dialysis completed. BP: /69-82; P: 77-96. Blood returned. Both lumens RIJ Temp Cath irrigated with NS then Heparin instilled and capped. Pt tolerated the treatment well. No meds given during dialysis. Copy of dialysis treatment record placed in chart, to be scanned into EMR.

## 2019-07-04 NOTE — PROGRESS NOTES
Verapamil    Medications      Scheduled Meds:   vancomycin  500 mg Intravenous Once    metoprolol succinate  75 mg Oral Daily    meropenem  500 mg Intravenous Q12H    vancomycin  125 mg Oral BID    vancomycin (VANCOCIN) intermittent dosing (placeholder)   Other RX Placeholder    collagenase   Topical Daily    melatonin  10 mg Oral Nightly    fluticasone  2 spray Each Nostril Daily    gabapentin  300 mg Oral Nightly    tamsulosin  0.4 mg Oral Daily    ferrous sulfate  325 mg Oral Daily with breakfast    magnesium oxide  400 mg Oral Nightly    therapeutic multivitamin-minerals  1 tablet Oral Daily    isosorbide dinitrate  10 mg Oral BID    allopurinol  100 mg Oral Daily    vitamin C  500 mg Oral Daily    atorvastatin  80 mg Oral Daily    famotidine  20 mg Oral Daily       Infusions:   heparin (porcine) 7.4 mL/hr (07/04/19 0027)    DOPamine 2.5 mcg/kg/min (07/04/19 0113)    furosemide (LASIX) 1mg/ml infusion 5 mg/hr (07/04/19 0557)       PRN Meds:  heparin (porcine), heparin (porcine), heparin (porcine), magnesium hydroxide, methocarbamol, acetaminophen    Intake and Output     Intake/Output Summary (Last 24 hours) at 7/4/2019 0808  Last data filed at 7/4/2019 0559  Gross per 24 hour   Intake 960 ml   Output 2375 ml   Net -1415 ml       Vitals    /70   Pulse 90   Temp 97.9 °F (36.6 °C) (Oral)   Resp 16   Ht 5' 10\" (1.778 m)   Wt 226 lb 3.1 oz (102.6 kg)   SpO2 95%   BMI 32.46 kg/m²     Exam:  General appearance: Well, NAD, appears stated age and cooperative. HEENT NCAT w/out obvious deformity. PERRL, EOM's intact. Conjunctivae/corneas clear. Wears glasses  Neck: Supple, No JVD/Bruits. Trachea midline w/out thyromegaly or adenopathy  Complains of pain on lateral rotation of neck to right.    Lungs: Anterior fields CTAB w/out Rales/Wheezes/Rhonchi w/ good respiratory effort, easy and unlabored  Heart: RRR w/ Normal S1/S2 w/out  M/R/G, PMI non-displaced  Wound VAC placement to sternum

## 2019-07-05 ENCOUNTER — APPOINTMENT (OUTPATIENT)
Dept: CT IMAGING | Age: 59
DRG: 813 | End: 2019-07-05
Attending: THORACIC SURGERY (CARDIOTHORACIC VASCULAR SURGERY)
Payer: MEDICAID

## 2019-07-05 LAB
ALBUMIN SERPL-MCNC: 2.7 G/DL (ref 3.4–5)
ANION GAP SERPL CALCULATED.3IONS-SCNC: 15 MMOL/L (ref 3–16)
APTT: 80.9 SEC (ref 26–36)
BUN BLDV-MCNC: 57 MG/DL (ref 7–20)
CALCIUM SERPL-MCNC: 8.4 MG/DL (ref 8.3–10.6)
CHLORIDE BLD-SCNC: 93 MMOL/L (ref 99–110)
CO2: 23 MMOL/L (ref 21–32)
CREAT SERPL-MCNC: 2.2 MG/DL (ref 0.8–1.3)
GFR AFRICAN AMERICAN: 37
GFR NON-AFRICAN AMERICAN: 31
GLUCOSE BLD-MCNC: 106 MG/DL (ref 70–99)
GLUCOSE BLD-MCNC: 111 MG/DL (ref 70–99)
GLUCOSE BLD-MCNC: 112 MG/DL (ref 70–99)
GLUCOSE BLD-MCNC: 127 MG/DL (ref 70–99)
GLUCOSE BLD-MCNC: 132 MG/DL (ref 70–99)
GRAM STAIN RESULT: ABNORMAL
ORGANISM: ABNORMAL
PERFORMED ON: ABNORMAL
PHOSPHORUS: 4 MG/DL (ref 2.5–4.9)
PLATELET # BLD: 178 K/UL (ref 135–450)
POTASSIUM SERPL-SCNC: 3.9 MMOL/L (ref 3.5–5.1)
SODIUM BLD-SCNC: 131 MMOL/L (ref 136–145)
VANCOMYCIN RANDOM: 16.7 UG/ML
WOUND/ABSCESS: ABNORMAL
WOUND/ABSCESS: ABNORMAL

## 2019-07-05 PROCEDURE — 80069 RENAL FUNCTION PANEL: CPT

## 2019-07-05 PROCEDURE — 97605 NEG PRS WND THER DME<=50SQCM: CPT

## 2019-07-05 PROCEDURE — 6360000004 HC RX CONTRAST MEDICATION: Performed by: INTERNAL MEDICINE

## 2019-07-05 PROCEDURE — 6360000002 HC RX W HCPCS: Performed by: INTERNAL MEDICINE

## 2019-07-05 PROCEDURE — 2580000003 HC RX 258: Performed by: INTERNAL MEDICINE

## 2019-07-05 PROCEDURE — 6370000000 HC RX 637 (ALT 250 FOR IP): Performed by: INTERNAL MEDICINE

## 2019-07-05 PROCEDURE — 6370000000 HC RX 637 (ALT 250 FOR IP): Performed by: THORACIC SURGERY (CARDIOTHORACIC VASCULAR SURGERY)

## 2019-07-05 PROCEDURE — 99233 SBSQ HOSP IP/OBS HIGH 50: CPT | Performed by: INTERNAL MEDICINE

## 2019-07-05 PROCEDURE — 85049 AUTOMATED PLATELET COUNT: CPT

## 2019-07-05 PROCEDURE — 85730 THROMBOPLASTIN TIME PARTIAL: CPT

## 2019-07-05 PROCEDURE — 80202 ASSAY OF VANCOMYCIN: CPT

## 2019-07-05 PROCEDURE — 70470 CT HEAD/BRAIN W/O & W/DYE: CPT

## 2019-07-05 PROCEDURE — 1200000000 HC SEMI PRIVATE

## 2019-07-05 PROCEDURE — 2580000003 HC RX 258

## 2019-07-05 PROCEDURE — 6360000002 HC RX W HCPCS: Performed by: NURSE PRACTITIONER

## 2019-07-05 RX ORDER — SODIUM CHLORIDE 9 MG/ML
INJECTION, SOLUTION INTRAVENOUS
Status: COMPLETED
Start: 2019-07-05 | End: 2019-07-05

## 2019-07-05 RX ORDER — METOPROLOL SUCCINATE 50 MG/1
50 TABLET, EXTENDED RELEASE ORAL DAILY
Status: DISCONTINUED | OUTPATIENT
Start: 2019-07-06 | End: 2019-07-11 | Stop reason: HOSPADM

## 2019-07-05 RX ORDER — HALOPERIDOL 5 MG/ML
2 INJECTION INTRAMUSCULAR ONCE
Status: DISCONTINUED | OUTPATIENT
Start: 2019-07-05 | End: 2019-07-05

## 2019-07-05 RX ORDER — DIPHENHYDRAMINE HYDROCHLORIDE 50 MG/ML
50 INJECTION INTRAMUSCULAR; INTRAVENOUS ONCE
Status: DISCONTINUED | OUTPATIENT
Start: 2019-07-05 | End: 2019-07-05

## 2019-07-05 RX ORDER — LORAZEPAM 2 MG/ML
1 INJECTION INTRAMUSCULAR ONCE
Status: COMPLETED | OUTPATIENT
Start: 2019-07-05 | End: 2019-07-05

## 2019-07-05 RX ADMIN — ATORVASTATIN CALCIUM 80 MG: 80 TABLET, FILM COATED ORAL at 10:44

## 2019-07-05 RX ADMIN — ENOXAPARIN SODIUM 100 MG: 100 INJECTION SUBCUTANEOUS at 15:45

## 2019-07-05 RX ADMIN — FAMOTIDINE 20 MG: 20 TABLET ORAL at 10:45

## 2019-07-05 RX ADMIN — SODIUM CHLORIDE: 900 INJECTION, SOLUTION INTRAVENOUS at 10:54

## 2019-07-05 RX ADMIN — MEROPENEM 500 MG: 500 INJECTION, POWDER, FOR SOLUTION INTRAVENOUS at 10:45

## 2019-07-05 RX ADMIN — ISOSORBIDE DINITRATE 10 MG: 10 TABLET ORAL at 21:51

## 2019-07-05 RX ADMIN — ISOSORBIDE DINITRATE 10 MG: 10 TABLET ORAL at 10:44

## 2019-07-05 RX ADMIN — LORAZEPAM 1 MG: 2 INJECTION, SOLUTION INTRAMUSCULAR; INTRAVENOUS at 01:59

## 2019-07-05 RX ADMIN — Medication 125 MG: at 10:45

## 2019-07-05 RX ADMIN — COLLAGENASE SANTYL 1 EACH: 250 OINTMENT TOPICAL at 09:17

## 2019-07-05 RX ADMIN — GABAPENTIN 300 MG: 300 CAPSULE ORAL at 21:51

## 2019-07-05 RX ADMIN — TAMSULOSIN HYDROCHLORIDE 0.4 MG: 0.4 CAPSULE ORAL at 10:54

## 2019-07-05 RX ADMIN — Medication 1 TABLET: at 10:45

## 2019-07-05 RX ADMIN — METOPROLOL SUCCINATE 75 MG: 25 TABLET, FILM COATED, EXTENDED RELEASE ORAL at 10:45

## 2019-07-05 RX ADMIN — ALLOPURINOL 100 MG: 100 TABLET ORAL at 10:45

## 2019-07-05 RX ADMIN — MELATONIN TAB 5 MG 10 MG: 5 TAB at 21:51

## 2019-07-05 RX ADMIN — FLUTICASONE PROPIONATE 2 SPRAY: 50 SPRAY, METERED NASAL at 10:46

## 2019-07-05 RX ADMIN — Medication 125 MG: at 21:55

## 2019-07-05 RX ADMIN — FERROUS SULFATE TAB 325 MG (65 MG ELEMENTAL FE) 325 MG: 325 (65 FE) TAB at 10:45

## 2019-07-05 RX ADMIN — ENOXAPARIN SODIUM 100 MG: 100 INJECTION SUBCUTANEOUS at 21:51

## 2019-07-05 RX ADMIN — OXYCODONE HYDROCHLORIDE AND ACETAMINOPHEN 500 MG: 500 TABLET ORAL at 10:45

## 2019-07-05 RX ADMIN — IOPAMIDOL 75 ML: 755 INJECTION, SOLUTION INTRAVENOUS at 12:10

## 2019-07-05 RX ADMIN — MEROPENEM 500 MG: 500 INJECTION, POWDER, FOR SOLUTION INTRAVENOUS at 21:51

## 2019-07-05 ASSESSMENT — PAIN SCALES - GENERAL
PAINLEVEL_OUTOF10: 0
PAINLEVEL_OUTOF10: 0
PAINLEVEL_OUTOF10: 4
PAINLEVEL_OUTOF10: 0
PAINLEVEL_OUTOF10: 4

## 2019-07-05 ASSESSMENT — PAIN SCALES - WONG BAKER: WONGBAKER_NUMERICALRESPONSE: 0

## 2019-07-05 NOTE — PROGRESS NOTES
for possible TDC placement next week  Monitor labs and urine output to see if there is recovery of kidney function

## 2019-07-05 NOTE — PROGRESS NOTES
No major complaint or events from cardiothoracic surgery standpoint  Wound VAC in position changed by nursing staff wound care  Vital signs stable afebrile  S1 plus S2 +0 no additional sound  Bilaterally clear no additional sound  Abdomen soft nondistended nontender bowel sounds positive  Plan  Wound VAC managed by our service  For all services agree we will start planning for discharge  Please coordinate renal for any nephrology issue  Please call hospitalist service for any medical issues

## 2019-07-05 NOTE — PROGRESS NOTES
130%  · BMI Classification: BMI 30.0 - 34.9 Obese Class I    Nutrition Interventions:   Continue current diet, Continue current ONS, Start ONS  Continued Inpatient Monitoring    Nutrition Evaluation:   · Evaluation: No progress toward goals   · Goals: Patient will tolerate and consume 50%< of PO meals and supplements provided.      · Monitoring: Nutrition Progression, Meal Intake, Supplement Intake, Diet Tolerance, Wound Healing, Pertinent Labs, Weight, Monitor Bowel Function      Electronically signed by Eric Courtney RD, LD on 7/5/19 at 1:25 PM  Contact Number: [phone]

## 2019-07-05 NOTE — PROGRESS NOTES
multivitamin-minerals  1 tablet Oral Daily    isosorbide dinitrate  10 mg Oral BID    allopurinol  100 mg Oral Daily    vitamin C  500 mg Oral Daily    atorvastatin  80 mg Oral Daily    famotidine  20 mg Oral Daily       Continuous Infusions:      PRN Meds:  methocarbamol, acetaminophen      Assessment:     DM, CHF, CAD / CM / CHF  Back surg with C6 corpectomy, cage.   Has 'failure C7 screws with plate dislodgement' on CT [Dr Christina Holter, Neurosurg]   CABG 12/2018  Sternal wound, cult + S marcescens 12/20/18  Hx C diff  MRSA bacteremia 5/9/19    Sternal osteomyelitis  MRSA bacteremia, suspect from sternum (has wound cult with MRSA)  Bacteriuria     Plan:     Change to daptomycin (has vanco MARY 2 on wound isolate)  Can d/c meropenem  Await BC MRSA sensitiviites / MARY  F/u BC   Sternal debridement - may need sternal wires out give associate with recurrent MRSA bacteremia    Discussed with pt  Iliana Lanier

## 2019-07-05 NOTE — PROGRESS NOTES
Wound vac alarming. Upon arrival into the room pt had ripped off wound vac stating it was \"itchy\" and he didn't want it. Wound vac replaced, pt instructed to not touch the site on his chest. [employer] made aware of situation, will continue to monitor.

## 2019-07-05 NOTE — PROGRESS NOTES
06/28/2019    Benign essential HTN [I10]     CAMI (acute kidney injury) ([facility] [N17.9]     Status post aorto-coronary artery bypass graft [Z95.1]     Postoperative infection of wound of sternum [T81.49XA] 12/21/2018    DM (diabetes mellitus), secondary, uncontrolled, w/neurologic complic ([facility] [address] [facility] [Y15.30, S37.02]     Acute on chronic systolic heart failure (HCC) [I50.23]     Coronary artery disease involving native coronary artery of native heart without angina pectoris [I25.10] 11/25/2018    CKD (chronic kidney disease) stage 3, GFR 30-59 ml/min ([facility]) [N18.3]     Anoxic brain injury ([facility] [address] [facility] [G93.1] 06/09/2014          ASSESSMENT/PLAN:  Postoperative infection of wound of sternum status post CABG x3 in the setting of coronary artery disease.  -Antibiotics: Merrem, Vanco  -Wound VAC placement status post cardiothoracic surgical exploration  -Medications: Lipitor, metoprolol, Isordil  -Infectious disease consultation ordered. Requesting assistance with antibiotics. No perioperative sample identified for culture at this time. Known prior hx of serratia ( located to sternum) and MRSA ( blood), will cover empirically for these pathogens. Now with MRSA to blood and sternal wound infection. ID likely to DC merrem and cont Vanco.  Pending note by ID may change Vanco to Dapto due to MARY. Plan for 6 weeks of IV therapy for OM.  - Cardiothoracic team to consider if they have debrided wound to care for recurrent MRSA bacteremia/infections. CTS team to contemplate if sternal wires may need to be addressed or removed due to this infection. ?? Acute Bacteremia MRSA: 7/3/2019.  - Recurrent MRSA to blood infection. Concern for active seeding to hardwire either in neck with failed hardware to repair of cervical spine, or possible sternal wires from CABG as wound culture is positive for Staph as well. .  Will discuss with ID.  - Abx: Vanco.  Pharmacy assisting with dosing.   ID considering change to Dapto due to MARY.  - ECHO to eval for vegetations ordered 7/4     Acute on chronic systolic congestive heart failure exacerbation  -Medications: Lipitor, metoprolol. Holding ACEI in setting of CAMI  -Patient has failed diuretic therapy, unresponsive to Lasix with oliguria and worsening renal function  -Echo: 5/4/2019 EF 20%, severe diffuse hypokinesis, reduced right ventricular function, moderate to severe mitral valve regurgitation,      Acute on chronic kidney injury with CKD type IV secondary to cardiorenal syndrome  -Nonresponsive to Lasix and dopamine trials  -Nephrology consultation: plan for hemodialysis and to have temporary dialysis catheter replaced to tunneled dialysis catheter once Blood clx have cleared.  -Holding aspirin and Eliquis for dialysis catheter placement on 7/3 and patient to receive first HD treatment on 7/3 and 7/4.  - Heparin gtt used to bridge patient. Was discontinued by Attending on CTS service. I would continue to recommend anticoagulation and bridge this patient. Would start Lovenox 1 mg/kg in BID dosing and time hold to when tunneled catheter can safely be placed.     Essential hypertension and hyperlipidemia in the setting of known coronary artery disease  -Medications: Metoprolol, Lipitor  -Continue to optimize blood pressure control  -Continue to optimize cholesterol control    Paroxysmal atrial fibrillation: Stable and HR controlled  - Medications: Toprol  - Anticoagulation:  Eliquis on hold for Dialysis catheter placement. Was heparin bridged to coordinate for planned tunneled catheter to continue dialysis care. Would recommend bridging with Heparin as preferred choice. Can use Lovenox 1mg/kg BID dosing and time hold to procedure for safety.     Acute cystitis: E. coli and Morganella, multidrug resistance  -Antibiotics: Sensitive to merrem  -Micro: Urinary culture consistent with E. coli and Morganella  -Chronic indwelling Robin.   Exchanged by urology consultation     Diabetes mellitus type 2  -Hemoglobin A1c 5/3/2019 5.3  -Presently diet controlled  -Consider sliding scale insulin if blood sugar remains greater than 150  -Continue to follow-up as outpatient to optimize glycemic control     Gout  -Medications: Allopurinol renally dosed  -Continue to monitor joint function     Microcytic anemia: MCV 78, hemoglobin 11  -Labs: CBC monitoring, iron studies Iron sat 16%, Total iron 35  -Consider holding iron in the acute setting of infection     Chronic urinary retention  -Patient to undergo hemodialysis secondary to oliguria/ Renal failure.    -Consider discontinuing Flomax once hemodialysis is underway     C. difficile colitis  -Diagnosed on 5/9/2019 and had recurrence  -Antibiotics:  oral vancomycin for prevention due to risk of recurrence with ABx load and current infections.  -Micro: Repeat C. Difficile  -ID consultation appreciated for Abx assistance. Cervical spondylosis and severe cervical stenosis C 5-7 with Myelomalacia and developing myelopathy S/P Decompressive corpectomy C6 and placement of PEEK allograft corpectomy cage with autograft bone and Vivigen C5-7.  - Cervical CT scan 6/26/2019:   Interval C5-C7 ACDF with C6 corpectomy.  There seems to be loosening of the   inferior screws at C7 with backing out of the plate which appears angled   outward at this level, possibly with an attached bone fragment. - Neurosurgery Consultation placed 7/2. No recommendations for inpatient stay. To follow up as outpt with Dr Serafin Cuellar. - ID to evaluate the possibilty of source of acute infection to cervical spine. Called [phone] to reach [place] [facility] team to discuss concern over possible OM to cervical area. With loose and failed hardware, it is unclear as to the safety of further imaging of MRI to help determine acute infectious soft tissue process.   Requesting NS to assist with their recommendations for further imaging, ( Can he undergo MRI safepy?) Possible need to remove hardware if it is infected, and activity orders due to instability of neck. Acute metabolic encephalopathy: Unclear origin presently. Work up in process  - Imaging: CT head w/wo contrast.  Unclear if patient unable to undergo MRI due to failed cervical hardware in his neck. Attempting to reach Neurosurgical team for further assistance. - Medications: Haldol 2 mg X 1 ordered, but not given. - Labs: CBC, BMP monitoring  - Treat underlying infectious causes. - Closely monitor patient           DVT Prophylaxis: Hold for Dialysis catheter placement  Diet: DIET CARB CONTROL;   Dietary Nutrition Supplements: Wound Healing Oral Supplement  Code Status: Prior  PT/OT Eval Status: Pt is non weight bearing and WC bound     Dispo -expect > 3 to 5 days pending clinical response to medical therapy           Donna Redd MD

## 2019-07-05 NOTE — PROGRESS NOTES
Pt continuing to pull at tubes and wound vac, undressing himself and throwing his legs over the edge of the bed. NP paged- \"Pt still yelling in room, difficult to redirect and has ripped off wound vac. Could I get Ativan, please? Thank you. \" 1 mg Ativan administered at this time. Will continue to monitor.

## 2019-07-06 LAB
ALBUMIN SERPL-MCNC: 2.9 G/DL (ref 3.4–5)
AMMONIA: 21 UMOL/L (ref 16–60)
ANION GAP SERPL CALCULATED.3IONS-SCNC: 14 MMOL/L (ref 3–16)
BLOOD CULTURE, ROUTINE: ABNORMAL
BUN BLDV-MCNC: 67 MG/DL (ref 7–20)
CALCIUM SERPL-MCNC: 9.1 MG/DL (ref 8.3–10.6)
CHLORIDE BLD-SCNC: 95 MMOL/L (ref 99–110)
CO2: 24 MMOL/L (ref 21–32)
CREAT SERPL-MCNC: 2.4 MG/DL (ref 0.8–1.3)
GFR AFRICAN AMERICAN: 34
GFR NON-AFRICAN AMERICAN: 28
GLUCOSE BLD-MCNC: 101 MG/DL (ref 70–99)
GLUCOSE BLD-MCNC: 113 MG/DL (ref 70–99)
GLUCOSE BLD-MCNC: 161 MG/DL (ref 70–99)
GLUCOSE BLD-MCNC: 337 MG/DL (ref 70–99)
GLUCOSE BLD-MCNC: 491 MG/DL (ref 70–99)
HCT VFR BLD CALC: 32.2 % (ref 40.5–52.5)
HEMOGLOBIN: 10.3 G/DL (ref 13.5–17.5)
MCH RBC QN AUTO: 25.5 PG (ref 26–34)
MCHC RBC AUTO-ENTMCNC: 31.9 G/DL (ref 31–36)
MCV RBC AUTO: 80.1 FL (ref 80–100)
ORGANISM: ABNORMAL
PDW BLD-RTO: 21.7 % (ref 12.4–15.4)
PERFORMED ON: ABNORMAL
PHOSPHORUS: 4.8 MG/DL (ref 2.5–4.9)
PLATELET # BLD: 167 K/UL (ref 135–450)
PMV BLD AUTO: 8.3 FL (ref 5–10.5)
POTASSIUM SERPL-SCNC: 4.3 MMOL/L (ref 3.5–5.1)
RBC # BLD: 4.02 M/UL (ref 4.2–5.9)
SODIUM BLD-SCNC: 133 MMOL/L (ref 136–145)
VANCOMYCIN RANDOM: 14.9 UG/ML
WBC # BLD: 9.9 K/UL (ref 4–11)

## 2019-07-06 PROCEDURE — 90935 HEMODIALYSIS ONE EVALUATION: CPT

## 2019-07-06 PROCEDURE — 80202 ASSAY OF VANCOMYCIN: CPT

## 2019-07-06 PROCEDURE — 80069 RENAL FUNCTION PANEL: CPT

## 2019-07-06 PROCEDURE — 36415 COLL VENOUS BLD VENIPUNCTURE: CPT

## 2019-07-06 PROCEDURE — 6370000000 HC RX 637 (ALT 250 FOR IP): Performed by: INTERNAL MEDICINE

## 2019-07-06 PROCEDURE — 82140 ASSAY OF AMMONIA: CPT

## 2019-07-06 PROCEDURE — 6360000002 HC RX W HCPCS: Performed by: INTERNAL MEDICINE

## 2019-07-06 PROCEDURE — 83036 HEMOGLOBIN GLYCOSYLATED A1C: CPT

## 2019-07-06 PROCEDURE — 2580000003 HC RX 258: Performed by: INTERNAL MEDICINE

## 2019-07-06 PROCEDURE — 85027 COMPLETE CBC AUTOMATED: CPT

## 2019-07-06 PROCEDURE — 6370000000 HC RX 637 (ALT 250 FOR IP): Performed by: THORACIC SURGERY (CARDIOTHORACIC VASCULAR SURGERY)

## 2019-07-06 PROCEDURE — 1200000000 HC SEMI PRIVATE

## 2019-07-06 PROCEDURE — 6370000000 HC RX 637 (ALT 250 FOR IP): Performed by: NURSE PRACTITIONER

## 2019-07-06 RX ORDER — DEXTROSE MONOHYDRATE 25 G/50ML
12.5 INJECTION, SOLUTION INTRAVENOUS PRN
Status: DISCONTINUED | OUTPATIENT
Start: 2019-07-06 | End: 2019-07-11 | Stop reason: HOSPADM

## 2019-07-06 RX ORDER — LACTULOSE 10 G/15ML
20 SOLUTION ORAL 3 TIMES DAILY
Status: DISCONTINUED | OUTPATIENT
Start: 2019-07-06 | End: 2019-07-11 | Stop reason: HOSPADM

## 2019-07-06 RX ORDER — INSULIN GLARGINE 100 [IU]/ML
0.25 INJECTION, SOLUTION SUBCUTANEOUS NIGHTLY
Status: DISCONTINUED | OUTPATIENT
Start: 2019-07-06 | End: 2019-07-11 | Stop reason: HOSPADM

## 2019-07-06 RX ORDER — HALOPERIDOL 5 MG/ML
2 INJECTION INTRAMUSCULAR ONCE
Status: COMPLETED | OUTPATIENT
Start: 2019-07-06 | End: 2019-07-06

## 2019-07-06 RX ORDER — NICOTINE POLACRILEX 4 MG
15 LOZENGE BUCCAL PRN
Status: DISCONTINUED | OUTPATIENT
Start: 2019-07-06 | End: 2019-07-11 | Stop reason: HOSPADM

## 2019-07-06 RX ORDER — DEXTROSE MONOHYDRATE 50 MG/ML
100 INJECTION, SOLUTION INTRAVENOUS PRN
Status: DISCONTINUED | OUTPATIENT
Start: 2019-07-06 | End: 2019-07-11 | Stop reason: HOSPADM

## 2019-07-06 RX ADMIN — MELATONIN TAB 5 MG 10 MG: 5 TAB at 22:16

## 2019-07-06 RX ADMIN — Medication 125 MG: at 22:18

## 2019-07-06 RX ADMIN — METHOCARBAMOL TABLETS 1000 MG: 500 TABLET, COATED ORAL at 10:20

## 2019-07-06 RX ADMIN — Medication 20 G: at 22:19

## 2019-07-06 RX ADMIN — FERROUS SULFATE TAB 325 MG (65 MG ELEMENTAL FE) 325 MG: 325 (65 FE) TAB at 10:03

## 2019-07-06 RX ADMIN — VANCOMYCIN HYDROCHLORIDE 500 MG: 500 INJECTION, POWDER, LYOPHILIZED, FOR SOLUTION INTRAVENOUS at 16:38

## 2019-07-06 RX ADMIN — ALLOPURINOL 100 MG: 100 TABLET ORAL at 10:02

## 2019-07-06 RX ADMIN — ISOSORBIDE DINITRATE 10 MG: 10 TABLET ORAL at 22:16

## 2019-07-06 RX ADMIN — INSULIN LISPRO 4 UNITS: 100 INJECTION, SOLUTION INTRAVENOUS; SUBCUTANEOUS at 21:20

## 2019-07-06 RX ADMIN — ACETAMINOPHEN 650 MG: 325 TABLET ORAL at 10:20

## 2019-07-06 RX ADMIN — MEROPENEM 500 MG: 500 INJECTION, POWDER, FOR SOLUTION INTRAVENOUS at 22:16

## 2019-07-06 RX ADMIN — ENOXAPARIN SODIUM 100 MG: 100 INJECTION SUBCUTANEOUS at 21:20

## 2019-07-06 RX ADMIN — ATORVASTATIN CALCIUM 80 MG: 80 TABLET, FILM COATED ORAL at 10:03

## 2019-07-06 RX ADMIN — ENOXAPARIN SODIUM 100 MG: 100 INJECTION SUBCUTANEOUS at 10:03

## 2019-07-06 RX ADMIN — HALOPERIDOL LACTATE 2 MG: 5 INJECTION INTRAMUSCULAR at 18:57

## 2019-07-06 RX ADMIN — MEROPENEM 500 MG: 500 INJECTION, POWDER, FOR SOLUTION INTRAVENOUS at 10:22

## 2019-07-06 RX ADMIN — COLLAGENASE SANTYL: 250 OINTMENT TOPICAL at 10:04

## 2019-07-06 RX ADMIN — Medication 125 MG: at 10:03

## 2019-07-06 RX ADMIN — GABAPENTIN 300 MG: 300 CAPSULE ORAL at 22:16

## 2019-07-06 RX ADMIN — Medication 1 TABLET: at 10:03

## 2019-07-06 RX ADMIN — ISOSORBIDE DINITRATE 10 MG: 10 TABLET ORAL at 10:02

## 2019-07-06 RX ADMIN — ACETAMINOPHEN 650 MG: 325 TABLET ORAL at 16:50

## 2019-07-06 RX ADMIN — INSULIN LISPRO 12 UNITS: 100 INJECTION, SOLUTION INTRAVENOUS; SUBCUTANEOUS at 17:12

## 2019-07-06 RX ADMIN — FAMOTIDINE 20 MG: 20 TABLET ORAL at 10:03

## 2019-07-06 RX ADMIN — METOPROLOL SUCCINATE 50 MG: 50 TABLET, EXTENDED RELEASE ORAL at 10:03

## 2019-07-06 RX ADMIN — INSULIN GLARGINE 25 UNITS: 100 INJECTION, SOLUTION SUBCUTANEOUS at 21:20

## 2019-07-06 RX ADMIN — FLUTICASONE PROPIONATE 2 SPRAY: 50 SPRAY, METERED NASAL at 10:03

## 2019-07-06 RX ADMIN — OXYCODONE HYDROCHLORIDE AND ACETAMINOPHEN 500 MG: 500 TABLET ORAL at 10:03

## 2019-07-06 RX ADMIN — METHOCARBAMOL TABLETS 1000 MG: 500 TABLET, COATED ORAL at 16:50

## 2019-07-06 RX ADMIN — TAMSULOSIN HYDROCHLORIDE 0.4 MG: 0.4 CAPSULE ORAL at 10:02

## 2019-07-06 ASSESSMENT — PAIN SCALES - GENERAL
PAINLEVEL_OUTOF10: 0
PAINLEVEL_OUTOF10: 0
PAINLEVEL_OUTOF10: 6
PAINLEVEL_OUTOF10: 6
PAINLEVEL_OUTOF10: 0
PAINLEVEL_OUTOF10: 8
PAINLEVEL_OUTOF10: 0

## 2019-07-06 NOTE — PROGRESS NOTES
succinate 50 mg daily. Anticoagulation with Eliquis on hold for dialysis catheter placement with heparin bridge. 7. Chronic urinary retention and acute cystitis with multi-drug resistant E. Coli and Morganella in the setting of chronic indwelling urinary catheter. Exchanged by Urology. 8. Type 2 Diabetes controlled without sliding scale lispro insulin  9. Gout continued on allopurinol (renal dosing). 10. Microcytic anemia (Hgb 10.3 / MCV 80): Iron saturation 16%. Iron held in the acute infection. 11. C. Difficile colitis (diagnosed 5/9/19 with recurrence):  Continues on oral vancomycin. 12. Cervical spondylosis and severe cervical stenosis C 5-7 with Myelomalacia and developing myelopathy S/P Decompressive corpectomy C6 and placement of PEEK allograft corpectomy cage with autograft bone and Vivigen C5-7. Neurosurgery Consultation placed 7/2. No recommendations for inpatient stay. To follow up as outpt with Dr Prakash Subramanian. 13. Infectious disease to evaluate the possibilty of source of acute infection to cervical spine. Called [phone] to reach [place] [facility] team to discuss concern over possible OM to cervical area. With loose and failed hardware, it is unclear as to the safety of further imaging of MRI to help determine acute infectious soft tissue process. Requesting NS to assist with their recommendations for further imaging, ( Can he undergo MRI safepy?) Possible need to remove hardware if it is infected, and activity orders due to instability of neck. 14. Acute metabolic encephalopathy: Unclear origin presently. Resolving. 15. Possible osteomyelitis of the cervical spine. Neurosurgery evaluation: Dr Lord Sandoval 7/3. No further recommendations at this time. He does not feel that the neck is consistent with OM, but more of a hardware failure. To follow up as outpt with Dr. Florencia Stanford Directive: Full Code. DVT prophylaxis with intermittent compression.    Discharge planning: unknown at

## 2019-07-06 NOTE — PROGRESS NOTES
Confusion and pulling at wound Vac and line. Yelling for help. Dosing acutely with Haldol. Likely hepatic encephalopathy. Check ammonia level and initiate therapy with oral lactulose.     Victor M Price MD

## 2019-07-07 LAB
ALBUMIN SERPL-MCNC: 2.6 G/DL (ref 3.4–5)
ANION GAP SERPL CALCULATED.3IONS-SCNC: 14 MMOL/L (ref 3–16)
BUN BLDV-MCNC: 46 MG/DL (ref 7–20)
CALCIUM SERPL-MCNC: 8.8 MG/DL (ref 8.3–10.6)
CHLORIDE BLD-SCNC: 98 MMOL/L (ref 99–110)
CO2: 23 MMOL/L (ref 21–32)
CREAT SERPL-MCNC: 2.1 MG/DL (ref 0.8–1.3)
ESTIMATED AVERAGE GLUCOSE: 171.4 MG/DL
GFR AFRICAN AMERICAN: 39
GFR NON-AFRICAN AMERICAN: 32
GLUCOSE BLD-MCNC: 118 MG/DL (ref 70–99)
GLUCOSE BLD-MCNC: 69 MG/DL (ref 70–99)
GLUCOSE BLD-MCNC: 78 MG/DL (ref 70–99)
GLUCOSE BLD-MCNC: 80 MG/DL (ref 70–99)
GLUCOSE BLD-MCNC: 81 MG/DL (ref 70–99)
GLUCOSE BLD-MCNC: 91 MG/DL (ref 70–99)
GLUCOSE BLD-MCNC: 97 MG/DL (ref 70–99)
HBA1C MFR BLD: 7.6 %
HCT VFR BLD CALC: 30.9 % (ref 40.5–52.5)
HEMOGLOBIN: 9.6 G/DL (ref 13.5–17.5)
MCH RBC QN AUTO: 25.4 PG (ref 26–34)
MCHC RBC AUTO-ENTMCNC: 31.2 G/DL (ref 31–36)
MCV RBC AUTO: 81.3 FL (ref 80–100)
PDW BLD-RTO: 21.5 % (ref 12.4–15.4)
PERFORMED ON: ABNORMAL
PERFORMED ON: ABNORMAL
PERFORMED ON: NORMAL
PHOSPHORUS: 3.4 MG/DL (ref 2.5–4.9)
PLATELET # BLD: 155 K/UL (ref 135–450)
PMV BLD AUTO: 8.3 FL (ref 5–10.5)
POTASSIUM SERPL-SCNC: 3.8 MMOL/L (ref 3.5–5.1)
RBC # BLD: 3.8 M/UL (ref 4.2–5.9)
SODIUM BLD-SCNC: 135 MMOL/L (ref 136–145)
TOTAL CK: 31 U/L (ref 39–308)
WBC # BLD: 10.6 K/UL (ref 4–11)

## 2019-07-07 PROCEDURE — 82550 ASSAY OF CK (CPK): CPT

## 2019-07-07 PROCEDURE — 80069 RENAL FUNCTION PANEL: CPT

## 2019-07-07 PROCEDURE — 36415 COLL VENOUS BLD VENIPUNCTURE: CPT

## 2019-07-07 PROCEDURE — 6370000000 HC RX 637 (ALT 250 FOR IP): Performed by: INTERNAL MEDICINE

## 2019-07-07 PROCEDURE — 6370000000 HC RX 637 (ALT 250 FOR IP): Performed by: THORACIC SURGERY (CARDIOTHORACIC VASCULAR SURGERY)

## 2019-07-07 PROCEDURE — 1200000000 HC SEMI PRIVATE

## 2019-07-07 PROCEDURE — 99233 SBSQ HOSP IP/OBS HIGH 50: CPT | Performed by: INTERNAL MEDICINE

## 2019-07-07 PROCEDURE — 85027 COMPLETE CBC AUTOMATED: CPT

## 2019-07-07 PROCEDURE — 6370000000 HC RX 637 (ALT 250 FOR IP): Performed by: NURSE PRACTITIONER

## 2019-07-07 PROCEDURE — 2580000003 HC RX 258: Performed by: INTERNAL MEDICINE

## 2019-07-07 PROCEDURE — 6360000002 HC RX W HCPCS: Performed by: INTERNAL MEDICINE

## 2019-07-07 PROCEDURE — 99024 POSTOP FOLLOW-UP VISIT: CPT | Performed by: THORACIC SURGERY (CARDIOTHORACIC VASCULAR SURGERY)

## 2019-07-07 RX ORDER — INSULIN GLARGINE 100 [IU]/ML
12.5 INJECTION, SOLUTION SUBCUTANEOUS ONCE
Status: COMPLETED | OUTPATIENT
Start: 2019-07-07 | End: 2019-07-07

## 2019-07-07 RX ORDER — SODIUM CHLORIDE 9 MG/ML
INJECTION, SOLUTION INTRAVENOUS
Status: DISPENSED
Start: 2019-07-07 | End: 2019-07-07

## 2019-07-07 RX ORDER — 0.9 % SODIUM CHLORIDE 0.9 %
500 INTRAVENOUS SOLUTION INTRAVENOUS ONCE
Status: DISCONTINUED | OUTPATIENT
Start: 2019-07-07 | End: 2019-07-07

## 2019-07-07 RX ADMIN — FERROUS SULFATE TAB 325 MG (65 MG ELEMENTAL FE) 325 MG: 325 (65 FE) TAB at 10:08

## 2019-07-07 RX ADMIN — OXYCODONE HYDROCHLORIDE AND ACETAMINOPHEN 500 MG: 500 TABLET ORAL at 10:08

## 2019-07-07 RX ADMIN — Medication 20 G: at 16:06

## 2019-07-07 RX ADMIN — MELATONIN TAB 5 MG 10 MG: 5 TAB at 22:10

## 2019-07-07 RX ADMIN — GABAPENTIN 300 MG: 300 CAPSULE ORAL at 22:10

## 2019-07-07 RX ADMIN — ISOSORBIDE DINITRATE 10 MG: 10 TABLET ORAL at 10:08

## 2019-07-07 RX ADMIN — Medication 125 MG: at 22:10

## 2019-07-07 RX ADMIN — Medication 125 MG: at 10:09

## 2019-07-07 RX ADMIN — INSULIN GLARGINE 13 UNITS: 100 INJECTION, SOLUTION SUBCUTANEOUS at 23:12

## 2019-07-07 RX ADMIN — FAMOTIDINE 20 MG: 20 TABLET ORAL at 10:10

## 2019-07-07 RX ADMIN — Medication 20 G: at 22:10

## 2019-07-07 RX ADMIN — ENOXAPARIN SODIUM 100 MG: 100 INJECTION SUBCUTANEOUS at 10:09

## 2019-07-07 RX ADMIN — FLUTICASONE PROPIONATE 2 SPRAY: 50 SPRAY, METERED NASAL at 10:07

## 2019-07-07 RX ADMIN — ALLOPURINOL 100 MG: 100 TABLET ORAL at 10:08

## 2019-07-07 RX ADMIN — Medication 20 G: at 10:09

## 2019-07-07 RX ADMIN — METOPROLOL SUCCINATE 50 MG: 50 TABLET, EXTENDED RELEASE ORAL at 10:08

## 2019-07-07 RX ADMIN — TAMSULOSIN HYDROCHLORIDE 0.4 MG: 0.4 CAPSULE ORAL at 10:08

## 2019-07-07 RX ADMIN — COLLAGENASE SANTYL: 250 OINTMENT TOPICAL at 10:07

## 2019-07-07 RX ADMIN — DAPTOMYCIN 500 MG: 500 INJECTION, POWDER, LYOPHILIZED, FOR SOLUTION INTRAVENOUS at 11:44

## 2019-07-07 RX ADMIN — Medication 1 TABLET: at 10:08

## 2019-07-07 RX ADMIN — ATORVASTATIN CALCIUM 80 MG: 80 TABLET, FILM COATED ORAL at 10:08

## 2019-07-07 ASSESSMENT — PAIN SCALES - GENERAL
PAINLEVEL_OUTOF10: 4
PAINLEVEL_OUTOF10: 0

## 2019-07-07 ASSESSMENT — PAIN DESCRIPTION - LOCATION: LOCATION: SACRUM

## 2019-07-07 ASSESSMENT — PAIN DESCRIPTION - PAIN TYPE: TYPE: ACUTE PAIN

## 2019-07-07 NOTE — PROGRESS NOTES
07/05/2019 10:07,  by Marla Felipe  If child <=2 yrs old please draw pediatric bottle. ~Blood Culture #1  Performed at:  [facility]  [address]    Phone [phone]   Culture Blood #1 [[id number]] (Abnormal)  Collected: 07/03/19 0035   Order Status: Completed Specimen: Blood Updated: 07/06/19 0811    Blood Culture, Routine --    Organism Staph aureus MRSA DNA DetectedAbnormal     Blood Culture, Routine --Abnormal     CONTACT PRECAUTIONS INDICATED   See additional report for complete BCID panel. Abnormal     Organism Staph aureus MRSAAbnormal     Blood Culture, Routine --Abnormal     POSITIVE for   CONTACT PRECAUTIONS INDICATED   Isolated one out of two bottles   Abnormal    Narrative:     ORDER#: [id number]                          ORDERED BY: Briseida Stallings  SOURCE: Blood No site given                COLLECTED:  07/03/19 00:35  ANTIBIOTICS AT [facility]. :                      RECEIVED :  07/03/19 07:16  CALL  [facility]  [facility] tel. [phone],  Microbiology results called to and read back by Terrence Pacheco RN, 07/05/2019  06:34, by [facility]  Microbiology results called to and read back by RAMAKRISHNA Pacheco, 07/04/2019  14:32, by [facility]  Microbiology results called to and read back by Theresa Gama,  07/04/2019 14:11, by [facility]  If child <=2 yrs old please draw pediatric bottle. ~Blood Culture #1  Performed at:  [facility]  [address]    Phone [phone]   Wound Culture [[id number]] (Abnormal)  Collected: 07/03/19 0100   Order Status: Completed Specimen: Chest Updated: 07/05/19 0648    WOUND/ABSCESS --    Gram Stain Result 1+ WBC's (Polymorphonuclear)   1+ RBC's   No organisms seen     Organism Staph aureus MRSAAbnormal     WOUND/ABSCESS --Abnormal     Rare growth   CONTACT PRECAUTIONS INDICATED   Abnormal    Narrative:     ORDER#: [id number]                          ORDERED BY: Hilaria MONROE  SOURCE: Chest

## 2019-07-07 NOTE — PROGRESS NOTES
07/07/19  1126   337* 78 91 97     Blood culture (7/3) positive MRSA. Objective:   Vitals:  BP 96/69   Pulse 97   Temp 97.6 °F (oral)   Resp 18   Ht 5' 10\"  Wt 100.6 kg  SpO2 92% on RA  BMI 31.82 kg/m²   General appearance: lying flat, alert and cooperative with exam, his is unable to complete orientation   Lungs: difuse rhonchi, decreased overall air movement  Heart: distant, irregular rhythm without appreciable murmur, chest wall post CABG with wound VAC in place and some dried blood, no active bleeding  Abdomen: soft, non-tender; bowel sounds normal; no masses,  no organomegaly  Extremities: extremities normal, atraumatic, no cyanosis or edema  Neurologic: No obvious focal neurologic deficits. Assessment and Plan:   1. Sternal osteomyelitis, postoperative infection of wound of sternum status post CABG x3 in the setting of coronary artery disease. IV antibiotic therapy with vancomycin and meropenem (day #4) transitioned to IV daptomycin (day #1). Wound VAC in place. 2. Hypotension:  Discontinue Isordil and tamsulosin. 3. Acute bacteremia MRSA (7/3):  Recurrent MRSA blood infection. for active seeding to hardwire either in neck with failed hardware to repair of cervical spine, or possible sternal wires from CABG as wound culture is positive for Staph as well. IV Vancomycin added to regimen. 4. Acute on chronic systolic congestive heart failure exacerbation (EF 20%):  Continues on Lipitor, metoprolol. Holding ACEI in setting of CAMI. Patient has failed diuretic therapy, unresponsive to Lasix with oliguria and worsening renal function. Echo: 5/4/2019 EF 20%, severe diffuse hypokinesis, reduced right ventricular function, moderate to severe mitral valve regurgitation,   5. Acute on chronic kidney injury with CKD type IV secondary to cardiorenal syndrome. Nonresponsive to Lasix and dopamine trials.   Nephrology consultation: plan for hemodialysis and to have temporary dialysis catheter

## 2019-07-08 LAB
ALBUMIN SERPL-MCNC: 2.6 G/DL (ref 3.4–5)
ANION GAP SERPL CALCULATED.3IONS-SCNC: 14 MMOL/L (ref 3–16)
BUN BLDV-MCNC: 52 MG/DL (ref 7–20)
CALCIUM SERPL-MCNC: 8.9 MG/DL (ref 8.3–10.6)
CHLORIDE BLD-SCNC: 99 MMOL/L (ref 99–110)
CO2: 23 MMOL/L (ref 21–32)
CREAT SERPL-MCNC: 2.2 MG/DL (ref 0.8–1.3)
GFR AFRICAN AMERICAN: 37
GFR NON-AFRICAN AMERICAN: 31
GLUCOSE BLD-MCNC: 154 MG/DL (ref 70–99)
GLUCOSE BLD-MCNC: 190 MG/DL (ref 70–99)
GLUCOSE BLD-MCNC: 70 MG/DL (ref 70–99)
GLUCOSE BLD-MCNC: 75 MG/DL (ref 70–99)
GLUCOSE BLD-MCNC: 77 MG/DL (ref 70–99)
GLUCOSE BLD-MCNC: 86 MG/DL (ref 70–99)
HCT VFR BLD CALC: 27.6 % (ref 40.5–52.5)
HEMOGLOBIN: 8.6 G/DL (ref 13.5–17.5)
INR BLD: 1.42 (ref 0.86–1.14)
MCH RBC QN AUTO: 25.2 PG (ref 26–34)
MCHC RBC AUTO-ENTMCNC: 31.2 G/DL (ref 31–36)
MCV RBC AUTO: 81 FL (ref 80–100)
PDW BLD-RTO: 22.6 % (ref 12.4–15.4)
PERFORMED ON: ABNORMAL
PERFORMED ON: ABNORMAL
PERFORMED ON: NORMAL
PHOSPHORUS: 4.2 MG/DL (ref 2.5–4.9)
PLATELET # BLD: 155 K/UL (ref 135–450)
PMV BLD AUTO: 8.4 FL (ref 5–10.5)
POTASSIUM SERPL-SCNC: 3.8 MMOL/L (ref 3.5–5.1)
PROTHROMBIN TIME: 16.2 SEC (ref 9.8–13)
RBC # BLD: 3.41 M/UL (ref 4.2–5.9)
SODIUM BLD-SCNC: 136 MMOL/L (ref 136–145)
WBC # BLD: 9.8 K/UL (ref 4–11)

## 2019-07-08 PROCEDURE — 6370000000 HC RX 637 (ALT 250 FOR IP): Performed by: INTERNAL MEDICINE

## 2019-07-08 PROCEDURE — 1200000000 HC SEMI PRIVATE

## 2019-07-08 PROCEDURE — 6370000000 HC RX 637 (ALT 250 FOR IP): Performed by: THORACIC SURGERY (CARDIOTHORACIC VASCULAR SURGERY)

## 2019-07-08 PROCEDURE — 85610 PROTHROMBIN TIME: CPT

## 2019-07-08 PROCEDURE — 99233 SBSQ HOSP IP/OBS HIGH 50: CPT | Performed by: INTERNAL MEDICINE

## 2019-07-08 PROCEDURE — 6360000002 HC RX W HCPCS: Performed by: INTERNAL MEDICINE

## 2019-07-08 PROCEDURE — 2709999900 HC NON-CHARGEABLE SUPPLY

## 2019-07-08 PROCEDURE — 2580000003 HC RX 258: Performed by: INTERNAL MEDICINE

## 2019-07-08 PROCEDURE — 85027 COMPLETE CBC AUTOMATED: CPT

## 2019-07-08 PROCEDURE — 87040 BLOOD CULTURE FOR BACTERIA: CPT

## 2019-07-08 PROCEDURE — 6360000002 HC RX W HCPCS: Performed by: NURSE PRACTITIONER

## 2019-07-08 PROCEDURE — 36415 COLL VENOUS BLD VENIPUNCTURE: CPT

## 2019-07-08 PROCEDURE — 80069 RENAL FUNCTION PANEL: CPT

## 2019-07-08 PROCEDURE — 6370000000 HC RX 637 (ALT 250 FOR IP): Performed by: NURSE PRACTITIONER

## 2019-07-08 RX ORDER — PHYTONADIONE 10 MG/ML
5 INJECTION, EMULSION INTRAMUSCULAR; INTRAVENOUS; SUBCUTANEOUS ONCE
Status: COMPLETED | OUTPATIENT
Start: 2019-07-08 | End: 2019-07-08

## 2019-07-08 RX ORDER — SODIUM CHLORIDE 9 MG/ML
INJECTION, SOLUTION INTRAVENOUS ONCE
Status: COMPLETED | OUTPATIENT
Start: 2019-07-08 | End: 2019-07-08

## 2019-07-08 RX ADMIN — FLUTICASONE PROPIONATE 2 SPRAY: 50 SPRAY, METERED NASAL at 09:03

## 2019-07-08 RX ADMIN — DAPTOMYCIN 500 MG: 500 INJECTION, POWDER, LYOPHILIZED, FOR SOLUTION INTRAVENOUS at 12:07

## 2019-07-08 RX ADMIN — ALLOPURINOL 100 MG: 100 TABLET ORAL at 08:58

## 2019-07-08 RX ADMIN — PHYTONADIONE 5 MG: 10 INJECTION, EMULSION INTRAMUSCULAR; INTRAVENOUS; SUBCUTANEOUS at 09:56

## 2019-07-08 RX ADMIN — FERROUS SULFATE TAB 325 MG (65 MG ELEMENTAL FE) 325 MG: 325 (65 FE) TAB at 08:58

## 2019-07-08 RX ADMIN — ACETAMINOPHEN 650 MG: 325 TABLET ORAL at 03:13

## 2019-07-08 RX ADMIN — INSULIN GLARGINE 25 UNITS: 100 INJECTION, SOLUTION SUBCUTANEOUS at 20:39

## 2019-07-08 RX ADMIN — MELATONIN TAB 5 MG 10 MG: 5 TAB at 20:38

## 2019-07-08 RX ADMIN — METOPROLOL SUCCINATE 50 MG: 50 TABLET, EXTENDED RELEASE ORAL at 08:58

## 2019-07-08 RX ADMIN — Medication 1 TABLET: at 08:58

## 2019-07-08 RX ADMIN — METHOCARBAMOL TABLETS 1000 MG: 500 TABLET, COATED ORAL at 17:52

## 2019-07-08 RX ADMIN — OXYCODONE HYDROCHLORIDE AND ACETAMINOPHEN 500 MG: 500 TABLET ORAL at 08:58

## 2019-07-08 RX ADMIN — Medication 125 MG: at 21:36

## 2019-07-08 RX ADMIN — INSULIN LISPRO 1 UNITS: 100 INJECTION, SOLUTION INTRAVENOUS; SUBCUTANEOUS at 20:39

## 2019-07-08 RX ADMIN — INSULIN LISPRO 2 UNITS: 100 INJECTION, SOLUTION INTRAVENOUS; SUBCUTANEOUS at 18:00

## 2019-07-08 RX ADMIN — GABAPENTIN 300 MG: 300 CAPSULE ORAL at 20:38

## 2019-07-08 RX ADMIN — FAMOTIDINE 20 MG: 20 TABLET ORAL at 08:58

## 2019-07-08 RX ADMIN — COLLAGENASE SANTYL: 250 OINTMENT TOPICAL at 09:04

## 2019-07-08 RX ADMIN — Medication 125 MG: at 09:02

## 2019-07-08 RX ADMIN — SODIUM CHLORIDE: 9 INJECTION, SOLUTION INTRAVENOUS at 17:55

## 2019-07-08 RX ADMIN — Medication 20 G: at 20:38

## 2019-07-08 ASSESSMENT — PAIN SCALES - GENERAL
PAINLEVEL_OUTOF10: 0
PAINLEVEL_OUTOF10: 5

## 2019-07-08 NOTE — PROGRESS NOTES
Hospitalist Progress Note      PCP: Adrien Rodrigues DO    Date of Admission: 6/28/2019      Chief Complaint on Admission: Consult request for medical management     Hospital Course: reviewed     Subjective:  No complaints, wound care noted bleeding at sternal site       Medications:  Reviewed    Infusion Medications    dextrose       Scheduled Medications    daptomycin (CUBICIN) IVPB  500 mg Intravenous Q24H    insulin lispro  0-12 Units Subcutaneous TID WC    insulin lispro  0-6 Units Subcutaneous Nightly    insulin glargine  0.25 Units/kg Subcutaneous Nightly    lactulose  20 g Oral TID    metoprolol succinate  50 mg Oral Daily    vancomycin  125 mg Oral BID    collagenase   Topical Daily    melatonin  10 mg Oral Nightly    fluticasone  2 spray Each Nostril Daily    gabapentin  300 mg Oral Nightly    ferrous sulfate  325 mg Oral Daily with breakfast    therapeutic multivitamin-minerals  1 tablet Oral Daily    allopurinol  100 mg Oral Daily    vitamin C  500 mg Oral Daily    famotidine  20 mg Oral Daily     PRN Meds: perflutren lipid microspheres, glucose, dextrose, glucagon (rDNA), dextrose, methocarbamol, acetaminophen      Intake/Output Summary (Last 24 hours) at 7/8/2019 0931  Last data filed at 7/8/2019 0328  Gross per 24 hour   Intake 420 ml   Output 450 ml   Net -30 ml       Physical Exam Performed:    /82   Pulse 91   Temp 97.3 °F (36.3 °C) (Oral)   Resp 16   Ht 5' 10\" (1.778 m)   Wt 226 lb 6.6 oz (102.7 kg) Comment: removed wound vac before weighing Pt  SpO2 98%   BMI 32.49 kg/m²     General appearance: No apparent distress, appears stated age and cooperative. HEENT: Pupils equal, round, and reactive to light. Conjunctivae/corneas clear. Neck: Supple, with full range of motion. No jugular venous distention. Trachea midline. Respiratory:  Normal respiratory effort. Clear to auscultation, bilaterally without Rales/Wheezes/Rhonchi.   Cardiovascular: Regular rate and rhythm with normal S1/S2 without murmurs, rubs or gallops. Chest: sternal wound noted with active bleeding  Abdomen: Soft, non-tender, non-distended with normal bowel sounds. Musculoskeletal: No clubbing, cyanosis or edema bilaterally. Full range of motion without deformity. Skin: Skin color, texture, turgor normal.  No rashes or lesions. Neurologic:  Neurovascularly intact without any focal sensory/motor deficits. Cranial nerves: II-XII intact, grossly non-focal.  Psychiatric: Alert and oriented, thought content appropriate, normal insight  Capillary Refill: Brisk,< 3 seconds   Peripheral Pulses: +2 palpable, equal bilaterally       Labs:   Recent Labs     07/06/19  0500 07/07/19  0441 07/08/19  0442   WBC 9.9 10.6 9.8   HGB 10.3* 9.6* 8.6*   HCT 32.2* 30.9* 27.6*    155 155     Recent Labs     07/06/19  0500 07/07/19  0441 07/08/19  0442   * 135* 136   K 4.3 3.8 3.8   CL 95* 98* 99   CO2 24 23 23   BUN 67* 46* 52*   CREATININE 2.4* 2.1* 2.2*   CALCIUM 9.1 8.8 8.9   PHOS 4.8 3.4 4.2     No results for input(s): AST, ALT, BILIDIR, BILITOT, ALKPHOS in the last 72 hours. Recent Labs     07/08/19  0824   INR 1.42*     Recent Labs     07/07/19  0441   CKTOTAL 31*       Urinalysis:      Lab Results   Component Value Date    NITRU Negative 06/28/2019    WBCUA >100 06/28/2019    BACTERIA 1+ 05/09/2019    RBCUA see below 06/28/2019    BLOODU LARGE 06/28/2019    SPECGRAV 1.015 06/28/2019    GLUCOSEU Negative 06/28/2019       Radiology:  CT HEAD W WO CONTRAST   Final Result   No acute intracranial abnormality. XR CHEST PORTABLE   Final Result   Status post placement of right internal jugular temporary dialysis catheter   with distal tip located in region of right atrium. No evidence of   pneumothorax.          IR NONTUNNELED VASCULAR CATHETER > 5 YEARS   Final Result   Status post successful ultrasound/fluoroscopically guided placement of right   internal jugular temporary dialysis catheter as described

## 2019-07-08 NOTE — PROGRESS NOTES
Vas cath dressing renewed due to fresh blood oozing from [facility] site. Will continue to monitor.

## 2019-07-08 NOTE — PROGRESS NOTES
[facility]). with following problems:    · MRSA bacteremia   · MRSA sternal  osteomyelitis  · History of Serratia in sternal wound culture in December 2018  · Status post C6 corpectomy and hardware stabilization December 2018  · Coronary artery disease status post CABG in December 2018  · Recurrent C. difficile infection  · Acute renal failure, requiring intermittent hemodialysis  · Type 2 diabetes mellitus and obese  · Obesity Class 1 due to excess calorie intake : Body mass index is 32.49 kg/m². Discussion:      The patient is currently on IV daptomycin 500 mg every 24 hours. Intravenous vancomycin was discontinued due to renal issues    Patient is on oral vancomycin 125 mg every 12 hours for C. Difficile    Nephrology note reviewed. Renal function improving    Follow blood cultures from yesterday are in process. Urine was colonized with Morganella at the time of admission    2D echo reviewed. No valvular vegetations    May also need to consider development of chronic sternum osteomyelitis by mouth. Antibiotic wouldn't be interested in that    Plan:     Diagnostic Workup:    · Continue to follow  fever curve, WBC count and blood cultures  · Follow up on liver and renal function  · Follow-up with CK level while on daptomycin    Antimicrobials:    · Will continue IV daptomycin 500 mg every 24 hours for now  · Contact isolation for MRSA  · Continue oral vancomycin 125 mg every 6 hours until 7/14/2019  · Continue to monitor her vitals closely  · Will follow up on nephrology plan  · Expect long-term IV daptomycin for at least around 6 weeks.   Dose will be dependent upon the renal function  · Maintain good glycemic control  · DVT prophylaxis  · Discussed the above plan with patient and RN       Drug Monitoring:    · Continue monitoring for antibiotic toxicity as follows: CK, CMP  · Continue to watch for following: new or worsening fever, new hypotension, hives, lip swelling and redness or purulence at vascular access sites. I/v access Management:    · Continue to monitor i.v access sites for erythema, induration,discharge or tenderness. · As always, continue efforts to minimize tubes/ lines/ drains as clinically appropriate to reduce chances of line associated infections. Patient education and counseling:     · The patient was educated in detail about the side-effects of various antibiotics and things to watch for like new rashes, lip swelling, severe reaction, worsening diarrhea, break through fever etc.  · Discussed patient's condition and what to expect. All of the patient's questions were addressed in a satisfactory manner and patient verbalized understanding all instructions. Diabetes mellitus education and counseling:    Patient was educated in detail about the importance of keeping diabetes under control to improve the cure rate of infection and prevent future infections. Patient was advised to check blood glucose level regularly and to stay compliant with the diabetes medications. Patient was advised to the trim the toe nails carefully, wear shoes or slippers at all times and check both feet everyday before going to bed to look for any cuts, blisters, swelling or redness. Importance of washing the feet everyday with soap and water and keeping them dry, and seeking prompt medical attention in case of a non-healing wound or ulcer on the feet was also highlighted. Risk of Complications/Morbidity: High     TIME SPENT TODAY:     - Spent over  36 minutes on visit (including interval history, physical exam, review of data including labs, cultures, imaging, development and implementation of treatment plan and coordination of complex care). - Over 50% of time spent with patient face to face on counseling and education. Thank you for involving me in the care of your patient. I will continue to follow.  If you have any additional questions, please do not hesitate to contact Subcutaneous TID     insulin lispro  0-6 Units Subcutaneous Nightly    insulin glargine  0.25 Units/kg Subcutaneous Nightly    lactulose  20 g Oral TID    metoprolol succinate  50 mg Oral Daily    vancomycin  125 mg Oral BID    collagenase   Topical Daily    melatonin  10 mg Oral Nightly    fluticasone  2 spray Each Nostril Daily    gabapentin  300 mg Oral Nightly    ferrous sulfate  325 mg Oral Daily with breakfast    therapeutic multivitamin-minerals  1 tablet Oral Daily    allopurinol  100 mg Oral Daily    vitamin C  500 mg Oral Daily    famotidine  20 mg Oral Daily        sodium chloride      dextrose         perflutren lipid microspheres, glucose, dextrose, glucagon (rDNA), dextrose, methocarbamol, acetaminophen      Problem list:       Patient Active Problem List   Diagnosis Code    Anoxic brain injury ([facility] [address] G93.1    HTN (hypertension) I10    Carpal tunnel syndrome G56.00    Obstructive sleep apnea syndrome G47.33    Depression F32.9    Gout M10.9    ACS (acute coronary syndrome) ([facility]) I24.9    Gait disturbance R26.9    CKD (chronic kidney disease) stage 3, GFR 30-59 ml/min ([facility]) N18.3    Abnormal echocardiogram R93.1    Coronary artery disease involving native coronary artery of native heart without angina pectoris I25.10    Abnormal stress test R94.39    NSTEMI (non-ST elevated myocardial infarction) ([facility]) I21.4    Ischemic cardiomyopathy I25.5    Acute on chronic systolic heart failure ([facility]) I50.23    Acute renal failure with acute tubular necrosis superimposed on stage 3 chronic kidney disease ([facility]) N17.0, N18.3    Spinal cord ischemia ([facility]) G95.11    DM (diabetes mellitus), secondary, uncontrolled, w/neurologic complic ([facility]) K36.38, H60.50    Cervical spinal cord compression ([facility]) G95.20    Postoperative infection of wound of sternum T81.49XA    Serratia infection A49.8    Gram-negative bacteremia R78.81    Acute respiratory failure with hypoxia ([facility]) J96.01   

## 2019-07-08 NOTE — PROGRESS NOTES
Discussed Tunneled Dialysis Catheter Placement with Dr Kristine Peralta. Patient had positive blood cultures on 7/3. New blood cultures were ordered for today. When patient has negative blood cultures, we can proceed with tunneled HD catheter placement. Possibly Wednesday. Reported this to [facility], Floor RN who verbalized understanding.

## 2019-07-09 LAB
ALBUMIN SERPL-MCNC: 2.5 G/DL (ref 3.4–5)
ANION GAP SERPL CALCULATED.3IONS-SCNC: 15 MMOL/L (ref 3–16)
BUN BLDV-MCNC: 62 MG/DL (ref 7–20)
CALCIUM SERPL-MCNC: 8.7 MG/DL (ref 8.3–10.6)
CHLORIDE BLD-SCNC: 99 MMOL/L (ref 99–110)
CO2: 22 MMOL/L (ref 21–32)
CREAT SERPL-MCNC: 2.2 MG/DL (ref 0.8–1.3)
GFR AFRICAN AMERICAN: 37
GFR NON-AFRICAN AMERICAN: 31
GLUCOSE BLD-MCNC: 104 MG/DL (ref 70–99)
GLUCOSE BLD-MCNC: 127 MG/DL (ref 70–99)
GLUCOSE BLD-MCNC: 136 MG/DL (ref 70–99)
GLUCOSE BLD-MCNC: 144 MG/DL (ref 70–99)
GLUCOSE BLD-MCNC: 99 MG/DL (ref 70–99)
HCT VFR BLD CALC: 28 % (ref 40.5–52.5)
HEMOGLOBIN: 9 G/DL (ref 13.5–17.5)
INR BLD: 1.39 (ref 0.86–1.14)
MCH RBC QN AUTO: 25.9 PG (ref 26–34)
MCHC RBC AUTO-ENTMCNC: 31.9 G/DL (ref 31–36)
MCV RBC AUTO: 81 FL (ref 80–100)
PDW BLD-RTO: 22.5 % (ref 12.4–15.4)
PERFORMED ON: ABNORMAL
PERFORMED ON: NORMAL
PHOSPHORUS: 3.7 MG/DL (ref 2.5–4.9)
PLATELET # BLD: 139 K/UL (ref 135–450)
PMV BLD AUTO: 8.6 FL (ref 5–10.5)
POTASSIUM SERPL-SCNC: 4.6 MMOL/L (ref 3.5–5.1)
PROTHROMBIN TIME: 15.8 SEC (ref 9.8–13)
RBC # BLD: 3.46 M/UL (ref 4.2–5.9)
SODIUM BLD-SCNC: 136 MMOL/L (ref 136–145)
WBC # BLD: 10 K/UL (ref 4–11)

## 2019-07-09 PROCEDURE — 2580000003 HC RX 258: Performed by: INTERNAL MEDICINE

## 2019-07-09 PROCEDURE — 6370000000 HC RX 637 (ALT 250 FOR IP): Performed by: INTERNAL MEDICINE

## 2019-07-09 PROCEDURE — 80069 RENAL FUNCTION PANEL: CPT

## 2019-07-09 PROCEDURE — 6370000000 HC RX 637 (ALT 250 FOR IP): Performed by: THORACIC SURGERY (CARDIOTHORACIC VASCULAR SURGERY)

## 2019-07-09 PROCEDURE — 1200000000 HC SEMI PRIVATE

## 2019-07-09 PROCEDURE — 36415 COLL VENOUS BLD VENIPUNCTURE: CPT

## 2019-07-09 PROCEDURE — 93306 TTE W/DOPPLER COMPLETE: CPT

## 2019-07-09 PROCEDURE — 85027 COMPLETE CBC AUTOMATED: CPT

## 2019-07-09 PROCEDURE — 99999 PR OFFICE/OUTPT VISIT,PROCEDURE ONLY: CPT | Performed by: THORACIC SURGERY (CARDIOTHORACIC VASCULAR SURGERY)

## 2019-07-09 PROCEDURE — 6370000000 HC RX 637 (ALT 250 FOR IP): Performed by: NURSE PRACTITIONER

## 2019-07-09 PROCEDURE — 6360000002 HC RX W HCPCS: Performed by: NURSE PRACTITIONER

## 2019-07-09 PROCEDURE — 85610 PROTHROMBIN TIME: CPT

## 2019-07-09 PROCEDURE — 6360000002 HC RX W HCPCS: Performed by: INTERNAL MEDICINE

## 2019-07-09 RX ORDER — LACTOBACILLUS RHAMNOSUS GG 10B CELL
1 CAPSULE ORAL
Status: DISCONTINUED | OUTPATIENT
Start: 2019-07-09 | End: 2019-07-11 | Stop reason: HOSPADM

## 2019-07-09 RX ORDER — PHYTONADIONE 10 MG/ML
5 INJECTION, EMULSION INTRAMUSCULAR; INTRAVENOUS; SUBCUTANEOUS ONCE
Status: COMPLETED | OUTPATIENT
Start: 2019-07-09 | End: 2019-07-09

## 2019-07-09 RX ADMIN — Medication 1 CAPSULE: at 13:07

## 2019-07-09 RX ADMIN — Medication 125 MG: at 08:10

## 2019-07-09 RX ADMIN — Medication 20 G: at 22:05

## 2019-07-09 RX ADMIN — INSULIN LISPRO 2 UNITS: 100 INJECTION, SOLUTION INTRAVENOUS; SUBCUTANEOUS at 16:39

## 2019-07-09 RX ADMIN — OXYCODONE HYDROCHLORIDE AND ACETAMINOPHEN 500 MG: 500 TABLET ORAL at 08:10

## 2019-07-09 RX ADMIN — COLLAGENASE SANTYL: 250 OINTMENT TOPICAL at 08:10

## 2019-07-09 RX ADMIN — Medication 125 MG: at 22:06

## 2019-07-09 RX ADMIN — METOPROLOL SUCCINATE 50 MG: 50 TABLET, EXTENDED RELEASE ORAL at 08:09

## 2019-07-09 RX ADMIN — Medication 1 TABLET: at 08:09

## 2019-07-09 RX ADMIN — GABAPENTIN 300 MG: 300 CAPSULE ORAL at 22:05

## 2019-07-09 RX ADMIN — FLUTICASONE PROPIONATE 2 SPRAY: 50 SPRAY, METERED NASAL at 08:10

## 2019-07-09 RX ADMIN — INSULIN GLARGINE 25 UNITS: 100 INJECTION, SOLUTION SUBCUTANEOUS at 22:08

## 2019-07-09 RX ADMIN — MELATONIN TAB 5 MG 10 MG: 5 TAB at 22:05

## 2019-07-09 RX ADMIN — FERROUS SULFATE TAB 325 MG (65 MG ELEMENTAL FE) 325 MG: 325 (65 FE) TAB at 08:10

## 2019-07-09 RX ADMIN — DAPTOMYCIN 500 MG: 500 INJECTION, POWDER, LYOPHILIZED, FOR SOLUTION INTRAVENOUS at 11:09

## 2019-07-09 RX ADMIN — PHYTONADIONE 5 MG: 10 INJECTION, EMULSION INTRAMUSCULAR; INTRAVENOUS; SUBCUTANEOUS at 13:07

## 2019-07-09 RX ADMIN — FAMOTIDINE 20 MG: 20 TABLET ORAL at 08:09

## 2019-07-09 RX ADMIN — ALLOPURINOL 100 MG: 100 TABLET ORAL at 08:10

## 2019-07-09 ASSESSMENT — ENCOUNTER SYMPTOMS
CONSTIPATION: 0
COUGH: 0
RHINORRHEA: 0
TROUBLE SWALLOWING: 0
EYE DISCHARGE: 0
BACK PAIN: 0
DIARRHEA: 0
EYE REDNESS: 0
SHORTNESS OF BREATH: 0
ABDOMINAL PAIN: 0
NAUSEA: 0
WHEEZING: 0
SORE THROAT: 0

## 2019-07-09 ASSESSMENT — PAIN SCALES - GENERAL
PAINLEVEL_OUTOF10: 0

## 2019-07-09 ASSESSMENT — PAIN SCALES - WONG BAKER: WONGBAKER_NUMERICALRESPONSE: 0

## 2019-07-09 NOTE — FLOWSHEET NOTE
07/08/19 2040   Assessment   Charting Type Shift assessment   Neurological   Neuro (WDL) X   Level of Consciousness 0   Orientation Level Oriented to person;Oriented to time;Disoriented to situation;Disoriented to place   Cognition Short term memory loss;Poor attention/concentration;Poor safety awareness; Follows commands   Language Clear; Appropriate for developmental age   [de-identified]   HEENT (WDL) X   Right Eye Intact; Impaired vision   Left Eye Intact; Impaired vision   Respiratory   Respiratory (WDL) WDL   Respiratory Pattern Regular   Respiratory Depth Normal   Respiratory Quality/Effort Unlabored   Chest Assessment Chest expansion symmetrical   L Breath Sounds Clear   R Breath Sounds Clear   Breath Sounds   Right Upper Lobe Clear   Right Middle Lobe Clear   Right Lower Lobe Clear   Left Upper Lobe Clear   Left Lower Lobe Clear   Cardiac   Cardiac (WDL) WDL   Cardiac Regularity Regular   Heart Sounds S1, S2   Cardiac Rhythm NSR   Cardiac Monitor   Telemetry Monitor On Yes   Telemetry Audible Yes   Telemetry Alarms Set Yes   Telemetry Box Number 94   Gastrointestinal   Abdominal (WDL) WDL   Peripheral Vascular   Peripheral Vascular (WDL) X   Edema Right lower extremity; Left lower extremity   RLE Edema +3;Pitting   LLE Edema +3;Pitting   Skin Color/Condition   Skin Color/Condition (WDL) X   Skin Color Pale   Skin Condition/Temp Cool   Skin Integrity   Skin Integrity (WDL) X   Skin Integrity Bruising   Location scattered   Preventative Dressing Yes   Skin Fold Management No   Multiple Skin Integrity Sites No   Dressing Site Sacrum   Date Applied 07/03/19   Assessed this shift? Yes   Skin Integrity Site 2   Skin Integrity Location 2 Redness   Location 2 BL heels   Musculoskeletal   Musculoskeletal (WDL) X   RUE Full movement   LUE Full movement   RL Extremity Full movement; Unsteady;Weakness   LL Extremity Full movement; Unsteady;Weakness   Genitourinary   Genitourinary (WDL) X  (mohamud)   Flank Tenderness No   Suprapubic

## 2019-07-09 NOTE — PROGRESS NOTES
Mid sternal dressing changed. Surgicel placed in wound bed and 4 4x4 dressings secured with medipore tape. Will continue to monitor.

## 2019-07-09 NOTE — PROGRESS NOTES
CVTS Thoracic Progress Note:          CC:  Post op follow up 6/28/19 [address] [facility] course:   7/03 awake, in bed, in NAD. Denies any current pain. 7/08 awake, changing dressing to sternum (still oozing), in NAD.   7/09 sitting up in bed, in NAD, easily conversational - responds appropriately, sitter at bedside. Obj:    Blood pressure 128/89, pulse 108, temperature 98 °F (36.7 °C), temperature source Oral, resp. rate 16, height 5' 10\" (1.778 m), weight 226 lb 6.6 oz (102.7 kg), SpO2 94 %. Lungs CTAB   Abdomen round, soft, nontender   Incision with good granulation, still oozing    Improved  ml out in past 24 hrs; Cr 2.2 (near baseline)   COR - RRR, currently SR in the low 90's    Diagnostics:   Recent Labs     07/07/19  0441 07/08/19  0442 07/09/19  0434   WBC 10.6 9.8 10.0   HGB 9.6* 8.6* 9.0*   HCT 30.9* 27.6* 28.0*    155 139                                                                  Recent Labs     07/07/19  0441 07/08/19  0442 07/09/19  0434   * 136 136   K 3.8 3.8 4.6   CL 98* 99 99   CO2 23 23 22   BUN 46* 52* 62*   CREATININE 2.1* 2.2* 2.2*   GLUCOSE 80 75 127*       CXR: 6/30/19 small bilateral pleural effusions     CT Cervical spine w/o contrast 6/26/19:   No acute cervical spine abnormality.       Interval C5-C7 ACDF with C6 corpectomy.  There seems to be loosening of the   inferior screws at C7 with backing out of the plate which appears angled   outward at this level, possibly with an attached bone fragment. Charles Pisano is no   prevertebral soft tissue swelling at this level.  Comparison with any   postoperative imaging would be helpful if available.       Mild canal and moderate foraminal narrowing at C5-C6.       Large bilateral pleural effusions.        Assess/Plan:   Hospitalist, ID, and nephro following    Sternal wound dehiscence: Wound RN following  Pt had dressing changes to sternal area X3 overnight, and x1

## 2019-07-09 NOTE — PROGRESS NOTES
right   internal jugular temporary dialysis catheter as described above. US RENAL COMPLETE   Final Result   Medical renal disease. Bilateral renal cyst.      Nonvisualization of bladder secondary to placement of Robin catheter. Ascites. XR CHEST PORTABLE   Final Result   Small bilateral pleural effusions. Assessment/Plan:    Active Hospital Problems    Diagnosis    Bacteremia due to methicillin resistant Staphylococcus aureus [R78.81]    Poorly controlled type 2 diabetes mellitus ([facility] [E11.65]    Class 1 obesity due to excess calories with body mass index (BMI) of 32.0 to 32.9 in adult [E66.09, Z68.32]    Diabetes education, encounter for [Z71.89]    Sternal osteomyelitis ([facility] [M86.9]    MRSA infection [A49.02]    Wound infection [T14. 8XXA, L08.9]    Benign essential HTN [I10]    CAMI (acute kidney injury) ([facility] [N17.9]    Status post aorto-coronary artery bypass graft [Z95.1]    Infection requiring contact isolation precautions [B99.9]    Postoperative infection of wound of sternum [T81.49XA]    DM (diabetes mellitus), secondary, uncontrolled, w/neurologic complic ([facility] [W99.08, V55.19]    Acute on chronic systolic heart failure (HCC) [I50.23]    Coronary artery disease involving native coronary artery of native heart without angina pectoris [I25.10]    CKD (chronic kidney disease) stage 3, GFR 30-59 ml/min ([facility]) [N18.3]    Anoxic brain injury ([facility] [G93.1]     Sternal osteomyelitis, postoperative infection of wound of sternum status post CABG x3 in the setting of coronary artery disease.  IV antibiotic therapy with vancomycin and meropenem (day #4) transitioned to IV daptomycin (day #3).   Wound VAC in place.     Hypotension:  Discontinue Isordil and tamsulosin.         Acute bacteremia MRSA (7/3):  Recurrent MRSA blood infection.  for active seeding to hardwire either in neck with failed hardware to repair of cervical spine, or possible sternal wires from corpectomy C6 and placement of PEEK allograft corpectomy cage with autograft bone and Vivigen C5-7.  Neurosurgery Consultation placed 7/2. No recommendations for inpatient stay.  To follow up as outpt with Dr Tabitha Lennon.     Infectious disease to evaluate the possibilty of source of acute infection to cervical spine. Called [phone] to reach [facility] team to discuss concern over possible OM to cervical area.  With loose and failed hardware, it is unclear as to the safety of further imaging of MRI to help determine acute infectious soft tissue process.  Requesting NS to assist with their recommendations for further imaging, ( Can he undergo MRI safepy?) Possible need to remove hardware if it is infected, and activity orders due to instability of neck.      Acute metabolic encephalopathy: Unclear origin presently.  Resolving.     Possible osteomyelitis of the cervical spine.  Neurosurgery evaluation: Dr Joelle Hdez further recommendations at this time. Monika Alexander does not feel that the neck is consistent with OM, but more of a hardware failure.  To follow up as outpt with Dr. Prakash Kiran       DVT Prophylaxis: scd due to bleeding sternal site  Diet: Dietary Nutrition Supplements: Wound Healing Oral Supplement  DIET CARB CONTROL;   Dietary Nutrition Supplements: Standard High Calorie Oral Supplement  Code Status: Full Code    PT/OT Eval Status: not ordered    Dispo - per CT surgery regarding bleeding sternal site    Francine Jacobsen MD

## 2019-07-09 NOTE — PROGRESS NOTES
Bedside report received from [facility]. Patient resting comfortably in bed. Sitter at bedside. No signs of discomfort or distress. Bed is in lowest position, wheels locked, 2/2 side rails up. Bedside table and call light within reach. White board updated. Will continue to monitor patient. Stella Ochoa RN    11:21 PM  Shift assessment complete. (See findings in flowsheet). Med pass complete. (See MAR). VSS. Patient with no complaints at this time. Patient resting in bed comfortably. No signs or symptoms of distress or discomfort noted at this time. Bed in lowest position, brakes locked. Nonskid footwear in place. 5 P's addressed, no needs at this time. Pt calls out appropriately. Sternal chest wound dressing in dry and intact, no visible blood showing. NO WOUND VAC IN PLACE, removed by wound care nurse on 7/8/19. Will continue to monitor dressing and reinforce as necessary. Stella Ochoa RN    1:59 AM  Sternal chest wound dressing remains dry and intact. No visible blood showing through. Stella Ochoa RN    3:18 AM  Dressing checked, remains dry and intact. Patient resting quietly with eyes closed. Stella Ochoa RN    5:36 AM  VSS. Dressing chekced, remains dry and intact. No new drainage.  Stella Ochoa RN

## 2019-07-10 LAB
ALBUMIN SERPL-MCNC: 2.6 G/DL (ref 3.4–5)
ANION GAP SERPL CALCULATED.3IONS-SCNC: 12 MMOL/L (ref 3–16)
BUN BLDV-MCNC: 71 MG/DL (ref 7–20)
CALCIUM SERPL-MCNC: 9 MG/DL (ref 8.3–10.6)
CHLORIDE BLD-SCNC: 98 MMOL/L (ref 99–110)
CO2: 23 MMOL/L (ref 21–32)
CREAT SERPL-MCNC: 2 MG/DL (ref 0.8–1.3)
GFR AFRICAN AMERICAN: 41
GFR NON-AFRICAN AMERICAN: 34
GLUCOSE BLD-MCNC: 103 MG/DL (ref 70–99)
GLUCOSE BLD-MCNC: 141 MG/DL (ref 70–99)
GLUCOSE BLD-MCNC: 171 MG/DL (ref 70–99)
GLUCOSE BLD-MCNC: 186 MG/DL (ref 70–99)
GLUCOSE BLD-MCNC: 78 MG/DL (ref 70–99)
GLUCOSE BLD-MCNC: 97 MG/DL (ref 70–99)
PERFORMED ON: ABNORMAL
PERFORMED ON: NORMAL
PHOSPHORUS: 3.8 MG/DL (ref 2.5–4.9)
POTASSIUM SERPL-SCNC: 4.5 MMOL/L (ref 3.5–5.1)
SODIUM BLD-SCNC: 133 MMOL/L (ref 136–145)

## 2019-07-10 PROCEDURE — 2580000003 HC RX 258

## 2019-07-10 PROCEDURE — 1200000000 HC SEMI PRIVATE

## 2019-07-10 PROCEDURE — 80069 RENAL FUNCTION PANEL: CPT

## 2019-07-10 PROCEDURE — 6360000002 HC RX W HCPCS: Performed by: INTERNAL MEDICINE

## 2019-07-10 PROCEDURE — 36415 COLL VENOUS BLD VENIPUNCTURE: CPT

## 2019-07-10 PROCEDURE — 97605 NEG PRS WND THER DME<=50SQCM: CPT

## 2019-07-10 PROCEDURE — 6370000000 HC RX 637 (ALT 250 FOR IP): Performed by: NURSE PRACTITIONER

## 2019-07-10 PROCEDURE — 6370000000 HC RX 637 (ALT 250 FOR IP): Performed by: THORACIC SURGERY (CARDIOTHORACIC VASCULAR SURGERY)

## 2019-07-10 PROCEDURE — 6370000000 HC RX 637 (ALT 250 FOR IP): Performed by: INTERNAL MEDICINE

## 2019-07-10 PROCEDURE — 99233 SBSQ HOSP IP/OBS HIGH 50: CPT | Performed by: INTERNAL MEDICINE

## 2019-07-10 PROCEDURE — 99999 PR OFFICE/OUTPT VISIT,PROCEDURE ONLY: CPT | Performed by: THORACIC SURGERY (CARDIOTHORACIC VASCULAR SURGERY)

## 2019-07-10 PROCEDURE — 2580000003 HC RX 258: Performed by: INTERNAL MEDICINE

## 2019-07-10 RX ORDER — SODIUM CHLORIDE 9 MG/ML
INJECTION, SOLUTION INTRAVENOUS
Status: DISPENSED
Start: 2019-07-10 | End: 2019-07-11

## 2019-07-10 RX ORDER — PROCHLORPERAZINE EDISYLATE 5 MG/ML
10 INJECTION INTRAMUSCULAR; INTRAVENOUS EVERY 6 HOURS PRN
Status: DISCONTINUED | OUTPATIENT
Start: 2019-07-10 | End: 2019-07-11 | Stop reason: HOSPADM

## 2019-07-10 RX ORDER — LIDOCAINE HYDROCHLORIDE 10 MG/ML
5 INJECTION, SOLUTION INFILTRATION; PERINEURAL ONCE
Status: DISCONTINUED | OUTPATIENT
Start: 2019-07-10 | End: 2019-07-11 | Stop reason: HOSPADM

## 2019-07-10 RX ORDER — SODIUM CHLORIDE 0.9 % (FLUSH) 0.9 %
SYRINGE (ML) INJECTION
Status: COMPLETED
Start: 2019-07-10 | End: 2019-07-10

## 2019-07-10 RX ORDER — SODIUM CHLORIDE 0.9 % (FLUSH) 0.9 %
10 SYRINGE (ML) INJECTION PRN
Status: DISCONTINUED | OUTPATIENT
Start: 2019-07-10 | End: 2019-07-11 | Stop reason: HOSPADM

## 2019-07-10 RX ORDER — SODIUM CHLORIDE 0.9 % (FLUSH) 0.9 %
10 SYRINGE (ML) INJECTION EVERY 12 HOURS SCHEDULED
Status: DISCONTINUED | OUTPATIENT
Start: 2019-07-10 | End: 2019-07-11 | Stop reason: HOSPADM

## 2019-07-10 RX ADMIN — Medication 20 G: at 21:13

## 2019-07-10 RX ADMIN — COLLAGENASE SANTYL: 250 OINTMENT TOPICAL at 10:03

## 2019-07-10 RX ADMIN — MELATONIN TAB 5 MG 10 MG: 5 TAB at 21:13

## 2019-07-10 RX ADMIN — Medication 125 MG: at 10:00

## 2019-07-10 RX ADMIN — INSULIN GLARGINE 25 UNITS: 100 INJECTION, SOLUTION SUBCUTANEOUS at 21:11

## 2019-07-10 RX ADMIN — OXYCODONE HYDROCHLORIDE AND ACETAMINOPHEN 500 MG: 500 TABLET ORAL at 09:59

## 2019-07-10 RX ADMIN — ALLOPURINOL 100 MG: 100 TABLET ORAL at 10:00

## 2019-07-10 RX ADMIN — FERROUS SULFATE TAB 325 MG (65 MG ELEMENTAL FE) 325 MG: 325 (65 FE) TAB at 09:59

## 2019-07-10 RX ADMIN — Medication 125 MG: at 21:13

## 2019-07-10 RX ADMIN — Medication 1 TABLET: at 09:59

## 2019-07-10 RX ADMIN — Medication 10 ML: at 21:16

## 2019-07-10 RX ADMIN — INSULIN LISPRO 1 UNITS: 100 INJECTION, SOLUTION INTRAVENOUS; SUBCUTANEOUS at 21:11

## 2019-07-10 RX ADMIN — FAMOTIDINE 20 MG: 20 TABLET ORAL at 10:00

## 2019-07-10 RX ADMIN — GABAPENTIN 300 MG: 300 CAPSULE ORAL at 21:13

## 2019-07-10 RX ADMIN — Medication: at 15:11

## 2019-07-10 RX ADMIN — Medication 20 G: at 10:00

## 2019-07-10 RX ADMIN — Medication 1 CAPSULE: at 09:59

## 2019-07-10 RX ADMIN — DAPTOMYCIN 500 MG: 500 INJECTION, POWDER, LYOPHILIZED, FOR SOLUTION INTRAVENOUS at 12:57

## 2019-07-10 ASSESSMENT — ENCOUNTER SYMPTOMS
SHORTNESS OF BREATH: 0
SORE THROAT: 0
TROUBLE SWALLOWING: 0
EYE DISCHARGE: 0
BACK PAIN: 0
CONSTIPATION: 0
DIARRHEA: 0
NAUSEA: 0
EYE REDNESS: 0
RHINORRHEA: 0
WHEEZING: 0
COUGH: 0
ABDOMINAL PAIN: 0

## 2019-07-10 ASSESSMENT — PAIN SCALES - GENERAL
PAINLEVEL_OUTOF10: 0
PAINLEVEL_OUTOF10: 0

## 2019-07-10 NOTE — CARE COORDINATION
LTACH screening initiated with [employer] to see if a candidate. Per Ora Smoke in admissions at [facility] patient does meet criteria and she has a bed at [facility]. She stated all Medicaid referrals have to go to [facility] so this is the only location option if treatment team and patient are agreeable. Angel served Keara Barcenas CNP to discuss. Will follow.

## 2019-07-10 NOTE — PROGRESS NOTES
[facility] Wound Ostomy Continence Nurse  Follow-up Progress Note       NAME:  Addie Espino  MEDICAL RECORD NUMBER:  [id number]  AGE:  60 y.o. GENDER:  male  :  1960  TODAY'S DATE:  7/10/2019    Subjective:  Are you going to change my dressing? Wound Identification:  Wound Type: non-healing surgical - mid sternal wound. Left heel unstagable pressure injury. Contributing Factors: diabetes, shear force, obesity and recent I & D. Patient Goal of Care:  [x] Wound Healing  [] Odor Control   [] Palliative Care  [] Pain Control   [] Other:      Objective:  Lying in bed/. RN here discontinuing VAS cath. /81   Pulse 113   Temp 98.1 °F (36.7 °C) (Oral)   Resp 18   Ht 5' 10\" (1.778 m)   Wt 239 lb 3.2 oz (108.5 kg)   SpO2 95%   BMI 34.32 kg/m²   Shivam Risk Score: Shivam Scale Score: 15  Assessment:  Wound bed with red wound base. Shasta wound intact. Measurements:  Negative Pressure Wound Therapy Sternum Mid (Active)   $ Standard NPWT <=50 sq cm PER TX $ Yes 7/10/2019  3:48 PM   Wound Type Surgical 7/10/2019  3:48 PM   Unit Type [facility] 7/10/2019  3:48 PM   Dressing Type Black foam 7/10/2019  3:48 PM   Number of pieces used 2 7/10/2019  3:48 PM   Cycle Continuous 7/10/2019  3:48 PM   Target Pressure (mmHg) 125 7/10/2019  3:48 PM   Intensity 1 7/10/2019  3:48 PM   Canister changed? Yes 7/10/2019  3:48 PM   Dressing Status Changed 7/10/2019  3:48 PM   Dressing Changed Changed/New 7/10/2019  3:48 PM   Drainage Amount Small 7/10/2019  3:48 PM   Drainage Description Serosanguinous 7/10/2019  3:48 PM   Dressing Change Due 07/12/19 7/10/2019  3:48 PM   Wound Assessment Red 7/10/2019  3:48 PM   Shasta-wound Assessment Clean;Dry; Intact 7/10/2019  3:48 PM   Shape oval 7/10/2019  3:48 PM   Odor None 7/10/2019  3:48 PM   Number of days: 0       Wound 12/22/18 Coccyx Mid Ishmael Ulcer-Dark puprle with left edge partial open 1cm (Active)   Number of days: 199 Wound 05/03/19 Coccyx Mid stage II to coccyx (Active)   Number of days: 67       Wound 05/10/19 Heel Lateral;Left Nonblanching, purple, 0.5x0.5cm (Active)   Wound Image   7/5/2019  8:52 AM   Wound Pressure Unstageable 7/5/2019  8:52 AM   Dressing Status Clean;Dry; Intact 7/10/2019  9:55 AM   Dressing Changed Changed/New 7/9/2019  9:42 AM   Dressing/Treatment Pharmaceutical agent (see MAR) 7/10/2019  9:55 AM   Wound Cleansed Not Cleansed 7/10/2019  9:55 AM   Dressing Change Due 07/11/19 7/10/2019  9:55 AM   Wound Length (cm) 2.5 cm 7/5/2019  8:52 AM   Wound Width (cm) 2 cm 7/5/2019  8:52 AM   Wound Depth (cm) 0 cm 7/5/2019  8:52 AM   Wound Surface Area (cm^2) 5 cm^2 7/5/2019  8:52 AM   Change in Wound Size % (l*w) -1900 7/5/2019  8:52 AM   Wound Volume (cm^3) 0 cm^3 7/5/2019  8:52 AM   Wound Healing % 100 7/5/2019  8:52 AM   Distance Tunneling (cm) 0 cm 7/5/2019  8:52 AM   Tunneling Position ___ O'Clock 0 7/5/2019  8:52 AM   Undermining Starts ___ O'Clock 0 7/5/2019  8:52 AM   Undermining Ends___ O'Clock 0 7/5/2019  8:52 AM   Undermining Maxium Distance (cm) 0 7/5/2019  8:52 AM   Wound Assessment Clean;Dry 7/10/2019  9:55 AM   Drainage Amount None 7/10/2019  9:55 AM   Drainage Description Serosanguinous 7/10/2019  5:39 AM   Odor None 7/10/2019  9:55 AM   Margins Defined edges; Unattached edges 7/10/2019  5:39 AM   Shasta-wound Assessment Pink 7/10/2019  9:55 AM   Non-staged Wound Description Partial thickness 7/10/2019  5:39 AM   Black%Wound Bed 100 7/10/2019  5:39 AM   Culture Taken No 7/5/2019  8:52 AM   Number of days: 60       Wound 05/13/19 Tibial Anterior;Mid;Left (Active)   Number of days: 57     Incision 06/28/19 Sternum (Active)   Wound Image   7/10/2019  3:48 PM   Wound Assessment Red 7/10/2019  3:48 PM   Shasta-wound Assessment Clean;Dry; Intact 7/10/2019  3:48 PM   Wound Length (cm) 5.2 cm 7/10/2019  3:48 PM   Wound Width (cm) 2 cm 7/10/2019  3:48 PM   Wound Depth (cm) 2.5 cm 7/10/2019  3:48 PM   Wound Volume

## 2019-07-10 NOTE — PROGRESS NOTES
stenosis C 5-7 with Myelomalacia and developing myelopathy S/P Decompressive corpectomy C6 and placement of PEEK allograft corpectomy cage with autograft bone and Vivigen C5-7.  Neurosurgery Consultation placed 7/2. No recommendations for inpatient stay.  To follow up as outpt with Dr Prakash Subramanian.     Infectious disease to evaluate the possibilty of source of acute infection to cervical spine. Called [phone] to reach [facility] team to discuss concern over possible OM to cervical area.  With loose and failed hardware, it is unclear as to the safety of further imaging of MRI to help determine acute infectious soft tissue process.  Requesting NS to assist with their recommendations for further imaging, ( Can he undergo MRI safepy?) Possible need to remove hardware if it is infected, and activity orders due to instability of neck.      Acute metabolic encephalopathy: Unclear origin presently.  Resolving.     Possible osteomyelitis of the cervical spine.  Neurosurgery evaluation: Dr Lord Jaime Brooks further recommendations at this time. Deanna Leader does not feel that the neck is consistent with OM, but more of a hardware failure.  To follow up as outpt with Dr. Ok Archer           DVT Prophylaxis: scd due to bleeding sternal site  Diet: Dietary Nutrition Supplements: Wound Healing Oral Supplement  DIET CARB CONTROL;   Dietary Nutrition Supplements: Standard High Calorie Oral Supplement  Code Status: Full Code    PT/OT Eval Status: not ordered    Dispo - per Gerardo Ledezma MD

## 2019-07-10 NOTE — PROGRESS NOTES
CVTS Thoracic Progress Note:          CC:  Post op follow up 6/28/19 [address] [facility] course:   7/03 awake, in bed, in NAD. Denies any current pain. 7/08 awake, changing dressing to sternum (still oozing), in NAD.   7/09 sitting up in bed, in NAD, easily conversational - responds appropriately, sitter at bedside. 7/10 pt remains in bed, reminded him/staff to get up into chair. Obj:    Blood pressure 108/84, pulse 111, temperature 97.3 °F (36.3 °C), temperature source Oral, resp. rate 18, height 5' 10\" (1.778 m), weight 239 lb 3.2 oz (108.5 kg), SpO2 95 %. Lungs CTAB   Abdomen round, soft, nontender   Incision with dry dressing, old drainage noted. Improved  ml out in past 24 hrs; Cr 2.0    COR - RRR, currently SR in the low 90's    Diagnostics:   Recent Labs     07/08/19  0442 07/09/19  0434   WBC 9.8 10.0   HGB 8.6* 9.0*   HCT 27.6* 28.0*    139                                                                  Recent Labs     07/08/19  0442 07/09/19  0434 07/10/19  0440    136 133*   K 3.8 4.6 4.5   CL 99 99 98*   CO2 23 22 23   BUN 52* 62* 71*   CREATININE 2.2* 2.2* 2.0*   GLUCOSE 75 127* 97       CXR: 6/30/19 small bilateral pleural effusions     CT Cervical spine w/o contrast 6/26/19:   No acute cervical spine abnormality.       Interval C5-C7 ACDF with C6 corpectomy.  There seems to be loosening of the   inferior screws at C7 with backing out of the plate which appears angled   outward at this level, possibly with an attached bone fragment. Gladystine Or is no   prevertebral soft tissue swelling at this level.  Comparison with any   postoperative imaging would be helpful if available.       Mild canal and moderate foraminal narrowing at C5-C6.       Large bilateral pleural effusions.        Assess/Plan:   Hospitalist, ID, and nephro following    Sternal wound dehiscence: Wound RN following  Pt taken back to OR 7/09 for wound

## 2019-07-10 NOTE — PROGRESS NOTES
weeks from the date of negative blood culture  · We will continue oral vancomycin for 4 more days  · Possibility of chronic sternum osteomyelitis cannot be ruled out. May need to consider chronic antibiotic suppression after completing IV daptomycin versus surgical removal of the affected bone in the future  · Discussed the importance of good glycemic control  · DVT prophylaxis  · Discussed all above with patient and RN      NAOMI has been updated with infusion orders as follows: Out-patient antibiotic therapy (OPAT)/ Antibiotic Infusion orders          Diagnosis: Complicated MRSA bacteremia, sternal osteomyelitis       Organism/ culture: MRSA     Name and dose of Antimicrobial: IV daptomycin 500 mg every 24 hours     Antimicrobial start date: calculated from 7/8/2019     Antimicrobial completion date planned: 8/19/2019. May need to consider chronic suppressive therapy with p.o. doxycycline 100 mg every 12 hour after the stop date of IV antibiotic     Lab monitoring: CBC, Chem 12, ESR, CRP, *CK once a week, to be       collected every Monday or Tuesday morning until the patient is off IV          antibiotics. Fax weekly lab results to Calvin Ontiveros MD's office at        [phone]. Please maintain PICC line until the patient is on IV antibiotics. ** Please notify Calvin Ontiveros MD's office with any change in patient's        status, transfer out of facility or to hospital by calling [phone]           Outpatient Follow up: Follow-up with David Gerber MD in [address] [place] [address] in 6 weeks.          Physician Signature:  Electronically signed by Calvin Ontiveros MD on                                                               7/10/19 at 3:10 PM           Drug Monitoring:    · Continue monitoring for antibiotic toxicity as follows: CK, CMP  · Continue to watch for following: new or worsening fever, new hypotension, hives, lip swelling Genitourinary: Negative for dysuria, flank pain, frequency, hematuria and urgency. Musculoskeletal: Negative for back pain and myalgias. Skin: Negative for rash. Neurological: Negative for dizziness, seizures and headaches. Hematological: Does not bruise/bleed easily. Psychiatric/Behavioral: Negative for hallucinations and suicidal ideas. All other systems reviewed and are negative. Past Medical History: All past medical history reviewed today. Past Medical History:   Diagnosis Date    Anoxic brain injury [facility]) 1994    Cardiac arrest ([facility] 1994    Clostridium difficile infection 05/09/2019    Gout     Hyperlipidemia     Hypertension     MDRO (multiple drug resistant organisms) resistance 06/28/2019    urine (ecoli)    Memory deficit     poor short term memory    MRSA (methicillin resistant staph aureus) culture positive 7/3/19;05/09/2019    bacteremia    MRSA (methicillin resistant staph aureus) culture positive 07/03/2019    chest wound    Reflux     S/P CABG x 3 12/2018    Sleep apnea     Type 2 diabetes mellitus without complication ([facility]        Past Surgical History: All past surgical history was reviewed today.     Past Surgical History:   Procedure Laterality Date    CARDIAC CATHETERIZATION  11/26/2018    Dr. Reji Pearson Left 03/25/2014    Dr. Courtney Feng - w/trigger finger (3rd) & trigger thumb release    CARPAL TUNNEL RELEASE Right 04/14/2015    Dr. Maria Del Rosario Andrade N/A 12/16/2018    Dr. Rebecca Valles - decompressive corpectomy C6 (>90%), microdissection, anterior cervical arthrodesis w/PEEK allograft, placement of corpectomy cage & Synthes plate W8-2    COLONOSCOPY  09/01/2015    Dr. Oneal - gillis-diverticulosis, transverse colon adenomatous polyps    CORONARY ARTERY BYPASS GRAFT  12/14/2018    Dr. Cande Davey  04/05/2019    cystoscopy/bladder biopsy f/c exchange    CYSTOSCOPY N/A 4/5/2019    CYSTOSCOPY performed heard.  Pulmonary/Chest: No stridor. No respiratory distress. He has no wheezes. He has no rales. Abdominal: Soft. Bowel sounds are normal. There is no tenderness. There is no rebound and no guarding. Musculoskeletal: Normal range of motion. He exhibits no edema or tenderness. Wound VAC in place on sternum area   Lymphadenopathy:     He has no cervical adenopathy. He has no axillary adenopathy. Neurological: He is alert and oriented to person, place, and time. He exhibits normal muscle tone. Skin: Skin is warm and dry. No rash noted. He is not diaphoretic. No erythema. Psychiatric: He has a normal mood and affect. His behavior is normal.   Nursing note and vitals reviewed. Lines: All vascular access sites are healthy with no local erythema, discharge or tenderness. Intake and output:    I/O last 3 completed shifts: In: 200 [P.O.:980]  Out: 900 [Urine:900]    Lab Data:   All available labs and old records have been reviewed by me. CBC:  Recent Labs     07/08/19 0442 07/09/19  0434   WBC 9.8 10.0   RBC 3.41* 3.46*   HGB 8.6* 9.0*   HCT 27.6* 28.0*    139   MCV 81.0 81.0   MCH 25.2* 25.9*   MCHC 31.2 31.9   RDW 22.6* 22.5*        BMP:  Recent Labs     07/08/19 0442 07/09/19  0434 07/10/19  0440    136 133*   K 3.8 4.6 4.5   CL 99 99 98*   CO2 23 22 23   BUN 52* 62* 71*   CREATININE 2.2* 2.2* 2.0*   CALCIUM 8.9 8.7 9.0   GLUCOSE 75 127* 97        Hepatic Function Panel:   Lab Results   Component Value Date    ALKPHOS 131 07/03/2019    ALT 10 07/03/2019    AST 24 07/03/2019    PROT 6.0 07/03/2019    BILITOT 0.3 07/03/2019    BILIDIR <0.2 12/04/2018    IBILI see below 12/04/2018    LABALBU 2.6 07/10/2019       CPK:   Lab Results   Component Value Date    CKTOTAL 31 (L) 07/07/2019     ESR:   Lab Results   Component Value Date    SEDRATE 31 (H) 07/03/2019     CRP:   Lab Results   Component Value Date    CRP 61.6 (H) 07/03/2019           Imaging:     All pertinent images and

## 2019-07-11 ENCOUNTER — APPOINTMENT (OUTPATIENT)
Dept: INTERVENTIONAL RADIOLOGY/VASCULAR | Age: 59
DRG: 813 | End: 2019-07-11
Attending: THORACIC SURGERY (CARDIOTHORACIC VASCULAR SURGERY)
Payer: MEDICAID

## 2019-07-11 VITALS
SYSTOLIC BLOOD PRESSURE: 126 MMHG | HEART RATE: 103 BPM | OXYGEN SATURATION: 97 % | WEIGHT: 234.79 LBS | TEMPERATURE: 97.8 F | DIASTOLIC BLOOD PRESSURE: 85 MMHG | RESPIRATION RATE: 18 BRPM | HEIGHT: 70 IN | BODY MASS INDEX: 33.61 KG/M2

## 2019-07-11 LAB
ALBUMIN SERPL-MCNC: 2.6 G/DL (ref 3.4–5)
ANION GAP SERPL CALCULATED.3IONS-SCNC: 13 MMOL/L (ref 3–16)
BUN BLDV-MCNC: 73 MG/DL (ref 7–20)
CALCIUM SERPL-MCNC: 9.2 MG/DL (ref 8.3–10.6)
CHLORIDE BLD-SCNC: 102 MMOL/L (ref 99–110)
CO2: 22 MMOL/L (ref 21–32)
CREAT SERPL-MCNC: 2.1 MG/DL (ref 0.8–1.3)
GFR AFRICAN AMERICAN: 39
GFR NON-AFRICAN AMERICAN: 32
GLUCOSE BLD-MCNC: 172 MG/DL (ref 70–99)
GLUCOSE BLD-MCNC: 82 MG/DL (ref 70–99)
GLUCOSE BLD-MCNC: 88 MG/DL (ref 70–99)
GLUCOSE BLD-MCNC: 91 MG/DL (ref 70–99)
HCT VFR BLD CALC: 28.6 % (ref 40.5–52.5)
HEMOGLOBIN: 9.2 G/DL (ref 13.5–17.5)
MCH RBC QN AUTO: 25.8 PG (ref 26–34)
MCHC RBC AUTO-ENTMCNC: 32 G/DL (ref 31–36)
MCV RBC AUTO: 80.5 FL (ref 80–100)
PDW BLD-RTO: 22.3 % (ref 12.4–15.4)
PERFORMED ON: ABNORMAL
PERFORMED ON: NORMAL
PERFORMED ON: NORMAL
PHOSPHORUS: 4.1 MG/DL (ref 2.5–4.9)
PLATELET # BLD: 183 K/UL (ref 135–450)
PMV BLD AUTO: 8.3 FL (ref 5–10.5)
POTASSIUM SERPL-SCNC: 4.9 MMOL/L (ref 3.5–5.1)
RBC # BLD: 3.55 M/UL (ref 4.2–5.9)
SODIUM BLD-SCNC: 137 MMOL/L (ref 136–145)
WBC # BLD: 10.3 K/UL (ref 4–11)

## 2019-07-11 PROCEDURE — 76937 US GUIDE VASCULAR ACCESS: CPT

## 2019-07-11 PROCEDURE — 2580000003 HC RX 258: Performed by: INTERNAL MEDICINE

## 2019-07-11 PROCEDURE — 6370000000 HC RX 637 (ALT 250 FOR IP): Performed by: NURSE PRACTITIONER

## 2019-07-11 PROCEDURE — 6370000000 HC RX 637 (ALT 250 FOR IP): Performed by: INTERNAL MEDICINE

## 2019-07-11 PROCEDURE — 36558 INSERT TUNNELED CV CATH: CPT

## 2019-07-11 PROCEDURE — 77001 FLUOROGUIDE FOR VEIN DEVICE: CPT

## 2019-07-11 PROCEDURE — 80069 RENAL FUNCTION PANEL: CPT

## 2019-07-11 PROCEDURE — 6370000000 HC RX 637 (ALT 250 FOR IP): Performed by: THORACIC SURGERY (CARDIOTHORACIC VASCULAR SURGERY)

## 2019-07-11 PROCEDURE — 36415 COLL VENOUS BLD VENIPUNCTURE: CPT

## 2019-07-11 PROCEDURE — 6360000002 HC RX W HCPCS: Performed by: NURSE PRACTITIONER

## 2019-07-11 PROCEDURE — C1887 CATHETER, GUIDING: HCPCS

## 2019-07-11 PROCEDURE — 85027 COMPLETE CBC AUTOMATED: CPT

## 2019-07-11 PROCEDURE — 6360000002 HC RX W HCPCS: Performed by: INTERNAL MEDICINE

## 2019-07-11 PROCEDURE — 2500000003 HC RX 250 WO HCPCS: Performed by: NURSE PRACTITIONER

## 2019-07-11 PROCEDURE — 6360000002 HC RX W HCPCS: Performed by: RADIOLOGY

## 2019-07-11 RX ORDER — METOPROLOL SUCCINATE 50 MG/1
50 TABLET, EXTENDED RELEASE ORAL DAILY
Qty: 30 TABLET | Refills: 0 | Status: SHIPPED | OUTPATIENT
Start: 2019-07-12

## 2019-07-11 RX ORDER — FENTANYL CITRATE 50 UG/ML
INJECTION, SOLUTION INTRAMUSCULAR; INTRAVENOUS
Status: COMPLETED | OUTPATIENT
Start: 2019-07-11 | End: 2019-07-11

## 2019-07-11 RX ORDER — METOPROLOL TARTRATE 5 MG/5ML
5 INJECTION INTRAVENOUS ONCE
Status: COMPLETED | OUTPATIENT
Start: 2019-07-11 | End: 2019-07-11

## 2019-07-11 RX ORDER — METHOCARBAMOL 750 MG/1
750 TABLET, FILM COATED ORAL 3 TIMES DAILY PRN
Qty: 21 TABLET | Refills: 0 | Status: ON HOLD | OUTPATIENT
Start: 2019-07-11 | End: 2019-07-19

## 2019-07-11 RX ORDER — METOPROLOL TARTRATE 5 MG/5ML
INJECTION INTRAVENOUS
Status: DISPENSED
Start: 2019-07-11 | End: 2019-07-11

## 2019-07-11 RX ORDER — MIDAZOLAM HYDROCHLORIDE 5 MG/ML
INJECTION INTRAMUSCULAR; INTRAVENOUS
Status: COMPLETED | OUTPATIENT
Start: 2019-07-11 | End: 2019-07-11

## 2019-07-11 RX ORDER — SODIUM CHLORIDE 9 MG/ML
INJECTION, SOLUTION INTRAVENOUS
Status: DISCONTINUED
Start: 2019-07-11 | End: 2019-07-11 | Stop reason: HOSPADM

## 2019-07-11 RX ORDER — LACTOBACILLUS RHAMNOSUS GG 10B CELL
1 CAPSULE ORAL
Qty: 4 CAPSULE | Refills: 0 | Status: SHIPPED | OUTPATIENT
Start: 2019-07-12 | End: 2019-07-16

## 2019-07-11 RX ORDER — TORSEMIDE 20 MG/1
20 TABLET ORAL DAILY
Qty: 30 TABLET | Refills: 0 | Status: ON HOLD | OUTPATIENT
Start: 2019-07-11 | End: 2019-07-19 | Stop reason: HOSPADM

## 2019-07-11 RX ADMIN — Medication 125 MG: at 09:05

## 2019-07-11 RX ADMIN — Medication 20 G: at 09:04

## 2019-07-11 RX ADMIN — METOPROLOL SUCCINATE 50 MG: 50 TABLET, EXTENDED RELEASE ORAL at 09:04

## 2019-07-11 RX ADMIN — METHOCARBAMOL TABLETS 1000 MG: 500 TABLET, COATED ORAL at 15:12

## 2019-07-11 RX ADMIN — Medication 1 TABLET: at 09:04

## 2019-07-11 RX ADMIN — OXYCODONE HYDROCHLORIDE AND ACETAMINOPHEN 500 MG: 500 TABLET ORAL at 09:04

## 2019-07-11 RX ADMIN — Medication 10 ML: at 09:06

## 2019-07-11 RX ADMIN — Medication 20 G: at 15:12

## 2019-07-11 RX ADMIN — Medication 1 CAPSULE: at 09:04

## 2019-07-11 RX ADMIN — MIDAZOLAM HYDROCHLORIDE 0.5 MG: 5 INJECTION, SOLUTION INTRAMUSCULAR; INTRAVENOUS at 11:19

## 2019-07-11 RX ADMIN — FAMOTIDINE 20 MG: 20 TABLET ORAL at 09:04

## 2019-07-11 RX ADMIN — DAPTOMYCIN 500 MG: 500 INJECTION, POWDER, LYOPHILIZED, FOR SOLUTION INTRAVENOUS at 12:48

## 2019-07-11 RX ADMIN — FERROUS SULFATE TAB 325 MG (65 MG ELEMENTAL FE) 325 MG: 325 (65 FE) TAB at 09:04

## 2019-07-11 RX ADMIN — COLLAGENASE SANTYL: 250 OINTMENT TOPICAL at 09:05

## 2019-07-11 RX ADMIN — ALLOPURINOL 100 MG: 100 TABLET ORAL at 09:04

## 2019-07-11 RX ADMIN — FENTANYL CITRATE 25 MCG: 50 INJECTION INTRAMUSCULAR; INTRAVENOUS at 11:19

## 2019-07-11 RX ADMIN — PROCHLORPERAZINE EDISYLATE 10 MG: 5 INJECTION INTRAMUSCULAR; INTRAVENOUS at 00:00

## 2019-07-11 RX ADMIN — METOPROLOL TARTRATE 5 MG: 5 INJECTION INTRAVENOUS at 01:48

## 2019-07-11 NOTE — PLAN OF CARE
Nutrition Problem: Increased nutrient needs  Intervention: Food and/or Nutrient Delivery: Continue current diet, Continue current ONS(Continue Multi Vitamin)  Nutritional Goals: Patient will tolerate and consume 50%< of PO meals and supplements provided.
Nutrition Problem: Increased nutrient needs  Intervention: Food and/or Nutrient Delivery: Continue current diet, Start ONS  Nutritional Goals: Patient will tolerate and consume 50%< of PO meals and supplements provided.
Problem: Falls - Risk of:  Goal: Will remain free from falls  Description  Will remain free from falls  6/29/2019 0221 by Zaida Trivedi RN  Outcome: Ongoing  Goal: Absence of physical injury  Description  Absence of physical injury  6/29/2019 0823 by Kimberly Peralta RN  Outcome: Ongoing  Note:   Pt high fall risk. Instructed to use call light before getting out of bed. Call light within reach. Bed in low position. Bed alarm on. Will continue to monitor. 6/29/2019 0221 by Zaida Trivedi RN  Outcome: Ongoing     Problem: Risk for Impaired Skin Integrity  Goal: Tissue integrity - skin and mucous membranes  Description  Structural intactness and normal physiological function of skin and  mucous membranes. Outcome: Ongoing  Note:   Pt is a Q2H, educated pt on preventing skin breakdown. See flowsheet for assessment      Problem: Pain:  Goal: Pain level will decrease  Description  Pain level will decrease  6/29/2019 0221 by Zaida Trivedi RN  Outcome: Ongoing  Goal: Control of acute pain  Description  Control of acute pain  6/29/2019 0221 by Zaida Trivedi RN  Outcome: Ongoing  Goal: Control of chronic pain  Description  Control of chronic pain  6/29/2019 0823 by Kimberly Peralta RN  Outcome: Ongoing  Note:   Pt states he undestands the 0-10 numeric pain scale. Pt also states that his pain is being adequately treated and will reach out to report changes.    6/29/2019 0221 by Zaida Trivedi RN  Outcome: Ongoing
Problem: Falls - Risk of:  Goal: Will remain free from falls  Description  Will remain free from falls  7/3/2019 0500 by Deacon Brady RN  Outcome: Ongoing     Problem: Risk for Impaired Skin Integrity  Goal: Tissue integrity - skin and mucous membranes  Description  Structural intactness and normal physiological function of skin and  mucous membranes.   7/3/2019 0500 by Deacon Brady RN  Outcome: Ongoing
Problem: Falls - Risk of:  Goal: Will remain free from falls  Description  Will remain free from falls  7/4/2019 1043 by Ester Green RN  Outcome: Ongoing  7/3/2019 2204 by Swapna Bailey RN  Note:   Pt will remain free of falls this shift. Bedside table and call light within reach, bed alarm in place. Pt instructed to call out when in need of assistance, verbalized understanding. Will continue to monitor. Goal: Absence of physical injury  Description  Absence of physical injury  Outcome: Ongoing     Problem: Risk for Impaired Skin Integrity  Goal: Tissue integrity - skin and mucous membranes  Description  Structural intactness and normal physiological function of skin and  mucous membranes.   Outcome: Ongoing     Problem: Pain:  Goal: Pain level will decrease  Description  Pain level will decrease  Outcome: Ongoing  Goal: Control of acute pain  Description  Control of acute pain  Outcome: Ongoing  Goal: Control of chronic pain  Description  Control of chronic pain  Outcome: Ongoing     Problem: Nutrition  Goal: Optimal nutrition therapy  Outcome: Ongoing     Problem: Confusion - Acute:  Goal: Absence of continued neurological deterioration signs and symptoms  Description  Absence of continued neurological deterioration signs and symptoms  Outcome: Ongoing  Goal: Mental status will be restored to baseline  Description  Mental status will be restored to baseline  Outcome: Ongoing     Problem: Discharge Planning:  Goal: Ability to perform activities of daily living will improve  Description  Ability to perform activities of daily living will improve  Outcome: Ongoing  Goal: Participates in care planning  Description  Participates in care planning  Outcome: Ongoing     Problem: Injury - Risk of, Physical Injury:  Goal: Will remain free from falls  Description  Will remain free from falls  7/4/2019 1043 by Ester Green RN  Outcome: Ongoing  7/3/2019 2204 by Swapna Bailey RN  Note:   Pt will remain
Problem: Falls - Risk of:  Goal: Will remain free from falls  Description  Will remain free from falls  7/6/2019 0804 by Hero Sky RN  Outcome: Ongoing  7/5/2019 2318 by Nae Santacruz RN  Note:   Pt will remain free of falls this shift. Bedside table and call light within reach, bed alarm in place. Pt instructed to call out when in need of assistance, verbalized understanding. Will continue to monitor. Goal: Absence of physical injury  Description  Absence of physical injury  Outcome: Ongoing     Problem: Risk for Impaired Skin Integrity  Goal: Tissue integrity - skin and mucous membranes  Description  Structural intactness and normal physiological function of skin and  mucous membranes.   Outcome: Ongoing     Problem: Pain:  Goal: Pain level will decrease  Description  Pain level will decrease  Outcome: Ongoing  Goal: Control of acute pain  Description  Control of acute pain  Outcome: Ongoing  Goal: Control of chronic pain  Description  Control of chronic pain  Outcome: Ongoing     Problem: Nutrition  Goal: Optimal nutrition therapy  Outcome: Ongoing     Problem: Confusion - Acute:  Goal: Absence of continued neurological deterioration signs and symptoms  Description  Absence of continued neurological deterioration signs and symptoms  Outcome: Ongoing  Goal: Mental status will be restored to baseline  Description  Mental status will be restored to baseline  Outcome: Ongoing     Problem: Discharge Planning:  Goal: Ability to perform activities of daily living will improve  Description  Ability to perform activities of daily living will improve  Outcome: Ongoing  Goal: Participates in care planning  Description  Participates in care planning  Outcome: Ongoing     Problem: Injury - Risk of, Physical Injury:  Goal: Will remain free from falls  Description  Will remain free from falls  7/6/2019 0804 by Hero Sky RN  Outcome: Ongoing  7/5/2019 2318 by Nae Santacruz RN  Note:   Pt will remain free
Problem: Falls - Risk of:  Goal: Will remain free from falls  Description  Will remain free from falls  Note:   Pt will remain free of falls this shift. Bedside table and call light within reach, bed alarm in place. Pt instructed to call out when in need of assistance, verbalized understanding. Will continue to monitor.
Problem: Falls - Risk of:  Goal: Will remain free from falls  Description  Will remain free from falls  Outcome: Ongoing     Patient instructed to call if assistance is needed. Call light within reach. Sitter at bedside for patient safety.
Problem: Falls - Risk of:  Goal: Will remain free from falls  Description  Will remain free from falls  Outcome: Ongoing     Problem: Injury - Risk of, Physical Injury:  Goal: Will remain free from falls  Description  Will remain free from falls  Outcome: Ongoing
Problem: Injury - Risk of, Physical Injury:  Goal: Will remain free from falls  Description  Will remain free from falls  Note:   Pt will remain free of falls this shift. Bedside table and call light within reach, bed alarm in place. Pt instructed to call out when in need of assistance, verbalized understanding. Will continue to monitor.
Problem: Risk for Impaired Skin Integrity  Goal: Tissue integrity - skin and mucous membranes  Description  Structural intactness and normal physiological function of skin and  mucous membranes.   Outcome: Ongoing
RN  Outcome: Ongoing  7/7/2019 0542 by Vicky Scheuermann, RN  Outcome: Ongoing  Goal: Absence of physical injury  Description  Absence of physical injury  Outcome: Ongoing     Problem: Mood - Altered:  Goal: Mood stable  Description  Mood stable  Outcome: Ongoing  Goal: Absence of abusive behavior  Description  Absence of abusive behavior  Outcome: Ongoing  Goal: Verbalizations of feeling emotionally comfortable while being cared for will increase  Description  Verbalizations of feeling emotionally comfortable while being cared for will increase  Outcome: Ongoing     Problem: Psychomotor Activity - Altered:  Goal: Absence of psychomotor disturbance signs and symptoms  Description  Absence of psychomotor disturbance signs and symptoms  Outcome: Ongoing     Problem: Sensory Perception - Impaired:  Goal: Demonstrations of improved sensory functioning will increase  Description  Demonstrations of improved sensory functioning will increase  Outcome: Ongoing  Goal: Decrease in sensory misperception frequency  Description  Decrease in sensory misperception frequency  Outcome: Ongoing  Goal: Able to refrain from responding to false sensory perceptions  Description  Able to refrain from responding to false sensory perceptions  Outcome: Ongoing  Goal: Demonstrates accurate environmental perceptions  Description  Demonstrates accurate environmental perceptions  Outcome: Ongoing  Goal: Able to distinguish between reality-based and nonreality-based thinking  Description  Able to distinguish between reality-based and nonreality-based thinking  Outcome: Ongoing  Goal: Able to interrupt nonreality-based thinking  Description  Able to interrupt nonreality-based thinking  Outcome: Ongoing     Problem: Sleep Pattern Disturbance:  Goal: Appears well-rested  Description  Appears well-rested  Outcome: Ongoing     Problem: Restraint Use - Nonviolent/Non-Self-Destructive Behavior:  Goal: Absence of restraint indications  Description  Absence
RN  Outcome: Ongoing  Goal: Mental status will be restored to baseline  Description  Mental status will be restored to baseline  7/7/2019 1343 by Michele Henson RN  Outcome: Ongoing  7/7/2019 1336 by Michele Henson RN  Outcome: Ongoing     Problem: Discharge Planning:  Goal: Ability to perform activities of daily living will improve  Description  Ability to perform activities of daily living will improve  7/7/2019 1343 by Michele Henson RN  Outcome: Ongoing  7/7/2019 1336 by Michele Henson RN  Outcome: Ongoing  Goal: Participates in care planning  Description  Participates in care planning  7/7/2019 1343 by Michele Henson RN  Outcome: Ongoing  7/7/2019 1336 by Michele Henson RN  Outcome: Ongoing     Problem: Injury - Risk of, Physical Injury:  Goal: Will remain free from falls  Description  Will remain free from falls  7/7/2019 1343 by Michele Henson RN  Outcome: Ongoing  7/7/2019 1336 by Michele Henson RN  Outcome: Ongoing  7/7/2019 0542 by Lilly Garcia RN  Outcome: Ongoing  Goal: Absence of physical injury  Description  Absence of physical injury  7/7/2019 1343 by Michele Henson RN  Outcome: Ongoing  7/7/2019 1336 by Michele Henson RN  Outcome: Ongoing     Problem: Mood - Altered:  Goal: Mood stable  Description  Mood stable  7/7/2019 1343 by Michele Henson RN  Outcome: Ongoing  7/7/2019 1336 by Michele Henson RN  Outcome: Ongoing  Goal: Absence of abusive behavior  Description  Absence of abusive behavior  7/7/2019 1343 by Michele Henson RN  Outcome: Ongoing  7/7/2019 1336 by Michele Henson RN  Outcome: Ongoing  Goal: Verbalizations of feeling emotionally comfortable while being cared for will increase  Description  Verbalizations of feeling emotionally comfortable while being cared for will increase  7/7/2019 1343 by Michele Henson RN  Outcome: Ongoing  7/7/2019 1336 by Michele Henson RN  Outcome: Ongoing     Problem: Psychomotor Activity - Altered:  Goal: Absence of
perceptions  7/7/2019 1348 by Greg Tucker RN  Outcome: Ongoing  7/7/2019 1343 by Greg Tucker RN  Outcome: Ongoing  7/7/2019 1336 by Greg Tucker RN  Outcome: Ongoing  Goal: Demonstrates accurate environmental perceptions  Description  Demonstrates accurate environmental perceptions  7/7/2019 1348 by Greg Tucker RN  Outcome: Ongoing  7/7/2019 1343 by Greg Tucker RN  Outcome: Ongoing  7/7/2019 1336 by Greg Tucker RN  Outcome: Ongoing  Goal: Able to distinguish between reality-based and nonreality-based thinking  Description  Able to distinguish between reality-based and nonreality-based thinking  7/7/2019 1348 by Greg Tucker RN  Outcome: Ongoing  7/7/2019 1343 by Greg Tucker RN  Outcome: Ongoing  7/7/2019 1336 by Greg Tucker RN  Outcome: Ongoing  Goal: Able to interrupt nonreality-based thinking  Description  Able to interrupt nonreality-based thinking  7/7/2019 1348 by Greg Tucker RN  Outcome: Ongoing  7/7/2019 1343 by Greg Tucker RN  Outcome: Ongoing  7/7/2019 1336 by Greg Tucker RN  Outcome: Ongoing     Problem: Sleep Pattern Disturbance:  Goal: Appears well-rested  Description  Appears well-rested  7/7/2019 1348 by Greg Tucker RN  Outcome: Ongoing  7/7/2019 1343 by Greg Tucker RN  Outcome: Ongoing  7/7/2019 1336 by Greg Tucker RN  Outcome: Ongoing     Problem: Restraint Use - Nonviolent/Non-Self-Destructive Behavior:  Goal: Absence of restraint indications  Description  Absence of restraint indications  7/7/2019 1348 by Greg Tucker RN  Outcome: Ongoing  7/7/2019 1343 by Greg Tucker RN  Outcome: Ongoing  7/7/2019 1336 by Greg Tucker RN  Outcome: Ongoing  Goal: Absence of restraint-related injury  Description  Absence of restraint-related injury  7/7/2019 1348 by Greg Tucker RN  Outcome: Ongoing  7/7/2019 1343 by Greg Tucker RN  Outcome: Ongoing  7/7/2019 1336 by Greg Tucker RN  Outcome: Ongoing

## 2019-07-11 NOTE — PROGRESS NOTES
Report called to [facility] and given to [facility]. Discharge instructions explained to patient and placed in envelope to be given to transport. IVs removed. PICC line in place. Wound vac removed and returned. Dressing in place over wound for transport. Patient belongings gathered and accounted for. Awaiting transport scheduled for 1630.

## 2019-07-11 NOTE — PROGRESS NOTES
1-25%  · Oral Nutrition Supplement (ONS) Orders: Wound Healing Oral Supplement, Standard High Calorie Oral Supplement  · ONS intake: %(per pt recall)  · Anthropometric Measures:  · Ht: 5' 10\" (177.8 cm)   · Current Body Wt: 234 lb 12 oz (106.5 kg)  · Admission Body Wt: 215 lb (97.5 kg)  · % Weight Change:  ,  +12 lbs since admission (+2 L since admission)  · Ideal Body Wt: 166 lb (75.3 kg), % Ideal Body 130%  · BMI Classification: BMI 30.0 - 34.9 Obese Class I    Nutrition Interventions:   Continue current diet, Continue current ONS(Continue Multi Vitamin)  Continued Inpatient Monitoring    Nutrition Evaluation:   · Evaluation: Progressing toward goals   · Goals: Patient will tolerate and consume 50%< of PO meals and supplements provided.      · Monitoring: Meal Intake, Supplement Intake, Pertinent Labs, Nutrition Progression      Electronically signed by Darin Patel RD, SHEELA on 7/11/19 at 11:42 AM    Contact Number: Office: [phone]; [address]: [address]

## 2019-07-11 NOTE — PROGRESS NOTES
Hospitalist Progress Note      PCP: Ernestina Miller DO    Date of Admission: 6/28/2019         Chief Complaint on Admission: Consult request for medical management     Hospital Course: reviewed      Subjective:  No complaints. Planning of DC to LTAC today. Medications:  Reviewed    Infusion Medications    dextrose       Scheduled Medications    metoprolol        lidocaine 1 % injection  5 mL Intradermal Once    sodium chloride flush  10 mL Intravenous 2 times per day    lactobacillus  1 capsule Oral Daily with breakfast    daptomycin (CUBICIN) IVPB  500 mg Intravenous Q24H    insulin lispro  0-12 Units Subcutaneous TID WC    insulin lispro  0-6 Units Subcutaneous Nightly    insulin glargine  0.25 Units/kg Subcutaneous Nightly    lactulose  20 g Oral TID    metoprolol succinate  50 mg Oral Daily    vancomycin  125 mg Oral BID    collagenase   Topical Daily    melatonin  10 mg Oral Nightly    fluticasone  2 spray Each Nostril Daily    gabapentin  300 mg Oral Nightly    ferrous sulfate  325 mg Oral Daily with breakfast    therapeutic multivitamin-minerals  1 tablet Oral Daily    allopurinol  100 mg Oral Daily    vitamin C  500 mg Oral Daily    famotidine  20 mg Oral Daily     PRN Meds: sodium chloride flush, prochlorperazine, perflutren lipid microspheres, glucose, dextrose, glucagon (rDNA), dextrose, methocarbamol, acetaminophen      Intake/Output Summary (Last 24 hours) at 7/11/2019 1239  Last data filed at 7/11/2019 0902  Gross per 24 hour   Intake 770 ml   Output 850 ml   Net -80 ml       Physical Exam Performed:    /80   Pulse 106   Temp 97.8 °F (36.6 °C) (Oral)   Resp 23   Ht 5' 10\" (1.778 m)   Wt 234 lb 12.6 oz (106.5 kg)   SpO2 100%   BMI 33.69 kg/m²       General appearance: No apparent distress, appears stated age and cooperative. HEENT: Pupils equal, round, and reactive to light. Conjunctivae/corneas clear. Neck: Supple, with full range of motion.  No jugular venous distention. Trachea midline. Respiratory:  Normal respiratory effort. Clear to auscultation, bilaterally without Rales/Wheezes/Rhonchi. Cardiovascular: Regular rate and rhythm with normal S1/S2 without murmurs, rubs or gallops. Chest: sternal wound noted without active bleeding  Abdomen: Soft, non-tender, non-distended with normal bowel sounds. Musculoskeletal: No clubbing, cyanosis or edema bilaterally.  Full range of motion without deformity. Skin: Skin color, texture, turgor normal.  No rashes or lesions. Neurologic:  Neurovascularly intact without any focal sensory/motor deficits. Cranial nerves: II-XII intact, grossly non-focal.  Psychiatric: Alert and oriented, thought content appropriate, normal insight  Capillary Refill: Brisk,< 3 seconds   Peripheral Pulses: +2 palpable, equal bilaterally              Labs:   Recent Labs     07/09/19  0434 07/11/19  0429   WBC 10.0 10.3   HGB 9.0* 9.2*   HCT 28.0* 28.6*    183     Recent Labs     07/09/19  0434 07/10/19  0440 07/11/19  0429    133* 137   K 4.6 4.5 4.9   CL 99 98* 102   CO2 22 23 22   BUN 62* 71* 73*   CREATININE 2.2* 2.0* 2.1*   CALCIUM 8.7 9.0 9.2   PHOS 3.7 3.8 4.1     No results for input(s): AST, ALT, BILIDIR, BILITOT, ALKPHOS in the last 72 hours. Recent Labs     07/09/19  0925   INR 1.39*     No results for input(s): [place] Cap in the last 72 hours. Urinalysis:      Lab Results   Component Value Date    NITRU Negative 06/28/2019    WBCUA >100 06/28/2019    BACTERIA 1+ 05/09/2019    RBCUA see below 06/28/2019    BLOODU LARGE 06/28/2019    SPECGRAV 1.015 06/28/2019    GLUCOSEU Negative 06/28/2019       Radiology:  CT HEAD W WO CONTRAST   Final Result   No acute intracranial abnormality. XR CHEST PORTABLE   Final Result   Status post placement of right internal jugular temporary dialysis catheter   with distal tip located in region of right atrium. No evidence of   pneumothorax.          IR NONTUNNELED 80):  Iron saturation 16%.  Iron held in the acute infection.      C. Difficile colitis (diagnosed 5/9/19 with recurrence):  Continues on oral vancomycin.    Cervical spondylosis and severe cervical stenosis C 5-7 with Myelomalacia and developing myelopathy S/P Decompressive corpectomy C6 and placement of PEEK allograft corpectomy cage with autograft bone and Vivigen C5-7.  Neurosurgery Consultation placed 7/2. No recommendations for inpatient stay.  To follow up as outpt with Dr Natasha Garcia.     Infectious disease to evaluate the possibilty of source of acute infection to cervical spine. Called [phone] to reach [place] [facility] team to discuss concern over possible OM to cervical area.  With loose and failed hardware, it is unclear as to the safety of further imaging of MRI to help determine acute infectious soft tissue process.  Requesting NS to assist with their recommendations for further imaging, ( Can he undergo MRI safepy?) Possible need to remove hardware if it is infected, and activity orders due to instability of neck.      Acute metabolic encephalopathy: Unclear origin presently.  Resolving.     Possible osteomyelitis of the cervical spine.  Neurosurgery evaluation: Dr Lisa Estrada Stack further recommendations at this time. [facility] does not feel that the neck is consistent with OM, but more of a hardware failure.  To follow up as outpt with Dr. Flora Bardales     DVT Prophylaxis: scd due to bleeding sternal site  Diet: Dietary Nutrition Supplements: Wound Healing Oral Supplement  Dietary Nutrition Supplements: Standard High Calorie Oral Supplement  DIET CARB CONTROL; Carb Control: 5 carb choices (75 gms)/meal  Code Status: Full Code    PT/OT Eval Status: not ordered    Dispo - per CTS. No medical barriers for DC. Plan for LTAC today.     Kalia Ryan MD

## 2019-07-11 NOTE — DISCHARGE SUMMARY
Cardiac, Vascular & Thoracic Surgery  Discharge Summary    Patient:  Sofia Harper 1960 [id number]   Admission Date:  6/28/2019  9:17 AM  Discharge Date:  7/11/19     Principle Diagnosis:  <principal problem not specified>    Secondary Diagnosis:  Active Problems:    Anoxic brain injury ([facility]    CKD (chronic kidney disease) stage 3, GFR 30-59 ml/min ([facility])    Coronary artery disease involving native coronary artery of native heart without angina pectoris    Acute on chronic systolic heart failure (HCC)    DM (diabetes mellitus), secondary, uncontrolled, w/neurologic complic ([facility])    Postoperative infection of wound of sternum    Infection requiring contact isolation precautions    Status post aorto-coronary artery bypass graft    Benign essential HTN    CAMI (acute kidney injury) ([facility]    Wound infection    MRSA infection    Bacteremia due to methicillin resistant Staphylococcus aureus    Poorly controlled type 2 diabetes mellitus (HCC)    Class 1 obesity due to excess calories with body mass index (BMI) of 32.0 to 32.9 in adult    Complex care coordination    Receiving intravenous antibiotic treatment as outpatient  Resolved Problems:    Sternal osteomyelitis ([facility]    Procedure:  6/28/19 Local exploration of the upper part of the sternal wound, wire  removal, debridement of subcutaneous tissue, skin and muscle on top of the sternum. Placement of wound VAC. History: The patient is a 60 y. o. male with significant past medical history of CKD, HLD, HTN,T2DM, FELIPE, GERD, and anoxic brain injury after cardiac arrest (1994) who presented with an abnormal stress test.  Originally he went to [facility] for c/o gate disturbance and pain in arms and lower abdomen. MRI unable to be performed due to patient refusing to remove the metal jewelry from his body. Echo showed LVEF 45% with areas of hypokinesis.  Stress test then performed and was abnormal. He then transferred to [facility] for angiogram which showed multivessel CAD that is complex in nature due to pts CKD and the need for higher dye loads if staged PCI were to be done. Pt had a CABG X4 on 12/04/18. The patient had a complicated postoperative course. When patient emerged from anesthesia he was found to have bilateral lower extremities weakness. Neurology was immediately consulted and has been following daily. There was concern for spinal cord ischemia. His exams have been inconsistent bilaterally. Cervical MRI demonstrated severe spinal stenosis with cord compression at C6-7 and stenosis with cord compression at C5-6, results were discussed with Neurology who then consulted Orthopedics. Marielos Tovar CNP notified Dr Aidan Melendez of the results and spoke with Jayesh Merchant MD.  The decision was made to transfer to [facility] for neurosurgical support. The patient was transferred to [facility] on 12/7/2018. On 12/16/18 pt had a C-6 corpectomy, C5-7 decompression/fusion with Dr. Hill Espino at [facility]. On 12/20/18 Pt had a sternal incision and drainage with wound vac placement by Dr. Aidan Melendez. He was discharged from [facility] on 12/27/18 to the inpatient rehab unit at [facility], from which he left on 1/10/19 to go to [facility]. Hospital Course: The patient Pt was admitted on 6/28/19 for sternal wound infection/debridement and wound vac placement. He had a complicated inpatient stay including acute on chronic renal failure, treatment of clostridium difficile infection from SNF, acute neck pain, and a UTI. The hospitalist service was consulted for medical management, infectious disease and nephrology were consulted as well. Pt was off of ASA and Eliquis while here for possible [facility] placement, resumed upon discharge. His UOP and Cr improved, and he did not need HD while here. Pt will get 4 more days of PO Vanc for his C-diff, and will need an additional 6 weeks of IV daptomycin. Pt had a tunneled IJ line placed 7/11/19 so that he will have access for his antibiotics. Nephro will follow him at [facility], ID has put their orders in [facility] for Abx, and Pt will need to follow up with Dr. Esvin Ordonez as an outpatient for the loosened hardware in his neck (found on CT 6/26/19). Pt is being discharged on 7/11/19 to [facility]. He will follow up with Dr. Riana Calles in two weeks. Discharged Condition: good    Disposition:  [facility] at [address]    Discharge Medications:   [place], [address] Medication Instructions EEA:[id number]    Printed on:07/11/19 1401   Medication Information                      ACCU-CHEK MULTICLIX LANCETS MISC  USE ONE  TO CHECK GLUCOSE ONCE DAILY             acetaminophen (TYLENOL) 325 MG tablet  Take 650 mg by mouth every 6 hours as needed for Pain             allopurinol (ZYLOPRIM) 100 MG tablet  Take 100 mg by mouth daily             apixaban (ELIQUIS) 2.5 MG TABS tablet  Take 1 tablet by mouth 2 times daily             aspirin (ASPIRIN LOW DOSE) 81 MG EC tablet  TAKE ONE TABLET BY MOUTH ONCE DAILY             Blood Glucose Monitoring Suppl (ACCU-CHEK COMPACT CARE KIT) KIT  1 kit by Does not apply route daily             collagenase 250 UNIT/GM ointment  Apply topically daily. famotidine (PEPCID) 20 MG tablet  Take 20 mg by mouth 2 times daily             ferrous sulfate 325 (65 Fe) MG tablet  Take 325 mg by mouth daily (with breakfast)             fluticasone (FLONASE) 50 MCG/ACT nasal spray  2 sprays by Each Nare route daily             gabapentin (NEURONTIN) 300 MG capsule  Take 300 mg by mouth nightly. Jhon Castillo glucose blood VI test strips (FREESTYLE TEST STRIPS) strip  1 each by In Vitro route daily As needed.              lactobacillus (CULTURELLE) capsule  Take 1 capsule by mouth daily (with breakfast) for 4 days             Melatonin 10 MG TABS  Take 10 mg by mouth nightly              methocarbamol (ROBAXIN-750) 750 MG tablet  Take 1 tablet by mouth 3 times daily as needed (PAIN)             metoprolol succinate

## 2019-07-11 NOTE — PRE SEDATION
Daily with breakfast    daptomycin (CUBICIN) IVPB  500 mg Intravenous Q24H    insulin lispro  0-12 Units Subcutaneous TID WC    insulin lispro  0-6 Units Subcutaneous Nightly    insulin glargine  0.25 Units/kg Subcutaneous Nightly    lactulose  20 g Oral TID    metoprolol succinate  50 mg Oral Daily    vancomycin  125 mg Oral BID    collagenase   Topical Daily    melatonin  10 mg Oral Nightly    fluticasone  2 spray Each Nostril Daily    gabapentin  300 mg Oral Nightly    ferrous sulfate  325 mg Oral Daily with breakfast    therapeutic multivitamin-minerals  1 tablet Oral Daily    allopurinol  100 mg Oral Daily    vitamin C  500 mg Oral Daily    famotidine  20 mg Oral Daily     Continuous Infusions:    dextrose       PRN Meds: sodium chloride flush, prochlorperazine, perflutren lipid microspheres, glucose, dextrose, glucagon (rDNA), dextrose, methocarbamol, acetaminophen  Home Meds:   Prior to Admission medications    Medication Sig Start Date End Date Taking? Authorizing Provider   allopurinol (ZYLOPRIM) 100 MG tablet Take 100 mg by mouth daily 7/13/15  Yes Historical Provider, MD   VANCOMYCIN HCL PO Take 250 mg by mouth 4 times daily   Yes Historical Provider, MD   metoprolol succinate (TOPROL XL) 50 MG extended release tablet Take 1.5 tablets by mouth daily 5/20/19  Yes PAYTON Ellis CNP   torsemide [facility]) 20 MG tablet Take 2 tablets by mouth daily 5/20/19  Yes PAYTON Ellis CNP   isosorbide dinitrate (ISORDIL) 10 MG tablet Take 1 tablet by mouth 2 times daily 5/16/19  Yes Derian Garrison MD   apixaban (ELIQUIS) 2.5 MG TABS tablet Take 1 tablet by mouth 2 times daily  Patient taking differently: Take 2.5 mg by mouth 2 times daily Stopped on 6/21/2019 for surgery.  5/16/19  Yes Derian Garrison MD   hydrALAZINE (APRESOLINE) 10 MG tablet Take 2 tablets by mouth every 12 hours 5/16/19  Yes Derian Garrison MD   metolazone (ZAROXOLYN) 2.5 MG tablet Take 1 tablet by mouth once

## 2019-07-11 NOTE — CARE COORDINATION
[facility]) can accept patient today, they have a bed and he is meeting criteria. Pauline moscoso is anticipating him admitting there later today if medically cleared by all specialties. Patient is off floor for procedure getting tunneled IJ line placed. Per Abi Castro CNP with CT surgery she is waiting to talk with Nephrology and Internal Medicine to ensure all parties are in agreement that patient is ready to discharge to [facility]. Will await orders and facilitate transport once cleared to transfer.

## 2019-07-11 NOTE — PROGRESS NOTES
Call placed to nephrology which line order is to be done. Spoke with specials, tunneled cath will be placed. Awaiting call back from nephrology. Per nephrology, patient is to receive a tunneled central line. Asked if RN would call specials to ensure that was the order they had. Specials verified that was correct.

## 2019-07-12 ENCOUNTER — CARE COORDINATION (OUTPATIENT)
Dept: CARE COORDINATION | Age: 59
End: 2019-07-12

## 2019-07-13 LAB — BLOOD CULTURE, ROUTINE: NORMAL

## 2019-07-14 LAB
CULTURE, BLOOD 2: ABNORMAL
CULTURE, BLOOD 2: ABNORMAL
ORGANISM: ABNORMAL

## 2019-07-16 ENCOUNTER — TELEPHONE (OUTPATIENT)
Dept: INFECTIOUS DISEASES | Age: 59
End: 2019-07-16

## 2019-07-17 ENCOUNTER — APPOINTMENT (OUTPATIENT)
Dept: CT IMAGING | Age: 59
DRG: 469 | End: 2019-07-17
Payer: MEDICAID

## 2019-07-17 ENCOUNTER — HOSPITAL ENCOUNTER (INPATIENT)
Age: 59
LOS: 2 days | Discharge: HOME | DRG: 469 | End: 2019-07-19
Attending: EMERGENCY MEDICINE | Admitting: INTERNAL MEDICINE
Payer: MEDICAID

## 2019-07-17 DIAGNOSIS — E87.5 HYPERKALEMIA: Primary | ICD-10-CM

## 2019-07-17 DIAGNOSIS — S16.1XXA STRAIN OF NECK MUSCLE, INITIAL ENCOUNTER: ICD-10-CM

## 2019-07-17 LAB
ALBUMIN SERPL-MCNC: 2.7 G/DL (ref 3.4–5)
ALBUMIN SERPL-MCNC: 2.9 G/DL (ref 3.4–5)
ANION GAP SERPL CALCULATED.3IONS-SCNC: 15 MMOL/L (ref 3–16)
BASOPHILS ABSOLUTE: 0 K/UL (ref 0–0.2)
BASOPHILS ABSOLUTE: 0 K/UL (ref 0–0.2)
BASOPHILS RELATIVE PERCENT: 0.4 %
BASOPHILS RELATIVE PERCENT: 0.5 %
BUN BLDV-MCNC: 112 MG/DL (ref 7–20)
BUN BLDV-MCNC: 112 MG/DL (ref 7–20)
BUN BLDV-MCNC: 119 MG/DL (ref 7–20)
CALCIUM SERPL-MCNC: 9.1 MG/DL (ref 8.3–10.6)
CALCIUM SERPL-MCNC: 9.2 MG/DL (ref 8.3–10.6)
CALCIUM SERPL-MCNC: 9.3 MG/DL (ref 8.3–10.6)
CHLORIDE BLD-SCNC: 97 MMOL/L (ref 99–110)
CHLORIDE BLD-SCNC: 99 MMOL/L (ref 99–110)
CHLORIDE BLD-SCNC: 99 MMOL/L (ref 99–110)
CO2: 22 MMOL/L (ref 21–32)
CO2: 23 MMOL/L (ref 21–32)
CO2: 24 MMOL/L (ref 21–32)
CREAT SERPL-MCNC: 3 MG/DL (ref 0.8–1.3)
CREAT SERPL-MCNC: 3 MG/DL (ref 0.8–1.3)
CREAT SERPL-MCNC: 3.1 MG/DL (ref 0.8–1.3)
EKG ATRIAL RATE: 86 BPM
EKG ATRIAL RATE: 92 BPM
EKG DIAGNOSIS: NORMAL
EKG DIAGNOSIS: NORMAL
EKG P AXIS: 38 DEGREES
EKG P AXIS: 39 DEGREES
EKG P-R INTERVAL: 180 MS
EKG P-R INTERVAL: 182 MS
EKG Q-T INTERVAL: 374 MS
EKG Q-T INTERVAL: 392 MS
EKG QRS DURATION: 110 MS
EKG QRS DURATION: 110 MS
EKG QTC CALCULATION (BAZETT): 462 MS
EKG QTC CALCULATION (BAZETT): 469 MS
EKG R AXIS: 84 DEGREES
EKG R AXIS: 86 DEGREES
EKG T AXIS: 254 DEGREES
EKG T AXIS: 255 DEGREES
EKG VENTRICULAR RATE: 86 BPM
EKG VENTRICULAR RATE: 92 BPM
EOSINOPHILS ABSOLUTE: 0.1 K/UL (ref 0–0.6)
EOSINOPHILS ABSOLUTE: 0.2 K/UL (ref 0–0.6)
EOSINOPHILS RELATIVE PERCENT: 1.7 %
EOSINOPHILS RELATIVE PERCENT: 2.6 %
GFR AFRICAN AMERICAN: 25
GFR AFRICAN AMERICAN: 26
GFR AFRICAN AMERICAN: 26
GFR NON-AFRICAN AMERICAN: 21
GLUCOSE BLD-MCNC: 103 MG/DL (ref 70–99)
GLUCOSE BLD-MCNC: 113 MG/DL (ref 70–99)
GLUCOSE BLD-MCNC: 157 MG/DL (ref 70–99)
GLUCOSE BLD-MCNC: 74 MG/DL (ref 70–99)
GLUCOSE BLD-MCNC: 92 MG/DL (ref 70–99)
HCT VFR BLD CALC: 29.3 % (ref 40.5–52.5)
HCT VFR BLD CALC: 30.6 % (ref 40.5–52.5)
HEMOGLOBIN: 9 G/DL (ref 13.5–17.5)
HEMOGLOBIN: 9.2 G/DL (ref 13.5–17.5)
LYMPHOCYTES ABSOLUTE: 0.7 K/UL (ref 1–5.1)
LYMPHOCYTES ABSOLUTE: 0.8 K/UL (ref 1–5.1)
LYMPHOCYTES RELATIVE PERCENT: 8 %
LYMPHOCYTES RELATIVE PERCENT: 9.2 %
MCH RBC QN AUTO: 24.7 PG (ref 26–34)
MCH RBC QN AUTO: 25.2 PG (ref 26–34)
MCHC RBC AUTO-ENTMCNC: 30 G/DL (ref 31–36)
MCHC RBC AUTO-ENTMCNC: 30.6 G/DL (ref 31–36)
MCV RBC AUTO: 82.3 FL (ref 80–100)
MCV RBC AUTO: 82.3 FL (ref 80–100)
MONOCYTES ABSOLUTE: 0.5 K/UL (ref 0–1.3)
MONOCYTES ABSOLUTE: 0.7 K/UL (ref 0–1.3)
MONOCYTES RELATIVE PERCENT: 6.1 %
MONOCYTES RELATIVE PERCENT: 7.6 %
NEUTROPHILS ABSOLUTE: 6.9 K/UL (ref 1.7–7.7)
NEUTROPHILS ABSOLUTE: 7.2 K/UL (ref 1.7–7.7)
NEUTROPHILS RELATIVE PERCENT: 80.2 %
NEUTROPHILS RELATIVE PERCENT: 83.7 %
PDW BLD-RTO: 22.6 % (ref 12.4–15.4)
PDW BLD-RTO: 22.7 % (ref 12.4–15.4)
PERFORMED ON: ABNORMAL
PERFORMED ON: NORMAL
PHOSPHORUS: 6.4 MG/DL (ref 2.5–4.9)
PHOSPHORUS: 6.5 MG/DL (ref 2.5–4.9)
PLATELET # BLD: 267 K/UL (ref 135–450)
PLATELET # BLD: 283 K/UL (ref 135–450)
PMV BLD AUTO: 8.1 FL (ref 5–10.5)
PMV BLD AUTO: 8.1 FL (ref 5–10.5)
POTASSIUM REFLEX MAGNESIUM: 6.1 MMOL/L (ref 3.5–5.1)
POTASSIUM SERPL-SCNC: 5.5 MMOL/L (ref 3.5–5.1)
POTASSIUM SERPL-SCNC: 5.6 MMOL/L (ref 3.5–5.1)
RBC # BLD: 3.56 M/UL (ref 4.2–5.9)
RBC # BLD: 3.72 M/UL (ref 4.2–5.9)
SODIUM BLD-SCNC: 134 MMOL/L (ref 136–145)
SODIUM BLD-SCNC: 137 MMOL/L (ref 136–145)
SODIUM BLD-SCNC: 138 MMOL/L (ref 136–145)
TOTAL CK: 38 U/L (ref 39–308)
WBC # BLD: 8.6 K/UL (ref 4–11)
WBC # BLD: 8.6 K/UL (ref 4–11)

## 2019-07-17 PROCEDURE — 99255 IP/OBS CONSLTJ NEW/EST HI 80: CPT | Performed by: INTERNAL MEDICINE

## 2019-07-17 PROCEDURE — 93005 ELECTROCARDIOGRAM TRACING: CPT | Performed by: EMERGENCY MEDICINE

## 2019-07-17 PROCEDURE — 85025 COMPLETE CBC W/AUTO DIFF WBC: CPT

## 2019-07-17 PROCEDURE — 99222 1ST HOSP IP/OBS MODERATE 55: CPT | Performed by: INTERNAL MEDICINE

## 2019-07-17 PROCEDURE — 1200000000 HC SEMI PRIVATE

## 2019-07-17 PROCEDURE — 6360000002 HC RX W HCPCS: Performed by: STUDENT IN AN ORGANIZED HEALTH CARE EDUCATION/TRAINING PROGRAM

## 2019-07-17 PROCEDURE — 6370000000 HC RX 637 (ALT 250 FOR IP): Performed by: STUDENT IN AN ORGANIZED HEALTH CARE EDUCATION/TRAINING PROGRAM

## 2019-07-17 PROCEDURE — 2500000003 HC RX 250 WO HCPCS: Performed by: STUDENT IN AN ORGANIZED HEALTH CARE EDUCATION/TRAINING PROGRAM

## 2019-07-17 PROCEDURE — 2580000003 HC RX 258: Performed by: STUDENT IN AN ORGANIZED HEALTH CARE EDUCATION/TRAINING PROGRAM

## 2019-07-17 PROCEDURE — 2500000003 HC RX 250 WO HCPCS: Performed by: EMERGENCY MEDICINE

## 2019-07-17 PROCEDURE — 87040 BLOOD CULTURE FOR BACTERIA: CPT

## 2019-07-17 PROCEDURE — 6370000000 HC RX 637 (ALT 250 FOR IP): Performed by: EMERGENCY MEDICINE

## 2019-07-17 PROCEDURE — 96375 TX/PRO/DX INJ NEW DRUG ADDON: CPT

## 2019-07-17 PROCEDURE — 93010 ELECTROCARDIOGRAM REPORT: CPT | Performed by: INTERNAL MEDICINE

## 2019-07-17 PROCEDURE — 82550 ASSAY OF CK (CPK): CPT

## 2019-07-17 PROCEDURE — 72125 CT NECK SPINE W/O DYE: CPT

## 2019-07-17 PROCEDURE — 93005 ELECTROCARDIOGRAM TRACING: CPT | Performed by: STUDENT IN AN ORGANIZED HEALTH CARE EDUCATION/TRAINING PROGRAM

## 2019-07-17 PROCEDURE — 80069 RENAL FUNCTION PANEL: CPT

## 2019-07-17 PROCEDURE — 96374 THER/PROPH/DIAG INJ IV PUSH: CPT

## 2019-07-17 PROCEDURE — 2580000003 HC RX 258: Performed by: EMERGENCY MEDICINE

## 2019-07-17 PROCEDURE — 99285 EMERGENCY DEPT VISIT HI MDM: CPT

## 2019-07-17 PROCEDURE — 36415 COLL VENOUS BLD VENIPUNCTURE: CPT

## 2019-07-17 PROCEDURE — 2580000003 HC RX 258

## 2019-07-17 RX ORDER — TAMSULOSIN HYDROCHLORIDE 0.4 MG/1
0.4 CAPSULE ORAL DAILY
Status: DISCONTINUED | OUTPATIENT
Start: 2019-07-17 | End: 2019-07-19 | Stop reason: HOSPADM

## 2019-07-17 RX ORDER — METHOCARBAMOL 750 MG/1
750 TABLET, FILM COATED ORAL 3 TIMES DAILY PRN
Status: DISCONTINUED | OUTPATIENT
Start: 2019-07-17 | End: 2019-07-19 | Stop reason: HOSPADM

## 2019-07-17 RX ORDER — TORSEMIDE 10 MG/1
20 TABLET ORAL DAILY
Status: CANCELLED | OUTPATIENT
Start: 2019-07-17

## 2019-07-17 RX ORDER — DEXTROSE MONOHYDRATE 25 G/50ML
12.5 INJECTION, SOLUTION INTRAVENOUS PRN
Status: DISCONTINUED | OUTPATIENT
Start: 2019-07-17 | End: 2019-07-19 | Stop reason: HOSPADM

## 2019-07-17 RX ORDER — LACTOBACILLUS RHAMNOSUS GG 10B CELL
4 CAPSULE ORAL DAILY
COMMUNITY

## 2019-07-17 RX ORDER — NICOTINE POLACRILEX 4 MG
15 LOZENGE BUCCAL PRN
Status: DISCONTINUED | OUTPATIENT
Start: 2019-07-17 | End: 2019-07-19 | Stop reason: HOSPADM

## 2019-07-17 RX ORDER — UREA 10 %
10 LOTION (ML) TOPICAL NIGHTLY
Status: DISCONTINUED | OUTPATIENT
Start: 2019-07-17 | End: 2019-07-19 | Stop reason: HOSPADM

## 2019-07-17 RX ORDER — ONDANSETRON 2 MG/ML
4 INJECTION INTRAMUSCULAR; INTRAVENOUS EVERY 6 HOURS PRN
Status: DISCONTINUED | OUTPATIENT
Start: 2019-07-17 | End: 2019-07-19 | Stop reason: HOSPADM

## 2019-07-17 RX ORDER — SODIUM POLYSTYRENE SULFONATE 15 G/60ML
30 SUSPENSION ORAL; RECTAL ONCE
Status: COMPLETED | OUTPATIENT
Start: 2019-07-17 | End: 2019-07-17

## 2019-07-17 RX ORDER — DEXTROSE MONOHYDRATE 25 G/50ML
25 INJECTION, SOLUTION INTRAVENOUS ONCE
Status: COMPLETED | OUTPATIENT
Start: 2019-07-17 | End: 2019-07-17

## 2019-07-17 RX ORDER — LACTOBACILLUS RHAMNOSUS GG 10B CELL
4 CAPSULE ORAL DAILY
Status: ON HOLD | COMMUNITY
End: 2019-07-19 | Stop reason: HOSPADM

## 2019-07-17 RX ORDER — ACETAMINOPHEN 325 MG/1
650 TABLET ORAL EVERY 4 HOURS PRN
Status: DISCONTINUED | OUTPATIENT
Start: 2019-07-17 | End: 2019-07-19 | Stop reason: HOSPADM

## 2019-07-17 RX ORDER — FAMOTIDINE 20 MG/1
20 TABLET, FILM COATED ORAL DAILY
Status: DISCONTINUED | OUTPATIENT
Start: 2019-07-17 | End: 2019-07-19 | Stop reason: HOSPADM

## 2019-07-17 RX ORDER — SODIUM CHLORIDE 9 MG/ML
INJECTION, SOLUTION INTRAVENOUS
Status: COMPLETED
Start: 2019-07-17 | End: 2019-07-17

## 2019-07-17 RX ORDER — ALLOPURINOL 100 MG/1
100 TABLET ORAL DAILY
Status: DISCONTINUED | OUTPATIENT
Start: 2019-07-17 | End: 2019-07-19 | Stop reason: HOSPADM

## 2019-07-17 RX ORDER — FLUTICASONE PROPIONATE 50 MCG
2 SPRAY, SUSPENSION (ML) NASAL DAILY
Status: DISCONTINUED | OUTPATIENT
Start: 2019-07-17 | End: 2019-07-19 | Stop reason: HOSPADM

## 2019-07-17 RX ORDER — METOPROLOL SUCCINATE 50 MG/1
50 TABLET, EXTENDED RELEASE ORAL DAILY
Status: DISCONTINUED | OUTPATIENT
Start: 2019-07-17 | End: 2019-07-19 | Stop reason: HOSPADM

## 2019-07-17 RX ORDER — FUROSEMIDE 10 MG/ML
40 INJECTION INTRAMUSCULAR; INTRAVENOUS 2 TIMES DAILY
Status: DISCONTINUED | OUTPATIENT
Start: 2019-07-17 | End: 2019-07-17

## 2019-07-17 RX ORDER — GABAPENTIN 300 MG/1
300 CAPSULE ORAL NIGHTLY
Status: DISCONTINUED | OUTPATIENT
Start: 2019-07-17 | End: 2019-07-19 | Stop reason: HOSPADM

## 2019-07-17 RX ORDER — SODIUM CHLORIDE 0.9 % (FLUSH) 0.9 %
10 SYRINGE (ML) INJECTION EVERY 12 HOURS SCHEDULED
Status: DISCONTINUED | OUTPATIENT
Start: 2019-07-17 | End: 2019-07-19 | Stop reason: HOSPADM

## 2019-07-17 RX ORDER — SODIUM CHLORIDE 0.9 % (FLUSH) 0.9 %
10 SYRINGE (ML) INJECTION PRN
Status: DISCONTINUED | OUTPATIENT
Start: 2019-07-17 | End: 2019-07-19 | Stop reason: HOSPADM

## 2019-07-17 RX ORDER — ASPIRIN 81 MG/1
81 TABLET ORAL DAILY
Status: DISCONTINUED | OUTPATIENT
Start: 2019-07-17 | End: 2019-07-17

## 2019-07-17 RX ORDER — DEXTROSE MONOHYDRATE 50 MG/ML
100 INJECTION, SOLUTION INTRAVENOUS PRN
Status: DISCONTINUED | OUTPATIENT
Start: 2019-07-17 | End: 2019-07-19 | Stop reason: HOSPADM

## 2019-07-17 RX ORDER — DAPTOMYCIN 50 MG/ML
INJECTION, POWDER, LYOPHILIZED, FOR SOLUTION INTRAVENOUS
Status: ON HOLD | COMMUNITY
End: 2019-07-19 | Stop reason: HOSPADM

## 2019-07-17 RX ORDER — FERROUS SULFATE 325(65) MG
325 TABLET ORAL
Status: DISCONTINUED | OUTPATIENT
Start: 2019-07-17 | End: 2019-07-19 | Stop reason: HOSPADM

## 2019-07-17 RX ADMIN — DEXTROSE 50 % IN WATER (D50W) INTRAVENOUS SYRINGE 25 G: at 03:48

## 2019-07-17 RX ADMIN — ALLOPURINOL 100 MG: 100 TABLET ORAL at 13:58

## 2019-07-17 RX ADMIN — SODIUM POLYSTYRENE SULFONATE 30 G: 15 SUSPENSION ORAL; RECTAL at 03:48

## 2019-07-17 RX ADMIN — Medication 10 ML: at 21:47

## 2019-07-17 RX ADMIN — TAMSULOSIN HYDROCHLORIDE 0.4 MG: 0.4 CAPSULE ORAL at 13:58

## 2019-07-17 RX ADMIN — SODIUM CHLORIDE: 9 INJECTION, SOLUTION INTRAVENOUS at 06:42

## 2019-07-17 RX ADMIN — INSULIN HUMAN 10 UNITS: 100 INJECTION, SOLUTION PARENTERAL at 03:48

## 2019-07-17 RX ADMIN — FLUTICASONE PROPIONATE 2 SPRAY: 50 SPRAY, METERED NASAL at 13:59

## 2019-07-17 RX ADMIN — MICONAZOLE NITRATE: 20 POWDER TOPICAL at 13:58

## 2019-07-17 RX ADMIN — FAMOTIDINE 20 MG: 20 TABLET ORAL at 13:58

## 2019-07-17 RX ADMIN — CALCIUM GLUCONATE 1 G: 98 INJECTION, SOLUTION INTRAVENOUS at 06:25

## 2019-07-17 RX ADMIN — Medication 10 MG: at 21:47

## 2019-07-17 RX ADMIN — FERROUS SULFATE TAB 325 MG (65 MG ELEMENTAL FE) 325 MG: 325 (65 FE) TAB at 13:58

## 2019-07-17 RX ADMIN — DAPTOMYCIN 500 MG: 500 INJECTION, POWDER, LYOPHILIZED, FOR SOLUTION INTRAVENOUS at 09:00

## 2019-07-17 RX ADMIN — GABAPENTIN 300 MG: 300 CAPSULE ORAL at 21:47

## 2019-07-17 RX ADMIN — SODIUM BICARBONATE 50 MEQ: 84 INJECTION, SOLUTION INTRAVENOUS at 03:47

## 2019-07-17 ASSESSMENT — ENCOUNTER SYMPTOMS
COUGH: 0
SHORTNESS OF BREATH: 1
EYES NEGATIVE: 1
ABDOMINAL PAIN: 0
GASTROINTESTINAL NEGATIVE: 1
CONSTIPATION: 0
WHEEZING: 0
BACK PAIN: 0
TROUBLE SWALLOWING: 0
EYE PAIN: 0
EYE DISCHARGE: 0
VOMITING: 0
DIARRHEA: 0
ALLERGIC/IMMUNOLOGIC NEGATIVE: 1
EYE REDNESS: 0
RHINORRHEA: 0
SORE THROAT: 0
SHORTNESS OF BREATH: 0
NAUSEA: 0

## 2019-07-17 ASSESSMENT — PAIN DESCRIPTION - PAIN TYPE: TYPE: ACUTE PAIN

## 2019-07-17 ASSESSMENT — PAIN SCALES - GENERAL
PAINLEVEL_OUTOF10: 0
PAINLEVEL_OUTOF10: 0
PAINLEVEL_OUTOF10: 3
PAINLEVEL_OUTOF10: 0
PAINLEVEL_OUTOF10: 0

## 2019-07-17 ASSESSMENT — PAIN DESCRIPTION - LOCATION: LOCATION: CHEST

## 2019-07-17 ASSESSMENT — PAIN DESCRIPTION - FREQUENCY: FREQUENCY: CONTINUOUS

## 2019-07-17 NOTE — PROGRESS NOTES
very comfortable\"  Plan:   Plan of Care: [REMOVED] Wound 07/17/19 Ankle Anterior; Left-Dressing/Treatment: Open to air  Wound 07/17/19 Toe (Comment  which one) Anterior;Right-Dressing/Treatment: Open to air, Other (comment)(venelex ordered)  Wound 07/17/19 Toe (Comment  which one) Anterior; Left-Dressing/Treatment: Open to air(ordering venelex)  Incision 06/28/19 Sternum-Dressing/Treatment: Foam(ordering VAC per [place])  Wound 05/10/19 Heel Lateral;Left stable dry eschar  POA TJH - Unstageable pressure injury-Dressing/Treatment: Other (comment), ABD(betadine, heelmdix boot)  Wound 07/17/19 Coccyx-Dressing/Treatment: (Mepilex)    Specialty Bed Required : yes, in place  [x] Low Air Loss   [] Pressure Redistribution  [] Fluid Immersion  [] Bariatric  [] Total Pressure Relief  [] Other:     Current Diet: DIET RENAL;    Patient/Caregiver Teaching:  Level of patient/caregiver understanding able to:   [x] Indicates understanding       [] Needs reinforcement  [] Unsuccessful      [x] Verbal Understanding  [] Demonstrated understanding       [] No evidence of learning  [] Refused teaching         [] N/A       Electronically signed by Irwin Quinteros RN, CWOCN on 7/17/2019 at 2:57 PM

## 2019-07-17 NOTE — PROGRESS NOTES
Musculoskeletal: Normal range of motion. He exhibits no deformity. Neurological: No cranial nerve deficit. Patient is alert. Oriented to person and time. Thought he was in [place]. Patient does not recall events of past weeks, but states that a lot has been going on. Does not remember where he came from or why he presented to ED. Skin:   Patient very edematous throughout all extremities. Pitting. Has 4x1cm open wound over sternum contiguous with sternotomy scar. Serosanguinous drainage. Psychiatric: He has a normal mood and affect.  His behavior is normal.     A/P  Per ICU note

## 2019-07-17 NOTE — CONSULTS
daily      gabapentin (NEURONTIN) 300 MG capsule Take 300 mg by mouth nightly. Kamran Bishop aspirin (ASPIRIN LOW DOSE) 81 MG EC tablet TAKE ONE TABLET BY MOUTH ONCE DAILY 90 tablet 3    Melatonin 10 MG TABS Take 10 mg by mouth nightly         Current Facility-Administered Medications   Medication Dose Route Frequency Provider Last Rate Last Dose    glucose (GLUTOSE) 40 % oral gel 15 g  15 g Oral PRN Suzanne Boykin MD        dextrose 50 % IV solution  12.5 g Intravenous PRN Suzanne Boykin MD        glucagon (rDNA) injection 1 mg  1 mg Intramuscular PRN Suzanne Boykin MD        dextrose 5 % solution  100 mL/hr Intravenous PRN Suzanne Boykin MD        sodium chloride flush 0.9 % injection 10 mL  10 mL Intravenous 2 times per day Sanaz Chan MD        sodium chloride flush 0.9 % injection 10 mL  10 mL Intravenous PRN Sanaz Chan MD        magnesium hydroxide (MILK OF MAGNESIA) 400 MG/5ML suspension 30 mL  30 mL Oral Daily PRN Sanaz Chan MD        ondansetron [facility]) injection 4 mg  4 mg Intravenous Q6H PRN Sanaz Chan MD        acetaminophen (TYLENOL) tablet 650 mg  650 mg Oral Q4H PRN Sanaz Chan MD        tamsulosin [facility]) capsule 0.4 mg  0.4 mg Oral Daily Sanaz Chan MD        miconazole (MICOTIN) 2 % powder   Topical Daily Sanaz Chan MD        allopurinol (ZYLOPRIM) tablet 100 mg  100 mg Oral Daily Sanaz Chan MD        aspirin EC tablet 81 mg  81 mg Oral Daily Sanaz Chan MD        famotidine (PEPCID) tablet 20 mg  20 mg Oral Daily Sanaz Chan MD        ferrous sulfate tablet 325 mg  325 mg Oral Daily with breakfast Sanaz Chan MD        fluticasone [facility]) 50 MCG/ACT nasal spray 2 spray  2 spray Each Nostril Daily Sanaz Chan MD        gabapentin (NEURONTIN) capsule 300 mg  300 mg Oral Nightly Sanaz Chan MD        melatonin tablet 10 mg  10 mg Oral Nightly Sanaz Chan MD        methocarbamol (ROBAXIN) tablet 750 mg  750 mg Oral TID PRN Jordis Kanner, MD        metoprolol succinate (TOPROL XL) extended release tablet 50 mg  50 mg Oral Daily Jordis Kanner, MD        apixaban Manda Fudge) tablet 2.5 mg  2.5 mg Oral BID Jordis Kanner, MD        furosemide (LASIX) injection 40 mg  40 mg Intravenous BID Princess Egan MD        daptomycin (CUBICIN) 500 mg in sodium chloride 0.9 % 50 mL IVPB  500 mg Intravenous Q24H Princess Egan MD        insulin lispro (HUMALOG) injection pen 0-6 Units  0-6 Units Subcutaneous TID WC Princess Egan MD        insulin lispro (HUMALOG) injection pen 0-3 Units  0-3 Units Subcutaneous Nightly Princess Egan MD          Objective:  /83   Pulse 92   Temp 97.4 °F (36.3 °C) (Oral)   Resp 16   Ht 5' 10\" (1.778 m)   Wt 241 lb 10 oz (109.6 kg)   SpO2 100%   BMI 34.67 kg/m²     Physical Exam:  Patient seen and examined   General: Well developed. Sleepy and cooperative in no acute distress. HENT: atraumatic, neck supple  Eyes: Optic discs: Not tested  Pulmonary: unlabored respiratory effort  Cardiovascular:  Warm well perfused. No peripheral edema  Gastrointestinal: abdomen soft, NT, ND    Neurologic Exam:  Neurological:  Mental Status: Sleepy, awakened easily to non-noxious stimuli, oriented x 4, speech clear and appropriate  Attention: Intact  Language: No aphasia or dysarthria noted  Sensation: Intact to all extremities to light touch  Coordination: Intact  DTRs:    Right  Left    Patella  2  2   ankle clonus  - -       Musculoskeletal:   Gait: Not tested   Assist devices: None   Tone: normal  Motor strength:    Right  Left    Right  Left    Deltoid  5 4  Hip Flex  4 3   Biceps  5 4  Knee Extensors  4 3   Triceps  5 4  Knee Flexors  4 3   Wrist Ext  5 4  Ankle Dorsiflex. 4 3   Wrist Flex  5 4  Ankle Plantarflex.   4 3   Handgrip  5 4  Ext Harpreet Longus  4 3   Thumb Ext  5 4             Radiological Findings:  CT scan from 06/26/2019 of the cervical spine showed   Interval C5-C7 ACDF with C6 corpectomy. Charles Pisano seems to be loosening of the   inferior screws at C7 with backing out of the plate which appears angled   outward at this level, possibly with an attached bone fragment. Annkevin Rosin is no   prevertebral soft tissue swelling at this level.  Comparison with any   postoperative imaging would be helpful if available.       Mild canal and moderate foraminal narrowing at C5-C6.       Large bilateral pleural effusions.          Labs  Recent Labs     07/17/19  0159 07/17/19  0606   * 137   CL 97* 99   CO2 22 23   * 112*   CREATININE 3.0* 3.1*   GLUCOSE 157* 103*   PHOS  --  6.4*     Recent Labs     07/17/19  0159 07/17/19  0606   WBC 8.6 8.6   RBC 3.72* 3.56*           Patient Active Problem List    Diagnosis Date Noted    Hyperkalemia 07/17/2019    Receiving intravenous antibiotic treatment as outpatient     Bacteremia due to methicillin resistant Staphylococcus aureus     Poorly controlled type 2 diabetes mellitus (HCC)     Class 1 obesity due to excess calories with body mass index (BMI) of 32.0 to 32.9 in adult     Complex care coordination     MRSA infection     Wound infection 06/28/2019    Decreased cardiac function 05/20/2019    Hypervolemia     Positive culture finding     Colitis due to Clostridium difficile     [facility]paroxysmal atrial fibrillation) [facility]     Benign essential HTN     CAMI (acute kidney injury) ([address]     Anasarca 05/03/2019    Lesion of bladder 04/05/2019    Recurrent UTI 04/05/2019    Cervical myelopathy (HCC) 12/27/2018    Prolonged QT interval     Status post aorto-coronary artery bypass graft     History of cardiac arrest     Obesity, Class II, BMI 35-39.9     Infection requiring contact isolation precautions     Postoperative infection of wound of sternum 12/21/2018    Serratia infection 12/21/2018    Gram-negative bacteremia 12/21/2018    Acute respiratory failure with hypoxia (HCC)     Cervical spinal cord compression ([address] 12/07/2018    Spinal cord ischemia [facility]     DM (diabetes mellitus), secondary, uncontrolled, w/neurologic complic ([facility]     Acute on chronic systolic heart failure (HCC)     Acute renal failure with acute tubular necrosis superimposed on stage 3 chronic kidney disease ([facility]     NSTEMI (non-ST elevated myocardial infarction) ([facility]     Ischemic cardiomyopathy     Abnormal stress test 11/26/2018    Coronary artery disease involving native coronary artery of native heart without angina pectoris 11/25/2018    Gait disturbance 11/23/2018    CKD (chronic kidney disease) stage 3, GFR 30-59 ml/min ([facility])     Abnormal echocardiogram     [facility] syndrome) [facility] 11/22/2018    Carpal tunnel syndrome 03/30/2015    Obstructive sleep apnea syndrome 03/06/2015    Anoxic brain injury ([facility] 06/09/2014    HTN (hypertension) 06/09/2014    Depression 04/20/2009    Gout 04/20/2009       Assessment:  Patient is a 60year old male who presents with neck pain radiating down left arm to elbow. CT scan shows loosening of the inferior screws at C7 with backing out of the plate which appears angled outward at this level, possibly with an attached bone fragment. Plan:        1. CT cervical WO today        2. Neurologic exams frequency:  - ICU: Q4H until otherwise directed  3. For change in exam MUST contact neurosurgery team along with critical care or primary team  4. Cervical spine precautions and miami j collar at all times  5. Hold all anticoagulation & antiplatelet  6. SCDs for DVT prophylaxis  7. Infectious disease following, appreciate their input  8. Nephrology following, appreciate their input  9. Renal diet  10. Pain control: PRN tylenol and robaxin  11. Wound care consult eval and treat  Thanks for consult.  Please call w/ questions or decline in mental status         DISPO- Inpatient, ICU  PAYTON Jeffers-CNP  Neurosurgery Nurse Practitioner  [phone]    Patient was seen and examined with Dr. Taylor Dwyer who agrees with above assessment and plan.      Electronically signed by: Jair Samano, 7/17/2019 8:52 AM

## 2019-07-17 NOTE — PROGRESS NOTES
[address] Attestation of Resident Documentation    I have personally seen and evaluated this patient and discussed evaluation and management with the resident, Dr. Thien Segura , on 7/17/2019. My pertinent findings and plan are elaborated below. For full details see resident note. Acute on chronic renal failure 4 with hyperkalemia and peaked T waves on EKG  Recent sternal wound infection/osteomyelitis, on IV Abx [facility]  Neck pain, loose hardware present  CAD h/o CABG  Chronic systolic CHF    Agree with plans as per Dr. Darren Jovel note. Continue his Daptomycin. Rx given for hyperkalemia. Monitor in ICU. Consult Nephrology.     Rafael Leslie MD  7/17/2019 5:55 AM

## 2019-07-17 NOTE — PROGRESS NOTES
Progress Note  Admit Date: 7/17/2019       Reviewed overnight events  No new issues since admission    Scheduled Medications:    sodium chloride flush  10 mL Intravenous 2 times per day    tamsulosin  0.4 mg Oral Daily    miconazole   Topical Daily    allopurinol  100 mg Oral Daily    aspirin  81 mg Oral Daily    famotidine  20 mg Oral Daily    ferrous sulfate  325 mg Oral Daily with breakfast    fluticasone  2 spray Each Nostril Daily    gabapentin  300 mg Oral Nightly    melatonin  10 mg Oral Nightly    metoprolol succinate  50 mg Oral Daily    apixaban  2.5 mg Oral BID    daptomycin (CUBICIN) IVPB  500 mg Intravenous Q24H    insulin lispro  0-6 Units Subcutaneous TID WC    insulin lispro  0-3 Units Subcutaneous Nightly      PRN Medications: glucose, dextrose, glucagon (rDNA), dextrose, sodium chloride flush, magnesium hydroxide, ondansetron, acetaminophen, methocarbamol  Diet: DIET RENAL;    Continuous Infusions:   dextrose         PHYSICAL EXAM:  /81   Pulse 85   Temp 97.4 °F (36.3 °C) (Oral)   Resp 16   Ht 5' 10\" (1.778 m)   Wt 241 lb 10 oz (109.6 kg)   SpO2 94%   BMI 34.67 kg/m²   No results for input(s): POCGLU in the last 72 hours. Intake/Output Summary (Last 24 hours) at 7/17/2019 1248  Last data filed at 7/17/2019 0546  Gross per 24 hour   Intake --   Output 175 ml   Net -175 ml           LABS:  Recent Labs     07/17/19  0159 07/17/19  0606   WBC 8.6 8.6   HGB 9.2* 9.0*   HCT 30.6* 29.3*    267                                                                    Recent Labs     07/17/19  0159 07/17/19  0606 07/17/19  1025   * 137 138   K 6.1* 5.5* 5.6*   CL 97* 99 99   CO2 22 23 24   * 112* 119*   CREATININE 3.0* 3.1* 3.0*   GLUCOSE 157* 103* 92     No results for input(s): AST, ALT, ALB, BILITOT, ALKPHOS in the last 72 hours. No results for input(s): TROPONINI in the last 72 hours. No results for input(s): BNP in the last 72 hours.   No results for

## 2019-07-17 NOTE — CONSULTS
Infectious Diseases   Consult Note        Admission Date: 7/17/2019  Hospital Day: Hospital Day: 1  Attending: Marguerite Powers MD  Date of service: 7/17/19    Reason for admission: Hyperkalemia [E87.5]  Hyperkalemia [E87.5]    Chief complaint/ Reason for consult: Recent MRSA bacteremia and sternal mastoiditis, receiving intravenous antibiotic    Microbiology:        I have reviewed allavailable micro lab data and cultures    · Blood culture (2/2) - collected on 7/17/2019: In process      Antibiotics and immunizations:       Current antibiotics: All antibiotics and their doses were reviewed by me    Recent Abx Admin                   daptomycin (CUBICIN) 500 mg in sodium chloride 0.9 % 50 mL IVPB (mg) 500 mg New Bag 07/17/19 0900                  Immunization History: All immunization history was reviewed by me today. There is no immunization history on file for this patient. Known drug allergies: All allergies were reviewed and updated    Allergies   Allergen Reactions    Enalapril     Nadolol     Verapamil        Assessment:     The patient is a 60 y.o. old male who  has a past medical history of Anoxic brain injury ([facility] [address] (1994), Cardiac arrest ([address] (1994), Clostridium difficile infection (05/09/2019), Gout, Hyperlipidemia, Hypertension, MDRO (multiple drug resistant organisms) resistance (06/28/2019), Memory deficit, MRSA (methicillin resistant staph aureus) culture positive (7/3/19;05/09/2019), MRSA (methicillin resistant staph aureus) culture positive (07/03/2019), Reflux, S/P CABG x 3 (12/2018), Sleep apnea, and Type 2 diabetes mellitus without complication ([address]  with following problems:    · Hyperkalemia on admission -it was 6.1 on admission  · Complicated MRSA bacteremia  · MRSA sternal osteomyelitis, likely chronic  · History of Serratia and sternal wound culture in December 2018  · Coronary artery disease status post CABG  · Receiving intravenous antibiotic as an outpatient  · Status post changing eating habits, substituting excess carbohydrates with proteins, stress reduction, using self-help programs like Weight Watchers®, Overeaters Anonymous®, and Take Off Pounds Sensibly (TOPS)© , following DASH diet and increasing exercise or walking briskly daily for half hour to and hour 5-7 days a week was suggested among other measures. Information was given about various weight loss education programs and their websites like www.cdc.gov/healthyweight, www.choosemyplate.gov and www.health.gov/dietaryguidelines/    Risk of Complications/Morbidity: High     · Illness(es)/ Infection present that pose threat to bodily function. · There is potential for severe exacerbation of infection/side effects of treatment. · Therapy requires intensive monitoring for antimicrobial agent toxicity. Thank you for involving me in the care of your patient. I will continue to follow. If you have any additional questions, please do not hesitate to contact me. Subjective:       HPI: Mat Osborne is a 60 y.o. male patient, who was seen at the request of Dr. Michelle Alatorre MD.    History was obtained from chart review and the patient. The patient was admitted on 7/17/2019. I have been consulted to see the patient for above mentioned reason(s). The patient has multiple medical comorbidities, and presented to the ER for worsening neck pain. The patient has a history of recent MRSA bacteremia with osteomyelitis of the sternum with MRSA, which may be chronic. He also has hardware in the neck. The patient has been staying at [place] [address] and was recently admitted to [facility].  He called his neurosurgeon and was recommended coming to [place] [address] and was brought to the ER here. In the ER, he was noted to have hyperkalemia with EKG changes. The patient was admitted to the medical ICU      I have been asked to see the patient for this complicated infection. Past Medical History:  All taking differently: Take 2.5 mg by mouth 2 times daily Stopped on 6/21/2019 for surgery.)  ferrous sulfate 325 (65 Fe) MG tablet, Take 325 mg by mouth daily (with breakfast)  Multiple Vitamins-Minerals (THERAPEUTIC MULTIVITAMIN-MINERALS) tablet, Take 1 tablet by mouth daily  tamsulosin (FLOMAX) 0.4 MG capsule, Take 1 capsule by mouth daily  fluticasone (FLONASE) 50 MCG/ACT nasal spray, 2 sprays by Each Nare route daily  gabapentin (NEURONTIN) 300 MG capsule, Take 300 mg by mouth nightly. Omer Wolfe aspirin (ASPIRIN LOW DOSE) 81 MG EC tablet, TAKE ONE TABLET BY MOUTH ONCE DAILY  Melatonin 10 MG TABS, Take 10 mg by mouth nightly   collagenase 250 UNIT/GM ointment, Apply topically daily. miconazole (LOTRIMIN AF) 2 % powder, by Intratympanic route  acetaminophen (TYLENOL) 325 MG tablet, Take 650 mg by mouth every 6 hours as needed for Pain  glucose blood VI test strips (FREESTYLE TEST STRIPS) strip, 1 each by In Vitro route daily As needed. ACCU-CHEK MULTICLIX LANCETS MISC, USE ONE  TO CHECK GLUCOSE ONCE DAILY  Blood Glucose Monitoring Suppl (ACCU-CHEK COMPACT CARE KIT) KIT, 1 kit by Does not apply route daily     sodium chloride flush  10 mL Intravenous 2 times per day    tamsulosin  0.4 mg Oral Daily    miconazole   Topical Daily    allopurinol  100 mg Oral Daily    famotidine  20 mg Oral Daily    ferrous sulfate  325 mg Oral Daily with breakfast    fluticasone  2 spray Each Nostril Daily    gabapentin  300 mg Oral Nightly    melatonin  10 mg Oral Nightly    metoprolol succinate  50 mg Oral Daily    daptomycin (CUBICIN) IVPB  500 mg Intravenous Q24H    insulin lispro  0-6 Units Subcutaneous TID WC    insulin lispro  0-3 Units Subcutaneous Nightly          REVIEW OF SYSTEMS:       Review of Systems   Constitutional: Positive for fatigue. Negative for chills, diaphoresis and fever. HENT: Negative for ear discharge, ear pain, rhinorrhea, sore throat and trouble swallowing.     Eyes: Negative for discharge and E87.70    Positive culture finding R89.5    Colitis due to Clostridium difficile A04.72    Decreased cardiac function R09.89    Wound infection T14. 8XXA, L08.9    Chronic osteomyelitis of sternum (HCC) M86.68    MRSA infection A49.02    MRSA bacteremia R78.81    Diabetes mellitus type 2 in obese (HCC) E11.69, E66.9    Class 1 obesity due to excess calories with body mass index (BMI) of 34.0 to 34.9 in adult E66.09, Z68.34    Weight loss counseling, encounter for Z71.3    Receiving intravenous antibiotic treatment as outpatient Z79.2    Hyperkalemia E87.5    Coronary artery disease due to lipid rich plaque I25.10, I25.83    Diabetic polyneuropathy associated with type 2 diabetes mellitus ([facility] [address] E11.42         Please note that this chart was generated using Dragon dictation software. Although every effort was made to ensure the accuracy of this automated transcription, some errors in transcription may have occurred inadvertently. If you may need any clarification, please do not hesitate to contact me through EPIC or at the phone number provided below with my electronic signature.       Flora Parnell MD, MPH  7/17/2019, 9:45 PM  [facility] Infectious Disease   Office: [phone]  Fax: [phone]  Tuesday AM clinic:   [address] [place] [address]  Thursday AM clinic: [address] [facility]

## 2019-07-18 ENCOUNTER — APPOINTMENT (OUTPATIENT)
Dept: GENERAL RADIOLOGY | Age: 59
DRG: 469 | End: 2019-07-18
Payer: MEDICAID

## 2019-07-18 LAB
ALBUMIN SERPL-MCNC: 2.7 G/DL (ref 3.4–5)
ALBUMIN SERPL-MCNC: 2.8 G/DL (ref 3.4–5)
ALBUMIN SERPL-MCNC: 2.8 G/DL (ref 3.4–5)
ALP BLD-CCNC: 132 U/L (ref 40–129)
ALT SERPL-CCNC: 32 U/L (ref 10–40)
ANION GAP SERPL CALCULATED.3IONS-SCNC: 16 MMOL/L (ref 3–16)
ANION GAP SERPL CALCULATED.3IONS-SCNC: 20 MMOL/L (ref 3–16)
AST SERPL-CCNC: 26 U/L (ref 15–37)
BASOPHILS ABSOLUTE: 0 K/UL (ref 0–0.2)
BASOPHILS RELATIVE PERCENT: 0.4 %
BILIRUB SERPL-MCNC: 0.4 MG/DL (ref 0–1)
BILIRUBIN DIRECT: <0.2 MG/DL (ref 0–0.3)
BILIRUBIN, INDIRECT: ABNORMAL MG/DL (ref 0–1)
BUN BLDV-MCNC: 111 MG/DL (ref 7–20)
BUN BLDV-MCNC: 116 MG/DL (ref 7–20)
C-REACTIVE PROTEIN: 40.2 MG/L (ref 0–5.1)
CALCIUM SERPL-MCNC: 8.7 MG/DL (ref 8.3–10.6)
CALCIUM SERPL-MCNC: 9.6 MG/DL (ref 8.3–10.6)
CHLORIDE BLD-SCNC: 97 MMOL/L (ref 99–110)
CHLORIDE BLD-SCNC: 98 MMOL/L (ref 99–110)
CO2: 19 MMOL/L (ref 21–32)
CO2: 23 MMOL/L (ref 21–32)
CREAT SERPL-MCNC: 2.9 MG/DL (ref 0.8–1.3)
CREAT SERPL-MCNC: 3 MG/DL (ref 0.8–1.3)
EOSINOPHILS ABSOLUTE: 0.2 K/UL (ref 0–0.6)
EOSINOPHILS RELATIVE PERCENT: 2.5 %
GFR AFRICAN AMERICAN: 26
GFR AFRICAN AMERICAN: 27
GFR NON-AFRICAN AMERICAN: 21
GFR NON-AFRICAN AMERICAN: 22
GLUCOSE BLD-MCNC: 119 MG/DL (ref 70–99)
GLUCOSE BLD-MCNC: 129 MG/DL (ref 70–99)
GLUCOSE BLD-MCNC: 68 MG/DL (ref 70–99)
GLUCOSE BLD-MCNC: 70 MG/DL (ref 70–99)
GLUCOSE BLD-MCNC: 88 MG/DL (ref 70–99)
HCT VFR BLD CALC: 30.6 % (ref 40.5–52.5)
HEMOGLOBIN: 9.1 G/DL (ref 13.5–17.5)
LYMPHOCYTES ABSOLUTE: 0.6 K/UL (ref 1–5.1)
LYMPHOCYTES RELATIVE PERCENT: 7.3 %
MCH RBC QN AUTO: 24.9 PG (ref 26–34)
MCHC RBC AUTO-ENTMCNC: 29.9 G/DL (ref 31–36)
MCV RBC AUTO: 83.3 FL (ref 80–100)
MONOCYTES ABSOLUTE: 0.5 K/UL (ref 0–1.3)
MONOCYTES RELATIVE PERCENT: 6.2 %
NEUTROPHILS ABSOLUTE: 7.3 K/UL (ref 1.7–7.7)
NEUTROPHILS RELATIVE PERCENT: 83.6 %
PDW BLD-RTO: 23.9 % (ref 12.4–15.4)
PERFORMED ON: ABNORMAL
PHOSPHORUS: 6.8 MG/DL (ref 2.5–4.9)
PHOSPHORUS: 7.2 MG/DL (ref 2.5–4.9)
PLATELET # BLD: 255 K/UL (ref 135–450)
PMV BLD AUTO: 7.9 FL (ref 5–10.5)
POTASSIUM SERPL-SCNC: 5.1 MMOL/L (ref 3.5–5.1)
POTASSIUM SERPL-SCNC: 5.4 MMOL/L (ref 3.5–5.1)
PRO-BNP: ABNORMAL PG/ML (ref 0–124)
RBC # BLD: 3.67 M/UL (ref 4.2–5.9)
SEDIMENTATION RATE, ERYTHROCYTE: 46 MM/HR (ref 0–20)
SODIUM BLD-SCNC: 136 MMOL/L (ref 136–145)
SODIUM BLD-SCNC: 137 MMOL/L (ref 136–145)
TOTAL CK: 45 U/L (ref 39–308)
TOTAL PROTEIN: 6.2 G/DL (ref 6.4–8.2)
WBC # BLD: 8.8 K/UL (ref 4–11)

## 2019-07-18 PROCEDURE — 6370000000 HC RX 637 (ALT 250 FOR IP): Performed by: STUDENT IN AN ORGANIZED HEALTH CARE EDUCATION/TRAINING PROGRAM

## 2019-07-18 PROCEDURE — 85025 COMPLETE CBC W/AUTO DIFF WBC: CPT

## 2019-07-18 PROCEDURE — 71045 X-RAY EXAM CHEST 1 VIEW: CPT

## 2019-07-18 PROCEDURE — 80069 RENAL FUNCTION PANEL: CPT

## 2019-07-18 PROCEDURE — 36415 COLL VENOUS BLD VENIPUNCTURE: CPT

## 2019-07-18 PROCEDURE — 80076 HEPATIC FUNCTION PANEL: CPT

## 2019-07-18 PROCEDURE — 1200000000 HC SEMI PRIVATE

## 2019-07-18 PROCEDURE — 99233 SBSQ HOSP IP/OBS HIGH 50: CPT | Performed by: INTERNAL MEDICINE

## 2019-07-18 PROCEDURE — 85652 RBC SED RATE AUTOMATED: CPT

## 2019-07-18 PROCEDURE — 6360000002 HC RX W HCPCS: Performed by: STUDENT IN AN ORGANIZED HEALTH CARE EDUCATION/TRAINING PROGRAM

## 2019-07-18 PROCEDURE — 82550 ASSAY OF CK (CPK): CPT

## 2019-07-18 PROCEDURE — 83880 ASSAY OF NATRIURETIC PEPTIDE: CPT

## 2019-07-18 PROCEDURE — 2580000003 HC RX 258: Performed by: STUDENT IN AN ORGANIZED HEALTH CARE EDUCATION/TRAINING PROGRAM

## 2019-07-18 PROCEDURE — 36592 COLLECT BLOOD FROM PICC: CPT

## 2019-07-18 PROCEDURE — 86140 C-REACTIVE PROTEIN: CPT

## 2019-07-18 RX ADMIN — Medication 10 MG: at 20:02

## 2019-07-18 RX ADMIN — METOPROLOL SUCCINATE 50 MG: 50 TABLET, EXTENDED RELEASE ORAL at 09:45

## 2019-07-18 RX ADMIN — Medication 10 ML: at 20:06

## 2019-07-18 RX ADMIN — ACETAMINOPHEN 650 MG: 325 TABLET ORAL at 23:45

## 2019-07-18 RX ADMIN — ALLOPURINOL 100 MG: 100 TABLET ORAL at 09:45

## 2019-07-18 RX ADMIN — TAMSULOSIN HYDROCHLORIDE 0.4 MG: 0.4 CAPSULE ORAL at 09:45

## 2019-07-18 RX ADMIN — FAMOTIDINE 20 MG: 20 TABLET ORAL at 09:45

## 2019-07-18 RX ADMIN — FERROUS SULFATE TAB 325 MG (65 MG ELEMENTAL FE) 325 MG: 325 (65 FE) TAB at 09:46

## 2019-07-18 RX ADMIN — GABAPENTIN 300 MG: 300 CAPSULE ORAL at 20:02

## 2019-07-18 RX ADMIN — DAPTOMYCIN 500 MG: 500 INJECTION, POWDER, LYOPHILIZED, FOR SOLUTION INTRAVENOUS at 06:11

## 2019-07-18 ASSESSMENT — PAIN DESCRIPTION - PAIN TYPE: TYPE: ACUTE PAIN

## 2019-07-18 ASSESSMENT — PAIN SCALES - GENERAL
PAINLEVEL_OUTOF10: 0
PAINLEVEL_OUTOF10: 0
PAINLEVEL_OUTOF10: 5
PAINLEVEL_OUTOF10: 0
PAINLEVEL_OUTOF10: 0

## 2019-07-18 ASSESSMENT — PAIN DESCRIPTION - PROGRESSION: CLINICAL_PROGRESSION: NOT CHANGED

## 2019-07-18 ASSESSMENT — PAIN DESCRIPTION - ORIENTATION: ORIENTATION: MID

## 2019-07-18 ASSESSMENT — PAIN SCALES - WONG BAKER
WONGBAKER_NUMERICALRESPONSE: 0

## 2019-07-18 ASSESSMENT — ENCOUNTER SYMPTOMS
COUGH: 0
SHORTNESS OF BREATH: 0

## 2019-07-18 ASSESSMENT — PAIN DESCRIPTION - LOCATION: LOCATION: NECK

## 2019-07-18 ASSESSMENT — PAIN DESCRIPTION - FREQUENCY: FREQUENCY: INTERMITTENT

## 2019-07-18 ASSESSMENT — PAIN - FUNCTIONAL ASSESSMENT: PAIN_FUNCTIONAL_ASSESSMENT: PREVENTS OR INTERFERES SOME ACTIVE ACTIVITIES AND ADLS

## 2019-07-18 ASSESSMENT — PAIN DESCRIPTION - ONSET: ONSET: ON-GOING

## 2019-07-18 ASSESSMENT — PAIN DESCRIPTION - DESCRIPTORS: DESCRIPTORS: ACHING

## 2019-07-18 NOTE — PROGRESS NOTES
Nutrition Assessment    Type and Reason for Visit: Initial, Positive Nutrition Screen    Nutrition Recommendations:     1. Renal diet, monitor and encourage intake  2. Add Low Calorie High Protein Ensure to aid in meeting protein needs  3. Monitor nutritional adequacy and need for further intervention    Nutrition Assessment: Positive screen for wounds. Patient is at risk for nutritional compromise as he has a hx of poor po intake at recent admission at [facility] and has open sternal wound as well as pressure injuries to his coccyx. Noted one year ago patient had wt loss over the next six months (7/2018-1/2018) however his wt appears stable for ths past six months. Patient is admitted for tx of cervial spine hardware malfunction. Patient also with CAMI/CKD and on a renal diet with elevated K+ and Phos. Per nephrology may need dialysis. Patient consumed 51-75% of lunch today. WIll order Ensure High Protein as pt was favorable to it at [facility]. Will continue to monitor nutritional status. Malnutrition Assessment:  · Malnutrition Status: At risk for malnutrition  · Context: Chronic illness  · Findings of the 6 clinical characteristics of malnutrition (Minimum of 2 out of 6 clinical characteristics is required to make the diagnosis of moderate or severe Protein Calorie Malnutrition based on AND/ASPEN Guidelines):  1. Energy Intake-Unable to assess,      2. Weight Loss-No significant weight loss,    3. Fat Loss-Unable to assess(r/t edema),    4. Muscle Loss-Unable to assess,    5. Fluid Accumulation-Moderate to severe fluid accumulation,    6.  Strength-Not measured    Nutrition Risk Level:  Moderate    Nutrient Needs:  · Estimated Daily Total Kcal: 6457-1863(65-44)  · Estimated Daily Protein (g): (1.2-1.4)  · Estimated Daily Total Fluid (ml/day):      Nutrition Diagnosis:   · Problem: Increased nutrient needs  · Etiology: related to Increased demand for energy/nutrients     Signs and symptoms:  as evidenced by

## 2019-07-18 NOTE — PROGRESS NOTES
 famotidine  20 mg Oral Daily    ferrous sulfate  325 mg Oral Daily with breakfast    fluticasone  2 spray Each Nostril Daily    gabapentin  300 mg Oral Nightly    melatonin  10 mg Oral Nightly    metoprolol succinate  50 mg Oral Daily    insulin lispro  0-6 Units Subcutaneous TID     insulin lispro  0-3 Units Subcutaneous Nightly     Continuous Infusions:   dextrose       PRN Meds:glucose, dextrose, glucagon (rDNA), dextrose, sodium chloride flush, magnesium hydroxide, ondansetron, acetaminophen, methocarbamol    Objective:   Vitals:   T-max:  Patient Vitals for the past 8 hrs:   BP Temp Temp src Pulse Resp SpO2 Height Weight   07/18/19 0600 107/81 -- -- 99 11 -- -- --   07/18/19 0500 96/81 -- -- 94 16 -- 5' 10\" (1.778 m) 243 lb 9.7 oz (110.5 kg)   07/18/19 0400 100/85 97.6 °F (36.4 °C) Oral 96 10 -- -- --   07/18/19 0300 94/72 -- -- 99 12 93 % -- --       Intake/Output Summary (Last 24 hours) at 7/18/2019 1045  Last data filed at 7/18/2019 0610  Gross per 24 hour   Intake --   Output 325 ml   Net -325 ml       Review of Systems   Constitutional: Negative for chills and fever. Respiratory: Negative for cough and shortness of breath. Cardiovascular: Negative for chest pain and palpitations. Physical Exam   Constitutional:   Ill-appearing, obese, white male. HENT:   Head: Normocephalic and atraumatic. Mouth/Throat: Oropharynx is clear and moist.   Eyes: Conjunctivae and EOM are normal.   Neck: In neck brace. No bruising. Cardiovascular: Normal rate, regular rhythm and normal heart sounds. Exam reveals no friction rub. No murmur heard. Pulmonary/Chest: Effort normal. No wheezes. Has basilar rales. Abdominal: Soft. Bowel sounds are normal. There is no tenderness. Musculoskeletal: Normal range of motion. He exhibits no deformity. Neurological: No cranial nerve deficit. Patient is alert. Oriented to person and time. Thought he was in [place].  Patient does not recall events of past weeks, but states that a lot has been going on. Does not remember where he came from or why he presented to ED. Skin: pitting edema in LE up to abdomen. Has 4x1cm open wound over sternum contiguous with sternotomy scar with serosanguinous drainage. Chronic pressure ulcer on left heel. Psychiatric: He has a normal mood and affect. His behavior is normal.     LABS:    CBC:   Recent Labs     07/17/19  0159 07/17/19  0606 07/18/19  0321   WBC 8.6 8.6 8.8   HGB 9.2* 9.0* 9.1*   HCT 30.6* 29.3* 30.6*    267 255   MCV 82.3 82.3 83.3     Renal:    Recent Labs     07/17/19  0606 07/17/19  1025 07/18/19  0321    138 136   K 5.5* 5.6* 5.1   CL 99 99 97*   CO2 23 24 23   * 119* 116*   CREATININE 3.1* 3.0* 2.9*   GLUCOSE 103* 92 88   CALCIUM 9.3 9.2 8.7   PHOS 6.4* 6.5* 6.8*   ANIONGAP 15 15 16     Hepatic:   Recent Labs     07/17/19  0606 07/17/19  1025 07/18/19  0321   AST  --   --  26   ALT  --   --  32   BILITOT  --   --  0.4   BILIDIR  --   --  <0.2   PROT  --   --  6.2*   LABALBU 2.7* 2.9* 2.8*  2.7*   ALKPHOS  --   --  132*     Troponin: No results for input(s): TROPONINI in the last 72 hours. BNP: No results for input(s): BNP in the last 72 hours. Lipids: No results for input(s): CHOL, HDL in the last 72 hours. Invalid input(s): LDLCALCU, TRIGLYCERIDE  ABGs:  No results for input(s): PHART, YLL6ULO, PO2ART, QUR6KWZ, BEART, THGBART, G5DKDPLK, MKR3ZHY in the last 72 hours. INR: No results for input(s): INR in the last 72 hours. Lactate: No results for input(s): LACTATE in the last 72 hours. Cultures:  -----------------------------------------------------------------  RAD:   XR CHEST PORTABLE   Final Result      Bilateral pleural effusions with associated bibasilar airspace disease right greater than left. CT CERVICAL SPINE WO CONTRAST   Final Result   1. Patient is status post C6 corpectomy with placement of a bone strut.  There is evidence of bone strut subsidence through the

## 2019-07-19 VITALS
TEMPERATURE: 97.5 F | RESPIRATION RATE: 16 BRPM | DIASTOLIC BLOOD PRESSURE: 62 MMHG | OXYGEN SATURATION: 96 % | WEIGHT: 243.61 LBS | SYSTOLIC BLOOD PRESSURE: 104 MMHG | HEART RATE: 87 BPM | HEIGHT: 70 IN | BODY MASS INDEX: 34.88 KG/M2

## 2019-07-19 LAB
ALBUMIN SERPL-MCNC: 2.1 G/DL (ref 3.4–5)
ALBUMIN SERPL-MCNC: 2.8 G/DL (ref 3.4–5)
ALBUMIN SERPL-MCNC: 2.8 G/DL (ref 3.4–5)
ANION GAP SERPL CALCULATED.3IONS-SCNC: 14 MMOL/L (ref 3–16)
ANION GAP SERPL CALCULATED.3IONS-SCNC: 15 MMOL/L (ref 3–16)
ANION GAP SERPL CALCULATED.3IONS-SCNC: 15 MMOL/L (ref 3–16)
BASOPHILS ABSOLUTE: 0 K/UL (ref 0–0.2)
BASOPHILS RELATIVE PERCENT: 0.4 %
BUN BLDV-MCNC: 110 MG/DL (ref 7–20)
BUN BLDV-MCNC: 112 MG/DL (ref 7–20)
BUN BLDV-MCNC: 98 MG/DL (ref 7–20)
CALCIUM SERPL-MCNC: 6.7 MG/DL (ref 8.3–10.6)
CALCIUM SERPL-MCNC: 9.3 MG/DL (ref 8.3–10.6)
CALCIUM SERPL-MCNC: 9.3 MG/DL (ref 8.3–10.6)
CHLORIDE BLD-SCNC: 108 MMOL/L (ref 99–110)
CHLORIDE BLD-SCNC: 97 MMOL/L (ref 99–110)
CHLORIDE BLD-SCNC: 99 MMOL/L (ref 99–110)
CO2: 18 MMOL/L (ref 21–32)
CO2: 24 MMOL/L (ref 21–32)
CO2: 24 MMOL/L (ref 21–32)
CREAT SERPL-MCNC: 2.3 MG/DL (ref 0.8–1.3)
CREAT SERPL-MCNC: 3 MG/DL (ref 0.8–1.3)
CREAT SERPL-MCNC: 3 MG/DL (ref 0.8–1.3)
EOSINOPHILS ABSOLUTE: 0.3 K/UL (ref 0–0.6)
EOSINOPHILS RELATIVE PERCENT: 4.2 %
GFR AFRICAN AMERICAN: 26
GFR AFRICAN AMERICAN: 26
GFR AFRICAN AMERICAN: 35
GFR NON-AFRICAN AMERICAN: 21
GFR NON-AFRICAN AMERICAN: 21
GFR NON-AFRICAN AMERICAN: 29
GLUCOSE BLD-MCNC: 110 MG/DL (ref 70–99)
GLUCOSE BLD-MCNC: 111 MG/DL (ref 70–99)
GLUCOSE BLD-MCNC: 133 MG/DL (ref 70–99)
GLUCOSE BLD-MCNC: 150 MG/DL (ref 70–99)
GLUCOSE BLD-MCNC: 155 MG/DL (ref 70–99)
HCT VFR BLD CALC: 28 % (ref 40.5–52.5)
HEMOGLOBIN: 8.4 G/DL (ref 13.5–17.5)
LYMPHOCYTES ABSOLUTE: 0.6 K/UL (ref 1–5.1)
LYMPHOCYTES RELATIVE PERCENT: 8.5 %
MCH RBC QN AUTO: 24.5 PG (ref 26–34)
MCHC RBC AUTO-ENTMCNC: 29.9 G/DL (ref 31–36)
MCV RBC AUTO: 82 FL (ref 80–100)
MONOCYTES ABSOLUTE: 0.4 K/UL (ref 0–1.3)
MONOCYTES RELATIVE PERCENT: 6.1 %
NEUTROPHILS ABSOLUTE: 5.6 K/UL (ref 1.7–7.7)
NEUTROPHILS RELATIVE PERCENT: 80.8 %
PDW BLD-RTO: 22.6 % (ref 12.4–15.4)
PERFORMED ON: ABNORMAL
PERFORMED ON: ABNORMAL
PHOSPHORUS: 5.4 MG/DL (ref 2.5–4.9)
PHOSPHORUS: 7.4 MG/DL (ref 2.5–4.9)
PHOSPHORUS: 7.5 MG/DL (ref 2.5–4.9)
PLATELET # BLD: 230 K/UL (ref 135–450)
PMV BLD AUTO: 7.6 FL (ref 5–10.5)
POTASSIUM SERPL-SCNC: 3.8 MMOL/L (ref 3.5–5.1)
POTASSIUM SERPL-SCNC: 5 MMOL/L (ref 3.5–5.1)
POTASSIUM SERPL-SCNC: 5.1 MMOL/L (ref 3.5–5.1)
RBC # BLD: 3.42 M/UL (ref 4.2–5.9)
SODIUM BLD-SCNC: 136 MMOL/L (ref 136–145)
SODIUM BLD-SCNC: 138 MMOL/L (ref 136–145)
SODIUM BLD-SCNC: 140 MMOL/L (ref 136–145)
WBC # BLD: 6.9 K/UL (ref 4–11)

## 2019-07-19 PROCEDURE — 80069 RENAL FUNCTION PANEL: CPT

## 2019-07-19 PROCEDURE — 2580000003 HC RX 258: Performed by: INTERNAL MEDICINE

## 2019-07-19 PROCEDURE — 6370000000 HC RX 637 (ALT 250 FOR IP): Performed by: STUDENT IN AN ORGANIZED HEALTH CARE EDUCATION/TRAINING PROGRAM

## 2019-07-19 PROCEDURE — 85025 COMPLETE CBC W/AUTO DIFF WBC: CPT

## 2019-07-19 PROCEDURE — 2580000003 HC RX 258: Performed by: STUDENT IN AN ORGANIZED HEALTH CARE EDUCATION/TRAINING PROGRAM

## 2019-07-19 RX ORDER — SEVELAMER CARBONATE 800 MG/1
800 TABLET, FILM COATED ORAL
Qty: 90 TABLET | Refills: 1 | Status: SHIPPED | OUTPATIENT
Start: 2019-07-19

## 2019-07-19 RX ORDER — SEVELAMER CARBONATE 800 MG/1
800 TABLET, FILM COATED ORAL
Status: DISCONTINUED | OUTPATIENT
Start: 2019-07-19 | End: 2019-07-19 | Stop reason: HOSPADM

## 2019-07-19 RX ORDER — SODIUM CHLORIDE 9 MG/ML
INJECTION, SOLUTION INTRAVENOUS CONTINUOUS
Status: DISCONTINUED | OUTPATIENT
Start: 2019-07-19 | End: 2019-07-19 | Stop reason: HOSPADM

## 2019-07-19 RX ORDER — METHOCARBAMOL 750 MG/1
750 TABLET, FILM COATED ORAL 3 TIMES DAILY PRN
Qty: 21 TABLET | Refills: 0 | Status: CANCELLED | OUTPATIENT
Start: 2019-07-19 | End: 2019-07-26

## 2019-07-19 RX ORDER — SEVELAMER CARBONATE 800 MG/1
800 TABLET, FILM COATED ORAL
Qty: 90 TABLET | Refills: 0 | Status: CANCELLED | OUTPATIENT
Start: 2019-07-19

## 2019-07-19 RX ORDER — METHOCARBAMOL 750 MG/1
750 TABLET, FILM COATED ORAL 3 TIMES DAILY PRN
Qty: 21 TABLET | Refills: 0 | Status: SHIPPED | OUTPATIENT
Start: 2019-07-19 | End: 2019-07-26

## 2019-07-19 RX ADMIN — FERROUS SULFATE TAB 325 MG (65 MG ELEMENTAL FE) 325 MG: 325 (65 FE) TAB at 09:43

## 2019-07-19 RX ADMIN — Medication 10 ML: at 09:45

## 2019-07-19 RX ADMIN — ALLOPURINOL 100 MG: 100 TABLET ORAL at 09:44

## 2019-07-19 RX ADMIN — SEVELAMER CARBONATE 800 MG: 800 TABLET, FILM COATED ORAL at 09:00

## 2019-07-19 RX ADMIN — SODIUM CHLORIDE: 9 INJECTION, SOLUTION INTRAVENOUS at 10:38

## 2019-07-19 RX ADMIN — SEVELAMER CARBONATE 800 MG: 800 TABLET, FILM COATED ORAL at 13:37

## 2019-07-19 RX ADMIN — MICONAZOLE NITRATE: 20 POWDER TOPICAL at 09:43

## 2019-07-19 RX ADMIN — TAMSULOSIN HYDROCHLORIDE 0.4 MG: 0.4 CAPSULE ORAL at 09:43

## 2019-07-19 RX ADMIN — FAMOTIDINE 20 MG: 20 TABLET ORAL at 09:43

## 2019-07-19 ASSESSMENT — PAIN SCALES - GENERAL
PAINLEVEL_OUTOF10: 2
PAINLEVEL_OUTOF10: 0
PAINLEVEL_OUTOF10: 3

## 2019-07-19 NOTE — CARE COORDINATION
[address] [facility]): [facility]  Phone: [phone] Fax: [phone]    LOC at discharge: [facility] [place] Completed: Yes    Notification completed in HENS/PAS?:  Not Applicable    IMM Completed:   Not Indicated    Transportation:  Transportation PLAN for discharge: EMS transportation   Mode of Transport: Ambulance stretcher - BLS  Reason for medical transport: Severe muscular weakness and de-conditioned state due to spinal infection and requires ambulance transport due to non ambulatory  Name of Transport Company: [employer]  Phone: [phone]  Time of Transport: 1400    Transport form completed: Yes  }    Referrals made at [facility] for outpatient continued care:  Not Applicable    Additional CM Notes: Discharge noted and arranged for transport via 1400 to [facility]. All orders faxed and Bryn Salgado in admissions was notified as well. Redd Vasquez and his family were provided with choice of provider; he and his family are in agreement with the discharge plan.     Care Transitions patient: No    Alejandro Briones RN  [facility]  Case Management Department  Ph: [phone]  Fax: [phone]

## 2019-07-19 NOTE — DISCHARGE SUMMARY
[facility] at bedside to [facility]. PICC line remains in place as well as mohamud catheter. All belongings gathered and given to transport service.

## 2019-07-19 NOTE — PLAN OF CARE
Problem: Falls - Risk of:  Goal: Will remain free from falls  Description  Will remain free from falls     Outcome: Ongoing  Note:   Non skid socks in use, bed alarm on with 2/4 guardrails in position. Bed is locked and in the lowest position with call light and personal belongings placed within reach. Will continue to monitor.

## 2019-07-19 NOTE — DISCHARGE INSTR - COC
Description Partial thickness 7/10/2019  5:39 AM   Black%Wound Bed 100 7/10/2019  5:39 AM   Culture Taken No 7/5/2019  8:52 AM   Number of days: 69       Wound 05/13/19 Tibial Anterior;Mid;Left (Active)   Number of days: 66       Wound 07/17/19 Toe (Comment  which one) Anterior;Right (Active)   Wound Image   7/17/2019  2:46 PM   Wound Traumatic 7/18/2019  4:00 AM   Dressing/Treatment Open to air 7/19/2019  4:00 AM   Wound Assessment Black 7/19/2019  4:00 AM   Drainage Amount None 7/19/2019  4:00 AM   Margins Attached edges 7/17/2019  2:46 PM   Shasta-wound Assessment Blanchable erythema 7/19/2019  4:00 AM   Black%Wound Bed 100 7/18/2019  4:00 PM   Number of days: 2       Wound 07/17/19 Toe (Comment  which one) Anterior; Left (Active)   Wound Image   7/17/2019  2:46 PM   Wound Traumatic 7/18/2019  4:00 AM   Dressing/Treatment Open to air 7/19/2019  4:00 AM   Wound Assessment Black 7/19/2019  4:00 AM   Drainage Amount None 7/19/2019  4:00 AM   Margins Attached edges 7/19/2019  4:00 AM   Shasta-wound Assessment Blanchable erythema 7/19/2019  4:00 AM   Black%Wound Bed 100 7/18/2019  4:00 PM   Number of days: 2       Wound 07/17/19 Coccyx (Active)   Wound Other 7/17/2019 12:15 PM   Dressing Status Clean;Dry; Intact 7/19/2019  4:00 AM   Dressing Changed Changed/New 7/17/2019  6:55 AM   Dressing/Treatment Other (comment) 7/19/2019  4:00 AM   Shasta-wound Assessment Red;Blanchable erythema 7/19/2019  4:00 AM   Number of days: 2        Elimination:  Continence:   · Bowel: No  · Bladder: No  Urinary Catheter: Insertion Date: ***   Colostomy/Ileostomy/Ileal Conduit: No       Date of Last BM: 7/19/2019      Intake/Output Summary (Last 24 hours) at 7/19/2019 1024  Last data filed at 7/19/2019 0945  Gross per 24 hour   Intake 850 ml   Output 1025 ml   Net -175 ml     I/O last 3 completed shifts: In: 840 [P.O.:840]  Out: 850 [Urine:850]    Safety Concerns:      At Risk for Falls    Impairments/Disabilities:      None    Nutrition

## 2019-07-19 NOTE — DISCHARGE SUMMARY
INTERNAL MEDICINE DEPARTMENT AT [address] [place] [address]  DISCHARGE SUMMARY    Patient ID: Malissa Navarrete                                             Discharge Date: 7/19/2019   Patient's PCP: Drew Mcpherson DO                                          Discharge Physician: Tony Willard MD  Admit Date: 7/17/2019   Admitting Physician: Micah Henderson MD    Admitting Diagnosis:  Cervical stenosis, s/p ACDF of C5-C7 and C6 corpectomy   Hyperkalemia in the setting of acute on chronic kidney disease. Acute kidney failure in the setting of chronic kidney failure  MRSA bacteremia due to sternotomy incision infection  Chronic Systolic Heart Failure    DISCHARGE DIAGNOSES:  Cervical stenosis, s/p ACDF of C5-C7 and C6 corpectomy   Hyperkalemia in the setting of acute on chronic kidney disease. Acute kidney failure in the setting of chronic kidney failure  MRSA bacteremia due to sternotomy incision infection  Chronic Systolic Heart Failure    Hospital Course:  60 y.o. M hx HFrEF (20-25%)CKD3 (not on RRT), CAD s/p 4v CABG, anoxic brain injury following cardiac arrest (1994), pAfib on elliquis, HTN, T2DM presented from LTAC at request of Dr. Phil Glover for neurologic monitoring in setting of cervical spine hardware malfunction and anticipated intervention once medically stable. Patient did not seem to know why he is here or how he got here.      Chart history: Patient Had CABG Dec 2018 after which he developed lower extremity weakness. Cervical MRI showed spinal stenosis with cord compression at C5-6. Followed by C6 corpectomy, C5-7 fusion. Also developed drainage from sternal incision and wound vac placed (grew serratia). Recently admitted to [facility] (5/70/29) which was complicated by CAMI, C.diff, acute neck pain, and UTI. Found to have loosened cervical hardware.  He was discharged on treatment for MRSA bacteremia (from sternal wound likely seeding cervical hardwarre) and chronic sternal osteomyelitis: PICC, IV daptomycin 6 weeks auscultation bilaterally  Heart: regular rate and rhythm, S1, S2 normal, no murmur, click, rub or gallop  Abdomen: soft, non-tender; bowel sounds normal; no masses,  no organomegaly  Extremities: extremities normal, atraumatic, no cyanosis or edema  Neurologic: Grossly normal    Consults: ID, nephrology, neurosurgery  Significant Diagnostic Studies:   XR CHEST PORTABLE   Final Result      Bilateral pleural effusions with associated bibasilar airspace disease right greater than left. CT CERVICAL SPINE WO CONTRAST   Final Result   1. Patient is status post C6 corpectomy with placement of a bone strut. There is evidence of bone strut subsidence through the inferior endplate of C5 and through the superior endplate of C7. An anterior fixation plate extending from C5 through C7    demonstrates loosening of the fixation screws at both C5 and C7.   2. Moderate-sized bilateral pleural effusions. Recommend correlation with chest radiograph.         Disposition: long term care facility  Discharged Condition: Stable  Follow Up: Primary Care Physician in one week    DISCHARGE MEDICATION:     Medication List      START taking these medications    sevelamer 800 MG tablet  Commonly known as:  RENVELA  Take 1 tablet by mouth 3 times daily (with meals)        CHANGE how you take these medications    apixaban 2.5 MG Tabs tablet  Commonly known as:  ELIQUIS  Take 1 tablet by mouth 2 times daily  What changed:  additional instructions        CONTINUE taking these medications    ACCU-CHEK COMPACT CARE KIT Kit  1 kit by Does not apply route daily     ACCU-CHEK MULTICLIX LANCETS Misc  USE ONE  TO CHECK GLUCOSE ONCE DAILY     acetaminophen 325 MG tablet  Commonly known as:  TYLENOL     allopurinol 100 MG tablet  Commonly known as:  ZYLOPRIM     aspirin 81 MG EC tablet  TAKE ONE TABLET BY MOUTH ONCE DAILY     blood glucose test strips strip  Commonly known as:  ASCENSIA AUTODISC VI;ONE TOUCH ULTRA TEST VI  1 each by In Vitro route daily

## 2019-07-22 PROBLEM — N18.30 CONTROLLED TYPE 2 DIABETES MELLITUS WITH STAGE 3 CHRONIC KIDNEY DISEASE, WITHOUT LONG-TERM CURRENT USE OF INSULIN (HCC): Status: ACTIVE | Noted: 2019-02-01

## 2019-07-22 PROBLEM — E11.22 CONTROLLED TYPE 2 DIABETES MELLITUS WITH STAGE 3 CHRONIC KIDNEY DISEASE, WITHOUT LONG-TERM CURRENT USE OF INSULIN (HCC): Status: ACTIVE | Noted: 2019-02-01

## 2019-07-22 PROBLEM — H25.13 AGE-RELATED NUCLEAR CATARACT, BILATERAL: Status: ACTIVE | Noted: 2019-07-22

## 2019-07-22 PROBLEM — H52.4 PRESBYOPIA: Status: ACTIVE | Noted: 2019-07-22

## 2019-07-22 PROBLEM — H43.813 PVD (POSTERIOR VITREOUS DETACHMENT), BOTH EYES: Status: ACTIVE | Noted: 2019-07-22

## 2019-07-22 LAB
BLOOD CULTURE, ROUTINE: NORMAL
CULTURE, BLOOD 2: NORMAL

## 2019-08-02 ENCOUNTER — TELEPHONE (OUTPATIENT)
Dept: INFECTIOUS DISEASES | Age: 59
End: 2019-08-02

## 2019-08-02 NOTE — TELEPHONE ENCOUNTER
[address] for lab results on patient. I was informed that the patient is in ICU after coding this morning.

## 2020-08-12 NOTE — CARE COORDINATION
Attempted to contact the patient; his preferred number listed stated that it was \"not in service. \" Left a VM on the other number listed with RN [facility] call back information.
Spoke with personnel at [address] [place] [address] Drive; states that the patient was discharged to home on 4/9/19. RN ACC will contact him and f/u. Future Appointments   Date Time Provider Samuel Thompson   5/29/2019  2:00 PM MD Elise Duffy MSN, RN  Care Coordinator  [phone]  [email or web address]
Detail Level: Detailed
Detail Level: Simple

## 2022-09-22 NOTE — ED PROVIDER NOTES
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 Maza Apt Left 03/25/2014    Dr. Charito Tan - w/trigger finger (3rd) & trigger thumb release    CARPAL TUNNEL RELEASE Right 04/14/2015    Dr. Anish West N/A 12/16/2018    Dr. Iram Anton - decompressive corpectomy C6 (>90%), microdissection, anterior cervical arthrodesis w/PEEK allograft, placement of corpectomy cage & Synthes plate I1-0    COLONOSCOPY  09/01/2015    Dr. Oneal - elis-diverticulosis, transverse colon adenomatous polyps    CORONARY ARTERY BYPASS GRAFT  12/14/2018    Dr. Vladislav Jones  04/05/2019    cystoscopy/bladder biopsy f/c exchange    CYSTOSCOPY N/A 4/5/2019    CYSTOSCOPY performed by Miriam Walsh MD at [address] [place] [address] Bilateral 02/27/2014    Dr. Valero Kinds POLYSOMNOGRAPHY  04/12/2016    Dr. Deborah Jung. HAFSA Muniz w/[facility]    CO CABG, ARTERIAL, THREE N/A 12/4/2018    Dr. Antoinette Smith - x3 (LIMA-LAD, BL SVG-OM, SVG-PDA)    RECONSTRUCTIVE REPAIR STERNAL N/A 12/20/2018    Dr. Antoinette Smith - I&D of sternum w/placement of wound VAC    STERNUM DEBRIDEMENT N/A 6/28/2019    STERNAL WOUND EXPLORATION WITH POSSIBLE WOUND VACUUM PLACEMENT performed by Harvinder Messina MD at [place] [id number] TRANSESOPHAGEAL ECHOCARDIOGRAM  12/04/2018    during CABG    TUNNELED VENOUS CATHETER PLACEMENT Right 12/24/2018    Dr. Avinash Ann - PICC via IJ     His family history includes High Blood Pressure in his brother, father, maternal grandfather, maternal grandmother, and mother. He reports that he has never smoked. He has never used smokeless tobacco. He reports that he does not drink alcohol or use drugs.     Medications 3 - 16    Glucose 157 (H) 70 - 99 mg/dL     (HH) 7 - 20 mg/dL    CREATININE 3.0 (H) 0.8 - 1.3 mg/dL    GFR Non-African American 21 (A) >60    GFR  26 (A) >60    Calcium 9.1 8.3 - 10.6 mg/dL     RECENT VITALS:  BP: 108/83, Temp: 97.4 °F (36.3 °C), Pulse: 92, Resp: 16, SpO2: 100 %     Procedures         ED Course     Nursing Notes, Past Medical Hx, Past Surgical Hx, Social Hx, Allergies, and Family Hx were reviewed. The patient was given the following medications:  Orders Placed This Encounter   Medications    AND Linked Order Group     insulin regular (HUMULIN R;NOVOLIN R) injection 10 Units     dextrose 50 % IV solution    glucose (GLUTOSE) 40 % oral gel 15 g    dextrose 50 % IV solution    glucagon (rDNA) injection 1 mg    dextrose 5 % solution    sodium bicarbonate 8.4 % injection 50 mEq    sodium polystyrene (KAYEXALATE) 15 GM/60ML suspension 30 g    calcium gluconate 1 g in dextrose 5 % 100 mL IVPB       CONSULTS:  IP CONSULT TO NEUROSURGERY  IP CONSULT TO NEPHROLOGY  IP CONSULT TO HOSPITALIST  IP CONSULT TO CRITICAL CARE  IP CONSULT TO NEPHROLOGY  IP CONSULT TO CRITICAL CARE    MEDICAL DECISION MAKING / ASSESSMENT / Emilie  is a 60 y.o. male with a history of sternal infection and kidney disease who presents initially for unclear reason. Had a CT scan performed last month which showed some hardware failure in his cervical spine. This potentially why he was sent today. Incidentally found to be hyperkalemic with worsening renal failure and uremia. Discussed with the on-call physician for kidney and hypertension center. Recommended insulin, D50, sodium bicarb and Kayexalate. Will admit to the ICU given his EKG changes and hyperkalemia for repeat potassium evaluation and determination as to whether he would benefit from hemodialysis.     Critical Care:  Due to the immediate potential for life-threatening deterioration due to hyperkalemia and renal failure, I obvious physical injury

## (undated) DEVICE — CLIP SM RED INTERN HMOCLP TITAN LIGATING

## (undated) DEVICE — SYSTEM BLD DEL

## (undated) DEVICE — SUTURE MCRYL SZ 4-0 L27IN ABSRB UD L19MM PS-2 1/2 CIR PRIM Y426H

## (undated) DEVICE — GOWN SIRUS NONREIN XL W/TWL: Brand: MEDLINE INDUSTRIES, INC.

## (undated) DEVICE — Z INACTIVE USE 2641838 CLIP INT L ORNG TI TRNSVRS GRV CHEVRON SHP W/ PRECIS TIP TO

## (undated) DEVICE — NEURO SPONGES: Brand: DEROYAL

## (undated) DEVICE — DRESSING GRMCDL 6 12FR D1N CNTR HOLE 4MM ANTMCRBL PRTCTVE DI

## (undated) DEVICE — PUNCH AORT DIA4MM LNG HNDL

## (undated) DEVICE — BLADE SAW W10XL54MM FOR PRI REPEAT STRNOTMY

## (undated) DEVICE — DBD-PACK,CYSTOSCOPY,PK VI,AURORA: Brand: MEDLINE

## (undated) DEVICE — ADHESIVE SKIN CLSR 0.7ML TOP DERMBND ADV

## (undated) DEVICE — PENCIL ES L3M ROCK SWCH S STL HEX LOK BLDE ELECTRD HOLSTER

## (undated) DEVICE — GAUZE,SPONGE,4"X4",8PLY,STRL,LF,10/TRAY: Brand: MEDLINE

## (undated) DEVICE — Device

## (undated) DEVICE — Z DUP USE 2522782 SOLUTION IRRIG 1000ML STRL H2O PLAS CONTAINER UROMATIC

## (undated) DEVICE — CANISTER NEG PRSS 500ML WND THER W/ TBNG NO PRSS RANG W/

## (undated) DEVICE — DRESSING WND VAC SM GRANUFOAM SENSATRAC

## (undated) DEVICE — CATHETER THORACENTHESIS 9 FRX20 IN EYES TOP

## (undated) DEVICE — PREP SOL PVP IODINE 4%  4 OZ/BTL

## (undated) DEVICE — TURNOVER KIT RM INF CTRL TECH

## (undated) DEVICE — CATHETER THOR L21IN DIA28FR R ANG SFT RADPQ STRP SIL

## (undated) DEVICE — SUTURE PDS II SZ 0 L36IN ABSRB VLT L36MM CT-1 1/2 CIR Z346H

## (undated) DEVICE — BLADE ES L2.75IN ELASTOMERIC COAT DURABLE BEND UPTO 90DEG

## (undated) DEVICE — LAMINECTOMY PK

## (undated) DEVICE — CANNULA VES L25IN RADPQ BODY W  1 W VLV 3MM BLNT TIP DLP

## (undated) DEVICE — COLLAR CERV ADJ UNIV AD 13-19 IN 2 PC RIGID REPL PD VISTA TX

## (undated) DEVICE — KIT BLWR MISTER 5P 15L W/ TBNG SET IRRIG MIST TO IMPROVE

## (undated) DEVICE — MEDI-VAC NON-CONDUCTIVE SUCTION TUBING: Brand: CARDINAL HEALTH

## (undated) DEVICE — SYRINGE IRRIG 60ML SFT PLIABLE BLB EZ TO GRP 1 HND USE W/

## (undated) DEVICE — BIPOLAR CABLE FLYING LEAD 12 FEET (3.7 M): Brand: MEGADYNE

## (undated) DEVICE — GOWN,SIRUS,POLYRNF,SETINSLV,XL,20/CS: Brand: MEDLINE

## (undated) DEVICE — CANNULA NSL 13FT TUBE AD ETCO2 DIV SAMP M

## (undated) DEVICE — 3M™ TEGADERM™ TRANSPARENT FILM DRESSING FRAME STYLE, 1626W, 4 IN X 4-3/4 IN (10 CM X 12 CM), 50/CT 4CT/CASE: Brand: 3M™ TEGADERM™

## (undated) DEVICE — SOLUTION IV 1000ML 0.9% SOD CHL

## (undated) DEVICE — KIT DRNGE PD W4XL4IN 500ML CATH VAC BTL W/ DRNGE LN DSG GZ

## (undated) DEVICE — BAG URIN STRL FOR URO CTCH SYS

## (undated) DEVICE — PRE OP PACK: Brand: MEDLINE INDUSTRIES, INC.

## (undated) DEVICE — 3M™ TEGADERM™ TRANSPARENT FILM DRESSING FRAME STYLE, 1624W, 2-3/8 IN X 2-3/4 IN (6 CM X 7 CM), 100/CT 4CT/CASE: Brand: 3M™ TEGADERM™

## (undated) DEVICE — MARKER REFLECTIVE REFLECTIVE TWST ON SPHERZ 5PK

## (undated) DEVICE — SUTURE VCRL SZ 3-0 L27IN ABSRB UD L36MM CT-1 1/2 CIR J258H

## (undated) DEVICE — Z DUP USE 2537558 SYSTEM ENDOSCP VES HARV VASO VW HEMOPRO

## (undated) DEVICE — PAD,NON-ADHERENT,3X8,STERILE,LF,1/PK: Brand: MEDLINE

## (undated) DEVICE — SURGICAL SET UP - SURE SET: Brand: MEDLINE INDUSTRIES, INC.

## (undated) DEVICE — ELECTRODE PT RET AD L9FT HI MOIST COND ADH HYDRGEL CORDED

## (undated) DEVICE — STAPLER SKIN H3.9MM WIRE DIA0.58MM CRWN 6.9MM 35 STPL ROT

## (undated) DEVICE — COVER,TABLE,HEAVY DUTY,77"X90",STRL: Brand: MEDLINE

## (undated) DEVICE — Z CONVERTED USE 2275871 SPONGE GZ W4XL4IN WHT 8 PLY CURITY

## (undated) DEVICE — Z DISCONTINUED USE 2516375 APPLICATOR MEDICATED 3 CC CLR STRL CHLORAPREP

## (undated) DEVICE — BIT DRL L14MM DIA2.2MM G FLAT CHK FOR ANT CERV PLT SYS

## (undated) DEVICE — CANNULA PERF L15IN DIA29FR VEN 3 STG THN WALL DSGN W  VENT

## (undated) DEVICE — CODMAN® SURGICAL PATTIES 1/2" X 3" (1.27CM X 7.62CM): Brand: CODMAN®

## (undated) DEVICE — AGENT HEMSTAT W3XL4IN OXIDIZED REGENERATED CELOS ABSRB FOR

## (undated) DEVICE — GLOVE ORANGE PI 7 1/2   MSG9075

## (undated) DEVICE — PROBE 8225101 5PK STD PRASS FL TIP ROHS

## (undated) DEVICE — COVER,MAYO STAND,XL,STERILE: Brand: MEDLINE

## (undated) DEVICE — BLADE ES L4IN INSUL EDGE

## (undated) DEVICE — BOWL MED L 32OZ PLAS W/ MOLD GRAD EZ OPN PEEL PCH

## (undated) DEVICE — DRESSING NEG PRSS W7.7XL11.2CM D1.75CM SM 2 SPRL GRANUFOAM

## (undated) DEVICE — MEDI-VAC YANKAUER SUCTION HANDLE: Brand: CARDINAL HEALTH

## (undated) DEVICE — COVER LT HNDL BLU PLAS

## (undated) DEVICE — TIP APPL TOP 2 SPRY

## (undated) DEVICE — SUTURE NONABSORBABLE MONOFILAMENT 4-0 RB-1 36 IN BLU PROLENE 8557H

## (undated) DEVICE — CRADLE ARM INDIV PT CARE KT COMP FOR FOR RADLUC WILSON FRME

## (undated) DEVICE — CHLORAPREP 26ML ORANGE

## (undated) DEVICE — SUTURE PROL SZ 6-0 L24IN NONABSORBABLE BLU L13MM C-1 3/8 8726H

## (undated) DEVICE — DRAPE THYROID

## (undated) DEVICE — EYE PROTECTOR FOAM MEDICHOICE

## (undated) DEVICE — SWAB CULTURET AMIES DBL

## (undated) DEVICE — SURE SET-DOUBLE BASIN-LF: Brand: MEDLINE INDUSTRIES, INC.

## (undated) DEVICE — SUTURE VCRL SZ 3-0 L18IN ABSRB UD L26MM SH 1/2 CIR J864D

## (undated) DEVICE — TOOL 14MH30 LEGEND 14CM 3MM: Brand: MIDAS REX ™

## (undated) DEVICE — CYSTO/BLADDER IRRIGATION SET, REGULATING CLAMP

## (undated) DEVICE — SUTURE ETHBND EXCEL SZ 0 L18IN NONABSORBABLE GRN L36MM CT-1 CX21D

## (undated) DEVICE — SUTURE MCRYL + SZ 4-0 L18IN ABSRB UD L19MM PS-2 3/8 CIR MCP496G

## (undated) DEVICE — GARMENT,MEDLINE,DVT,INT,CALF,MED, GEN2: Brand: MEDLINE

## (undated) DEVICE — 3M™ TEGADERM™ TRANSPARENT FILM DRESSING FRAME STYLE, 1627, 4 IN X 10 IN (10 CM X 25 CM), 20/CT 4CT/CASE: Brand: 3M™ TEGADERM™

## (undated) DEVICE — GOWN,SIRUS,POLYRNF,SETINSLV,L,20/CS: Brand: MEDLINE

## (undated) DEVICE — INVOICE RENTAL KCI WOUND VAC

## (undated) DEVICE — SUTURE ABSORBABLE BRAIDED 2-0 CT-1 27 IN UD VICRYL J259H

## (undated) DEVICE — GOWN SIRUS NONREIN LG W/TWL: Brand: MEDLINE INDUSTRIES, INC.

## (undated) DEVICE — APPLICATOR LNG TP 12.5IN

## (undated) DEVICE — SUTURE VCRL SZ 3-0 L27IN ABSRB UD L26MM SH 1/2 CIR J416H

## (undated) DEVICE — SUTURE ETHBND EXCEL SZ 2-0 L36IN NONABSORBABLE GRN SH-2 X559H

## (undated) DEVICE — GAUZE,SPONGE,4"X4",16PLY,XRAY,STRL,LF: Brand: MEDLINE

## (undated) DEVICE — COVER LT HNDL CAM BLU DISP W/ SURG CTRL

## (undated) DEVICE — SUTURE VCRL SZ 0 L18IN ABSRB UD L36MM CT-1 1/2 CIR J840D

## (undated) DEVICE — APPLICATOR PREP 26ML 0.7% IOD POVACRYLEX 74% ISO ALC ST

## (undated) DEVICE — KIT APPL 11:1 PROC W/ FIBRIJET MED CUP APPL TIP TY

## (undated) DEVICE — STERILE LATEX POWDER-FREE SURGICAL GLOVESWITH NITRILE COATING: Brand: PROTEXIS

## (undated) DEVICE — 3M™ STERI-STRIP™ REINFORCED ADHESIVE SKIN CLOSURES, R1549, 1/2 IN X 2 IN (12 MM X 50 MM), 6 STRIPS/ENVELOPE: Brand: 3M™ STERI-STRIP™

## (undated) DEVICE — INTENDED FOR TISSUE SEPARATION, AND OTHER PROCEDURES THAT REQUIRE A SHARP SURGICAL BLADE TO PUNCTURE OR CUT.: Brand: BARD-PARKER ® CARBON RIB-BACK BLADES

## (undated) DEVICE — TIP APPL 20 GAX5 CM 2 CANN MALL

## (undated) DEVICE — CANNULA PERF AD 20FR L8.5IN ART 3/8IN CONN NVENT MTL TIP

## (undated) DEVICE — COVER LT HNDL PLAS RIG 2 PER PK

## (undated) DEVICE — ANTI-FOG SOLUTION WITH FOAM PAD: Brand: DEVON

## (undated) DEVICE — SUTURE SZ 7 L18IN NONABSORBABLE SIL CCS L48MM 1/2 CIR STRNM M655G

## (undated) DEVICE — DRAPE MICSCP W132XL406CM LENS DIA68MM W VARI LENS2 FOR LEICA

## (undated) DEVICE — Z TEMPORARILY DISCONTINUED NO SUB IDED DRAIN SURG BLD RECVRY PT TB FOR ATS BG OASIS

## (undated) DEVICE — WAX SURG 2.5GM HEMSTAT BNE BEESWAX PARAFFIN ISO PALMITATE

## (undated) DEVICE — GLOVE SURG SZ 7.5 L11.2IN THK9.8MIL STRW LTX POLYMER BEAD

## (undated) DEVICE — PACK PROCEDURE SURG OPN HRT A BASIC

## (undated) DEVICE — CANNULA PERF 7FR L5.5IN AORT ROOT RADPQ STD TIP W/ VENT LN

## (undated) DEVICE — GLOVE,SURG,TRIUMPH MICRO,LTX,PF,7.5: Brand: MEDLINE

## (undated) DEVICE — SUTURE NONABSORBABLE MONOFILAMENT 7-0 BV-1 1X24 IN PROLENE 8702H